# Patient Record
Sex: MALE | Race: ASIAN | NOT HISPANIC OR LATINO | Employment: OTHER | URBAN - METROPOLITAN AREA
[De-identification: names, ages, dates, MRNs, and addresses within clinical notes are randomized per-mention and may not be internally consistent; named-entity substitution may affect disease eponyms.]

---

## 2017-07-14 ENCOUNTER — TRANSFERRED RECORDS (OUTPATIENT)
Dept: HEALTH INFORMATION MANAGEMENT | Facility: CLINIC | Age: 37
End: 2017-07-14

## 2019-01-03 ENCOUNTER — TRANSFERRED RECORDS (OUTPATIENT)
Dept: HEALTH INFORMATION MANAGEMENT | Facility: CLINIC | Age: 39
End: 2019-01-03

## 2019-02-28 ENCOUNTER — TRANSFERRED RECORDS (OUTPATIENT)
Dept: HEALTH INFORMATION MANAGEMENT | Facility: CLINIC | Age: 39
End: 2019-02-28

## 2019-04-29 ENCOUNTER — TRANSFERRED RECORDS (OUTPATIENT)
Dept: HEALTH INFORMATION MANAGEMENT | Facility: CLINIC | Age: 39
End: 2019-04-29

## 2019-05-06 ENCOUNTER — TRANSFERRED RECORDS (OUTPATIENT)
Dept: HEALTH INFORMATION MANAGEMENT | Facility: CLINIC | Age: 39
End: 2019-05-06

## 2019-05-09 ENCOUNTER — MEDICAL CORRESPONDENCE (OUTPATIENT)
Dept: HEALTH INFORMATION MANAGEMENT | Facility: CLINIC | Age: 39
End: 2019-05-09

## 2019-05-09 ENCOUNTER — TRANSFERRED RECORDS (OUTPATIENT)
Dept: HEALTH INFORMATION MANAGEMENT | Facility: CLINIC | Age: 39
End: 2019-05-09

## 2019-05-20 ENCOUNTER — TRANSFERRED RECORDS (OUTPATIENT)
Dept: HEALTH INFORMATION MANAGEMENT | Facility: CLINIC | Age: 39
End: 2019-05-20

## 2019-05-20 LAB
CREAT SERPL-MCNC: 7.14 MG/DL (ref 0.73–1.18)
GFR SERPL CREATININE-BSD FRML MDRD: 9 ML/MIN/1.73M2
GLUCOSE SERPL-MCNC: 97 MG/DL (ref 70–100)
POTASSIUM SERPL-SCNC: 4.5 MMOL/L (ref 3.5–5.1)

## 2019-05-23 ENCOUNTER — REFERRAL (OUTPATIENT)
Dept: TRANSPLANT | Facility: CLINIC | Age: 39
End: 2019-05-23

## 2019-05-23 DIAGNOSIS — E11.9 DIABETES MELLITUS, TYPE 2 (H): ICD-10-CM

## 2019-05-23 DIAGNOSIS — I10 ESSENTIAL HYPERTENSION: ICD-10-CM

## 2019-05-23 DIAGNOSIS — Z76.82 ORGAN TRANSPLANT CANDIDATE: ICD-10-CM

## 2019-05-23 DIAGNOSIS — Z01.818 ENCOUNTER FOR PRE-TRANSPLANT EVALUATION FOR KIDNEY AND PANCREAS TRANSPLANT: ICD-10-CM

## 2019-05-23 DIAGNOSIS — N18.5 CHRONIC RENAL FAILURE, STAGE 5 (H): ICD-10-CM

## 2019-05-23 NOTE — Clinical Note
Please see smart set for pre KP eval. Pt is not yet on dialysis. Pt does NOT need an  speaks english very well. peace Michelle

## 2019-05-23 NOTE — LETTER
June 6, 2019  Erik Cramer  [address] [place] [id number]      Dear Erik,    Thank you for your interest in the Transplant Center at [facility], [facility]. We look forward to being a part of your care team and assisting you through the transplant process.    As we discussed, your transplant coordinator is Viviana Williamson (Pancreas, Kidney).  You may call your coordinator at any time with questions or concerns.  Your first scheduled call will be on 6/25/19.  If this needs to change, call [phone].    Please complete the following.    1. Fill out and return the enclosed forms    Authorization for Electronic Communication    Authorization to Discuss Protected Health Information    Authorization for Release of Protected Health Information    2. Sign up for:    D[employer], access to your electronic medical record (see enclosed pamphlet)    [email or web address], a transplant education website    You can use these tools to learn more about your transplant, communicate with your care team, and track your medical details    Sincerely,    Solid Organ Transplant  [facility], [facility]    cc: Figueroa Washington MD, Ignacio Dietrich MD

## 2019-05-30 VITALS — HEIGHT: 65 IN | BODY MASS INDEX: 29.99 KG/M2 | WEIGHT: 180 LBS

## 2019-05-30 SDOH — HEALTH STABILITY: MENTAL HEALTH: HOW OFTEN DO YOU HAVE A DRINK CONTAINING ALCOHOL?: NEVER

## 2019-05-30 ASSESSMENT — MIFFLIN-ST. JEOR: SCORE: 1668.35

## 2019-05-30 NOTE — TELEPHONE ENCOUNTER
PCP:   Figueroa Washington   Referring Provider: Ignacio Dietrich  Referring Diagnosis:     Insurance information:  [employer]  Policy lucas:  SELF  Subscriber/policy/ID number:  [id number]  Group Number:  [id number]    Is patient in a group home/assisted living? NO  Does patient have a guardian? NO    Referral intake process completed.  Patient is aware that after financial approval is received, medical records will be requested.   Patient confirmed for a callback from transplant coordinator on 6/25/19 (within 2 weeks)  Tentative evaluation date: Not scheduled @ present time (within 4 weeks)    Confirmed coordinator will discuss evaluation process in more detail at the time of their call.   Patient is aware of the need to arrange age appropriate cancer screening, vaccinations, and dental care.  Reminded patient to complete questionnaire, complete medical records release, and review packet prior to evaluation visit .  Assessed patient for special needs (ie--wheelchair, assistance, guardian, and ):  NONE   Patient instructed to call [phone] with questions.

## 2019-06-25 NOTE — TELEPHONE ENCOUNTER
Reviewed records available today. Pt has stage 5 CKD from hypertension per clinic note dated 05/09/2019 per Dr. Ignacio Muhammad - scanned into Epic. No mention of kidney biopsy being done. Pt also has diabetes type 2 which was diagnosed at age 19 or 20 years old. he is currently taking insulin for, previously on metformin. Hgb A1C was 6.6 on 02/22/2019. No history of cardiac/pulmonary issues. BMI 31.56. All OK to proceed with KP evaluation.     Contacted patient and introduced myself as their Transplant Coordinator, also introduced the role of the Transplant Coordinator in the transplant process.  Explained the purpose of this call including reviewing next steps and answering questions. Pt confirmed he is interested in proceeding with KP evaluation. Pt states he uses 24 units of insulin a day and does not check his blood sugars because they have been stable. Pt stating he is currently scheduled to see a nephrologist here on 07/18/2019 for a second opinion about his kidney function status and if there is any chance that it may recover before he proceeds with an actual kidney transplant. Pt is also seeing a holistic doctor who is providing some type of massage treatment/herbals to try to get his kidney function to return. Pt however still wants to proceed with KP evaluation in case his kidney function does not return. I told pt this is all fine and is good for him to get a second opinion try his holistic doctor as these make sense to him at this time. I did review the importance of not using herbal medication or other non approved substances when on immunosuppression due to the unknown interactions. Pt expressed very good understanding of all.   Confirmed Referring Provider, Dialysis Center, and Primary Care Physician. Notified patient of the importance of continued communication with referring providers and primary care physicians.    Reviewed components of transplant evaluation process including necessary  appointments, tests, and procedures.    Answered questions for patient regarding evaluation, provided my name and contact information and requested they call with any additional questions.    Determined that patient would like additional information regarding transplant by:     Drop Down choices: Mail, Email, MyChart, Phone Call   Encourage MyChart   Notified patients that they will hear from a Transplant  to schedule evaluation. Instructed pt to bring someone with him to eval. Pt expressed very good understanding of all and was in good agreement with the plan.     Smart set orders into Epic today for pre kidney/pancreas transplant evaluation - routed ot .

## 2019-07-15 NOTE — TELEPHONE ENCOUNTER
RECORDS RECEIVED FROM:(External)  CKD stage 5. Schedule per [facility]   DATE RECEIVED: 07.18.2019   NOTES STATUS DETAILS   OFFICE NOTE from referring provider Received 05.09.5019   OFFICE NOTE from other specialist  Care Everywhere 06.06.2019 07.05.2019   *Only VASCULITIS or LUPUS gather office notes for the following N/A    *PULMONARY   N/A    *ENT N/A    *DERMATOLOGY N/A    *RHEUMATOLOGY N/A    DISCHARGE SUMMARY from hospital N/A    DISCHARGE REPORT from the ER N/A    MEDICATION LIST Received 05.20.2019   IMAGING  (NEED IMAGES AND REPORTS)     KIDNEY CT SCAN N/A    KIDNEY ULTRASOUND Care Everywhere 07.14.2017   LABS     CBC Received 03.14.2019   CMP Care Everywhere 07.10.2018   BMP Care Everywhere    UA Received 05.19.2018   URINE PROTEIN N/A    RENAL PANEL Received 03.28.2019   BIOPSY     KIDNEY BIOPSY  N/A

## 2019-07-17 DIAGNOSIS — N18.5 CKD (CHRONIC KIDNEY DISEASE) STAGE 5, GFR LESS THAN 15 ML/MIN (H): Primary | ICD-10-CM

## 2019-07-18 ENCOUNTER — OFFICE VISIT (OUTPATIENT)
Dept: NEPHROLOGY | Facility: CLINIC | Age: 39
End: 2019-07-18
Attending: INTERNAL MEDICINE
Payer: COMMERCIAL

## 2019-07-18 ENCOUNTER — PRE VISIT (OUTPATIENT)
Dept: NEPHROLOGY | Facility: CLINIC | Age: 39
End: 2019-07-18

## 2019-07-18 VITALS
WEIGHT: 173.6 LBS | BODY MASS INDEX: 28.92 KG/M2 | DIASTOLIC BLOOD PRESSURE: 87 MMHG | HEART RATE: 84 BPM | HEIGHT: 65 IN | OXYGEN SATURATION: 97 % | SYSTOLIC BLOOD PRESSURE: 173 MMHG

## 2019-07-18 DIAGNOSIS — N18.5 CKD (CHRONIC KIDNEY DISEASE) STAGE 5, GFR LESS THAN 15 ML/MIN (H): ICD-10-CM

## 2019-07-18 DIAGNOSIS — N18.5 ANEMIA DUE TO STAGE 5 CHRONIC KIDNEY DISEASE, NOT ON CHRONIC DIALYSIS (H): ICD-10-CM

## 2019-07-18 DIAGNOSIS — D63.1 ANEMIA DUE TO STAGE 5 CHRONIC KIDNEY DISEASE, NOT ON CHRONIC DIALYSIS (H): ICD-10-CM

## 2019-07-18 DIAGNOSIS — I15.1 HYPERTENSION SECONDARY TO OTHER RENAL DISORDERS: ICD-10-CM

## 2019-07-18 DIAGNOSIS — N25.81 SECONDARY RENAL HYPERPARATHYROIDISM (H): ICD-10-CM

## 2019-07-18 DIAGNOSIS — N18.5 CKD (CHRONIC KIDNEY DISEASE) STAGE 5, GFR LESS THAN 15 ML/MIN (H): Primary | ICD-10-CM

## 2019-07-18 PROBLEM — D64.9 ANEMIA: Status: ACTIVE | Noted: 2019-07-18

## 2019-07-18 LAB
ALBUMIN SERPL-MCNC: 3.2 G/DL (ref 3.4–5)
ALBUMIN UR-MCNC: >499 MG/DL
ANION GAP SERPL CALCULATED.3IONS-SCNC: 6 MMOL/L (ref 3–14)
APPEARANCE UR: CLEAR
BILIRUB UR QL STRIP: NEGATIVE
BUN SERPL-MCNC: 72 MG/DL (ref 7–30)
CALCIUM SERPL-MCNC: 7.3 MG/DL (ref 8.5–10.1)
CHLORIDE SERPL-SCNC: 107 MMOL/L (ref 94–109)
CO2 SERPL-SCNC: 26 MMOL/L (ref 20–32)
COLOR UR AUTO: YELLOW
CREAT SERPL-MCNC: 7.35 MG/DL (ref 0.66–1.25)
CREAT UR-MCNC: 88 MG/DL
DEPRECATED CALCIDIOL+CALCIFEROL SERPL-MC: 9 UG/L (ref 20–75)
ERYTHROCYTE [DISTWIDTH] IN BLOOD BY AUTOMATED COUNT: 11.9 % (ref 10–15)
GFR SERPL CREATININE-BSD FRML MDRD: 9 ML/MIN/{1.73_M2}
GLUCOSE SERPL-MCNC: 108 MG/DL (ref 70–99)
GLUCOSE UR STRIP-MCNC: 50 MG/DL
HCT VFR BLD AUTO: 31.9 % (ref 40–53)
HGB BLD-MCNC: 10.2 G/DL (ref 13.3–17.7)
HGB UR QL STRIP: NEGATIVE
KETONES UR STRIP-MCNC: NEGATIVE MG/DL
LEUKOCYTE ESTERASE UR QL STRIP: NEGATIVE
MCH RBC QN AUTO: 28.8 PG (ref 26.5–33)
MCHC RBC AUTO-ENTMCNC: 32 G/DL (ref 31.5–36.5)
MCV RBC AUTO: 90 FL (ref 78–100)
MUCOUS THREADS #/AREA URNS LPF: PRESENT /LPF
NITRATE UR QL: NEGATIVE
PH UR STRIP: 6 PH (ref 5–7)
PHOSPHATE SERPL-MCNC: 5.7 MG/DL (ref 2.5–4.5)
PLATELET # BLD AUTO: 292 10E9/L (ref 150–450)
POTASSIUM SERPL-SCNC: 5.1 MMOL/L (ref 3.4–5.3)
PROT UR-MCNC: 5.48 G/L
PROT/CREAT 24H UR: 6.2 G/G CR (ref 0–0.2)
RBC # BLD AUTO: 3.54 10E12/L (ref 4.4–5.9)
RBC #/AREA URNS AUTO: 9 /HPF (ref 0–2)
SODIUM SERPL-SCNC: 139 MMOL/L (ref 133–144)
SOURCE: ABNORMAL
SP GR UR STRIP: 1.01 (ref 1–1.03)
SQUAMOUS #/AREA URNS AUTO: <1 /HPF (ref 0–1)
UROBILINOGEN UR STRIP-MCNC: 0 MG/DL (ref 0–2)
WBC # BLD AUTO: 7.3 10E9/L (ref 4–11)
WBC #/AREA URNS AUTO: 5 /HPF (ref 0–5)

## 2019-07-18 PROCEDURE — G0463 HOSPITAL OUTPT CLINIC VISIT: HCPCS | Mod: ZF

## 2019-07-18 PROCEDURE — 80069 RENAL FUNCTION PANEL: CPT | Performed by: INTERNAL MEDICINE

## 2019-07-18 PROCEDURE — 84156 ASSAY OF PROTEIN URINE: CPT | Performed by: INTERNAL MEDICINE

## 2019-07-18 PROCEDURE — 36415 COLL VENOUS BLD VENIPUNCTURE: CPT | Performed by: INTERNAL MEDICINE

## 2019-07-18 PROCEDURE — 81001 URINALYSIS AUTO W/SCOPE: CPT | Performed by: INTERNAL MEDICINE

## 2019-07-18 PROCEDURE — 82306 VITAMIN D 25 HYDROXY: CPT | Performed by: INTERNAL MEDICINE

## 2019-07-18 PROCEDURE — 85027 COMPLETE CBC AUTOMATED: CPT | Performed by: INTERNAL MEDICINE

## 2019-07-18 RX ORDER — INSULIN GLARGINE 100 [IU]/ML
14 INJECTION, SOLUTION SUBCUTANEOUS AT BEDTIME
Refills: 2 | Status: ON HOLD | COMMUNITY
Start: 2019-06-06 | End: 2021-06-21

## 2019-07-18 RX ORDER — LANCETS
EACH MISCELLANEOUS
Status: ON HOLD | COMMUNITY
Start: 2019-05-20 | End: 2021-06-10

## 2019-07-18 RX ORDER — CALCIUM CARBONATE 500 MG/1
1 TABLET, CHEWABLE ORAL PRN
Status: ON HOLD | COMMUNITY
Start: 2019-05-09 | End: 2021-06-21

## 2019-07-18 RX ORDER — SILDENAFIL 50 MG/1
50 TABLET, FILM COATED ORAL
COMMUNITY
Start: 2018-11-19 | End: 2019-10-09

## 2019-07-18 RX ORDER — ISOPROPYL ALCOHOL 70 %
1 TOWELETTE (EA) MISCELLANEOUS
Status: ON HOLD | COMMUNITY
Start: 2016-02-17 | End: 2020-08-31

## 2019-07-18 RX ORDER — CALCITRIOL 0.25 UG/1
0.25 CAPSULE, LIQUID FILLED ORAL DAILY
Refills: 3 | COMMUNITY
Start: 2019-07-02 | End: 2019-12-03

## 2019-07-18 RX ORDER — ATORVASTATIN CALCIUM 80 MG/1
80 TABLET, FILM COATED ORAL EVERY EVENING
Refills: 2 | Status: ON HOLD | COMMUNITY
Start: 2018-11-14 | End: 2021-06-21

## 2019-07-18 RX ORDER — AMLODIPINE BESYLATE 10 MG/1
10 TABLET ORAL
Status: ON HOLD | COMMUNITY
Start: 2019-05-28 | End: 2020-08-31

## 2019-07-18 RX ORDER — HYDRALAZINE HYDROCHLORIDE 100 MG/1
100 TABLET, FILM COATED ORAL 3 TIMES DAILY
Refills: 0 | Status: ON HOLD | COMMUNITY
Start: 2018-12-27 | End: 2021-06-21

## 2019-07-18 RX ORDER — FUROSEMIDE 20 MG
40 TABLET ORAL 2 TIMES DAILY
Qty: 60 TABLET | Refills: 1 | Status: SHIPPED | OUTPATIENT
Start: 2019-07-18 | End: 2021-01-14

## 2019-07-18 RX ORDER — FUROSEMIDE 20 MG
TABLET ORAL
COMMUNITY
End: 2019-12-03

## 2019-07-18 ASSESSMENT — MIFFLIN-ST. JEOR: SCORE: 1639.32

## 2019-07-18 ASSESSMENT — PAIN SCALES - GENERAL: PAINLEVEL: NO PAIN (0)

## 2019-07-18 NOTE — LETTER
2019       RE: Erik Cramer  [address]  [facility] [address]     Dear Colleague,    Thank you for referring your patient, Erik Carmer, to the [facility] NEPHROLOGY at [facility]. Please see a copy of my visit note below.      Nephrology Clinic    Oly Martinez MD  2019     Name: Erik Cramer  MRN: [id number]  Age: 39 year old  : 1980  Referring provider: Figueroa Washington     Assessment and Plan:    # CKD Stage 5: Likely secondary to diabetic nephropathy and uncontrolled hypertension. He has had a negative w/u for other autoimmune diseases. IgA nephropathy cannot be entirely ruled out based on labs, but he has not had significant hematuria and his clinical course is most consistent with diabetic nephropathy. He has been on herbal supplements for his diabetes since early this year but it is hard to say if they contributed to any worsening. GFR now at 9 and has been stable over last several months. Creatinine at 6.6 mg/dl. UA with proteinuria and P/Cr ratio of 6 g/g. Does not have any uremic symptoms at this time.   --Given the chronicity of his kidney disease, biopsy may not  and hence not recommended at this time  --No acute indication for dialysis however discussed that he may need it pretty soon.   --He has an appointment for transplant evaluation  --Discussed dialysis modalities with him in brief  --Recommend a low sodium and potassium diet.    --He will continue to f/u with Dr Dietrich in [place].     # Diabetes Type 2: Under control with the help of herbal supplements. Patient tracks blood sugar at home.     # Hypertension: Hypervolemic on exam. /87 today in clinic which is unusually high for him. Has a low salt diet. 2+ pitting edema b/l which has been getting worse more recently.  - Will increase Furosemide to 40 mg BID.     # Anemia in CKD: Hemoglobin is at 10.2. No indication for EPO    # Secondary hyperparathyroidism:  on Calcitriol per his primary nephrologist.     Follow-up: No follow-ups on file. Will follow up in [place] with Dr. Dietrich.     Reason For Visit:   Second Opinion     HPI:   Identification:  Erik Cramer is a 39 year old male with a history of diabetes mellitus type 2 and hypertension. He was diagnosed with DM at age 19 (around 2002) and did not require insulin at the time. He is now on insulin. He was diagnosed around the same time with hypertension. His blood pressure has not been controlled for a number of years, even with medication.   In the last year, the patient's kidney function has declined rapidly. The patient was last seen by Dr. Dietrich at [facility] on 6/6/19; please see that note for further details. Since this last visit, the patient reports doing alright. He is here for a second opinion on next steps today. He is not keen on doing dialysis, as his physical health does not reflect a need for it.  The patient reports lower leg edema bilaterally. The patient denies dizziness, SOB, decreased appetite, and sleep disturbances. He denies skin rashes, joint pain, or history of kidney stones. The patient is not aware of any relatives that have kidney disease, but the patient has a family history of hypertension and diabetes.      Review of Systems:   Pertinent items are noted in HPI or as below, remainder of complete ROS is negative.      Active Medications:     Current Outpatient Medications:      Alcohol Sheets (ALCOH-WIPE) SHEE, Apply 1 each topically, Disp: , Rfl:      amLODIPine (NORVASC) 10 MG tablet, Take 10 mg by mouth, Disp: , Rfl:      blood glucose (ACCU-CHEK GUIDE) test strip, 1 strip, Disp: , Rfl:      blood glucose monitoring (ACCU-CHEK FASTCLIX) lancets, Testing blood sugars 2 time(s) a day.  Indications: Type 2 Diabetes, Disp: , Rfl:      calcium carbonate (TUMS) 500 MG chewable tablet, Take 1,000 mg by mouth, Disp: , Rfl:      furosemide (LASIX) 20 MG tablet, Take 2 tablets (40 mg) by  "mouth 2 times daily, Disp: 60 tablet, Rfl: 1     insulin pen needle (BD RAÚL U/F) 32G X 4 MM miscellaneous, use as directed with lantus pen once a day, Disp: , Rfl:      sildenafil (VIAGRA) 50 MG tablet, Take 50 mg by mouth, Disp: , Rfl:      atorvastatin (LIPITOR) 40 MG tablet, Take 40 mg by mouth daily, Disp: , Rfl: 2     calcitRIOL (ROCALTROL) 0.25 MCG capsule, Take 0.25 mcg by mouth daily, Disp: , Rfl: 3     furosemide (LASIX) 20 MG tablet, , Disp: , Rfl:      hydrALAZINE (APRESOLINE) 25 MG tablet, Take 2 tablets BY MOUTH TWICE DAILY, Disp: , Rfl: 0     LANTUS SOLOSTAR 100 UNIT/ML soln, Inject 30 units sub-q every evening., Disp: , Rfl: 2     Allergies:   Patient has no known allergies.      Past Medical History:  Diabetes Type 2  Hypertension     Past Surgical History:  Retinal detachment eye surgery Left eye, 2015  Trigger finger, left    Family History:   Hypertension  Diabetes     Social History:   - Marital status:   - Smoking status: Never Smoker  - Smokeless tobacco: Never Used  - Alcohol use: Yes   - Frequency: Social     Physical Exam:  /87   Pulse 84   Ht 1.651 m (5' 5\")   Wt 78.7 kg (173 lb 9.6 oz)   SpO2 97%   BMI 28.89 kg/m        Laboratory:  CMP  Recent Labs   Lab Test 07/18/19  1158 05/20/19     --    POTASSIUM 5.1 4.5   CHLORIDE 107  --    CO2 26  --    ANIONGAP 6  --    * 97   BUN 72*  --    CR 7.35* 7.14*   GFRESTIMATED 9* 9*   GFRESTBLACK 10* 10*   SHAY 7.3*  --    PHOS 5.7*  --    ALBUMIN 3.2*  --      CBC  Recent Labs   Lab Test 07/18/19  1158   HGB 10.2*   WBC 7.3   RBC 3.54*   HCT 31.9*   MCV 90   MCH 28.8   MCHC 32.0   RDW 11.9        INR  No lab results found.  ABG  No lab results found.   URINE STUDIES  Recent Labs   Lab Test 07/18/19  1200   COLOR Yellow   APPEARANCE Clear   URINEGLC 50*   URINEBILI Negative   URINEKETONE Negative   SG 1.011   UBLD Negative   URINEPH 6.0   PROTEIN >499*   NITRITE Negative   LEUKEST Negative   RBCU 9*   WBCU 5 "     Recent Labs   Lab Test 07/18/19  1200   UTPG 6.20*     PTH  No lab results found.  IRON STUDIES   No lab results found.    Imaging:    Scribe Disclosure:   I, Ghislaine Penny, am serving as a scribe to document services personally performed by Oly Martinez MD at this visit, based upon the provider's statements to me. All documentation has been reviewed by the aforementioned provider prior to being entered into the official medical record.     Again, thank you for allowing me to participate in the care of your patient.      Sincerely,    Oly Martinez MD

## 2019-07-18 NOTE — PATIENT INSTRUCTIONS
Patient Education     Discharge Instructions: Eating a Low-Potassium Diet  Your healthcare provider has prescribed a low-potassium diet for you. This kind of diet is advised for people who have certain kidney problems. Potassium is needed for muscle function. But too much potassium is a health risk. Potassium is found in many foods. Read below to find out how to change your diet.  Foods to limit  Some foods are high in potassium. Limit your daily intake of the foods in the list below.    Fruits: apricots (canned and fresh), bananas, cantaloupe, honeydew melon, kiwi, nectarines, pomegranates, oranges, orange juice, pears, dried fruits (apricots, dates, figs, prunes), and prune juice    Vegetables: asparagus, avocado, artichoke, bamboo shoots, beets, brussels sprouts, cabbage, celery, chard, okra, potatoes (white and sweet), pumpkin, rutabaga, spinach (cooked), squash, tomato, tomato sauce, tomato juice, and vegetable juice cocktail    Legumes: black-eyed peas, chickpeas, lentils, lima beans, navy beans, red kidney beans, soybeans, and split peas    Nuts and seeds: almonds, Brazil nuts, cashews, peanuts, peanut butter, pecans, pumpkin seeds, sunflower seeds, and walnuts    Breads and cereals: bran and whole-grain products    Dairy foods: milk, cheese, ice cream, yogurt    Animal protein: all forms of animal protein    Other: chocolate, cocoa, coconut milk, and molasses  Tips    Ask your healthcare provider how much potassium you are allowed each day. This will help you figure out serving sizes for your needs.    Check labels for potassium. It may be listed as potassium chloride.    Do not use salt substitutes. These often have potassium in them.    Cook frozen fruits and vegetables in water. Rinse and drain them well before eating.    Drain liquid from all canned fruits and vegetables. Rinse them before eating.    Reduce the potassium in potatoes. Peel them, slice thinly, and soak in water for at least 4  hours.    Reduce the potassium in green leafy vegetables. Soak them in water for at least 4 hours.    Eat white rice and refined white flour products. These include white bread, pasta, and grits.  Follow-up  Make a follow-up appointment as advised by your healthcare provider.  When to call your healthcare provider  Call your healthcare provider right away if you have any of the following:    Fatigue    Shortness of breath    Chest pain    Slow, irregular heartbeat    Fainting    Dizziness    Lightheadedness    Confusion   Date Last Reviewed: 6/1/2017 2000-2018 [employer]. [address] [place] [address]. All rights reserved. This information is not intended as a substitute for professional medical care. Always follow your healthcare professional's instructions.           1. Increase the dose of furosemide from 40 mg daily to 40 mg 2 times a day.

## 2019-07-18 NOTE — LETTER
2019      RE: Erik Cramer  [address]  [facility] [address]         Nephrology Clinic    Oly Martinez MD  2019     Name: Erik Cramer  MRN: [id number]  Age: 39 year old  : 1980  Referring provider: Figueroa Washington     Assessment and Plan:    # CKD Stage 5: Likely secondary to diabetic nephropathy and uncontrolled hypertension. He has had a negative w/u for other autoimmune diseases. IgA nephropathy cannot be entirely ruled out based on labs, but he has not had significant hematuria and his clinical course is most consistent with diabetic nephropathy. He has been on herbal supplements for his diabetes since early this year but it is hard to say if they contributed to any worsening. GFR now at 9 and has been stable over last several months. Creatinine at 6.6 mg/dl. UA with proteinuria and P/Cr ratio of 6 g/g. Does not have any uremic symptoms at this time.   --Given the chronicity of his kidney disease, biopsy may not  and hence not recommended at this time  --No acute indication for dialysis however discussed that he may need it pretty soon.   --He has an appointment for transplant evaluation  --Discussed dialysis modalities with him in brief  --Recommend a low sodium and potassium diet.    --He will continue to f/u with Dr Dietrich in [place].     # Diabetes Type 2: Under control with the help of herbal supplements. Patient tracks blood sugar at home.     # Hypertension: Hypervolemic on exam. /87 today in clinic which is unusually high for him. Has a low salt diet. 2+ pitting edema b/l which has been getting worse more recently.  - Will increase Furosemide to 40 mg BID.     # Anemia in CKD: Hemoglobin is at 10.2. No indication for EPO    # Secondary hyperparathyroidism: on Calcitriol per his primary nephrologist.     Follow-up: No follow-ups on file. Will follow up in [place] with Dr. Dietrich.     Reason For Visit:   Second Opinion     HPI:   Identification:  Erik  FLORINA Cramer is a 39 year old male with a history of diabetes mellitus type 2 and hypertension. He was diagnosed with DM at age 19 (around 2002) and did not require insulin at the time. He is now on insulin. He was diagnosed around the same time with hypertension. His blood pressure has not been controlled for a number of years, even with medication.   In the last year, the patient's kidney function has declined rapidly. The patient was last seen by Dr. Dietrich at [facility] on 6/6/19; please see that note for further details. Since this last visit, the patient reports doing alright. He is here for a second opinion on next steps today. He is not keen on doing dialysis, as his physical health does not reflect a need for it.  The patient reports lower leg edema bilaterally. The patient denies dizziness, SOB, decreased appetite, and sleep disturbances. He denies skin rashes, joint pain, or history of kidney stones. The patient is not aware of any relatives that have kidney disease, but the patient has a family history of hypertension and diabetes.      Review of Systems:   Pertinent items are noted in HPI or as below, remainder of complete ROS is negative.      Active Medications:     Current Outpatient Medications:      Alcohol Sheets (ALCOH-WIPE) SHEE, Apply 1 each topically, Disp: , Rfl:      amLODIPine (NORVASC) 10 MG tablet, Take 10 mg by mouth, Disp: , Rfl:      blood glucose (ACCU-CHEK GUIDE) test strip, 1 strip, Disp: , Rfl:      blood glucose monitoring (ACCU-CHEK FASTCLIX) lancets, Testing blood sugars 2 time(s) a day.  Indications: Type 2 Diabetes, Disp: , Rfl:      calcium carbonate (TUMS) 500 MG chewable tablet, Take 1,000 mg by mouth, Disp: , Rfl:      furosemide (LASIX) 20 MG tablet, Take 2 tablets (40 mg) by mouth 2 times daily, Disp: 60 tablet, Rfl: 1     insulin pen needle (BD RAÚL U/F) 32G X 4 MM miscellaneous, use as directed with lantus pen once a day, Disp: , Rfl:      sildenafil (VIAGRA) 50 MG  "tablet, Take 50 mg by mouth, Disp: , Rfl:      atorvastatin (LIPITOR) 40 MG tablet, Take 40 mg by mouth daily, Disp: , Rfl: 2     calcitRIOL (ROCALTROL) 0.25 MCG capsule, Take 0.25 mcg by mouth daily, Disp: , Rfl: 3     furosemide (LASIX) 20 MG tablet, , Disp: , Rfl:      hydrALAZINE (APRESOLINE) 25 MG tablet, Take 2 tablets BY MOUTH TWICE DAILY, Disp: , Rfl: 0     LANTUS SOLOSTAR 100 UNIT/ML soln, Inject 30 units sub-q every evening., Disp: , Rfl: 2     Allergies:   Patient has no known allergies.      Past Medical History:  Diabetes Type 2  Hypertension     Past Surgical History:  Retinal detachment eye surgery Left eye, 2015  Trigger finger, left    Family History:   Hypertension  Diabetes     Social History:   - Marital status:   - Smoking status: Never Smoker  - Smokeless tobacco: Never Used  - Alcohol use: Yes   - Frequency: Social     Physical Exam:  /87   Pulse 84   Ht 1.651 m (5' 5\")   Wt 78.7 kg (173 lb 9.6 oz)   SpO2 97%   BMI 28.89 kg/m        Laboratory:  CMP  Recent Labs   Lab Test 07/18/19  1158 05/20/19     --    POTASSIUM 5.1 4.5   CHLORIDE 107  --    CO2 26  --    ANIONGAP 6  --    * 97   BUN 72*  --    CR 7.35* 7.14*   GFRESTIMATED 9* 9*   GFRESTBLACK 10* 10*   SHAY 7.3*  --    PHOS 5.7*  --    ALBUMIN 3.2*  --      CBC  Recent Labs   Lab Test 07/18/19  1158   HGB 10.2*   WBC 7.3   RBC 3.54*   HCT 31.9*   MCV 90   MCH 28.8   MCHC 32.0   RDW 11.9        INR  No lab results found.  ABG  No lab results found.   URINE STUDIES  Recent Labs   Lab Test 07/18/19  1200   COLOR Yellow   APPEARANCE Clear   URINEGLC 50*   URINEBILI Negative   URINEKETONE Negative   SG 1.011   UBLD Negative   URINEPH 6.0   PROTEIN >499*   NITRITE Negative   LEUKEST Negative   RBCU 9*   WBCU 5     Recent Labs   Lab Test 07/18/19  1200   UTPG 6.20*     PTH  No lab results found.  IRON STUDIES   No lab results found.    Imaging:    Scribe Disclosure:   Ghislaine BHAKTA, am serving as a scribe " to document services personally performed by Oly Martinez MD at this visit, based upon the provider's statements to me. All documentation has been reviewed by the aforementioned provider prior to being entered into the official medical record.     Oly Martinez MD

## 2019-07-18 NOTE — PROGRESS NOTES
Nephrology Clinic    Oly Martinez MD  2019     Name: Erik Cramer  MRN: [id number]  Age: 39 year old  : 1980  Referring provider: Figueroa Washington     Assessment and Plan:    # CKD Stage 5: Likely secondary to diabetic nephropathy and uncontrolled hypertension. He has had a negative w/u for other autoimmune diseases. IgA nephropathy cannot be entirely ruled out based on labs, but he has not had significant hematuria and his clinical course is most consistent with diabetic nephropathy. He has been on herbal supplements for his diabetes since early this year but it is hard to say if they contributed to any worsening. GFR now at 9 and has been stable over last several months. Creatinine at 6.6 mg/dl. UA with proteinuria and P/Cr ratio of 6 g/g. Does not have any uremic symptoms at this time.   --Given the chronicity of his kidney disease, biopsy may not  and hence not recommended at this time  --No acute indication for dialysis however discussed that he may need it pretty soon.   --He has an appointment for transplant evaluation  --Discussed dialysis modalities with him in brief  --Recommend a low sodium and potassium diet.    --He will continue to f/u with Dr Dietrich in [place].     # Diabetes Type 2: Under control with the help of herbal supplements. Patient tracks blood sugar at home.     # Hypertension: Hypervolemic on exam. /87 today in clinic which is unusually high for him. Has a low salt diet. 2+ pitting edema b/l which has been getting worse more recently.  - Will increase Furosemide to 40 mg BID.     # Anemia in CKD: Hemoglobin is at 10.2. No indication for EPO    # Secondary hyperparathyroidism: on Calcitriol per his primary nephrologist.     Follow-up: No follow-ups on file. Will follow up in [place] with Dr. Dietrich.     Reason For Visit:   Second Opinion     HPI:   Identification:  Erik Cramer is a 39 year old male with a history of diabetes mellitus type  2 and hypertension. He was diagnosed with DM at age 19 (around 2002) and did not require insulin at the time. He is now on insulin. He was diagnosed around the same time with hypertension. His blood pressure has not been controlled for a number of years, even with medication.   In the last year, the patient's kidney function has declined rapidly. The patient was last seen by Dr. Dietrich at [facility] on 6/6/19; please see that note for further details. Since this last visit, the patient reports doing alright. He is here for a second opinion on next steps today. He is not keen on doing dialysis, as his physical health does not reflect a need for it.  The patient reports lower leg edema bilaterally. The patient denies dizziness, SOB, decreased appetite, and sleep disturbances. He denies skin rashes, joint pain, or history of kidney stones. The patient is not aware of any relatives that have kidney disease, but the patient has a family history of hypertension and diabetes.      Review of Systems:   Pertinent items are noted in HPI or as below, remainder of complete ROS is negative.      Active Medications:     Current Outpatient Medications:      Alcohol Sheets (ALCOH-WIPE) SHEE, Apply 1 each topically, Disp: , Rfl:      amLODIPine (NORVASC) 10 MG tablet, Take 10 mg by mouth, Disp: , Rfl:      blood glucose (ACCU-CHEK GUIDE) test strip, 1 strip, Disp: , Rfl:      blood glucose monitoring (ACCU-CHEK FASTCLIX) lancets, Testing blood sugars 2 time(s) a day.  Indications: Type 2 Diabetes, Disp: , Rfl:      calcium carbonate (TUMS) 500 MG chewable tablet, Take 1,000 mg by mouth, Disp: , Rfl:      furosemide (LASIX) 20 MG tablet, Take 2 tablets (40 mg) by mouth 2 times daily, Disp: 60 tablet, Rfl: 1     insulin pen needle (BD RAÚL U/F) 32G X 4 MM miscellaneous, use as directed with lantus pen once a day, Disp: , Rfl:      sildenafil (VIAGRA) 50 MG tablet, Take 50 mg by mouth, Disp: , Rfl:      atorvastatin (LIPITOR) 40  "MG tablet, Take 40 mg by mouth daily, Disp: , Rfl: 2     calcitRIOL (ROCALTROL) 0.25 MCG capsule, Take 0.25 mcg by mouth daily, Disp: , Rfl: 3     furosemide (LASIX) 20 MG tablet, , Disp: , Rfl:      hydrALAZINE (APRESOLINE) 25 MG tablet, Take 2 tablets BY MOUTH TWICE DAILY, Disp: , Rfl: 0     LANTUS SOLOSTAR 100 UNIT/ML soln, Inject 30 units sub-q every evening., Disp: , Rfl: 2     Allergies:   Patient has no known allergies.      Past Medical History:  Diabetes Type 2  Hypertension     Past Surgical History:  Retinal detachment eye surgery Left eye, 2015  Trigger finger, left    Family History:   Hypertension  Diabetes     Social History:   - Marital status:   - Smoking status: Never Smoker  - Smokeless tobacco: Never Used  - Alcohol use: Yes   - Frequency: Social     Physical Exam:  /87   Pulse 84   Ht 1.651 m (5' 5\")   Wt 78.7 kg (173 lb 9.6 oz)   SpO2 97%   BMI 28.89 kg/m       Laboratory:  CMP  Recent Labs   Lab Test 07/18/19  1158 05/20/19     --    POTASSIUM 5.1 4.5   CHLORIDE 107  --    CO2 26  --    ANIONGAP 6  --    * 97   BUN 72*  --    CR 7.35* 7.14*   GFRESTIMATED 9* 9*   GFRESTBLACK 10* 10*   SHAY 7.3*  --    PHOS 5.7*  --    ALBUMIN 3.2*  --      CBC  Recent Labs   Lab Test 07/18/19  1158   HGB 10.2*   WBC 7.3   RBC 3.54*   HCT 31.9*   MCV 90   MCH 28.8   MCHC 32.0   RDW 11.9        INR  No lab results found.  ABG  No lab results found.   URINE STUDIES  Recent Labs   Lab Test 07/18/19  1200   COLOR Yellow   APPEARANCE Clear   URINEGLC 50*   URINEBILI Negative   URINEKETONE Negative   SG 1.011   UBLD Negative   URINEPH 6.0   PROTEIN >499*   NITRITE Negative   LEUKEST Negative   RBCU 9*   WBCU 5     Recent Labs   Lab Test 07/18/19  1200   UTPG 6.20*     PTH  No lab results found.  IRON STUDIES   No lab results found.    Imaging:    Scribe Disclosure:   Ghislaine BHAKTA, am serving as a scribe to document services personally performed by Oly Martinez MD at this " visit, based upon the provider's statements to me. All documentation has been reviewed by the aforementioned provider prior to being entered into the official medical record.

## 2019-07-22 ENCOUNTER — TELEPHONE (OUTPATIENT)
Dept: NEPHROLOGY | Facility: CLINIC | Age: 39
End: 2019-07-22

## 2019-07-22 DIAGNOSIS — E56.9 VITAMIN DEFICIENCY: Primary | ICD-10-CM

## 2019-07-22 RX ORDER — CHOLECALCIFEROL (VITAMIN D3) 50 MCG
2000 TABLET ORAL DAILY
Qty: 90 TABLET | Refills: 0 | Status: SHIPPED | OUTPATIENT
Start: 2019-07-22

## 2019-07-29 ENCOUNTER — ALLIED HEALTH/NURSE VISIT (OUTPATIENT)
Dept: TRANSPLANT | Facility: CLINIC | Age: 39
End: 2019-07-29
Attending: PHYSICIAN ASSISTANT
Payer: COMMERCIAL

## 2019-07-29 ENCOUNTER — DOCUMENTATION ONLY (OUTPATIENT)
Dept: TRANSPLANT | Facility: CLINIC | Age: 39
End: 2019-07-29

## 2019-07-29 ENCOUNTER — ANCILLARY PROCEDURE (OUTPATIENT)
Dept: CARDIOLOGY | Facility: CLINIC | Age: 39
End: 2019-07-29
Attending: PHYSICIAN ASSISTANT
Payer: COMMERCIAL

## 2019-07-29 ENCOUNTER — ANCILLARY PROCEDURE (OUTPATIENT)
Dept: GENERAL RADIOLOGY | Facility: CLINIC | Age: 39
End: 2019-07-29
Attending: PHYSICIAN ASSISTANT
Payer: COMMERCIAL

## 2019-07-29 VITALS
OXYGEN SATURATION: 97 % | HEIGHT: 65 IN | TEMPERATURE: 97.6 F | SYSTOLIC BLOOD PRESSURE: 163 MMHG | BODY MASS INDEX: 29.31 KG/M2 | WEIGHT: 175.9 LBS | DIASTOLIC BLOOD PRESSURE: 77 MMHG | HEART RATE: 79 BPM

## 2019-07-29 DIAGNOSIS — E11.9 DIABETES MELLITUS, TYPE 2 (H): ICD-10-CM

## 2019-07-29 DIAGNOSIS — I15.0 RENOVASCULAR HYPERTENSION: ICD-10-CM

## 2019-07-29 DIAGNOSIS — I10 ESSENTIAL HYPERTENSION: ICD-10-CM

## 2019-07-29 DIAGNOSIS — E11.01 TYPE 2 DIABETES MELLITUS WITH HYPEROSMOLAR COMA, WITHOUT LONG-TERM CURRENT USE OF INSULIN (H): ICD-10-CM

## 2019-07-29 DIAGNOSIS — N18.5 CHRONIC RENAL FAILURE, STAGE 5 (H): ICD-10-CM

## 2019-07-29 DIAGNOSIS — Z01.818 ENCOUNTER FOR PRE-TRANSPLANT EVALUATION FOR KIDNEY AND PANCREAS TRANSPLANT: ICD-10-CM

## 2019-07-29 DIAGNOSIS — Z79.4 TYPE 2 DIABETES MELLITUS WITH STAGE 5 CHRONIC KIDNEY DISEASE NOT ON CHRONIC DIALYSIS, WITH LONG-TERM CURRENT USE OF INSULIN (H): ICD-10-CM

## 2019-07-29 DIAGNOSIS — Z76.82 ORGAN TRANSPLANT CANDIDATE: ICD-10-CM

## 2019-07-29 DIAGNOSIS — E55.9 VITAMIN D DEFICIENCY: ICD-10-CM

## 2019-07-29 DIAGNOSIS — N18.5 CKD (CHRONIC KIDNEY DISEASE) STAGE 5, GFR LESS THAN 15 ML/MIN (H): Primary | ICD-10-CM

## 2019-07-29 DIAGNOSIS — E11.22 TYPE 2 DIABETES MELLITUS WITH STAGE 5 CHRONIC KIDNEY DISEASE NOT ON CHRONIC DIALYSIS, WITH LONG-TERM CURRENT USE OF INSULIN (H): ICD-10-CM

## 2019-07-29 DIAGNOSIS — Z76.82 ORGAN TRANSPLANT CANDIDATE: Primary | ICD-10-CM

## 2019-07-29 DIAGNOSIS — N18.5 CKD (CHRONIC KIDNEY DISEASE) STAGE 5, GFR LESS THAN 15 ML/MIN (H): ICD-10-CM

## 2019-07-29 DIAGNOSIS — N18.5 TYPE 2 DIABETES MELLITUS WITH STAGE 5 CHRONIC KIDNEY DISEASE NOT ON CHRONIC DIALYSIS, WITH LONG-TERM CURRENT USE OF INSULIN (H): ICD-10-CM

## 2019-07-29 DIAGNOSIS — K31.84 GASTROPARESIS: ICD-10-CM

## 2019-07-29 DIAGNOSIS — N18.5 CHRONIC KIDNEY DISEASE, STAGE 5 (H): ICD-10-CM

## 2019-07-29 DIAGNOSIS — Z01.818 ENCOUNTER FOR PRE-TRANSPLANT EVALUATION FOR KIDNEY AND PANCREAS TRANSPLANT: Primary | ICD-10-CM

## 2019-07-29 DIAGNOSIS — D63.1 ANEMIA IN STAGE 5 CHRONIC KIDNEY DISEASE, NOT ON CHRONIC DIALYSIS (H): ICD-10-CM

## 2019-07-29 DIAGNOSIS — N18.5 ANEMIA IN STAGE 5 CHRONIC KIDNEY DISEASE, NOT ON CHRONIC DIALYSIS (H): ICD-10-CM

## 2019-07-29 LAB
ABO + RH BLD: NORMAL
ALBUMIN SERPL-MCNC: 3.8 G/DL (ref 3.4–5)
ALBUMIN UR-MCNC: >499 MG/DL
ALP SERPL-CCNC: 176 U/L (ref 40–150)
ALT SERPL W P-5'-P-CCNC: 29 U/L (ref 0–70)
ANION GAP SERPL CALCULATED.3IONS-SCNC: 7 MMOL/L (ref 3–14)
APPEARANCE UR: CLEAR
APTT PPP: 37 SEC (ref 22–37)
AST SERPL W P-5'-P-CCNC: 30 U/L (ref 0–45)
BASOPHILS # BLD AUTO: 0.1 10E9/L (ref 0–0.2)
BASOPHILS NFR BLD AUTO: 0.6 %
BILIRUB SERPL-MCNC: 0.3 MG/DL (ref 0.2–1.3)
BILIRUB UR QL STRIP: NEGATIVE
BLD GP AB SCN SERPL QL: NORMAL
BLOOD BANK CMNT PATIENT-IMP: NORMAL
BUN SERPL-MCNC: 101 MG/DL (ref 7–30)
CALCIUM SERPL-MCNC: 7.8 MG/DL (ref 8.5–10.1)
CHLORIDE SERPL-SCNC: 106 MMOL/L (ref 94–109)
CO2 SERPL-SCNC: 24 MMOL/L (ref 20–32)
COLOR UR AUTO: ABNORMAL
CREAT SERPL-MCNC: 7.79 MG/DL (ref 0.66–1.25)
DIFFERENTIAL METHOD BLD: ABNORMAL
EOSINOPHIL # BLD AUTO: 0.4 10E9/L (ref 0–0.7)
EOSINOPHIL NFR BLD AUTO: 4.1 %
ERYTHROCYTE [DISTWIDTH] IN BLOOD BY AUTOMATED COUNT: 12.3 % (ref 10–15)
GFR SERPL CREATININE-BSD FRML MDRD: 8 ML/MIN/{1.73_M2}
GLUCOSE SERPL-MCNC: 138 MG/DL (ref 70–99)
GLUCOSE UR STRIP-MCNC: 50 MG/DL
HBA1C MFR BLD: 6.4 % (ref 0–5.6)
HCT VFR BLD AUTO: 30.2 % (ref 40–53)
HGB BLD-MCNC: 9.9 G/DL (ref 13.3–17.7)
HGB UR QL STRIP: NEGATIVE
IMM GRANULOCYTES # BLD: 0 10E9/L (ref 0–0.4)
IMM GRANULOCYTES NFR BLD: 0.3 %
INR PPP: 1.03 (ref 0.86–1.14)
KETONES UR STRIP-MCNC: NEGATIVE MG/DL
LEUKOCYTE ESTERASE UR QL STRIP: NEGATIVE
LYMPHOCYTES # BLD AUTO: 1.8 10E9/L (ref 0.8–5.3)
LYMPHOCYTES NFR BLD AUTO: 19.2 %
MCH RBC QN AUTO: 29.5 PG (ref 26.5–33)
MCHC RBC AUTO-ENTMCNC: 32.8 G/DL (ref 31.5–36.5)
MCV RBC AUTO: 90 FL (ref 78–100)
MONOCYTES # BLD AUTO: 0.9 10E9/L (ref 0–1.3)
MONOCYTES NFR BLD AUTO: 9.1 %
MUCOUS THREADS #/AREA URNS LPF: PRESENT /LPF
NEUTROPHILS # BLD AUTO: 6.3 10E9/L (ref 1.6–8.3)
NEUTROPHILS NFR BLD AUTO: 66.7 %
NITRATE UR QL: NEGATIVE
NRBC # BLD AUTO: 0 10*3/UL
NRBC BLD AUTO-RTO: 0 /100
PH UR STRIP: 5 PH (ref 5–7)
PHOSPHATE SERPL-MCNC: 5.4 MG/DL (ref 2.5–4.5)
PLATELET # BLD AUTO: 312 10E9/L (ref 150–450)
POTASSIUM SERPL-SCNC: 4 MMOL/L (ref 3.4–5.3)
PROT SERPL-MCNC: 8.1 G/DL (ref 6.8–8.8)
RBC # BLD AUTO: 3.36 10E12/L (ref 4.4–5.9)
RBC #/AREA URNS AUTO: 5 /HPF (ref 0–2)
SODIUM SERPL-SCNC: 136 MMOL/L (ref 133–144)
SOURCE: ABNORMAL
SP GR UR STRIP: 1.01 (ref 1–1.03)
SPECIMEN EXP DATE BLD: NORMAL
SPECIMEN EXP DATE BLD: NORMAL
UROBILINOGEN UR STRIP-MCNC: 0 MG/DL (ref 0–2)
WBC # BLD AUTO: 9.5 10E9/L (ref 4–11)
WBC #/AREA URNS AUTO: 3 /HPF (ref 0–5)

## 2019-07-29 PROCEDURE — 86803 HEPATITIS C AB TEST: CPT | Performed by: NURSE PRACTITIONER

## 2019-07-29 PROCEDURE — 83036 HEMOGLOBIN GLYCOSYLATED A1C: CPT | Performed by: NURSE PRACTITIONER

## 2019-07-29 PROCEDURE — 85025 COMPLETE CBC W/AUTO DIFF WBC: CPT | Performed by: NURSE PRACTITIONER

## 2019-07-29 PROCEDURE — 85613 RUSSELL VIPER VENOM DILUTED: CPT | Performed by: NURSE PRACTITIONER

## 2019-07-29 PROCEDURE — 81240 F2 GENE: CPT | Performed by: NURSE PRACTITIONER

## 2019-07-29 PROCEDURE — 86706 HEP B SURFACE ANTIBODY: CPT | Performed by: NURSE PRACTITIONER

## 2019-07-29 PROCEDURE — 85670 THROMBIN TIME PLASMA: CPT | Performed by: NURSE PRACTITIONER

## 2019-07-29 PROCEDURE — 81001 URINALYSIS AUTO W/SCOPE: CPT | Performed by: NURSE PRACTITIONER

## 2019-07-29 PROCEDURE — 86147 CARDIOLIPIN ANTIBODY EA IG: CPT | Performed by: NURSE PRACTITIONER

## 2019-07-29 PROCEDURE — 86665 EPSTEIN-BARR CAPSID VCA: CPT | Performed by: NURSE PRACTITIONER

## 2019-07-29 PROCEDURE — 86787 VARICELLA-ZOSTER ANTIBODY: CPT | Performed by: NURSE PRACTITIONER

## 2019-07-29 PROCEDURE — 85730 THROMBOPLASTIN TIME PARTIAL: CPT | Performed by: NURSE PRACTITIONER

## 2019-07-29 PROCEDURE — 86850 RBC ANTIBODY SCREEN: CPT | Performed by: NURSE PRACTITIONER

## 2019-07-29 PROCEDURE — 80053 COMPREHEN METABOLIC PANEL: CPT | Performed by: NURSE PRACTITIONER

## 2019-07-29 PROCEDURE — 84100 ASSAY OF PHOSPHORUS: CPT | Performed by: NURSE PRACTITIONER

## 2019-07-29 PROCEDURE — 85610 PROTHROMBIN TIME: CPT | Performed by: NURSE PRACTITIONER

## 2019-07-29 PROCEDURE — 85732 THROMBOPLASTIN TIME PARTIAL: CPT | Performed by: NURSE PRACTITIONER

## 2019-07-29 PROCEDURE — 87340 HEPATITIS B SURFACE AG IA: CPT | Performed by: NURSE PRACTITIONER

## 2019-07-29 PROCEDURE — 86644 CMV ANTIBODY: CPT | Performed by: NURSE PRACTITIONER

## 2019-07-29 PROCEDURE — 40000866 ZZHCL STATISTIC HIV 1/2 ANTIGEN/ANTIBODY PRETRANSPLANT ONLY: Performed by: NURSE PRACTITIONER

## 2019-07-29 PROCEDURE — 86900 BLOOD TYPING SEROLOGIC ABO: CPT | Mod: 91 | Performed by: NURSE PRACTITIONER

## 2019-07-29 PROCEDURE — 86886 COOMBS TEST INDIRECT TITER: CPT | Performed by: NURSE PRACTITIONER

## 2019-07-29 PROCEDURE — 86481 TB AG RESPONSE T-CELL SUSP: CPT | Performed by: NURSE PRACTITIONER

## 2019-07-29 PROCEDURE — 36415 COLL VENOUS BLD VENIPUNCTURE: CPT | Performed by: NURSE PRACTITIONER

## 2019-07-29 PROCEDURE — 86780 TREPONEMA PALLIDUM: CPT | Performed by: NURSE PRACTITIONER

## 2019-07-29 PROCEDURE — 81241 F5 GENE: CPT | Performed by: NURSE PRACTITIONER

## 2019-07-29 PROCEDURE — 84681 ASSAY OF C-PEPTIDE: CPT | Performed by: NURSE PRACTITIONER

## 2019-07-29 PROCEDURE — 85597 PHOSPHOLIPID PLTLT NEUTRALIZ: CPT | Performed by: NURSE PRACTITIONER

## 2019-07-29 PROCEDURE — 85730 THROMBOPLASTIN TIME PARTIAL: CPT | Mod: 91 | Performed by: NURSE PRACTITIONER

## 2019-07-29 PROCEDURE — 86905 BLOOD TYPING RBC ANTIGENS: CPT | Performed by: NURSE PRACTITIONER

## 2019-07-29 PROCEDURE — 86704 HEP B CORE ANTIBODY TOTAL: CPT | Performed by: NURSE PRACTITIONER

## 2019-07-29 PROCEDURE — 86901 BLOOD TYPING SEROLOGIC RH(D): CPT | Performed by: NURSE PRACTITIONER

## 2019-07-29 RX ORDER — MINOXIDIL 2.5 MG/1
2.5 TABLET ORAL
COMMUNITY
Start: 2019-07-02 | End: 2019-12-03

## 2019-07-29 ASSESSMENT — MIFFLIN-ST. JEOR: SCORE: 1655.37

## 2019-07-29 NOTE — PROGRESS NOTES
Assessment and Plan:  # Kidney/Pancreas Transplant Evaluation: Patient is a good candidate overall. Benefits of a living donor transplant were discussed.    # CKD from presumed type 2 diabetes and hypertension: with no previous biopsy. He has had progressive kidney disease since at least 2016 with a more rapid decline this past year. He is now following with Dr. Dietrich with most recent labs including a serum creatinine of 7.3 and eGFR of 9. He has plans for fistula placement soon. When ready, he would likely benefit from a kidney transplant.     # Type 2 diabetes: controlled using 24 units of insulin per day with a recent hemoglobin A1c of 5.8% (in prior years was  >14%).  No history of hypoglycemic unawareness. Given evidence of end-organ damage, he may benefit from a pancreas transplant.     # Cardiac Risk: no history of cardiac disease or events. ECHO/EKG results today are pending. Given risk factors, he will need a cardiac risk assessment and further testing for CAD.     # PAD risk: will need CT a/p and dopplers.     #Chronic nausea and vomiting: will need updated gastric emptying study.    #History of noncompliance: in part, related to insurance problems in the past.  Would appreciate social work input.  Will also discuss with primary nephrology.    # Health Maintenance: Dental: Up to date      Addendum: Discussed compliance with Dr. Dietrich. Although patient was slow to be on track with CKD management in the past, he is adherent now. No concerns with noncompliance.      Discussed the risks and benefits of a transplant, including the risk of surgery and immunosuppression medications.  Patient's overall evaluation will be discussed in the Transplant Program's regular meeting with a final recommendation on the patients suitability for transplant to be made at that time.  Patient was seen in conjunction with Dr. Howie Beltran as part of a shared visit.    Evaluation:  Erik Cramer was seen in consultation at  the request of Dr. Zoie Gray for evaluation as a potential kidney/pancreas transplant recipient.    Reason for Visit:  Erik Cramer is a 39 year old male with CKD from DM II/HTN , who presents for kidney/pancreas transplant evaluation.    History of Present Illness:  Erik Cramer is a 39-year-old of [place] descent that presents with CKD secondary to presumed type 2 diabetes and uncontrolled hypertension, with no previous biopsy. He has type 2 diabetes for 19 years and hypertension for at least 5 years. He has had progressive kidney disease since at least 2016 with a more rapid decline this past year. He is now following with Dr. Dietrich with most recent labs including a serum creatinine of 7.3 and eGFR of 9.  He has plans for fistula placement next week.  Overall, he is feeling okay.  He has had chronic nausea and vomiting recently that has improved recently with herbal tea use.  In 2016, he had a [place] hepatobiliary study that shows a low gallbladder EF of 3%.  He has never had a gastric emptying study.  He has no history of cardiac disease or events.  No outside recent ECHO or stress test for review.  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  With exertion he denies chest pain, shortness of breath or claudication symptoms.  He is  and lives with his wife and 3-year-old child.           Kidney Disease Hx:        Kidney Disease Dx: presumed DM II/HTN        Biopsy Proven: No         On Dialysis: No       Primary Nephrologist: Dr. Dietrich        H/o Kidney Stones: No       H/o Recurrent/Frequent UTI: No         Diabetic Hx:        Diagnosis Date: since 22 years old       Medication History: first started on oral medications, then started using insulin  3-4 years ago. Currently using 24 units/day.        Diabetic Control: Controlled (HbA1c <7%)   Last HbA1c: 5.8% (7/2019)       Hypoglycemic Unawareness: No        End-Organ Damage due to DM: RT and PN          Cardiac/Vascular Disease Risk Factors:        Cardiac Risk Factors: Diabetes, Hypertension and CKD       Known CAD: No       Known PAD/Caludication Symptoms: No       Known Heart Failure: No       Arrhythmia: No       Pulmonary Hypertension: No       Valvular Disease: No       Other: None             Fatigue/Decreased Energy: [x] No [] Yes    Chest Pain or SOB with Exertion: [] No [x] Yes SOB improving with better blood pressure control    Significant Weight Change: [x] No [] Yes    Nausea, Vomiting or Diarrhea: [x] No [] Yes    Fever, Sweats or Chills:  [x] No [] Yes    Leg Swelling [x] No [] Yes        History of Cancer: None    Review of Systems:  A comprehensive review of systems was obtained and negative, except as noted in the HPI or PMH.    Past Medical History:   Medical record was reviewed and PMH was discussed with patient and noted below.  Past Medical History:   Diagnosis Date     Diabetes (H) 2012    Type 2  IDDM     Hypertension 2018       Past Social History:   Past Surgical History:   Procedure Laterality Date     EYE SURGERY Left 2015    retinal detachment     ORTHOPEDIC SURGERY Left     Trigger finger      Personal history of bleeding or anesthesia problems: No    Family History:  No family history on file.    Personal History:   Social History     Socioeconomic History     Marital status:      Spouse name: Not on file     Number of children: Not on file     Years of education: Not on file     Highest education level: Not on file   Occupational History     Not on file   Social Needs     Financial resource strain: Not on file     Food insecurity:     Worry: Not on file     Inability: Not on file     Transportation needs:     Medical: Not on file     Non-medical: Not on file   Tobacco Use     Smoking status: Never Smoker     Smokeless tobacco: Never Used   Substance and Sexual Activity     Alcohol use: Yes     Frequency: Never     Comment:  Social     Drug use: Never     Sexual activity: Not on file   Lifestyle     Physical activity:     Days per week: Not on file     Minutes per session: Not on file     Stress: Not on file   Relationships     Social connections:     Talks on phone: Not on file     Gets together: Not on file     Attends Muslim service: Not on file     Active member of club or organization: Not on file     Attends meetings of clubs or organizations: Not on file     Relationship status: Not on file     Intimate partner violence:     Fear of current or ex partner: Not on file     Emotionally abused: Not on file     Physically abused: Not on file     Forced sexual activity: Not on file   Other Topics Concern     Parent/sibling w/ CABG, MI or angioplasty before 65F 55M? Not Asked   Social History Narrative     Not on file       Allergies:  No Known Allergies    Medications:  Current Outpatient Medications   Medication Sig     Alcohol Sheets (ALCOH-WIPE) SHEE Apply 1 each topically     amLODIPine (NORVASC) 10 MG tablet Take 10 mg by mouth     atorvastatin (LIPITOR) 40 MG tablet Take 40 mg by mouth daily     blood glucose (ACCU-CHEK GUIDE) test strip 1 strip     blood glucose monitoring (ACCU-CHEK FASTCLIX) lancets Testing blood sugars 2 time(s) a day.  Indications: Type 2 Diabetes     calcitRIOL (ROCALTROL) 0.25 MCG capsule Take 0.25 mcg by mouth daily     calcium carbonate (TUMS) 500 MG chewable tablet Take 1,000 mg by mouth     furosemide (LASIX) 20 MG tablet Take 2 tablets (40 mg) by mouth 2 times daily     hydrALAZINE (APRESOLINE) 25 MG tablet Take 2 tablets BY MOUTH TWICE DAILY     insulin pen needle (BD RAÚL U/F) 32G X 4 MM miscellaneous use as directed with lantus pen once a day     LANTUS SOLOSTAR 100 UNIT/ML soln Inject 30 units sub-q every evening.     minoxidil (LONITEN) 2.5 MG tablet Take 2.5 mg by mouth     vitamin D3 (CHOLECALCIFEROL) 2000 units (50 mcg) tablet Take 1 tablet (2,000 Units) by mouth daily     furosemide  "(LASIX) 20 MG tablet      sildenafil (VIAGRA) 50 MG tablet Take 50 mg by mouth     No current facility-administered medications for this visit.        Vitals:  /77   Pulse 79   Temp 97.6  F (36.4  C)   Ht 1.66 m (5' 5.35\")   Wt 79.8 kg (175 lb 14.4 oz)   SpO2 97%   BMI 28.96 kg/m      Exam:  GENERAL APPEARANCE: alert and no distress  HENT: mouth without ulcers or lesions  LYMPHATICS: no cervical or supraclavicular nodes  RESP: lungs clear to auscultation - no rales, rhonchi or wheezes  CV: regular rhythm, normal rate, no rub, no murmur  FEMORAL PULSES: 2+ equal bilaterally.   EDEMA: no LE edema bilaterally  ABDOMEN: soft, nondistended, nontender, bowel sounds normal  MS: extremities normal - no gross deformities noted, no evidence of inflammation in joints, no muscle tenderness  SKIN: no rash    Results:   Recent Results (from the past 336 hour(s))   Vitamin D Deficiency    Collection Time: 07/18/19 11:58 AM   Result Value Ref Range    Vitamin D Deficiency screening 9 (L) 20 - 75 ug/L   CBC with platelets    Collection Time: 07/18/19 11:58 AM   Result Value Ref Range    WBC 7.3 4.0 - 11.0 10e9/L    RBC Count 3.54 (L) 4.4 - 5.9 10e12/L    Hemoglobin 10.2 (L) 13.3 - 17.7 g/dL    Hematocrit 31.9 (L) 40.0 - 53.0 %    MCV 90 78 - 100 fl    MCH 28.8 26.5 - 33.0 pg    MCHC 32.0 31.5 - 36.5 g/dL    RDW 11.9 10.0 - 15.0 %    Platelet Count 292 150 - 450 10e9/L   Renal panel    Collection Time: 07/18/19 11:58 AM   Result Value Ref Range    Sodium 139 133 - 144 mmol/L    Potassium 5.1 3.4 - 5.3 mmol/L    Chloride 107 94 - 109 mmol/L    Carbon Dioxide 26 20 - 32 mmol/L    Anion Gap 6 3 - 14 mmol/L    Glucose 108 (H) 70 - 99 mg/dL    Urea Nitrogen 72 (H) 7 - 30 mg/dL    Creatinine 7.35 (H) 0.66 - 1.25 mg/dL    GFR Estimate 9 (L) >60 mL/min/[1.73_m2]    GFR Estimate If Black 10 (L) >60 mL/min/[1.73_m2]    Calcium 7.3 (L) 8.5 - 10.1 mg/dL    Phosphorus 5.7 (H) 2.5 - 4.5 mg/dL    Albumin 3.2 (L) 3.4 - 5.0 g/dL   Routine " UA with microscopic - No culture (for [facility])    Collection Time: 07/18/19 12:00 PM   Result Value Ref Range    Color Urine Yellow     Appearance Urine Clear     Glucose Urine 50 (A) NEG^Negative mg/dL    Bilirubin Urine Negative NEG^Negative    Ketones Urine Negative NEG^Negative mg/dL    Specific Gravity Urine 1.011 1.003 - 1.035    Blood Urine Negative NEG^Negative    pH Urine 6.0 5.0 - 7.0 pH    Protein Albumin Urine >499 (A) NEG^Negative mg/dL    Urobilinogen mg/dL 0.0 0.0 - 2.0 mg/dL    Nitrite Urine Negative NEG^Negative    Leukocyte Esterase Urine Negative NEG^Negative    Source Midstream Urine     WBC Urine 5 0 - 5 /HPF    RBC Urine 9 (H) 0 - 2 /HPF    Squamous Epithelial /HPF Urine <1 0 - 1 /HPF    Mucous Urine Present (A) NEG^Negative /LPF   Protein  random urine with Creat Ratio    Collection Time: 07/18/19 12:00 PM   Result Value Ref Range    Protein Random Urine 5.48 g/L    Protein Total Urine g/gr Creatinine 6.20 (H) 0 - 0.2 g/g Cr   Creatinine urine calculation only    Collection Time: 07/18/19 12:00 PM   Result Value Ref Range    Creatinine Urine 88 mg/dL

## 2019-07-29 NOTE — PROGRESS NOTES
"Psychosocial Assessment  Patient Name/ Age: Erik Cramer 39 year old   Medical Record #: [id number]  Duration of Interview:     40min  Process:   Face-to-Face Interview                (counseling < 50%)   Present at Appointment: Erik \"Tye\" and his brother-in-law David        :TRISTAN Carlisle, LICSW Date:  July 29, 2019        Type of transplant: Kidney/Pancreas    Donor type:   Tye indicated he does not know of any potential kidney donors at this time.   [facility]ver   Prior Transplants:    No Status of Transplant:       Current Living Situation    Location:   [address] [place] [address]  With Whom: Wife Saniya and their son Jordan (3 years old).       Family/ Social Support:    Tye indicated his father lives in [place].  He has two brothers (1-[place] and 1-[place]) and two sisters ([place]).   Available, helpful   Committed relationship:  Tye and Saniya have been  for nine years.   Stable/supportive   Other supports:   Friends Available, helpful       Activities/ Functional Ability    Current level:  Tye wears corrective lenses. Ambulatory, visually impaired and independent with ADL's     Transportation Drives self       Vocational/Employment/Financial     Employment  [employer]   Full time   Job Description  Technician      Income  Saniya is employed full time.   Salary/wages   Insurance  BC/BS through Tye's employer.    At this time, patient can afford medication costs:  Yes  Private Insurance       Medical Status    Current mode of treatment for ESRD None   Complications - Diabetes controlled with insulin. None       Behavioral    Tobacco Use No Chemical Dependency No    Tye indicated he may drink once a month.     Psychiatric Impairment No    Reading ability Good  Education Level: Some College Recent Legal History No    Coping Style/Strategies: Tye indicated when under stress he will talk to his wife or play games.   Ability to Adhere to Complex Medical Regime: Yes     Adherence " History:  Tye indicated he will usually follow his physician's recommendations.  See under Notable Items.        Education  _X_ Medicare  _X_ Rehabilitation  _X_ Donor issues  _X_ Community resources  _X_ Post discharge housing  _X_ Financial resources  _X_ Medical insurance options  _X_ Psych adjustment  _X_ Family adjustment  _X_ Health Care Directive - Provided Education and Declined Completing at this time.  Tye indicated agreement with his wife making his medical decisions for him if he is unable. Psychosocial Risks of Transplant Reviewed and Discussed:  _X_ Increased stress related to emotional,            family, social, employment or financial           situation  _X_ Affect on work and/or disability benefits  _X_ Affect on future life insurance  _X_ Transplant outcome expectations may           not be met  _X_ Mental Health Risks: anxiety,           depression, PTSD, guilt, grief and           chronic fatigue     Notable Items:   There are notes in Tye's chart indicating non compliance with medications.  Tye indicated understanding of need to be compliant and also that he cannot take herbs post transplant.      Final Evaluation/Assessment   Patient seemed to process information well. Appeared well informed, motivated and able to follow post transplant requirements. Behavior was appropriate during interview. Has adequate income and insurance coverage. Adequate social support. No major contraindications noted for transplant.  At this time patient appears to understand the risks and benefits of transplant.      Recommendation  Acceptable    Selection Criteria Met:  Plan for support Yes   Chemical Dependence Yes   Smoking Yes   Mental Health Yes   Adequate Finances Yes    Signature: TRISTAN Carlisle, [facility]   Title: Clinical

## 2019-07-29 NOTE — PROGRESS NOTES
Patient was seen by myself, Dr. Howie Beltran, in conjunction with Michelle Quiles, as part of a shared visit.    I personally reviewed past medical and surgical history, vital signs, medications and labs.  Present and past medical history, along with significant physical exam findings were all reviewed with ADI.    My ramirez findings:  Erik Cramer is a 39 year old year old male with CKD from diabetes mellitus type 2, who presents for Kidney transplant evaluation.  Patient with DMII since his 20's.     He has followed with nephrology since 2016. No prior biopsy. Nearing HD.     HTN: poorly controlled.     CAD: no known CAD, but no workup in the past. No exertional symptoms.    Intermittent n/v. No prior workup in the past for gastropathy.     HIDA with low EF at 3%.    DMII: 5.8 g%. On 24 units of insulin. No neuropathy or retinopathy per him. No hypoglycemic unawareness. BMI currently 28.     Interested in pancreas.    Key management decisions made by me and discussed with ADI:    1. Kidney/Pancreas transplant evaluation - patient is a good candidate overall. Benefits of a living donor transplant were discussed.  The patient may have 1 donor.  As such we will list him for simultaneous pancreas and kidney transplant to decrease his weight time for an organ. However, if living donor becomes available, may consider pancreas after living kidney if he starts dialysis soon.   2. CKD from diabetes mellitus type 2: continue with local nephrologist for HD/PD initiation.   3. DMII: diabetes well controlled. Interested in pancreas transplant.   4. HTN: goal bp 100-130/60-80.  5. CAD risk: refer to cardiology.  6. Ca screen: up to date. Dental up to date.  7. N/v, low EF of gallbladder: get gastic emptying and refer to GI for the low gall bladder EF.

## 2019-07-29 NOTE — PROGRESS NOTES
Transplant Surgery Consult Note    Medical record number: [id number]  YOB: 1980,   Consult requested by Dr. Dietrich for evaluation of kidney and pancreas transplant candidacy.    Assessment and Recommendations: Mr. Cramer is a good candidate for transplantation and has a good understanding of the risks and benefits of this approach to management of renal failure and diabetes. The following issues should be addressed prior to transplant:     C-peptide/labs and maybe AT1r due to difficulty controlling blood pressure  KP imaging: CT A/P non-contrast and U/S of B iliacs  Virals  I told him I could not guarantee he will become insulin independent given his type 2 status      The majority of our visit was spent in counselling, discussing the medical and surgical risks of living or  donor kidney and pancreas transplantation, either in a simultaneous or sequential fashion. I contrasted approximate wait time for SPK vs living vs  donor kidneys from normal (0-85%) or higher (%) kidney donor profile index (KDPI) donors and their associated outcomes. I would not recommend this individual to consider kidneys from high KDPI donors. The reason for this decision is best summarized as: multi-organ transplant candidate.  Access to transplant will be impacted by living donor availability and overall candidacy for SPK, as well as the influence of blood type and degree of sensitization. We discussed advantages of preemptive transplant as well as living donor kidney transplant, and graft and patient survival outcomes associated with these options. Potential surgical complications of kidney and pancreas transplantation include bleeding, clotting, infection, wound complications, anastomotic failure and other issues such as cardiac complications, pneumonia, deep venous thrombosis, pulmonary embolism, post transplant diabetes and death. We discussed the need for protocol biopsy of the kidney and the  possible need for a ureteral stent (and subsequent removal). We discussed benefits and risks associated with different approaches to exocrine drainage of pancreatic secretions. We also discussed differences in the average length of stay, recovery process, and posttransplant lab and monitoring protocol. We discussed the risk of graft rejection and recurrent diabetic nephropathy in the setting of poor glycemic control. I emphasized the need for strict immunosuppression adherence and the potential for complications of immunosuppression such as skin cancer or lymphoma, as well as a very low but not zero risk of donor-derived disease transmission risks (infection, cancer). Mr. Cramer asked good questions and the patient's candidacy will be reviewed at our Multidisciplinary Selection Committee. Thank you for the opportunity to participate in Mr. Cramer's care.  Total time: 45 minutes  Counselling time: 40 minutes      Zoie Gray MD FACS  Assistant Professor of Surgery  Director, Living Kidney Donor Program.  ---------------------------------------------------------------------------------------------------    HPI: Mr. Cramer has Chronic renal failure due to diabetes mellitus type 2. The patient has had diabetes for 17 years. Management is by Lantus 24 units at bedtime. The patient usually checks his blood sugar 1 times/day.  Daily blood glucoses range typically from 88 to 107.  Hypoglyemic unawareness is not an issue.  The diabetes is controlled.    Complications of diabetes include:    Retinopathy:  Yes   Neuropathy: No  Gastroparesis:  No    The patient is not on dialysis.    Has potential kidney donors:  No.  Interested in participation in paired exchange if a donor is willing: Doesn't know     The patient has the following pertinent history:       No    Yes  Dialysis:    [x]      [] via:       Blood Transfusion                  [x]      []  Number of units:   Most recently:  Pregnancy:    [x]       [] Number:       Previous Transplant:  [x]      [] Details:    Cancer    [x]      [] Comment:   Kidney stones   [x]      [] Comment:      Recurrent infections  [x]      []  Type:                  Bladder dysfunction  [x]      [] Cause:    Claudication   [x]      [] Distance:    Previous Amputation  [x]      [] Cause:     Chronic anticoagulation  [x]      [] Indication:  Presybeterian  [x]      []     Past Medical History:   Diagnosis Date     Diabetes (H) 2012    Type 2  IDDM     Hypertension 2018     Past Surgical History:   Procedure Laterality Date     EYE SURGERY Left 2015    retinal detachment     ORTHOPEDIC SURGERY Left     Trigger finger      No family history on file.  Social History     Socioeconomic History     Marital status:      Spouse name: Not on file     Number of children: Not on file     Years of education: Not on file     Highest education level: Not on file   Occupational History     Not on file   Social Needs     Financial resource strain: Not on file     Food insecurity:     Worry: Not on file     Inability: Not on file     Transportation needs:     Medical: Not on file     Non-medical: Not on file   Tobacco Use     Smoking status: Never Smoker     Smokeless tobacco: Never Used   Substance and Sexual Activity     Alcohol use: Yes     Frequency: Never     Comment: Social     Drug use: Never     Sexual activity: Not on file   Lifestyle     Physical activity:     Days per week: Not on file     Minutes per session: Not on file     Stress: Not on file   Relationships     Social connections:     Talks on phone: Not on file     Gets together: Not on file     Attends Latter-day service: Not on file     Active member of club or organization: Not on file     Attends meetings of clubs or organizations: Not on file     Relationship status: Not on file     Intimate partner violence:     Fear of current or ex partner: Not on file     Emotionally abused: Not on file     Physically abused: Not on  file     Forced sexual activity: Not on file   Other Topics Concern     Parent/sibling w/ CABG, MI or angioplasty before 65F 55M? Not Asked   Social History Narrative     Not on file       ROS:   CONSTITUTIONAL:  No fevers or chills  EYES: negative for icterus  ENT:  negative for hearing loss, tinnitus and sore throat  RESPIRATORY:  negative for cough, sputum, dyspnea  CARDIOVASCULAR:  negative for chest pain Fatigue  Retinopathy  GASTROINTESTINAL:  negative for nausea, vomiting, diarrhea or constipation  GENITOURINARY:  negative for incontinence, dysuria, bladder emptying problems  HEME:  No easy bruising  INTEGUMENT:  negative for rash and pruritus  NEURO:  Negative for headache, seizure disorder    Allergies:   No Known Allergies    Medications:  Prescription Medications as of 2019       Rx Number Disp Refills Start End Last Dispensed Date Next Fill Date Owning Pharmacy    Alcohol Sheets (ALCOH-WIPE) SHEE    2016        Sig: Apply 1 each topically    Class: Historical    Route: Topical    amLODIPine (NORVASC) 10 MG tablet    2019       Sig: Take 10 mg by mouth    Class: Historical    Route: Oral    atorvastatin (LIPITOR) 40 MG tablet   2 2018        Sig: Take 40 mg by mouth daily    Class: Historical    Route: Oral    blood glucose (ACCU-CHEK GUIDE) test strip    2019        Si strip    Class: Historical    blood glucose monitoring (ACCU-CHEK FASTCLIX) lancets    2019        Sig: Testing blood sugars 2 time(s) a day.  Indications: Type 2 Diabetes    Class: Historical    calcitRIOL (ROCALTROL) 0.25 MCG capsule   3 2019        Sig: Take 0.25 mcg by mouth daily    Class: Historical    Route: Oral    calcium carbonate (TUMS) 500 MG chewable tablet    2019        Sig: Take 1,000 mg by mouth    Class: Historical    Route: Oral    furosemide (LASIX) 20 MG tablet            Class: Historical    furosemide (LASIX) 20 MG tablet  60 tablet 1 2019    [facility], [address]    Sig: Take 2 tablets (40 mg) by mouth 2 times daily    Class: E-Prescribe    Route: Oral    hydrALAZINE (APRESOLINE) 25 MG tablet   0 12/27/2018        Sig: Take 2 tablets BY MOUTH TWICE DAILY    Class: Historical    insulin pen needle (BD RAÚL U/F) 32G X 4 MM miscellaneous    9/13/2018        Sig: use as directed with lantus pen once a day    Class: Historical    LANTUS SOLOSTAR 100 UNIT/ML soln   2 6/6/2019        Sig: Inject 30 units sub-q every evening.    Class: Historical    minoxidil (LONITEN) 2.5 MG tablet    7/2/2019        Sig: Take 2.5 mg by mouth    Class: Historical    Route: Oral    sildenafil (VIAGRA) 50 MG tablet    11/19/2018        Sig: Take 50 mg by mouth    Class: Historical    Route: Oral    vitamin D3 (CHOLECALCIFEROL) 2000 units (50 mcg) tablet  90 tablet 0 7/22/2019    [facility], [address]    Sig: Take 1 tablet (2,000 Units) by mouth daily    Class: E-Prescribe    Route: Oral          Exam:   Temp:  [97.6  F (36.4  C)] 97.6  F (36.4  C)  Pulse:  [79] 79  BP: (163)/(77) 163/77  SpO2:  [97 %] 97 %  Appearance: in no apparent distress.   Skin: normal  Head and Neck: Normal, no rashes or jaundice  Respiratory: easy respirations, no audible wheezing.  Cardiovascular: RRR  Abdomen: rounded, No distention and No surgical scars   Extremeties: femoral 2+/2+, Edema, pitting and 2+  Neuro: without deficit     Diagnostics:   Recent Results (from the past 672 hour(s))   Vitamin D Deficiency    Collection Time: 07/18/19 11:58 AM   Result Value Ref Range    Vitamin D Deficiency screening 9 (L) 20 - 75 ug/L   CBC with platelets    Collection Time: 07/18/19 11:58 AM   Result Value Ref Range    WBC 7.3 4.0 - 11.0 10e9/L    RBC Count 3.54 (L) 4.4 - 5.9 10e12/L    Hemoglobin 10.2 (L) 13.3 - 17.7 g/dL    Hematocrit 31.9 (L) 40.0 - 53.0 %    MCV 90 78 - 100 fl    MCH 28.8 26.5 - 33.0 pg    MCHC 32.0 31.5 - 36.5 g/dL    RDW  11.9 10.0 - 15.0 %    Platelet Count 292 150 - 450 10e9/L   Renal panel    Collection Time: 07/18/19 11:58 AM   Result Value Ref Range    Sodium 139 133 - 144 mmol/L    Potassium 5.1 3.4 - 5.3 mmol/L    Chloride 107 94 - 109 mmol/L    Carbon Dioxide 26 20 - 32 mmol/L    Anion Gap 6 3 - 14 mmol/L    Glucose 108 (H) 70 - 99 mg/dL    Urea Nitrogen 72 (H) 7 - 30 mg/dL    Creatinine 7.35 (H) 0.66 - 1.25 mg/dL    GFR Estimate 9 (L) >60 mL/min/[1.73_m2]    GFR Estimate If Black 10 (L) >60 mL/min/[1.73_m2]    Calcium 7.3 (L) 8.5 - 10.1 mg/dL    Phosphorus 5.7 (H) 2.5 - 4.5 mg/dL    Albumin 3.2 (L) 3.4 - 5.0 g/dL   Routine UA with microscopic - No culture (for [facility])    Collection Time: 07/18/19 12:00 PM   Result Value Ref Range    Color Urine Yellow     Appearance Urine Clear     Glucose Urine 50 (A) NEG^Negative mg/dL    Bilirubin Urine Negative NEG^Negative    Ketones Urine Negative NEG^Negative mg/dL    Specific Gravity Urine 1.011 1.003 - 1.035    Blood Urine Negative NEG^Negative    pH Urine 6.0 5.0 - 7.0 pH    Protein Albumin Urine >499 (A) NEG^Negative mg/dL    Urobilinogen mg/dL 0.0 0.0 - 2.0 mg/dL    Nitrite Urine Negative NEG^Negative    Leukocyte Esterase Urine Negative NEG^Negative    Source Midstream Urine     WBC Urine 5 0 - 5 /HPF    RBC Urine 9 (H) 0 - 2 /HPF    Squamous Epithelial /HPF Urine <1 0 - 1 /HPF    Mucous Urine Present (A) NEG^Negative /LPF   Protein  random urine with Creat Ratio    Collection Time: 07/18/19 12:00 PM   Result Value Ref Range    Protein Random Urine 5.48 g/L    Protein Total Urine g/gr Creatinine 6.20 (H) 0 - 0.2 g/g Cr   Creatinine urine calculation only    Collection Time: 07/18/19 12:00 PM   Result Value Ref Range    Creatinine Urine 88 mg/dL     No results found for: CPRA

## 2019-07-29 NOTE — LETTER
2019       RE: Erik Cramer  [address]  [facility] [place] [address]     Dear Colleague,    Thank you for referring your patient, Erik Cramer, to the [facility]ID [facility]GAN TRANSPL[facility] at [facility]. Please see a copy of my visit note below.      Transplant Surgery Consult Note    Medical record number: [id number]  YOB: 1980,   Consult requested by Dr. Dietrich for evaluation of kidney and pancreas transplant candidacy.    Assessment and Recommendations: Mr. Cramer is a good candidate for transplantation and has a good understanding of the risks and benefits of this approach to management of renal failure and diabetes. The following issues should be addressed prior to transplant:     C-peptide/labs and maybe AT1r due to difficulty controlling blood pressure  KP imaging: CT A/P non-contrast and U/S of B iliacs  Virals  I told him I could not guarantee he will become insulin independent given his type 2 status      The majority of our visit was spent in counselling, discussing the medical and surgical risks of living or  donor kidney and pancreas transplantation, either in a simultaneous or sequential fashion. I contrasted approximate wait time for SPK vs living vs  donor kidneys from normal (0-85%) or higher (%) kidney donor profile index (KDPI) donors and their associated outcomes. I would not recommend this individual to consider kidneys from high KDPI donors. The reason for this decision is best summarized as: multi-organ transplant candidate.  Access to transplant will be impacted by living donor availability and overall candidacy for SPK, as well as the influence of blood type and degree of sensitization. We discussed advantages of preemptive transplant as well as living donor kidney transplant, and graft and patient survival outcomes associated with these options. Potential surgical complications of kidney and pancreas transplantation include  bleeding, clotting, infection, wound complications, anastomotic failure and other issues such as cardiac complications, pneumonia, deep venous thrombosis, pulmonary embolism, post transplant diabetes and death. We discussed the need for protocol biopsy of the kidney and the possible need for a ureteral stent (and subsequent removal). We discussed benefits and risks associated with different approaches to exocrine drainage of pancreatic secretions. We also discussed differences in the average length of stay, recovery process, and posttransplant lab and monitoring protocol. We discussed the risk of graft rejection and recurrent diabetic nephropathy in the setting of poor glycemic control. I emphasized the need for strict immunosuppression adherence and the potential for complications of immunosuppression such as skin cancer or lymphoma, as well as a very low but not zero risk of donor-derived disease transmission risks (infection, cancer). Mr. Cramer asked good questions and the patient's candidacy will be reviewed at our Multidisciplinary Selection Committee. Thank you for the opportunity to participate in Mr. Cramer's care.  Total time: 45 minutes  Counselling time: 40 minutes      Zoie Gray MD FACS  Assistant Professor of Surgery  Director, Living Kidney Donor Program.  ---------------------------------------------------------------------------------------------------    HPI: Mr. Cramer has Chronic renal failure due to diabetes mellitus type 2. The patient has had diabetes for 17 years. Management is by Lantus 24 units at bedtime. The patient usually checks his blood sugar 1 times/day.  Daily blood glucoses range typically from 88 to 107.  Hypoglyemic unawareness is not an issue.  The diabetes is controlled.    Complications of diabetes include:    Retinopathy:  Yes   Neuropathy: No  Gastroparesis:  No    The patient is not on dialysis.    Has potential kidney donors:  No.  Interested in  participation in paired exchange if a donor is willing: Doesn't know     The patient has the following pertinent history:       No    Yes  Dialysis:    [x]      [] via:       Blood Transfusion                  [x]      []  Number of units:   Most recently:  Pregnancy:    [x]      [] Number:       Previous Transplant:  [x]      [] Details:    Cancer    [x]      [] Comment:   Kidney stones   [x]      [] Comment:      Recurrent infections  [x]      []  Type:                  Bladder dysfunction  [x]      [] Cause:    Claudication   [x]      [] Distance:    Previous Amputation  [x]      [] Cause:     Chronic anticoagulation  [x]      [] Indication:  Tenriism  [x]      []     Past Medical History:   Diagnosis Date     Diabetes (H) 2012    Type 2  IDDM     Hypertension 2018     Past Surgical History:   Procedure Laterality Date     EYE SURGERY Left 2015    retinal detachment     ORTHOPEDIC SURGERY Left     Trigger finger      No family history on file.  Social History     Socioeconomic History     Marital status:      Spouse name: Not on file     Number of children: Not on file     Years of education: Not on file     Highest education level: Not on file   Occupational History     Not on file   Social Needs     Financial resource strain: Not on file     Food insecurity:     Worry: Not on file     Inability: Not on file     Transportation needs:     Medical: Not on file     Non-medical: Not on file   Tobacco Use     Smoking status: Never Smoker     Smokeless tobacco: Never Used   Substance and Sexual Activity     Alcohol use: Yes     Frequency: Never     Comment: Social     Drug use: Never     Sexual activity: Not on file   Lifestyle     Physical activity:     Days per week: Not on file     Minutes per session: Not on file     Stress: Not on file   Relationships     Social connections:     Talks on phone: Not on file     Gets together: Not on file     Attends Cheondoism service: Not on file     Active member  of club or organization: Not on file     Attends meetings of clubs or organizations: Not on file     Relationship status: Not on file     Intimate partner violence:     Fear of current or ex partner: Not on file     Emotionally abused: Not on file     Physically abused: Not on file     Forced sexual activity: Not on file   Other Topics Concern     Parent/sibling w/ CABG, MI or angioplasty before 65F 55M? Not Asked   Social History Narrative     Not on file       ROS:   CONSTITUTIONAL:  No fevers or chills  EYES: negative for icterus  ENT:  negative for hearing loss, tinnitus and sore throat  RESPIRATORY:  negative for cough, sputum, dyspnea  CARDIOVASCULAR:  negative for chest pain Fatigue  Retinopathy  GASTROINTESTINAL:  negative for nausea, vomiting, diarrhea or constipation  GENITOURINARY:  negative for incontinence, dysuria, bladder emptying problems  HEME:  No easy bruising  INTEGUMENT:  negative for rash and pruritus  NEURO:  Negative for headache, seizure disorder    Allergies:   No Known Allergies    Medications:  Prescription Medications as of 2019       Rx Number Disp Refills Start End Last Dispensed Date Next Fill Date Owning Pharmacy    Alcohol Sheets (ALCOH-WIPE) SHEE    2016        Sig: Apply 1 each topically    Class: Historical    Route: Topical    amLODIPine (NORVASC) 10 MG tablet    2019       Sig: Take 10 mg by mouth    Class: Historical    Route: Oral    atorvastatin (LIPITOR) 40 MG tablet   2 2018        Sig: Take 40 mg by mouth daily    Class: Historical    Route: Oral    blood glucose (ACCU-CHEK GUIDE) test strip    2019        Si strip    Class: Historical    blood glucose monitoring (ACCU-CHEK FASTCLIX) lancets    2019        Sig: Testing blood sugars 2 time(s) a day.  Indications: Type 2 Diabetes    Class: Historical    calcitRIOL (ROCALTROL) 0.25 MCG capsule   3 2019        Sig: Take 0.25 mcg by mouth daily    Class: Historical    Route:  Oral    calcium carbonate (TUMS) 500 MG chewable tablet    5/9/2019        Sig: Take 1,000 mg by mouth    Class: Historical    Route: Oral    furosemide (LASIX) 20 MG tablet            Class: Historical    furosemide (LASIX) 20 MG tablet  60 tablet 1 7/18/2019    [facility], [address]    Sig: Take 2 tablets (40 mg) by mouth 2 times daily    Class: E-Prescribe    Route: Oral    hydrALAZINE (APRESOLINE) 25 MG tablet   0 12/27/2018        Sig: Take 2 tablets BY MOUTH TWICE DAILY    Class: Historical    insulin pen needle (BD RAÚL U/F) 32G X 4 MM miscellaneous    9/13/2018        Sig: use as directed with lantus pen once a day    Class: Historical    LANTUS SOLOSTAR 100 UNIT/ML soln   2 6/6/2019        Sig: Inject 30 units sub-q every evening.    Class: Historical    minoxidil (LONITEN) 2.5 MG tablet    7/2/2019        Sig: Take 2.5 mg by mouth    Class: Historical    Route: Oral    sildenafil (VIAGRA) 50 MG tablet    11/19/2018        Sig: Take 50 mg by mouth    Class: Historical    Route: Oral    vitamin D3 (CHOLECALCIFEROL) 2000 units (50 mcg) tablet  90 tablet 0 7/22/2019    [facility], [address]    Sig: Take 1 tablet (2,000 Units) by mouth daily    Class: E-Prescribe    Route: Oral          Exam:   Temp:  [97.6  F (36.4  C)] 97.6  F (36.4  C)  Pulse:  [79] 79  BP: (163)/(77) 163/77  SpO2:  [97 %] 97 %  Appearance: in no apparent distress.   Skin: normal  Head and Neck: Normal, no rashes or jaundice  Respiratory: easy respirations, no audible wheezing.  Cardiovascular: RRR  Abdomen: rounded, No distention and No surgical scars   Extremeties: femoral 2+/2+, Edema, pitting and 2+  Neuro: without deficit     Diagnostics:   Recent Results (from the past 672 hour(s))   Vitamin D Deficiency    Collection Time: 07/18/19 11:58 AM   Result Value Ref Range    Vitamin D Deficiency screening 9 (L) 20 - 75 ug/L   CBC with platelets    Collection  Time: 07/18/19 11:58 AM   Result Value Ref Range    WBC 7.3 4.0 - 11.0 10e9/L    RBC Count 3.54 (L) 4.4 - 5.9 10e12/L    Hemoglobin 10.2 (L) 13.3 - 17.7 g/dL    Hematocrit 31.9 (L) 40.0 - 53.0 %    MCV 90 78 - 100 fl    MCH 28.8 26.5 - 33.0 pg    MCHC 32.0 31.5 - 36.5 g/dL    RDW 11.9 10.0 - 15.0 %    Platelet Count 292 150 - 450 10e9/L   Renal panel    Collection Time: 07/18/19 11:58 AM   Result Value Ref Range    Sodium 139 133 - 144 mmol/L    Potassium 5.1 3.4 - 5.3 mmol/L    Chloride 107 94 - 109 mmol/L    Carbon Dioxide 26 20 - 32 mmol/L    Anion Gap 6 3 - 14 mmol/L    Glucose 108 (H) 70 - 99 mg/dL    Urea Nitrogen 72 (H) 7 - 30 mg/dL    Creatinine 7.35 (H) 0.66 - 1.25 mg/dL    GFR Estimate 9 (L) >60 mL/min/[1.73_m2]    GFR Estimate If Black 10 (L) >60 mL/min/[1.73_m2]    Calcium 7.3 (L) 8.5 - 10.1 mg/dL    Phosphorus 5.7 (H) 2.5 - 4.5 mg/dL    Albumin 3.2 (L) 3.4 - 5.0 g/dL   Routine UA with microscopic - No culture (for [facility])    Collection Time: 07/18/19 12:00 PM   Result Value Ref Range    Color Urine Yellow     Appearance Urine Clear     Glucose Urine 50 (A) NEG^Negative mg/dL    Bilirubin Urine Negative NEG^Negative    Ketones Urine Negative NEG^Negative mg/dL    Specific Gravity Urine 1.011 1.003 - 1.035    Blood Urine Negative NEG^Negative    pH Urine 6.0 5.0 - 7.0 pH    Protein Albumin Urine >499 (A) NEG^Negative mg/dL    Urobilinogen mg/dL 0.0 0.0 - 2.0 mg/dL    Nitrite Urine Negative NEG^Negative    Leukocyte Esterase Urine Negative NEG^Negative    Source Midstream Urine     WBC Urine 5 0 - 5 /HPF    RBC Urine 9 (H) 0 - 2 /HPF    Squamous Epithelial /HPF Urine <1 0 - 1 /HPF    Mucous Urine Present (A) NEG^Negative /LPF   Protein  random urine with Creat Ratio    Collection Time: 07/18/19 12:00 PM   Result Value Ref Range    Protein Random Urine 5.48 g/L    Protein Total Urine g/gr Creatinine 6.20 (H) 0 - 0.2 g/g Cr   Creatinine urine calculation only    Collection Time: 07/18/19 12:00 PM   Result  Value Ref Range    Creatinine Urine 88 mg/dL     No results found for: CPRA    Again, thank you for allowing me to participate in the care of your patient.      Sincerely,    JAIRO

## 2019-07-29 NOTE — PROGRESS NOTES
Outpatient MNT: Kidney Pancreas Transplant Evaluation    Current BMI: 29 (HT 65.25 in,  lbs/80 kg)  BMI is within criteria of <35 for KP transplant  Ideal BMI Goal for pancreas transplant <30 or <181 lbs      Time Spent: 15 minutes  Visit Type: Initial  Referring Physician: Marina   Pt accompanied by: his brother-in-law, David    Medical dx associated with RD referral  - DM II  - CKD V    History of previous txp: none   Frequency of BG checks: 1x/day   Dialysis: no     Nutrition Assessment  Pt's mother-in-law does most of the cooking. He tries to eat less potassium and phosphorus, which is new for him in the past 1-4 weeks.     Appetite: great    Vitamins, Supplements, Pertinent Meds: vit D  Herbal Medicines/Supplements: does take an herbal powder brewed into tea from [place] that his dad buys for him; goal is to improve kidney function; he is unsure what it is called    Diet Recall  Breakfast None    Lunch Plain white rice with occasional spam or eggs    Dinner Rice with pork or shrimp    Snacks Some chips and fruit    Beverages Occasional pop, Gatorade 1x/week, water    Alcohol None    Dining out 2-3x/week, Portuguese or Asian food      Physical Activity  None      Anthropometrics  Height:   65.25 in   BMI:    29    Weight Status:Overweight BMI 25-29.9   Weight:  175 lbs            IBW (lb): 138  % IBW: 127 Wt Hx: Pt reports no current edema. Weight overall stable except for fluid changes, which have now normalized.     Adj/dosing BW: 147 lbs/67 kg      Labs  No results found for: A1C  Potassium   Date Value Ref Range Status   07/18/2019 5.1 3.4 - 5.3 mmol/L Final     PHOSPHORUS: 5.7 on 7/18/19     Malnutrition  % Intake: No decreased intake noted  % Weight Loss: None noted  Subcutaneous Fat Loss: None  Muscle Loss: None  Fluid Accumulation/Edema: None noted  Malnutrition Diagnosis: Patient does not meet two of the above criteria necessary for diagnosing malnutrition     Estimated Nutrition  Needs  Energy  3702-1010     (20-25 kcal/kg dosing BW for desired wt loss vs maintenance)     Protein  40-54    (0.6-0.8 g/kg for CKD)           Fluid  1 ml/kcal or per MD   Micronutrient   Na+: <2000 mg/day  K+: 9323-1533 mg/day  Phos: 800-1000 mg/day            Nutrition Diagnosis  Food and nutrition related knowledge deficit r/t pre transplant eval AEB pt verbalized not hearing pre/post transplant diet guidelines.    Nutrition Intervention  Nutrition education provided:  Discussed current weight and that he is near BMI cutoff for a pancreas transplant.     Discussed sodium intake (low sodium foods and drinks, seasoning food without salt and tips for low sodium diet). Reviewed wnl K level, yet elevated Phos level last week. Reviewed high phos foods; pt reports he eats some nuts and some chocolate, but has really cut back within the past month trying to follow a more renal diet.     Reviewed post txp diet guidelines in brief (will review in further detail post txp):  (1) Review of proper food safety measures d/t immunosuppressant therapy post-op and increased risk for food-borne illness    (2) Avoid the following post txp d/t risk for rejection, unknown effects on the organs, and/or potential interactions with immunosuppressants:  - Herbal, Chinese, holistic, chiropractic, natural, alternative medicines and supplements  - Detoxes and cleanses  - Weight loss pills  - Protein powders or other products with extracts or herbs (ie green tea extract)    (3) Med regimen and possible side effects    Patient Understanding: Pt verbalized understanding of education provided.  Expected Compliance: Good  Follow-Up Plans: PRN     Nutrition Goals  1. Limit Na+ <2000mg/day  2. Pt to verbalize understanding of 3 aspects of post txp education provided  3. Weight maintenance at a BMI <30 or <181 lbs for pancreas transplant      Provided pt with contact info.   Kate Bernal RD, LD  Pgr [phone]

## 2019-07-29 NOTE — LETTER
"7/29/2019       RE: Erik Cramer  [address]  [place] [address]     Dear Colleague,    Thank you for referring your patient, Erik Cramer, to the [facility] SOLID ORGAN TRANSPLANT at [facility]. Please see a copy of my visit note below.    Chief Complaint   Patient presents with     Transplant Evaluation     kidney/pancreas     Blood pressure 163/77, pulse 79, temperature 97.6  F (36.4  C), height 1.66 m (5' 5.35\"), weight 79.8 kg (175 lb 14.4 oz), SpO2 97 %.    Patient has not taken his B/P medications and he did not bring them with him today.    Emilia Umana CMA          Assessment and Plan:  # Kidney/Pancreas Transplant Evaluation: Patient is a good candidate overall. Benefits of a living donor transplant were discussed.    # CKD from presumed type 2 diabetes and hypertension: with no previous biopsy. He has had progressive kidney disease since at least 2016 with a more rapid decline this past year. He is now following with Dr. Dietrich with most recent labs including a serum creatinine of 7.3 and eGFR of 9. He has plans for fistula placement soon. When ready, he would likely benefit from a kidney transplant.     # Type 2 diabetes: controlled using 24 units of insulin per day with a recent hemoglobin A1c of 5.8% (in prior years was  >14%).  No history of hypoglycemic unawareness. Given evidence of end-organ damage, he may benefit from a pancreas transplant.     # Cardiac Risk: no history of cardiac disease or events. ECHO/EKG results today are pending. Given risk factors, he will need a cardiac risk assessment and further testing for CAD.     # PAD risk: will need CT a/p and dopplers.     #Chronic nausea and vomiting: will need updated gastric emptying study.    #History of noncompliance: in part, related to insurance problems in the past.  Would appreciate social work input.  Will also discuss with primary nephrology.    # Health Maintenance: Dental: Up to date  "     Discussed the risks and benefits of a transplant, including the risk of surgery and immunosuppression medications.  Patient's overall evaluation will be discussed in the Transplant Program's regular meeting with a final recommendation on the patients suitability for transplant to be made at that time.  Patient was seen in conjunction with Dr. Howie Beltran as part of a shared visit.    Evaluation:  Erik Cramer was seen in consultation at the request of Dr. Zoie Gray for evaluation as a potential kidney/pancreas transplant recipient.    Reason for Visit:  Erik Cramer is a 39 year old male with CKD from DM II/HTN , who presents for kidney/pancreas transplant evaluation.    History of Present Illness:  Erik Cramer is a 39-year-old of Sinhala descent that presents with CKD secondary to presumed type 2 diabetes and uncontrolled hypertension, with no previous biopsy. He has type 2 diabetes for 17 years and hypertension for at least 5 years. He has had progressive kidney disease since at least 2016 with a more rapid decline this past year. He is now following with Dr. Dietrich with most recent labs including a serum creatinine of 7.3 and eGFR of 9.  He has plans for fistula placement next week.  Overall, he is feeling okay.  He has had chronic nausea and vomiting recently that has improved recently with herbal tea use.  In 2016, he had a [place] hepatobiliary study that shows a low gallbladder EF of 3%.  He has never had a gastric emptying study.  He has no history of cardiac disease or events.  No outside recent ECHO or stress test for review.  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  With exertion he denies chest pain, shortness of breath or claudication symptoms.  He is  and lives with his wife and 3-year-old child.           Kidney Disease Hx:        Kidney Disease Dx: presumed DM II/HTN        Biopsy  Proven: No         On Dialysis: No       Primary Nephrologist: Dr. Dietrich        H/o Kidney Stones: No       H/o Recurrent/Frequent UTI: No         Diabetic Hx:        Diagnosis Date: since 22 years old       Medication History: first started on oral medications, then started using insulin  3-4 years ago. Currently using 24 units/day.        Diabetic Control: Controlled (HbA1c <7%)   Last HbA1c: 5.8% (7/2019)       Hypoglycemic Unawareness: No       End-Organ Damage due to DM: RT and PN          Cardiac/Vascular Disease Risk Factors:        Cardiac Risk Factors: Diabetes, Hypertension and CKD       Known CAD: No       Known PAD/Caludication Symptoms: No       Known Heart Failure: No       Arrhythmia: No       Pulmonary Hypertension: No       Valvular Disease: No       Other: None             Fatigue/Decreased Energy: [x] No [] Yes    Chest Pain or SOB with Exertion: [] No [x] Yes SOB improving with better blood pressure control    Significant Weight Change: [x] No [] Yes    Nausea, Vomiting or Diarrhea: [x] No [] Yes    Fever, Sweats or Chills:  [x] No [] Yes    Leg Swelling [x] No [] Yes        History of Cancer: None    Review of Systems:  A comprehensive review of systems was obtained and negative, except as noted in the HPI or PMH.    Past Medical History:   Medical record was reviewed and PMH was discussed with patient and noted below.  Past Medical History:   Diagnosis Date     Diabetes (H) 2012    Type 2  IDDM     Hypertension 2018       Past Social History:   Past Surgical History:   Procedure Laterality Date     EYE SURGERY Left 2015    retinal detachment     ORTHOPEDIC SURGERY Left     Trigger finger      Personal history of bleeding or anesthesia problems: No    Family History:  No family history on file.    Personal History:   Social History     Socioeconomic History     Marital status:      Spouse name: Not on file     Number of children: Not on file     Years of education: Not on file      Highest education level: Not on file   Occupational History     Not on file   Social Needs     Financial resource strain: Not on file     Food insecurity:     Worry: Not on file     Inability: Not on file     Transportation needs:     Medical: Not on file     Non-medical: Not on file   Tobacco Use     Smoking status: Never Smoker     Smokeless tobacco: Never Used   Substance and Sexual Activity     Alcohol use: Yes     Frequency: Never     Comment: Social     Drug use: Never     Sexual activity: Not on file   Lifestyle     Physical activity:     Days per week: Not on file     Minutes per session: Not on file     Stress: Not on file   Relationships     Social connections:     Talks on phone: Not on file     Gets together: Not on file     Attends [employer] service: Not on file     Active member of club or organization: Not on file     Attends meetings of clubs or organizations: Not on file     Relationship status: Not on file     Intimate partner violence:     Fear of current or ex partner: Not on file     Emotionally abused: Not on file     Physically abused: Not on file     Forced sexual activity: Not on file   Other Topics Concern     Parent/sibling w/ CABG, MI or angioplasty before 65F 55M? Not Asked   Social History Narrative     Not on file       Allergies:  No Known Allergies    Medications:  Current Outpatient Medications   Medication Sig     Alcohol Sheets (ALCOH-WIPE) SHEE Apply 1 each topically     amLODIPine (NORVASC) 10 MG tablet Take 10 mg by mouth     atorvastatin (LIPITOR) 40 MG tablet Take 40 mg by mouth daily     blood glucose (ACCU-CHEK GUIDE) test strip 1 strip     blood glucose monitoring (ACCU-CHEK FASTCLIX) lancets Testing blood sugars 2 time(s) a day.  Indications: Type 2 Diabetes     calcitRIOL (ROCALTROL) 0.25 MCG capsule Take 0.25 mcg by mouth daily     calcium carbonate (TUMS) 500 MG chewable tablet Take 1,000 mg by mouth     furosemide (LASIX) 20 MG tablet Take 2 tablets (40 mg) by mouth 2  "times daily     hydrALAZINE (APRESOLINE) 25 MG tablet Take 2 tablets BY MOUTH TWICE DAILY     insulin pen needle (BD RAÚL U/F) 32G X 4 MM miscellaneous use as directed with lantus pen once a day     LANTUS SOLOSTAR 100 UNIT/ML soln Inject 30 units sub-q every evening.     minoxidil (LONITEN) 2.5 MG tablet Take 2.5 mg by mouth     vitamin D3 (CHOLECALCIFEROL) 2000 units (50 mcg) tablet Take 1 tablet (2,000 Units) by mouth daily     furosemide (LASIX) 20 MG tablet      sildenafil (VIAGRA) 50 MG tablet Take 50 mg by mouth     No current facility-administered medications for this visit.        Vitals:  /77   Pulse 79   Temp 97.6  F (36.4  C)   Ht 1.66 m (5' 5.35\")   Wt 79.8 kg (175 lb 14.4 oz)   SpO2 97%   BMI 28.96 kg/m       Exam:  GENERAL APPEARANCE: alert and no distress  HENT: mouth without ulcers or lesions  LYMPHATICS: no cervical or supraclavicular nodes  RESP: lungs clear to auscultation - no rales, rhonchi or wheezes  CV: regular rhythm, normal rate, no rub, no murmur  FEMORAL PULSES: 2+ equal bilaterally.   EDEMA: no LE edema bilaterally  ABDOMEN: soft, nondistended, nontender, bowel sounds normal  MS: extremities normal - no gross deformities noted, no evidence of inflammation in joints, no muscle tenderness  SKIN: no rash    Results:   Recent Results (from the past 336 hour(s))   Vitamin D Deficiency    Collection Time: 07/18/19 11:58 AM   Result Value Ref Range    Vitamin D Deficiency screening 9 (L) 20 - 75 ug/L   CBC with platelets    Collection Time: 07/18/19 11:58 AM   Result Value Ref Range    WBC 7.3 4.0 - 11.0 10e9/L    RBC Count 3.54 (L) 4.4 - 5.9 10e12/L    Hemoglobin 10.2 (L) 13.3 - 17.7 g/dL    Hematocrit 31.9 (L) 40.0 - 53.0 %    MCV 90 78 - 100 fl    MCH 28.8 26.5 - 33.0 pg    MCHC 32.0 31.5 - 36.5 g/dL    RDW 11.9 10.0 - 15.0 %    Platelet Count 292 150 - 450 10e9/L   Renal panel    Collection Time: 07/18/19 11:58 AM   Result Value Ref Range    Sodium 139 133 - 144 mmol/L    " Potassium 5.1 3.4 - 5.3 mmol/L    Chloride 107 94 - 109 mmol/L    Carbon Dioxide 26 20 - 32 mmol/L    Anion Gap 6 3 - 14 mmol/L    Glucose 108 (H) 70 - 99 mg/dL    Urea Nitrogen 72 (H) 7 - 30 mg/dL    Creatinine 7.35 (H) 0.66 - 1.25 mg/dL    GFR Estimate 9 (L) >60 mL/min/[1.73_m2]    GFR Estimate If Black 10 (L) >60 mL/min/[1.73_m2]    Calcium 7.3 (L) 8.5 - 10.1 mg/dL    Phosphorus 5.7 (H) 2.5 - 4.5 mg/dL    Albumin 3.2 (L) 3.4 - 5.0 g/dL   Routine UA with microscopic - No culture (for [facility])    Collection Time: 07/18/19 12:00 PM   Result Value Ref Range    Color Urine Yellow     Appearance Urine Clear     Glucose Urine 50 (A) NEG^Negative mg/dL    Bilirubin Urine Negative NEG^Negative    Ketones Urine Negative NEG^Negative mg/dL    Specific Gravity Urine 1.011 1.003 - 1.035    Blood Urine Negative NEG^Negative    pH Urine 6.0 5.0 - 7.0 pH    Protein Albumin Urine >499 (A) NEG^Negative mg/dL    Urobilinogen mg/dL 0.0 0.0 - 2.0 mg/dL    Nitrite Urine Negative NEG^Negative    Leukocyte Esterase Urine Negative NEG^Negative    Source Midstream Urine     WBC Urine 5 0 - 5 /HPF    RBC Urine 9 (H) 0 - 2 /HPF    Squamous Epithelial /HPF Urine <1 0 - 1 /HPF    Mucous Urine Present (A) NEG^Negative /LPF   Protein  random urine with Creat Ratio    Collection Time: 07/18/19 12:00 PM   Result Value Ref Range    Protein Random Urine 5.48 g/L    Protein Total Urine g/gr Creatinine 6.20 (H) 0 - 0.2 g/g Cr   Creatinine urine calculation only    Collection Time: 07/18/19 12:00 PM   Result Value Ref Range    Creatinine Urine 88 mg/dL           Patient was seen by myself, Dr. Howie Beltran, in conjunction with Michelle Quiles, as part of a shared visit.    I personally reviewed past medical and surgical history, vital signs, medications and labs.  Present and past medical history, along with significant physical exam findings were all reviewed with ADI.    My ramirez findings:  Erik D Cramer is a 39 year old year old male with CKD from  diabetes mellitus type 2, who presents for Kidney transplant evaluation.  Patient with DMII since his 20's.     He has followed with nephrology since 2016. No prior biopsy. Nearing HD.     HTN: poorly controlled.     CAD: no known CAD, but no workup in the past. No exertional symptoms.    Intermittent n/v. No prior workup in the past for gastropathy.     HIDA with low EF at 3%.    DMII: 5.8 g%. On 24 units of insulin. No neuropathy or retinopathy per him. No hypoglycemic unawareness. BMI currently 28.     Interested in pancreas.    Key management decisions made by me and discussed with ADI:    1. Kidney/Pancreas transplant evaluation - patient is a good candidate overall. Benefits of a living donor transplant were discussed.  The patient may have 1 donor.  As such we will list him for simultaneous pancreas and kidney transplant to decrease his weight time for an organ. However, if living donor becomes available, may consider pancreas after living kidney if he starts dialysis soon.   2. CKD from diabetes mellitus type 2: continue with local nephrologist for HD/PD initiation.   3. DMII: diabetes well controlled. Interested in pancreas transplant.   4. HTN: goal bp 100-130/60-80.  5. CAD risk: refer to cardiology.  6. Ca screen: up to date. Dental up to date.  7. N/v, low EF of gallbladder: get gastic emptying and refer to GI for the low gall bladder EF.        Again, thank you for allowing me to participate in the care of your patient.      Sincerely,    JAIRO

## 2019-07-29 NOTE — PROGRESS NOTES
Summary    Team s concerns/comments: Transplant surgeon recommended abd/pelvic CT wo contrast and bilateral iliac artery ultrasound. Transplant Nephrologist recommend gastric emptying study because of history of nausea/vomitting and low gall bladder output. Social work have no concerns.     Candidacy category: YELLOW     Action/Plan: Continue eval.     Expected Selection Meeting Discussion: 08-

## 2019-07-29 NOTE — PROGRESS NOTES
"Chief Complaint   Patient presents with     Transplant Evaluation     kidney/pancreas     Blood pressure 163/77, pulse 79, temperature 97.6  F (36.4  C), height 1.66 m (5' 5.35\"), weight 79.8 kg (175 lb 14.4 oz), SpO2 97 %.    Patient has not taken his B/P medications and he did not bring them with him today.    Emilia Umana CMA      "

## 2019-07-29 NOTE — PROGRESS NOTES
"Kidney, Pancreas Transplant Referral - 5/23/2019  Erik Cramer attended the pre-transplant patient education class today. The [facility] website pre-transplant modules were viewed; class participants were educated on using the site.     Content reviewed:    Living Donation and how to access that program    Paired exchange    Kidney Donor Profile Index (KDPI)    Waiting list issues (right to decline without penalty, high PHS risk donors, what to expect when called with an offer)    Hospital experience,  length of stay , need to stay locally post-discharge (2-4 weeks)    Surgical options (with pictures)                             Post-surgery lifting and driving restrictions    Post-transplant routines, frequency of lab work and clinic visits    Need to stay locally post-discharge (2-4 weeks)    Role of Transplant Coordinator    Participants were informed of the benefits of transplant as well as potential risks such as infection, cancer, and death.  The need for total adherence with immunosuppression medications and following transplant regimens was stressed.  The overall evaluation/approval/listing process was reviewed.        The patient was provided with the following documents:  What You Need to Know About a Kidney Transplant  Adult Kidney Transplant - A Guide for Patients  SRTR Data Sheet - Kidney  Brochure - Kidney Allocation  What You Need to Know About a Pancreas Transplant  Adult Pancreas Transplant-A Guide for Patients  SRTR Data Sheet - Pancreas  Brochure - Pancreas  SRTR Data Sheet - Kidney/Pancreas  Brochure - SPK  Brochure - Islet Allocation  Brochure - Multiple Listing and Waiting Time Transfer  What Every Patient Needs to Know ([facility]OS)  UNOS Facts and Figures  Finding a Donor  [facility] - Quick Start Guide  KDPI Consent  Receipt of Information form    Erik Cramer signed the  Receipt of Information for Organ Transplant Recipient.\" He was provided Viviana Williamson's business card and " instructed to call with additional questions.      Summary      Expected Selection Meeting Discussion: 8/7/19

## 2019-07-30 LAB
BLD GP AB SCN TITR SERPL: NORMAL {TITER}
BLOOD BANK CMNT PATIENT-IMP: NORMAL
C PEPTIDE SERPL-MCNC: 7.8 NG/ML (ref 0.9–6.9)
CARDIOLIPIN ANTIBODY IGG: <1.6 GPL-U/ML (ref 0–19.9)
CARDIOLIPIN ANTIBODY IGM: 0.2 MPL-U/ML (ref 0–19.9)
CMV IGG SERPL QL IA: >8 AI (ref 0–0.8)
EBV VCA IGG SER QL IA: >8 AI (ref 0–0.8)
HBV CORE AB SERPL QL IA: NONREACTIVE
HBV SURFACE AB SERPL IA-ACNC: 57.52 M[IU]/ML
HBV SURFACE AG SERPL QL IA: NONREACTIVE
HCV AB SERPL QL IA: NONREACTIVE
HIV 1+2 AB+HIV1 P24 AG SERPL QL IA: NONREACTIVE
INTERPRETATION ECG - MUSE: NORMAL
T PALLIDUM AB SER QL: NONREACTIVE
THROMBIN TIME: 17.2 SEC (ref 13–19)
VZV IGG SER QL IA: 4.2 AI (ref 0–0.8)

## 2019-07-31 LAB
GAMMA INTERFERON BACKGROUND BLD IA-ACNC: 0.03 IU/ML
LA PPP-IMP: NEGATIVE
M TB IFN-G BLD-IMP: NEGATIVE
M TB IFN-G CD4+ BCKGRND COR BLD-ACNC: 4.77 IU/ML
MITOGEN IGNF BCKGRD COR BLD-ACNC: 0 IU/ML
MITOGEN IGNF BCKGRD COR BLD-ACNC: 0 IU/ML

## 2019-08-01 LAB
A* LOCUS: NORMAL
A*: NORMAL
ABTEST METHOD: NORMAL
B* LOCUS: NORMAL
B*: NORMAL
BW-1: NORMAL
C* LOCUS: NORMAL
COPATH REPORT: NORMAL
DPA1* NMDP: NORMAL
DPA1*: NORMAL
DPB1* LOCUS NMDP: NORMAL
DPB1* NMDP: NORMAL
DPB1*: NORMAL
DPB1*LOCUS: NORMAL
DQA1*: NORMAL
DQA1*LOCUS: NORMAL
DQB1* LOCUS: NORMAL
DQB1*: NORMAL
DRB1* LOCUS: NORMAL
DRB1*: NORMAL
DRB3* LOCUS: NORMAL
DRB5*: NORMAL
DRSSO TEST METHOD: NORMAL
PROTOCOL CUTOFF: NORMAL
SA1 CELL: NORMAL
SA1 COMMENTS: NORMAL
SA1 HI RISK ABY: NORMAL
SA1 MOD RISK ABY: NORMAL
SA1 TEST METHOD: NORMAL
SA2 CELL: NORMAL
SA2 COMMENTS: NORMAL
SA2 HI RISK ABY UA: NORMAL
SA2 MOD RISK ABY: NORMAL
SA2 TEST METHOD: NORMAL
UNACCEPTABLE ANTIGEN: NORMAL
UNOS CPRA: 2

## 2019-08-07 ENCOUNTER — COMMITTEE REVIEW (OUTPATIENT)
Dept: TRANSPLANT | Facility: CLINIC | Age: 39
End: 2019-08-07

## 2019-08-07 NOTE — COMMITTEE REVIEW
Abdominal Committee Review Note     Evaluation Date: 7/29/2019  Committee Review Date: 8/7/2019    Organ being evaluated for: Kidney/Pancreas    Transplant Phase: Evaluation  Transplant Status: Active    Transplant Coordinator: Viviana Williamson  Transplant Surgeon: Dr. Zoie Gray      Referring Physician: Dr. Ignacio Dietrich    Primary Diagnosis: Hypertensive Nephrosclerosis  Secondary Diagnosis: CKD     Committee Review Members:  Nephrology Howie Beltran MD, Amol Quiles, APRN CNP, Bertin Brand MD   Nurse Suzi Patton, RN, Bonnie Kaufman, RN   Nutrition Kate Bernal, MELVIN   Pharmacist Bandar Ramirez, [facility]    - Clinical Dora Culver, MSW, Katelyn Powell, MSW   Surgery Zoie Gray MD, MD   Transplant Celine Marshall, YING, Katherine Peña, YING, Leilani Vincent, NP, Viviana Williamson RN, Arik Syed MD, Roman Fraser MD, Diana Lucas RN       Transplant Eligibility: Insulin-dependent diabetes mellitus    Committee Review Decision: Approved    Relative Contraindications: None    Absolute Contraindications: None    Committee Chair Arik Syed MD verbally attested to the committee's decision.    Committee Discussion Details: Reviewed medical status and evaluation results to date. Reviewed CXR results done 07/29/2019 and determined this is OK, no further evaluation needed for this. Reviewed medical compliance is OK for transplant. Determined pt does not need a aorto-iliac US.  Recommended pt completes: cardiology evaluation, gastric emptying study, GI evaluation for low EF of gallbladder, non contrast CT of abdomen and pelvis. Determined that pt is approved for kidney and SPK wait list now on INACTIVE status due to an incomplete evaluation.

## 2019-08-26 ENCOUNTER — TELEPHONE (OUTPATIENT)
Dept: TRANSPLANT | Facility: CLINIC | Age: 39
End: 2019-08-26

## 2019-08-26 NOTE — TELEPHONE ENCOUNTER
Contacted patient on 08/09/2019 to review outcome of selection committee meeting (See selection committee encounter).   Explained to patient that he/she needs to complete all components of the evaluation to be eligible for active status on the waiting list or to proceed with a live donor kidney transplant.   Reviewed next steps based on outcomes: patient wishing to complete evaluation appointments locally and so will work with his regular providers. Pt understands that results will then need to be available here for review.   I reviewed with patient on 08/09/2019 that he will be listed as inactive (is not on dialysis and evaluation is not complete)-will receive:   -An Evaluation Summary Letter indicating what is needed to complete evaluation-discussed with patient if they would like to have testing done with [facility] or locally   -A listing letter indicating inactive status   -A PRA Order   -PRA Mailers  Confirmed with patient that on successful completion of outstanding components, patient is eligible for active status and they will receive a follow-up call.   Confirmed that patient has contact information for additional questions or concerns.  Pt expressed very good understanding of all and was in good agreement with the plan.   I added pt today on 08/26/2019 onto the kidney and simultaneous kidney/pancreas transplant wait lists in [facility] on INACTIVE status due to an incomplete evaluation. Pt is not yet on dialysis. Pt does qualify for wait time with an est GFR of 8 done here with evaluation on 07/29/2019. Generated listing letter, transplant evaluation summary letter, PRA order today in Epic - routed to admin for mailing.

## 2019-08-26 NOTE — Clinical Note
Please note I have added pt today onto the DD kidney and SPK wait lists in [facility] on INACTIVE status. peace Michelle

## 2019-08-26 NOTE — LETTER
08/26/19        Erik Cramer  [address] [place] [address]  [facility] [address]        Dear Erik,    It was a pleasure to see you recently for consideration of kidney and pancreas transplantation. Your pre-transplant evaluation results were reviewed at our Multidisciplinary Selection Committee on 08/07/2019. The Committee is requesting the following items are completed before determining your candidacy:    1. Cardiologist appointment to evaluate your heart function and to make a recommendation for you to proceed with a kidney and or kidney/pancreas transplant surgery.   2. Gastric emptying study to evaluate your low gallbladder ejection fraction, chronic nausea and vomiting. When completed then attend an appointment with a Gastroenterologist for assessment of all and a recommendation to proceed with kidney/pancreas transplant surgery.   3. Abdominal/pelvic CT scan without contrast to image any arterial calcifications.   4. Dental work needs to remain up to date.     The above items can be done locally with the assistance of your local providers, please call their office to assist you. If you would like to schedule any of these appointments at our Facility, please let me know and I am happy to facilitate this.     You have already been added onto the kidney and simultaneous kidney/pancreas transplant wait list today, please see my separate letter regarding the details of this. Upon successful completion of the above items, your results will be reviewed at our Multidisciplinary Selection Committee Meeting for approval. Upon approval, you will be eligible to be changed to ACTIVE on the waiting lists or to proceed with a live donor kidney transplant in the event of an approved live donor.               Please have any potential live donors register now online with our Program to initiate their evaluations at [email or web address]. You will be notified by our Office in the event of an approved live donor.     For any questions,  please contact myself at the Transplant Office Main Number at [phone] or at my Direct Number at [phone].      Sincerely,      Viviana Williamson RN BSN Transplant Coordinator   Solid Organ Transplant  [facility]    Enclosure: [employer] Letter     CC's:

## 2019-08-26 NOTE — LETTER
PHYSICIAN ORDER   ALA/PRA BLOOD    DATE & TIME ISSUED: 2019 3:52 PM  PATIENT NAME: Erik Cramer   : 1980     [facility] MR#  [id number]     DIAGNOSIS/ICD-10 CODE: Awaiting Organ transplant [Z76.82}   EXPIRES: (1 YEAR AFTER DATE ISSUED)  EVERY 12 weeks / 3 months   1. Please draw 20ml of blood in red top (plain) tube for Antileukocyte Antibody (ALA or PRA).   2. Label tubes with the patient s name, complete lab slip.         3. Mailers, lab slips with instructions are sent to patient separately.      4. Call the Outreach Lab at [phone] to reorder mailers.       5. Mail blood to (this address is also on the mailers):    IMMUNOLOGY LABORATORY  [facility]  [address] [place]  [place] [address]    .

## 2019-08-26 NOTE — LETTER
2019    Erik Cramer  [address] [place] [id number]      Dear Mr. Cramer,    This letter is sent to confirm that you are a candidate in the kidney and pancreas transplant program at the [facility].  You were placed on the kidney and simultaneous kidney/pancreas INACTIVE waitlist on 2019.  This means you will accumulate waiting time but not receive  donor calls. You have been placed on INACTIVE status due to an incomplete evaluation, please see my separate letter regarding the details of this.       Items we will need from you:      We will receive approval from your insurance company for the transplant procedure when your status is ready to be changed to ACTIVE.  It is critical that you notify us if there is any change in your insurance.  It is also important that you familiarize yourself with the details of your specific insurance policy.  Our patient  is available to assist you if you should have any questions regarding your coverage.      An ALA or PRA blood sample will need to be sent here every 3 months to match you with  donors or any potential living donors when you are ACTIVE status. You do not need to send these samples now because you are on INACTIVE status. You will receive instruction when it is time to start. For your future use, a physician order form and special mailing boxes (called mailers) will be sent to you directly from the Outreach Department.  You should take the physician order form and the  to your home laboratory when it is time to for this testing to be done.  Additional mailers can be obtained by calling the Transplant Office and asking to speak to a kidney and pancreas .      During this waiting period, we may request additional periodic laboratory tests with your primary physician.  It will be your responsibility to remind your physician to forward your results to the  Transplant Office.      We need to be kept informed of any changes in your medical condition such as:    o changes in your medications,   o significant changes in your health  o significant infections (such as pneumonia or abscesses)  o blood transfusions  o any condition which requires hospitalization  o any surgery      Remember to complete any routine cancer screening tests required before your transplant.  This includes colonoscopy; prostrate screening for men, and mammogram and gynecologic testing for women, as well as dental work.  Your primary care clinic can assist you with arranging for these exams.  Remind your caregivers to forward copies of the records and final reports.    We want you to know that our program has physician and surgeon coverage 24 hours a day, 365 days a year. If this coverage changes or there are substantial program changes, you will be notified in writing by letter.     Attached is a letter from the [employer] ([employer]). It describes the services and information offered to patients by [employer] and the Organ Procurement and Transplantation Network.    We appreciate having had the opportunity to participate in your care.  If you have questions, please feel free to call the Transplant Office at [phone] or [phone].      Sincerely,       Kidney and Pancreas Transplant Program    Enclosures: ALA/PRA Physician Order, Telephone Contact List, Travel Resources, [employer] Letter, Waitlist Information Update and While You Are Waiting  CC:   Figueroa Washington MD(PCP) Ignacio Dietrich MD

## 2019-08-28 ENCOUNTER — TRANSFERRED RECORDS (OUTPATIENT)
Dept: HEALTH INFORMATION MANAGEMENT | Facility: CLINIC | Age: 39
End: 2019-08-28

## 2019-09-13 ENCOUNTER — TELEPHONE (OUTPATIENT)
Dept: TRANSPLANT | Facility: CLINIC | Age: 39
End: 2019-09-13

## 2019-09-13 ENCOUNTER — TRANSFERRED RECORDS (OUTPATIENT)
Dept: HEALTH INFORMATION MANAGEMENT | Facility: CLINIC | Age: 39
End: 2019-09-13

## 2019-09-13 DIAGNOSIS — Z01.818 PRE-TRANSPLANT EVALUATION FOR KIDNEY AND PANCREAS TRANSPLANT: Primary | ICD-10-CM

## 2019-09-13 DIAGNOSIS — R11.2 NAUSEA AND VOMITING: ICD-10-CM

## 2019-09-13 NOTE — Clinical Note
Please see orders for gastric emptying study, Gastroenterologist and cardiologist appts. Pt is not on dialysis. Please make sure gastric emptying done before GI appt. Pt wants all appts on one day as he is long distance. peace Michelle

## 2019-09-13 NOTE — TELEPHONE ENCOUNTER
Received VM from Dolores medical assistant at Dr. Muhammad's Office stating Dr. Muhammad recommends gastric emptying study/GI be scheduled here as they do not have resources locally for this.      Spoke with pt today when he called with the same info. Pt also requesting cardiologist appt here. Told pt all is fine and will do. Explained a   will call him soon to make appts. Pt expressed very good understanding of all and was in good agremeent with the plan.

## 2019-09-23 ENCOUNTER — TELEPHONE (OUTPATIENT)
Dept: TRANSPLANT | Facility: CLINIC | Age: 39
End: 2019-09-23

## 2019-09-23 NOTE — TELEPHONE ENCOUNTER
Called patient as he needed appts with [facility]VC & GI; GI was able to schedule him for Wed, Oct 9 at 1140am w/Viskocil & [facility] appt at 230pm w//Sih.  He confirmed for both appts.

## 2019-09-25 NOTE — TELEPHONE ENCOUNTER
RECORDS RECEIVED FROM: Internal    DATE RECEIVED: 10/9/19    NOTES STATUS DETAILS   OFFICE NOTE from referring provider Internal Ov note 9/23/19     OFFICE NOTE from other specialist N/A    DISCHARGE SUMMARY from hospital N/A    OPERATIVE REPORT N/A    MEDICATION LIST N/A         ENDOSCOPY  N/A    COLONOSCOPY N/A    ERCP N/A    EUS N/A    STOOL TESTING N/A    PERTINENT LABS Internal    PATHOLOGY REPORTS (RELATED) N/A    IMAGING (CT, MRI, EGD) Internal / [facility] PACS  NM 9/13/19  US 8/28/19  CT 8/28/19      REFERRAL INFORMATION    Date referral was placed: 10/9/19   Date all records received: N/A   Date records were scanned into Epic: N/A   Date records were sent to Provider to review: N/A   Date and recommendation received from provider:  LETTER SENT  SCHEDULE APPOINTMENT   Date patient was contacted to schedule: 9/23/19

## 2019-09-27 ENCOUNTER — TELEPHONE (OUTPATIENT)
Dept: TRANSPLANT | Facility: CLINIC | Age: 39
End: 2019-09-27

## 2019-09-27 NOTE — TELEPHONE ENCOUNTER
Reviewed abdominal pelvic CT and aorto-iliac artery US results dated 08/28/2019 from [facility] with Dr. Zoie Gray who said all is OK to proceed with KP transplant.

## 2019-10-04 ASSESSMENT — ENCOUNTER SYMPTOMS
MUSCLE WEAKNESS: 0
NECK PAIN: 0
JOINT SWELLING: 0
MUSCLE WEAKNESS: 0
NECK PAIN: 0
MUSCLE CRAMPS: 1
MUSCLE CRAMPS: 1
ARTHRALGIAS: 0
STIFFNESS: 0
STIFFNESS: 0
BACK PAIN: 0
ARTHRALGIAS: 0
JOINT SWELLING: 0
MYALGIAS: 0
MYALGIAS: 0
BACK PAIN: 0

## 2019-10-07 ENCOUNTER — TELEPHONE (OUTPATIENT)
Dept: GASTROENTEROLOGY | Facility: CLINIC | Age: 39
End: 2019-10-07

## 2019-10-07 NOTE — TELEPHONE ENCOUNTER
Called and left message for patient reminding of appointment scheduled on 10/9/19 at 1140 with [facility] GI clinic. Patient to arrive 15 min early. To reschedule or cancel patient to call [phone].    RENEE Nichols

## 2019-10-09 ENCOUNTER — PRE VISIT (OUTPATIENT)
Dept: GASTROENTEROLOGY | Facility: CLINIC | Age: 39
End: 2019-10-09

## 2019-10-09 ENCOUNTER — OFFICE VISIT (OUTPATIENT)
Dept: CARDIOLOGY | Facility: CLINIC | Age: 39
End: 2019-10-09
Attending: NURSE PRACTITIONER
Payer: COMMERCIAL

## 2019-10-09 ENCOUNTER — APPOINTMENT (OUTPATIENT)
Dept: LAB | Facility: CLINIC | Age: 39
End: 2019-10-09
Payer: COMMERCIAL

## 2019-10-09 ENCOUNTER — PRE VISIT (OUTPATIENT)
Dept: CARDIOLOGY | Facility: CLINIC | Age: 39
End: 2019-10-09

## 2019-10-09 ENCOUNTER — OFFICE VISIT (OUTPATIENT)
Dept: GASTROENTEROLOGY | Facility: CLINIC | Age: 39
End: 2019-10-09
Attending: NURSE PRACTITIONER
Payer: COMMERCIAL

## 2019-10-09 VITALS
TEMPERATURE: 98.1 F | HEART RATE: 77 BPM | HEIGHT: 66 IN | DIASTOLIC BLOOD PRESSURE: 68 MMHG | SYSTOLIC BLOOD PRESSURE: 144 MMHG | RESPIRATION RATE: 18 BRPM | BODY MASS INDEX: 27.8 KG/M2 | OXYGEN SATURATION: 98 % | WEIGHT: 173 LBS

## 2019-10-09 VITALS
SYSTOLIC BLOOD PRESSURE: 153 MMHG | HEIGHT: 66 IN | BODY MASS INDEX: 27.74 KG/M2 | DIASTOLIC BLOOD PRESSURE: 69 MMHG | HEART RATE: 83 BPM | OXYGEN SATURATION: 96 % | WEIGHT: 172.6 LBS

## 2019-10-09 DIAGNOSIS — R06.02 SOB (SHORTNESS OF BREATH): ICD-10-CM

## 2019-10-09 DIAGNOSIS — N18.5 CKD (CHRONIC KIDNEY DISEASE) STAGE 5, GFR LESS THAN 15 ML/MIN (H): ICD-10-CM

## 2019-10-09 DIAGNOSIS — Z01.818 PRE-TRANSPLANT EVALUATION FOR KIDNEY AND PANCREAS TRANSPLANT: ICD-10-CM

## 2019-10-09 DIAGNOSIS — R11.2 NON-INTRACTABLE VOMITING WITH NAUSEA, UNSPECIFIED VOMITING TYPE: Primary | ICD-10-CM

## 2019-10-09 DIAGNOSIS — R11.2 NAUSEA AND VOMITING, INTRACTABILITY OF VOMITING NOT SPECIFIED, UNSPECIFIED VOMITING TYPE: ICD-10-CM

## 2019-10-09 DIAGNOSIS — Z01.810 PRE-OPERATIVE CARDIOVASCULAR EXAMINATION: Primary | ICD-10-CM

## 2019-10-09 LAB — IGA SERPL-MCNC: 170 MG/DL (ref 70–380)

## 2019-10-09 PROCEDURE — 99203 OFFICE O/P NEW LOW 30 MIN: CPT | Mod: ZP | Performed by: INTERNAL MEDICINE

## 2019-10-09 PROCEDURE — G0463 HOSPITAL OUTPT CLINIC VISIT: HCPCS | Mod: ZF

## 2019-10-09 ASSESSMENT — PAIN SCALES - GENERAL
PAINLEVEL: NO PAIN (0)
PAINLEVEL: NO PAIN (0)

## 2019-10-09 ASSESSMENT — MIFFLIN-ST. JEOR
SCORE: 1652.47
SCORE: 1650.66

## 2019-10-09 NOTE — NURSING NOTE
Chief Complaint   Patient presents with     New Patient     reason for visit: New Pt Visit     Vitals were taken and medications were reconciled.   Mary Ann Quinonez  1:06 PM

## 2019-10-09 NOTE — NURSING NOTE
Cardiac Testing: Patient given instructions regarding  nuclear pharmacologic thallium . Discussed purpose, preparation, procedure and when to expect results reported back to the patient. Patient demonstrated understanding of this information and agreed to call with further questions or concerns.  Med Reconcile: Reviewed and verified all current medications with the patient. The updated medication list was printed and given to the patient.  Return Appointment: Patient given instructions regarding scheduling next clinic visit. Patient demonstrated understanding of this information and agreed to call with further questions or concerns.  Patient stated he understood all health information given and agreed to call with further questions or concerns.    Ebony Nuñez LPN

## 2019-10-09 NOTE — LETTER
RE: Erik Cramer  [address] [facility] [address]     Dear Colleague,    Thank you for the opportunity to participate in the care of your patient, Erik Cramer, at the [facility] HEART [facility]. Please see a copy of my visit note below.    Service Date: 10/09/2019      Figueroa Washington MD   [facility]   [address] [place], [place] [id number]      RE: Erik Cramer   MRN: [id number]   : 1980      Dear Dr. Washington:      It was a pleasure participating in the care of your patient, Mr. Tye Valencia.  As you know, Tye is a 39-year-old gentleman whom I see today in preoperative evaluation prior to kidney and pancreas transplant.      His past medical history is significant for the followin.  Type 2 diabetes.   2.  Hypertension.   3.  Chronic renal insufficiency secondary to diabetic nephropathy, current baseline GFR of 8 mL/min as of 2019.  Status post recent AV fistula placement yesterday, currently not on dialysis.      He denies having a significant history of cardiac disease.      In terms of his present symptom complex, the patient does not exercise on a regular basis, but he is able to walk for 15-20 minutes at a time and climb a flight of stairs.  However, when he pushes himself too hard, he will get mildly short of breath.  However, he denies chest discomfort.  He denies other PND, orthopnea, edema, palpitations, syncope or near-syncope.      In terms of his cardiac risk factors, he does have a long history of diabetes, about 18 years, as well as hypertension.  He denies history of smoking, drinking or drug use.  He denies a family history of heart disease.  He does not know his cholesterol.      He is on part-time work buffing glass.      CURRENT MEDICATIONS:     1.  Amlodipine 10 mg a day.   2.  Lipitor 40 mg a day.   3.  Lasix 40 mg a day.   4.  Hydralazine 50 mg twice daily.   5.  Minoxidil 2.5 mg a day.      PHYSICAL  EXAMINATION:     VITAL SIGNS:  Blood pressure is 150/70 with a pulse of 83.  His weight is 172 pounds.   NECK:  Exam reveals no obvious jugular venous distention.   LUNGS:  Clear to auscultation.  Respiratory effort is normal.   CARDIAC:  Exam reveals a regular rate and rhythm.  There is a soft, benign, 2/6 systolic ejection murmur.   ABDOMEN:  Belly soft, nontender.   EXTREMITIES:  Without gross edema.      His EKG 2019 reveals normal sinus rhythm at a rate of 84 beats per minute.      Echocardiogram 2019 reveals ejection fraction 65%-70%, no significant valvular pathology identified.  2019 potassium 4.0, GFR 8, hemoglobin 9.9.        IMPRESSION:      Tye is a 39-year-old gentleman without a prior documented history of cardiac disease who presents for preoperative evaluation prior to kidney and pancreas transplant.      From a symptom standpoint, he is able to walk for 15-20 minutes on flat ground and 1 flight of stairs; however, when he pushes himself hard, he will get mildly short of breath.  His echocardiogram reveals no significant structural pathology.  EKG is unremarkable.  Further noninvasive evaluation would be indicated.        PLAN:     1.  Pharmacologic nuclear stress test.      The patient feels deconditioned and not able to fully run or exert himself to his fullest on the treadmill or on a bike.     2.  If his pharmacologic nuclear stress test is unremarkable, then he will be approved for his procedure at acceptable perioperative risk for event; otherwise, further updates to follow.      Once again, it was a pleasure participating in the care of your patient, Mr. Tye Cramer.  Please feel free to contact me at any time if you have any questions regarding his care in the future.      Sincerely,          Alejandro Shepherd MD       D: 10/09/2019   T: 10/09/2019   MT: OSMANI    Name:     KEVEN CRAMER   MRN:      [id number]        Account:      [id number]   :      1980           Service  Date: 10/09/2019    Document: [id number]

## 2019-10-09 NOTE — PATIENT INSTRUCTIONS
1. Recommend checking a blood glucose with every episode of lightheadedness and nausea/vomiting. Please write this down.  2. Stop at the lab for a blood draw and a stool collection kit.    3. Ok to continue ondansetron/Zofran as needed for nausea.   4. Recommend not lying down for 3-4 hours after eating.     Follow-up in GI clinic as needed.     If you have any questions, please don't hesitate to contact me through our GI RN Clinic Coordinator, Meg Michelle, at [phone].

## 2019-10-09 NOTE — NURSING NOTE
"Chief Complaint   Patient presents with     Consult     Pt here for consult for kidney/pancreas TX       Vitals:    10/09/19 1125   BP: (!) 144/68   BP Location: Right arm   Patient Position: Sitting   Cuff Size: Adult Regular   Pulse: 77   Resp: 18   Temp: 98.1  F (36.7  C)   TempSrc: Oral   SpO2: 98%   Weight: 78.5 kg (173 lb)   Height: 1.676 m (5' 6\")       Body mass index is 27.92 kg/m .      CHAO BranchT                      "

## 2019-10-09 NOTE — PATIENT INSTRUCTIONS
Patient Instructions:  It was a pleasure to see you in the cardiology clinic today.      If you have any questions, you can reach my nurse, Ebony HEATON LPN, at [phone].  Press Option #1 for the [facility], and then press Option #4 for nursing.    We are encouraging the use of [employer] to communicate with your HealthCare Provider    Medication Changes: None.    Recommendations: None.    Studies Ordered: Nuclear Medicine Lexiscan Stress Test at your earliest convenience.    Prep for anum-nuc stress test: Report to the  [facility] Waiting Room at the [facility]. The hospital is located at [address] [place] [address] on [place] the [place].    This test can take up to 3 hours.    NPO 3 hours prior, Water is ok and encouraged  NO alcohol, smoking, caffeine, or decaf products 12 hours prior  TAKE ALL medications as prescribed, hold the following medications if they pertain to you:   If you take Aggrenox or dipyridamole (Persantine, Permole), stop taking it 48 hours before your test.   If you take Viagra, Cialis or Levitra, stop taking it 48 hours before your test.   If you take theophylline or aminophylline, stop taking it 12 hours before your test.   For patients with diabetes:If you take insulin, call your diabetes care team. Ask if you should take a 1/2 dose the morning of your test.   If you take diabetes medicine by mouth, don t take it on the morning of your test. Bring it with you to take after the test. (If you have questions, call your diabetes care team.)      Do not take nitrates on the day of your test. Do not wear your Nitro-Patch    If you have questions regarding your appointment date and time, please contact scheduling at [phone].  Any other questions regarding testing can be referred to Ebony.    The results from today include: None.    Please follow up: With Dr. Shepherd based on results of testing and as determined by your transplant team.    Sincerely,    Alejandro Shepherd MD      If you have an urgent need after hours (8:00 am to 4:30 pm) please call [phone] and ask for the cardiology fellow on call.

## 2019-10-09 NOTE — PROGRESS NOTES
Service Date: 10/09/2019      2019      Figueroa Washington MD   [facility] [place] [address]      RE: Erik Cramer   MRN: [id number]   : 1980      Dear Dr. Washington:      It was a pleasure participating in the care of your patient, Mr. Tye Valencia.  As you know, Tye is a 39-year-old gentleman whom I see today in preoperative evaluation prior to kidney and pancreas transplant.      His past medical history is significant for the followin.  Type 2 diabetes.   2.  Hypertension.   3.  Chronic renal insufficiency secondary to diabetic nephropathy, current baseline GFR of 8 mL/min as of 2019.  Status post recent AV fistula placement yesterday, currently not on dialysis.      He denies having a significant history of cardiac disease.      In terms of his present symptom complex, the patient does not exercise on a regular basis, but he is able to walk for 15-20 minutes at a time and climb a flight of stairs.  However, when he pushes himself too hard, he will get mildly short of breath.  However, he denies chest discomfort.  He denies other PND, orthopnea, edema, palpitations, syncope or near-syncope.      In terms of his cardiac risk factors, he does have a long history of diabetes, about 18 years, as well as hypertension.  He denies history of smoking, drinking or drug use.  He denies a family history of heart disease.  He does not know his cholesterol.      He is on part-time work buffing glass.      CURRENT MEDICATIONS:     1.  Amlodipine 10 mg a day.   2.  Lipitor 40 mg a day.   3.  Lasix 40 mg a day.   4.  Hydralazine 50 mg twice daily.   5.  Minoxidil 2.5 mg a day.      PHYSICAL EXAMINATION:     VITAL SIGNS:  Blood pressure is 150/70 with a pulse of 83.  His weight is 172 pounds.   NECK:  Exam reveals no obvious jugular venous distention.   LUNGS:  Clear to auscultation.  Respiratory effort is normal.   CARDIAC:  Exam reveals a regular rate and  rhythm.  There is a soft, benign, 2/6 systolic ejection murmur.   ABDOMEN:  Belly soft, nontender.   EXTREMITIES:  Without gross edema.      His EKG 2019 reveals normal sinus rhythm at a rate of 84 beats per minute.      Echocardiogram 2019 reveals ejection fraction 65%-70%, no significant valvular pathology identified.  2019 potassium 4.0, GFR 8, hemoglobin 9.9.        IMPRESSION:      Tye is a 39-year-old gentleman without a prior documented history of cardiac disease who presents for preoperative evaluation prior to kidney and pancreas transplant.      From a symptom standpoint, he is able to walk for 15-20 minutes on flat ground and 1 flight of stairs; however, when he pushes himself hard, he will get mildly short of breath.  His echocardiogram reveals no significant structural pathology.  EKG is unremarkable.  Further noninvasive evaluation would be indicated.        PLAN:     1.  Pharmacologic nuclear stress test.      The patient feels deconditioned and not able to fully run or exert himself to his fullest on the treadmill or on a bike.     2.  If his pharmacologic nuclear stress test is unremarkable, then he will be approved for his procedure at acceptable perioperative risk for event; otherwise, further updates to follow.      Once again, it was a pleasure participating in the care of your patient, Mr. Tye Cramer.  Please feel free to contact me at any time if you have any questions regarding his care in the future.      Sincerely,            Alejandro Shepherd MD     Addendum 10/14/19:    Pharmacologic nuclear stress test does not reveal evidence for gross stress induced ischemia    Patient is approved for his procedure at acceptable perioperative risk for event              D: 10/09/2019   T: 10/09/2019   MT: OSMANI      Name:     KEVEN CRAMER   MRN:      [id number]        Account:      [id number]   :      1980           Service Date: 10/09/2019      Document: [id number]

## 2019-10-09 NOTE — NURSING NOTE
Faxed lab request for H. Pylori stool sample to [facility] lab at [phone].  Patient aware that he will need to stop by the hospital and  a stool collection kit.  Requested the lab result be faxed back to the clinic

## 2019-10-09 NOTE — PROGRESS NOTES
GI CLINIC VISIT    CC/REFERRING MD:  Amol Quiles  REASON FOR CONSULTATION:   Mr. Cramer is a 39 year old male who I was asked to see in consultation at the request of Dr. Amol Quiles for   Chief Complaint   Patient presents with     Consult     Pt here for consult for kidney/pancreas TX       ASSESSMENT/PLAN:  (R11.2) Non-intractable vomiting with nausea, unspecified vomiting type  (primary encounter diagnosis)  (N18.5) CKD (chronic kidney disease) stage 5, GFR less than 15 ml/min (H)  Comment:     Infrequent n/v episodes (once monthly) which are generally short-lived and self-resolving. Pt maintains a healthy appetite and stable weight. Ondansetron has proved useful in the past, but he hasn't used it recently as he feels his symptoms are minimal. He denies any vomiting of pills and this has not impacted his ability to tolerate his medications at this point.    Recent gastric emptying study (in the setting of known T2DM) was normal at all time points. Clinical picture is not suggestive of gastroparesis or any chronic vomiting syndromes. Pt without other associated gut symptoms to suggest GI etiology. Will evaluate for both celiac disease and H pylori (as detailed below) given their varied presentations. Outlet obstruction is also unlikely with normal emptying study.    Pt reports today that each episode is preceded by lightheadedness (this may suggest that nausea/vomiting may be a consequence of the lightheadedness/presyncope). Unclear if he has periodic hypoTN (he is currently on three antihypertensives) though his BP was not hypotensive today shortly before he endorsed lightheadedness and nausea. Interestingly has been hypoglycemic during an episode (but hasn't checked any BGLs during episodes since then). We discussed tracking this moving forward.   Current medications which can contribute to n/v include: furosemide, hydralazine, amlodipine, calcitriol, and atorvastatin. With declining creatine  "clearance, some of these may be hanging around longer than in the past with increasing side effects. Pt with increasing BUN ( in the last few months) - though this is a chronic increase, it can contribute to some nausea as well (less likely, particularly as he has felt his n/v have been better in the last few months).    We discussed evaluating for upper GI inflammation/ulcer disease, H pylori, and celiac disease. Mr. Cramer does express concern about time away from work (states he \"used up his FMLA\") as well as cost.  Discussed consideration of EGD vs non-invasive work-up and limitations and risks of each. Ultimately, will move forward with H pylori stool Ag testing and serum celiac serologies. I think it's unlikely that symptoms are driven by GI inflammation or ulcer disease, particularly as he is symptom-free so much of the time. He is not currently endorsing any red flag symptoms. Certainly this can be considered down-the-line if there is a change in symptoms. Per his request, will arrange for local testing near his home town. He is reluctant to schedule follow-up as he feels well and he states he cannot take much more time away from work. We discussed importance of reaching out to GI clinic if symptoms progress and that we would be happy to see him. GI clinic contact information was provided.   Plan:   1. Recommend checking a blood glucose with every episode of lightheadedness and nausea/vomiting. Please write this down.  2. Stop at the lab for a blood draw and a stool collection kit.    3. Ok to continue ondansetron/Zofran as needed for nausea.   4. Recommend not lying down for 3-4 hours after eating.     Follow-up in GI clinic as needed.     Thank you for this consultation.  It was a pleasure to participate in the care of this patient; please contact us with any further questions.  I spent 45 minutes with the patient, of which > 50% was spent face-to-face with the patient in education, care coordination " "and counseling regarding the above diagnoses (explaining treatment options, disease processes, laboratory/imaging results, prognosis, risks and benefits of treatment options, medication side effects, importance of compliance with treatment, risk factor reduction, follow up with primary care physician).  I spent a total time (reviewing notes, labs, imaging, clinical status events) of 30 min.      Inez Cervantes MD   of Medicine  Division of Gastroenterology, Hepatology and Nutrition  [facility]    ---------------------------------------------------------------------------------------------------  HPI:     Mr. Cramer is a 39 year old male with CKD, stage 5 with secondary hyperparathyroidism s/p recent AV fistula formation, HTN, anemia of chronic disease and renal disease, DMT2 (on insulin, most recent hgb A1c < 6.0), and BMI of 28 who states he presents to GI clinic for a \"pre-transplant evaluation.\" He is currently being evaluated for a kidney-pancreas transplant. Outside records indicate he is here \"after gastric emptying studying.\" After a full GI ROS (see below), it seems patient has been having chronic nausea and vomiting.     Mr. Cramer believes his nausea and vomiting began about a year ago.   At it's worst he had episodes of nausea once every other week/about twice a month. He states this has improved in recent months and he is experiencing nausea about once a month.     The nausea seems to always be proceeded by lightheadedness and \"dizziness\". When asked about his dizziness, he denies a sense of room spinning, imbalance, or vertigo. He further describes it's more a feeling like he \"might faint.\" He denies experiencing any tunnel vision, he has had occasionally had diaphoresis. He denies an episodes where he lost consciousness.   Generally the lightheadedness then progresses to nausea. About 50 % of the time he has nausea, he will vomit. If he doesn't vomit, his sense of " "nausea spontaneously resolves after a couple hours. If he does vomit, he often feels immediately better and most episodes last < 10 minutes. In the past he's had episodes that last longer, at most up to two hours; he may vomit more than once during these episodes. When he vomits he will bring up whatever is in his stomach at that time (acid and often food). Mr. Cramer feels his vomiting is unpredictable and not reliably after meals. In the past, he had a couple episodes of vomiting in the morning where he brought up some food contents eaten the night before.     Mr. Cramer has not noted any pattern to when an episode occurs (no relation to time of day, timing of medications, eating, activity, etc.). He hasn't really checked his blood pressure during these episodes (doesn't have a home BP cuff). He only checked in BGL once during an episode and it was low at that time (he thinks around 70). He estimates that he only checks his BGL once a month. He hasn't checked his pulse during episodes or noted any palpitations.    He denies vomiting blood. He denies weight loss. His weight has ranged from 172-175 lbs over the past several months on his home scale and he feels this has been very stable. He continues to have a good appetite and does not feel his eating habits have been affected. He denies every waking at night to vomit.    In the past he was prescribed ondansetron 4 mg. Today, he states this was helpful and when taken would abort an episode of nausea/vomiting, though he used it infrequently. He states he would use his ondansetron if an episode lasted longer than an hour or so. He hasn't used this medication in a few months as he's felt better.     Today, Mr. Cramer does report that driving here today made him nauseated. When asked about increased symptoms with motion or traveling in moving vehicles he denied this was a concern. He then stated that he felt his current nausea was \"due to rushing here\" and feeling " "\"anxious\" and \"stressed\". In his view this evolved into feeling lightheaded and then nauseated. He wonders if anxiousness is a trigger.    Partway through his visit today, Mr. Cramer did vomit into an emesis bag. Emesis was ~ 250 mL of pale-yellow translucent liquid.     Diet:  Wakes up at 6 a.m. Starts work at 6:30 a.m.  B: doesn't eat  S: eats snack at 9a.m. (during break time), generally has a bag of chips  L: eats at 12 - 12:30 pm, eats rice or bread with a meat, homemade   D: eats at 7:30 - 8:00 pm, eats rice or bread with a meat, homemade, veggies are mainly lettuce or cucumber  Lays down for bed at 10:00 pm, but often stays awake until 11 pm or 12am.    Prior work-up for his nausea and vomiting has included a GES which was normal at all time points.       ROS:    GI ROS:  Dysphagia: none   Odynophagia: none  GERD symptoms: \"rare\" heartburn, experienced as a substernal pressure, generally passes quickly  Nausea/vomiting: see HPI  Hematemesis/melena/hematochezia: none  Baseline stools:  Generally has 1-2 BMs a day, stools are formed and easy to pass  Diarrhea: none  Constipation: none  Eructation: no change from baseline  Flatus: no change from baseline  Abdominal pain: none  Weight loss: none, see HPI   NSAIDs: none  Oral steroids: none     See remainder of comprehensive ROS below.    PROBLEM LIST  Patient Active Problem List    Diagnosis Date Noted     Type 2 diabetes mellitus (H)      Priority: Medium     Chronic kidney disease, stage 5 (H)      Priority: Medium     Anemia in chronic kidney disease      Priority: Medium     Vitamin D deficiency      Priority: Medium     CKD (chronic kidney disease) stage 5, GFR less than 15 ml/min (H) 07/18/2019     Priority: Medium     Hypertension secondary to other renal disorders 07/18/2019     Priority: Medium     Anemia 07/18/2019     Priority: Medium     Secondary renal hyperparathyroidism (H) 07/18/2019     Priority: Medium     Hypertension 01/01/2018     Priority: " Medium       PERTINENT PAST MEDICAL HISTORY:  Past Medical History:   Diagnosis Date     Anemia in chronic kidney disease      Chronic kidney disease, stage 5 (H)      Hypertension 2018     Type 2 diabetes mellitus (H)      Vitamin D deficiency        PREVIOUS SURGERIES:  Past Surgical History:   Procedure Laterality Date     EYE SURGERY Left 2015    retinal detachment     ORTHOPEDIC SURGERY Left     Trigger finger        PREVIOUS ENDOSCOPY:  None    ALLERGIES:   Allergies   Allergen Reactions     Metoprolol Anaphylaxis       PERTINENT MEDICATIONS:  Current Outpatient Medications   Medication     Alcohol Sheets (ALCOH-WIPE) SHEE     amLODIPine (NORVASC) 10 MG tablet     atorvastatin (LIPITOR) 40 MG tablet     blood glucose (ACCU-CHEK GUIDE) test strip     blood glucose monitoring (ACCU-CHEK FASTCLIX) lancets     calcitRIOL (ROCALTROL) 0.25 MCG capsule     calcium carbonate (TUMS) 500 MG chewable tablet     furosemide (LASIX) 20 MG tablet     furosemide (LASIX) 20 MG tablet     hydrALAZINE (APRESOLINE) 25 MG tablet     insulin pen needle (BD RAÚL U/F) 32G X 4 MM miscellaneous     LANTUS SOLOSTAR 100 UNIT/ML soln     minoxidil (LONITEN) 2.5 MG tablet     sildenafil (VIAGRA) 50 MG tablet     vitamin D3 (CHOLECALCIFEROL) 2000 units (50 mcg) tablet     No current facility-administered medications for this visit.        SOCIAL HISTORY:  Social History     Socioeconomic History     Marital status:      Spouse name: Not on file     Number of children: Not on file     Years of education: Not on file     Highest education level: Not on file   Occupational History     Not on file   Social Needs     Financial resource strain: Not on file     Food insecurity:     Worry: Not on file     Inability: Not on file     Transportation needs:     Medical: Not on file     Non-medical: Not on file   Tobacco Use     Smoking status: Never Smoker     Smokeless tobacco: Never Used   Substance and Sexual Activity     Alcohol use: Yes      "Frequency: Never     Comment: Social     Drug use: Never     Sexual activity: Not on file   Lifestyle     Physical activity:     Days per week: Not on file     Minutes per session: Not on file     Stress: Not on file   Relationships     Social connections:     Talks on phone: Not on file     Gets together: Not on file     Attends Evangelical service: Not on file     Active member of club or organization: Not on file     Attends meetings of clubs or organizations: Not on file     Relationship status: Not on file     Intimate partner violence:     Fear of current or ex partner: Not on file     Emotionally abused: Not on file     Physically abused: Not on file     Forced sexual activity: Not on file   Other Topics Concern     Parent/sibling w/ CABG, MI or angioplasty before 65F 55M? Not Asked   Social History Narrative     Not on file       FAMILY HISTORY:  Family History   Problem Relation Age of Onset     Breast Cancer Mother      Diabetes Brother    Father: T2DM    Denies any known family history of IBD, celiac disease, or GI malignancies.     PHYSICAL EXAMINATION:  Vitals BP (!) 144/68 (BP Location: Right arm, Patient Position: Sitting, Cuff Size: Adult Regular)   Pulse 77   Temp 98.1  F (36.7  C) (Oral)   Resp 18   Ht 1.676 m (5' 6\")   Wt 78.5 kg (173 lb)   SpO2 98%   BMI 27.92 kg/m     Wt   Wt Readings from Last 2 Encounters:   10/09/19 78.5 kg (173 lb)   07/29/19 79.8 kg (175 lb 14.4 oz)   Gen: Pt sitting up in NAD, interactive and cooperative on exam  Eyes: sclerae anicteric, no injection  ENT:  OP clear, MMM  Cardiac: RRR, nl S1, S2,  1-2+ pitting LE edema  Resp: Clear on anterior exam  GI: Normoactive BS, abd soft, flat, nontender  Skin: Warm, dry, no jaundice, nails appear healthy  Lymph: no submandibular, no cervical, and no supraclavicular LAD  Neuro: alert, oriented, answers questions appropriately      PERTINENT STUDIES:    Orders Only on 07/29/2019   Component Date Value Ref Range Status     C " Peptide 07/29/2019 7.8* 0.9 - 6.9 ng/mL Final     Hemoglobin A1C 07/29/2019 6.4* 0 - 5.6 % Final     Color Urine 07/29/2019 Straw   Final     Appearance Urine 07/29/2019 Clear   Final     Glucose Urine 07/29/2019 50* NEG^Negative mg/dL Final     Bilirubin Urine 07/29/2019 Negative  NEG^Negative Final     Ketones Urine 07/29/2019 Negative  NEG^Negative mg/dL Final     Specific Gravity Urine 07/29/2019 1.010  1.003 - 1.035 Final     Blood Urine 07/29/2019 Negative  NEG^Negative Final     pH Urine 07/29/2019 5.0  5.0 - 7.0 pH Final     Protein Albumin Urine 07/29/2019 >499* NEG^Negative mg/dL Final     Urobilinogen mg/dL 07/29/2019 0.0  0.0 - 2.0 mg/dL Final     Nitrite Urine 07/29/2019 Negative  NEG^Negative Final     Leukocyte Esterase Urine 07/29/2019 Negative  NEG^Negative Final     Source 07/29/2019 Midstream Urine   Final     WBC Urine 07/29/2019 3  0 - 5 /HPF Final     RBC Urine 07/29/2019 5* 0 - 2 /HPF Final     Mucous Urine 07/29/2019 Present* NEG^Negative /LPF Final     Thrombin Time 07/29/2019 17.2  13.0 - 19.0 sec Final     PTT 07/29/2019 37  22 - 37 sec Final     INR 07/29/2019 1.03  0.86 - 1.14 Final     Lupus Result 07/29/2019 Negative  NEG^Negative Final     Cardiolipin Antibody IgG 07/29/2019 <1.6  0.0 - 19.9 GPL-U/mL Final     Cardiolipin Antibody IgM 07/29/2019 0.2  0.0 - 19.9 MPL-U/mL Final     WBC 07/29/2019 9.5  4.0 - 11.0 10e9/L Final     RBC Count 07/29/2019 3.36* 4.4 - 5.9 10e12/L Final     Hemoglobin 07/29/2019 9.9* 13.3 - 17.7 g/dL Final     Hematocrit 07/29/2019 30.2* 40.0 - 53.0 % Final     MCV 07/29/2019 90  78 - 100 fl Final     MCH 07/29/2019 29.5  26.5 - 33.0 pg Final     MCHC 07/29/2019 32.8  31.5 - 36.5 g/dL Final     RDW 07/29/2019 12.3  10.0 - 15.0 % Final     Platelet Count 07/29/2019 312  150 - 450 10e9/L Final     Diff Method 07/29/2019 Automated Method   Final     % Neutrophils 07/29/2019 66.7  % Final     % Lymphocytes 07/29/2019 19.2  % Final     % Monocytes 07/29/2019 9.1  %  Final     % Eosinophils 07/29/2019 4.1  % Final     % Basophils 07/29/2019 0.6  % Final     % Immature Granulocytes 07/29/2019 0.3  % Final     Nucleated RBCs 07/29/2019 0  0 /100 Final     Absolute Neutrophil 07/29/2019 6.3  1.6 - 8.3 10e9/L Final     Absolute Lymphocytes 07/29/2019 1.8  0.8 - 5.3 10e9/L Final     Absolute Monocytes 07/29/2019 0.9  0.0 - 1.3 10e9/L Final     Absolute Eosinophils 07/29/2019 0.4  0.0 - 0.7 10e9/L Final     Absolute Basophils 07/29/2019 0.1  0.0 - 0.2 10e9/L Final     Abs Immature Granulocytes 07/29/2019 0.0  0 - 0.4 10e9/L Final     Absolute Nucleated RBC 07/29/2019 0.0   Final     Quantiferon-TB Gold Plus Result 07/29/2019 Negative  NEG^Negative Final     TB1 Ag minus Nil Value 07/29/2019 0.00  IU/mL Final     TB2 Ag minus Nil Value 07/29/2019 0.00  IU/mL Final     Mitogen minus Nil Result 07/29/2019 4.77  IU/mL Final     Nil Result 07/29/2019 0.03  IU/mL Final     Phosphorus 07/29/2019 5.4* 2.5 - 4.5 mg/dL Final     Sodium 07/29/2019 136  133 - 144 mmol/L Final     Potassium 07/29/2019 4.0  3.4 - 5.3 mmol/L Final     Chloride 07/29/2019 106  94 - 109 mmol/L Final     Carbon Dioxide 07/29/2019 24  20 - 32 mmol/L Final     Anion Gap 07/29/2019 7  3 - 14 mmol/L Final     Glucose 07/29/2019 138* 70 - 99 mg/dL Final     Urea Nitrogen 07/29/2019 101* 7 - 30 mg/dL Final     Creatinine 07/29/2019 7.79* 0.66 - 1.25 mg/dL Final     GFR Estimate 07/29/2019 8* >60 mL/min/[1.73_m2] Final     GFR Estimate If Black 07/29/2019 9* >60 mL/min/[1.73_m2] Final     Calcium 07/29/2019 7.8* 8.5 - 10.1 mg/dL Final     Bilirubin Total 07/29/2019 0.3  0.2 - 1.3 mg/dL Final     Albumin 07/29/2019 3.8  3.4 - 5.0 g/dL Final     Protein Total 07/29/2019 8.1  6.8 - 8.8 g/dL Final     Alkaline Phosphatase 07/29/2019 176* 40 - 150 U/L Final     ALT 07/29/2019 29  0 - 70 U/L Final     AST 07/29/2019 30  0 - 45 U/L Final     Antigen Type 07/29/2019    Final                    Value:Canceled, Test  credited  Incorrectly ordered by PCU/Clinic       Antibody Titer 07/29/2019    Final                    Value:Anti A with A1 cells: IgM 8, IgG 8  Anti A with A2 cells: IgM 1, IgG <1       ABO 07/29/2019 B   Final     RH(D) 07/29/2019 Pos   Final     Antibody Screen 07/29/2019 Neg   Final     Test Valid Only At 07/29/2019 [facility]      Final     Specimen Expires 07/29/2019 08/01/2019   Final     ABO 07/29/2019 B   Final     RH(D) 07/29/2019 Pos   Final     Specimen Expires 07/29/2019 08/01/2019   Final     Copath Report 07/29/2019    Final                    Value:Patient Name: KEVEN MCKENNA  MR#: [id number]  Specimen #: [id number]  Collected: 7/29/2019 12:54  Received: 7/30/2019 08:28  Reported: 8/1/2019 15:05  Ordering Phy(s): CLAYTON PINEDA  Additional Phy(s): NEYMAR SCOTT    For improved result formatting, select 'View Enhanced Report Format' under   Linked Documents section.  _________________________________________    TEST(S) REQUESTED:  Factor 5 Leiden and Factor 2 by PCR    SPECIMEN DESCRIPTION:  Blood    METHODOLOGY:   The regions of genomic DNA containing the [id number] Factor V   Leiden gene mutation (Factor V  Leiden) and the Factor 2(Prothrombin [id number]) gene mutation were   simultaneously amplified using the polymerase  chain reaction.  The amplified products were digested with restriction   endonuclease TaqI and products were  analyzed by gel electrophoresis.    RESULTS:    Factor V 1691G>A (Leiden)  RESULTS:  Mutation analyzed:     1691G>A  Factor V 1691G>A (Leiden)  Interpretation:      ABSENT  Factor V 1691G>A (Leiden) mut                          ation  genotype:      G/G    FACTOR 2/PROTHROMBIN RESULTS:  Mutation analyzed:     39376T>A  Factor 2 Mutation Interpretation:      ABSENT  Factor 2 Mutation genotype:      G/G    INTERPRETATION:  The patient is negative for the Factor V 1691G>A (Leiden) and negative for   the Factor 2  mutation.    COMMENTS:  If a patient is the recipient of an allogeneic bone marrow transplant,   this test must be done on a  pre-transplant sample or buccal swab.  A previous allogeneic bone marrow   transplant will interfere with test  results.  Call the [facility]([phone]) for   instructions on sample collection for these  patients.    This test was developed and its performance characteristics determined by   the [facility],  [facility]. It has not been cleared or   approved by the FDA. The laboratory is  regulated under CLIA as qualified to perform high-complexity testing. This   test is used for clinical purposes.  It should not                           be regarded as investigational or for research.    A resident/fellow in an accredited training program was involved in the   selection of testing, review of  laboratory data, and/or interpretation of this case.  I, as the senior   physician, attest that I: (i) confirmed  appropriate testing, (ii) examined the relevant raw data for the   specimen(s); and (iii) rendered or confirmed  the interpretation(s).    Electronically Signed Out By:  Ector Becker M.D., PhD  [facility]    CPT Codes:  A: 66117-K8MHVM, 78212-O7ZTZA, -XOLEBM(2)    TESTING LAB LOCATION:  [address] [address] [phone]    COLLECTION SITE:  Client:  [facility]  Location:  [facility] (B)         Answers for HPI/ROS submitted by the patient on 10/4/2019   General Symptoms: No  Skin Symptoms: No  HENT Symptoms: No  EYE SYMPTOMS: No  HEART SYMPTOMS: No  LUNG SYMPTOMS: No  INTESTINAL SYMPTOMS: No  URINARY SYMPTOMS: No  REPRODUCTIVE SYMPTOMS: No  SKELETAL SYMPTOMS: Yes  BLOOD SYMPTOMS: No  NERVOUS SYSTEM SYMPTOMS: No  MENTAL HEALTH SYMPTOMS: No  Back pain: No  Muscle aches: No  Neck pain:  No  Swollen joints: No  Joint pain: No  Bone pain: No  Muscle cramps: Yes  Muscle weakness: No  Joint stiffness: No  Bone fracture: No

## 2019-10-09 NOTE — LETTER
10/9/2019       RE: Erik Cramer  [address]  [facility] [address]     Dear Colleague,    Thank you for referring your patient, Erik Cramer, to the [facility] GASTROENTEROLOGY AND IBD CLINIC at [facility]. Please see a copy of my visit note below.    GI CLINIC VISIT    CC/REFERRING MD:  Amol Quiles  REASON FOR CONSULTATION:   Mr. Cramer is a 39 year old male who I was asked to see in consultation at the request of Dr. Amol Quiles for   Chief Complaint   Patient presents with     Consult     Pt here for consult for kidney/pancreas TX       ASSESSMENT/PLAN:  (R11.2) Non-intractable vomiting with nausea, unspecified vomiting type  (primary encounter diagnosis)  (N18.5) CKD (chronic kidney disease) stage 5, GFR less than 15 ml/min (H)  Comment:     Infrequent n/v episodes (once monthly) which are generally short-lived and self-resolving. Pt maintains a healthy appetite and stable weight. Ondansetron has proved useful in the past, but he hasn't used it recently as he feels his symptoms are minimal. He denies any vomiting of pills and this has not impacted his ability to tolerate his medications at this point.    Recent gastric emptying study (in the setting of known T2DM) was normal at all time points. Clinical picture is not suggestive of gastroparesis or any chronic vomiting syndromes. Pt without other associated gut symptoms to suggest GI etiology. Will evaluate for both celiac disease and H pylori (as detailed below) given their varied presentations. Outlet obstruction is also unlikely with normal emptying study.    Pt reports today that each episode is preceded by lightheadedness (this may suggest that nausea/vomiting may be a consequence of the lightheadedness/presyncope). Unclear if he has periodic hypoTN (he is currently on three antihypertensives) though his BP was not hypotensive today shortly before he endorsed lightheadedness and nausea. Interestingly has been  "hypoglycemic during an episode (but hasn't checked any BGLs during episodes since then). We discussed tracking this moving forward.   Current medications which can contribute to n/v include: furosemide, hydralazine, amlodipine, calcitriol, and atorvastatin. With declining creatine clearance, some of these may be hanging around longer than in the past with increasing side effects. Pt with increasing BUN ( in the last few months) - though this is a chronic increase, it can contribute to some nausea as well (less likely, particularly as he has felt his n/v have been better in the last few months).    We discussed evaluating for upper GI inflammation/ulcer disease, H pylori, and celiac disease. Mr. Cramer does express concern about time away from work (states he \"used up his FMLA\") as well as cost.  Discussed consideration of EGD vs non-invasive work-up and limitations and risks of each. Ultimately, will move forward with H pylori stool Ag testing and serum celiac serologies. I think it's unlikely that symptoms are driven by GI inflammation or ulcer disease, particularly as he is symptom-free so much of the time. He is not currently endorsing any red flag symptoms. Certainly this can be considered down-the-line if there is a change in symptoms. Per his request, will arrange for local testing near his home town. He is reluctant to schedule follow-up as he feels well and he states he cannot take much more time away from work. We discussed importance of reaching out to GI clinic if symptoms progress and that we would be happy to see him. GI clinic contact information was provided.   Plan:   1. Recommend checking a blood glucose with every episode of lightheadedness and nausea/vomiting. Please write this down.  2. Stop at the lab for a blood draw and a stool collection kit.    3. Ok to continue ondansetron/Zofran as needed for nausea.   4. Recommend not lying down for 3-4 hours after eating.     Follow-up in GI clinic " "as needed.     Thank you for this consultation.  It was a pleasure to participate in the care of this patient; please contact us with any further questions.  I spent 45 minutes with the patient, of which > 50% was spent face-to-face with the patient in education, care coordination and counseling regarding the above diagnoses (explaining treatment options, disease processes, laboratory/imaging results, prognosis, risks and benefits of treatment options, medication side effects, importance of compliance with treatment, risk factor reduction, follow up with primary care physician).  I spent a total time (reviewing notes, labs, imaging, clinical status events) of 30 min.      Inez Cervantes MD   of Medicine  Division of Gastroenterology, Hepatology and Nutrition  [facility]    ---------------------------------------------------------------------------------------------------  HPI:     Mr. Cramer is a 39 year old male with CKD, stage 5 with secondary hyperparathyroidism s/p recent AV fistula formation, HTN, anemia of chronic disease and renal disease, DMT2 (on insulin, most recent hgb A1c < 6.0), and BMI of 28 who states he presents to GI clinic for a \"pre-transplant evaluation.\" He is currently being evaluated for a kidney-pancreas transplant. Outside records indicate he is here \"after gastric emptying studying.\" After a full GI ROS (see below), it seems patient has been having chronic nausea and vomiting.     Mr. Cramer believes his nausea and vomiting began about a year ago.   At it's worst he had episodes of nausea once every other week/about twice a month. He states this has improved in recent months and he is experiencing nausea about once a month.     The nausea seems to always be proceeded by lightheadedness and \"dizziness\". When asked about his dizziness, he denies a sense of room spinning, imbalance, or vertigo. He further describes it's more a feeling like he \"might faint.\" " He denies experiencing any tunnel vision, he has had occasionally had diaphoresis. He denies an episodes where he lost consciousness.   Generally the lightheadedness then progresses to nausea. About 50 % of the time he has nausea, he will vomit. If he doesn't vomit, his sense of nausea spontaneously resolves after a couple hours. If he does vomit, he often feels immediately better and most episodes last < 10 minutes. In the past he's had episodes that last longer, at most up to two hours; he may vomit more than once during these episodes. When he vomits he will bring up whatever is in his stomach at that time (acid and often food). Mr. Cramer feels his vomiting is unpredictable and not reliably after meals. In the past, he had a couple episodes of vomiting in the morning where he brought up some food contents eaten the night before.     Mr. Cramer has not noted any pattern to when an episode occurs (no relation to time of day, timing of medications, eating, activity, etc.). He hasn't really checked his blood pressure during these episodes (doesn't have a home BP cuff). He only checked in BGL once during an episode and it was low at that time (he thinks around 70). He estimates that he only checks his BGL once a month. He hasn't checked his pulse during episodes or noted any palpitations.    He denies vomiting blood. He denies weight loss. His weight has ranged from 172-175 lbs over the past several months on his home scale and he feels this has been very stable. He continues to have a good appetite and does not feel his eating habits have been affected. He denies every waking at night to vomit.    In the past he was prescribed ondansetron 4 mg. Today, he states this was helpful and when taken would abort an episode of nausea/vomiting, though he used it infrequently. He states he would use his ondansetron if an episode lasted longer than an hour or so. He hasn't used this medication in a few months as he's felt  "better.     Today, Mr. Cramer does report that driving here today made him nauseated. When asked about increased symptoms with motion or traveling in moving vehicles he denied this was a concern. He then stated that he felt his current nausea was \"due to rushing here\" and feeling \"anxious\" and \"stressed\". In his view this evolved into feeling lightheaded and then nauseated. He wonders if anxiousness is a trigger.    Partway through his visit today, Mr. Cramer did vomit into an emesis bag. Emesis was ~ 250 mL of pale-yellow translucent liquid.     Diet:  Wakes up at 6 a.m. Starts work at 6:30 a.m.  B: doesn't eat  S: eats snack at 9a.m. (during break time), generally has a bag of chips  L: eats at 12 - 12:30 pm, eats rice or bread with a meat, homemade   D: eats at 7:30 - 8:00 pm, eats rice or bread with a meat, homemade, veggies are mainly lettuce or cucumber  Lays down for bed at 10:00 pm, but often stays awake until 11 pm or 12am.    Prior work-up for his nausea and vomiting has included a GES which was normal at all time points.       ROS:    GI ROS:  Dysphagia: none   Odynophagia: none  GERD symptoms: \"rare\" heartburn, experienced as a substernal pressure, generally passes quickly  Nausea/vomiting: see HPI  Hematemesis/melena/hematochezia: none  Baseline stools:  Generally has 1-2 BMs a day, stools are formed and easy to pass  Diarrhea: none  Constipation: none  Eructation: no change from baseline  Flatus: no change from baseline  Abdominal pain: none  Weight loss: none, see HPI   NSAIDs: none  Oral steroids: none     See remainder of comprehensive ROS below.    PROBLEM LIST  Patient Active Problem List    Diagnosis Date Noted     Type 2 diabetes mellitus (H)      Priority: Medium     Chronic kidney disease, stage 5 (H)      Priority: Medium     Anemia in chronic kidney disease      Priority: Medium     Vitamin D deficiency      Priority: Medium     CKD (chronic kidney disease) stage 5, GFR less than 15 ml/min " (H) 07/18/2019     Priority: Medium     Hypertension secondary to other renal disorders 07/18/2019     Priority: Medium     Anemia 07/18/2019     Priority: Medium     Secondary renal hyperparathyroidism (H) 07/18/2019     Priority: Medium     Hypertension 01/01/2018     Priority: Medium       PERTINENT PAST MEDICAL HISTORY:  Past Medical History:   Diagnosis Date     Anemia in chronic kidney disease      Chronic kidney disease, stage 5 (H)      Hypertension 2018     Type 2 diabetes mellitus (H)      Vitamin D deficiency        PREVIOUS SURGERIES:  Past Surgical History:   Procedure Laterality Date     EYE SURGERY Left 2015    retinal detachment     ORTHOPEDIC SURGERY Left     Trigger finger        PREVIOUS ENDOSCOPY:  None    ALLERGIES:   Allergies   Allergen Reactions     Metoprolol Anaphylaxis       PERTINENT MEDICATIONS:  Current Outpatient Medications   Medication     Alcohol Sheets (ALCOH-WIPE) SHEE     amLODIPine (NORVASC) 10 MG tablet     atorvastatin (LIPITOR) 40 MG tablet     blood glucose (ACCU-CHEK GUIDE) test strip     blood glucose monitoring (ACCU-CHEK FASTCLIX) lancets     calcitRIOL (ROCALTROL) 0.25 MCG capsule     calcium carbonate (TUMS) 500 MG chewable tablet     furosemide (LASIX) 20 MG tablet     furosemide (LASIX) 20 MG tablet     hydrALAZINE (APRESOLINE) 25 MG tablet     insulin pen needle (BD RAÚL U/F) 32G X 4 MM miscellaneous     LANTUS SOLOSTAR 100 UNIT/ML soln     minoxidil (LONITEN) 2.5 MG tablet     sildenafil (VIAGRA) 50 MG tablet     vitamin D3 (CHOLECALCIFEROL) 2000 units (50 mcg) tablet     No current facility-administered medications for this visit.        SOCIAL HISTORY:  Social History     Socioeconomic History     Marital status:      Spouse name: Not on file     Number of children: Not on file     Years of education: Not on file     Highest education level: Not on file   Occupational History     Not on file   Social Needs     Financial resource strain: Not on file      "Food insecurity:     Worry: Not on file     Inability: Not on file     Transportation needs:     Medical: Not on file     Non-medical: Not on file   Tobacco Use     Smoking status: Never Smoker     Smokeless tobacco: Never Used   Substance and Sexual Activity     Alcohol use: Yes     Frequency: Never     Comment: Social     Drug use: Never     Sexual activity: Not on file   Lifestyle     Physical activity:     Days per week: Not on file     Minutes per session: Not on file     Stress: Not on file   Relationships     Social connections:     Talks on phone: Not on file     Gets together: Not on file     Attends Druze service: Not on file     Active member of club or organization: Not on file     Attends meetings of clubs or organizations: Not on file     Relationship status: Not on file     Intimate partner violence:     Fear of current or ex partner: Not on file     Emotionally abused: Not on file     Physically abused: Not on file     Forced sexual activity: Not on file   Other Topics Concern     Parent/sibling w/ CABG, MI or angioplasty before 65F 55M? Not Asked   Social History Narrative     Not on file       FAMILY HISTORY:  Family History   Problem Relation Age of Onset     Breast Cancer Mother      Diabetes Brother    Father: T2DM    Denies any known family history of IBD, celiac disease, or GI malignancies.     PHYSICAL EXAMINATION:  Vitals BP (!) 144/68 (BP Location: Right arm, Patient Position: Sitting, Cuff Size: Adult Regular)   Pulse 77   Temp 98.1  F (36.7  C) (Oral)   Resp 18   Ht 1.676 m (5' 6\")   Wt 78.5 kg (173 lb)   SpO2 98%   BMI 27.92 kg/m      Wt   Wt Readings from Last 2 Encounters:   10/09/19 78.5 kg (173 lb)   07/29/19 79.8 kg (175 lb 14.4 oz)   Gen: Pt sitting up in NAD, interactive and cooperative on exam  Eyes: sclerae anicteric, no injection  ENT:  OP clear, MMM  Cardiac: RRR, nl S1, S2,  1-2+ pitting LE edema  Resp: Clear on anterior exam  GI: Normoactive BS, abd soft, flat, " nontender  Skin: Warm, dry, no jaundice, nails appear healthy  Lymph: no submandibular, no cervical, and no supraclavicular LAD  Neuro: alert, oriented, answers questions appropriately      PERTINENT STUDIES:    Orders Only on 07/29/2019   Component Date Value Ref Range Status     C Peptide 07/29/2019 7.8* 0.9 - 6.9 ng/mL Final     Hemoglobin A1C 07/29/2019 6.4* 0 - 5.6 % Final     Color Urine 07/29/2019 Straw   Final     Appearance Urine 07/29/2019 Clear   Final     Glucose Urine 07/29/2019 50* NEG^Negative mg/dL Final     Bilirubin Urine 07/29/2019 Negative  NEG^Negative Final     Ketones Urine 07/29/2019 Negative  NEG^Negative mg/dL Final     Specific Gravity Urine 07/29/2019 1.010  1.003 - 1.035 Final     Blood Urine 07/29/2019 Negative  NEG^Negative Final     pH Urine 07/29/2019 5.0  5.0 - 7.0 pH Final     Protein Albumin Urine 07/29/2019 >499* NEG^Negative mg/dL Final     Urobilinogen mg/dL 07/29/2019 0.0  0.0 - 2.0 mg/dL Final     Nitrite Urine 07/29/2019 Negative  NEG^Negative Final     Leukocyte Esterase Urine 07/29/2019 Negative  NEG^Negative Final     Source 07/29/2019 Midstream Urine   Final     WBC Urine 07/29/2019 3  0 - 5 /HPF Final     RBC Urine 07/29/2019 5* 0 - 2 /HPF Final     Mucous Urine 07/29/2019 Present* NEG^Negative /LPF Final     Thrombin Time 07/29/2019 17.2  13.0 - 19.0 sec Final     PTT 07/29/2019 37  22 - 37 sec Final     INR 07/29/2019 1.03  0.86 - 1.14 Final     Lupus Result 07/29/2019 Negative  NEG^Negative Final     Cardiolipin Antibody IgG 07/29/2019 <1.6  0.0 - 19.9 GPL-U/mL Final     Cardiolipin Antibody IgM 07/29/2019 0.2  0.0 - 19.9 MPL-U/mL Final     WBC 07/29/2019 9.5  4.0 - 11.0 10e9/L Final     RBC Count 07/29/2019 3.36* 4.4 - 5.9 10e12/L Final     Hemoglobin 07/29/2019 9.9* 13.3 - 17.7 g/dL Final     Hematocrit 07/29/2019 30.2* 40.0 - 53.0 % Final     MCV 07/29/2019 90  78 - 100 fl Final     MCH 07/29/2019 29.5  26.5 - 33.0 pg Final     MCHC 07/29/2019 32.8  31.5 - 36.5  g/dL Final     RDW 07/29/2019 12.3  10.0 - 15.0 % Final     Platelet Count 07/29/2019 312  150 - 450 10e9/L Final     Diff Method 07/29/2019 Automated Method   Final     % Neutrophils 07/29/2019 66.7  % Final     % Lymphocytes 07/29/2019 19.2  % Final     % Monocytes 07/29/2019 9.1  % Final     % Eosinophils 07/29/2019 4.1  % Final     % Basophils 07/29/2019 0.6  % Final     % Immature Granulocytes 07/29/2019 0.3  % Final     Nucleated RBCs 07/29/2019 0  0 /100 Final     Absolute Neutrophil 07/29/2019 6.3  1.6 - 8.3 10e9/L Final     Absolute Lymphocytes 07/29/2019 1.8  0.8 - 5.3 10e9/L Final     Absolute Monocytes 07/29/2019 0.9  0.0 - 1.3 10e9/L Final     Absolute Eosinophils 07/29/2019 0.4  0.0 - 0.7 10e9/L Final     Absolute Basophils 07/29/2019 0.1  0.0 - 0.2 10e9/L Final     Abs Immature Granulocytes 07/29/2019 0.0  0 - 0.4 10e9/L Final     Absolute Nucleated RBC 07/29/2019 0.0   Final     Quantiferon-TB Gold Plus Result 07/29/2019 Negative  NEG^Negative Final     TB1 Ag minus Nil Value 07/29/2019 0.00  IU/mL Final     TB2 Ag minus Nil Value 07/29/2019 0.00  IU/mL Final     Mitogen minus Nil Result 07/29/2019 4.77  IU/mL Final     Nil Result 07/29/2019 0.03  IU/mL Final     Phosphorus 07/29/2019 5.4* 2.5 - 4.5 mg/dL Final     Sodium 07/29/2019 136  133 - 144 mmol/L Final     Potassium 07/29/2019 4.0  3.4 - 5.3 mmol/L Final     Chloride 07/29/2019 106  94 - 109 mmol/L Final     Carbon Dioxide 07/29/2019 24  20 - 32 mmol/L Final     Anion Gap 07/29/2019 7  3 - 14 mmol/L Final     Glucose 07/29/2019 138* 70 - 99 mg/dL Final     Urea Nitrogen 07/29/2019 101* 7 - 30 mg/dL Final     Creatinine 07/29/2019 7.79* 0.66 - 1.25 mg/dL Final     GFR Estimate 07/29/2019 8* >60 mL/min/[1.73_m2] Final     GFR Estimate If Black 07/29/2019 9* >60 mL/min/[1.73_m2] Final     Calcium 07/29/2019 7.8* 8.5 - 10.1 mg/dL Final     Bilirubin Total 07/29/2019 0.3  0.2 - 1.3 mg/dL Final     Albumin 07/29/2019 3.8  3.4 - 5.0 g/dL Final      Protein Total 07/29/2019 8.1  6.8 - 8.8 g/dL Final     Alkaline Phosphatase 07/29/2019 176* 40 - 150 U/L Final     ALT 07/29/2019 29  0 - 70 U/L Final     AST 07/29/2019 30  0 - 45 U/L Final     Antigen Type 07/29/2019    Final                    Value:Canceled, Test credited  Incorrectly ordered by PCU/Clinic       Antibody Titer 07/29/2019    Final                    Value:Anti A with A1 cells: IgM 8, IgG 8  Anti A with A2 cells: IgM 1, IgG <1       ABO 07/29/2019 B   Final     RH(D) 07/29/2019 Pos   Final     Antibody Screen 07/29/2019 Neg   Final     Test Valid Only At 07/29/2019 General acute hospital      Final     Specimen Expires 07/29/2019 08/01/2019   Final     ABO 07/29/2019 B   Final     RH(D) 07/29/2019 Pos   Final     Specimen Expires 07/29/2019 08/01/2019   Final     Copath Report 07/29/2019    Final                    Value:Patient Name: KEVEN MCKENNA  MR#: [id number]  Specimen #: [id number]  Collected: 7/29/2019 12:54  Received: 7/30/2019 08:28  Reported: 8/1/2019 15:05  Ordering Phy(s): CLAYTON PINEDA  Additional Phy(s): NEYMAR SCOTT    For improved result formatting, select 'View Enhanced Report Format' under   Linked Documents section.  _________________________________________    TEST(S) REQUESTED:  Factor 5 Leiden and Factor 2 by PCR    SPECIMEN DESCRIPTION:  Blood    METHODOLOGY:   The regions of genomic DNA containing the [id number] Factor V   Leiden gene mutation (Factor V  Leiden) and the Factor 2(Prothrombin S39621L) gene mutation were   simultaneously amplified using the polymerase  chain reaction.  The amplified products were digested with restriction   endonuclease TaqI and products were  analyzed by gel electrophoresis.    RESULTS:    Factor V 1691G>A (Leiden)  RESULTS:  Mutation analyzed:     1691G>A  Factor V 1691G>A (Leiden)  Interpretation:      ABSENT  Factor V 1691G>A (Leiden) mut                          ation  genotype:      G/G    FACTOR  2/PROTHROMBIN RESULTS:  Mutation analyzed:     05790A>A  Factor 2 Mutation Interpretation:      ABSENT  Factor 2 Mutation genotype:      G/G    INTERPRETATION:  The patient is negative for the Factor V 1691G>A (Leiden) and negative for   the Factor 2 mutation.    COMMENTS:  If a patient is the recipient of an allogeneic bone marrow transplant,   this test must be done on a  pre-transplant sample or buccal swab.  A previous allogeneic bone marrow   transplant will interfere with test  results.  Call the [facility]([phone]) for   instructions on sample collection for these  patients.    This test was developed and its performance characteristics determined by   the [facility],  [facility]. It has not been cleared or   approved by the FDA. The laboratory is  regulated under CLIA as qualified to perform high-complexity testing. This   test is used for clinical purposes.  It should not                           be regarded as investigational or for research.    A resident/fellow in an accredited training program was involved in the   selection of testing, review of  laboratory data, and/or interpretation of this case.  I, as the senior   physician, attest that I: (i) confirmed  appropriate testing, (ii) examined the relevant raw data for the   specimen(s); and (iii) rendered or confirmed  the interpretation(s).    Electronically Signed Out By:  Ector Becker M.D., PhD  [facility]    CPT Codes:  A: 40773-F0LBEC, 85398-V1NKGF, -BRYWTV(2)    TESTING LAB LOCATION:  [address] [address] [phone]    COLLECTION SITE:  Client:  [facility]  Location:  [facility] (B)         Again, thank you for allowing me to participate in the care of your patient.      Sincerely,    Inez Cervantes MD

## 2019-10-10 LAB
TTG IGA SER-ACNC: <1 U/ML
TTG IGG SER-ACNC: <1 U/ML

## 2019-10-11 ENCOUNTER — HOSPITAL ENCOUNTER (OUTPATIENT)
Dept: NUCLEAR MEDICINE | Facility: CLINIC | Age: 39
Setting detail: NUCLEAR MEDICINE
End: 2019-10-11
Attending: INTERNAL MEDICINE
Payer: COMMERCIAL

## 2019-10-11 ENCOUNTER — HOSPITAL ENCOUNTER (OUTPATIENT)
Dept: CARDIOLOGY | Facility: CLINIC | Age: 39
Discharge: HOME | End: 2019-10-11
Attending: INTERNAL MEDICINE | Admitting: INTERNAL MEDICINE
Payer: COMMERCIAL

## 2019-10-11 VITALS — SYSTOLIC BLOOD PRESSURE: 145 MMHG | DIASTOLIC BLOOD PRESSURE: 45 MMHG

## 2019-10-11 DIAGNOSIS — Z01.810 PRE-OPERATIVE CARDIOVASCULAR EXAMINATION: ICD-10-CM

## 2019-10-11 DIAGNOSIS — R06.02 SOB (SHORTNESS OF BREATH): ICD-10-CM

## 2019-10-11 PROCEDURE — 78452 HT MUSCLE IMAGE SPECT MULT: CPT

## 2019-10-11 PROCEDURE — 25000128 H RX IP 250 OP 636: Performed by: INTERNAL MEDICINE

## 2019-10-11 PROCEDURE — 93018 CV STRESS TEST I&R ONLY: CPT | Performed by: INTERNAL MEDICINE

## 2019-10-11 PROCEDURE — 93017 CV STRESS TEST TRACING ONLY: CPT

## 2019-10-11 PROCEDURE — 34300033 ZZH RX 343: Performed by: INTERNAL MEDICINE

## 2019-10-11 PROCEDURE — A9502 TC99M TETROFOSMIN: HCPCS | Performed by: INTERNAL MEDICINE

## 2019-10-11 PROCEDURE — 93016 CV STRESS TEST SUPVJ ONLY: CPT | Performed by: INTERNAL MEDICINE

## 2019-10-11 RX ORDER — ACYCLOVIR 200 MG/1
0-1 CAPSULE ORAL
Status: DISCONTINUED | OUTPATIENT
Start: 2019-10-11 | End: 2019-10-12 | Stop reason: HOSPADM

## 2019-10-11 RX ORDER — REGADENOSON 0.08 MG/ML
0.4 INJECTION, SOLUTION INTRAVENOUS ONCE
Status: COMPLETED | OUTPATIENT
Start: 2019-10-11 | End: 2019-10-11

## 2019-10-11 RX ORDER — ALBUTEROL SULFATE 90 UG/1
2 AEROSOL, METERED RESPIRATORY (INHALATION) EVERY 5 MIN PRN
Status: DISCONTINUED | OUTPATIENT
Start: 2019-10-11 | End: 2019-10-12 | Stop reason: HOSPADM

## 2019-10-11 RX ORDER — AMINOPHYLLINE 25 MG/ML
50-100 INJECTION, SOLUTION INTRAVENOUS
Status: DISCONTINUED | OUTPATIENT
Start: 2019-10-11 | End: 2019-10-12 | Stop reason: HOSPADM

## 2019-10-11 RX ADMIN — TETROFOSMIN 37.9 MCI.: 1.38 INJECTION, POWDER, LYOPHILIZED, FOR SOLUTION INTRAVENOUS at 10:01

## 2019-10-11 RX ADMIN — TETROFOSMIN 11.2 MCI.: 1.38 INJECTION, POWDER, LYOPHILIZED, FOR SOLUTION INTRAVENOUS at 09:03

## 2019-10-11 RX ADMIN — REGADENOSON 0.4 MG: 0.08 INJECTION, SOLUTION INTRAVENOUS at 10:00

## 2019-10-21 ENCOUNTER — TELEPHONE (OUTPATIENT)
Dept: TRANSPLANT | Facility: CLINIC | Age: 39
End: 2019-10-21

## 2019-10-21 NOTE — TELEPHONE ENCOUNTER
Spoke with pt today who called to tell me he is currently inpatient at [facility] as he is initiating on dialysis. Pt stating his chronic dialysis unit has not yet been confirmed and he will call me when he knows. Pt wanting to know if live donors can register - told him yes and review the website/donor team phone number for this. Reviewed tx eval status, still waiting for his stool sample for h pylori to be resulted here.  Pt expressed very good understanding of all and was in good agreement with the plan.

## 2019-10-24 ENCOUNTER — TELEPHONE (OUTPATIENT)
Dept: TRANSPLANT | Facility: CLINIC | Age: 39
End: 2019-10-24

## 2019-10-24 NOTE — TELEPHONE ENCOUNTER
Spoke with pt today, explained his stool sample result is not resulted here yet.  Will keep on eye out for it. Pt expressed very good understanding.

## 2019-10-30 ENCOUNTER — MYC MEDICAL ADVICE (OUTPATIENT)
Dept: GASTROENTEROLOGY | Facility: CLINIC | Age: 39
End: 2019-10-30

## 2019-10-30 ENCOUNTER — PATIENT OUTREACH (OUTPATIENT)
Dept: GASTROENTEROLOGY | Facility: CLINIC | Age: 39
End: 2019-10-30

## 2019-10-30 NOTE — PROGRESS NOTES
Called patient as he requested explained to patient that his H. pylori test was negative.      Called patient  transplant care coordinator Chiquita Lezama and informed her that the negative testing result for h. Pylori is located in care everywhere and Dr Cervantes has signed off on the patient.

## 2019-11-04 ENCOUNTER — TELEPHONE (OUTPATIENT)
Dept: TRANSPLANT | Facility: CLINIC | Age: 39
End: 2019-11-04

## 2019-11-04 NOTE — LETTER
PHYSICIAN ORDER   ALA/PRA BLOOD    DATE & TIME ISSUED: 2019 2:08 PM  PATIENT NAME: Erik Cramer   : 1980     [facility] MR#  [id number]     DIAGNOSIS/ICD-10 CODE: Awaiting Organ transplant [Z76.82}   EXPIRES: (1 YEAR AFTER DATE ISSUED)  EVERY 12 weeks / 3 months   1. Please draw 20ml of blood in red top (plain) tube for Antileukocyte Antibody (ALA or PRA).   2. Label tubes with the patient s name, complete lab slip.         3. Mailers, lab slips with instructions are sent to patient separately.      4. Call the Outreach Lab at [phone] to reorder mailers.       5. Mail blood to (this address is also on the mailers):    IMMUNOLOGY LABORATORY  [facility]  [address] [place] [address]    .

## 2019-11-04 NOTE — TELEPHONE ENCOUNTER
Reviewed medical chart for purposes of pre kidney/pancreas transplant evaluation, found that it appears all pending items are now completed. Will plan to review this week at Selection Committee.     Called pt today and LM on VM explaining above. Also need to know where he is on chronic dialysis at, who his nephrologist currently is now, needs updated PRA sample sent.     Spoke with pt today when he returned my call. Pt did receive my voicemail as above. Pt also provided he now dialyzes at [facility] on MWF and nephrologist is Dr. Bell.     I called dialysis today and requested that PRA sample be drawn.

## 2019-11-08 ENCOUNTER — TELEPHONE (OUTPATIENT)
Dept: TRANSPLANT | Facility: CLINIC | Age: 39
End: 2019-11-08

## 2019-11-08 NOTE — TELEPHONE ENCOUNTER
Spoke with pt today, let him know determined still need GI review of [place] hepatobiliary test result done in 2016 at [facility] as this is abnormal. Explained will review outcome of GI review again at Selection Committee when known. Explained he will remain listed on INACTIVE status until further notice for now. Pt expressed very good understanding of all and was in good agreement with the plan.

## 2019-11-15 ENCOUNTER — TELEPHONE (OUTPATIENT)
Dept: TRANSPLANT | Facility: CLINIC | Age: 39
End: 2019-11-15

## 2019-11-15 DIAGNOSIS — R11.2 NAUSEA AND VOMITING IN ADULT: ICD-10-CM

## 2019-11-15 DIAGNOSIS — Z01.818 PRE-TRANSPLANT EVALUATION FOR KIDNEY AND PANCREAS TRANSPLANT: Primary | ICD-10-CM

## 2019-11-15 NOTE — TELEPHONE ENCOUNTER
Contacted patient to review outcome of selection committee meeting (See selection committee encounter).   Explained to patient that he/she needs to complete all components of the evaluation to be eligible for active status on the waiting list or to proceed with a live donor kidney transplant.   Reviewed next steps based on outcomes:  will call patient to schedule upper endoscopy and appt with Dr. Zoie Gray to discuss recommendation and scheduling of cholecystectomy. Pt expressed very good understanding of all and was in good agreement with the plan. Instructed pt he needs a  because of sedation related to upper endoscopy.   Orders for upper endoscopy and appt with Dr. Gray into [email or web address] - routed to     Transplant Evaluation Summary Letter generated in Epic today - routed to  for mailing.

## 2019-11-15 NOTE — LETTER
11/15/19    Erik Cramer  [address] [address]        Dear Erik,    Your pre-transplant evaluation results were reviewed at our Multidisciplinary Selection Committee on 11/13/2019. The Committee is requesting the following items are completed before determining your candidacy:    1. Upper endoscopy to look for anything that could be contributing to your symptoms of nausea and vomiting. You will need to be fasting for this. You will need a  to bring you home after this because sedation is used.     2. Return to clinic here with Dr. Zoie Gray to review our recommendation for you to have a cholecystectomy ( surgical removal of your gallbladder ).  Scheduling of this surgery will also be discussed at this clinic visit.       A  from our Office will call you to schedule these appointments here at our Center.       Upon successful completion of the above items, your results will be reviewed at the Multidisciplinary Selection Committee for approval.  When you are approved, you will then be eligible to be changed to ACTIVE status on the kidney and simultaneous kidney-pancreas transplant wait lists or to proceed with a live donor kidney transplant in the event you have an approved live donor.       Please have any potential live donors register now online with our Program to initiate [facility] at [email or web address]. You will be notified by our Office in the event of an approved live donor.                For any questions, please contact myself at the Transplant Office Main Number at [phone] or at my Direct Number at [phone].    Sincerely,    Viviana Williamson RN BSN Transplant Coordinator   Solid Organ Transplant  [facility]    Enclosure: UNOS Letter     CC's: Figueroa Washington MD(PCP); Ignacio Dietrich MD; Marcelino Arroyo

## 2019-11-15 NOTE — Clinical Note
Please see order for upper endoscopy and RTC with Dr. Gray to review recommendation for cholecystectomy. Pt is on dialysis. peace joyce

## 2019-11-20 NOTE — TELEPHONE ENCOUNTER
Called patient to schedule needs appts, Marina's first available is Tues, Jan 16; patient wasn't happy about that date so he asked for Mon appt, he'll move his dialysis to earlier in the morning to come to Monday appts.  After many calls back and forth with Endoscopy being the issue the first available open date for both appts is Mon, Dec 16, 2019; w/Finger at 1pm and Endo at 230pm.  I have called patient lvm for appts and mailing letter.

## 2019-11-22 ENCOUNTER — TELEPHONE (OUTPATIENT)
Dept: TRANSPLANT | Facility: CLINIC | Age: 39
End: 2019-11-22

## 2019-11-22 ENCOUNTER — TELEPHONE (OUTPATIENT)
Dept: GASTROENTEROLOGY | Facility: CLINIC | Age: 39
End: 2019-11-22

## 2019-11-22 ENCOUNTER — PREP FOR PROCEDURE (OUTPATIENT)
Dept: TRANSPLANT | Facility: CLINIC | Age: 39
End: 2019-11-22

## 2019-11-22 DIAGNOSIS — K80.50 GALLSTONES, COMMON BILE DUCT: Primary | ICD-10-CM

## 2019-11-22 NOTE — TELEPHONE ENCOUNTER
Called patient today and spoke with him to review the below plan:     Tuesday December 3rd:   9:00 am appt with Dr. Zoie Gray to review need for cholecystectomy and surgical procedure of   2:30 pm appt to have upper endoscopy at our hospital     Tuesday December 10th:   1:30 pm laparoscopic cholecystectomy with Dr. Zoie Gray      - Patient to schedule pre-op history and physical asap with local PCP clinic.     Patient will remain listed on K and SPK wait lists on INACTIVE status until all of these pending items are successfully resolved.     Patient expressed very good understanding of all and was in good agreement with the plan.     Staff message today to Dr. Gray about plan.

## 2019-11-26 ENCOUNTER — TELEPHONE (OUTPATIENT)
Dept: GASTROENTEROLOGY | Facility: CLINIC | Age: 39
End: 2019-11-26

## 2019-11-26 NOTE — TELEPHONE ENCOUNTER
Patient scheduled for EGD    Indication for procedure. Pre-transplant evaluation for kidney and pancreas transplant; Nausea and vomiting in adult    Referring Provider. Zoie Gray MD    ? No     Arrival time verified? 2:30 PM    Facility location verified? [address] [place] [address]    Instructions given regarding prep and procedure Instructions reviewed    Prep Type NPO    Are you taking any anticoagulants or blood thinners? No     Instructions given? N/a     Electronic implanted devices? No     Pre procedure teaching completed? Yes    Transportation from procedure?  policy reviewed. Instructed patient to have someone stay with him for 6 hours post exam    H&P / Pre op physical completed? N/a    Advised patient to consult his clinician about insulin dosing

## 2019-11-29 DIAGNOSIS — Z76.82 AWAITING ORGAN TRANSPLANT: ICD-10-CM

## 2019-12-03 ENCOUNTER — OFFICE VISIT (OUTPATIENT)
Dept: TRANSPLANT | Facility: CLINIC | Age: 39
End: 2019-12-03
Attending: SURGERY
Payer: COMMERCIAL

## 2019-12-03 ENCOUNTER — ANCILLARY PROCEDURE (OUTPATIENT)
Dept: ULTRASOUND IMAGING | Facility: CLINIC | Age: 39
End: 2019-12-03
Payer: COMMERCIAL

## 2019-12-03 ENCOUNTER — HOSPITAL ENCOUNTER (OUTPATIENT)
Facility: AMBULATORY SURGERY CENTER | Age: 39
End: 2019-12-03
Attending: INTERNAL MEDICINE
Payer: COMMERCIAL

## 2019-12-03 VITALS
SYSTOLIC BLOOD PRESSURE: 120 MMHG | BODY MASS INDEX: 27.48 KG/M2 | TEMPERATURE: 98 F | HEART RATE: 63 BPM | WEIGHT: 171 LBS | HEIGHT: 66 IN | OXYGEN SATURATION: 99 % | DIASTOLIC BLOOD PRESSURE: 73 MMHG

## 2019-12-03 VITALS
OXYGEN SATURATION: 100 % | SYSTOLIC BLOOD PRESSURE: 159 MMHG | BODY MASS INDEX: 27.48 KG/M2 | WEIGHT: 171 LBS | HEIGHT: 66 IN | TEMPERATURE: 97.5 F | HEART RATE: 76 BPM | DIASTOLIC BLOOD PRESSURE: 91 MMHG | RESPIRATION RATE: 18 BRPM

## 2019-12-03 DIAGNOSIS — Z01.818 PRE-TRANSPLANT EVALUATION FOR KIDNEY AND PANCREAS TRANSPLANT: ICD-10-CM

## 2019-12-03 DIAGNOSIS — R11.2 NAUSEA AND VOMITING IN ADULT: ICD-10-CM

## 2019-12-03 DIAGNOSIS — K82.8 BILIARY DYSKINESIA: Primary | ICD-10-CM

## 2019-12-03 LAB
GLUCOSE BLDC GLUCOMTR-MCNC: 88 MG/DL (ref 70–99)
GLUCOSE BLDC GLUCOMTR-MCNC: 98 MG/DL (ref 70–99)
UPPER GI ENDOSCOPY: NORMAL

## 2019-12-03 RX ORDER — ONDANSETRON 2 MG/ML
4 INJECTION INTRAMUSCULAR; INTRAVENOUS
Status: DISCONTINUED | OUTPATIENT
Start: 2019-12-03 | End: 2019-12-04 | Stop reason: HOSPADM

## 2019-12-03 RX ORDER — LIDOCAINE 40 MG/G
CREAM TOPICAL
Status: DISCONTINUED | OUTPATIENT
Start: 2019-12-03 | End: 2019-12-04 | Stop reason: HOSPADM

## 2019-12-03 RX ORDER — LOSARTAN POTASSIUM 100 MG/1
100 TABLET ORAL DAILY
Status: ON HOLD | COMMUNITY
End: 2021-06-21

## 2019-12-03 RX ORDER — FENTANYL CITRATE 50 UG/ML
INJECTION, SOLUTION INTRAMUSCULAR; INTRAVENOUS PRN
Status: DISCONTINUED | OUTPATIENT
Start: 2019-12-03 | End: 2019-12-03 | Stop reason: HOSPADM

## 2019-12-03 RX ORDER — SIMETHICONE
LIQUID (ML) MISCELLANEOUS PRN
Status: DISCONTINUED | OUTPATIENT
Start: 2019-12-03 | End: 2019-12-03 | Stop reason: HOSPADM

## 2019-12-03 ASSESSMENT — MIFFLIN-ST. JEOR
SCORE: 1643.4
SCORE: 1643.4

## 2019-12-03 NOTE — PROGRESS NOTES
[facility] Initial Transplant Surgery Consult    Erik Cramer MRN# [id number]   Age: 39 year old YOB: 1980     Date of Admission:  (Not on file)    Reason for consult: Biliary dyskinesia and N/V       Requesting physician: Dr. Fraser       Level of consult: Consult, follow and place orders           Assessment and Plan:   Assessment:   Nausea and vomiting with normal gastric emptying study and HIDA scan showing 3% EF of gallbladder.  Patient likely has symptomatic biliary dyskinesia.      Plan:   I did obtain a right upper quadrant ultrasound today to look for common bile duct stones and signs of cholecystitis as well as cholelithiasis.  This showed sludge in the gallbladder without signs of acute cholecystitis or choledocholithiasis.  He is scheduled for an upper endoscopy today to rule out other etiologies of nausea and vomiting including ulcer disease.  If the upper endoscopy is normal, we will proceed with laparoscopic cholecystectomy.  We discussed the risks and benefits of a laparoscopic cholecystectomy including but not limited to bleeding, infection, damage to surrounding structures, damage to common bile duct, anesthetic risks, possibility of converting to an open procedure, and the possibility that the surgery will not alleviate his symptoms.  He does have a HIDA scan consistent with biliary dyskinesia and nausea and vomiting with fatty meals.  He does avoid fatty meals for this reason.  For this reason, I do think there is a high likelihood that he would benefit from a laparoscopic cholecystectomy.  He is currently scheduled for laparoscopic cholecystectomy next week.  We will proceed with that unless there are findings to be corrected on the upper endoscopy.             Chief Complaint:   Nausea and vomiting     History is obtained from the patient         History of Present Illness:   This patient is a 39 year old  male with a significant past medical history of chronic  kidney disease, diabetes and hypertension who presents with history of nausea and vomiting for the past approximately 4 years.  He had a HIDA scan performed in 2016 which showed an ejection fraction of 3%.  He more recently had a gastric emptying study that was normal.  He states he has nausea and vomiting intermittently.  Sometimes this nausea and vomiting is associated with fatty meals and he therefore tries to avoid fatty meals.  He denies any acholic stools.  He denies any right upper quadrant pain.  The nausea and vomiting is not always associated with meals.  Of note he is on the kidney pancreas transplant waiting list and therefore it is important to have this nausea and vomiting resolved to ensure that he will tolerate the antirejection medications.  He also has an upper endoscopy scheduled today to rule out other etiologies of nausea and vomiting.       Past Medical History:     Past Medical History:   Diagnosis Date     Anemia in chronic kidney disease      Chronic kidney disease, stage 5 (H)      Hypertension 2018     Type 2 diabetes mellitus (H)      Vitamin D deficiency              Past Surgical History:     Past Surgical History:   Procedure Laterality Date     EYE SURGERY Left 2015    retinal detachment     ORTHOPEDIC SURGERY Left     Trigger finger              Social History:     Social History     Tobacco Use     Smoking status: Never Smoker     Smokeless tobacco: Never Used   Substance Use Topics     Alcohol use: Yes     Frequency: Never     Comment: Social             Family History:     Family History   Problem Relation Age of Onset     Breast Cancer Mother      Diabetes Brother      Family history reviewed and updated in EPIC and reviewed         Allergies:     Allergies   Allergen Reactions     Metoprolol Anaphylaxis             Medications:     Current Outpatient Medications   Medication Sig     Alcohol Sheets (ALCOH-WIPE) SHEE Apply 1 each topically     amLODIPine (NORVASC) 10 MG tablet  Take 10 mg by mouth     atorvastatin (LIPITOR) 40 MG tablet Take 80 mg by mouth daily      blood glucose (ACCU-CHEK GUIDE) test strip 1 strip     blood glucose monitoring (ACCU-CHEK FASTCLIX) lancets Testing blood sugars 2 time(s) a day.  Indications: Type 2 Diabetes     calcium carbonate (TUMS) 500 MG chewable tablet Take 1,000 mg by mouth     cloNIDine (CATAPRES) 0.1 MG tablet Take 0.1 mg by mouth 2 times daily     furosemide (LASIX) 20 MG tablet Take 2 tablets (40 mg) by mouth 2 times daily     hydrALAZINE (APRESOLINE) 25 MG tablet 100 mg 3 times daily      insulin pen needle (BD RAÚL U/F) 32G X 4 MM miscellaneous use as directed with lantus pen once a day     LANTUS SOLOSTAR 100 UNIT/ML soln Inject 30 units sub-q every evening.     losartan (COZAAR) 100 MG tablet Take 100 mg by mouth daily     vitamin D3 (CHOLECALCIFEROL) 2000 units (50 mcg) tablet Take 1 tablet (2,000 Units) by mouth daily     No current facility-administered medications for this visit.      Facility-Administered Medications Ordered in Other Visits   Medication     lidocaine (LMX4) kit     lidocaine BUFFERED 1 % injection 0.1-1 mL     ondansetron (ZOFRAN) injection 4 mg     simethicone (MYLICON) suspension     sodium chloride (PF) 0.9% PF flush 3 mL     sodium chloride (PF) 0.9% PF flush 3 mL           Immunosuppressant Medications:            Reasons for Consult:   Biliary dyskinesia         Review of Systems:   A comprehensive review of systems was performed and found to be negative except as described in this note          Physical Exam:   Vitals were reviewed  Temp: 98  F (36.7  C)   BP: 120/73 Pulse: 63     SpO2: 99 %      Constitutional:   awake, alert, cooperative, no apparent distress, and appears stated age     Eyes:   Lids and lashes normal, pupils equal, round and reactive to light, extra ocular muscles intact, sclera clear, conjunctiva normal     Lungs:   No increased work of breathing, good air exchange, clear to auscultation  bilaterally, no crackles or wheezing     Cardiovascular:   Normal apical impulse, regular rate and rhythm, normal S1 and S2, no S3 or S4, and no murmur noted     Chest / Breast:   No HD catheter present         Abdomen:   No scars, normal bowel sounds, soft, non-distended, non-tender, no masses palpated, no hepatosplenomegally.  No positive johnson's sign     Genitounirinary:   No CVA tenderness     Musculoskeletal:   There is no redness, warmth, or swelling of the joints.  Full range of motion noted.  Motor strength is 5 out of 5 all extremities bilaterally.  Tone is normal.     Neurologic:   Awake, alert, oriented to name, place and time.  Cranial nerves II-XII are grossly intact.  Motor is 5 out of 5 bilaterally.  Cerebellar finger to nose, heel to shin intact.  Sensory is intact.  Babinski down going, Romberg negative, and gait is normal.     Neuropsychiatric:   Level of consciousness: alert / normal  Affect: normal  Orientation: oriented to self, place, time and situation     Skin:   no bruising or bleeding and no rashes             Data:     Lab Results   Component Value Date    WBC 9.5 07/29/2019    HGB 9.9 (L) 07/29/2019    HCT 30.2 (L) 07/29/2019     07/29/2019     07/29/2019    POTASSIUM 4.0 07/29/2019    CHLORIDE 106 07/29/2019    CO2 24 07/29/2019     (H) 07/29/2019    CR 7.79 (H) 07/29/2019     (H) 07/29/2019    AST 30 07/29/2019    ALT 29 07/29/2019    ALKPHOS 176 (H) 07/29/2019    BILITOTAL 0.3 07/29/2019    INR 1.03 07/29/2019     All cardiac studies reviewed by me.  All imaging studies reviewed by me.

## 2019-12-03 NOTE — LETTER
12/3/2019       RE: Erik Cramer  [address] [place] [id number]     Dear Colleague,    Thank you for referring your patient, Erik Cramer, to the [facility] SOLID ORGAN TRANSPLANT at [facility]. Please see a copy of my visit note below.    [facility] Initial Transplant Surgery Consult    Erik Cramer MRN# [id number]   Age: 39 year old YOB: 1980     Date of Admission:  (Not on file)    Reason for consult: Biliary dyskinesia and N/V       Requesting physician: Dr. Fraser       Level of consult: Consult, follow and place orders           Assessment and Plan:   Assessment:   Nausea and vomiting with normal gastric emptying study and HIDA scan showing 3% EF of gallbladder.  Patient likely has symptomatic biliary dyskinesia.      Plan:   I did obtain a right upper quadrant ultrasound today to look for common bile duct stones and signs of cholecystitis as well as cholelithiasis.  This showed sludge in the gallbladder without signs of acute cholecystitis or choledocholithiasis.  He is scheduled for an upper endoscopy today to rule out other etiologies of nausea and vomiting including ulcer disease.  If the upper endoscopy is normal, we will proceed with laparoscopic cholecystectomy.  We discussed the risks and benefits of a laparoscopic cholecystectomy including but not limited to bleeding, infection, damage to surrounding structures, damage to common bile duct, anesthetic risks, possibility of converting to an open procedure, and the possibility that the surgery will not alleviate his symptoms.  He does have a HIDA scan consistent with biliary dyskinesia and nausea and vomiting with fatty meals.  He does avoid fatty meals for this reason.  For this reason, I do think there is a high likelihood that he would benefit from a laparoscopic cholecystectomy.  He is currently scheduled for laparoscopic cholecystectomy next week.  We will proceed with that unless there  are findings to be corrected on the upper endoscopy.             Chief Complaint:   Nausea and vomiting     History is obtained from the patient         History of Present Illness:   This patient is a 39 year old  male with a significant past medical history of chronic kidney disease, diabetes and hypertension who presents with history of nausea and vomiting for the past approximately 4 years.  He had a HIDA scan performed in 2016 which showed an ejection fraction of 3%.  He more recently had a gastric emptying study that was normal.  He states he has nausea and vomiting intermittently.  Sometimes this nausea and vomiting is associated with fatty meals and he therefore tries to avoid fatty meals.  He denies any acholic stools.  He denies any right upper quadrant pain.  The nausea and vomiting is not always associated with meals.  Of note he is on the kidney pancreas transplant waiting list and therefore it is important to have this nausea and vomiting resolved to ensure that he will tolerate the antirejection medications.  He also has an upper endoscopy scheduled today to rule out other etiologies of nausea and vomiting.       Past Medical History:     Past Medical History:   Diagnosis Date     Anemia in chronic kidney disease      Chronic kidney disease, stage 5 (H)      Hypertension 2018     Type 2 diabetes mellitus (H)      Vitamin D deficiency              Past Surgical History:     Past Surgical History:   Procedure Laterality Date     EYE SURGERY Left 2015    retinal detachment     ORTHOPEDIC SURGERY Left     Trigger finger              Social History:     Social History     Tobacco Use     Smoking status: Never Smoker     Smokeless tobacco: Never Used   Substance Use Topics     Alcohol use: Yes     Frequency: Never     Comment: Social             Family History:     Family History   Problem Relation Age of Onset     Breast Cancer Mother      Diabetes Brother      Family history reviewed and updated in  EPIC and reviewed         Allergies:     Allergies   Allergen Reactions     Metoprolol Anaphylaxis             Medications:     Current Outpatient Medications   Medication Sig     Alcohol Sheets (ALCOH-WIPE) SHEE Apply 1 each topically     amLODIPine (NORVASC) 10 MG tablet Take 10 mg by mouth     atorvastatin (LIPITOR) 40 MG tablet Take 80 mg by mouth daily      blood glucose (ACCU-CHEK GUIDE) test strip 1 strip     blood glucose monitoring (ACCU-CHEK FASTCLIX) lancets Testing blood sugars 2 time(s) a day.  Indications: Type 2 Diabetes     calcium carbonate (TUMS) 500 MG chewable tablet Take 1,000 mg by mouth     cloNIDine (CATAPRES) 0.1 MG tablet Take 0.1 mg by mouth 2 times daily     furosemide (LASIX) 20 MG tablet Take 2 tablets (40 mg) by mouth 2 times daily     hydrALAZINE (APRESOLINE) 25 MG tablet 100 mg 3 times daily      insulin pen needle (BD RAÚL U/F) 32G X 4 MM miscellaneous use as directed with lantus pen once a day     LANTUS SOLOSTAR 100 UNIT/ML soln Inject 30 units sub-q every evening.     losartan (COZAAR) 100 MG tablet Take 100 mg by mouth daily     vitamin D3 (CHOLECALCIFEROL) 2000 units (50 mcg) tablet Take 1 tablet (2,000 Units) by mouth daily     No current facility-administered medications for this visit.      Facility-Administered Medications Ordered in Other Visits   Medication     lidocaine (LMX4) kit     lidocaine BUFFERED 1 % injection 0.1-1 mL     ondansetron (ZOFRAN) injection 4 mg     simethicone (MYLICON) suspension     sodium chloride (PF) 0.9% PF flush 3 mL     sodium chloride (PF) 0.9% PF flush 3 mL           Immunosuppressant Medications:            Reasons for Consult:   Biliary dyskinesia         Review of Systems:   A comprehensive review of systems was performed and found to be negative except as described in this note          Physical Exam:   Vitals were reviewed  Temp: 98  F (36.7  C)   BP: 120/73 Pulse: 63     SpO2: 99 %      Constitutional:   awake, alert, cooperative, no  apparent distress, and appears stated age     Eyes:   Lids and lashes normal, pupils equal, round and reactive to light, extra ocular muscles intact, sclera clear, conjunctiva normal     Lungs:   No increased work of breathing, good air exchange, clear to auscultation bilaterally, no crackles or wheezing     Cardiovascular:   Normal apical impulse, regular rate and rhythm, normal S1 and S2, no S3 or S4, and no murmur noted     Chest / Breast:   No HD catheter present         Abdomen:   No scars, normal bowel sounds, soft, non-distended, non-tender, no masses palpated, no hepatosplenomegally.  No positive johnson's sign     Genitounirinary:   No CVA tenderness     Musculoskeletal:   There is no redness, warmth, or swelling of the joints.  Full range of motion noted.  Motor strength is 5 out of 5 all extremities bilaterally.  Tone is normal.     Neurologic:   Awake, alert, oriented to name, place and time.  Cranial nerves II-XII are grossly intact.  Motor is 5 out of 5 bilaterally.  Cerebellar finger to nose, heel to shin intact.  Sensory is intact.  Babinski down going, Romberg negative, and gait is normal.     Neuropsychiatric:   Level of consciousness: alert / normal  Affect: normal  Orientation: oriented to self, place, time and situation     Skin:   no bruising or bleeding and no rashes             Data:     Lab Results   Component Value Date    WBC 9.5 07/29/2019    HGB 9.9 (L) 07/29/2019    HCT 30.2 (L) 07/29/2019     07/29/2019     07/29/2019    POTASSIUM 4.0 07/29/2019    CHLORIDE 106 07/29/2019    CO2 24 07/29/2019     (H) 07/29/2019    CR 7.79 (H) 07/29/2019     (H) 07/29/2019    AST 30 07/29/2019    ALT 29 07/29/2019    ALKPHOS 176 (H) 07/29/2019    BILITOTAL 0.3 07/29/2019    INR 1.03 07/29/2019     All cardiac studies reviewed by me.  All imaging studies reviewed by me.       Again, thank you for allowing me to participate in the care of your patient.      Sincerely,    Zoie  MD Marina, MD

## 2019-12-04 ENCOUNTER — TRANSFERRED RECORDS (OUTPATIENT)
Dept: HEALTH INFORMATION MANAGEMENT | Facility: CLINIC | Age: 39
End: 2019-12-04

## 2019-12-05 DIAGNOSIS — Z76.82 AWAITING ORGAN TRANSPLANT: Primary | ICD-10-CM

## 2019-12-09 ENCOUNTER — ANESTHESIA EVENT (OUTPATIENT)
Dept: SURGERY | Facility: CLINIC | Age: 39
End: 2019-12-09
Payer: COMMERCIAL

## 2019-12-09 LAB
PROTOCOL CUTOFF: NORMAL
SA1 CELL: NORMAL
SA1 COMMENTS: NORMAL
SA1 HI RISK ABY: NORMAL
SA1 MOD RISK ABY: NORMAL
SA1 TEST METHOD: NORMAL
SA2 CELL: NORMAL
SA2 COMMENTS: NORMAL
SA2 HI RISK ABY UA: NORMAL
SA2 MOD RISK ABY: NORMAL
SA2 TEST METHOD: NORMAL
UNACCEPTABLE ANTIGEN: NORMAL
UNOS CPRA: 3

## 2019-12-10 ENCOUNTER — ANESTHESIA (OUTPATIENT)
Dept: SURGERY | Facility: CLINIC | Age: 39
End: 2019-12-10
Payer: COMMERCIAL

## 2019-12-10 ENCOUNTER — HOSPITAL ENCOUNTER (OUTPATIENT)
Facility: CLINIC | Age: 39
Discharge: HOME | End: 2019-12-10
Attending: SURGERY | Admitting: SURGERY
Payer: COMMERCIAL

## 2019-12-10 VITALS
SYSTOLIC BLOOD PRESSURE: 145 MMHG | RESPIRATION RATE: 16 BRPM | OXYGEN SATURATION: 100 % | HEIGHT: 66 IN | WEIGHT: 165.57 LBS | DIASTOLIC BLOOD PRESSURE: 73 MMHG | TEMPERATURE: 98.6 F | HEART RATE: 99 BPM | BODY MASS INDEX: 26.61 KG/M2

## 2019-12-10 DIAGNOSIS — K82.8 BILIARY DYSKINESIA: Primary | ICD-10-CM

## 2019-12-10 DIAGNOSIS — K80.50 GALLSTONES, COMMON BILE DUCT: ICD-10-CM

## 2019-12-10 LAB
CREAT SERPL-MCNC: 7.7 MG/DL (ref 0.66–1.25)
GFR SERPL CREATININE-BSD FRML MDRD: 8 ML/MIN/{1.73_M2}
GLUCOSE BLDC GLUCOMTR-MCNC: 108 MG/DL (ref 70–99)
GLUCOSE BLDC GLUCOMTR-MCNC: 99 MG/DL (ref 70–99)
HGB BLD-MCNC: 13.3 G/DL (ref 13.3–17.7)
INR PPP: 0.97 (ref 0.86–1.14)
POTASSIUM SERPL-SCNC: 4.8 MMOL/L (ref 3.4–5.3)

## 2019-12-10 PROCEDURE — 84132 ASSAY OF SERUM POTASSIUM: CPT | Performed by: ANESTHESIOLOGY

## 2019-12-10 PROCEDURE — 71000027 ZZH RECOVERY PHASE 2 EACH 15 MINS: Performed by: SURGERY

## 2019-12-10 PROCEDURE — 71000014 ZZH RECOVERY PHASE 1 LEVEL 2 FIRST HR: Performed by: SURGERY

## 2019-12-10 PROCEDURE — 25000132 ZZH RX MED GY IP 250 OP 250 PS 637: Performed by: ANESTHESIOLOGY

## 2019-12-10 PROCEDURE — 82565 ASSAY OF CREATININE: CPT | Performed by: ANESTHESIOLOGY

## 2019-12-10 PROCEDURE — 36415 COLL VENOUS BLD VENIPUNCTURE: CPT | Performed by: ANESTHESIOLOGY

## 2019-12-10 PROCEDURE — 37000009 ZZH ANESTHESIA TECHNICAL FEE, EACH ADDTL 15 MIN: Performed by: SURGERY

## 2019-12-10 PROCEDURE — 85018 HEMOGLOBIN: CPT | Performed by: ANESTHESIOLOGY

## 2019-12-10 PROCEDURE — 25000128 H RX IP 250 OP 636: Performed by: SURGERY

## 2019-12-10 PROCEDURE — 85610 PROTHROMBIN TIME: CPT | Performed by: ANESTHESIOLOGY

## 2019-12-10 PROCEDURE — 25000125 ZZHC RX 250: Performed by: SURGERY

## 2019-12-10 PROCEDURE — 88304 TISSUE EXAM BY PATHOLOGIST: CPT | Performed by: SURGERY

## 2019-12-10 PROCEDURE — 25000128 H RX IP 250 OP 636: Performed by: NURSE ANESTHETIST, CERTIFIED REGISTERED

## 2019-12-10 PROCEDURE — 25000125 ZZHC RX 250: Performed by: NURSE ANESTHETIST, CERTIFIED REGISTERED

## 2019-12-10 PROCEDURE — 37000008 ZZH ANESTHESIA TECHNICAL FEE, 1ST 30 MIN: Performed by: SURGERY

## 2019-12-10 PROCEDURE — 36000057 ZZH SURGERY LEVEL 3 1ST 30 MIN - UMMC: Performed by: SURGERY

## 2019-12-10 PROCEDURE — 25000565 ZZH ISOFLURANE, EA 15 MIN: Performed by: SURGERY

## 2019-12-10 PROCEDURE — 40000170 ZZH STATISTIC PRE-PROCEDURE ASSESSMENT II: Performed by: SURGERY

## 2019-12-10 PROCEDURE — 36000059 ZZH SURGERY LEVEL 3 EA 15 ADDTL MIN UMMC: Performed by: SURGERY

## 2019-12-10 PROCEDURE — 25800030 ZZH RX IP 258 OP 636: Performed by: NURSE ANESTHETIST, CERTIFIED REGISTERED

## 2019-12-10 PROCEDURE — 27210794 ZZH OR GENERAL SUPPLY STERILE: Performed by: SURGERY

## 2019-12-10 PROCEDURE — 25000128 H RX IP 250 OP 636: Performed by: ANESTHESIOLOGY

## 2019-12-10 PROCEDURE — 71000015 ZZH RECOVERY PHASE 1 LEVEL 2 EA ADDTL HR: Performed by: SURGERY

## 2019-12-10 PROCEDURE — 82962 GLUCOSE BLOOD TEST: CPT

## 2019-12-10 RX ORDER — VITS A,C,E/LUTEIN/MINERALS 300MCG-200
2 TABLET ORAL DAILY
Status: ON HOLD | COMMUNITY
End: 2020-08-31

## 2019-12-10 RX ORDER — LIDOCAINE HYDROCHLORIDE 20 MG/ML
INJECTION, SOLUTION INFILTRATION; PERINEURAL PRN
Status: DISCONTINUED | OUTPATIENT
Start: 2019-12-10 | End: 2019-12-10

## 2019-12-10 RX ORDER — MEPERIDINE HYDROCHLORIDE 25 MG/ML
12.5 INJECTION INTRAMUSCULAR; INTRAVENOUS; SUBCUTANEOUS
Status: DISCONTINUED | OUTPATIENT
Start: 2019-12-10 | End: 2019-12-10 | Stop reason: HOSPADM

## 2019-12-10 RX ORDER — SODIUM CHLORIDE, SODIUM LACTATE, POTASSIUM CHLORIDE, CALCIUM CHLORIDE 600; 310; 30; 20 MG/100ML; MG/100ML; MG/100ML; MG/100ML
INJECTION, SOLUTION INTRAVENOUS CONTINUOUS
Status: DISCONTINUED | OUTPATIENT
Start: 2019-12-10 | End: 2019-12-10 | Stop reason: HOSPADM

## 2019-12-10 RX ORDER — CEFUROXIME SODIUM 1.5 G/16ML
1.5 INJECTION, POWDER, FOR SOLUTION INTRAVENOUS
Status: COMPLETED | OUTPATIENT
Start: 2019-12-10 | End: 2019-12-10

## 2019-12-10 RX ORDER — ACETAMINOPHEN 325 MG/1
650 TABLET ORAL
Status: DISCONTINUED | OUTPATIENT
Start: 2019-12-10 | End: 2019-12-10 | Stop reason: HOSPADM

## 2019-12-10 RX ORDER — NALOXONE HYDROCHLORIDE 0.4 MG/ML
.1-.4 INJECTION, SOLUTION INTRAMUSCULAR; INTRAVENOUS; SUBCUTANEOUS
Status: DISCONTINUED | OUTPATIENT
Start: 2019-12-10 | End: 2019-12-10 | Stop reason: HOSPADM

## 2019-12-10 RX ORDER — LABETALOL 20 MG/4 ML (5 MG/ML) INTRAVENOUS SYRINGE
10
Status: DISCONTINUED | OUTPATIENT
Start: 2019-12-10 | End: 2019-12-10 | Stop reason: HOSPADM

## 2019-12-10 RX ORDER — METOCLOPRAMIDE HYDROCHLORIDE 5 MG/ML
10 INJECTION INTRAMUSCULAR; INTRAVENOUS ONCE
Status: COMPLETED | OUTPATIENT
Start: 2019-12-10 | End: 2019-12-10

## 2019-12-10 RX ORDER — GABAPENTIN 300 MG/1
300 CAPSULE ORAL ONCE
Status: COMPLETED | OUTPATIENT
Start: 2019-12-10 | End: 2019-12-10

## 2019-12-10 RX ORDER — HYDRALAZINE HYDROCHLORIDE 20 MG/ML
2.5-5 INJECTION INTRAMUSCULAR; INTRAVENOUS EVERY 10 MIN PRN
Status: DISCONTINUED | OUTPATIENT
Start: 2019-12-10 | End: 2019-12-10 | Stop reason: HOSPADM

## 2019-12-10 RX ORDER — SODIUM CHLORIDE 9 MG/ML
INJECTION, SOLUTION INTRAVENOUS CONTINUOUS PRN
Status: DISCONTINUED | OUTPATIENT
Start: 2019-12-10 | End: 2019-12-10

## 2019-12-10 RX ORDER — HYDROCODONE BITARTRATE AND ACETAMINOPHEN 5; 325 MG/1; MG/1
1-2 TABLET ORAL EVERY 6 HOURS PRN
Qty: 6 TABLET | Refills: 0 | Status: ON HOLD | OUTPATIENT
Start: 2019-12-10 | End: 2020-08-31

## 2019-12-10 RX ORDER — FENTANYL CITRATE 50 UG/ML
25-50 INJECTION, SOLUTION INTRAMUSCULAR; INTRAVENOUS EVERY 5 MIN PRN
Status: DISCONTINUED | OUTPATIENT
Start: 2019-12-10 | End: 2019-12-10 | Stop reason: HOSPADM

## 2019-12-10 RX ORDER — PROPOFOL 10 MG/ML
INJECTION, EMULSION INTRAVENOUS PRN
Status: DISCONTINUED | OUTPATIENT
Start: 2019-12-10 | End: 2019-12-10

## 2019-12-10 RX ORDER — ONDANSETRON 4 MG/1
4 TABLET, ORALLY DISINTEGRATING ORAL EVERY 30 MIN PRN
Status: DISCONTINUED | OUTPATIENT
Start: 2019-12-10 | End: 2019-12-10 | Stop reason: HOSPADM

## 2019-12-10 RX ORDER — ACETAMINOPHEN 325 MG/1
975 TABLET ORAL ONCE
Status: COMPLETED | OUTPATIENT
Start: 2019-12-10 | End: 2019-12-10

## 2019-12-10 RX ORDER — ONDANSETRON 4 MG/1
4 TABLET, ORALLY DISINTEGRATING ORAL
Status: DISCONTINUED | OUTPATIENT
Start: 2019-12-10 | End: 2019-12-10 | Stop reason: HOSPADM

## 2019-12-10 RX ORDER — ONDANSETRON 2 MG/ML
INJECTION INTRAMUSCULAR; INTRAVENOUS PRN
Status: DISCONTINUED | OUTPATIENT
Start: 2019-12-10 | End: 2019-12-10

## 2019-12-10 RX ORDER — HYDROCODONE BITARTRATE AND ACETAMINOPHEN 5; 325 MG/1; MG/1
1 TABLET ORAL
Status: DISCONTINUED | OUTPATIENT
Start: 2019-12-10 | End: 2019-12-10 | Stop reason: HOSPADM

## 2019-12-10 RX ORDER — EPHEDRINE SULFATE 50 MG/ML
INJECTION, SOLUTION INTRAMUSCULAR; INTRAVENOUS; SUBCUTANEOUS PRN
Status: DISCONTINUED | OUTPATIENT
Start: 2019-12-10 | End: 2019-12-10

## 2019-12-10 RX ORDER — ONDANSETRON 2 MG/ML
4 INJECTION INTRAMUSCULAR; INTRAVENOUS EVERY 30 MIN PRN
Status: DISCONTINUED | OUTPATIENT
Start: 2019-12-10 | End: 2019-12-10 | Stop reason: HOSPADM

## 2019-12-10 RX ORDER — HYDROMORPHONE HYDROCHLORIDE 1 MG/ML
.3-.5 INJECTION, SOLUTION INTRAMUSCULAR; INTRAVENOUS; SUBCUTANEOUS EVERY 10 MIN PRN
Status: DISCONTINUED | OUTPATIENT
Start: 2019-12-10 | End: 2019-12-10 | Stop reason: HOSPADM

## 2019-12-10 RX ORDER — AMOXICILLIN 250 MG
1-2 CAPSULE ORAL 2 TIMES DAILY
Qty: 30 TABLET | Refills: 0 | Status: ON HOLD | OUTPATIENT
Start: 2019-12-10 | End: 2020-08-31

## 2019-12-10 RX ORDER — FENTANYL CITRATE 50 UG/ML
INJECTION, SOLUTION INTRAMUSCULAR; INTRAVENOUS PRN
Status: DISCONTINUED | OUTPATIENT
Start: 2019-12-10 | End: 2019-12-10

## 2019-12-10 RX ADMIN — GABAPENTIN 300 MG: 300 CAPSULE ORAL at 12:14

## 2019-12-10 RX ADMIN — MIDAZOLAM 2 MG: 1 INJECTION INTRAMUSCULAR; INTRAVENOUS at 12:40

## 2019-12-10 RX ADMIN — HYDRALAZINE HYDROCHLORIDE 5 MG: 20 INJECTION INTRAMUSCULAR; INTRAVENOUS at 15:33

## 2019-12-10 RX ADMIN — FENTANYL CITRATE 25 MCG: 50 INJECTION, SOLUTION INTRAMUSCULAR; INTRAVENOUS at 16:02

## 2019-12-10 RX ADMIN — METOCLOPRAMIDE 10 MG: 5 INJECTION, SOLUTION INTRAMUSCULAR; INTRAVENOUS at 12:18

## 2019-12-10 RX ADMIN — HYDRALAZINE HYDROCHLORIDE 10 MG: 20 INJECTION INTRAMUSCULAR; INTRAVENOUS at 16:04

## 2019-12-10 RX ADMIN — Medication 5 MG: at 13:02

## 2019-12-10 RX ADMIN — FAMOTIDINE 20 MG: 20 INJECTION, SOLUTION INTRAVENOUS at 12:16

## 2019-12-10 RX ADMIN — ONDANSETRON 4 MG: 2 INJECTION INTRAMUSCULAR; INTRAVENOUS at 14:28

## 2019-12-10 RX ADMIN — PHENYLEPHRINE HYDROCHLORIDE 100 MCG: 10 INJECTION INTRAVENOUS at 13:05

## 2019-12-10 RX ADMIN — ROCURONIUM BROMIDE 10 MG: 10 INJECTION INTRAVENOUS at 13:20

## 2019-12-10 RX ADMIN — ROCURONIUM BROMIDE 50 MG: 10 INJECTION INTRAVENOUS at 12:48

## 2019-12-10 RX ADMIN — MIDAZOLAM 0.5 MG: 1 INJECTION INTRAMUSCULAR; INTRAVENOUS at 12:14

## 2019-12-10 RX ADMIN — PROPOFOL 40 MG: 10 INJECTION, EMULSION INTRAVENOUS at 14:55

## 2019-12-10 RX ADMIN — CEFUROXIME 1.5 G: 1.5 INJECTION, POWDER, FOR SOLUTION INTRAVENOUS at 13:06

## 2019-12-10 RX ADMIN — SUGAMMADEX 200 MG: 100 INJECTION, SOLUTION INTRAVENOUS at 14:45

## 2019-12-10 RX ADMIN — SODIUM CHLORIDE: 9 INJECTION, SOLUTION INTRAVENOUS at 12:05

## 2019-12-10 RX ADMIN — FENTANYL CITRATE 100 MCG: 50 INJECTION, SOLUTION INTRAMUSCULAR; INTRAVENOUS at 12:48

## 2019-12-10 RX ADMIN — ACETAMINOPHEN 975 MG: 325 TABLET, FILM COATED ORAL at 12:14

## 2019-12-10 RX ADMIN — PROPOFOL 80 MG: 10 INJECTION, EMULSION INTRAVENOUS at 12:48

## 2019-12-10 RX ADMIN — LIDOCAINE HYDROCHLORIDE 100 MG: 20 INJECTION, SOLUTION INFILTRATION; PERINEURAL at 12:48

## 2019-12-10 ASSESSMENT — PAIN DESCRIPTION - DESCRIPTORS
DESCRIPTORS: TIGHTNESS;SORE
DESCRIPTORS: SORE

## 2019-12-10 ASSESSMENT — MIFFLIN-ST. JEOR: SCORE: 1618.75

## 2019-12-10 NOTE — ANESTHESIA CARE TRANSFER NOTE
Patient: Erik Cramer    Procedure(s):  CHOLECYSTECTOMY, LAPAROSCOPIC    Diagnosis: Gallstones, common bile duct [K80.50]  Diagnosis Additional Information: No value filed.    Anesthesia Type:   General     Note:  Airway :Nasal Cannula  Patient transferred to:PACU  Handoff Report: Identifed the Patient, Identified the Reponsible Provider, Reviewed the pertinent medical history, Discussed the surgical course, Reviewed Intra-OP anesthesia mangement and issues during anesthesia, Set expectations for post-procedure period and Allowed opportunity for questions and acknowledgement of understanding      Vitals: (Last set prior to Anesthesia Care Transfer)    CRNA VITALS  12/10/2019 1430 - 12/10/2019 1507      12/10/2019             Resp Rate (observed):  11    Resp Rate (set):  10                Electronically Signed By: SHANIA Gregory CRNA  December 10, 2019  3:07 PM

## 2019-12-10 NOTE — DISCHARGE INSTRUCTIONS
[facility] [place]  Same-Day Surgery   Adult Discharge Orders & Instructions     For 24 hours after surgery    1. Get plenty of rest.  A responsible adult must stay with you for at least 24 hours after you leave the hospital.   2. Do not drive or use heavy equipment.  If you have weakness or tingling, don't drive or use heavy equipment until this feeling goes away.  3. Do not drink alcohol.  4. Avoid strenuous or risky activities.  Ask for help when climbing stairs.   5. You may feel lightheaded.  IF so, sit for a few minutes before standing.  Have someone help you get up.   6. If you have nausea (feel sick to your stomach): Drink only clear liquids such as apple juice, ginger ale, broth or 7-Up.  Rest may also help.  Be sure to drink enough fluids.  Move to a regular diet as you feel able.  7. You may have a slight fever. Call the doctor if your fever is over 100.5 F (taken under the tongue) or lasts longer than 24 hours.  8. You may have a dry mouth, a sore throat, muscle aches or trouble sleeping.  These should go away after 24 hours.  9. Do not make important or legal decisions.   Call your doctor for any of the followin.  Signs of infection (fever, growing tenderness at the surgery site, a large amount of drainage or bleeding, severe pain, foul-smelling drainage, redness, swelling).    2. It has been over 8 to 10 hours since surgery and you are still not able to urinate (pass water).    3.  Headache for over 24 hours.    4.  Numbness, tingling or weakness the day after surgery (if you had spinal anesthesia).  To contact a doctor, call _______________________________ or:        [phone] and ask for the resident on call for   ___________________________General surgery___________________ (answered 24 hours a day)      Emergency Department:    [facility]: [phone]       (TTY for hearing impaired: [phone])

## 2019-12-10 NOTE — BRIEF OP NOTE
[facility], [place]    Brief Operative Note    Pre-operative diagnosis: Biliary dyskinesia  Post-operative diagnosis Same as pre-operative diagnosis    Procedure: Procedure(s):  CHOLECYSTECTOMY, LAPAROSCOPIC  Surgeon: Surgeon(s) and Role:     * Zoie Gray MD - Primary     * Andres Lanza MD - Resident - Assisting     * Edmar Jacobo MD - Fellow - Assisting  Anesthesia: General   Estimated blood loss: 20  Drains: None  Specimens:   ID Type Source Tests Collected by Time Destination   A : Gallbladder Tissue Gallbladder and Contents SURGICAL PATHOLOGY EXAM Zoie Gray MD 12/10/2019  2:17 PM      Findings:   biliary dysinesia.  Complications: None   .  Implants: * No implants in log *

## 2019-12-10 NOTE — PROVIDER NOTIFICATION
"BP (!) 179/84   Pulse 76   Temp 98.4  F (36.9  C) (Oral)   Resp 18   Ht 1.676 m (5' 6\")   Wt 75.1 kg (165 lb 9.1 oz)   SpO2 100%   BMI 26.72 kg/m      Time of notification: 3:40 PM  Provider notified: Dr. Cedeño  Patient status: BP climbing, has had 5mg hydralazine with no effect  Orders received: give 300mL NS bolus and dose with another 10mg hydralazine if next BP elevated.      "

## 2019-12-10 NOTE — ANESTHESIA POSTPROCEDURE EVALUATION
Anesthesia POST Procedure Evaluation    Patient: Erik Cramer   MRN:     [id number] Gender:   male   Age:    39 year old :      1980        Preoperative Diagnosis: Gallstones, common bile duct [K80.50]   Procedure(s):  CHOLECYSTECTOMY, LAPAROSCOPIC   Postop Comments: No value filed.       Anesthesia Type:  Not documented  General    Reportable Event: NO     PAIN: Uncomplicated   Sign Out status: Comfortable, Well controlled pain     PONV: No PONV   Sign Out status:  No Nausea or Vomiting     Neuro/Psych: Uneventful perioperative course   Sign Out Status: Preoperative baseline; Age appropriate mentation     Airway/Resp.: Uneventful perioperative course   Sign Out Status: Non labored breathing, age appropriate RR; Resp. Status within EXPECTED Parameters     CV: Uneventful perioperative course   Sign Out status: Appropriate BP and perfusion indices; Appropriate HR/Rhythm     Disposition:   Sign Out in:  PACU  Disposition:  Phase II; Home  Recovery Course: Uneventful  Follow-Up: Not required           Last Anesthesia Record Vitals:  CRNA VITALS  12/10/2019 1430 - 12/10/2019 1530      12/10/2019             Resp Rate (observed):  11    Resp Rate (set):  10          Last PACU Vitals:  Vitals Value Taken Time   /69 12/10/2019  4:20 PM   Temp 37.1  C (98.8  F) 12/10/2019  4:10 PM   Pulse 97 12/10/2019  4:20 PM   Resp 18 12/10/2019  4:10 PM   SpO2 97 % 12/10/2019  4:23 PM   Temp src     NIBP     Pulse     SpO2     Resp     Temp     Ht Rate     Temp 2     Vitals shown include unvalidated device data.      Electronically Signed By: Linda Cedeño MD, December 10, 2019, 4:24 PM

## 2019-12-10 NOTE — PROGRESS NOTES
Spoke with Transplant Surgical Fellow who called to tell pt pt is now post op fide phipps and needs RTC with Dr. Gray in 2 weeks - told him will do.     Called /sent staff message that pt needs RTC with Dr. Gray in 2 weeks for post op check.

## 2019-12-10 NOTE — OR NURSING
"\" Please place preop orders on Bela Gamble - thanks sohail *[id number]\".  This text message sent to MD Gray @ 8499.. Pt also hypertensive on arrival - rechecked blood pressure - no improvement.  LULU Thrasher notified of HTN @ 1130.    "

## 2019-12-10 NOTE — OP NOTE
Transplant Surgery  Operative Note      Date:  12/10/2019  Preop Dx:  Biliary Dyskinesia  Postop Dx: Biliary Dyskinesia  Procedure: Laparoscopic Cholecystectomy  Surgeon: Zoie Gray MD  ASSISTANT:  Inocencio fellow. ROXANNA Lanza resident.   Anesthesia: General  EBL: 20 ml  Drains: no drain  Specimen: Gallbladder.  Complications: None  Findings:    Complications: None.    Indication: The patient has history of nausea, found in 2016 to have EF of 3% on HIDA.  After discussing the risks and benefits of surgery and potential complications, the patient provided informed consent.     DETAILS OF PROCEDURE:  The patient was brought to the operating room, placed in a supine position.  Perioperative prophylactic IV antibiotics were given.  Anesthesia was adminisitered. The abdomen was prepped and draped in the usual sterile fashion.  Time out was performed. An infraumbilical incision was made and dissection was carried down to the fascia and fascia was entered, a alesia port was placed and the abdomen was insufflated to 15mmHg, patient tolerated insufflation well. No injury from trocar placement was appreciated. An additional 12mm port was placed in the epigastrium,  And two additional 5mm ports placed in the RUQ. Patient was placed in reverse Trendelenberg with the right side up. The gallbladder was grasped at the fundus and retracted towards the right shoulder, the gallbladder was grasped at the infindibulum and retracted towards the right lower quadrant, this maneuver exposed Calot's triangle. The Cystic duct and the cystic artery were circumferentially dissected out and a critical view of safety was achieved. The cystic duct and artery were clipped once towards the GB and twice away and divided. The gallbladder was then taken off of the liver bed with bovie electrocautery. A cholecystostomy was made in the process and there was bile spillage. This was copiously irrigated. Hemostasis was checked and found to be  excellent. The epigastric port site was closed with O Vicryl. The 5mm ports were closed under direct vision. The Elijah [place] site was closed with O vicryl. Skin was closed with 4-O monocryl and skin glue.    All needle, sponge, and instrument counts were accurate.  The patient tolerated the procedure well without apparent complications and was trasfered to the PACU in good condition.  Faculty was present for critical portions of the procedure.

## 2019-12-10 NOTE — ANESTHESIA PREPROCEDURE EVALUATION
Anesthesia Pre-Procedure Evaluation    Patient: Erik Cramer   MRN:     [id number] Gender:   male   Age:    39 year old :      1980        Preoperative Diagnosis: Gallstones, common bile duct [K80.50]   Procedure(s):  CHOLECYSTECTOMY, LAPAROSCOPIC     Past Medical History:   Diagnosis Date     Anemia in chronic kidney disease      Chronic kidney disease, stage 5 (H)      Hypertension      Type 2 diabetes mellitus (H)      Vitamin D deficiency       Past Surgical History:   Procedure Laterality Date     ESOPHAGOSCOPY, GASTROSCOPY, DUODENOSCOPY (EGD), COMBINED N/A 12/3/2019    Procedure: ESOPHAGOGASTRODUODENOSCOPY, WITH BIOPSY;  Surgeon: Guy Bar MD;  Location: [facility] OR     EYE SURGERY Left 2015    retinal detachment     ORTHOPEDIC SURGERY Left     Trigger finger           Anesthesia Evaluation     . Pt has had prior anesthetic. Type: MAC and General           ROS/MED HX    ENT/Pulmonary:  - neg pulmonary ROS     Neurologic:  - neg neurologic ROS     Cardiovascular:     (+) hypertension----. : . . . :. .       METS/Exercise Tolerance:     Hematologic:     (+) Anemia, -      Musculoskeletal:         GI/Hepatic:     (+) cholecystitis/cholelithiasis,       Renal/Genitourinary:     (+) chronic renal disease, type: CRI, Pt has history of transplant,       Endo:     (+) type II DM .   (-) Type I DM   Psychiatric:     (+) psychiatric history anxiety      Infectious Disease:         Malignancy:         Other:                         PHYSICAL EXAM:   Mental Status/Neuro: A/A/O   Airway: Facies: Feasible  Mallampati: I  Mouth/Opening: Full  TM distance: > 6 cm  Neck ROM: Full   Respiratory: Auscultation: CTAB     Resp. Rate: Normal     Resp. Effort: Normal      CV: Rhythm: Regular  Rate: Age appropriate  Heart: Normal Sounds  Edema: None   Comments:      Dental: Normal Dentition                LABS:  CBC:   Lab Results   Component Value Date    WBC 9.5 2019    WBC 7.3 2019    HGB 13.3 12/10/2019  "   HGB 9.9 (L) 07/29/2019    HCT 30.2 (L) 07/29/2019    HCT 31.9 (L) 07/18/2019     07/29/2019     07/18/2019     BMP:   Lab Results   Component Value Date     07/29/2019     07/18/2019    POTASSIUM 4.0 07/29/2019    POTASSIUM 5.1 07/18/2019    CHLORIDE 106 07/29/2019    CHLORIDE 107 07/18/2019    CO2 24 07/29/2019    CO2 26 07/18/2019     (H) 07/29/2019    BUN 72 (H) 07/18/2019    CR 7.79 (H) 07/29/2019    CR 7.35 (H) 07/18/2019     (H) 07/29/2019     (H) 07/18/2019     COAGS:   Lab Results   Component Value Date    PTT 37 07/29/2019    INR 1.03 07/29/2019     POC:   Lab Results   Component Value Date    BGM 99 12/10/2019     OTHER:   Lab Results   Component Value Date    A1C 6.4 (H) 07/29/2019    SHAY 7.8 (L) 07/29/2019    PHOS 5.4 (H) 07/29/2019    ALBUMIN 3.8 07/29/2019    PROTTOTAL 8.1 07/29/2019    ALT 29 07/29/2019    AST 30 07/29/2019    ALKPHOS 176 (H) 07/29/2019    BILITOTAL 0.3 07/29/2019        Preop Vitals    BP Readings from Last 3 Encounters:   12/10/19 (!) 182/96   12/03/19 (!) 159/91   12/03/19 120/73    Pulse Readings from Last 3 Encounters:   12/10/19 76   12/03/19 76   12/03/19 63      Resp Readings from Last 3 Encounters:   12/10/19 18   12/03/19 18   10/09/19 18    SpO2 Readings from Last 3 Encounters:   12/10/19 100%   12/03/19 100%   12/03/19 99%      Temp Readings from Last 1 Encounters:   12/10/19 36.6  C (97.9  F) (Oral)    Ht Readings from Last 1 Encounters:   12/10/19 1.676 m (5' 6\")      Wt Readings from Last 1 Encounters:   12/10/19 75.1 kg (165 lb 9.1 oz)    Estimated body mass index is 26.72 kg/m  as calculated from the following:    Height as of this encounter: 1.676 m (5' 6\").    Weight as of this encounter: 75.1 kg (165 lb 9.1 oz).     LDA:  CVC Double Lumen 12/03/19 (Active)   Site Assessment WDL 12/3/2019  1:20 PM   Lumen A - Status heparin locked 12/3/2019  1:20 PM   Lumen B - Status heparin locked 12/3/2019  1:20 PM   Number of " days: 7       Hemodialysis Vascular Access Arteriovenous fistula (Active)   Site Assessment [facility] 12/3/2019  1:20 PM   Number of days: 7        Assessment:   ASA SCORE: 3    H&P: History and physical reviewed and following examination; no interval change.         Plan:   Anes. Type:  General   Pre-Medication: Midazolam; Gabapentin; Acetaminophen   Induction:  IV (Standard)   Airway: ETT; Oral   Access/Monitoring: PIV; 2nd PIV   Maintenance: Balanced     Postop Plan:   Postop Pain: Opioids  Postop Sedation/Airway: Not planned  Disposition: Inpatient/Admit     PONV Management:   Adult Risk Factors:, Postop Opioids   Prevention: Ondansetron, Dexamethasone     CONSENT: Direct conversation   Plan and risks discussed with: Patient   Blood Products: Consented (ALL Blood Products)                   Ashly Colbert MD

## 2019-12-11 LAB — COPATH REPORT: NORMAL

## 2019-12-12 ENCOUNTER — TELEPHONE (OUTPATIENT)
Dept: TRANSPLANT | Facility: CLINIC | Age: 39
End: 2019-12-12

## 2019-12-12 RX ORDER — TRAMADOL HYDROCHLORIDE 50 MG/1
50 TABLET ORAL EVERY 6 HOURS PRN
Qty: 10 TABLET | Refills: 0 | Status: CANCELLED | OUTPATIENT
Start: 2019-12-12 | End: 2019-12-15

## 2019-12-12 NOTE — TELEPHONE ENCOUNTER
Called patient for s/p lap moise post op check and spoke to him. Pt states he is feeling well, eating/drinking normally and up and around. Pt denies any fever or drainage from surgical incision. Pt stating he does have pain directly at the site of gallbladder removal, stating he just took his last narco today and has used them all up - wants to know he he can get a refill - told him I will check with Dr. Gray.     Called patient back and explained that Dr. Gray has submitted a script for Ultram to his [facility]. Pt will arrange to pick it up.

## 2019-12-13 ENCOUNTER — TELEPHONE (OUTPATIENT)
Dept: TRANSPLANT | Facility: CLINIC | Age: 39
End: 2019-12-13

## 2019-12-13 LAB — COPATH REPORT: NORMAL

## 2019-12-13 NOTE — TELEPHONE ENCOUNTER
Thurs, Dec 12:  I called patient lvm for Leilani S appt on Friday, Dec 27 at 930am, asked for a cb to confirm.    Thurs, Dec 12 at 5:55pm:  Patient called back lvm asking could we move his appt to a Tues or Thurs, due to his dialysis schedule.  Not sure where to move him too or with who?

## 2019-12-13 NOTE — TELEPHONE ENCOUNTER
Called pt and spoke with him today. He is feeling better again today, no fever no drainage. Has picked up his ultram but says he hasn't taken any because he doesn't have bad enough pain to use it. Plans to attend post surg f/u appt as scheduled. Will call with keegan.

## 2019-12-19 ENCOUNTER — OFFICE VISIT (OUTPATIENT)
Dept: TRANSPLANT | Facility: CLINIC | Age: 39
End: 2019-12-19
Attending: NURSE PRACTITIONER
Payer: COMMERCIAL

## 2019-12-19 VITALS
OXYGEN SATURATION: 96 % | SYSTOLIC BLOOD PRESSURE: 141 MMHG | DIASTOLIC BLOOD PRESSURE: 74 MMHG | WEIGHT: 167.4 LBS | HEART RATE: 72 BPM | BODY MASS INDEX: 27.02 KG/M2

## 2019-12-19 DIAGNOSIS — L30.9 DERMATITIS: Primary | ICD-10-CM

## 2019-12-19 RX ORDER — EMOLLIENT BASE
CREAM (GRAM) TOPICAL PRN
Qty: 453 G | Refills: 1 | Status: ON HOLD | OUTPATIENT
Start: 2019-12-19 | End: 2020-08-31

## 2019-12-19 ASSESSMENT — PAIN SCALES - GENERAL: PAINLEVEL: MILD PAIN (2)

## 2020-01-02 NOTE — PROGRESS NOTES
Transplant Surgery Progress Note    Medical record number: [id number]  YOB: 1980,   Date of Visit:  2019  For followup after laparoscopic cholecystectomy.    Assessment and Recommendations: Mr. Cramer is doing well after laparoscopic cholecystectomy.    1. Lifting restrictions: 10 lbs until 6 weeks   2. Incision: healing well.     Total time: 15 minutes  Counselling time: 10 minutes        ---------------------------------------------------------------------------------------------------    S: Mr. Cramer is a 39-year-old male who presents to clinic today for follow-up after laparoscopic cholecystectomy.    Patient overall is doing well he has no complaints.  He denies any fevers, chills, nausea or vomiting.    He states his incision is healing well  Medications:  Prescription Medications as of 2020       Rx Number Disp Refills Start End Last Dispensed Date Next Fill Date Owning Pharmacy    Alcohol Sheets (ALCOH-WIPE) ANNABELLEE    2016        Sig: Apply 1 each topically    Class: Historical    Route: Topical    amLODIPine (NORVASC) 10 MG tablet    2019       Sig: Take 10 mg by mouth    Class: Historical    Route: Oral    atorvastatin (LIPITOR) 40 MG tablet   2 2018        Sig: Take 80 mg by mouth daily     Class: Historical    Route: Oral    blood glucose (ACCU-CHEK GUIDE) test strip    2019        Si strip    Class: Historical    blood glucose monitoring (ACCU-CHEK FASTCLIX) lancets    2019        Sig: Testing blood sugars 2 time(s) a day.  Indications: Type 2 Diabetes    Class: Historical    calcium carbonate (OS-SHAY) 1500 (600 Ca) MG tablet   12 2019    [place], [place] - [address] [facility]    Si mg    Class: Historical    calcium carbonate (TUMS) 500 MG chewable tablet    2019        Sig: Take 1,000 mg by mouth Pt says he takes regular calcium-not TUMS chewables    Class: Historical    Route: Oral     cloNIDine (CATAPRES) 0.1 MG tablet            Sig: Take 0.1 mg by mouth 2 times daily    Class: Historical    Route: Oral    emollient (VANICREAM) external cream  453 g 1 2019    [address]    Sig: Apply topically as needed for other (apply to abdomen and directed)    Class: E-Prescribe    Route: Topical    furosemide (LASIX) 20 MG tablet  60 tablet 1 2019    [facility], [address]    Sig: Take 2 tablets (40 mg) by mouth 2 times daily    Class: E-Prescribe    Route: Oral    hydrALAZINE (APRESOLINE) 25 MG tablet   0 2018        Si mg 3 times daily     Class: Historical    HYDROcodone-acetaminophen (NORCO) 5-325 MG tablet  6 tablet 0 12/10/2019    [address]    Sig: Take 1-2 tablets by mouth every 6 hours as needed for moderate to severe pain    Class: Local Print    Earliest Fill Date: 12/10/2019    Route: Oral    insulin pen needle (BD RAÚL U/F) 32G X 4 MM miscellaneous    2018        Sig: use as directed with lantus pen once a day    Class: Historical    LANTUS SOLOSTAR 100 UNIT/ML soln   2 2019        Si Units At Bedtime     Class: Historical    losartan (COZAAR) 100 MG tablet            Sig: Take 100 mg by mouth daily    Class: Historical    Route: Oral    multivitamin (OCUVITE) TABS tablet        [address]    Sig: Take 2 tablets by mouth daily    Class: Historical    Route: Oral    senna-docusate (SENOKOT-S/PERICOLACE) 8.6-50 MG tablet  30 tablet 0 12/10/2019    [address]    Sig: Take 1-2 tablets by mouth 2 times daily    Class: Local Print    Route: Oral    vitamin D3 (CHOLECALCIFEROL) 2000 units (50 mcg) tablet  90 tablet 0 2019    [facility], [place] -  [address]    Sig: Take 1 tablet (2,000 Units) by mouth daily    Class: E-Prescribe    Route: Oral          Exam:      Appearance: in no apparent distress.   Skin: normal  Head and Neck: Normal, no rashes or jaundice  Respiratory: normal respiratory excursions, no audible wheeze  Cardiovascular: RRR  Abdomen: rounded, Surgical scars consistent with history   Extremeties: Edema, none  Neuro: without deficit       Labs:   Recent Labs   Lab Test 12/10/19  1111 07/29/19  1252   BUN  --  101*   CR 7.70* 7.79*   GFRESTIMATED 8* 8*   GLC  --  138*     Recent Labs   Lab Test 07/29/19  1240   COLOR Straw   APPEARANCE Clear   URINEGLC 50*   URINEBILI Negative   URINEKETONE Negative   SG 1.010   UBLD Negative   URINEPH 5.0   PROTEIN >499*   NITRITE Negative   LEUKEST Negative   RBCU 5*   WBCU 3     Recent Labs   Lab Test 07/18/19  1200   [facility]PG 6.20*

## 2020-01-16 ENCOUNTER — TELEPHONE (OUTPATIENT)
Dept: TRANSPLANT | Facility: CLINIC | Age: 40
End: 2020-01-16

## 2020-01-16 NOTE — TELEPHONE ENCOUNTER
Called pt today and LM asking him to return my call. Pt returned my call and I spoke with him patient to inform them of eligibility of listing to active status on K, SPK wait lists.  Pt very agreeable and happy to change to active status.   -Verified contact information as documented in EPIC  -Informed patient that a  donor offer can happen at any time (24 hours/day, 7 days/week)  -Instructed patient about importance of having PRAs drawn every 3 months via: (Mailers/[facility] Lab)  -Reviewed organ offer process including request to accept or deny offer, informing coordinator of any recent infections  -Reviewed timeframe after call with organ offer (process for admission to hospital and pre-op testing and work and timeframe)  -Discussed expected length of surgical procedure, expected inpatient length of stay post transplant, and potential to stay locally post transplant.    Pt stating he plans to have his post transplant labs done locally at [facility]. Understands role of post transplant coordinator. Pt states he currently sets up and manages his own medications and expects to do so post transplant. Pt stating he is currently using 20 units per day of Lantus and no short acting.   Explained he will be transferred now to the wait list coordinator team, his new coordinator is Tamy HE assisted by Tg BATISTA. Provider phone numbers.   Pt confirmed he has assistance at home post op for when he initially goes home.   Provided name and contact information for additional questions or concerns.  Pt expressed very good understanding of all and was in good agreement with the plan.     Generated change of status letter in Epic - routed to admin for mailing.

## 2020-01-16 NOTE — LETTER
January 16, 2020    Erik Cramer  [address] [place] [id number]      Dear Mr. Cramer,    This letter is sent to notify you that your listing status was changed from Inactive to Active on 01/16/2020 on the kidney and simultaneous kidney/pancreas transplant waitlist at the [facility].  Your status was changed because you now meet all criteria for this.    During this waiting period, we may still request periodic laboratory tests with your primary physician.  It will be your responsibility to remind your physician to forward your results to the Transplant Office.    We still need to be kept informed of any changes such as:    o changes in your insurance coverage  o change in your phone number, address or emergency contact  o significant changes in your health  o significant infections (such as pneumonia or abscesses)  o blood transfusions  o any condition which requires hospitalization or surgery    Sincerely,        Kidney and Pancreas Transplant Program    Enclosures: UNOS Letter    CC:Figueroa Avendano MD(PCP);Antoinette saldivar; Ignacio Dietrich

## 2020-03-11 ENCOUNTER — HEALTH MAINTENANCE LETTER (OUTPATIENT)
Age: 40
End: 2020-03-11

## 2020-06-02 ENCOUNTER — DOCUMENTATION ONLY (OUTPATIENT)
Dept: TRANSPLANT | Facility: CLINIC | Age: 40
End: 2020-06-02

## 2020-06-03 DIAGNOSIS — Z76.82 AWAITING ORGAN TRANSPLANT: ICD-10-CM

## 2020-06-10 LAB
PROTOCOL CUTOFF: NORMAL
SA1 CELL: NORMAL
SA1 COMMENTS: NORMAL
SA1 HI RISK ABY: NORMAL
SA1 MOD RISK ABY: NORMAL
SA1 TEST METHOD: NORMAL
SA2 CELL: NORMAL
SA2 COMMENTS: NORMAL
SA2 HI RISK ABY UA: NORMAL
SA2 MOD RISK ABY: NORMAL
SA2 TEST METHOD: NORMAL
UNACCEPTABLE ANTIGEN: NORMAL
UNOS CPRA: 32

## 2020-08-12 ENCOUNTER — TELEPHONE (OUTPATIENT)
Dept: TRANSPLANT | Facility: CLINIC | Age: 40
End: 2020-08-12

## 2020-08-12 NOTE — TELEPHONE ENCOUNTER
Patient Call: Voicemail  Date/Time: 8/12/20 / 3:30/20  Reason for call: Natalie would like to talk to Tamy about his plan on care and specifically if he is on the active transplant list for pancreas and kidney.  Natalie provided a confidential voicemail number however the area code cut out so I could only recover the following numbers:  [phone]

## 2020-08-12 NOTE — TELEPHONE ENCOUNTER
Added an 8 to the phone number and was able to reach Natalie's voice mail.  Left voice message patient is currently Active status on the kidney and kidney/pancreas transplant waitlists and will be due for return waitlist in-person appointments ([place] resident) in October 2020.  Requested return call from Natalie should she have other questions.

## 2020-08-30 ENCOUNTER — HOSPITAL ENCOUNTER (INPATIENT)
Facility: CLINIC | Age: 40
Setting detail: SURGERY ADMIT
LOS: 1 days | Discharge: HOME | End: 2020-08-31
Attending: SURGERY | Admitting: SURGERY
Payer: COMMERCIAL

## 2020-08-30 ENCOUNTER — ORGAN (OUTPATIENT)
Dept: TRANSPLANT | Facility: CLINIC | Age: 40
End: 2020-08-30

## 2020-08-30 ENCOUNTER — DOCUMENTATION ONLY (OUTPATIENT)
Dept: TRANSPLANT | Facility: CLINIC | Age: 40
End: 2020-08-30

## 2020-08-30 ENCOUNTER — RESULTS ONLY (OUTPATIENT)
Dept: OTHER | Facility: CLINIC | Age: 40
End: 2020-08-30

## 2020-08-30 DIAGNOSIS — Z76.82 AWAITING ORGAN TRANSPLANT: Primary | ICD-10-CM

## 2020-08-30 PROCEDURE — 93010 ELECTROCARDIOGRAM REPORT: CPT | Mod: 59 | Performed by: INTERNAL MEDICINE

## 2020-08-30 PROCEDURE — 87389 HIV-1 AG W/HIV-1&-2 AB AG IA: CPT | Performed by: STUDENT IN AN ORGANIZED HEALTH CARE EDUCATION/TRAINING PROGRAM

## 2020-08-30 PROCEDURE — 86644 CMV ANTIBODY: CPT | Performed by: STUDENT IN AN ORGANIZED HEALTH CARE EDUCATION/TRAINING PROGRAM

## 2020-08-30 PROCEDURE — 86705 HEP B CORE ANTIBODY IGM: CPT | Performed by: STUDENT IN AN ORGANIZED HEALTH CARE EDUCATION/TRAINING PROGRAM

## 2020-08-30 PROCEDURE — 83036 HEMOGLOBIN GLYCOSYLATED A1C: CPT | Performed by: STUDENT IN AN ORGANIZED HEALTH CARE EDUCATION/TRAINING PROGRAM

## 2020-08-30 PROCEDURE — 85730 THROMBOPLASTIN TIME PARTIAL: CPT | Performed by: STUDENT IN AN ORGANIZED HEALTH CARE EDUCATION/TRAINING PROGRAM

## 2020-08-30 PROCEDURE — 86665 EPSTEIN-BARR CAPSID VCA: CPT | Performed by: STUDENT IN AN ORGANIZED HEALTH CARE EDUCATION/TRAINING PROGRAM

## 2020-08-30 PROCEDURE — 85610 PROTHROMBIN TIME: CPT | Performed by: STUDENT IN AN ORGANIZED HEALTH CARE EDUCATION/TRAINING PROGRAM

## 2020-08-30 PROCEDURE — 87340 HEPATITIS B SURFACE AG IA: CPT | Performed by: STUDENT IN AN ORGANIZED HEALTH CARE EDUCATION/TRAINING PROGRAM

## 2020-08-30 PROCEDURE — 86900 BLOOD TYPING SEROLOGIC ABO: CPT | Performed by: STUDENT IN AN ORGANIZED HEALTH CARE EDUCATION/TRAINING PROGRAM

## 2020-08-30 PROCEDURE — 12000026 ZZH R&B TRANSPLANT

## 2020-08-30 PROCEDURE — 86901 BLOOD TYPING SEROLOGIC RH(D): CPT | Performed by: STUDENT IN AN ORGANIZED HEALTH CARE EDUCATION/TRAINING PROGRAM

## 2020-08-30 PROCEDURE — 86645 CMV ANTIBODY IGM: CPT | Performed by: STUDENT IN AN ORGANIZED HEALTH CARE EDUCATION/TRAINING PROGRAM

## 2020-08-30 PROCEDURE — 80053 COMPREHEN METABOLIC PANEL: CPT | Performed by: STUDENT IN AN ORGANIZED HEALTH CARE EDUCATION/TRAINING PROGRAM

## 2020-08-30 PROCEDURE — 36415 COLL VENOUS BLD VENIPUNCTURE: CPT | Performed by: STUDENT IN AN ORGANIZED HEALTH CARE EDUCATION/TRAINING PROGRAM

## 2020-08-30 PROCEDURE — 85025 COMPLETE CBC W/AUTO DIFF WBC: CPT | Performed by: STUDENT IN AN ORGANIZED HEALTH CARE EDUCATION/TRAINING PROGRAM

## 2020-08-30 PROCEDURE — 80061 LIPID PANEL: CPT | Performed by: STUDENT IN AN ORGANIZED HEALTH CARE EDUCATION/TRAINING PROGRAM

## 2020-08-30 PROCEDURE — 86923 COMPATIBILITY TEST ELECTRIC: CPT | Performed by: STUDENT IN AN ORGANIZED HEALTH CARE EDUCATION/TRAINING PROGRAM

## 2020-08-30 PROCEDURE — 86850 RBC ANTIBODY SCREEN: CPT | Performed by: STUDENT IN AN ORGANIZED HEALTH CARE EDUCATION/TRAINING PROGRAM

## 2020-08-30 PROCEDURE — 86803 HEPATITIS C AB TEST: CPT | Performed by: STUDENT IN AN ORGANIZED HEALTH CARE EDUCATION/TRAINING PROGRAM

## 2020-08-30 RX ORDER — CEFUROXIME SODIUM 1.5 G/16ML
1.5 INJECTION, POWDER, FOR SOLUTION INTRAVENOUS ONCE
Status: DISCONTINUED | OUTPATIENT
Start: 2020-08-31 | End: 2020-08-31 | Stop reason: HOSPADM

## 2020-08-30 RX ORDER — ACETAMINOPHEN 325 MG/1
975 TABLET ORAL ONCE
Status: DISCONTINUED | OUTPATIENT
Start: 2020-08-31 | End: 2020-08-31 | Stop reason: HOSPADM

## 2020-08-30 ASSESSMENT — MIFFLIN-ST. JEOR: SCORE: 1713.25

## 2020-08-30 NOTE — PROGRESS NOTES
PATHOLOGY HLA CROSSMATCH CONSULTATION: DONOR/RECIPIENT  VIRTUAL CROSSMATCH - Kidney-Pancreas  Consultation Date: 2020  Consultation Requested by: Dr. Ordaz    Regarding: Compatibility of  donor organ UNOS #[id number] from OPO: [place] with Erik Cramer    Findings: Regarding a virtual crossmatch between Erik Cramer and  donor listed above (match ID [id number]):  The most recent (2020) and 2 additional patient serum/sera  were analyzed.  The patient has no antibodies listed with HLA specificity against the donor organ.      Record Review Indicates: I personally reviewed the most recent serum, the historic peak sera, and all other sera with solid-phase HLA Single Antigen test results:  The patient has no HLA antibodies against the donor organ.     The results of this virtual XM are:   -most recent serum: compatible   -peak #1: compatible  -peak #2: compatible    Disclaimer: Clinical judgement must take into account other factors, such as non-HLA antibodies not detected in the assay. The VXM gives probabilities only.  The probability does not account for the potential for auto-antibodies that may be present in the patient's serum.  These autoantibodies may render the physical crossmatch falsely positive, and would be detected by an autologous crossmatch.  When possible, confirm findings with prospective allogeneic and autologous flow crossmatches before going to transplant as clinically indicated.     Korin Nettles MD  Medical Director, Immunology/Histocompatibility Laboratory

## 2020-08-31 ENCOUNTER — APPOINTMENT (OUTPATIENT)
Dept: GENERAL RADIOLOGY | Facility: CLINIC | Age: 40
End: 2020-08-31
Attending: SURGERY
Payer: COMMERCIAL

## 2020-08-31 ENCOUNTER — PATIENT OUTREACH (OUTPATIENT)
Dept: CARE COORDINATION | Facility: CLINIC | Age: 40
End: 2020-08-31

## 2020-08-31 ENCOUNTER — TELEPHONE (OUTPATIENT)
Dept: TRANSPLANT | Facility: CLINIC | Age: 40
End: 2020-08-31

## 2020-08-31 VITALS
WEIGHT: 188.49 LBS | SYSTOLIC BLOOD PRESSURE: 171 MMHG | TEMPERATURE: 98.6 F | DIASTOLIC BLOOD PRESSURE: 87 MMHG | BODY MASS INDEX: 31.4 KG/M2 | HEART RATE: 69 BPM | HEIGHT: 65 IN | RESPIRATION RATE: 16 BRPM | OXYGEN SATURATION: 98 %

## 2020-08-31 LAB
ABO + RH BLD: NORMAL
ABO + RH BLD: NORMAL
ALBUMIN SERPL-MCNC: 4.3 G/DL (ref 3.4–5)
ALP SERPL-CCNC: 109 U/L (ref 40–150)
ALT SERPL W P-5'-P-CCNC: 25 U/L (ref 0–70)
ANION GAP SERPL CALCULATED.3IONS-SCNC: 8 MMOL/L (ref 3–14)
APTT PPP: 35 SEC (ref 22–37)
AST SERPL W P-5'-P-CCNC: 17 U/L (ref 0–45)
BASOPHILS # BLD AUTO: 0.1 10E9/L (ref 0–0.2)
BASOPHILS NFR BLD AUTO: 0.7 %
BILIRUB SERPL-MCNC: 0.4 MG/DL (ref 0.2–1.3)
BLD GP AB SCN SERPL QL: NORMAL
BLD PROD TYP BPU: NORMAL
BLOOD BANK CMNT PATIENT-IMP: NORMAL
BUN SERPL-MCNC: 60 MG/DL (ref 7–30)
CALCIUM SERPL-MCNC: 9 MG/DL (ref 8.5–10.1)
CHLORIDE SERPL-SCNC: 100 MMOL/L (ref 94–109)
CHOLEST SERPL-MCNC: 133 MG/DL
CMV IGG SERPL QL IA: >8 AI (ref 0–0.8)
CMV IGM SERPL QL IA: <0.2 AI (ref 0–0.8)
CO2 SERPL-SCNC: 26 MMOL/L (ref 20–32)
CREAT SERPL-MCNC: 10.6 MG/DL (ref 0.66–1.25)
DIFFERENTIAL METHOD BLD: ABNORMAL
EBV VCA IGG SER QL IA: >8 AI (ref 0–0.8)
EBV VCA IGM SER QL IA: <0.2 AI (ref 0–0.8)
EOSINOPHIL # BLD AUTO: 0.3 10E9/L (ref 0–0.7)
EOSINOPHIL NFR BLD AUTO: 3 %
ERYTHROCYTE [DISTWIDTH] IN BLOOD BY AUTOMATED COUNT: 13.2 % (ref 10–15)
GFR SERPL CREATININE-BSD FRML MDRD: 5 ML/MIN/{1.73_M2}
GLUCOSE BLDC GLUCOMTR-MCNC: 157 MG/DL (ref 70–99)
GLUCOSE SERPL-MCNC: 126 MG/DL (ref 70–99)
HBA1C MFR BLD: 5.9 % (ref 0–5.6)
HBV CORE IGM SERPL QL IA: NONREACTIVE
HBV SURFACE AB SERPL IA-ACNC: >1000 M[IU]/ML
HBV SURFACE AG SERPL QL IA: NONREACTIVE
HBV SURFACE AG SERPL QL IA: NONREACTIVE
HCT VFR BLD AUTO: 32.4 % (ref 40–53)
HCV AB SERPL QL IA: NONREACTIVE
HDLC SERPL-MCNC: 62 MG/DL
HGB BLD-MCNC: 10.5 G/DL (ref 13.3–17.7)
HIV 1+2 AB+HIV1 P24 AG SERPL QL IA: NONREACTIVE
IMM GRANULOCYTES # BLD: 0 10E9/L (ref 0–0.4)
IMM GRANULOCYTES NFR BLD: 0.2 %
INR PPP: 0.96 (ref 0.86–1.14)
LABORATORY COMMENT REPORT: NORMAL
LDLC SERPL CALC-MCNC: 49 MG/DL
LYMPHOCYTES # BLD AUTO: 2.5 10E9/L (ref 0.8–5.3)
LYMPHOCYTES NFR BLD AUTO: 28.3 %
MCH RBC QN AUTO: 31.6 PG (ref 26.5–33)
MCHC RBC AUTO-ENTMCNC: 32.4 G/DL (ref 31.5–36.5)
MCV RBC AUTO: 98 FL (ref 78–100)
MONOCYTES # BLD AUTO: 0.8 10E9/L (ref 0–1.3)
MONOCYTES NFR BLD AUTO: 9.1 %
NEUTROPHILS # BLD AUTO: 5.1 10E9/L (ref 1.6–8.3)
NEUTROPHILS NFR BLD AUTO: 58.7 %
NONHDLC SERPL-MCNC: 71 MG/DL
NRBC # BLD AUTO: 0 10*3/UL
NRBC BLD AUTO-RTO: 0 /100
NUM BPU REQUESTED: 2
PLATELET # BLD AUTO: 284 10E9/L (ref 150–450)
POTASSIUM SERPL-SCNC: 4.8 MMOL/L (ref 3.4–5.3)
PROT SERPL-MCNC: 8.2 G/DL (ref 6.8–8.8)
PROT UR-MCNC: 0.86 G/L
PROT/CREAT 24H UR: 2.62 G/G CR (ref 0–0.2)
RBC # BLD AUTO: 3.32 10E12/L (ref 4.4–5.9)
SARS-COV-2 RNA SPEC QL NAA+PROBE: NEGATIVE
SARS-COV-2 RNA SPEC QL NAA+PROBE: NORMAL
SODIUM SERPL-SCNC: 134 MMOL/L (ref 133–144)
SPECIMEN EXP DATE BLD: NORMAL
SPECIMEN SOURCE: NORMAL
SPECIMEN SOURCE: NORMAL
TRIGL SERPL-MCNC: 109 MG/DL
WBC # BLD AUTO: 8.7 10E9/L (ref 4–11)

## 2020-08-31 PROCEDURE — U0003 INFECTIOUS AGENT DETECTION BY NUCLEIC ACID (DNA OR RNA); SEVERE ACUTE RESPIRATORY SYNDROME CORONAVIRUS 2 (SARS-COV-2) (CORONAVIRUS DISEASE [COVID-19]), AMPLIFIED PROBE TECHNIQUE, MAKING USE OF HIGH THROUGHPUT TECHNOLOGIES AS DESCRIBED BY CMS-2020-01-R: HCPCS | Performed by: STUDENT IN AN ORGANIZED HEALTH CARE EDUCATION/TRAINING PROGRAM

## 2020-08-31 PROCEDURE — 87340 HEPATITIS B SURFACE AG IA: CPT | Performed by: INTERNAL MEDICINE

## 2020-08-31 PROCEDURE — 90937 HEMODIALYSIS REPEATED EVAL: CPT

## 2020-08-31 PROCEDURE — 25800030 ZZH RX IP 258 OP 636: Performed by: INTERNAL MEDICINE

## 2020-08-31 PROCEDURE — 25000125 ZZHC RX 250

## 2020-08-31 PROCEDURE — 5A1D70Z PERFORMANCE OF URINARY FILTRATION, INTERMITTENT, LESS THAN 6 HOURS PER DAY: ICD-10-PCS | Performed by: INTERNAL MEDICINE

## 2020-08-31 PROCEDURE — 93005 ELECTROCARDIOGRAM TRACING: CPT

## 2020-08-31 PROCEDURE — 25000132 ZZH RX MED GY IP 250 OP 250 PS 637: Performed by: STUDENT IN AN ORGANIZED HEALTH CARE EDUCATION/TRAINING PROGRAM

## 2020-08-31 PROCEDURE — 00000146 ZZHCL STATISTIC GLUCOSE BY METER IP

## 2020-08-31 PROCEDURE — 71046 X-RAY EXAM CHEST 2 VIEWS: CPT

## 2020-08-31 PROCEDURE — 86706 HEP B SURFACE ANTIBODY: CPT | Performed by: INTERNAL MEDICINE

## 2020-08-31 PROCEDURE — 40000556 ZZH STATISTIC PERIPHERAL IV START W US GUIDANCE

## 2020-08-31 PROCEDURE — 84156 ASSAY OF PROTEIN URINE: CPT | Performed by: STUDENT IN AN ORGANIZED HEALTH CARE EDUCATION/TRAINING PROGRAM

## 2020-08-31 RX ORDER — HYDRALAZINE HYDROCHLORIDE 50 MG/1
100 TABLET, FILM COATED ORAL 3 TIMES DAILY
Status: DISCONTINUED | OUTPATIENT
Start: 2020-08-31 | End: 2020-08-31 | Stop reason: HOSPADM

## 2020-08-31 RX ORDER — AMLODIPINE BESYLATE 10 MG/1
10 TABLET ORAL DAILY
COMMUNITY
End: 2021-07-08

## 2020-08-31 RX ORDER — ONDANSETRON 4 MG/1
4 TABLET, ORALLY DISINTEGRATING ORAL EVERY 8 HOURS PRN
COMMUNITY
End: 2021-08-12

## 2020-08-31 RX ORDER — LABETALOL 20 MG/4 ML (5 MG/ML) INTRAVENOUS SYRINGE
20 EVERY 4 HOURS PRN
Status: DISCONTINUED | OUTPATIENT
Start: 2020-08-31 | End: 2020-08-31

## 2020-08-31 RX ORDER — HYDRALAZINE HYDROCHLORIDE 20 MG/ML
5 INJECTION INTRAMUSCULAR; INTRAVENOUS ONCE
Status: DISCONTINUED | OUTPATIENT
Start: 2020-08-31 | End: 2020-08-31 | Stop reason: HOSPADM

## 2020-08-31 RX ORDER — CALCIUM ACETATE 667 MG/1
1334 CAPSULE ORAL
Status: ON HOLD | COMMUNITY
End: 2021-06-21

## 2020-08-31 RX ORDER — LIDOCAINE HYDROCHLORIDE 10 MG/ML
INJECTION, SOLUTION EPIDURAL; INFILTRATION; INTRACAUDAL; PERINEURAL
Status: COMPLETED
Start: 2020-08-31 | End: 2020-08-31

## 2020-08-31 RX ORDER — HYDRALAZINE HYDROCHLORIDE 20 MG/ML
5 INJECTION INTRAMUSCULAR; INTRAVENOUS ONCE
Status: DISCONTINUED | OUTPATIENT
Start: 2020-08-31 | End: 2020-08-31

## 2020-08-31 RX ADMIN — HYDRALAZINE HYDROCHLORIDE 100 MG: 50 TABLET, FILM COATED ORAL at 08:23

## 2020-08-31 RX ADMIN — Medication: at 02:30

## 2020-08-31 RX ADMIN — SODIUM CHLORIDE 250 ML: 9 INJECTION, SOLUTION INTRAVENOUS at 02:30

## 2020-08-31 RX ADMIN — LIDOCAINE HYDROCHLORIDE 0.5 ML: 10 INJECTION, SOLUTION EPIDURAL; INFILTRATION; INTRACAUDAL; PERINEURAL at 02:25

## 2020-08-31 RX ADMIN — SODIUM CHLORIDE 300 ML: 9 INJECTION, SOLUTION INTRAVENOUS at 02:30

## 2020-08-31 ASSESSMENT — ACTIVITIES OF DAILY LIVING (ADL)
ADLS_ACUITY_SCORE: 17
RETIRED_COMMUNICATION: 0-->UNDERSTANDS/COMMUNICATES WITHOUT DIFFICULTY
TRANSFERRING: 0-->INDEPENDENT
AMBULATION: 0-->INDEPENDENT
RETIRED_EATING: 0-->INDEPENDENT
TOILETING: 0-->INDEPENDENT
FALL_HISTORY_WITHIN_LAST_SIX_MONTHS: NO
SWALLOWING: 0-->SWALLOWS FOODS/LIQUIDS WITHOUT DIFFICULTY
DRESS: 0-->INDEPENDENT
ADLS_ACUITY_SCORE: 11
COGNITION: 0 - NO COGNITION ISSUES REPORTED
BATHING: 0-->INDEPENDENT

## 2020-08-31 ASSESSMENT — MIFFLIN-ST. JEOR: SCORE: 1701.88

## 2020-08-31 NOTE — PROGRESS NOTES
HEMODIALYSIS TREATMENT NOTE    Date: 8/31/2020  Time: 3:00 AM    Data:  Pre Wt: 85.5 kg (standing scale)  Desired Wt: 84 kg   Post Wt:  84.5 kg (estimated)  Weight change: 1 kg  Ultrafiltration - Post Run Net Total Removed (mL):  1000 mls  Vascular Access Status: Fistula (left forearm) / patent  Dialyzer Rinse: light, streaked  Total Blood Volume Processed: 57.9 L   Total Dialysis (Treatment) Time:  3 hours  Dialysate Bath: K 2, Ca 2.5, Na 138, Bicarb 32  Heparin: None    Lab:   Yes - Hep B Surface Antigen & Antibody (per protocol)    Interventions:  AVF cannulated with 16 gauge needles / 350 BFR  Lidocaine administered prior to cannulation per patient preference  UF goal decreased 2/2 drop in SBP    @ 0410  1 L of fluid removed without complications  Pressure applied to cannulation sites post-treatment until hemostasis achieved    Assessment:  HD run for ESRD patient admitted for SPK. Alert and oriented x 4. Ambulating independently. Pleasant and cooperative with cares. Still makes urine every few hours. Voiced nervousness/excitement regarding transplant. Regularly dialyzes MWF at [facility] in [place], [place].      Plan:    Continue with plan of care. Further RRT per renal team.

## 2020-08-31 NOTE — TELEPHONE ENCOUNTER
"TRANSPLANT OR REPORT    Organ: SPK  Laterality (if known): TBD  Organ Location: Local    UNOS ID: [id number]   Donor OR Time: 0500  Expected/Actual Cross Clamp Time: 0700  Expected Organ Arrival Time: 0900    Surgeon: Gary  Time in OR: 0900  Time in 3C (N/A for LI): 0800    Recipient Details  Admission ETA: 2300  Unit: 7A  Isolation: No  Latex Allergy: No  : No  Diagnosis: ESRD/DM    Liver Transplants  Bypass: N/A  Hemodialysis: N/A  ~ \"RENAL STAFF TEACHING SERVICE MEDICINE\" : N/A  ~ CRRT Resource Nurse: N/A  (Telephone Number for CRRT [phone])    Kidney/Panc Transplants  XM Status (Need to wait for XM?): Yes    Liver or KP/PA Recipients:  Can Vessels be Banked: Yes      Transplant Coordinator Contact Info: Porsha Angelo2      Vessel Bank Information  Transplant hospitals must not store a donor s extra vessels if the donor has tested positive for any of the following:   - HIV by antibody, antigen, or nucleic acid test (YISSEL)   - Hepatitis B surface antigen (HBsAg)   - Hepatitis B (HBV) by YISSEL   - Hepatitis C (HCV) by antibody or YISSEL     Extra vessels from donors that do not test positive for HIV, HBV, or HCV as above may be stored      "

## 2020-08-31 NOTE — CONSULTS
Nephrology Initial Consult  August 31, 2020      Erik Cramer MRN:[id number] YOB: 1980  Date of Admission:8/30/2020  Primary care provider: Figueroa Washington  Requesting physician: Julio Cesar Vega*    ASSESSMENT AND RECOMMENDATIONS:     Erik Cramer is a 40 year old male with ESRD on HD , T2DM for 18 yrs  With h/o retinopathy,, HTN, Obesity, bilateral shoulder pain, and atypical nevi . Patient admitted for SPK         ESRD on HD   Hypertensive urgency   Euvolemic   -- Will dialyze him now for 3 hours  With 1  litres  UF   -- He does not know his EDW .  Today his weight is 86.6   -- he is compliant with his AntiHTN meds   Per patient his regimen includes   Losartan 100 mg daily - hold perioperatively   Clonidine 0.2 mg BID - Hold perioperatively  Lasix 80 mg PO BID  - hold scheduled dose , but can continue PRN    Amlodipine 5 mg daily - increase dose to 10 mg daily   Hydralazine 100 mg TID - Continue   Start Labetalol prn  Every 4 hours for SBP > 160     --Patient needs Renal Replacement therapy ( RRT )   Indications of Renal replacement therapy ( RRT) , its possible complications including but not limited to infections, bleeding, fatal arrhythmias ,death explained to patient . Patient verbalized understanding and agreeable to proceed with RRT. Patient signed written consent  - placed in chart and  Image attached to this note       Anemia : Hb 10.5  Electrolytes - no acute issues  Acid/Base: no acute issues   BMD - he is on calcium carbonate, Vit D3    Recommendations were communicated to primary team via note and verbally  Patient Staffed with on call attending  Segundo Medina MD, FACP  Nephrology Fellow   [facility]   Pager [phone]          REASON FOR CONSULT:  ESRD on HD - needs urgent HD prior to DDKT     HISTORY OF PRESENT ILLNESS:  Admitting provider and nursing notes reviewed  Erik Cramer is a 40 year old male with ESRD on HD , T2DM for 18 yrs  With  h/o retinopathy,, HTN, Obesity, bilateral shoulder pain, and atypical nevi . Patient admitted for  SPK  Nephrology team has been consulted for urgent HD prior to SPK which is scheduled in AM  Around 9 AM   Denies any CP/SOB/Cough/abdomen pain/nausea/vomiting.  Still males urine  . No dysuria/hematuria.  No confusion/headache/focal weakness        PAST MEDICAL HISTORY:  Reviewed with patient on 08/31/2020     Past Medical History:   Diagnosis Date     Anemia in chronic kidney disease      Chronic kidney disease, stage 5 (H)      Hypertension 2018     Type 2 diabetes mellitus (H)      Vitamin D deficiency        Past Surgical History:   Procedure Laterality Date     ESOPHAGOSCOPY, GASTROSCOPY, DUODENOSCOPY (EGD), COMBINED N/A 12/3/2019    Procedure: ESOPHAGOGASTRODUODENOSCOPY, WITH BIOPSY;  Surgeon: Guy Bar MD;  Location: [facility]     EYE SURGERY Left 2015    retinal detachment     LAPAROSCOPIC CHOLECYSTECTOMY N/A 12/10/2019    Procedure: CHOLECYSTECTOMY, LAPAROSCOPIC;  Surgeon: Zoie Gray MD;  Location: [facility] OR     ORTHOPEDIC SURGERY Left     Trigger finger         MEDICATIONS:  PTA Meds  Prior to Admission medications    Medication Sig Last Dose Taking? Auth Provider   Alcohol Sheets (ALCOH-WIPE) SHEE Apply 1 each topically   Reported, Patient   amLODIPine (NORVASC) 10 MG tablet Take 10 mg by mouth   Reported, Patient   atorvastatin (LIPITOR) 40 MG tablet Take 80 mg by mouth daily    Reported, Patient   blood glucose (ACCU-CHEK GUIDE) test strip 1 strip   Reported, Patient   blood glucose monitoring (ACCU-CHEK FASTCLIX) lancets Testing blood sugars 2 time(s) a day.  Indications: Type 2 Diabetes   Reported, Patient   calcium carbonate (OS-SHAY) 1500 (600 Ca) MG tablet 600 mg   Reported, Patient   calcium carbonate (TUMS) 500 MG chewable tablet Take 1,000 mg by mouth Pt says he takes regular calcium-not TUMS chewables   Reported, Patient   cloNIDine (CATAPRES) 0.1 MG tablet Take 0.1 mg by mouth 2 times  daily   Reported, Patient   emollient (VANICREAM) external cream Apply topically as needed for other (apply to abdomen and directed)   Leilani Vincent, NP   furosemide (LASIX) 20 MG tablet Take 2 tablets (40 mg) by mouth 2 times daily  Patient taking differently: Take 20 mg by mouth 2 times daily    Oly Martinez MD   hydrALAZINE (APRESOLINE) 25 MG tablet 100 mg 3 times daily    Reported, Patient   HYDROcodone-acetaminophen (NORCO) 5-325 MG tablet Take 1-2 tablets by mouth every 6 hours as needed for moderate to severe pain   Edmar Jacobo MD   insulin pen needle (BD RAÚL U/F) 32G X 4 MM miscellaneous use as directed with lantus pen once a day   Reported, Patient   LANTUS SOLOSTAR 100 UNIT/ML soln 20 Units At Bedtime    Reported, Patient   losartan (COZAAR) 100 MG tablet Take 100 mg by mouth daily   Reported, Patient   multivitamin (OCUVITE) TABS tablet Take 2 tablets by mouth daily   Reported, Patient   senna-docusate (SENOKOT-S/PERICOLACE) 8.6-50 MG tablet Take 1-2 tablets by mouth 2 times daily   Edmar Jacobo MD   vitamin D3 (CHOLECALCIFEROL) 2000 units (50 mcg) tablet Take 1 tablet (2,000 Units) by mouth daily   Oly Martinez MD      Current Meds    acetaminophen  975 mg Oral Once     anti-thymocyte globulin  2 mg/kg (Dosing Weight) Intravenous Once     anti-thymocyte globulin  2 mg/kg (Dosing Weight) Intravenous Once     cefuroxime (ZINACEF) IV  1.5 g Intravenous Once     methylPREDNISolone  500 mg Intravenous Once     methylPREDNISolone  500 mg Intravenous Once     mycophenolate mofetil intermittent infusion 1000 mg  1,000 mg Intravenous Once     mycophenolate mofetil intermittent infusion 1000 mg  1,000 mg Intravenous Once     Infusion Meds      ALLERGIES:    Allergies   Allergen Reactions     Metoprolol Anaphylaxis       REVIEW OF SYSTEMS:  A comprehensive of systems was negative except as noted above.    SOCIAL HISTORY:   Social History     Socioeconomic History      "Marital status:      Spouse name: Not on file     Number of children: Not on file     Years of education: Not on file     Highest education level: Not on file   Occupational History     Not on file   Social Needs     Financial resource strain: Not on file     Food insecurity     Worry: Not on file     Inability: Not on file     Transportation needs     Medical: Not on file     Non-medical: Not on file   Tobacco Use     Smoking status: Never Smoker     Smokeless tobacco: Never Used   Substance and Sexual Activity     Alcohol use: Yes     Frequency: Never     Comment: Social     Drug use: Never     Sexual activity: Not on file   Lifestyle     Physical activity     Days per week: Not on file     Minutes per session: Not on file     Stress: Not on file   Relationships     Social connections     Talks on phone: Not on file     Gets together: Not on file     Attends Sabianist service: Not on file     Active member of club or organization: Not on file     Attends meetings of clubs or organizations: Not on file     Relationship status: Not on file     Intimate partner violence     Fear of current or ex partner: Not on file     Emotionally abused: Not on file     Physically abused: Not on file     Forced sexual activity: Not on file   Other Topics Concern     Parent/sibling w/ CABG, MI or angioplasty before 65F 55M? Not Asked   Social History Narrative     Not on file     Reviewed with patient     FAMILY MEDICAL HISTORY:   Family History   Problem Relation Age of Onset     Breast Cancer Mother      Diabetes Brother      Reviewed with patient     PHYSICAL EXAM:   Temp  Av.1  F (36.7  C)  Min: 98.1  F (36.7  C)  Max: 98.1  F (36.7  C)      Pulse  Av  Min: 78  Max: 78 Resp  Av  Min: 20  Max: 20  SpO2  Av %  Min: 99 %  Max: 99 %       BP (!) 198/92 (BP Location: Right arm)   Pulse 78   Temp 98.1  F (36.7  C) (Oral)   Resp 20   Ht 1.651 m (5' 5\")   Wt 86.6 kg (191 lb)   SpO2 99%   BMI 31.78 kg/m  "       Admit Weight: 86.6 kg (191 lb)     GENERAL APPEARANCE: no distress, he is  awake  EYES: no scleral icterus, pupils equal  Endo: no goiter, no moon facies  Lymphatics: no cervical or supraclavicular LAD  Pulmonary: lungs clear to auscultation with equal breath sounds bilaterally,no clubbing  CV: regular rhythm, normal rate, no rub   - JVD none   - Edema none  GI: soft, nontender, normal bowel sounds  MS: no evidence of inflammation in joints, no muscle tenderness  :no edwards  SKIN: no rash, warm, dry, no cyanosis  NEURO: face symmetric, no asterixis   ACCESS : left AVF - good bruit     LABS:   CMP  Recent Labs   Lab 08/30/20  2338      POTASSIUM 4.8   CHLORIDE 100   CO2 26   ANIONGAP 8   *   BUN 60*   CR 10.60*   GFRESTIMATED 5*   GFRESTBLACK 6*   SHAY 9.0   PROTTOTAL 8.2   ALBUMIN 4.3   BILITOTAL 0.4   ALKPHOS 109   AST 17   ALT 25     CBC  Recent Labs   Lab 08/30/20  2338   HGB 10.5*   WBC 8.7   RBC 3.32*   HCT 32.4*   MCV 98   MCH 31.6   MCHC 32.4   RDW 13.2        INR  Recent Labs   Lab 08/30/20  2338   INR 0.96   PTT 35     ABGNo lab results found in last 7 days.   URINE STUDIES  Recent Labs   Lab Test 07/29/19  1240 07/18/19  1200   COLOR Straw Yellow   APPEARANCE Clear Clear   URINEGLC 50* 50*   URINEBILI Negative Negative   URINEKETONE Negative Negative   SG 1.010 1.011   UBLD Negative Negative   URINEPH 5.0 6.0   PROTEIN >499* >499*   NITRITE Negative Negative   LEUKEST Negative Negative   RBCU 5* 9*   WBCU 3 5     Recent Labs   Lab Test 07/18/19  1200   UTPG 6.20*     PTH  No lab results found.  IRON STUDIES  No lab results found.    IMAGING:  All imaging studies reviewed by me.     Himanshu Raymond MD

## 2020-08-31 NOTE — H&P
[facility]    Transplant Surgery  History and Physical    Erik Cramer  : 1980  MRN # [id number]    ADMIT DATE: 2020    PCP: Figueroa Washington    Assessment and Plan: Erik Cramer is a 40 year old male with a past medical history significant for end stage kidney disease Other past medical history includes HD, T2DM, HTN, Obesity, bilateral shoulder pain, and atypical nevi with AV fistula.  Past surgical history of lap moise () for symptomatic biliary dyskinesia. Patient was notified as an acceptable  donor organ became available and presented for further pre-operative work-up.  Patient was informed of the risks and benefits regarding  donor kidney pancreas transplantation, and has elected to proceed.    - [place] to outpatient for pre-op work-up  - Pre-op labs, including BMP, CBC, coag panel, viral serologies  - Immunosuppression therapy tonight   - Solmedrol    - Mycophenalate   - Ant-thymoglobulin  - EKG, CXR  - Nephrology consult placed, and spoken to    Stefano Piedra MD MONTEZ  Department of Surgery  [facility]    Attestation: I did NOT see the patient during this admission. Graft quality was not sufficient for transplant. The case was aborted and the patient discharged home.   -Julio Cesar Vega MD  -------------------------------------------------------------    Chief Complaint   ESRD     History is obtained from the patient     History of Present Illness  Erik Cramer is a 40 year old male with a past medical history significant for end stage kidney disease      The patient is on dialysis.  Modality: hemodialysis    H/o blood transfusion: no  No history of Chronic anticoagulation, etc  Other past medical history includes HD, T2DM, HTN, Obesity, bilateral shoulder pain, and atypical nevi with AV fistula.      At the time of admission, the patient did not show any signs of infection and was at his baseline in good  health. He is COVID negative, and understands the risks/benefits associated with this transplantation. He is agreeable with the plan and has elected to go forward with pancrease kidney transplantation.    PMH:  Past Medical History:   Diagnosis Date     Anemia in chronic kidney disease      Chronic kidney disease, stage 5 (H)      Hypertension 2018     Type 2 diabetes mellitus (H)      Vitamin D deficiency        PSH:  Past Surgical History:   Procedure Laterality Date     ESOPHAGOSCOPY, GASTROSCOPY, DUODENOSCOPY (EGD), COMBINED N/A 12/3/2019    Procedure: ESOPHAGOGASTRODUODENOSCOPY, WITH BIOPSY;  Surgeon: Guy Bar MD;  Location: [facility] OR     EYE SURGERY Left 2015    retinal detachment     LAPAROSCOPIC CHOLECYSTECTOMY N/A 12/10/2019    Procedure: CHOLECYSTECTOMY, LAPAROSCOPIC;  Surgeon: Zoie Gray MD;  Location: [facility] OR     ORTHOPEDIC SURGERY Left     Trigger finger        MEDICATIONS:  Cannot display prior to admission medications because the patient has not been admitted in this contact.        ALLERGIES:     Allergies   Allergen Reactions     Metoprolol Anaphylaxis       FAMILY HISTORY:  Family History   Problem Relation Age of Onset     Breast Cancer Mother      Diabetes Brother        SOCIAL HISTORY:  Social History     Socioeconomic History     Marital status:      Spouse name: Not on file     Number of children: Not on file     Years of education: Not on file     Highest education level: Not on file   Occupational History     Not on file   Social Needs     Financial resource strain: Not on file     Food insecurity     Worry: Not on file     Inability: Not on file     Transportation needs     Medical: Not on file     Non-medical: Not on file   Tobacco Use     Smoking status: Never Smoker     Smokeless tobacco: Never Used   Substance and Sexual Activity     Alcohol use: Yes     Frequency: Never     Comment: Social     Drug use: Never     Sexual activity: Not on file   Lifestyle      "Physical activity     Days per week: Not on file     Minutes per session: Not on file     Stress: Not on file   Relationships     Social connections     Talks on phone: Not on file     Gets together: Not on file     Attends [employer] service: Not on file     Active member of club or organization: Not on file     Attends meetings of clubs or organizations: Not on file     Relationship status: Not on file     Intimate partner violence     Fear of current or ex partner: Not on file     Emotionally abused: Not on file     Physically abused: Not on file     Forced sexual activity: Not on file   Other Topics Concern     Parent/sibling w/ CABG, MI or angioplasty before 65F 55M? Not Asked   Social History Narrative     Not on file       PHYSICAL EXAM:  Blood pressure (!) 198/92, pulse 78, temperature 98.1  F (36.7  C), temperature source Oral, resp. rate 20, height 1.651 m (5' 5\"), weight 86.6 kg (191 lb), SpO2 99 %.  Constitutional: awake, alert, cooperative, no apparent distress, and appears stated age  Respiratory: No increased work of breathing, good air exchange, clear to auscultation bilaterally, no crackles or wheezing  Cardiovascular: Normal apical impulse, regular rate and rhythm, normal S1 and S2, no S3 or S4, and no murmur noted  GI: No scars, normal bowel sounds, soft, non-distended, non-tender, no masses palpated, no hepatosplenomegally  Skin: normal skin color, texture, turgor and no redness, warmth, or swelling  Musculoskeletal: 2+ pitting edema bilaterally, moves all 4 extremities without difficulty  Neurologic: Awake, alert, oriented to name, place and time.  Cranial nerves II-XII are grossly intact.  Motor is 5 out of 5 bilaterally.  Cerebellar finger to nose, heel to shin intact.  Sensory is intact.  Babinski down going, Romberg negative, and gait is normal.  Neuropsychiatric: appropriate mood and affect, cognitively normal  Extremities: Notable thrill apparent on LUE, distal pulses b/l UE and " LE    LABS:  CBC:  Recent Labs   Lab Test 12/10/19  1111 07/29/19  1252   WBC  --  9.5   RBC  --  3.36*   HGB 13.3 9.9*   HCT  --  30.2*   MCV  --  90   MCH  --  29.5   MCHC  --  32.8   RDW  --  12.3   PLT  --  312       CMP:  Recent Labs   Lab Test 12/10/19  1111 07/29/19  1252   NA  --  136   POTASSIUM 4.8 4.0   CHLORIDE  --  106   SHAY  --  7.8*   CO2  --  24   BUN  --  101*   CR 7.70* 7.79*   GLC  --  138*   AST  --  30   ALT  --  29   BILITOTAL  --  0.3   ALBUMIN  --  3.8   PROTTOTAL  --  8.1   ALKPHOS  --  176*

## 2020-08-31 NOTE — TELEPHONE ENCOUNTER
Reviewed with Saniya that since our transplant center was offered combined kidney/pancreas, we need to accept the organs as a pair; if there's an issue with one of the organs we cannot decline one organ and accept the other.  This is part of the regulations we follow as a transplant hospital. I also reviewed the average regional wait times for kidney and kidney/pancreas for patients who are blood type B.  Also noted if Saniya and Tye can review [employer], all of the teaching modules for kidney and kidney/pancreas that were reviewed in transplant class can be found on this website for review.  Noted if Tye isn't transplanted by October 2020, he will be due for return waitlist appointments with transplant surgery, social work, and cardiology, all of which may be schedule as virtual visits.  Explained I will send scheduling orders to our scheduling team next month for these appointments and then one of our schedulers will contact Tye by phone or through [employer] to schedule.  Encouraged Saniya to call me should she have further questions.  Saniya thanked me for the call.

## 2020-08-31 NOTE — PLAN OF CARE
Pt was admitted as pre-op Kidney/pancreas transplant and transplant was not performed due to organs quality.   Discharge instruction reviewed with pt. PIV removed. Pt is waiting for his wife to come and pick him up.  BP rechecked 171/89, per pt his BP gets that high at times, he had scheduled Hydralazine and he is going to take BP meds when he gets home

## 2020-08-31 NOTE — PHARMACY-ADMISSION MEDICATION HISTORY
Admission Medication History Completed by Pharmacy    See [facility] Admission Navigator for allergy information, preferred outpatient pharmacy, prior to admission medications and immunization status.     Medication History Sources:     Patient, Sure Scripts    Changes made to PTA medication list (reason):    Added: Calcium acetate, ondansetron, vit B complex, vit E, Ocuvite    Deleted: Norco    Changed:   o Added route and frequency to Os-Geoffrey  o Tums 2 tab -> 1 tab prn heartburn  o Clonidine 0.1 mg BID -> 0.2 mg BID  o Furosemide 20 mg BID -> 80 mg BID    Additional Information:    Patient was a good historian and knew doses and directions for all of his medications.    Prior to Admission medications    Medication Sig Last Dose Taking? Auth Provider   amLODIPine (NORVASC) 10 MG tablet Take 10 mg by mouth daily 8/30/2020 at Unknown time Yes Unknown, Entered By History   atorvastatin (LIPITOR) 80 MG tablet Take 80 mg by mouth daily  8/30/2020 at Unknown time Yes Reported, Patient   calcium acetate (PHOSLO) 667 MG CAPS capsule Take 1,334 mg by mouth 3 times daily (with meals) 8/30/2020 at Unknown time Yes Unknown, Entered By History   calcium carbonate (OS-GEOFFREY) 1500 (600 Ca) MG tablet Take 600 mg by mouth 3 times daily  8/30/2020 at Unknown time Yes Reported, Patient   cloNIDine (CATAPRES) 0.1 MG tablet Take 0.2 mg by mouth 2 times daily  8/30/2020 at Unknown time Yes Reported, Patient   furosemide (LASIX) 20 MG tablet Take 2 tablets (40 mg) by mouth 2 times daily  Patient taking differently: Take 80 mg by mouth 2 times daily  8/30/2020 at Unknown time Yes Oly Martinez MD   hydrALAZINE (APRESOLINE) 25 MG tablet Take 100 mg by mouth 3 times daily  8/30/2020 at Unknown time Yes Reported, Patient   LANTUS SOLOSTAR 100 UNIT/ML soln Inject 20 Units Subcutaneous At Bedtime  8/30/2020 at Unknown time Yes Reported, Patient   losartan (COZAAR) 100 MG tablet Take 100 mg by mouth daily 8/30/2020 at Unknown time Yes  Reported, Patient   Multiple Vitamins-Minerals (OCUVITE PO) Take 2 tablets by mouth daily 8/30/2020 at Unknown time Yes Unknown, Entered By History   ondansetron (ZOFRAN-ODT) 4 MG ODT tab Take 4 mg by mouth every 8 hours as needed for nausea Past Month at Unknown time Yes Unknown, Entered By History   vitamin B-Complex Take 1 tablet by mouth daily 8/30/2020 at Unknown time Yes Unknown, Entered By History   vitamin D3 (CHOLECALCIFEROL) 2000 units (50 mcg) tablet Take 1 tablet (2,000 Units) by mouth daily 8/30/2020 at Unknown time Yes Oly Martinez MD   VITAMIN E PO Take 1 tablet by mouth daily 8/30/2020 at Unknown time Yes Unknown, Entered By History   blood glucose (ACCU-CHEK GUIDE) test strip 1 strip   Reported, Patient   blood glucose monitoring (ACCU-CHEK FASTCLIX) lancets Testing blood sugars 2 time(s) a day.  Indications: Type 2 Diabetes   Reported, Patient   calcium carbonate (TUMS) 500 MG chewable tablet Take 1 chew tab by mouth as needed for heartburn  More than a month at Unknown time  Reported, Patient   insulin pen needle (BD RAÚL U/F) 32G X 4 MM miscellaneous use as directed with lantus pen once a day   Reported, Patient       Date completed: 08/31/20    Medication history completed by: Coral Tim, Pharmacy Resident

## 2020-08-31 NOTE — DISCHARGE SUMMARY
[facility] Discharge Summary    Erik Cramer MRN# [id number]   Age: 40 year old YOB: 1980     Date of Admission:  8/30/2020  Date of Discharge::  8/31/2020  Admitting Physician:  Julio Cesar Vega MD  Discharge Physician:  Monica Pérez PA-C/Arik Syed MD         Admission Diagnoses:   End stage renal disease (H) [N18.6]          Discharge Diagnosis:   Active Problems:    Kidney transplant candidate         Procedures:   No procedures performed during this admission          Medications Prior to Admission:     Medications Prior to Admission   Medication Sig Dispense Refill Last Dose     amLODIPine (NORVASC) 10 MG tablet Take 10 mg by mouth daily   8/30/2020 at Unknown time     atorvastatin (LIPITOR) 80 MG tablet Take 80 mg by mouth daily   2 8/30/2020 at Unknown time     calcium acetate (PHOSLO) 667 MG CAPS capsule Take 1,334 mg by mouth 3 times daily (with meals)   8/30/2020 at Unknown time     calcium carbonate (OS-SHAY) 1500 (600 Ca) MG tablet Take 600 mg by mouth 3 times daily   12 8/30/2020 at Unknown time     cloNIDine (CATAPRES) 0.1 MG tablet Take 0.2 mg by mouth 2 times daily    8/30/2020 at Unknown time     furosemide (LASIX) 20 MG tablet Take 2 tablets (40 mg) by mouth 2 times daily (Patient taking differently: Take 80 mg by mouth 2 times daily ) 60 tablet 1 8/30/2020 at Unknown time     hydrALAZINE (APRESOLINE) 25 MG tablet Take 100 mg by mouth 3 times daily   0 8/30/2020 at Unknown time     LANTUS SOLOSTAR 100 UNIT/ML soln Inject 20 Units Subcutaneous At Bedtime   2 8/30/2020 at Unknown time     losartan (COZAAR) 100 MG tablet Take 100 mg by mouth daily   8/30/2020 at Unknown time     Multiple Vitamins-Minerals (OCUVITE PO) Take 2 tablets by mouth daily   8/30/2020 at Unknown time     ondansetron (ZOFRAN-ODT) 4 MG ODT tab Take 4 mg by mouth every 8 hours as needed for nausea   Past Month at Unknown time     vitamin B-Complex Take 1 tablet by mouth daily    8/30/2020 at Unknown time     vitamin D3 (CHOLECALCIFEROL) 2000 units (50 mcg) tablet Take 1 tablet (2,000 Units) by mouth daily 90 tablet 0 8/30/2020 at Unknown time     VITAMIN E PO Take 1 tablet by mouth daily   8/30/2020 at Unknown time     blood glucose (ACCU-CHEK GUIDE) test strip 1 strip        blood glucose monitoring (ACCU-CHEK FASTCLIX) lancets Testing blood sugars 2 time(s) a day.  Indications: Type 2 Diabetes        calcium carbonate (TUMS) 500 MG chewable tablet Take 1 chew tab by mouth as needed for heartburn    More than a month at Unknown time     insulin pen needle (BD RAÚL U/F) 32G X 4 MM miscellaneous use as directed with lantus pen once a day                Discharge Medications:     Current Discharge Medication List      CONTINUE these medications which have NOT CHANGED    Details   amLODIPine (NORVASC) 10 MG tablet Take 10 mg by mouth daily      atorvastatin (LIPITOR) 80 MG tablet Take 80 mg by mouth daily   Refills: 2      calcium acetate (PHOSLO) 667 MG CAPS capsule Take 1,334 mg by mouth 3 times daily (with meals)      calcium carbonate (OS-SHAY) 1500 (600 Ca) MG tablet Take 600 mg by mouth 3 times daily   Refills: 12      cloNIDine (CATAPRES) 0.1 MG tablet Take 0.2 mg by mouth 2 times daily       furosemide (LASIX) 20 MG tablet Take 2 tablets (40 mg) by mouth 2 times daily  Qty: 60 tablet, Refills: 1    Associated Diagnoses: CKD (chronic kidney disease) stage 5, GFR less than 15 ml/min (H)      hydrALAZINE (APRESOLINE) 25 MG tablet Take 100 mg by mouth 3 times daily   Refills: 0      LANTUS SOLOSTAR 100 UNIT/ML soln Inject 20 Units Subcutaneous At Bedtime   Refills: 2      losartan (COZAAR) 100 MG tablet Take 100 mg by mouth daily      Multiple Vitamins-Minerals (OCUVITE PO) Take 2 tablets by mouth daily      ondansetron (ZOFRAN-ODT) 4 MG ODT tab Take 4 mg by mouth every 8 hours as needed for nausea      vitamin B-Complex Take 1 tablet by mouth daily      vitamin D3 (CHOLECALCIFEROL) 2000  units (50 mcg) tablet Take 1 tablet (2,000 Units) by mouth daily  Qty: 90 tablet, Refills: 0    Associated Diagnoses: Vitamin deficiency      VITAMIN E PO Take 1 tablet by mouth daily      blood glucose (ACCU-CHEK GUIDE) test strip 1 strip      blood glucose monitoring (ACCU-CHEK FASTCLIX) lancets Testing blood sugars 2 time(s) a day.  Indications: Type 2 Diabetes      calcium carbonate (TUMS) 500 MG chewable tablet Take 1 chew tab by mouth as needed for heartburn       insulin pen needle (BD RAÚL U/F) 32G X 4 MM miscellaneous use as directed with lantus pen once a day                   Consultations:   Consultation during this admission received from nephrology          Brief History of Illness:   40 year old male with a past medical history significant for end stage kidney disease. Other past medical history includes HD, T2DM, HTN, Obesity, bilateral shoulder pain, and atypical nevi with AV fistula.  Past surgical history of lap moise () for symptomatic biliary dyskinesia. Patient was notified as an acceptable  donor organ became available and presented for further pre-operative work-up.            Hospital Course:   Nephrology was consulted and HD run was completed on admission. Pancreas was found to be poor quality and patient was discharged home.     PE:   Constitutional: awake, alert, cooperative, no apparent distress, and appears stated age  Respiratory: No increased work of breathing, good air exchange, clear to auscultation bilaterally, no crackles or wheezing  Cardiovascular: Regular rate and rhythm  GI: No scars, normal bowel sounds, soft, non-distended, non-tender, no masses palpated, no hepatosplenomegally  Skin: normal skin color, texture, turgor and no redness, warmth, or swelling  Musculoskeletal: 1+ pitting edema bilaterally  Neurologic: Awake, alert, oriented to name, place and time. Gait is normal.  Neuropsychiatric: appropriate mood and affect  Extremities: Notable thrill apparent on  ROSA          Discharge Instructions and Follow-Up:   Discharge diet: Diabetic/renal   Discharge activity: Activity as tolerated   Discharge follow-up: Resume previous lab/appointment and dialysis schedule.            Discharge Disposition:   Discharged to home      Monica Pérez PA-C                        I have reviewed history, examined patient and discussed plan with the fellow/resident/ADI.    I concur with the findings in this note.    Time spent on discharge activities: 45 minutes.

## 2020-08-31 NOTE — TELEPHONE ENCOUNTER
"Patient asks why he wasn't offered the kidney, since the pancreas \"was bad.\"  Reviewed when our transplant center is offered the organs as a pair/together, we need to accept them as a pair; we cannot just take the kidney per regulatory rules that all transplant centers follow.  Patient reports his wife was very upset and doesn't understand why he couldn't have gotten the kidney alone.  Per his request, I will contact her to explain. Confirmed patient is listed for both kidney alone and simultaneous kidney/pancreas, active status, which means he is eligible to receive organ offer calls.   "

## 2020-08-31 NOTE — PLAN OF CARE
"Admitted Erik \"ANAY\" Bela on August 30, 2020 at 2345. Patient is expected to undergo a kidney/pancreas transplant on August 31, 2020 with an OR time of 0900. Author of note began pre-operative admission process including orientation to care setting, pre-op education & pre-op workup.     7A Transplant Pre-Op Checklist (RN responsibilities):       Release signed & held orders: Done.    Verify ID/Allergy/Falls/Limb Restriction bands in place: Done.     COVID-19 swab: Done - negative.     VRE swab: N/A    STAT Orders  o Labs: Done.  o EKG: Done - in chart.   o CXR: Done.  o UA/UC: Need sample - patient is aware, hat is in toilet; pt still makes urine.   o HCG: N/A    Pre-op Shower:   o X1: done  o X2: done    PIV placed: Done - RPIV saline locked.    Survival Rates: Done.     Insulin drip initiated: N/A    [facility] account: Not completed.     [facility] videos:  Not completed.     Contact Pharmacy: Done    Adult Profile: Done    Adult PCS: Not completed.     Epic Pre-Op Checklist: Started - day RN please complete.     [facility] Pre-Op Education Charting: Not completed.     Additional Pre-OP Requirements     Bedside NST tasks: Done.     Desk NST tasks: Done.     Charge RN tasks: Done.     Notes: Patient dialyzed this AM from apprx [phone].   "

## 2020-08-31 NOTE — TELEPHONE ENCOUNTER
Organ Offer Encounter Information    Organ Offer Information  Organ offer date & time:  8/30/2020  4:18 PM  Coordinator/Fellow/Attending name:  Praveena Wilkinson RN   Organ(s):   Organ UNOS ID Match Run ID Comment Organ Laterality   Kidney [id number] MNOP    Pancreas [id number] MNOP       Recent infections?:  No    New medications?:  No Recent pregnancy?:  No (Comment: NA)   Angicoagulation medications?:  No Recent vaccinations?:  No   Recent blood transfusions?:  No Recent hospitalizations?:  No   Has your insurance changed in the last 6-12 months?:  Neg    Patient last dialyzed:  8/28/2020  6:30 AM  Dialysis type:  [facility]  Discussed organ offer with:  Patient  Patient/Caregiver name:  Tye Cramer  Discussed risk category with Patient/Other:  N/A  Understood donor criteria, verbalized understanding  Patient/Other asked to speak to a surgeon?:  No  Discussed program-specific outcomes:  Did not have questions regarding SRTR  Right to decline organ offer without penalty, Patient/Other:  Aware of option to decline without penalty  Organ offer decision status Patient/Other:  Accepted Offer  Organ disposition:  Case Cancelled - Donor quality - Other (specify)  Additional Comments:  8/30/2020 4:25 PM  KP/Panc: JEANNA  MD: Gary/Marilee  OPO Contact: Harpreet Villanueva [phone]  VXM Results: Compatible per Dr. Nettles (results in Epic)  Plan (XM, NPO, Donor OR): Donor OR not set yet.  Per Dr. Vega, will admit once OR time set.  Will need xmatch and COVOD 19 testing.  Discussed with Tye the admission process and that pancreas could be identified to be not transplantable. He expressed understanding.    In the past month, have you had:  Any close contact with a suspect or laboratory-confirmed COVID-19 patient: No  Travel anywhere: No  Fever: No  Cough: No  Short of Breath: No  Rash: No  Praveena Wilkinson RN, BA  On Call Organ Coordinator    August 30, 2020 7:53 PM  Called patient to give updated contact info.  " Donor OR time TBD yet.  Planning to admit once time set.  Porsha Gutierrez RN   Transplant Coordinator    August 30, 2020 9:43 PM  Donor OR set for 0500, will admit now for pre-op and FXM.  Called patient to give admission instructions, explained current visitor policy to patient.  Dr. Hunt and Dr. Jacobo to procure,  at **.  Admissions: 2143 - [place], ETA 2300  Unit: 2145 - [facility] 7A notified of admission  Update Provider Entering Orders (XM Plan & COVID Testing):  2130 - FELIX BRENNEN [ Msg Id [id number] ] - needs FXM and COVID testing  Immunology: 2158 - Tg notified of admission and FXM needs  Inpatient Lab (COVID Testing [phone], Option 2): [facility] notified of pre-op testing  Book OR: 2212 - [place], booked for 0900 - - Need to F/U with laterality after donor OR  Vessel Storage Confirmation: Ok to bank, confirmed with Clarita  Blood Bank: 2210 - [facility] notified - - Need to F/U with laterality after donor OR  Research: N/A  TransNet/ABO Verification: 2205 - Done  Add Organ: 2205 - Done  Porsha Gutierrez RN   Transplant Coordinator    7:44 AM  August 31, 2020  Notified that pancreas is not transplantable \"hard  And soponification present.\"  OR, Blood Bank, and 7A notified of cancelation.  Praveena Wilkinson RN, BA  On Call Organ Coordinator            Attestation I have discussed all of the above with the Patient/Legal Guardian/Caregiver regarding this organ offer.:  Yes  Coordinator/Fellow/Attending name:  Praveena Wilkinson RN       "

## 2020-08-31 NOTE — TELEPHONE ENCOUNTER
Patient Call: Voicemail  Date/Time: 8/31/20 / 4:31 pm  Reason for call: Patient is trying to get a hold of Zoie Doll and would like a call back.

## 2020-09-01 LAB
CELL TYPE ALLO: NORMAL
CELL TYPE AUTO: NORMAL
CHANNELSHIFTALLOB1: -67
CHANNELSHIFTALLOB2: -64
CHANNELSHIFTALLOT1: -35
CHANNELSHIFTALLOT2: -35
CHANNELSHIFTAUTOB1: -56
CHANNELSHIFTAUTOB2: -56
CHANNELSHIFTAUTOT1: -21
CHANNELSHIFTAUTOT2: -20
COMMENT ALLOB2: NORMAL
CROSSMATCHDATEALLO: NORMAL
CROSSMATCHDATEAUTO: NORMAL
DONOR ALLO: NORMAL
DONOR AUTO: NORMAL
DONORCELLDATE ALLO: NORMAL
DONORCELLDATE AUTO: NORMAL
INTERPRETATION ECG - MUSE: NORMAL
POS CUT OFF ALLO B: >93
POS CUT OFF ALLO T: >79
POS CUT OFF AUTO B: >93
POS CUT OFF AUTO T: >79
PROTOCOL CUTOFF: NORMAL
RESULT ALLO B1: NORMAL
RESULT ALLO B2: NORMAL
RESULT ALLO T1: NORMAL
RESULT ALLO T2: NORMAL
RESULT AUTO B1: NORMAL
RESULT AUTO B2: NORMAL
RESULT AUTO T1: NORMAL
RESULT AUTO T2: NORMAL
SA1 CELL: NORMAL
SA1 COMMENTS: NORMAL
SA1 HI RISK ABY: NORMAL
SA1 MOD RISK ABY: NORMAL
SA1 TEST METHOD: NORMAL
SA2 CELL: NORMAL
SA2 COMMENTS: NORMAL
SA2 HI RISK ABY UA: NORMAL
SA2 MOD RISK ABY: NORMAL
SA2 TEST METHOD: NORMAL
SERUM DATE ALLO B1: NORMAL
SERUM DATE ALLO B2: NORMAL
SERUM DATE ALLO T1: NORMAL
SERUM DATE ALLO T2: NORMAL
SERUM DATE AUTO B1: NORMAL
SERUM DATE AUTO B2: NORMAL
SERUM DATE AUTO T1: NORMAL
SERUM DATE AUTO T2: NORMAL
TESTMETHODALLO: NORMAL
TESTMETHODAUTO: NORMAL
TREATMENT ALLO B1: NORMAL
TREATMENT ALLO B2: NORMAL
TREATMENT ALLO T1: NORMAL
TREATMENT ALLO T2: NORMAL
TREATMENT AUTO B1: NORMAL
TREATMENT AUTO B2: NORMAL
TREATMENT AUTO T1: NORMAL
TREATMENT AUTO T2: NORMAL
UNACCEPTABLE ANTIGEN: NORMAL
UNOS CPRA: 32

## 2020-09-01 NOTE — TELEPHONE ENCOUNTER
Returned call from 08/31/2020, 6:30 PM.  Confirmed with Tye that I also spoke with Saniya (spouse) last evening and answered her questions.  Noted if Tye hasn't been transplanted by October 2020, he will be due to have return waitlist appointments with pancreas transplant surgeon, social work, and cardiology.  Explained later this month, I will send scheduling orders to our scheduling team to contact Tye to schedule these appointments, which will likely be scheduled as virtual and phone visits.  Noted if Tye has been transplanted, the appointments may always be cancelled.  Encouraged Tye to contact me should he have any questions.

## 2020-09-03 LAB
BLD PROD TYP BPU: NORMAL
BLD PROD TYP BPU: NORMAL
BLD UNIT ID BPU: 0
BLD UNIT ID BPU: 0
BLOOD PRODUCT CODE: NORMAL
BLOOD PRODUCT CODE: NORMAL
BPU ID: NORMAL
BPU ID: NORMAL
TRANSFUSION STATUS PATIENT QL: NORMAL

## 2020-09-14 ENCOUNTER — APPOINTMENT (OUTPATIENT)
Dept: LAB | Facility: CLINIC | Age: 40
End: 2020-09-14
Attending: TRANSPLANT SURGERY
Payer: COMMERCIAL

## 2020-10-06 ENCOUNTER — DOCUMENTATION ONLY (OUTPATIENT)
Dept: TRANSPLANT | Facility: CLINIC | Age: 40
End: 2020-10-06

## 2020-10-06 ENCOUNTER — ORGAN (OUTPATIENT)
Dept: TRANSPLANT | Facility: CLINIC | Age: 40
End: 2020-10-06

## 2020-10-06 DIAGNOSIS — Z76.82 AWAITING ORGAN TRANSPLANT: Primary | ICD-10-CM

## 2020-10-06 PROCEDURE — 80502 PR CLINICAL PATHOLOGY CONSULTATION COMPREHENSIVE: CPT | Performed by: PATHOLOGY

## 2020-10-06 NOTE — PROGRESS NOTES
PATHOLOGY HLA CROSSMATCH CONSULTATION: DONOR/RECIPIENT  VIRTUAL CROSSMATCH - Kidney/pancreas  Consultation Date: 2020  Consultation Requested by: Dr. Edmar Jacobo    Regarding: Compatibility of  donor organ UNOS #[id number] from OPO: AZOB with Erik Cramer    Findings: Regarding a virtual crossmatch between Erik FLORINA Cramer and  donor listed above (match ID [id number]):  The most recent (20) and three (3) additional patient serum/sera  were analyzed.  The patient has no antibodies listed with HLA specificity against the donor organ.      Record Review Indicates: I personally reviewed the most recent serum, the historic peak sera, and all other sera with solid-phase HLA Single Antigen test results:  The patient has no HLA antibodies against the donor organ.     The results of this virtual XM are:   -most recent serum: compatible   -peak #1: compatible  -peak #2: compatible    Disclaimer: Clinical judgement must take into account other factors, such as non-HLA antibodies not detected in the assay. The VXM gives probabilities only.  The probability does not account for the potential for auto-antibodies that may be present in the patient's serum.  These autoantibodies may render the physical crossmatch falsely positive, and would be detected by an autologous crossmatch.  When possible, confirm findings with prospective allogeneic and autologous flow crossmatches before going to transplant as clinically indicated.     Miguel Ludwig, PhD    Miguel Ludwig, PhD,[facility]  Medical Director, Immunology/Histocompatibility Laboratory  Pager [phone]

## 2020-10-07 NOTE — TELEPHONE ENCOUNTER
Organ Offer Encounter Information    Organ Offer Information  Organ offer date & time: 10/6/2020  5:28 PM  Coordinator/Fellow/Attending name: Solange Lomeli RN   Organ(s):  Organ UNOS ID Match Run ID Comment Organ Laterality   Kidney [id number] AZOB    Pancreas [id number] AZOB       Recent infections?: No    New medications?: Yes (Comment: bp med) Recent pregnancy?: No   Angicoagulation medications?: No Recent vaccinations?: No   Recent blood transfusions?: No Recent hospitalizations?: No   Has your insurance changed in the last 6-12 months?: Neg    Patient last dialyzed: 10/5/2020 11:00 AM  Dialysis type: [facility]  Discussed organ offer with: Patient  Patient/Caregiver name: Tye  Discussed risk category with Patient/Other: [employer] Risk  Understood donor criteria, verbalized understanding  Discussed program-specific outcomes: Did not have questions regarding SRTR  Right to decline organ offer without penalty, Patient/Other: Aware of option to decline without penalty  Organ offer decision status Patient/Other: Accepted Offer  Organ disposition: Case Cancelled - Other (specify)  Additional Comments: 10/6/2020 5:30 PM  KP/Panc: KP offer  MD: Odalis/Marcia  OPO Contact: Bree [phone]  VXM Results: Dr. Ludwig, compatible, no DSA  Plan (XM, NPO, Donor OR): Donor OR set for 1600, called pt to discuss offer. He is interested in moving forward. We are still backup, waiting to hear from Bree if we become primary again. Bree agreed to split a charter with us. Spoke to Ale at [employer] and put a soft hold on a smaller jet with a total flight time of 4.5hrs with layover in [place] to [facility]. 2.5hrs reposition time. Bree said that the center before us has accepted. Called Tye to let him know he is still backup until he hears from me. Will follow up as soon as we know.    - - -   COVID Screening  In the past month, have you had:  Any close contact with a suspect or laboratory-confirmed COVID-19  patient: No  Travel anywhere: No  Fever: No  Cough: No  Short of Breath: No  Rash: No    10/6/2020 9:10 PM:   Bree texted to confirm that primary center accepted upon visualization. Per Dr. Jacobo called Ada at [facility] to cancel soft hold w Almaz and called Tye to notify him that he would not be getting the organs. Pt verbalized understanding no further questions at this time.   Solange Lomeli  Transplant Coordinator        Attestation I have discussed all of the above with the Patient/Legal Guardian/Caregiver regarding this organ offer.: Yes  Coordinator/Fellow/Attending name: Solange Lomeli RN

## 2020-10-19 LAB — HBA1C MFR BLD: 6.1 % (ref 0–5.7)

## 2020-10-26 ENCOUNTER — TELEPHONE (OUTPATIENT)
Dept: TRANSPLANT | Facility: CLINIC | Age: 40
End: 2020-10-26

## 2020-10-26 NOTE — TELEPHONE ENCOUNTER
Patient reports had cataract surgery on 10/22/2020 and is scheduled 11/05/2020 to have the second eye done.  Patient wants to confirm whether okay to have a transplant now.  Since patient reports he has a return appointment with his eye specialist on 10/29/2020, advised he check with this specialist whether he needs to wait specific time period after cataract surgery before having other surgery such as organ transplant.  Provided my direct contact information for additional questions.

## 2020-12-04 ENCOUNTER — TRANSFERRED RECORDS (OUTPATIENT)
Dept: HEALTH INFORMATION MANAGEMENT | Facility: CLINIC | Age: 40
End: 2020-12-04

## 2020-12-09 LAB
ALT SERPL-CCNC: 48 U/L (ref 10–49)
AST SERPL-CCNC: 21 U/L (ref 0–34)
CREAT SERPL-MCNC: 14.02 MG/DL (ref 0.7–1.3)
POTASSIUM SERPL-SCNC: 5 MEQ/L (ref 3.5–5.5)

## 2020-12-21 LAB — POTASSIUM SERPL-SCNC: 4.3 MEQ/L (ref 3.5–5.5)

## 2020-12-29 ENCOUNTER — DOCUMENTATION ONLY (OUTPATIENT)
Dept: TRANSPLANT | Facility: CLINIC | Age: 40
End: 2020-12-29

## 2020-12-30 DIAGNOSIS — N18.6 ESRD (END STAGE RENAL DISEASE) (H): ICD-10-CM

## 2020-12-30 DIAGNOSIS — Z76.82 ORGAN TRANSPLANT CANDIDATE: ICD-10-CM

## 2021-01-03 ENCOUNTER — HEALTH MAINTENANCE LETTER (OUTPATIENT)
Age: 41
End: 2021-01-03

## 2021-01-04 ENCOUNTER — TRANSFERRED RECORDS (OUTPATIENT)
Dept: HEALTH INFORMATION MANAGEMENT | Facility: CLINIC | Age: 41
End: 2021-01-04

## 2021-01-04 DIAGNOSIS — Z76.82 ORGAN TRANSPLANT CANDIDATE: ICD-10-CM

## 2021-01-04 DIAGNOSIS — N18.6 ESRD (END STAGE RENAL DISEASE) (H): ICD-10-CM

## 2021-01-04 LAB
ALT SERPL-CCNC: 47 U/L (ref 10–49)
AST SERPL-CCNC: 31 U/L (ref 0–34)
CREAT SERPL-MCNC: 9.89 MG/DL (ref 0.7–1.3)
POTASSIUM SERPL-SCNC: 4.2 MEQ/L (ref 3.5–5.5)

## 2021-01-05 ENCOUNTER — DOCUMENTATION ONLY (OUTPATIENT)
Dept: TRANSPLANT | Facility: CLINIC | Age: 41
End: 2021-01-05

## 2021-01-05 ENCOUNTER — TELEPHONE (OUTPATIENT)
Dept: TRANSPLANT | Facility: CLINIC | Age: 41
End: 2021-01-05

## 2021-01-05 DIAGNOSIS — Z76.82 ORGAN TRANSPLANT CANDIDATE: ICD-10-CM

## 2021-01-05 DIAGNOSIS — E11.9 DIABETES MELLITUS, TYPE 2 (H): ICD-10-CM

## 2021-01-05 DIAGNOSIS — N18.5 STAGE 5 CHRONIC KIDNEY DISEASE NOT ON CHRONIC DIALYSIS (H): ICD-10-CM

## 2021-01-05 NOTE — TELEPHONE ENCOUNTER
Left voice message patient due for return waitlist appointments and he will be receiving a call from scheduling to schedule the appointments which will be virtual and phone appointments.  Requested return call should patient have any questions.

## 2021-01-06 ENCOUNTER — TRANSFERRED RECORDS (OUTPATIENT)
Dept: HEALTH INFORMATION MANAGEMENT | Facility: CLINIC | Age: 41
End: 2021-01-06

## 2021-01-08 ENCOUNTER — TELEPHONE (OUTPATIENT)
Dept: TRANSPLANT | Facility: CLINIC | Age: 41
End: 2021-01-08

## 2021-01-08 NOTE — TELEPHONE ENCOUNTER
Patient reports he had COBRA insurance through [facility] that ended 12/31/2020.  He also reports he applied for Medicare, was told by [employer] that Medicare is approved, but take some time to process and doesn't yet have his ID number yet.  Patient notes he was told claims may be re-submitted to Medicare.  Noted I will send an update to Sony [address] with this information; will not change transplant waitlist status yet.

## 2021-01-11 NOTE — PROGRESS NOTES
"[facility]  CARDIOVASCULAR MEDICINE VIDEO VISIT NOTE    Referring Provider: Arik Syed   Primary Care Provider: Figueroa Washington     Patient Name: Erik Cramer   MRN: [id number]     PERTINENT CLINICAL HISTORY:   Erik Cramer is a 40 year old male with past medical history of HTN, DMII (since age 21), and ESRD on HD who presents via video visit for cardiac evaluation as part of kidney/pancreas transplant work up. Patient last saw Cardiology (Dr. Shepherd) in 2019. No recent hospitalizations.     Tye has been feeling \"pretty good\" recently. With dialysis, he has been more tired on some days than on others though this seems to be random. He sometimes feels dizzy w/ HD, particularly at the end, sometimes the last blood pressure is too low and he gets dizzy upon standing. He is trying to be active although COVID and the weather make that difficult. He takes his son outside to play, walks around the block when the weather is nicer, cleans, and does laundry and dishes. Gets tired if he walks long distances. Can take stairs. He is asymptomatic with activity. Denies chest pain, SOB, GOMEZ, palpitations, lightheadedness, dizziness, leg swelling, and weight gain. Doesn't remember recent BP readings. He estimates that he's had DMII since age 21 although he was off medications for a period of time after he lost some weight. He had cataract surgery in October on both eyes which went well.     Cardiac medications include amlodipine 10 mg, atorvastatin 80 mg, clonidine 0.2 mg BID, lasix 80 mg BID, hydralazine 100 mg TID, doxazosin 2 mg, and losartan 100 mg.    PAST MEDICAL HISTORY:     Past Medical History:   Diagnosis Date     Anemia in chronic kidney disease      Chronic kidney disease, stage 5 (H)      Hypertension 2018     Type 2 diabetes mellitus (H)      Vitamin D deficiency         PAST SURGICAL HISTORY:     Past Surgical History:   Procedure Laterality Date     ESOPHAGOSCOPY, GASTROSCOPY, DUODENOSCOPY " (EGD), COMBINED N/A 12/3/2019    Procedure: ESOPHAGOGASTRODUODENOSCOPY, WITH BIOPSY;  Surgeon: Guy Bar MD;  Location: [facility] OR     EYE SURGERY Left 2015    retinal detachment     LAPAROSCOPIC CHOLECYSTECTOMY N/A 12/10/2019    Procedure: CHOLECYSTECTOMY, LAPAROSCOPIC;  Surgeon: Zoie Gray MD;  Location: [facility] OR     ORTHOPEDIC SURGERY Left     Trigger finger         CURRENT MEDICATIONS:     Current Outpatient Medications   Medication Sig Dispense Refill     amLODIPine (NORVASC) 10 MG tablet Take 10 mg by mouth daily       atorvastatin (LIPITOR) 80 MG tablet Take 80 mg by mouth daily   2     blood glucose (ACCU-CHEK GUIDE) test strip 1 strip       blood glucose monitoring (ACCU-CHEK FASTCLIX) lancets Testing blood sugars 2 time(s) a day.  Indications: Type 2 Diabetes       calcium acetate (PHOSLO) 667 MG CAPS capsule Take 1,334 mg by mouth 3 times daily (with meals)       calcium carbonate (OS-SHAY) 1500 (600 Ca) MG tablet Take 600 mg by mouth 3 times daily   12     calcium carbonate (TUMS) 500 MG chewable tablet Take 1 chew tab by mouth as needed for heartburn        cloNIDine (CATAPRES) 0.1 MG tablet Take 0.2 mg by mouth 2 times daily        furosemide (LASIX) 20 MG tablet Take 2 tablets (40 mg) by mouth 2 times daily (Patient taking differently: Take 80 mg by mouth 2 times daily ) 60 tablet 1     hydrALAZINE (APRESOLINE) 25 MG tablet Take 100 mg by mouth 3 times daily   0     insulin pen needle (BD RAÚL U/F) 32G X 4 MM miscellaneous use as directed with lantus pen once a day       LANTUS SOLOSTAR 100 UNIT/ML soln Inject 20 Units Subcutaneous At Bedtime   2     losartan (COZAAR) 100 MG tablet Take 100 mg by mouth daily       Multiple Vitamins-Minerals (OCUVITE PO) Take 2 tablets by mouth daily       ondansetron (ZOFRAN-ODT) 4 MG ODT tab Take 4 mg by mouth every 8 hours as needed for nausea       vitamin B-Complex Take 1 tablet by mouth daily       vitamin D3 (CHOLECALCIFEROL) 2000 units (50 mcg)  tablet Take 1 tablet (2,000 Units) by mouth daily 90 tablet 0     VITAMIN E PO Take 1 tablet by mouth daily          ALLERGIES:     Allergies   Allergen Reactions     Metoprolol Anaphylaxis        FAMILY HISTORY:     Family History   Problem Relation Age of Onset     Breast Cancer Mother      Diabetes Brother         SOCIAL HISTORY:     Social History     Socioeconomic History     Marital status:      Spouse name: Not on file     Number of children: Not on file     Years of education: Not on file     Highest education level: Not on file   Occupational History     Not on file   Social Needs     Financial resource strain: Not on file     Food insecurity     Worry: Not on file     Inability: Not on file     Transportation needs     Medical: Not on file     Non-medical: Not on file   Tobacco Use     Smoking status: Never Smoker     Smokeless tobacco: Never Used   Substance and Sexual Activity     Alcohol use: Yes     Frequency: Never     Comment: Social     Drug use: Never     Sexual activity: Not on file   Lifestyle     Physical activity     Days per week: Not on file     Minutes per session: Not on file     Stress: Not on file   Relationships     Social connections     Talks on phone: Not on file     Gets together: Not on file     Attends Orthodoxy service: Not on file     Active member of club or organization: Not on file     Attends meetings of clubs or organizations: Not on file     Relationship status: Not on file     Intimate partner violence     Fear of current or ex partner: Not on file     Emotionally abused: Not on file     Physically abused: Not on file     Forced sexual activity: Not on file   Other Topics Concern     Parent/sibling w/ CABG, MI or angioplasty before 65F 55M? Not Asked   Social History Narrative     Not on file     Erik  reports current alcohol use. and  reports that he has never smoked. He has never used smokeless tobacco..     REVIEW OF SYSTEMS:   A comprehensive review of systems  was performed and negative unless otherwise noted in the HPI above.      PHYSICAL EXAMINATION:   No vital signs were taken for this video visit.   There is no height or weight on file to calculate BMI.  Wt Readings from Last 2 Encounters:   08/31/20 85.5 kg (188 lb 7.9 oz)   12/19/19 75.9 kg (167 lb 6.4 oz)     Constitutional: no acute distress, pleasant and cooperative, appears overall well.  Eyes:sclera white, conjunctiva clear, without icterus   Respiratory: no audible wheezes  Musculoskeletal: normal muscle bulk and tone, joints   Neurologic: Alert and oriented, face symmetric, normal gait  Psychiatric: appropriate affect, eye contact, intact thought and speech     LABORATORY DATA:     LIPID RESULTS:  Recent Labs   Lab Test 08/30/20 2338   CHOL 133   HDL 62   LDL 49   TRIG 109        LIVER ENZYME RESULTS:  Recent Labs   Lab Test 08/30/20 2338 07/29/19  1252   AST 17 30   ALT 25 29       CBC RESULTS:  Recent Labs   Lab Test 08/30/20  2338 12/10/19  1111 07/29/19  1252   WBC 8.7  --  9.5   HGB 10.5* 13.3 9.9*   HCT 32.4*  --  30.2*     --  312       BMP RESULTS:  Recent Labs   Lab Test 08/30/20  2338 12/10/19  1111 07/29/19  1252     --  136   POTASSIUM 4.8 4.8 4.0   CHLORIDE 100  --  106   CO2 26  --  24   ANIONGAP 8  --  7   *  --  138*   BUN 60*  --  101*   CR 10.60* 7.70* 7.79*   SHAY 9.0  --  7.8*       A1C RESULTS:  Lab Results   Component Value Date    A1C 5.9 (H) 08/30/2020       INR RESULTS:  Recent Labs   Lab Test 08/30/20  2338 12/10/19  1111   INR 0.96 0.97          PROCEDURES & FURTHER ASSESSMENTS:     ECHO: 7/19 - Global and regional left ventricular function is hyperkinetic with an EF of  65-70%.  Right ventricular function, chamber size, wall motion, and thickness are  normal.  The inferior vena cava is normal.  Trivial pericardial effusion is present.    STRESS TEST: 10/19 - Normal myocardial SPECT study      CLINICAL IMPRESSION:     Erik Cramer is a 40 year old male with  "past medical history of HTN, DMII (since age 21), and ESRD on HD who presents via video visit for cardiac evaluation as part of kidney/pancreas transplant work up.     PLAN:  Pre-operative cardiovascular clearance   Erik (\"Tye\") is able to achieve >4 METS without symptoms. He is tolerating dialysis well without chest pain or hypotension during runs. Risk factors for perioperative MACE include intra-abdominal surgery, creatinine >2, and insulin therapy thus RCRI risk score is 3. This score corresponds with a 5.4% risk of periprocedural MACE. Plan is for DSE for cardiac evaluation prior to transplant. If DSE is normal, he may proceed with transplant at an acceptable cardiac risk.   -DSE     Follow-up: in one year     Thank you for allowing me to take part in the care of this very pleasant patient.  Please do not hesitate to call if any further questions or concerns arise.    Ebony Rand DNP APRN CNP  Interventional and Critical Care Cardiology  [phone]    Duration of video visit: 12 minutes   Patient location: home   Provider location: home  Program used: [facility]    ADDENDUM 2/16/21: Pt had DSE at OSH on 2/12/21 which showed: Globally normal resting LV systolic function with no regional wall abnormalities. Normal resting LVEF. Normal augmentation of global function with Dobutamine infusion with no regional wall motion abnormalities. Normal Dobutamine Stress Echo test, no evidence for ischemia. Tye may proceed with transplant at acceptable cardiac risk.     SHANIA Amin CNP    CC  Patient Care Team:  Figueroa Washington MD as PCP - Ignacio Wilson MD as Nephrologist (Nephrology)  Zoie Gray MD as Assigned Surgical Provider  Oly Martinez MD as Assigned Nephrology Provider  Tg Dhaliwal LPN as LPN (Transplant)  Tamy Brown RN as Registered Nurse (Transplant)  KELLY CHRISTIANSON    "

## 2021-01-12 ENCOUNTER — ALLIED HEALTH/NURSE VISIT (OUTPATIENT)
Dept: TRANSPLANT | Facility: CLINIC | Age: 41
End: 2021-01-12
Attending: SURGERY
Payer: MEDICARE

## 2021-01-12 ENCOUNTER — VIRTUAL VISIT (OUTPATIENT)
Dept: NEPHROLOGY | Facility: CLINIC | Age: 41
End: 2021-01-12
Attending: SURGERY
Payer: MEDICARE

## 2021-01-12 DIAGNOSIS — E11.22 TYPE 2 DIABETES MELLITUS WITH CHRONIC KIDNEY DISEASE ON CHRONIC DIALYSIS, WITH LONG-TERM CURRENT USE OF INSULIN (H): ICD-10-CM

## 2021-01-12 DIAGNOSIS — Z76.82 AWAITING ORGAN TRANSPLANT: Primary | ICD-10-CM

## 2021-01-12 DIAGNOSIS — Z79.4 TYPE 2 DIABETES MELLITUS WITH CHRONIC KIDNEY DISEASE ON CHRONIC DIALYSIS, WITH LONG-TERM CURRENT USE OF INSULIN (H): ICD-10-CM

## 2021-01-12 DIAGNOSIS — N18.6 END STAGE KIDNEY DISEASE (H): Primary | ICD-10-CM

## 2021-01-12 DIAGNOSIS — N18.6 TYPE 2 DIABETES MELLITUS WITH CHRONIC KIDNEY DISEASE ON CHRONIC DIALYSIS, WITH LONG-TERM CURRENT USE OF INSULIN (H): ICD-10-CM

## 2021-01-12 DIAGNOSIS — Z99.2 TYPE 2 DIABETES MELLITUS WITH CHRONIC KIDNEY DISEASE ON CHRONIC DIALYSIS, WITH LONG-TERM CURRENT USE OF INSULIN (H): ICD-10-CM

## 2021-01-12 PROCEDURE — 99214 OFFICE O/P EST MOD 30 MIN: CPT | Mod: 95 | Performed by: NURSE PRACTITIONER

## 2021-01-12 RX ORDER — DOXAZOSIN 4 MG/1
4 TABLET ORAL AT BEDTIME
Status: ON HOLD | COMMUNITY
Start: 2020-09-30 | End: 2021-06-21

## 2021-01-12 NOTE — PROGRESS NOTES
"Tye is a 40 year old who is being evaluated via a billable video visit.      How would you like to obtain your AVS? MyChart  If the video visit is dropped, the invitation should be resent by: Text to cell phone: [phone]  Will anyone else be joining your video visit? No  {If patient encounters technical issues they should call [phone] :[id number]}    Video Start Time: {video visit start/end time for provider to select:152948}  Video-Visit Details    Type of service:  Video Visit    Video End Time:{video visit start/end time for provider to select:152948}    Originating Location (pt. Location): {video visit patient location:071433::\"Home\"}    Distant Location (provider location):  [facility] [place]     Platform used for Video Visit: {Virtual Visit Platforms:811465::\"Private Practice\"}    "

## 2021-01-12 NOTE — PROGRESS NOTES
Patient Name: Erik Cramer  : 1980  Age: 40 year old  MRN: [id number]  Date of Initial Social Work Evaluation: 3019    Patient on kidney/pancreas transplant wait list active.  Called Tye today to update psychosocial assessment.      Presenting Information   Living Situation: in [place], [place] with his wife Saniya and their son Jordan.  If not local, plans for short term stay:  Encouraged Tye to contact his local county to learn if he is eligible for assistance with travel and lodging.  Previous Functional Status: Independent  Cultural/Language/Spiritual Considerations: None indicated at this time.    Support System  Primary Support Person Saniya  Other support:  Brother-in-aw  Plan for support in immediate post-transplant period: Saniya    Health Care Directive  Decision Maker: Self  Alternate Decision Maker: Saniya  Health Care Directive: Provided education and Declined completing at this time.  Tye indicated he is in agreement with Saniya making his medical decisions for him if he is unable.    Mental Health/Coping:   History of Mental Health: No known history  History of Chemical Health: Tye indicated he may have a drink a month.  Current status: Appropriate  Coping: Tye indicated when under stress he will talk to his wife or play games.  Services Needed/Recommended: None indicated at this time.    Financial   Income: SSDI  Impact of transplant on income: Discussed Tye may not be eligible for disability one year post successful transplant.  He indicated under standing  Insurance and medication coverage: Medicare Parts A and B and [employer].  Tye indicated his Part D is through [facility].  Financial concerns: None indicated at this time.  Resources needed: None indicated at this time.    Assessment and recommendations and plan: Tye indicated he started dialysis 2019 and needed to stop working.  Tye continues to be an appropriate transplant candidate at this time. Reviewed transplant education (Medicare,  rehabilitation, donor issues, community/financial resources, and psych/family adjustment) as well as psychosocial risks of transplant. Emailed, with patient's permission, a copy of post-transplant informational sheet that includes information on potential costs of medications, Medicare ESRD, post-transplant lodging, etc. Patient seemed to process information well. Appeared well informed, motivated, and able to follow post transplant requirements. Behavior was appropriate during interview. Has adequate income and insurance coverage. Adequate social support. No major contraindications noted for transplant. At this time, patient appears to understand the risks and benefits of transplant.

## 2021-01-12 NOTE — LETTER
2021       RE: Erik Cramer  [address] [place] [id number]     Dear Colleague,    Thank you for referring your patient, Erik Cramer, to the [facility] [place] at [facility]. Please see a copy of my visit note below.    Tye is a 40 year old who is being evaluated via a billable video visit.      How would you like to obtain your AVS? MyChart  If the video visit is dropped, the invitation should be resent by: Send to e-mail at: [email or web address]  Will anyone else be joining your video visit? No      Video Start Time: 4:00 pm   Video-Visit Details    Type of service:  Video Visit    Video End Time:4:15 pm     Originating Location (pt. Location): Home    Distant Location (provider location):  [facility] [place]     Platform used for Video Visit: E[employer]ia App    Assessment and Plan:  #Kidney/Pancreas Transplant Wait List Evaluation: Patient is a good candidate overall. Patient should be active on the wait list.    # ESKD from diabetes mellitus type 2: although doing okay on hemodialysis since 10/2019, he would likely benefit from a kidney transplant.  Called in for potential transplant in 2020, but graft quality was not sufficient for transplant.    #DM Type 2: controlled using 18 units of insulin per day.    # Cardiac Risk: no known history of heart disease.  He had a negative stress test in 10/2019 and will have a updated cardiac risk assessment later this week.    # Health Maintenance: Dental: Up to date     Discussed the risks and benefits of a transplant, including the risk of surgery and immunosuppression medications.    KDPI: We discussed approximate remaining wait time and how that is influenced by issues such as blood type and sensitization (PRA) and access to a living donor. I contrasted potential waiting time for living vs  donor kidneys from  normal (0-85%) or higher (%) kidney donor  profile index (KDPI) donors and their associated outcomes. I would not recommend Mr. Cramer to consider kidneys from high KDPI donors. The reason for this decision is best summarized as: multi-organ transplant candidate.    Patient s overall re-evaluation may require further discussion in the Transplant Program s multidisciplinary selection committee for a final recommendation on the patient s suitability for transplant.     Reason for Visit:  Erik Cramer is a 40 year old male with ESKD from diabetes mellitus type 2, who presents for kidney/pancreas transplant wait list evaluation.     Date of Initial Transplant Evaluation:  7/2019        Current Transplant Phase: Waitlist: Active  Official [facility] Listing Date: 8/26/2019  Blood Type: B        cPRA: 32%       Date of cPRA: 8/2020  Transplant Coordinator: Tamy Brown Transplant Office phone number [phone]     Previous Medical Issues:  #Cardiology: 10/2019 stress test was negative for ischemia and he was cleared for transplant.  He will have an updated risk assessment later this week.  #PAD screening: Mild iliac plaque on 8/2019 outside CT imaging.  #Dental: UTD      History of Present Illness:   Erik Cramer is a 40-year-old Hmong gentleman who presents for waitlist evaluation with history of ESKD, on HD 10/2019 from presumed type 2 diabetes and hypertension, and history of noncompliance.           Interim Events:    He started hemodialysis in 10/2019 which has been going well.  Has had some recent fluctuating blood pressures but working closely with Dr. Khan on this and recently started Cardura. He was called in for SPK after in August 2020, but graft quality was not sufficient for transplant.    S/p lap moise in 12/2019 for biliary dyskinesia.  No complications.         Kidney Disease:        Kidney Disease Dx: Diabetes mellitus type 2        On Dialysis: Yes, Date initiated: 10/2019  and Dialysis Type: Incenter HD;       [facility]          Diabetes: 18 units of Lantus daily        Diabetic Control: Controlled (HbA1c <7%)      Last HbA1c: 6.2       Hypoglycemic Unawareness: No       New Issues: No           Cardiac/Vascular Disease History:       Known CAD: No       Known PAD/Claudication Symptoms: No       Known Heart Failure: No       Arrhythmia:  No       Pulmonary Hypertension: No        Valvular Disease: No       Other: None       New Cardiac/Vascular Events: No         Functional Capacity/Frailty:        Limited to walking a few blocks d/t fatigue.  Able to go up and down a flight of stairs okay, but gets tired.             Fatigue/Decreased Energy: [] No [x] Yes    Chest Pain or SOB with Exertion: [x] No [] Yes    Significant Weight Change: [x] No [] Yes    Nausea, Vomiting or Diarrhea: [x] No [] Yes    Fever, Sweats or Chills:  [x] No [] Yes    Leg Swelling [x] No [] Yes        Other Pertinent Transplant Surgical Issues:  Recent Blood Transfusion: No  Previous Abdominal Transplant: No  Bladder Dysfunction: No  Chronic/Recurrent Infections: No  Chronic Anticoagulation: No  Jehovah s Witness: No    Review Of Systems:  A comprehensive review of systems was obtained and negative, except as noted in the HPI or PMH.     Active Problem List:   Patient Active Problem List   Diagnosis     CKD (chronic kidney disease) stage 5, GFR less than 15 ml/min (H)     Hypertension secondary to other renal disorders     Anemia     Secondary renal hyperparathyroidism (H)     Type 2 diabetes mellitus (H)     Hypertension     Chronic kidney disease, stage 5 (H)     Anemia in chronic kidney disease     Vitamin D deficiency     Gallstones, common bile duct     Kidney transplant candidate       Personal History:  Social History     Socioeconomic History     Marital status:      Spouse name: Not on file     Number of children: Not on file     Years of education: Not on file     Highest education level: Not on file   Occupational History     Not  on file   Social Needs     Financial resource strain: Not on file     Food insecurity     Worry: Not on file     Inability: Not on file     Transportation needs     Medical: Not on file     Non-medical: Not on file   Tobacco Use     Smoking status: Never Smoker     Smokeless tobacco: Never Used   Substance and Sexual Activity     Alcohol use: Yes     Frequency: Never     Comment: Social     Drug use: Never     Sexual activity: Not on file   Lifestyle     Physical activity     Days per week: Not on file     Minutes per session: Not on file     Stress: Not on file   Relationships     Social connections     Talks on phone: Not on file     Gets together: Not on file     Attends [employer] service: Not on file     Active member of club or organization: Not on file     Attends meetings of clubs or organizations: Not on file     Relationship status: Not on file     Intimate partner violence     Fear of current or ex partner: Not on file     Emotionally abused: Not on file     Physically abused: Not on file     Forced sexual activity: Not on file   Other Topics Concern     Parent/sibling w/ CABG, MI or angioplasty before 65F 55M? Not Asked   Social History Narrative     Not on file        Allergies:  Allergies   Allergen Reactions     Metoprolol Anaphylaxis        Medications:  Current Outpatient Medications   Medication Sig     amLODIPine (NORVASC) 10 MG tablet Take 10 mg by mouth daily     atorvastatin (LIPITOR) 80 MG tablet Take 80 mg by mouth daily      blood glucose (ACCU-CHEK GUIDE) test strip 1 strip     blood glucose monitoring (ACCU-CHEK FASTCLIX) lancets Testing blood sugars 2 time(s) a day.  Indications: Type 2 Diabetes     calcium acetate (PHOSLO) 667 MG CAPS capsule Take 1,334 mg by mouth 3 times daily (with meals)     calcium carbonate (OS-SHAY) 1500 (600 Ca) MG tablet Take 600 mg by mouth 3 times daily      calcium carbonate (TUMS) 500 MG chewable tablet Take 1 chew tab by mouth as needed for heartburn       cloNIDine (CATAPRES) 0.1 MG tablet Take 0.2 mg by mouth 2 times daily      doxazosin (CARDURA) 2 MG tablet Take 2 mg by mouth     furosemide (LASIX) 20 MG tablet Take 2 tablets (40 mg) by mouth 2 times daily (Patient taking differently: Take 80 mg by mouth 2 times daily )     hydrALAZINE (APRESOLINE) 25 MG tablet Take 100 mg by mouth 3 times daily      insulin pen needle (BD RAÚL U/F) 32G X 4 MM miscellaneous use as directed with lantus pen once a day     LANTUS SOLOSTAR 100 UNIT/ML soln Inject 20 Units Subcutaneous At Bedtime      losartan (COZAAR) 100 MG tablet Take 100 mg by mouth daily     Multiple Vitamins-Minerals (OCUVITE PO) Take 2 tablets by mouth daily     ondansetron (ZOFRAN-ODT) 4 MG ODT tab Take 4 mg by mouth every 8 hours as needed for nausea     vitamin B-Complex Take 1 tablet by mouth daily     vitamin D3 (CHOLECALCIFEROL) 2000 units (50 mcg) tablet Take 1 tablet (2,000 Units) by mouth daily     VITAMIN E PO Take 1 tablet by mouth daily     No current facility-administered medications for this visit.         Vitals:  There were no vitals taken for this visit.     Exam:  GENERAL: Healthy, alert and no distress  EYES: Eyes grossly normal to inspection.  No discharge or erythema, or obvious scleral/conjunctival abnormalities.  RESP: No audible wheeze, cough, or visible cyanosis.  No visible retractions or increased work of breathing.    SKIN: Visible skin clear. No significant rash, abnormal pigmentation or lesions.  NEURO: Cranial nerves grossly intact.  Mentation and speech appropriate for age.  PSYCH: Mentation appears normal, affect normal/bright, judgement and insight intact, normal speech and appearance well-groomed.            Again, thank you for allowing me to participate in the care of your patient.      Sincerely,    SHANIA Meier CNP

## 2021-01-12 NOTE — PROGRESS NOTES
Tye is a 40 year old who is being evaluated via a billable video visit.      How would you like to obtain your AVS? MyChart  If the video visit is dropped, the invitation should be resent by: Send to e-mail at: [email or web address]  Will anyone else be joining your video visit? No      Video Start Time: 4:00 pm   Video-Visit Details    Type of service:  Video Visit    Video End Time:4:15 pm     Originating Location (pt. Location): Home    Distant Location (provider location):  [facility] [place]     Platform used for Video Visit: Aw[employer]    Assessment and Plan:  #Kidney/Pancreas Transplant Wait List Evaluation: Patient is a good candidate overall. Patient should be active on the wait list.    # ESKD from diabetes mellitus type 2: although doing okay on hemodialysis since 10/2019, he would likely benefit from a kidney transplant.  Called in for potential transplant in 2020, but graft quality was not sufficient for transplant.    #DM Type 2: controlled using 18 units of insulin per day.    # Cardiac Risk: no known history of heart disease.  He had a negative stress test in 10/2019 and will have a updated cardiac risk assessment later this week.    # Health Maintenance: Dental: Up to date     Discussed the risks and benefits of a transplant, including the risk of surgery and immunosuppression medications.    KDPI: We discussed approximate remaining wait time and how that is influenced by issues such as blood type and sensitization (PRA) and access to a living donor. I contrasted potential waiting time for living vs  donor kidneys from  normal (0-85%) or higher (%) kidney donor profile index (KDPI) donors and their associated outcomes. I would not recommend Mr. Cramer to consider kidneys from high KDPI donors. The reason for this decision is best summarized as: multi-organ transplant candidate.    Patient s overall re-evaluation may require further discussion in the  Transplant Program s multidisciplinary selection committee for a final recommendation on the patient s suitability for transplant.     Reason for Visit:  Erik Cramer is a 40 year old male with ESKD from diabetes mellitus type 2, who presents for kidney/pancreas transplant wait list evaluation.     Date of Initial Transplant Evaluation:  7/2019        Current Transplant Phase: Waitlist: Active  Official [facility]OS Listing Date: 8/26/2019  Blood Type: B        cPRA: 32%       Date of cPRA: 8/2020  Transplant Coordinator: Tamy Brown Transplant Office phone number [phone]     Previous Medical Issues:  #Cardiology: 10/2019 stress test was negative for ischemia and he was cleared for transplant.  He will have an updated risk assessment later this week.  #PAD screening: Mild iliac plaque on 8/2019 outside CT imaging.  #Dental: UTD      History of Present Illness:   Erik Cramer is a 40-year-old Hmong gentleman who presents for waitlist evaluation with history of ESKD, on HD 10/2019 from presumed type 2 diabetes and hypertension, and history of noncompliance.           Interim Events:    He started hemodialysis in 10/2019 which has been going well.  Has had some recent fluctuating blood pressures but working closely with Dr. Khan on this and recently started Cardura. He was called in for SPK after in August 2020, but graft quality was not sufficient for transplant.    S/p lap moise in 12/2019 for biliary dyskinesia.  No complications.         Kidney Disease:        Kidney Disease Dx: Diabetes mellitus type 2        On Dialysis: Yes, Date initiated: 10/2019  and Dialysis Type: [facility];       Primary Nephrologist: Dr. Khan          Diabetes: 18 units of Lantus daily        Diabetic Control: Controlled (HbA1c <7%)      Last HbA1c: 6.2       Hypoglycemic Unawareness: No       New Issues: No           Cardiac/Vascular Disease History:       Known CAD: No       Known PAD/Claudication Symptoms: No       Known  Heart Failure: No       Arrhythmia:  No       Pulmonary Hypertension: No        Valvular Disease: No       Other: None       New Cardiac/Vascular Events: No         Functional Capacity/Frailty:        Limited to walking a few blocks d/t fatigue.  Able to go up and down a flight of stairs okay, but gets tired.             Fatigue/Decreased Energy: [] No [x] Yes    Chest Pain or SOB with Exertion: [x] No [] Yes    Significant Weight Change: [x] No [] Yes    Nausea, Vomiting or Diarrhea: [x] No [] Yes    Fever, Sweats or Chills:  [x] No [] Yes    Leg Swelling [x] No [] Yes        Other Pertinent Transplant Surgical Issues:  Recent Blood Transfusion: No  Previous Abdominal Transplant: No  Bladder Dysfunction: No  Chronic/Recurrent Infections: No  Chronic Anticoagulation: No  Jehovah s Witness: No    Review Of Systems:  A comprehensive review of systems was obtained and negative, except as noted in the HPI or PMH.     Active Problem List:   Patient Active Problem List   Diagnosis     CKD (chronic kidney disease) stage 5, GFR less than 15 ml/min (H)     Hypertension secondary to other renal disorders     Anemia     Secondary renal hyperparathyroidism (H)     Type 2 diabetes mellitus (H)     Hypertension     Chronic kidney disease, stage 5 (H)     Anemia in chronic kidney disease     Vitamin D deficiency     Gallstones, common bile duct     Kidney transplant candidate       Personal History:  Social History     Socioeconomic History     Marital status:      Spouse name: Not on file     Number of children: Not on file     Years of education: Not on file     Highest education level: Not on file   Occupational History     Not on file   Social Needs     Financial resource strain: Not on file     Food insecurity     Worry: Not on file     Inability: Not on file     Transportation needs     Medical: Not on file     Non-medical: Not on file   Tobacco Use     Smoking status: Never Smoker     Smokeless tobacco: Never Used    Substance and Sexual Activity     Alcohol use: Yes     Frequency: Never     Comment: Social     Drug use: Never     Sexual activity: Not on file   Lifestyle     Physical activity     Days per week: Not on file     Minutes per session: Not on file     Stress: Not on file   Relationships     Social connections     Talks on phone: Not on file     Gets together: Not on file     Attends Anabaptist service: Not on file     Active member of club or organization: Not on file     Attends meetings of clubs or organizations: Not on file     Relationship status: Not on file     Intimate partner violence     Fear of current or ex partner: Not on file     Emotionally abused: Not on file     Physically abused: Not on file     Forced sexual activity: Not on file   Other Topics Concern     Parent/sibling w/ CABG, MI or angioplasty before 65F 55M? Not Asked   Social History Narrative     Not on file        Allergies:  Allergies   Allergen Reactions     Metoprolol Anaphylaxis        Medications:  Current Outpatient Medications   Medication Sig     amLODIPine (NORVASC) 10 MG tablet Take 10 mg by mouth daily     atorvastatin (LIPITOR) 80 MG tablet Take 80 mg by mouth daily      blood glucose (ACCU-CHEK GUIDE) test strip 1 strip     blood glucose monitoring (ACCU-CHEK FASTCLIX) lancets Testing blood sugars 2 time(s) a day.  Indications: Type 2 Diabetes     calcium acetate (PHOSLO) 667 MG CAPS capsule Take 1,334 mg by mouth 3 times daily (with meals)     calcium carbonate (OS-SHAY) 1500 (600 Ca) MG tablet Take 600 mg by mouth 3 times daily      calcium carbonate (TUMS) 500 MG chewable tablet Take 1 chew tab by mouth as needed for heartburn      cloNIDine (CATAPRES) 0.1 MG tablet Take 0.2 mg by mouth 2 times daily      doxazosin (CARDURA) 2 MG tablet Take 2 mg by mouth     furosemide (LASIX) 20 MG tablet Take 2 tablets (40 mg) by mouth 2 times daily (Patient taking differently: Take 80 mg by mouth 2 times daily )     hydrALAZINE  (APRESOLINE) 25 MG tablet Take 100 mg by mouth 3 times daily      insulin pen needle (BD RAÚL U/F) 32G X 4 MM miscellaneous use as directed with lantus pen once a day     LANTUS SOLOSTAR 100 UNIT/ML soln Inject 20 Units Subcutaneous At Bedtime      losartan (COZAAR) 100 MG tablet Take 100 mg by mouth daily     Multiple Vitamins-Minerals (OCUVITE PO) Take 2 tablets by mouth daily     ondansetron (ZOFRAN-ODT) 4 MG ODT tab Take 4 mg by mouth every 8 hours as needed for nausea     vitamin B-Complex Take 1 tablet by mouth daily     vitamin D3 (CHOLECALCIFEROL) 2000 units (50 mcg) tablet Take 1 tablet (2,000 Units) by mouth daily     VITAMIN E PO Take 1 tablet by mouth daily     No current facility-administered medications for this visit.         Vitals:  There were no vitals taken for this visit.     Exam:  GENERAL: Healthy, alert and no distress  EYES: Eyes grossly normal to inspection.  No discharge or erythema, or obvious scleral/conjunctival abnormalities.  RESP: No audible wheeze, cough, or visible cyanosis.  No visible retractions or increased work of breathing.    SKIN: Visible skin clear. No significant rash, abnormal pigmentation or lesions.  NEURO: Cranial nerves grossly intact.  Mentation and speech appropriate for age.  PSYCH: Mentation appears normal, affect normal/bright, judgement and insight intact, normal speech and appearance well-groomed.

## 2021-01-14 ENCOUNTER — VIRTUAL VISIT (OUTPATIENT)
Dept: CARDIOLOGY | Facility: CLINIC | Age: 41
End: 2021-01-14
Attending: SURGERY
Payer: MEDICARE

## 2021-01-14 DIAGNOSIS — N18.5 CKD (CHRONIC KIDNEY DISEASE) STAGE 5, GFR LESS THAN 15 ML/MIN (H): ICD-10-CM

## 2021-01-14 DIAGNOSIS — N18.6 ESRD (END STAGE RENAL DISEASE) ON DIALYSIS (H): ICD-10-CM

## 2021-01-14 DIAGNOSIS — Z99.2 ESRD (END STAGE RENAL DISEASE) ON DIALYSIS (H): ICD-10-CM

## 2021-01-14 DIAGNOSIS — I10 HYPERTENSION, UNSPECIFIED TYPE: ICD-10-CM

## 2021-01-14 DIAGNOSIS — Z01.810 PRE-OPERATIVE CARDIOVASCULAR EXAMINATION: Primary | ICD-10-CM

## 2021-01-14 DIAGNOSIS — Z76.82 ORGAN TRANSPLANT CANDIDATE: ICD-10-CM

## 2021-01-14 PROCEDURE — 99213 OFFICE O/P EST LOW 20 MIN: CPT | Mod: 95 | Performed by: NURSE PRACTITIONER

## 2021-01-14 RX ORDER — FUROSEMIDE 80 MG
80 TABLET ORAL 2 TIMES DAILY
Status: ON HOLD | COMMUNITY
Start: 2021-01-14 | End: 2021-06-21

## 2021-01-14 NOTE — PROGRESS NOTES
Tye is a 40 year old who is being evaluated via a billable video visit.      How would you like to obtain your AVS? Harry   Would like mailed copy as well  If the video visit is dropped, the invitation should be resent by: Text to cell phone: [phone]  Will anyone else be joining your video visit? No

## 2021-01-14 NOTE — LETTER
"1/14/2021      RE: Erik Cramer  [address] [place] [id number]       Dear Colleague,    Thank you for the opportunity to participate in the care of your patient, Erik Cramer, at the [facility] [place] at [facility]. Please see a copy of my visit note below.    [facility]  CARDIOVASCULAR MEDICINE VIDEO VISIT NOTE    Referring Provider: Arik Syed   Primary Care Provider: Figueroa Washington     Patient Name: Erik Cramer   MRN: [id number]     PERTINENT CLINICAL HISTORY:   Erik Cramer is a 40 year old male with past medical history of HTN, DMII (since age 21), and ESRD on HD who presents via video visit for cardiac evaluation as part of kidney/pancreas transplant work up. Patient last saw Cardiology (Dr. Shepherd) in 2019. No recent hospitalizations.     Tye has been feeling \"pretty good\" recently. With dialysis, he has been more tired on some days than on others though this seems to be random. He sometimes feels dizzy w/ HD, particularly at the end, sometimes the last blood pressure is too low and he gets dizzy upon standing. He is trying to be active although COVID and the weather make that difficult. He takes his son outside to play, walks around the block when the weather is nicer, cleans, and does laundry and dishes. Gets tired if he walks long distances. Can take stairs. He is asymptomatic with activity. Denies chest pain, SOB, GOMEZ, palpitations, lightheadedness, dizziness, leg swelling, and weight gain. Doesn't remember recent BP readings. He estimates that he's had DMII since age 21 although he was off medications for a period of time after he lost some weight. He had cataract surgery in October on both eyes which went well.     Cardiac medications include amlodipine 10 mg, atorvastatin 80 mg, clonidine 0.2 mg BID, lasix 80 mg BID, hydralazine 100 mg TID, doxazosin 2 mg, and losartan 100 mg.    PAST MEDICAL HISTORY:     Past " Medical History:   Diagnosis Date     Anemia in chronic kidney disease      Chronic kidney disease, stage 5 (H)      Hypertension 2018     Type 2 diabetes mellitus (H)      Vitamin D deficiency         PAST SURGICAL HISTORY:     Past Surgical History:   Procedure Laterality Date     ESOPHAGOSCOPY, GASTROSCOPY, DUODENOSCOPY (EGD), COMBINED N/A 12/3/2019    Procedure: ESOPHAGOGASTRODUODENOSCOPY, WITH BIOPSY;  Surgeon: Guy Bar MD;  Location: [facility] OR     EYE SURGERY Left 2015    retinal detachment     LAPAROSCOPIC CHOLECYSTECTOMY N/A 12/10/2019    Procedure: CHOLECYSTECTOMY, LAPAROSCOPIC;  Surgeon: Zoie Gray MD;  Location: [facility] OR     ORTHOPEDIC SURGERY Left     Trigger finger         CURRENT MEDICATIONS:     Current Outpatient Medications   Medication Sig Dispense Refill     amLODIPine (NORVASC) 10 MG tablet Take 10 mg by mouth daily       atorvastatin (LIPITOR) 80 MG tablet Take 80 mg by mouth daily   2     blood glucose (ACCU-CHEK GUIDE) test strip 1 strip       blood glucose monitoring (ACCU-CHEK FASTCLIX) lancets Testing blood sugars 2 time(s) a day.  Indications: Type 2 Diabetes       calcium acetate (PHOSLO) 667 MG CAPS capsule Take 1,334 mg by mouth 3 times daily (with meals)       calcium carbonate (OS-SHAY) 1500 (600 Ca) MG tablet Take 600 mg by mouth 3 times daily   12     calcium carbonate (TUMS) 500 MG chewable tablet Take 1 chew tab by mouth as needed for heartburn        cloNIDine (CATAPRES) 0.1 MG tablet Take 0.2 mg by mouth 2 times daily        furosemide (LASIX) 20 MG tablet Take 2 tablets (40 mg) by mouth 2 times daily (Patient taking differently: Take 80 mg by mouth 2 times daily ) 60 tablet 1     hydrALAZINE (APRESOLINE) 25 MG tablet Take 100 mg by mouth 3 times daily   0     insulin pen needle (BD RAÚL U/F) 32G X 4 MM miscellaneous use as directed with lantus pen once a day       LANTUS SOLOSTAR 100 UNIT/ML soln Inject 20 Units Subcutaneous At Bedtime   2     losartan (COZAAR) 100  MG tablet Take 100 mg by mouth daily       Multiple Vitamins-Minerals (OCUVITE PO) Take 2 tablets by mouth daily       ondansetron (ZOFRAN-ODT) 4 MG ODT tab Take 4 mg by mouth every 8 hours as needed for nausea       vitamin B-Complex Take 1 tablet by mouth daily       vitamin D3 (CHOLECALCIFEROL) 2000 units (50 mcg) tablet Take 1 tablet (2,000 Units) by mouth daily 90 tablet 0     VITAMIN E PO Take 1 tablet by mouth daily          ALLERGIES:     Allergies   Allergen Reactions     Metoprolol Anaphylaxis        FAMILY HISTORY:     Family History   Problem Relation Age of Onset     Breast Cancer Mother      Diabetes Brother         SOCIAL HISTORY:     Social History     Socioeconomic History     Marital status:      Spouse name: Not on file     Number of children: Not on file     Years of education: Not on file     Highest education level: Not on file   Occupational History     Not on file   Social Needs     Financial resource strain: Not on file     Food insecurity     Worry: Not on file     Inability: Not on file     Transportation needs     Medical: Not on file     Non-medical: Not on file   Tobacco Use     Smoking status: Never Smoker     Smokeless tobacco: Never Used   Substance and Sexual Activity     Alcohol use: Yes     Frequency: Never     Comment: Social     Drug use: Never     Sexual activity: Not on file   Lifestyle     Physical activity     Days per week: Not on file     Minutes per session: Not on file     Stress: Not on file   Relationships     Social connections     Talks on phone: Not on file     Gets together: Not on file     Attends Buddhism service: Not on file     Active member of club or organization: Not on file     Attends meetings of clubs or organizations: Not on file     Relationship status: Not on file     Intimate partner violence     Fear of current or ex partner: Not on file     Emotionally abused: Not on file     Physically abused: Not on file     Forced sexual activity: Not on  file   Other Topics Concern     Parent/sibling w/ CABG, MI or angioplasty before 65F 55M? Not Asked   Social History Narrative     Not on file     Erik  reports current alcohol use. and  reports that he has never smoked. He has never used smokeless tobacco..     REVIEW OF SYSTEMS:   A comprehensive review of systems was performed and negative unless otherwise noted in the HPI above.      PHYSICAL EXAMINATION:   No vital signs were taken for this video visit.   There is no height or weight on file to calculate BMI.  Wt Readings from Last 2 Encounters:   08/31/20 85.5 kg (188 lb 7.9 oz)   12/19/19 75.9 kg (167 lb 6.4 oz)     Constitutional: no acute distress, pleasant and cooperative, appears overall well.  Eyes:sclera white, conjunctiva clear, without icterus   Respiratory: no audible wheezes  Musculoskeletal: normal muscle bulk and tone, joints   Neurologic: Alert and oriented, face symmetric, normal gait  Psychiatric: appropriate affect, eye contact, intact thought and speech     LABORATORY DATA:     LIPID RESULTS:  Recent Labs   Lab Test 08/30/20 2338   CHOL 133   HDL 62   LDL 49   TRIG 109        LIVER ENZYME RESULTS:  Recent Labs   Lab Test 08/30/20 2338 07/29/19  1252   AST 17 30   ALT 25 29       CBC RESULTS:  Recent Labs   Lab Test 08/30/20  2338 12/10/19  1111 07/29/19  1252   WBC 8.7  --  9.5   HGB 10.5* 13.3 9.9*   HCT 32.4*  --  30.2*     --  312       BMP RESULTS:  Recent Labs   Lab Test 08/30/20  2338 12/10/19  1111 07/29/19  1252     --  136   POTASSIUM 4.8 4.8 4.0   CHLORIDE 100  --  106   CO2 26  --  24   ANIONGAP 8  --  7   *  --  138*   BUN 60*  --  101*   CR 10.60* 7.70* 7.79*   SHAY 9.0  --  7.8*       A1C RESULTS:  Lab Results   Component Value Date    A1C 5.9 (H) 08/30/2020       INR RESULTS:  Recent Labs   Lab Test 08/30/20  2338 12/10/19  1111   INR 0.96 0.97        PROCEDURES & FURTHER ASSESSMENTS:     ECHO: 7/19 - Global and regional left ventricular function is  "hyperkinetic with an EF of  65-70%.  Right ventricular function, chamber size, wall motion, and thickness are  normal.  The inferior vena cava is normal.  Trivial pericardial effusion is present.    STRESS TEST: 10/19 - Normal myocardial SPECT study      CLINICAL IMPRESSION:     Erik Cramer is a 40 year old male with past medical history of HTN, DMII (since age 21), and ESRD on HD who presents via video visit for cardiac evaluation as part of kidney/pancreas transplant work up.     PLAN:  Pre-operative cardiovascular clearance   Erik (\"Tye\") is able to achieve >4 METS without symptoms. He is tolerating dialysis well without chest pain or hypotension during runs. Risk factors for perioperative MACE include intra-abdominal surgery, creatinine >2, and insulin therapy thus RCRI risk score is 3. This score corresponds with a 5.4% risk of periprocedural MACE. Plan is for DSE for cardiac evaluation prior to transplant. If DSE is normal, he may proceed with transplant at an acceptable cardiac risk.   -DSE     Follow-up: in one year     Thank you for allowing me to take part in the care of this very pleasant patient.  Please do not hesitate to call if any further questions or concerns arise.    Ebony Rand, ZANDRA APRN CNP  Interventional and Critical Care Cardiology  [phone]    Duration of video visit: 12 minutes   Patient location: home   Provider location: home  Program used: [facility]  Patient Care Team:  Figueroa Washington MD as PCP - Ignacio Wilson MD as Nephrologist (Nephrology)  Zoie Gray MD as Assigned Surgical Provider  Oly Martinez MD as Assigned Nephrology Provider  Tg Dhaliwal LPN as LPN (Transplant)  Tamy Brown RN as Registered Nurse (Transplant)  KELLY CHRISTIANSON      Tye is a 40 year old who is being evaluated via a billable video visit.      How would you like to obtain your AVS? MyChart   Would like mailed copy as well  If the video visit is dropped, " the invitation should be resent by: Text to cell phone: [phone]  Will anyone else be joining your video visit? No    Please do not hesitate to contact me if you have any questions/concerns.     Sincerely,     SHANIA Amin CNP

## 2021-01-14 NOTE — PATIENT INSTRUCTIONS
Thank you for your visit with me in the Cardiovascular Clinic at the [facility]! I appreciate your time.     Cardiology provider you saw during your visit: Ebony Rand, ZANDRA APRN CNP.    1. I have ordered a dobutamine stress test for cardiac evaluation. You will be contacted in 1-2 weeks to schedule this.   2. Continue current medications.   3. Follow up with Cardiology in one year if you are still listed for transplant at that time.    Questions and [phone].   First press #1 for the University and then press #3 for Medical Questions to reach the Cardiology triage nurse.     On Call Cardiologist for after hours or on weekends: [phone], press option #4 and ask to speak to the on-call Cardiologist.

## 2021-01-15 ENCOUNTER — HEALTH MAINTENANCE LETTER (OUTPATIENT)
Age: 41
End: 2021-01-15

## 2021-01-18 ENCOUNTER — TELEPHONE (OUTPATIENT)
Dept: TRANSPLANT | Facility: CLINIC | Age: 41
End: 2021-01-18

## 2021-01-18 NOTE — TELEPHONE ENCOUNTER
Left voice message requesting return call from patient as need to schedule stress test ordered by our Cardiology provider.

## 2021-01-18 NOTE — TELEPHONE ENCOUNTER
"Patient reports he requested [facility] cardiology provider send stress test orders to [facility] as prefers to have done locally and \"she said she would.\"   "

## 2021-01-25 ENCOUNTER — VIRTUAL VISIT (OUTPATIENT)
Dept: TRANSPLANT | Facility: CLINIC | Age: 41
End: 2021-01-25
Attending: SURGERY
Payer: MEDICARE

## 2021-01-25 DIAGNOSIS — N18.6 TYPE 2 DIABETES MELLITUS WITH CHRONIC KIDNEY DISEASE ON CHRONIC DIALYSIS, WITH LONG-TERM CURRENT USE OF INSULIN (H): ICD-10-CM

## 2021-01-25 DIAGNOSIS — Z99.2 TYPE 2 DIABETES MELLITUS WITH CHRONIC KIDNEY DISEASE ON CHRONIC DIALYSIS, WITH LONG-TERM CURRENT USE OF INSULIN (H): ICD-10-CM

## 2021-01-25 DIAGNOSIS — E11.22 TYPE 2 DIABETES MELLITUS WITH CHRONIC KIDNEY DISEASE ON CHRONIC DIALYSIS, WITH LONG-TERM CURRENT USE OF INSULIN (H): ICD-10-CM

## 2021-01-25 DIAGNOSIS — Z76.82 ORGAN TRANSPLANT CANDIDATE: ICD-10-CM

## 2021-01-25 DIAGNOSIS — Z79.4 TYPE 2 DIABETES MELLITUS WITH CHRONIC KIDNEY DISEASE ON CHRONIC DIALYSIS, WITH LONG-TERM CURRENT USE OF INSULIN (H): ICD-10-CM

## 2021-01-25 DIAGNOSIS — N18.5 STAGE 5 CHRONIC KIDNEY DISEASE NOT ON CHRONIC DIALYSIS (H): ICD-10-CM

## 2021-01-25 PROCEDURE — 99204 OFFICE O/P NEW MOD 45 MIN: CPT | Performed by: SURGERY

## 2021-01-25 NOTE — PROGRESS NOTES
Transplant Video visit [facility] Consult Note    Medical record number: [id number]  YOB: 1980,   Consult requested by Dr. Hidalgo for evaluation of kidney and pancreas transplant candidacy.    Assessment and Recommendations: Mr. Cramer is a good candidate for transplantation and has a good understanding of the risks and benefits of this approach to management of renal failure and diabetes. The following issues should be addressed prior to transplant:     39 yo male with type 2 Dm   On insulin 18 units/d of lantus  A1C 6.2  ESRD on dialysis since Oct 2019  No prev abd surgery except lap moise  No blood transfusions  BMI 30.4  Abdominal profile - full  Good candidate for KP  Risks of the surgical procedure including but not limited to the rare risk of mortality discussed in detail. Patient verbalized good understanding and had several pertinent questions which were answered satisfactorily.   Immunosuppressive regimen, management and long term risks discussed in detail.     Mr. Cramer has Type 2 Diabetes whose condition is not expected to resolve, is expected to progress, and is expected to continue to develop related comorbid conditions.  Cardiology consult for cardiac risk stratification to be ordered:Yes  CT abdomen and pelvis without contrast to be ordered for assessment of vascular targets: Yes  Transplant listing labs ordered to include HLA, ABOx2, Cr, etc.  Dietician consult ordered: Yes  Social work consult ordered: Yes  Imaging reports reviewed:  yes  Radiology images reviewed:no  Recipient suitable to move forward with work up of living donors:  No  No live donors at this time    The majority of our visit was spent in counselling, discussing the medical and surgical risks of living or  donor kidney and pancreas transplantation, either in a simultaneous or sequential fashion. I contrasted approximate wait time for SPK vs living vs  donor kidneys from normal (0-85%) or  higher (%) kidney donor profile index (KDPI) donors and their associated outcomes. I would not recommend this individual to consider kidneys from high KDPI donors. The reason for this decision is best summarized as: multi-organ transplant candidate.  Access to transplant will be impacted by living donor availability and overall candidacy for SPK, as well as the influence of blood type and degree of sensitization. We discussed advantages of preemptive transplant as well as living donor kidney transplant, and graft and patient survival outcomes associated with these options. Potential surgical complications of kidney and pancreas transplantation include bleeding, clotting, infection, wound complications, anastomotic failure and other issues such as cardiac complications, pneumonia, deep venous thrombosis, pulmonary embolism, post transplant diabetes and death. We discussed the need for protocol biopsy of the kidney and the possible need for a ureteral stent (and subsequent removal). We discussed benefits and risks associated with different approaches to exocrine drainage of pancreatic secretions. We also discussed differences in the average length of stay, recovery process, and posttransplant lab and monitoring protocol. We discussed the risk of graft rejection and recurrent diabetic nephropathy in the setting of poor glycemic control. I emphasized the need for strict immunosuppression adherence and the potential for complications of immunosuppression such as skin cancer or lymphoma, as well as a very low but not zero risk of donor-derived disease transmission risks (infection, cancer). Mr. Cramer asked good questions and the patient's candidacy will be reviewed at our Multidisciplinary Selection Committee. Thank you for the opportunity to participate in Mr. Cramer's care.    Total time: 45 minutes  Counselling time: 40  minutes    .  ---------------------------------------------------------------------------------------------------    HPI: Mr. Cramer has Type 2 Diabetes. The patient has had diabetes for 20 years. Management is by Lantus 18 units. The patient usually checks his blood sugar 1 times/day.  Hypoglyemic unawareness is not an issue.  The diabetes is controlled.    Complications of diabetes include:    Retinopathy:  Yes   Neuropathy: No  Gastroparesis:  No    The patient is on dialysis.    Has potential kidney donors:  No.  Interested in participation in paired exchange if a donor is willing: Doesn't know     The patient has the following pertinent history:       No    Yes  Dialysis:    []      [] via:       Blood Transfusion                  []      []  Number of units:   Most recently:  Pregnancy:    []      [] Number:       Previous Transplant:  []      [] Details:    Cancer    []      [] Comment:   Kidney stones   []      [] Comment:      Recurrent infections  []      []  Type:                  Bladder dysfunction  []      [] Cause:    Claudication   []      [] Distance:    Previous Amputation  []      [] Cause:     Chronic anticoagulation  []      [] Indication:  Temple  []      []     Past Medical History:   Diagnosis Date     Anemia in chronic kidney disease      Chronic kidney disease, stage 5 (H)      Hypertension 2018     Type 2 diabetes mellitus (H)      Vitamin D deficiency      Vitamin D deficiency      Past Surgical History:   Procedure Laterality Date     ESOPHAGOSCOPY, GASTROSCOPY, DUODENOSCOPY (EGD), COMBINED N/A 12/3/2019    Procedure: ESOPHAGOGASTRODUODENOSCOPY, WITH BIOPSY;  Surgeon: Guy Bar MD;  Location: [facility] OR     EYE SURGERY Left 2015    retinal detachment     LAPAROSCOPIC CHOLECYSTECTOMY N/A 12/10/2019    Procedure: CHOLECYSTECTOMY, LAPAROSCOPIC;  Surgeon: Zoie Gray MD;  Location: [facility] OR     ORTHOPEDIC SURGERY Left     Trigger finger      Family History   Problem  Relation Age of Onset     Breast Cancer Mother      Diabetes Brother      Social History     Socioeconomic History     Marital status:      Spouse name: Not on file     Number of children: Not on file     Years of education: Not on file     Highest education level: Not on file   Occupational History     Not on file   Social Needs     Financial resource strain: Not on file     Food insecurity     Worry: Not on file     Inability: Not on file     Transportation needs     Medical: Not on file     Non-medical: Not on file   Tobacco Use     Smoking status: Never Smoker     Smokeless tobacco: Never Used   Substance and Sexual Activity     Alcohol use: Yes     Frequency: Never     Comment: Social     Drug use: Never     Sexual activity: Not on file   Lifestyle     Physical activity     Days per week: Not on file     Minutes per session: Not on file     Stress: Not on file   Relationships     Social connections     Talks on phone: Not on file     Gets together: Not on file     Attends Synagogue service: Not on file     Active member of club or organization: Not on file     Attends meetings of clubs or organizations: Not on file     Relationship status: Not on file     Intimate partner violence     Fear of current or ex partner: Not on file     Emotionally abused: Not on file     Physically abused: Not on file     Forced sexual activity: Not on file   Other Topics Concern     Parent/sibling w/ CABG, MI or angioplasty before 65F 55M? Not Asked   Social History Narrative     Not on file       ROS:   CONSTITUTIONAL:  No fevers or chills  EYES: negative for icterus  ENT:  negative for hearing loss, tinnitus and sore throat  RESPIRATORY:  negative for cough, sputum, dyspnea  CARDIOVASCULAR:  negative for chest pain Fatigue  GASTROINTESTINAL:  negative for nausea, vomiting, diarrhea or constipation  GENITOURINARY:  negative for incontinence, dysuria, bladder emptying problems  HEME:  No easy bruising  INTEGUMENT:  negative  for rash and pruritus  NEURO:  Negative for headache, seizure disorder    Allergies:   Allergies   Allergen Reactions     Metoprolol Anaphylaxis       Medications:  Prescription Medications as of 2021       Rx Number Disp Refills Start End Last Dispensed Date Next Fill Date Owning Pharmacy    amLODIPine (NORVASC) 10 MG tablet            Sig: Take 10 mg by mouth daily    Class: Historical    Route: Oral    atorvastatin (LIPITOR) 80 MG tablet   2 2018        Sig: Take 80 mg by mouth daily     Class: Historical    Route: Oral    blood glucose (ACCU-CHEK GUIDE) test strip    2019        Si strip    Class: Historical    blood glucose monitoring (ACCU-CHEK FASTCLIX) lancets    2019        Sig: Testing blood sugars 2 time(s) a day.  Indications: Type 2 Diabetes    Class: Historical    calcium acetate (PHOSLO) 667 MG CAPS capsule            Sig: Take 1,334 mg by mouth 3 times daily (with meals)    Class: Historical    Route: Oral    calcium carbonate (OS-SHAY) 1500 (600 Ca) MG tablet   12 2019    [facility] [place], [place] - [address] [place] [address]    Sig: Take 600 mg by mouth 3 times daily     Class: Historical    Route: Oral    calcium carbonate (TUMS) 500 MG chewable tablet    2019        Sig: Take 1 chew tab by mouth as needed for heartburn     Class: Historical    Route: Oral    cloNIDine (CATAPRES) 0.1 MG tablet            Sig: Take 0.2 mg by mouth 2 times daily     Class: Historical    Route: Oral    doxazosin (CARDURA) 2 MG tablet    2020    [facility] [address]     Sig: Take 2 mg by mouth    Class: Historical    Route: Oral    furosemide (LASIX) 20 MG tablet    2021    [facility] [address]    Sig: Take 4 tablets (80 mg) by mouth 2 times daily    Class: Historical    Route: Oral    hydrALAZINE (APRESOLINE) 25 MG tablet   0 2018        Sig: Take 100 mg by mouth 3 times daily     Class:  Historical    Route: Oral    insulin pen needle (BD RAÚL U/F) 32G X 4 MM miscellaneous    9/13/2018        Sig: use as directed with lantus pen once a day    Class: Historical    LANTUS SOLOSTAR 100 UNIT/ML soln   2 6/6/2019        Sig: Inject 20 Units Subcutaneous At Bedtime     Class: Historical    Route: Subcutaneous    losartan (COZAAR) 100 MG tablet            Sig: Take 100 mg by mouth daily    Class: Historical    Route: Oral    Multiple Vitamins-Minerals (OCUVITE PO)            Sig: Take 2 tablets by mouth daily    Class: Historical    Route: Oral    ondansetron (ZOFRAN-ODT) 4 MG ODT tab            Sig: Take 4 mg by mouth every 8 hours as needed for nausea    Class: Historical    Route: Oral    vitamin B-Complex            Sig: Take 1 tablet by mouth daily    Class: Historical    Route: Oral    vitamin D3 (CHOLECALCIFEROL) 2000 units (50 mcg) tablet  90 tablet 0 7/22/2019    [facility], [place] - [address]    Sig: Take 1 tablet (2,000 Units) by mouth daily    Class: E-Prescribe    Route: Oral    VITAMIN E PO            Sig: Take 1 tablet by mouth daily    Class: Historical    Route: Oral          Exam:   Constitutional - A&O in NAD.   Eyes - no redness or discharge.  Sclera anicteric  Respiratory - no cough, no labored breathing  Musculoskeletal - range of motion normal  Skin - no discoloration, no jaundice  Neurological - no tremors.  No facial droop or dysarthria  Psychiatric - normal mood and affect  The rest of a comprehensive physical examination is deferred due to PHE (public health emergency) video visit restrictions    Diagnostics:   Recent Results (from the past 672 hour(s))   PRA Single Antigen IgG Antibody ([id number])    Collection Time: 12/30/20 10:15 AM   Result Value Ref Range    SA1 Test Method SA FCS     SA1 Cell Class I     SA1 Hi Risk Tamie None     SA1 Mod Risk Tamie B:37 48     SA1 Comments        Test performed by modified procedure. Serum heat inactivated and tested   by [facility]  modified ([place]) protocol including fetal calf serum addition.   High-risk, mfi >3,000. Mod-risk, mfi 500-3,000.      SA2 Test Method SA FCS     SA2 Cell Class II     SA2 Hi Risk Tamie None     SA2 Mod Risk Tamie None     SA2 Comments        Test performed by modified procedure. Serum heat inactivated and tested   by a modified ([place]) protocol including fetal calf serum addition.   High-risk, mfi >3,000. Mod-risk, mfi 500-3,000.      Protocol Cutoff Plan A, 500 mfi cumulative      UNOS cPRA 32     Unacceptable Antigen B:37 44 48 76 DR:14      ALT    Collection Time: 01/04/21 12:00 AM   Result Value Ref Range    ALT 47 10 - 49 U/L   AST    Collection Time: 01/04/21 12:00 AM   Result Value Ref Range    AST 31 0 - 34 U/L   Creatinine    Collection Time: 01/04/21 12:00 AM   Result Value Ref Range    Creatinine 9.89 (H) 0.70 - 1.30 mg/dL   Potassium    Collection Time: 01/04/21 12:00 AM   Result Value Ref Range    Potassium 4.2 3.5 - 5.5 mEq/L     UNOS cPRA   Date Value Ref Range Status   12/30/2020 32  Final

## 2021-01-25 NOTE — LETTER
1/25/2021         RE: Erik Cramer  [address] [place] [address]        Dear Colleague,    Thank you for referring your patient, Erik Cramer, to the [facility]PL[facility]. Please see a copy of my visit note below.    Transplant Video visit [facility] Consult Note    Medical record number: [id number]  YOB: 1980,   Consult requested by Dr. Hidalgo for evaluation of kidney and pancreas transplant candidacy.    Assessment and Recommendations: Mr. Cramer is a good candidate for transplantation and has a good understanding of the risks and benefits of this approach to management of renal failure and diabetes. The following issues should be addressed prior to transplant:     41 yo male with type 2 Dm   On insulin 18 units/d of lantus  A1C 6.2  ESRD on dialysis since Oct 2019  No prev abd surgery except lap moise  No blood transfusions  BMI 30.4  Abdominal profile - full  Good candidate for KP  Risks of the surgical procedure including but not limited to the rare risk of mortality discussed in detail. Patient verbalized good understanding and had several pertinent questions which were answered satisfactorily.   Immunosuppressive regimen, management and long term risks discussed in detail.     Mr. Cramer has Type 2 Diabetes whose condition is not expected to resolve, is expected to progress, and is expected to continue to develop related comorbid conditions.  Cardiology consult for cardiac risk stratification to be ordered:Yes  CT abdomen and pelvis without contrast to be ordered for assessment of vascular targets: Yes  Transplant listing labs ordered to include HLA, ABOx2, Cr, etc.  Dietician consult ordered: Yes  Social work consult ordered: Yes  Imaging reports reviewed:  yes  Radiology images reviewed:no  Recipient suitable to move forward with work up of living donors:  No  No live donors at this time    The majority of our visit was spent in counselling, discussing the  medical and surgical risks of living or  donor kidney and pancreas transplantation, either in a simultaneous or sequential fashion. I contrasted approximate wait time for SPK vs living vs  donor kidneys from normal (0-85%) or higher (%) kidney donor profile index (KDPI) donors and their associated outcomes. I would not recommend this individual to consider kidneys from high KDPI donors. The reason for this decision is best summarized as: multi-organ transplant candidate.  Access to transplant will be impacted by living donor availability and overall candidacy for SPK, as well as the influence of blood type and degree of sensitization. We discussed advantages of preemptive transplant as well as living donor kidney transplant, and graft and patient survival outcomes associated with these options. Potential surgical complications of kidney and pancreas transplantation include bleeding, clotting, infection, wound complications, anastomotic failure and other issues such as cardiac complications, pneumonia, deep venous thrombosis, pulmonary embolism, post transplant diabetes and death. We discussed the need for protocol biopsy of the kidney and the possible need for a ureteral stent (and subsequent removal). We discussed benefits and risks associated with different approaches to exocrine drainage of pancreatic secretions. We also discussed differences in the average length of stay, recovery process, and posttransplant lab and monitoring protocol. We discussed the risk of graft rejection and recurrent diabetic nephropathy in the setting of poor glycemic control. I emphasized the need for strict immunosuppression adherence and the potential for complications of immunosuppression such as skin cancer or lymphoma, as well as a very low but not zero risk of donor-derived disease transmission risks (infection, cancer). Mr. Cramer asked good questions and the patient's candidacy will be reviewed at our  Multidisciplinary Selection Committee. Thank you for the opportunity to participate in Mr. Cramer's care.    Total time: 45 minutes  Counselling time: 40 minutes    .  ---------------------------------------------------------------------------------------------------    HPI: Mr. Cramer has Type 2 Diabetes. The patient has had diabetes for 20 years. Management is by Lantus 18 units. The patient usually checks his blood sugar 1 times/day.  Hypoglyemic unawareness is not an issue.  The diabetes is controlled.    Complications of diabetes include:    Retinopathy:  Yes   Neuropathy: No  Gastroparesis:  No    The patient is on dialysis.    Has potential kidney donors:  No.  Interested in participation in paired exchange if a donor is willing: Doesn't know     The patient has the following pertinent history:       No    Yes  Dialysis:    []      [] via:       Blood Transfusion                  []      []  Number of units:   Most recently:  Pregnancy:    []      [] Number:       Previous Transplant:  []      [] Details:    Cancer    []      [] Comment:   Kidney stones   []      [] Comment:      Recurrent infections  []      []  Type:                  Bladder dysfunction  []      [] Cause:    Claudication   []      [] Distance:    Previous Amputation  []      [] Cause:     Chronic anticoagulation  []      [] Indication:  Faith  []      []     Past Medical History:   Diagnosis Date     Anemia in chronic kidney disease      Chronic kidney disease, stage 5 (H)      Hypertension 2018     Type 2 diabetes mellitus (H)      Vitamin D deficiency      Vitamin D deficiency      Past Surgical History:   Procedure Laterality Date     ESOPHAGOSCOPY, GASTROSCOPY, DUODENOSCOPY (EGD), COMBINED N/A 12/3/2019    Procedure: ESOPHAGOGASTRODUODENOSCOPY, WITH BIOPSY;  Surgeon: Guy Bar MD;  Location: [facility] OR     EYE SURGERY Left 2015    retinal detachment     LAPAROSCOPIC CHOLECYSTECTOMY N/A 12/10/2019    Procedure:  CHOLECYSTECTOMY, LAPAROSCOPIC;  Surgeon: Zoie Gray MD;  Location: [facility] OR     ORTHOPEDIC SURGERY Left     Trigger finger      Family History   Problem Relation Age of Onset     Breast Cancer Mother      Diabetes Brother      Social History     Socioeconomic History     Marital status:      Spouse name: Not on file     Number of children: Not on file     Years of education: Not on file     Highest education level: Not on file   Occupational History     Not on file   Social Needs     Financial resource strain: Not on file     Food insecurity     Worry: Not on file     Inability: Not on file     Transportation needs     Medical: Not on file     Non-medical: Not on file   Tobacco Use     Smoking status: Never Smoker     Smokeless tobacco: Never Used   Substance and Sexual Activity     Alcohol use: Yes     Frequency: Never     Comment: Social     Drug use: Never     Sexual activity: Not on file   Lifestyle     Physical activity     Days per week: Not on file     Minutes per session: Not on file     Stress: Not on file   Relationships     Social connections     Talks on phone: Not on file     Gets together: Not on file     Attends Anglican service: Not on file     Active member of club or organization: Not on file     Attends meetings of clubs or organizations: Not on file     Relationship status: Not on file     Intimate partner violence     Fear of current or ex partner: Not on file     Emotionally abused: Not on file     Physically abused: Not on file     Forced sexual activity: Not on file   Other Topics Concern     Parent/sibling w/ CABG, MI or angioplasty before 65F 55M? Not Asked   Social History Narrative     Not on file       ROS:   CONSTITUTIONAL:  No fevers or chills  EYES: negative for icterus  ENT:  negative for hearing loss, tinnitus and sore throat  RESPIRATORY:  negative for cough, sputum, dyspnea  CARDIOVASCULAR:  negative for chest pain Fatigue  GASTROINTESTINAL:  negative for nausea,  vomiting, diarrhea or constipation  GENITOURINARY:  negative for incontinence, dysuria, bladder emptying problems  HEME:  No easy bruising  INTEGUMENT:  negative for rash and pruritus  NEURO:  Negative for headache, seizure disorder    Allergies:   Allergies   Allergen Reactions     Metoprolol Anaphylaxis       Medications:  Prescription Medications as of 2021       Rx Number Disp Refills Start End Last Dispensed Date Next Fill Date Owning Pharmacy    amLODIPine (NORVASC) 10 MG tablet            Sig: Take 10 mg by mouth daily    Class: Historical    Route: Oral    atorvastatin (LIPITOR) 80 MG tablet   2 2018        Sig: Take 80 mg by mouth daily     Class: Historical    Route: Oral    blood glucose (ACCU-CHEK GUIDE) test strip    2019        Si strip    Class: Historical    blood glucose monitoring (ACCU-CHEK FASTCLIX) lancets    2019        Sig: Testing blood sugars 2 time(s) a day.  Indications: Type 2 Diabetes    Class: Historical    calcium acetate (PHOSLO) 667 MG CAPS capsule            Sig: Take 1,334 mg by mouth 3 times daily (with meals)    Class: Historical    Route: Oral    calcium carbonate (OS-SHAY) 1500 (600 Ca) MG tablet   12 2019    [facility] [place], [place] - [address]    Sig: Take 600 mg by mouth 3 times daily     Class: Historical    Route: Oral    calcium carbonate (TUMS) 500 MG chewable tablet    2019        Sig: Take 1 chew tab by mouth as needed for heartburn     Class: Historical    Route: Oral    cloNIDine (CATAPRES) 0.1 MG tablet            Sig: Take 0.2 mg by mouth 2 times daily     Class: Historical    Route: Oral    doxazosin (CARDURA) 2 MG tablet    2020    [facility] [place], [place] - [address]    Sig: Take 2 mg by mouth    Class: Historical    Route: Oral    furosemide (LASIX) 20 MG tablet    2021    [facility] [place], [place] - [address]     Sig: Take 4 tablets (80 mg) by mouth 2  times daily    Class: Historical    Route: Oral    hydrALAZINE (APRESOLINE) 25 MG tablet   0 12/27/2018        Sig: Take 100 mg by mouth 3 times daily     Class: Historical    Route: Oral    insulin pen needle (BD RAÚL U/F) 32G X 4 MM miscellaneous    9/13/2018        Sig: use as directed with lantus pen once a day    Class: Historical    LANTUS SOLOSTAR 100 UNIT/ML soln   2 6/6/2019        Sig: Inject 20 Units Subcutaneous At Bedtime     Class: Historical    Route: Subcutaneous    losartan (COZAAR) 100 MG tablet            Sig: Take 100 mg by mouth daily    Class: Historical    Route: Oral    Multiple Vitamins-Minerals (OCUVITE PO)            Sig: Take 2 tablets by mouth daily    Class: Historical    Route: Oral    ondansetron (ZOFRAN-ODT) 4 MG ODT tab            Sig: Take 4 mg by mouth every 8 hours as needed for nausea    Class: Historical    Route: Oral    vitamin B-Complex            Sig: Take 1 tablet by mouth daily    Class: Historical    Route: Oral    vitamin D3 (CHOLECALCIFEROL) 2000 units (50 mcg) tablet  90 tablet 0 7/22/2019    [facility], [place] - [address]    Sig: Take 1 tablet (2,000 Units) by mouth daily    Class: E-Prescribe    Route: Oral    VITAMIN E PO            Sig: Take 1 tablet by mouth daily    Class: Historical    Route: Oral          Exam:   Constitutional - A&O in NAD.   Eyes - no redness or discharge.  Sclera anicteric  Respiratory - no cough, no labored breathing  Musculoskeletal - range of motion normal  Skin - no discoloration, no jaundice  Neurological - no tremors.  No facial droop or dysarthria  Psychiatric - normal mood and affect  The rest of a comprehensive physical examination is deferred due to PHE (public health emergency) video visit restrictions    Diagnostics:   Recent Results (from the past 672 hour(s))   PRA Single Antigen IgG Antibody ([id number])    Collection Time: 12/30/20 10:15 AM   Result Value Ref Range    SA1 Test Method SA FCS     SA1 Cell  Class I     SA1 Hi Risk Tamie None     SA1 Mod Risk Tamie B:37 48     SA1 Comments        Test performed by modified procedure. Serum heat inactivated and tested   by a modified ([place]) protocol including fetal calf serum addition.   High-risk, mfi >3,000. Mod-risk, mfi 500-3,000.      SA2 Test Method SA FCS     SA2 Cell Class II     SA2 Hi Risk Tamie None     SA2 Mod Risk Tamie None     SA2 Comments        Test performed by modified procedure. Serum heat inactivated and tested   by a modified ([place]) protocol including fetal calf serum addition.   High-risk, mfi >3,000. Mod-risk, mfi 500-3,000.      Protocol Cutoff Plan A, 500 mfi cumulative      UNOS cPRA 32     Unacceptable Antigen B:37 44 48 76 DR:14      ALT    Collection Time: 01/04/21 12:00 AM   Result Value Ref Range    ALT 47 10 - 49 U/L   AST    Collection Time: 01/04/21 12:00 AM   Result Value Ref Range    AST 31 0 - 34 U/L   Creatinine    Collection Time: 01/04/21 12:00 AM   Result Value Ref Range    Creatinine 9.89 (H) 0.70 - 1.30 mg/dL   Potassium    Collection Time: 01/04/21 12:00 AM   Result Value Ref Range    Potassium 4.2 3.5 - 5.5 mEq/L     UNOS cPRA   Date Value Ref Range Status   12/30/2020 32  Final       Again, thank you for allowing me to participate in the care of your patient.        Sincerely,        Arik Syed MD

## 2021-02-02 ENCOUNTER — TELEPHONE (OUTPATIENT)
Dept: TRANSPLANT | Facility: CLINIC | Age: 41
End: 2021-02-02

## 2021-02-02 NOTE — TELEPHONE ENCOUNTER
Left voice message for patient.  Did he have his stress test locally?  If not, requested he contact [facility] as patient had previously reported this clinic was going to send orders to patient's local clinic. Requested return call from patient.

## 2021-02-02 NOTE — TELEPHONE ENCOUNTER
Patient reports he is waiting for a call from [place] to schedule his stress test.  Per request, sending [employer] message with the transplant office fax number to have stress test report faxed.

## 2021-02-12 ENCOUNTER — TRANSFERRED RECORDS (OUTPATIENT)
Dept: HEALTH INFORMATION MANAGEMENT | Facility: CLINIC | Age: 41
End: 2021-02-12

## 2021-03-01 DIAGNOSIS — Z76.82 ORGAN TRANSPLANT CANDIDATE: ICD-10-CM

## 2021-03-01 DIAGNOSIS — N18.6 ESRD (END STAGE RENAL DISEASE) (H): ICD-10-CM

## 2021-03-07 ENCOUNTER — HEALTH MAINTENANCE LETTER (OUTPATIENT)
Age: 41
End: 2021-03-07

## 2021-04-07 ENCOUNTER — TELEPHONE (OUTPATIENT)
Dept: TRANSPLANT | Facility: CLINIC | Age: 41
End: 2021-04-07

## 2021-04-07 NOTE — TELEPHONE ENCOUNTER
Patient reports he had his first COVID19 vaccine today at dialysis ([facility]) and also reports he now has Medicare Part D.  Noted I will share the insurance information with Sony Lane and if she has questions, she will contact the patient.  Patient verbalizes agreement with this plan.

## 2021-06-09 ENCOUNTER — DOCUMENTATION ONLY (OUTPATIENT)
Dept: TRANSPLANT | Facility: CLINIC | Age: 41
End: 2021-06-09

## 2021-06-09 ENCOUNTER — ORGAN (OUTPATIENT)
Dept: TRANSPLANT | Facility: CLINIC | Age: 41
End: 2021-06-09

## 2021-06-09 DIAGNOSIS — Z76.82 AWAITING ORGAN TRANSPLANT: Primary | ICD-10-CM

## 2021-06-10 ENCOUNTER — RESULTS ONLY (OUTPATIENT)
Dept: OTHER | Facility: CLINIC | Age: 41
End: 2021-06-10

## 2021-06-10 ENCOUNTER — ANESTHESIA (OUTPATIENT)
Dept: SURGERY | Facility: CLINIC | Age: 41
DRG: 008 | End: 2021-06-10
Payer: MEDICARE

## 2021-06-10 ENCOUNTER — ANESTHESIA EVENT (OUTPATIENT)
Dept: SURGERY | Facility: CLINIC | Age: 41
DRG: 008 | End: 2021-06-10
Payer: MEDICARE

## 2021-06-10 ENCOUNTER — HOSPITAL ENCOUNTER (INPATIENT)
Facility: CLINIC | Age: 41
LOS: 10 days | Discharge: HOME | DRG: 008 | End: 2021-06-21
Attending: TRANSPLANT SURGERY | Admitting: TRANSPLANT SURGERY
Payer: MEDICARE

## 2021-06-10 ENCOUNTER — APPOINTMENT (OUTPATIENT)
Dept: GENERAL RADIOLOGY | Facility: CLINIC | Age: 41
DRG: 008 | End: 2021-06-10
Attending: TRANSPLANT SURGERY
Payer: MEDICARE

## 2021-06-10 DIAGNOSIS — Z76.82 PANCREAS TRANSPLANT CANDIDATE: Primary | ICD-10-CM

## 2021-06-10 DIAGNOSIS — Z94.83 PANCREAS TRANSPLANTED (H): ICD-10-CM

## 2021-06-10 DIAGNOSIS — Z94.0 KIDNEY REPLACED BY TRANSPLANT: ICD-10-CM

## 2021-06-10 DIAGNOSIS — N18.6 TYPE 2 DIABETES MELLITUS WITH CHRONIC KIDNEY DISEASE ON CHRONIC DIALYSIS, WITH LONG-TERM CURRENT USE OF INSULIN (H): ICD-10-CM

## 2021-06-10 DIAGNOSIS — Z76.82 KIDNEY TRANSPLANT CANDIDATE: ICD-10-CM

## 2021-06-10 DIAGNOSIS — E11.22 TYPE 2 DIABETES MELLITUS WITH CHRONIC KIDNEY DISEASE ON CHRONIC DIALYSIS, WITH LONG-TERM CURRENT USE OF INSULIN (H): ICD-10-CM

## 2021-06-10 DIAGNOSIS — Z79.4 TYPE 2 DIABETES MELLITUS WITH CHRONIC KIDNEY DISEASE ON CHRONIC DIALYSIS, WITH LONG-TERM CURRENT USE OF INSULIN (H): ICD-10-CM

## 2021-06-10 DIAGNOSIS — Z99.2 TYPE 2 DIABETES MELLITUS WITH CHRONIC KIDNEY DISEASE ON CHRONIC DIALYSIS, WITH LONG-TERM CURRENT USE OF INSULIN (H): ICD-10-CM

## 2021-06-10 DIAGNOSIS — D84.9 IMMUNOSUPPRESSED STATUS (H): ICD-10-CM

## 2021-06-10 LAB
ABO + RH BLD: NORMAL
ABO + RH BLD: NORMAL
ALBUMIN SERPL-MCNC: 3.9 G/DL (ref 3.4–5)
ALBUMIN UR-MCNC: 200 MG/DL
ALP SERPL-CCNC: 84 U/L (ref 40–150)
ALT SERPL W P-5'-P-CCNC: 32 U/L (ref 0–70)
AMYLASE SERPL-CCNC: 96 U/L (ref 30–110)
ANION GAP SERPL CALCULATED.3IONS-SCNC: 7 MMOL/L (ref 3–14)
APPEARANCE UR: CLEAR
APTT PPP: 37 SEC (ref 22–37)
AST SERPL W P-5'-P-CCNC: 32 U/L (ref 0–45)
BASE DEFICIT BLDA-SCNC: 0.2 MMOL/L
BASE DEFICIT BLDA-SCNC: 0.3 MMOL/L
BASE DEFICIT BLDA-SCNC: 1.5 MMOL/L
BASE EXCESS BLDA CALC-SCNC: 0.4 MMOL/L
BILIRUB SERPL-MCNC: 0.4 MG/DL (ref 0.2–1.3)
BILIRUB UR QL STRIP: NEGATIVE
BLD GP AB SCN SERPL QL: NORMAL
BLD PROD TYP BPU: NORMAL
BLD UNIT ID BPU: 0
BLD UNIT ID BPU: 0
BLOOD BANK CMNT PATIENT-IMP: NORMAL
BLOOD PRODUCT CODE: NORMAL
BLOOD PRODUCT CODE: NORMAL
BPU ID: NORMAL
BPU ID: NORMAL
BUN SERPL-MCNC: 34 MG/DL (ref 7–30)
CA-I BLD-MCNC: 4.3 MG/DL (ref 4.4–5.2)
CA-I BLD-MCNC: 4.3 MG/DL (ref 4.4–5.2)
CA-I BLD-MCNC: 4.4 MG/DL (ref 4.4–5.2)
CA-I BLD-MCNC: 4.5 MG/DL (ref 4.4–5.2)
CALCIUM SERPL-MCNC: 8.8 MG/DL (ref 8.5–10.1)
CHLORIDE SERPL-SCNC: 96 MMOL/L (ref 94–109)
CMV IGG SERPL QL IA: >8 AI (ref 0–0.8)
CMV IGM SERPL QL IA: <0.2 AI (ref 0–0.8)
CO2 SERPL-SCNC: 29 MMOL/L (ref 20–32)
COLOR UR AUTO: ABNORMAL
CREAT SERPL-MCNC: 7.32 MG/DL (ref 0.66–1.25)
EBV VCA IGG SER QL IA: >8 AI (ref 0–0.8)
EBV VCA IGM SER QL IA: <0.2 AI (ref 0–0.8)
ERYTHROCYTE [DISTWIDTH] IN BLOOD BY AUTOMATED COUNT: 13.4 % (ref 10–15)
GFR SERPL CREATININE-BSD FRML MDRD: 9 ML/MIN/{1.73_M2}
GLUCOSE BLD-MCNC: 147 MG/DL (ref 70–99)
GLUCOSE BLD-MCNC: 156 MG/DL (ref 70–99)
GLUCOSE BLD-MCNC: 181 MG/DL (ref 70–99)
GLUCOSE BLD-MCNC: 189 MG/DL (ref 70–99)
GLUCOSE BLDC GLUCOMTR-MCNC: 107 MG/DL (ref 70–99)
GLUCOSE BLDC GLUCOMTR-MCNC: 125 MG/DL (ref 70–99)
GLUCOSE SERPL-MCNC: 119 MG/DL (ref 70–99)
GLUCOSE UR STRIP-MCNC: NEGATIVE MG/DL
HBA1C MFR BLD: 6 % (ref 0–5.6)
HBV CORE AB SERPL QL IA: NONREACTIVE
HBV SURFACE AB SERPL IA-ACNC: >1000 M[IU]/ML
HBV SURFACE AG SERPL QL IA: NONREACTIVE
HCO3 BLD-SCNC: 23 MMOL/L (ref 21–28)
HCO3 BLD-SCNC: 24 MMOL/L (ref 21–28)
HCO3 BLD-SCNC: 24 MMOL/L (ref 21–28)
HCO3 BLD-SCNC: 25 MMOL/L (ref 21–28)
HCT VFR BLD AUTO: 25 % (ref 40–53)
HCV AB SERPL QL IA: NONREACTIVE
HGB BLD-MCNC: 7.1 G/DL (ref 13.3–17.7)
HGB BLD-MCNC: 7.5 G/DL (ref 13.3–17.7)
HGB BLD-MCNC: 7.5 G/DL (ref 13.3–17.7)
HGB BLD-MCNC: 7.6 G/DL (ref 13.3–17.7)
HGB BLD-MCNC: 8.1 G/DL (ref 13.3–17.7)
HGB UR QL STRIP: NEGATIVE
HIV 1+2 AB+HIV1 P24 AG SERPL QL IA: NONREACTIVE
HYALINE CASTS #/AREA URNS LPF: 1 /LPF (ref 0–2)
INR PPP: 0.98 (ref 0.86–1.14)
KETONES UR STRIP-MCNC: NEGATIVE MG/DL
LABORATORY COMMENT REPORT: NORMAL
LACTATE BLD-SCNC: 1 MMOL/L (ref 0.7–2)
LACTATE BLD-SCNC: 1.1 MMOL/L (ref 0.7–2)
LACTATE BLD-SCNC: 1.4 MMOL/L (ref 0.7–2)
LEUKOCYTE ESTERASE UR QL STRIP: NEGATIVE
LIPASE SERPL-CCNC: 580 U/L (ref 73–393)
MCH RBC QN AUTO: 31 PG (ref 26.5–33)
MCHC RBC AUTO-ENTMCNC: 32.4 G/DL (ref 31.5–36.5)
MCV RBC AUTO: 96 FL (ref 78–100)
MUCOUS THREADS #/AREA URNS LPF: PRESENT /LPF
NITRATE UR QL: NEGATIVE
NUM BPU REQUESTED: 2
O2/TOTAL GAS SETTING VFR VENT: 40 %
OXYHGB MFR BLD: 94 % (ref 92–100)
PCO2 BLD: 34 MM HG (ref 35–45)
PCO2 BLD: 38 MM HG (ref 35–45)
PH BLD: 7.42 PH (ref 7.35–7.45)
PH BLD: 7.43 PH (ref 7.35–7.45)
PH BLD: 7.45 PH (ref 7.35–7.45)
PH BLD: 7.45 PH (ref 7.35–7.45)
PH UR STRIP: 7 PH (ref 5–7)
PLATELET # BLD AUTO: 231 10E9/L (ref 150–450)
PO2 BLD: 113 MM HG (ref 80–105)
PO2 BLD: 124 MM HG (ref 80–105)
PO2 BLD: 177 MM HG (ref 80–105)
PO2 BLD: 78 MM HG (ref 80–105)
POTASSIUM BLD-SCNC: 3.9 MMOL/L (ref 3.4–5.3)
POTASSIUM BLD-SCNC: 4.3 MMOL/L (ref 3.4–5.3)
POTASSIUM BLD-SCNC: 4.4 MMOL/L (ref 3.4–5.3)
POTASSIUM BLD-SCNC: 4.4 MMOL/L (ref 3.4–5.3)
POTASSIUM SERPL-SCNC: 4.3 MMOL/L (ref 3.4–5.3)
PROT SERPL-MCNC: 7.2 G/DL (ref 6.8–8.8)
PROT UR-MCNC: 2.26 G/L
PROT/CREAT 24H UR: 5.9 G/G CR (ref 0–0.2)
RBC # BLD AUTO: 2.61 10E12/L (ref 4.4–5.9)
RBC #/AREA URNS AUTO: 3 /HPF (ref 0–2)
SARS-COV-2 RNA RESP QL NAA+PROBE: NEGATIVE
SODIUM BLD-SCNC: 132 MMOL/L (ref 133–144)
SODIUM BLD-SCNC: 133 MMOL/L (ref 133–144)
SODIUM BLD-SCNC: 133 MMOL/L (ref 133–144)
SODIUM BLD-SCNC: 134 MMOL/L (ref 133–144)
SODIUM SERPL-SCNC: 132 MMOL/L (ref 133–144)
SOURCE: ABNORMAL
SP GR UR STRIP: 1.01 (ref 1–1.03)
SPECIMEN EXP DATE BLD: NORMAL
SPECIMEN SOURCE: NORMAL
SPERM #/AREA URNS HPF: PRESENT /HPF
SQUAMOUS #/AREA URNS AUTO: 0 /HPF (ref 0–1)
TRANSFUSION STATUS PATIENT QL: NORMAL
UROBILINOGEN UR STRIP-MCNC: NORMAL MG/DL (ref 0–2)
WBC # BLD AUTO: 6.7 10E9/L (ref 4–11)
WBC #/AREA URNS AUTO: 1 /HPF (ref 0–5)

## 2021-06-10 PROCEDURE — 250N000011 HC RX IP 250 OP 636: Performed by: PHYSICIAN ASSISTANT

## 2021-06-10 PROCEDURE — 0TY00Z0 TRANSPLANTATION OF RIGHT KIDNEY, ALLOGENEIC, OPEN APPROACH: ICD-10-PCS | Performed by: TRANSPLANT SURGERY

## 2021-06-10 PROCEDURE — 71046 X-RAY EXAM CHEST 2 VIEWS: CPT

## 2021-06-10 PROCEDURE — 84132 ASSAY OF SERUM POTASSIUM: CPT | Performed by: TRANSPLANT SURGERY

## 2021-06-10 PROCEDURE — 87535 HIV-1 PROBE&REVERSE TRNSCRPJ: CPT | Performed by: TRANSPLANT SURGERY

## 2021-06-10 PROCEDURE — 999N000141 HC STATISTIC PRE-PROCEDURE NURSING ASSESSMENT: Performed by: TRANSPLANT SURGERY

## 2021-06-10 PROCEDURE — 82330 ASSAY OF CALCIUM: CPT | Performed by: TRANSPLANT SURGERY

## 2021-06-10 PROCEDURE — 86665 EPSTEIN-BARR CAPSID VCA: CPT | Performed by: TRANSPLANT SURGERY

## 2021-06-10 PROCEDURE — 83036 HEMOGLOBIN GLYCOSYLATED A1C: CPT | Performed by: TRANSPLANT SURGERY

## 2021-06-10 PROCEDURE — 86923 COMPATIBILITY TEST ELECTRIC: CPT | Performed by: STUDENT IN AN ORGANIZED HEALTH CARE EDUCATION/TRAINING PROGRAM

## 2021-06-10 PROCEDURE — 250N000011 HC RX IP 250 OP 636

## 2021-06-10 PROCEDURE — 87389 HIV-1 AG W/HIV-1&-2 AB AG IA: CPT | Performed by: TRANSPLANT SURGERY

## 2021-06-10 PROCEDURE — 86901 BLOOD TYPING SEROLOGIC RH(D): CPT | Performed by: STUDENT IN AN ORGANIZED HEALTH CARE EDUCATION/TRAINING PROGRAM

## 2021-06-10 PROCEDURE — 86803 HEPATITIS C AB TEST: CPT | Performed by: TRANSPLANT SURGERY

## 2021-06-10 PROCEDURE — 87516 HEPATITIS B DNA AMP PROBE: CPT | Performed by: TRANSPLANT SURGERY

## 2021-06-10 PROCEDURE — 370N000017 HC ANESTHESIA TECHNICAL FEE, PER MIN: Performed by: TRANSPLANT SURGERY

## 2021-06-10 PROCEDURE — 85730 THROMBOPLASTIN TIME PARTIAL: CPT | Performed by: TRANSPLANT SURGERY

## 2021-06-10 PROCEDURE — 250N000009 HC RX 250: Performed by: STUDENT IN AN ORGANIZED HEALTH CARE EDUCATION/TRAINING PROGRAM

## 2021-06-10 PROCEDURE — 999N000157 HC STATISTIC RCP TIME EA 10 MIN

## 2021-06-10 PROCEDURE — 250N000009 HC RX 250

## 2021-06-10 PROCEDURE — 81001 URINALYSIS AUTO W/SCOPE: CPT | Performed by: STUDENT IN AN ORGANIZED HEALTH CARE EDUCATION/TRAINING PROGRAM

## 2021-06-10 PROCEDURE — 84156 ASSAY OF PROTEIN URINE: CPT | Performed by: STUDENT IN AN ORGANIZED HEALTH CARE EDUCATION/TRAINING PROGRAM

## 2021-06-10 PROCEDURE — U0003 INFECTIOUS AGENT DETECTION BY NUCLEIC ACID (DNA OR RNA); SEVERE ACUTE RESPIRATORY SYNDROME CORONAVIRUS 2 (SARS-COV-2) (CORONAVIRUS DISEASE [COVID-19]), AMPLIFIED PROBE TECHNIQUE, MAKING USE OF HIGH THROUGHPUT TECHNOLOGIES AS DESCRIBED BY CMS-2020-01-R: HCPCS | Performed by: PHYSICIAN ASSISTANT

## 2021-06-10 PROCEDURE — 80053 COMPREHEN METABOLIC PANEL: CPT | Performed by: TRANSPLANT SURGERY

## 2021-06-10 PROCEDURE — 82803 BLOOD GASES ANY COMBINATION: CPT

## 2021-06-10 PROCEDURE — 272N000001 HC OR GENERAL SUPPLY STERILE: Performed by: TRANSPLANT SURGERY

## 2021-06-10 PROCEDURE — 360N000078 HC SURGERY LEVEL 5, PER MIN: Performed by: TRANSPLANT SURGERY

## 2021-06-10 PROCEDURE — 85027 COMPLETE CBC AUTOMATED: CPT | Performed by: TRANSPLANT SURGERY

## 2021-06-10 PROCEDURE — 258N000003 HC RX IP 258 OP 636: Performed by: PHYSICIAN ASSISTANT

## 2021-06-10 PROCEDURE — 36415 COLL VENOUS BLD VENIPUNCTURE: CPT | Performed by: STUDENT IN AN ORGANIZED HEALTH CARE EDUCATION/TRAINING PROGRAM

## 2021-06-10 PROCEDURE — 999N000015 HC STATISTIC ARTERIAL MONITORING DAILY

## 2021-06-10 PROCEDURE — 86706 HEP B SURFACE ANTIBODY: CPT | Performed by: TRANSPLANT SURGERY

## 2021-06-10 PROCEDURE — 250N000025 HC SEVOFLURANE, PER MIN: Performed by: TRANSPLANT SURGERY

## 2021-06-10 PROCEDURE — 82803 BLOOD GASES ANY COMBINATION: CPT | Performed by: TRANSPLANT SURGERY

## 2021-06-10 PROCEDURE — 93005 ELECTROCARDIOGRAM TRACING: CPT

## 2021-06-10 PROCEDURE — 86644 CMV ANTIBODY: CPT | Performed by: TRANSPLANT SURGERY

## 2021-06-10 PROCEDURE — 82962 GLUCOSE BLOOD TEST: CPT

## 2021-06-10 PROCEDURE — 87521 HEPATITIS C PROBE&RVRS TRNSC: CPT | Performed by: TRANSPLANT SURGERY

## 2021-06-10 PROCEDURE — 85610 PROTHROMBIN TIME: CPT | Performed by: TRANSPLANT SURGERY

## 2021-06-10 PROCEDURE — 0FYG0Z0 TRANSPLANTATION OF PANCREAS, ALLOGENEIC, OPEN APPROACH: ICD-10-PCS | Performed by: TRANSPLANT SURGERY

## 2021-06-10 PROCEDURE — 258N000003 HC RX IP 258 OP 636

## 2021-06-10 PROCEDURE — C2617 STENT, NON-COR, TEM W/O DEL: HCPCS | Performed by: TRANSPLANT SURGERY

## 2021-06-10 PROCEDURE — 999N000155 HC STATISTIC RAPCV CVP MONITORING

## 2021-06-10 PROCEDURE — 83605 ASSAY OF LACTIC ACID: CPT

## 2021-06-10 PROCEDURE — 82330 ASSAY OF CALCIUM: CPT

## 2021-06-10 PROCEDURE — 82947 ASSAY GLUCOSE BLOOD QUANT: CPT | Performed by: TRANSPLANT SURGERY

## 2021-06-10 PROCEDURE — 82947 ASSAY GLUCOSE BLOOD QUANT: CPT

## 2021-06-10 PROCEDURE — 999N000128 HC STATISTIC PERIPHERAL IV START W/O US GUIDANCE

## 2021-06-10 PROCEDURE — 99207 PR SATISFY VISIT NUMBER: CPT | Performed by: TRANSPLANT SURGERY

## 2021-06-10 PROCEDURE — 86900 BLOOD TYPING SEROLOGIC ABO: CPT | Performed by: STUDENT IN AN ORGANIZED HEALTH CARE EDUCATION/TRAINING PROGRAM

## 2021-06-10 PROCEDURE — 250N000011 HC RX IP 250 OP 636: Performed by: STUDENT IN AN ORGANIZED HEALTH CARE EDUCATION/TRAINING PROGRAM

## 2021-06-10 PROCEDURE — 82150 ASSAY OF AMYLASE: CPT | Performed by: TRANSPLANT SURGERY

## 2021-06-10 PROCEDURE — 258N000003 HC RX IP 258 OP 636: Performed by: STUDENT IN AN ORGANIZED HEALTH CARE EDUCATION/TRAINING PROGRAM

## 2021-06-10 PROCEDURE — U0005 INFEC AGEN DETEC AMPLI PROBE: HCPCS | Performed by: PHYSICIAN ASSISTANT

## 2021-06-10 PROCEDURE — 84132 ASSAY OF SERUM POTASSIUM: CPT

## 2021-06-10 PROCEDURE — 87340 HEPATITIS B SURFACE AG IA: CPT | Performed by: TRANSPLANT SURGERY

## 2021-06-10 PROCEDURE — 71046 X-RAY EXAM CHEST 2 VIEWS: CPT | Mod: 26 | Performed by: RADIOLOGY

## 2021-06-10 PROCEDURE — 86645 CMV ANTIBODY IGM: CPT | Performed by: TRANSPLANT SURGERY

## 2021-06-10 PROCEDURE — 36415 COLL VENOUS BLD VENIPUNCTURE: CPT | Performed by: TRANSPLANT SURGERY

## 2021-06-10 PROCEDURE — 84295 ASSAY OF SERUM SODIUM: CPT | Performed by: TRANSPLANT SURGERY

## 2021-06-10 PROCEDURE — 93010 ELECTROCARDIOGRAM REPORT: CPT | Mod: 59 | Performed by: INTERNAL MEDICINE

## 2021-06-10 PROCEDURE — P9016 RBC LEUKOCYTES REDUCED: HCPCS | Performed by: STUDENT IN AN ORGANIZED HEALTH CARE EDUCATION/TRAINING PROGRAM

## 2021-06-10 PROCEDURE — 86704 HEP B CORE ANTIBODY TOTAL: CPT | Performed by: TRANSPLANT SURGERY

## 2021-06-10 PROCEDURE — 710N000011 HC RECOVERY PHASE 1, LEVEL 3, PER MIN: Performed by: TRANSPLANT SURGERY

## 2021-06-10 PROCEDURE — 86850 RBC ANTIBODY SCREEN: CPT | Performed by: STUDENT IN AN ORGANIZED HEALTH CARE EDUCATION/TRAINING PROGRAM

## 2021-06-10 PROCEDURE — 82810 BLOOD GASES O2 SAT ONLY: CPT

## 2021-06-10 PROCEDURE — 84295 ASSAY OF SERUM SODIUM: CPT

## 2021-06-10 PROCEDURE — 83690 ASSAY OF LIPASE: CPT | Performed by: TRANSPLANT SURGERY

## 2021-06-10 PROCEDURE — 83605 ASSAY OF LACTIC ACID: CPT | Performed by: TRANSPLANT SURGERY

## 2021-06-10 DEVICE — STENT URETERAL DBL PIGTAIL INLAY 6FRX16CM G14865
Type: IMPLANTABLE DEVICE | Site: URETER | Status: NON-FUNCTIONAL
Removed: 2021-07-08

## 2021-06-10 RX ORDER — PIPERACILLIN SODIUM, TAZOBACTAM SODIUM 3; .375 G/15ML; G/15ML
3.38 INJECTION, POWDER, LYOPHILIZED, FOR SOLUTION INTRAVENOUS ONCE
Status: COMPLETED | OUTPATIENT
Start: 2021-06-10 | End: 2021-06-10

## 2021-06-10 RX ORDER — NICOTINE POLACRILEX 4 MG
15-30 LOZENGE BUCCAL
Status: DISCONTINUED | OUTPATIENT
Start: 2021-06-10 | End: 2021-06-11

## 2021-06-10 RX ORDER — LIDOCAINE/PRILOCAINE 2.5 %-2.5%
CREAM (GRAM) TOPICAL PRN
Status: ON HOLD | COMMUNITY
End: 2021-06-21

## 2021-06-10 RX ORDER — DEXTROSE MONOHYDRATE 25 G/50ML
25-50 INJECTION, SOLUTION INTRAVENOUS
Status: DISCONTINUED | OUTPATIENT
Start: 2021-06-10 | End: 2021-06-11

## 2021-06-10 RX ORDER — FENTANYL CITRATE 50 UG/ML
INJECTION, SOLUTION INTRAMUSCULAR; INTRAVENOUS PRN
Status: DISCONTINUED | OUTPATIENT
Start: 2021-06-10 | End: 2021-06-11

## 2021-06-10 RX ORDER — MONTELUKAST SODIUM 10 MG/1
10 TABLET ORAL AT BEDTIME
COMMUNITY

## 2021-06-10 RX ORDER — SODIUM CHLORIDE, SODIUM LACTATE, POTASSIUM CHLORIDE, CALCIUM CHLORIDE 600; 310; 30; 20 MG/100ML; MG/100ML; MG/100ML; MG/100ML
INJECTION, SOLUTION INTRAVENOUS CONTINUOUS PRN
Status: DISCONTINUED | OUTPATIENT
Start: 2021-06-10 | End: 2021-06-11

## 2021-06-10 RX ORDER — LIDOCAINE 40 MG/G
CREAM TOPICAL
Status: DISCONTINUED | OUTPATIENT
Start: 2021-06-10 | End: 2021-06-11 | Stop reason: HOSPADM

## 2021-06-10 RX ORDER — PROPOFOL 10 MG/ML
INJECTION, EMULSION INTRAVENOUS PRN
Status: DISCONTINUED | OUTPATIENT
Start: 2021-06-10 | End: 2021-06-11

## 2021-06-10 RX ORDER — OCTREOTIDE ACETATE 100 UG/ML
100 INJECTION, SOLUTION INTRAVENOUS; SUBCUTANEOUS ONCE
Status: DISCONTINUED | OUTPATIENT
Start: 2021-06-10 | End: 2021-06-11 | Stop reason: HOSPADM

## 2021-06-10 RX ORDER — LIDOCAINE HYDROCHLORIDE 20 MG/ML
INJECTION, SOLUTION INFILTRATION; PERINEURAL PRN
Status: DISCONTINUED | OUTPATIENT
Start: 2021-06-10 | End: 2021-06-11

## 2021-06-10 RX ADMIN — PIPERACILLIN SODIUM AND TAZOBACTAM SODIUM 3.38 G: 3; .375 INJECTION, POWDER, LYOPHILIZED, FOR SOLUTION INTRAVENOUS at 18:50

## 2021-06-10 RX ADMIN — ROCURONIUM BROMIDE 20 MG: 10 INJECTION INTRAVENOUS at 20:14

## 2021-06-10 RX ADMIN — FENTANYL CITRATE 50 MCG: 50 INJECTION, SOLUTION INTRAMUSCULAR; INTRAVENOUS at 17:54

## 2021-06-10 RX ADMIN — MYCOPHENOLATE MOFETIL 1000 MG: 500 INJECTION, POWDER, LYOPHILIZED, FOR SOLUTION INTRAVENOUS at 19:15

## 2021-06-10 RX ADMIN — SODIUM CHLORIDE 500 MG: 9 INJECTION, SOLUTION INTRAVENOUS at 18:45

## 2021-06-10 RX ADMIN — FENTANYL CITRATE 100 MCG: 50 INJECTION, SOLUTION INTRAMUSCULAR; INTRAVENOUS at 18:20

## 2021-06-10 RX ADMIN — HUMAN INSULIN 1.5 UNITS/HR: 100 INJECTION, SOLUTION SUBCUTANEOUS at 21:19

## 2021-06-10 RX ADMIN — ANTI-THYMOCYTE GLOBULIN (RABBIT) 175 MG: 5 INJECTION, POWDER, LYOPHILIZED, FOR SOLUTION INTRAVENOUS at 19:15

## 2021-06-10 RX ADMIN — CISATRACURIUM BESYLATE 4 MG: 2 INJECTION INTRAVENOUS at 22:54

## 2021-06-10 RX ADMIN — SODIUM CHLORIDE, POTASSIUM CHLORIDE, SODIUM LACTATE AND CALCIUM CHLORIDE: 600; 310; 30; 20 INJECTION, SOLUTION INTRAVENOUS at 21:00

## 2021-06-10 RX ADMIN — SODIUM CHLORIDE, POTASSIUM CHLORIDE, SODIUM LACTATE AND CALCIUM CHLORIDE: 600; 310; 30; 20 INJECTION, SOLUTION INTRAVENOUS at 21:19

## 2021-06-10 RX ADMIN — FENTANYL CITRATE 50 MCG: 50 INJECTION, SOLUTION INTRAMUSCULAR; INTRAVENOUS at 23:56

## 2021-06-10 RX ADMIN — LIDOCAINE HYDROCHLORIDE 100 MG: 20 INJECTION, SOLUTION INFILTRATION; PERINEURAL at 18:20

## 2021-06-10 RX ADMIN — MICAFUNGIN SODIUM 100 MG: 50 INJECTION, POWDER, LYOPHILIZED, FOR SOLUTION INTRAVENOUS at 18:50

## 2021-06-10 RX ADMIN — FENTANYL CITRATE 100 MCG: 50 INJECTION, SOLUTION INTRAMUSCULAR; INTRAVENOUS at 19:25

## 2021-06-10 RX ADMIN — PROPOFOL 200 MG: 10 INJECTION, EMULSION INTRAVENOUS at 18:20

## 2021-06-10 RX ADMIN — ROCURONIUM BROMIDE 50 MG: 10 INJECTION INTRAVENOUS at 18:20

## 2021-06-10 RX ADMIN — SODIUM CHLORIDE, POTASSIUM CHLORIDE, SODIUM LACTATE AND CALCIUM CHLORIDE: 600; 310; 30; 20 INJECTION, SOLUTION INTRAVENOUS at 18:20

## 2021-06-10 RX ADMIN — CISATRACURIUM BESYLATE 4 MG: 2 INJECTION INTRAVENOUS at 23:37

## 2021-06-10 RX ADMIN — MIDAZOLAM 2 MG: 1 INJECTION INTRAMUSCULAR; INTRAVENOUS at 18:00

## 2021-06-10 ASSESSMENT — MIFFLIN-ST. JEOR: SCORE: 1713.88

## 2021-06-10 NOTE — PROGRESS NOTES
Pt transported to pre-op via bed. Belongings remain in patients room. His back pack, clothes, shoes, necklace and ring, and cell phone. Report given to 3 C Rn.

## 2021-06-10 NOTE — PROGRESS NOTES
Admission          6/10/2021  7:37 AM  -----------------------------------------------------------  Reason for admission: kidney/panc transplant direct admit  Primary team notified of pt arrival.  Admitted from: home  Via: walked in  Accompanied by: self  Belongings: Placed in closet  Admission Profile: complete  Teaching: orientation to unit and call light- call light within reach, call don't fall, use of console, meal times, when to call for the RN, and enforced importance of safety   Access: right PIV  Telemetry: no tele orders  Ht./Wt.: complete  Code Status verified on armband: yes  2 RN Skin Assessment Completed By: marty, skin intact, left AV fistula WDL.  Med Rec completed: yes  Bed surface reassessed with algorithm and charted: yes  New bed surface ordered: no  Suction/Ambu bag/Flowmeter at bedside: yes    Pt status:    Temp:  [98.2  F (36.8  C)] 98.2  F (36.8  C)  Pulse:  [63] 63  Resp:  [16] 16  BP: (144)/(83) 144/83

## 2021-06-10 NOTE — PHARMACY-ADMISSION MEDICATION HISTORY
Admission Medication History Completed by Pharmacy    See [facility] Admission Navigator for allergy information, preferred outpatient pharmacy, prior to admission medications and immunization status.     Medication History Sources:   - Patient and Care Everywhere    Changes made to PTA medication list (reason):    Added: Lidocaine prilocaine 2.5%-2.5% cream - Apply topically on dialysis port during dialysis runs on MWF, omeprazole DR 20 mg capsules - Take 1 capsule by mouth every morning, montelukast 10 mg tablets - Take 1 tablet by mouth at bedtime.     Deleted: None    Changed: Doxazosin 2 mg tablets (2 tabs at bedtime)  --> doxazosin 4 mg tablets (I tab at bedtime), furosemide 20 mg tablets (4 tabs BID) --> furosemide 80 mg tablets (1 tab BID), hydralazine 25 mg tablets (4 tabs TID) --> hydralazine 100 mg tablets (1 tab TID), clonidine 0.1 mg (2 tabs BID) ---> clonidine 0.2 mg (1 tab BID)    Additional Information:    Patient was a good historian, was able to list medications and strengths unprompted    Patient reports not testing blood sugars often due to lowering of A1c (patient reports A1c of 6.2%).    Lidocaine-prilocaine cream applied to dialysis port during dialysis on MWF    Prior to Admission medications    Medication Sig Last Dose Taking? Auth Provider   amLODIPine (NORVASC) 10 MG tablet Take 10 mg by mouth daily Take 1 tablet by mouth in the evening 6/9/2021 at 1800 Yes Unknown, Entered By History   atorvastatin (LIPITOR) 80 MG tablet Take 80 mg by mouth daily  6/9/2021 at 2100 Yes Reported, Patient   calcium acetate (PHOSLO) 667 MG CAPS capsule Take 1,334 mg by mouth 3 times daily (with meals) 6/10/2021 at 0600 Yes Unknown, Entered By History   calcium carbonate (OS-SHAY) 1500 (600 Ca) MG tablet Take 600 mg by mouth 2 times daily (with meals)  6/10/2021 at 0600am Yes Reported, Patient   calcium carbonate (TUMS) 500 MG chewable tablet Take 1 chew tab by mouth as needed for heartburn  6/10/2021 at 0600 Yes  Reported, Patient   CLONIDINE HCL PO Take 0.2 mg by mouth 2 times daily Take 1 tablet by mouth twice daily 6/10/2021 at 0600 Yes Reported, Patient   doxazosin (CARDURA) 4 MG tablet Take 4 mg by mouth At Bedtime Take 1 tablet by mouth at bedtime 6/9/2021 at 2100 Yes Reported, Patient   furosemide (LASIX) 80 MG tablet Take 80 mg by mouth 2 times daily  6/10/2021 at 0600 Yes Ebony Rand APRN CNP   hydrALAZINE (APRESOLINE) 100 MG tablet Take 100 mg by mouth 3 times daily  6/10/2021 at 0600 Yes Reported, Patient   insulin pen needle (BD RAÚL U/F) 32G X 4 MM miscellaneous use as directed with lantus pen once a day 6/9/2021 at 2100 Yes Reported, Patient   LANTUS SOLOSTAR 100 UNIT/ML soln Inject 14 Units Subcutaneous At Bedtime  6/9/2021 at 2100 Yes Reported, Patient   lidocaine-prilocaine (EMLA) 2.5-2.5 % external cream Apply topically as needed for moderate pain Apply to dialysis port during dialysis runs on MWF 6/9/2021 at Unknown time Yes Unknown, Entered By History   losartan (COZAAR) 100 MG tablet Take 100 mg by mouth daily 6/10/2021 at 0600 Yes Reported, Patient   montelukast (SINGULAIR) 10 MG tablet Take 10 mg by mouth At Bedtime 6/9/2021 at 2100 Yes Unknown, Entered By History   Multiple Vitamins-Minerals (OCUVITE PO) Take 2 tablets by mouth daily 6/9/2021 at 2100 Yes Unknown, Entered By History   omeprazole (PRILOSEC) 20 MG DR capsule Take 20 mg by mouth every morning 6/10/2021 at 0600 Yes Unknown, Entered By History   ondansetron (ZOFRAN-ODT) 4 MG ODT tab Take 4 mg by mouth every 8 hours as needed for nausea 6/10/2021 at 0600 Yes Unknown, Entered By History   vitamin B-Complex Take 1 tablet by mouth 2 times daily  6/10/2021 at 0600 Yes Unknown, Entered By History   vitamin D3 (CHOLECALCIFEROL) 2000 units (50 mcg) tablet Take 1 tablet (2,000 Units) by mouth daily 6/9/2021 at 2100 Yes Oly Martinez MD   VITAMIN E PO Take 1 tablet by mouth daily 6/9/2021 at 2100 Yes Unknown, Entered By History            Date completed: 06/10/21    Medication history completed by: Pascual Silva

## 2021-06-10 NOTE — PROGRESS NOTES
PATHOLOGY HLA CROSSMATCH CONSULTATION: DONOR/RECIPIENT  VIRTUAL CROSSMATCH - Kidney/pancreas  Consultation Date: 2021  Consultation Requested by: Dr. Julio Cesar Vega    Regarding: Compatibility of  donor organ UNOS #[id number] from OPO: MNOP with Erik Cramer    Findings: Regarding a virtual crossmatch between Erik Cramer and  donor listed above (match ID [id number]):  The most recent (3/1/21) and five (5) additional patient serum/sera  were analyzed.  The patient has no antibodies listed with HLA specificity against the donor organ.      Record Review Indicates: I personally reviewed the most recent serum, the historic peak sera, and all other sera with solid-phase HLA Single Antigen test results:  The patient has no HLA antibodies against the donor organ.     The results of this virtual XM are:   -most recent serum: compatible   -peak #1: compatible  -peak #2: compatible    Disclaimer: Clinical judgement must take into account other factors, such as non-HLA antibodies not detected in the assay. The VXM gives probabilities only.  The probability does not account for the potential for auto-antibodies that may be present in the patient's serum.  These autoantibodies may render the physical crossmatch falsely positive, and would be detected by an autologous crossmatch.  When possible, confirm findings with prospective allogeneic and autologous flow crossmatches before going to transplant as clinically indicated.     Miguel Ludwig, PhD    Miguel Ludwig, PhD,[facility]  Medical Director, Immunology/Histocompatibility Laboratory  Pager [phone]

## 2021-06-10 NOTE — LETTER
[facility] [address] [address]  [phone]    FACSIMILE TRANSMITTAL SHEET    TO: [facility]    _____URGENT _____REVIEW ONLY _____PLEASE COMMENT____PLEASE REPLY    NOTES/COMMENTS: Attached please find initial clinical/demographic information for Erik Cramer.  Requesting home RN.  Anticipate discharge end of week.    Cristina Boland, RN BSN, PHN, ACM-RN  7A RN Care Coordinator  Phone: [phone]  Pager [phone]    6/15/2021 4:29 PM                                        IF YOU DID NOT RECEIVE THE CORRECT NUMBER OF PAGES OR THE FAX DID NOT COME THROUGH CLEARLY, PLEASE CALL THE SENDER     CONFIDENTIALITY STATEMENT: Confidential information that may accompany this transmission contains protected health information under state and federal law and is legally privileged. This information is intended only for the use of the individual or entity named above and may be used only for carrying out treatment, payment or other healthcare operations. The recipient or person responsible for delivering this information is prohibited by law from disclosing this information without proper authorization to any other party, unless required to do so by law or regulation. If you are not the intended recipient, you are hereby notified that any review, dissemination, distribution, or copying of this message is strictly prohibited. If you have received this communication in error, please destroy the materials and contact us immediately by calling the number listed above. No response indicates that the information was received by the appropriate authorized party

## 2021-06-10 NOTE — TELEPHONE ENCOUNTER
"TRANSPLANT OR REPORT    Organ: KP  Laterality (if known): TBD  Organ Location: [place]    UNOS ID: [id number]  Donor OR Time: 1100  Expected/Actual Cross Clamp Time: 1300  Expected Organ Arrival Time: 1530    Surgeon: Marcia/Gary  Time in OR: 1530  Time in 3C (N/A for LI): 1430    Recipient Details  Admission ETA: Currently admitted  Unit: 6B  Isolation: No  Latex Allergy: No  : No  Diagnosis: DM/ESRD    Liver Transplants  Bypass: N/A  Hemodialysis: N/A  ~ \"RENAL STAFF TEACHING SERVICE MEDICINE\" : N/A  ~ CRRT Resource Nurse: N/A  (Telephone Number for CRRT [phone])    Kidney/Panc Transplants  XM Status (Need to wait for XM?): Currently in process    Liver or KP/PA Recipients - Vessel Banking:  Donor has positive serologies for HIV/HCV/HBV: No  Donor has risk criteria for HIV/HCV/HBV: No      Transplant Coordinator Contact Info: [phone]      Vessel Bank Information  Transplant hospitals must not store a donor s extra vessels if the donor has tested positive for any of the following:   - HIV by antibody, antigen, or nucleic acid test (YISSEL)   - Hepatitis B surface antigen (HBsAg)   - Hepatitis B (HBV) by YISSEL   - Hepatitis C (HCV) by antibody or YISSEL     Extra vessels from donors that do not test positive for HIV, HBV, or HCV as above may be stored      "

## 2021-06-10 NOTE — LETTER
Transition Communication Hand-off for Care Transitions to Next Level of Care Provider    Name: Erik Cramer  : 1980  MRN #: [id number]  Primary Care Provider: Figueroa Washington     Primary Clinic: [facility] [address] [place] [id number]     Reason for Hospitalization:  Pancreas transplant candidate [Z76.82]  Pancreas transplant status (H) [Z94.83]  Admit Date/Time: 6/10/2021  7:37 AM  Discharge Date: 2021  Payor Source: Payor: MEDICARE / Plan: MEDICARE / Product Type: Medicare /            Reason for Communication Hand-off Referral: Other Continuity of care    Discharge Plan:Home with outpatient follow up and home care       Concern for non-adherence with plan of care:   No  Discharge Needs Assessment:  Needs      Most Recent Value   Equipment Currently Used at Home  none          Follow-up specialty is recommended: Yes    Follow-up plan:    Future Appointments   Date Time Provider Department Center   2021  6:30 AM [facility]   2021  7:00 AM [facility] SIP[facility] INFUSION NURSE [facility]   2021  8:00 AM Bertin Brand MD [facility]   2021  6:30 AM [facility]   2021  7:00 AM [facility] SIPC INFUSION NURSE [facility]   2021  8:00 AM Bertin Brand MD [facility]   2021  4:30 PM Russell Kennedy MD [facility]   2021  9:00 AM Transplant, [facility]   2021  2:00 PM Leilani Vincent NP TXS [facility]   2021  9:30 AM Transplant, [facility]   2021  2:00 PM Lobo Bartlett, [facility]   10/11/2021 10:00 AM Transplant, [facility]   10/11/2021 11:00 AM Dora Culver, MSW TXO [facility]   12/3/2021 10:30 AM Transplant, [facility]       Any outstanding tests or procedures:        Referrals     Future Labs/Procedures    Home care nursing referral     Comments:    [facility] [phone], Fax [phone]    Home Care visits to start  following completion of daily ATC Clinic visits ([phone])     Skilled nursing visits to monitor cardiac and resp status   Monitor hydration, nutrition, urinary and bowel status   Monitor healing of incision     Instruct in medications and eval effects    Assist / teach patient to obtain and record lab results in handbook     Lab draws(mornings) per orders, report results to Post Transplant Coordinator: Twyla Chang, RN #[phone]  Fax # [phone]      Your provider has ordered home care nursing services. If you have not been contacted within 2 days of your discharge please call the inpatient department phone number at [phone] .      Documentation of Face to Face and Certification for Home Health Services    I certify that patient: Erik Cramer is under my care and that I, or a nurse practitioner or physician's assistant working with me, had a face-to-face encounter that meets the physician face-to-face encounter requirements with this patient on: June 14, 2021.    This encounter with the patient was in whole, or in part, for the following medical condition, which is the primary reason for home health care: Kidney Pancreas Transplants.    I certify that, based on my findings, the following services are medically necessary home health services: Nursing    My clinical findings support the need for the above services because: Skilled nursing visits to monitor cardiac and resp status. Monitor hydration, nutrition, urinary and bowel status. Monitor healing of incision. Instruct in medications and eval effects. Assist / teach patient to obtain and record lab results in handbook. Physical Therapy to address functional mobility, transfers, gait and endurance.     Further, I certify that my clinical findings support that this patient is homebound (i.e. absences from home require considerable and taxing effort and are for medical reasons or [employer] services or infrequently or of short duration when for  other reasons) because: Leaving home is medically contraindicated for the following reason(s): Infection risk / immunocompromised state where it is safer for them to receive services in the home.     Based on the above findings. I certify that this patient is confined to the home and needs intermittent skilled nursing care, physical therapy and/or speech therapy.  The patient is under my care, and I have initiated the establishment of the plan of care.  This patient will be followed by a physician who will periodically review the plan of care.  Physician/Provider to provide follow up care: Figueroa Washington    Attending hospital physician (the Medicare certified Price provider): Julio Cesar Vega*  Physician Signature: See electronic signature associated with these discharge orders.  Date: 6/14/2021            Key Recommendations:  Please see attached AVS.  Patient cleared to discharge home today.  ATC requested.  [facility] anticipates start of care on Thursday 6/24/2021.     Brittnee Boland RN    AVS/Discharge Summary is the source of truth; this is a helpful guide for improved communication of patient story

## 2021-06-10 NOTE — H&P
[facility]     History and Physical  Solid Organ Transplant     Date of Admission:  6/10/2021    Erik Cramer  MRN: [id number]  male  41 year old    PCP: Figueroa Washington    Assessment and Plan:     40 year old male with a past medical history significant for end stage kidney disease Other past medical history includes HD, T2DM, HTN, BMI>30, bilateral shoulder pain, and atypical nevi with AV fistula.     Past surgical history of lap moise () for symptomatic biliary dyskinesia. Patient was notified as an acceptable  donor organ became available and presented for further pre-operative work-up.  Patient was informed of the risks and benefits regarding  donor kidney pancreas transplantation, and has elected to proceed.     - STAT pre-op labs, COVID-19  - Pre-op labs, including BMP, CBC, coag panel, viral serologies  - Immunosuppression therapy               - Solmedrol               - Mycophenalate              - Ant-thymoglobulin, intermediate protocol, 2 mg/kg intra-op. PRA 32.  - EKG, CXR  - Nephrology consult  - OR today @~3:30PM  - Surgical Consent completed      __________________________________________________________   History is obtained from the patient     History of Present Illness  Erik Cramer is a 40 year old male with a PMH with HD, T2DM, HTN, BMI>30, bilateral shoulder pain, and atypical nevi with AV fistula.      The patient is on dialysis.  Modality: hemodialysis  H/o blood transfusion: no  No history of Chronic anticoagulation, etc  Other past medical history includes      At the time of admission, the patient did not show any signs of infection and was at his baseline in good health. He is COVID negative, and understands the risks/benefits associated with this transplantation. He is agreeable with the plan and has elected to go forward with pancrease kidney transplantation.    Patient was notified as an acceptable   donor organ became available and presented for further pre-operative work-up.  Patient was informed of the risks and benefits regarding  donor organ transplantation, and has elected to proceed.     Discussed with fellow   ________________________    41 yo male with Type 2DM  On insulin 18 units/d of lantus  A1C 6.2  BMI 30.4     The patient is on dialysis. Modality: HD, since Oct 2019  Dialysis days: MWF  Last dialysis run: 21  Does/does not make urine prior to transplantation.    H/o blood transfusion: None  Chronic anticoagulation: No  Other past medical history includes T2DM, HTN, Gallstones, Anemia  Other past surgical history includes lap cholecystectomy    _______________________________  Stefano Piedra MD  Transplant Surgery    Patient Active Problem List   Diagnosis     End stage kidney disease (H)     Hypertension secondary to other renal disorders     Secondary renal hyperparathyroidism (H)     Type 2 diabetes mellitus (H)     Hypertension     Anemia in chronic kidney disease     Vitamin D deficiency     Gallstones, common bile duct     Kidney transplant candidate       Past Surgical History:   Past Surgical History:   Procedure Laterality Date     ESOPHAGOSCOPY, GASTROSCOPY, DUODENOSCOPY (EGD), COMBINED N/A 12/3/2019    Procedure: ESOPHAGOGASTRODUODENOSCOPY, WITH BIOPSY;  Surgeon: Guy Bar MD;  [facility]     EYE SURGERY Left     retinal detachment     LAPAROSCOPIC CHOLECYSTECTOMY N/A 12/10/2019    Procedure: CHOLECYSTECTOMY, LAPAROSCOPIC;  Surgeon: Zoie Gray MD;  Location[facility]     ORTHOPEDIC SURGERY Left     Trigger finger        Past Medical History:   Past Medical History:   Diagnosis Date     Anemia in chronic kidney disease      Chronic kidney disease, stage 5 (H)      Hypertension      Type 2 diabetes mellitus (H)      Vitamin D deficiency      Vitamin D deficiency        Social History:   Social History     Tobacco Use     Smoking status: Never  Smoker     Smokeless tobacco: Never Used   Substance Use Topics     Alcohol use: Yes     Frequency: Never     Comment: Social       Family History:   Family History   Problem Relation Age of Onset     Breast Cancer Mother      Diabetes Brother         Allergies:   Allergies   Allergen Reactions     Metoprolol Anaphylaxis       Active Medications:   Current Outpatient Medications   Medication Sig Dispense Refill     amLODIPine (NORVASC) 10 MG tablet Take 10 mg by mouth daily       atorvastatin (LIPITOR) 80 MG tablet Take 80 mg by mouth daily   2     blood glucose (ACCU-CHEK GUIDE) test strip 1 strip       blood glucose monitoring (ACCU-CHEK FASTCLIX) lancets Testing blood sugars 2 time(s) a day.  Indications: Type 2 Diabetes       calcium acetate (PHOSLO) 667 MG CAPS capsule Take 1,334 mg by mouth 3 times daily (with meals)       calcium carbonate (OS-SHAY) 1500 (600 Ca) MG tablet Take 600 mg by mouth 3 times daily   12     calcium carbonate (TUMS) 500 MG chewable tablet Take 1 chew tab by mouth as needed for heartburn        cloNIDine (CATAPRES) 0.1 MG tablet Take 0.2 mg by mouth 2 times daily        doxazosin (CARDURA) 2 MG tablet Take 2 mg by mouth       furosemide (LASIX) 20 MG tablet Take 4 tablets (80 mg) by mouth 2 times daily       hydrALAZINE (APRESOLINE) 25 MG tablet Take 100 mg by mouth 3 times daily   0     insulin pen needle (BD RAÚL U/F) 32G X 4 MM miscellaneous use as directed with lantus pen once a day       LANTUS SOLOSTAR 100 UNIT/ML soln Inject 20 Units Subcutaneous At Bedtime   2     losartan (COZAAR) 100 MG tablet Take 100 mg by mouth daily       Multiple Vitamins-Minerals (OCUVITE PO) Take 2 tablets by mouth daily       ondansetron (ZOFRAN-ODT) 4 MG ODT tab Take 4 mg by mouth every 8 hours as needed for nausea       vitamin B-Complex Take 1 tablet by mouth daily       vitamin D3 (CHOLECALCIFEROL) 2000 units (50 mcg) tablet Take 1 tablet (2,000 Units) by mouth daily 90 tablet 0     VITAMIN E PO  Take 1 tablet by mouth daily         ROS:  Otherwise negative    Physical Examination:   Vital Signs: There were no vitals taken for this visit.  Awake and alert  RRR, normal S1, S2  Non labored breathing  Soft, non distended, non tender  Extremities for fistula  No edema  CN II- XII intact    Labs/Imaging/Other:  BMPNo lab results found in last 7 days.  CBCNo lab results found in last 7 days.  INRNo lab results found in last 7 days.   AST/ALT & Alk PhosNo lab results found in last 7 days.  Bili  Recent Labs   Lab Test 08/30/20  2338 07/29/19  1252   BILITOTAL 0.4 0.3     Lipase/AmlyaseNo lab results found in last 7 days.   Imaging: No results found.    Admitting Diagnosis: No diagnosis found.

## 2021-06-10 NOTE — LETTER
[facility] [address] [address]  [phone]    FACSIMILE TRANSMITTAL SHEET    TO:  [facility],  [phone], Fax [phone]    _____URGENT _____REVIEW ONLY _____PLEASE COMMENT____PLEASE REPLY    NOTES/COMMENTS: Attached please find final discharge orders for Erik ReyeChristopher Angy.      Cristina Boland, RN BSN, PHN, ACM-RN  7A RN Care Coordinator  Phone: [phone]  Pager [phone]    6/21/2021 2:18 PM                                      IF YOU DID NOT RECEIVE THE CORRECT NUMBER OF PAGES OR THE FAX DID NOT COME THROUGH CLEARLY, PLEASE CALL THE SENDER     CONFIDENTIALITY STATEMENT: Confidential information that may accompany this transmission contains protected health information under state and federal law and is legally privileged. This information is intended only for the use of the individual or entity named above and may be used only for carrying out treatment, payment or other healthcare operations. The recipient or person responsible for delivering this information is prohibited by law from disclosing this information without proper authorization to any other party, unless required to do so by law or regulation. If you are not the intended recipient, you are hereby notified that any review, dissemination, distribution, or copying of this message is strictly prohibited. If you have received this communication in error, please destroy the materials and contact us immediately by calling the number listed above. No response indicates that the information was received by the appropriate authorized party

## 2021-06-11 ENCOUNTER — DOCUMENTATION ONLY (OUTPATIENT)
Dept: TRANSPLANT | Facility: CLINIC | Age: 41
End: 2021-06-11

## 2021-06-11 ENCOUNTER — APPOINTMENT (OUTPATIENT)
Dept: ULTRASOUND IMAGING | Facility: CLINIC | Age: 41
DRG: 008 | End: 2021-06-11
Attending: STUDENT IN AN ORGANIZED HEALTH CARE EDUCATION/TRAINING PROGRAM
Payer: MEDICARE

## 2021-06-11 ENCOUNTER — APPOINTMENT (OUTPATIENT)
Dept: GENERAL RADIOLOGY | Facility: CLINIC | Age: 41
DRG: 008 | End: 2021-06-11
Attending: SURGERY
Payer: MEDICARE

## 2021-06-11 LAB
AMYLASE SERPL-CCNC: 102 U/L (ref 30–110)
ANION GAP SERPL CALCULATED.3IONS-SCNC: 12 MMOL/L (ref 3–14)
APTT PPP: 34 SEC (ref 22–37)
BASE DEFICIT BLDA-SCNC: 1.5 MMOL/L
BASE DEFICIT BLDA-SCNC: 3.2 MMOL/L
BASOPHILS # BLD AUTO: 0 10E9/L (ref 0–0.2)
BASOPHILS NFR BLD AUTO: 0.1 %
BUN SERPL-MCNC: 42 MG/DL (ref 7–30)
CA-I BLD-MCNC: 4.3 MG/DL (ref 4.4–5.2)
CA-I BLD-MCNC: 4.6 MG/DL (ref 4.4–5.2)
CALCIUM SERPL-MCNC: 9 MG/DL (ref 8.5–10.1)
CELL TYPE ALLO: NORMAL
CELL TYPE AUTO: NORMAL
CHANNELSHIFTALLOB1: -47
CHANNELSHIFTALLOB2: -48
CHANNELSHIFTALLOT1: -40
CHANNELSHIFTALLOT2: -42
CHANNELSHIFTAUTOB1: -46
CHANNELSHIFTAUTOB2: -51
CHANNELSHIFTAUTOT1: -21
CHANNELSHIFTAUTOT2: -21
CHLORIDE SERPL-SCNC: 100 MMOL/L (ref 94–109)
CO2 SERPL-SCNC: 19 MMOL/L (ref 20–32)
COMMENT ALLOB2: NORMAL
CREAT SERPL-MCNC: 7.93 MG/DL (ref 0.66–1.25)
CROSSMATCHDATEALLO: NORMAL
CROSSMATCHDATEAUTO: NORMAL
DIFFERENTIAL METHOD BLD: ABNORMAL
DONOR ALLO: NORMAL
DONOR AUTO: NORMAL
DONOR IDENTIFICATION: NORMAL
DONORCELLDATE ALLO: NORMAL
DONORCELLDATE AUTO: NORMAL
DSA COMMENTS: NORMAL
DSA PRESENT: NO
DSA TEST METHOD: NORMAL
EOSINOPHIL # BLD AUTO: 0 10E9/L (ref 0–0.7)
EOSINOPHIL NFR BLD AUTO: 0 %
ERYTHROCYTE [DISTWIDTH] IN BLOOD BY AUTOMATED COUNT: 16.7 % (ref 10–15)
ERYTHROCYTE [DISTWIDTH] IN BLOOD BY AUTOMATED COUNT: 16.9 % (ref 10–15)
GFR SERPL CREATININE-BSD FRML MDRD: 8 ML/MIN/{1.73_M2}
GLUCOSE BLD-MCNC: 134 MG/DL (ref 70–99)
GLUCOSE BLD-MCNC: 142 MG/DL (ref 70–99)
GLUCOSE BLDC GLUCOMTR-MCNC: 105 MG/DL (ref 70–99)
GLUCOSE BLDC GLUCOMTR-MCNC: 111 MG/DL (ref 70–99)
GLUCOSE BLDC GLUCOMTR-MCNC: 112 MG/DL (ref 70–99)
GLUCOSE BLDC GLUCOMTR-MCNC: 117 MG/DL (ref 70–99)
GLUCOSE BLDC GLUCOMTR-MCNC: 118 MG/DL (ref 70–99)
GLUCOSE BLDC GLUCOMTR-MCNC: 119 MG/DL (ref 70–99)
GLUCOSE BLDC GLUCOMTR-MCNC: 120 MG/DL (ref 70–99)
GLUCOSE BLDC GLUCOMTR-MCNC: 121 MG/DL (ref 70–99)
GLUCOSE BLDC GLUCOMTR-MCNC: 121 MG/DL (ref 70–99)
GLUCOSE BLDC GLUCOMTR-MCNC: 123 MG/DL (ref 70–99)
GLUCOSE BLDC GLUCOMTR-MCNC: 125 MG/DL (ref 70–99)
GLUCOSE BLDC GLUCOMTR-MCNC: 137 MG/DL (ref 70–99)
GLUCOSE BLDC GLUCOMTR-MCNC: 137 MG/DL (ref 70–99)
GLUCOSE SERPL-MCNC: 129 MG/DL (ref 70–99)
HCO3 BLD-SCNC: 21 MMOL/L (ref 21–28)
HCO3 BLD-SCNC: 23 MMOL/L (ref 21–28)
HCT VFR BLD AUTO: 20.3 % (ref 40–53)
HCT VFR BLD AUTO: 26.4 % (ref 40–53)
HGB BLD-MCNC: 10 G/DL (ref 13.3–17.7)
HGB BLD-MCNC: 6.6 G/DL (ref 13.3–17.7)
HGB BLD-MCNC: 7.1 G/DL (ref 13.3–17.7)
HGB BLD-MCNC: 7.3 G/DL (ref 13.3–17.7)
HGB BLD-MCNC: 8.7 G/DL (ref 13.3–17.7)
HGB BLD-MCNC: 9.5 G/DL (ref 13.3–17.7)
HGB BLD-MCNC: 9.7 G/DL (ref 13.3–17.7)
HGB BLD-MCNC: 9.9 G/DL (ref 13.3–17.7)
IMM GRANULOCYTES # BLD: 0.1 10E9/L (ref 0–0.4)
IMM GRANULOCYTES NFR BLD: 0.8 %
INR PPP: 1.09 (ref 0.86–1.14)
LACTATE BLD-SCNC: 2 MMOL/L (ref 0.7–2)
LACTATE BLD-SCNC: 2.3 MMOL/L (ref 0.7–2)
LIPASE SERPL-CCNC: 1066 U/L (ref 73–393)
LYMPHOCYTES # BLD AUTO: 0.1 10E9/L (ref 0.8–5.3)
LYMPHOCYTES NFR BLD AUTO: 0.4 %
MAGNESIUM SERPL-MCNC: 1.5 MG/DL (ref 1.6–2.3)
MCH RBC QN AUTO: 30.6 PG (ref 26.5–33)
MCH RBC QN AUTO: 30.6 PG (ref 26.5–33)
MCHC RBC AUTO-ENTMCNC: 32.5 G/DL (ref 31.5–36.5)
MCHC RBC AUTO-ENTMCNC: 33 G/DL (ref 31.5–36.5)
MCV RBC AUTO: 93 FL (ref 78–100)
MCV RBC AUTO: 94 FL (ref 78–100)
MONOCYTES # BLD AUTO: 1 10E9/L (ref 0–1.3)
MONOCYTES NFR BLD AUTO: 6.8 %
NEUTROPHILS # BLD AUTO: 13 10E9/L (ref 1.6–8.3)
NEUTROPHILS NFR BLD AUTO: 91.9 %
NRBC # BLD AUTO: 0 10*3/UL
NRBC BLD AUTO-RTO: 0 /100
O2/TOTAL GAS SETTING VFR VENT: ABNORMAL %
O2/TOTAL GAS SETTING VFR VENT: ABNORMAL %
ORGAN: NORMAL
PCO2 BLD: 35 MM HG (ref 35–45)
PCO2 BLD: 35 MM HG (ref 35–45)
PH BLD: 7.4 PH (ref 7.35–7.45)
PH BLD: 7.42 PH (ref 7.35–7.45)
PHOSPHATE SERPL-MCNC: 2.2 MG/DL (ref 2.5–4.5)
PLATELET # BLD AUTO: 169 10E9/L (ref 150–450)
PLATELET # BLD AUTO: 217 10E9/L (ref 150–450)
PO2 BLD: 121 MM HG (ref 80–105)
PO2 BLD: 128 MM HG (ref 80–105)
POS CUT OFF ALLO B: >93
POS CUT OFF ALLO T: >79
POS CUT OFF AUTO B: >93
POS CUT OFF AUTO T: >79
POTASSIUM BLD-SCNC: 4 MMOL/L (ref 3.4–5.3)
POTASSIUM BLD-SCNC: 4.1 MMOL/L (ref 3.4–5.3)
POTASSIUM SERPL-SCNC: 4.1 MMOL/L (ref 3.4–5.3)
POTASSIUM SERPL-SCNC: 4.4 MMOL/L (ref 3.4–5.3)
POTASSIUM SERPL-SCNC: 4.5 MMOL/L (ref 3.4–5.3)
POTASSIUM SERPL-SCNC: 4.7 MMOL/L (ref 3.4–5.3)
POTASSIUM SERPL-SCNC: 4.8 MMOL/L (ref 3.4–5.3)
RBC # BLD AUTO: 2.16 10E12/L (ref 4.4–5.9)
RBC # BLD AUTO: 2.84 10E12/L (ref 4.4–5.9)
RESULT ALLO B1: NORMAL
RESULT ALLO B2: NORMAL
RESULT ALLO T1: NORMAL
RESULT ALLO T2: NORMAL
RESULT AUTO B1: NORMAL
RESULT AUTO B2: NORMAL
RESULT AUTO T1: NORMAL
RESULT AUTO T2: NORMAL
SA1 CELL: NORMAL
SA1 COMMENTS: NORMAL
SA1 HI RISK ABY: NORMAL
SA1 MOD RISK ABY: NORMAL
SA1 TEST METHOD: NORMAL
SA2 CELL: NORMAL
SA2 COMMENTS: NORMAL
SA2 HI RISK ABY UA: NORMAL
SA2 MOD RISK ABY: NORMAL
SA2 TEST METHOD: NORMAL
SERUM DATE ALLO B1: NORMAL
SERUM DATE ALLO B2: NORMAL
SERUM DATE ALLO T1: NORMAL
SERUM DATE ALLO T2: NORMAL
SERUM DATE AUTO B1: NORMAL
SERUM DATE AUTO B2: NORMAL
SERUM DATE AUTO T1: NORMAL
SERUM DATE AUTO T2: NORMAL
SODIUM BLD-SCNC: 132 MMOL/L (ref 133–144)
SODIUM BLD-SCNC: 133 MMOL/L (ref 133–144)
SODIUM SERPL-SCNC: 132 MMOL/L (ref 133–144)
TESTMETHODALLO: NORMAL
TESTMETHODAUTO: NORMAL
TREATMENT ALLO B1: NORMAL
TREATMENT ALLO B2: NORMAL
TREATMENT ALLO T1: NORMAL
TREATMENT ALLO T2: NORMAL
TREATMENT AUTO B1: NORMAL
TREATMENT AUTO B2: NORMAL
TREATMENT AUTO T1: NORMAL
TREATMENT AUTO T2: NORMAL
UNACCEPTABLE ANTIGEN: NORMAL
UNOS CPRA: 32
WBC # BLD AUTO: 14.2 10E9/L (ref 4–11)
WBC # BLD AUTO: 9.8 10E9/L (ref 4–11)

## 2021-06-11 PROCEDURE — 83690 ASSAY OF LIPASE: CPT | Performed by: SURGERY

## 2021-06-11 PROCEDURE — 250N000013 HC RX MED GY IP 250 OP 250 PS 637: Performed by: SURGERY

## 2021-06-11 PROCEDURE — 85610 PROTHROMBIN TIME: CPT | Performed by: SURGERY

## 2021-06-11 PROCEDURE — 71045 X-RAY EXAM CHEST 1 VIEW: CPT | Mod: 26 | Performed by: RADIOLOGY

## 2021-06-11 PROCEDURE — 258N000002 HC RX IP 258 OP 250: Performed by: SURGERY

## 2021-06-11 PROCEDURE — 250N000011 HC RX IP 250 OP 636: Performed by: ANESTHESIOLOGY

## 2021-06-11 PROCEDURE — 250N000011 HC RX IP 250 OP 636: Performed by: PHYSICIAN ASSISTANT

## 2021-06-11 PROCEDURE — 999N000065 XR CHEST PORT 1 VIEW

## 2021-06-11 PROCEDURE — 258N000003 HC RX IP 258 OP 636: Performed by: SURGERY

## 2021-06-11 PROCEDURE — 88304 TISSUE EXAM BY PATHOLOGIST: CPT | Mod: 26 | Performed by: PATHOLOGY

## 2021-06-11 PROCEDURE — 999N001017 HC STATISTIC GLUCOSE BY METER IP

## 2021-06-11 PROCEDURE — 99223 1ST HOSP IP/OBS HIGH 75: CPT | Mod: GC | Performed by: INTERNAL MEDICINE

## 2021-06-11 PROCEDURE — 80048 BASIC METABOLIC PNL TOTAL CA: CPT | Performed by: SURGERY

## 2021-06-11 PROCEDURE — 85027 COMPLETE CBC AUTOMATED: CPT | Performed by: TRANSPLANT SURGERY

## 2021-06-11 PROCEDURE — 120N000003 HC R&B IMCU UMMC

## 2021-06-11 PROCEDURE — 82150 ASSAY OF AMYLASE: CPT | Performed by: SURGERY

## 2021-06-11 PROCEDURE — 812N000003 HC ACQUISITION KIDNEY CADAVER

## 2021-06-11 PROCEDURE — 250N000013 HC RX MED GY IP 250 OP 250 PS 637: Performed by: PHYSICIAN ASSISTANT

## 2021-06-11 PROCEDURE — 84132 ASSAY OF SERUM POTASSIUM: CPT | Performed by: SURGERY

## 2021-06-11 PROCEDURE — 85025 COMPLETE CBC W/AUTO DIFF WBC: CPT | Performed by: SURGERY

## 2021-06-11 PROCEDURE — 82947 ASSAY GLUCOSE BLOOD QUANT: CPT | Performed by: TRANSPLANT SURGERY

## 2021-06-11 PROCEDURE — 84132 ASSAY OF SERUM POTASSIUM: CPT | Performed by: TRANSPLANT SURGERY

## 2021-06-11 PROCEDURE — 812N000013 HC ACQUISITION PANCREAS CADAVER OF SPK

## 2021-06-11 PROCEDURE — 250N000009 HC RX 250: Performed by: PHYSICIAN ASSISTANT

## 2021-06-11 PROCEDURE — 250N000011 HC RX IP 250 OP 636: Performed by: STUDENT IN AN ORGANIZED HEALTH CARE EDUCATION/TRAINING PROGRAM

## 2021-06-11 PROCEDURE — 258N000003 HC RX IP 258 OP 636

## 2021-06-11 PROCEDURE — 76776 US EXAM K TRANSPL W/DOPPLER: CPT

## 2021-06-11 PROCEDURE — 96372 THER/PROPH/DIAG INJ SC/IM: CPT | Performed by: SURGERY

## 2021-06-11 PROCEDURE — 88304 TISSUE EXAM BY PATHOLOGIST: CPT | Mod: TC | Performed by: TRANSPLANT SURGERY

## 2021-06-11 PROCEDURE — 85018 HEMOGLOBIN: CPT | Performed by: SURGERY

## 2021-06-11 PROCEDURE — 93976 VASCULAR STUDY: CPT

## 2021-06-11 PROCEDURE — 93976 VASCULAR STUDY: CPT | Mod: 26 | Performed by: RADIOLOGY

## 2021-06-11 PROCEDURE — 250N000012 HC RX MED GY IP 250 OP 636 PS 637: Performed by: SURGERY

## 2021-06-11 PROCEDURE — 36592 COLLECT BLOOD FROM PICC: CPT | Performed by: SURGERY

## 2021-06-11 PROCEDURE — 82803 BLOOD GASES ANY COMBINATION: CPT | Performed by: TRANSPLANT SURGERY

## 2021-06-11 PROCEDURE — 76776 US EXAM K TRANSPL W/DOPPLER: CPT | Mod: 26 | Performed by: RADIOLOGY

## 2021-06-11 PROCEDURE — 250N000011 HC RX IP 250 OP 636

## 2021-06-11 PROCEDURE — 84100 ASSAY OF PHOSPHORUS: CPT | Performed by: SURGERY

## 2021-06-11 PROCEDURE — 250N000011 HC RX IP 250 OP 636: Performed by: SURGERY

## 2021-06-11 PROCEDURE — P9041 ALBUMIN (HUMAN),5%, 50ML: HCPCS

## 2021-06-11 PROCEDURE — 250N000013 HC RX MED GY IP 250 OP 250 PS 637: Performed by: STUDENT IN AN ORGANIZED HEALTH CARE EDUCATION/TRAINING PROGRAM

## 2021-06-11 PROCEDURE — 250N000009 HC RX 250

## 2021-06-11 PROCEDURE — 83735 ASSAY OF MAGNESIUM: CPT | Performed by: SURGERY

## 2021-06-11 PROCEDURE — 84295 ASSAY OF SERUM SODIUM: CPT | Performed by: TRANSPLANT SURGERY

## 2021-06-11 PROCEDURE — 82330 ASSAY OF CALCIUM: CPT | Performed by: TRANSPLANT SURGERY

## 2021-06-11 PROCEDURE — 83605 ASSAY OF LACTIC ACID: CPT | Performed by: TRANSPLANT SURGERY

## 2021-06-11 PROCEDURE — 85730 THROMBOPLASTIN TIME PARTIAL: CPT | Performed by: SURGERY

## 2021-06-11 RX ORDER — GLYCOPYRROLATE 0.2 MG/ML
INJECTION, SOLUTION INTRAMUSCULAR; INTRAVENOUS PRN
Status: DISCONTINUED | OUTPATIENT
Start: 2021-06-11 | End: 2021-06-11

## 2021-06-11 RX ORDER — MANNITOL 20 G/100ML
INJECTION, SOLUTION INTRAVENOUS PRN
Status: DISCONTINUED | OUTPATIENT
Start: 2021-06-11 | End: 2021-06-11

## 2021-06-11 RX ORDER — NALOXONE HYDROCHLORIDE 0.4 MG/ML
0.4 INJECTION, SOLUTION INTRAMUSCULAR; INTRAVENOUS; SUBCUTANEOUS
Status: DISCONTINUED | OUTPATIENT
Start: 2021-06-11 | End: 2021-06-11

## 2021-06-11 RX ORDER — SULFAMETHOXAZOLE AND TRIMETHOPRIM 200; 40 MG/5ML; MG/5ML
10 SUSPENSION ORAL
Status: DISCONTINUED | OUTPATIENT
Start: 2021-06-11 | End: 2021-06-11

## 2021-06-11 RX ORDER — HYDROXYZINE HYDROCHLORIDE 25 MG/1
50 TABLET, FILM COATED ORAL EVERY 6 HOURS PRN
Status: DISCONTINUED | OUTPATIENT
Start: 2021-06-11 | End: 2021-06-21 | Stop reason: HOSPADM

## 2021-06-11 RX ORDER — CLOTRIMAZOLE 10 MG/1
10 LOZENGE ORAL 4 TIMES DAILY
Status: DISCONTINUED | OUTPATIENT
Start: 2021-06-11 | End: 2021-06-21 | Stop reason: HOSPADM

## 2021-06-11 RX ORDER — CALCIUM CHLORIDE 100 MG/ML
INJECTION INTRAVENOUS; INTRAVENTRICULAR PRN
Status: DISCONTINUED | OUTPATIENT
Start: 2021-06-11 | End: 2021-06-11

## 2021-06-11 RX ORDER — HYDROMORPHONE HYDROCHLORIDE 1 MG/ML
0.5 INJECTION, SOLUTION INTRAMUSCULAR; INTRAVENOUS; SUBCUTANEOUS ONCE
Status: DISCONTINUED | OUTPATIENT
Start: 2021-06-11 | End: 2021-06-11

## 2021-06-11 RX ORDER — PREDNISONE 10 MG/1
10 TABLET ORAL DAILY
Status: DISCONTINUED | OUTPATIENT
Start: 2021-07-02 | End: 2021-06-21 | Stop reason: HOSPADM

## 2021-06-11 RX ORDER — HEPARIN SODIUM 10000 [USP'U]/100ML
200 INJECTION, SOLUTION INTRAVENOUS CONTINUOUS
Status: DISCONTINUED | OUTPATIENT
Start: 2021-06-11 | End: 2021-06-11

## 2021-06-11 RX ORDER — ONDANSETRON 4 MG/1
4 TABLET, ORALLY DISINTEGRATING ORAL EVERY 30 MIN PRN
Status: DISCONTINUED | OUTPATIENT
Start: 2021-06-11 | End: 2021-06-11 | Stop reason: HOSPADM

## 2021-06-11 RX ORDER — ONDANSETRON 2 MG/ML
4 INJECTION INTRAMUSCULAR; INTRAVENOUS EVERY 30 MIN PRN
Status: DISCONTINUED | OUTPATIENT
Start: 2021-06-11 | End: 2021-06-11 | Stop reason: HOSPADM

## 2021-06-11 RX ORDER — AMOXICILLIN 250 MG
1 CAPSULE ORAL 2 TIMES DAILY
Status: DISCONTINUED | OUTPATIENT
Start: 2021-06-11 | End: 2021-06-14

## 2021-06-11 RX ORDER — ALBUMIN HUMAN 5 %
INTRAVENOUS SOLUTION INTRAVENOUS PRN
Status: DISCONTINUED | OUTPATIENT
Start: 2021-06-11 | End: 2021-06-11

## 2021-06-11 RX ORDER — ONDANSETRON 2 MG/ML
INJECTION INTRAMUSCULAR; INTRAVENOUS PRN
Status: DISCONTINUED | OUTPATIENT
Start: 2021-06-11 | End: 2021-06-11

## 2021-06-11 RX ORDER — HYDROXYZINE HYDROCHLORIDE 25 MG/1
25 TABLET, FILM COATED ORAL EVERY 6 HOURS PRN
Status: DISCONTINUED | OUTPATIENT
Start: 2021-06-11 | End: 2021-06-21 | Stop reason: HOSPADM

## 2021-06-11 RX ORDER — PIPERACILLIN SODIUM, TAZOBACTAM SODIUM 2; .25 G/10ML; G/10ML
2.25 INJECTION, POWDER, LYOPHILIZED, FOR SOLUTION INTRAVENOUS
Status: DISCONTINUED | OUTPATIENT
Start: 2021-06-11 | End: 2021-06-13

## 2021-06-11 RX ORDER — MAGNESIUM OXIDE 400 MG/1
400 TABLET ORAL EVERY 24 HOURS
Status: DISCONTINUED | OUTPATIENT
Start: 2021-06-13 | End: 2021-06-21 | Stop reason: HOSPADM

## 2021-06-11 RX ORDER — CLONIDINE HYDROCHLORIDE 0.2 MG/1
0.2 TABLET ORAL 2 TIMES DAILY
Status: DISCONTINUED | OUTPATIENT
Start: 2021-06-11 | End: 2021-06-12

## 2021-06-11 RX ORDER — HYDRALAZINE HYDROCHLORIDE 20 MG/ML
10 INJECTION INTRAMUSCULAR; INTRAVENOUS EVERY 4 HOURS PRN
Status: DISCONTINUED | OUTPATIENT
Start: 2021-06-11 | End: 2021-06-12

## 2021-06-11 RX ORDER — SODIUM CHLORIDE, SODIUM LACTATE, POTASSIUM CHLORIDE, CALCIUM CHLORIDE 600; 310; 30; 20 MG/100ML; MG/100ML; MG/100ML; MG/100ML
INJECTION, SOLUTION INTRAVENOUS CONTINUOUS
Status: CANCELLED | OUTPATIENT
Start: 2021-06-11

## 2021-06-11 RX ORDER — NALOXONE HYDROCHLORIDE 0.4 MG/ML
0.2 INJECTION, SOLUTION INTRAMUSCULAR; INTRAVENOUS; SUBCUTANEOUS
Status: DISCONTINUED | OUTPATIENT
Start: 2021-06-11 | End: 2021-06-12

## 2021-06-11 RX ORDER — SODIUM CHLORIDE 450 MG/100ML
INJECTION, SOLUTION INTRAVENOUS
Status: DISCONTINUED
Start: 2021-06-11 | End: 2021-06-11 | Stop reason: HOSPADM

## 2021-06-11 RX ORDER — HYDRALAZINE HYDROCHLORIDE 20 MG/ML
10 INJECTION INTRAMUSCULAR; INTRAVENOUS ONCE
Status: COMPLETED | OUTPATIENT
Start: 2021-06-11 | End: 2021-06-11

## 2021-06-11 RX ORDER — NICOTINE POLACRILEX 4 MG
15-30 LOZENGE BUCCAL
Status: DISCONTINUED | OUTPATIENT
Start: 2021-06-11 | End: 2021-06-21 | Stop reason: HOSPADM

## 2021-06-11 RX ORDER — ATORVASTATIN CALCIUM 40 MG/1
40 TABLET, FILM COATED ORAL DAILY
Status: DISCONTINUED | OUTPATIENT
Start: 2021-06-13 | End: 2021-06-21 | Stop reason: HOSPADM

## 2021-06-11 RX ORDER — ONDANSETRON 2 MG/ML
4 INJECTION INTRAMUSCULAR; INTRAVENOUS EVERY 6 HOURS PRN
Status: DISCONTINUED | OUTPATIENT
Start: 2021-06-11 | End: 2021-06-21 | Stop reason: HOSPADM

## 2021-06-11 RX ORDER — DOCUSATE SODIUM 100 MG/1
100 CAPSULE, LIQUID FILLED ORAL 2 TIMES DAILY
Status: DISCONTINUED | OUTPATIENT
Start: 2021-06-11 | End: 2021-06-14

## 2021-06-11 RX ORDER — FENTANYL CITRATE 50 UG/ML
25-50 INJECTION, SOLUTION INTRAMUSCULAR; INTRAVENOUS
Status: CANCELLED | OUTPATIENT
Start: 2021-06-11

## 2021-06-11 RX ORDER — PREDNISONE 20 MG/1
40 TABLET ORAL DAILY
Status: COMPLETED | OUTPATIENT
Start: 2021-06-16 | End: 2021-06-17

## 2021-06-11 RX ORDER — NEOSTIGMINE METHYLSULFATE 1 MG/ML
VIAL (ML) INJECTION PRN
Status: DISCONTINUED | OUTPATIENT
Start: 2021-06-11 | End: 2021-06-11

## 2021-06-11 RX ORDER — FUROSEMIDE 10 MG/ML
INJECTION INTRAMUSCULAR; INTRAVENOUS PRN
Status: DISCONTINUED | OUTPATIENT
Start: 2021-06-11 | End: 2021-06-11

## 2021-06-11 RX ORDER — SULFAMETHOXAZOLE AND TRIMETHOPRIM 200; 40 MG/5ML; MG/5ML
10 SUSPENSION ORAL
Status: DISCONTINUED | OUTPATIENT
Start: 2021-06-14 | End: 2021-06-14

## 2021-06-11 RX ORDER — ACETAMINOPHEN 325 MG/10.15ML
975 LIQUID ORAL EVERY 8 HOURS
Status: DISCONTINUED | OUTPATIENT
Start: 2021-06-11 | End: 2021-06-14

## 2021-06-11 RX ORDER — HEPARIN SODIUM 10000 [USP'U]/100ML
400 INJECTION, SOLUTION INTRAVENOUS CONTINUOUS
Status: DISCONTINUED | OUTPATIENT
Start: 2021-06-11 | End: 2021-06-18

## 2021-06-11 RX ORDER — HYDROMORPHONE HCL IN WATER/PF 6 MG/30 ML
0.2 PATIENT CONTROLLED ANALGESIA SYRINGE INTRAVENOUS
Status: DISCONTINUED | OUTPATIENT
Start: 2021-06-11 | End: 2021-06-14

## 2021-06-11 RX ORDER — ONDANSETRON 2 MG/ML
4 INJECTION INTRAMUSCULAR; INTRAVENOUS EVERY 30 MIN PRN
Status: CANCELLED | OUTPATIENT
Start: 2021-06-11

## 2021-06-11 RX ORDER — ALBUMIN, HUMAN INJ 5% 5 %
SOLUTION INTRAVENOUS CONTINUOUS PRN
Status: DISCONTINUED | OUTPATIENT
Start: 2021-06-11 | End: 2021-06-11

## 2021-06-11 RX ORDER — HEPARIN SODIUM 1000 [USP'U]/ML
INJECTION, SOLUTION INTRAVENOUS; SUBCUTANEOUS PRN
Status: DISCONTINUED | OUTPATIENT
Start: 2021-06-11 | End: 2021-06-11

## 2021-06-11 RX ORDER — PREDNISONE 20 MG/1
20 TABLET ORAL DAILY
Status: DISCONTINUED | OUTPATIENT
Start: 2021-06-18 | End: 2021-06-21 | Stop reason: HOSPADM

## 2021-06-11 RX ORDER — OCTREOTIDE ACETATE 100 UG/ML
100 INJECTION, SOLUTION INTRAVENOUS; SUBCUTANEOUS EVERY 12 HOURS
Status: DISCONTINUED | OUTPATIENT
Start: 2021-06-11 | End: 2021-06-14

## 2021-06-11 RX ORDER — PREDNISONE 5 MG/1
5 TABLET ORAL DAILY
Status: DISCONTINUED | OUTPATIENT
Start: 2021-07-09 | End: 2021-06-21 | Stop reason: HOSPADM

## 2021-06-11 RX ORDER — NALOXONE HYDROCHLORIDE 0.4 MG/ML
0.2 INJECTION, SOLUTION INTRAMUSCULAR; INTRAVENOUS; SUBCUTANEOUS
Status: DISCONTINUED | OUTPATIENT
Start: 2021-06-11 | End: 2021-06-11

## 2021-06-11 RX ORDER — VALGANCICLOVIR 450 MG/1
450 TABLET, FILM COATED ORAL
Status: DISCONTINUED | OUTPATIENT
Start: 2021-06-14 | End: 2021-06-13

## 2021-06-11 RX ORDER — PROCHLORPERAZINE MALEATE 5 MG
10 TABLET ORAL EVERY 6 HOURS PRN
Status: DISCONTINUED | OUTPATIENT
Start: 2021-06-11 | End: 2021-06-21 | Stop reason: HOSPADM

## 2021-06-11 RX ORDER — HYDROMORPHONE HYDROCHLORIDE 1 MG/ML
.3-.5 INJECTION, SOLUTION INTRAMUSCULAR; INTRAVENOUS; SUBCUTANEOUS EVERY 5 MIN PRN
Status: DISCONTINUED | OUTPATIENT
Start: 2021-06-11 | End: 2021-06-11 | Stop reason: HOSPADM

## 2021-06-11 RX ORDER — DEXTROSE MONOHYDRATE 25 G/50ML
25-50 INJECTION, SOLUTION INTRAVENOUS
Status: DISCONTINUED | OUTPATIENT
Start: 2021-06-11 | End: 2021-06-21 | Stop reason: HOSPADM

## 2021-06-11 RX ORDER — DEXAMETHASONE SODIUM PHOSPHATE 4 MG/ML
INJECTION, SOLUTION INTRA-ARTICULAR; INTRALESIONAL; INTRAMUSCULAR; INTRAVENOUS; SOFT TISSUE PRN
Status: DISCONTINUED | OUTPATIENT
Start: 2021-06-11 | End: 2021-06-11

## 2021-06-11 RX ORDER — FENTANYL CITRATE 50 UG/ML
25-50 INJECTION, SOLUTION INTRAMUSCULAR; INTRAVENOUS
Status: DISCONTINUED | OUTPATIENT
Start: 2021-06-11 | End: 2021-06-11 | Stop reason: HOSPADM

## 2021-06-11 RX ORDER — POLYETHYLENE GLYCOL 3350 17 G/17G
17 POWDER, FOR SOLUTION ORAL DAILY
Status: DISCONTINUED | OUTPATIENT
Start: 2021-06-12 | End: 2021-06-15

## 2021-06-11 RX ORDER — METHYLPREDNISOLONE SODIUM SUCCINATE 125 MG/2ML
100 INJECTION, POWDER, LYOPHILIZED, FOR SOLUTION INTRAMUSCULAR; INTRAVENOUS ONCE
Status: COMPLETED | OUTPATIENT
Start: 2021-06-13 | End: 2021-06-13

## 2021-06-11 RX ORDER — HYDROMORPHONE HCL IN WATER/PF 6 MG/30 ML
0.4 PATIENT CONTROLLED ANALGESIA SYRINGE INTRAVENOUS
Status: DISCONTINUED | OUTPATIENT
Start: 2021-06-11 | End: 2021-06-14

## 2021-06-11 RX ORDER — EPHEDRINE SULFATE 50 MG/ML
INJECTION, SOLUTION INTRAMUSCULAR; INTRAVENOUS; SUBCUTANEOUS PRN
Status: DISCONTINUED | OUTPATIENT
Start: 2021-06-11 | End: 2021-06-11

## 2021-06-11 RX ORDER — ASPIRIN 81 MG/1
81 TABLET, CHEWABLE ORAL DAILY
Status: DISCONTINUED | OUTPATIENT
Start: 2021-06-11 | End: 2021-06-18

## 2021-06-11 RX ORDER — ONDANSETRON 4 MG/1
4 TABLET, ORALLY DISINTEGRATING ORAL EVERY 6 HOURS PRN
Status: DISCONTINUED | OUTPATIENT
Start: 2021-06-11 | End: 2021-06-21 | Stop reason: HOSPADM

## 2021-06-11 RX ORDER — DEXTROSE MONOHYDRATE 100 MG/ML
INJECTION, SOLUTION INTRAVENOUS CONTINUOUS PRN
Status: DISCONTINUED | OUTPATIENT
Start: 2021-06-11 | End: 2021-06-21 | Stop reason: HOSPADM

## 2021-06-11 RX ORDER — MYCOPHENOLATE MOFETIL 200 MG/ML
1000 POWDER, FOR SUSPENSION ORAL 2 TIMES DAILY
Status: DISCONTINUED | OUTPATIENT
Start: 2021-06-11 | End: 2021-06-14

## 2021-06-11 RX ORDER — HYDROMORPHONE HYDROCHLORIDE 1 MG/ML
.3-.5 INJECTION, SOLUTION INTRAMUSCULAR; INTRAVENOUS; SUBCUTANEOUS EVERY 5 MIN PRN
Status: CANCELLED | OUTPATIENT
Start: 2021-06-11

## 2021-06-11 RX ORDER — SODIUM CHLORIDE, SODIUM LACTATE, POTASSIUM CHLORIDE, CALCIUM CHLORIDE 600; 310; 30; 20 MG/100ML; MG/100ML; MG/100ML; MG/100ML
INJECTION, SOLUTION INTRAVENOUS CONTINUOUS
Status: DISCONTINUED | OUTPATIENT
Start: 2021-06-11 | End: 2021-06-11 | Stop reason: HOSPADM

## 2021-06-11 RX ORDER — NALOXONE HYDROCHLORIDE 0.4 MG/ML
0.4 INJECTION, SOLUTION INTRAMUSCULAR; INTRAVENOUS; SUBCUTANEOUS
Status: DISCONTINUED | OUTPATIENT
Start: 2021-06-11 | End: 2021-06-12

## 2021-06-11 RX ORDER — ACETAMINOPHEN 325 MG/1
650 TABLET ORAL EVERY 4 HOURS PRN
Status: DISCONTINUED | OUTPATIENT
Start: 2021-06-14 | End: 2021-06-21 | Stop reason: HOSPADM

## 2021-06-11 RX ORDER — BISACODYL 10 MG
10 SUPPOSITORY, RECTAL RECTAL DAILY PRN
Status: DISCONTINUED | OUTPATIENT
Start: 2021-06-11 | End: 2021-06-21 | Stop reason: HOSPADM

## 2021-06-11 RX ORDER — ONDANSETRON 4 MG/1
4 TABLET, ORALLY DISINTEGRATING ORAL EVERY 30 MIN PRN
Status: CANCELLED | OUTPATIENT
Start: 2021-06-11

## 2021-06-11 RX ORDER — AMLODIPINE BESYLATE 10 MG/1
10 TABLET ORAL DAILY
Status: DISCONTINUED | OUTPATIENT
Start: 2021-06-11 | End: 2021-06-21 | Stop reason: HOSPADM

## 2021-06-11 RX ADMIN — HYDROMORPHONE HYDROCHLORIDE 0.5 MG: 1 INJECTION, SOLUTION INTRAMUSCULAR; INTRAVENOUS; SUBCUTANEOUS at 06:06

## 2021-06-11 RX ADMIN — HYDRALAZINE HYDROCHLORIDE 10 MG: 20 INJECTION INTRAMUSCULAR; INTRAVENOUS at 06:25

## 2021-06-11 RX ADMIN — ACETAMINOPHEN ORAL SOLUTION 975 MG: 325 SOLUTION ORAL at 09:54

## 2021-06-11 RX ADMIN — TOPICAL ANESTHETIC 0.5 ML: 200 SPRAY DENTAL; PERIODONTAL at 11:26

## 2021-06-11 RX ADMIN — DEXAMETHASONE SODIUM PHOSPHATE 4 MG: 4 INJECTION, SOLUTION INTRA-ARTICULAR; INTRALESIONAL; INTRAMUSCULAR; INTRAVENOUS; SOFT TISSUE at 03:34

## 2021-06-11 RX ADMIN — SODIUM CHLORIDE, SODIUM LACTATE, POTASSIUM CHLORIDE, CALCIUM CHLORIDE AND DEXTROSE MONOHYDRATE: 5; 600; 310; 30; 20 INJECTION, SOLUTION INTRAVENOUS at 05:49

## 2021-06-11 RX ADMIN — FUROSEMIDE 100 MG: 10 INJECTION, SOLUTION INTRAVENOUS at 02:58

## 2021-06-11 RX ADMIN — FENTANYL CITRATE 50 MCG: 50 INJECTION, SOLUTION INTRAMUSCULAR; INTRAVENOUS at 04:32

## 2021-06-11 RX ADMIN — PANTOPRAZOLE SODIUM 40 MG: 40 TABLET, DELAYED RELEASE ORAL at 11:19

## 2021-06-11 RX ADMIN — HYDROXYZINE HYDROCHLORIDE 50 MG: 25 TABLET, FILM COATED ORAL at 23:11

## 2021-06-11 RX ADMIN — DOCUSATE SODIUM 50 MG AND SENNOSIDES 8.6 MG 1 TABLET: 8.6; 5 TABLET, FILM COATED ORAL at 09:53

## 2021-06-11 RX ADMIN — OCTREOTIDE ACETATE 100 MCG: 100 INJECTION, SOLUTION INTRAVENOUS; SUBCUTANEOUS at 11:22

## 2021-06-11 RX ADMIN — GLYCOPYRROLATE 0.2 MG: 0.2 INJECTION, SOLUTION INTRAMUSCULAR; INTRAVENOUS at 05:01

## 2021-06-11 RX ADMIN — AMLODIPINE BESYLATE 10 MG: 10 TABLET ORAL at 09:52

## 2021-06-11 RX ADMIN — HYDROMORPHONE HYDROCHLORIDE 0.4 MG: 0.2 INJECTION, SOLUTION INTRAMUSCULAR; INTRAVENOUS; SUBCUTANEOUS at 20:21

## 2021-06-11 RX ADMIN — Medication 10 MG: at 01:15

## 2021-06-11 RX ADMIN — HYDROMORPHONE HYDROCHLORIDE 0.5 MG: 1 INJECTION, SOLUTION INTRAMUSCULAR; INTRAVENOUS; SUBCUTANEOUS at 01:58

## 2021-06-11 RX ADMIN — SODIUM CHLORIDE: 4.5 INJECTION, SOLUTION INTRAVENOUS at 10:42

## 2021-06-11 RX ADMIN — SODIUM CHLORIDE: 9 INJECTION, SOLUTION INTRAVENOUS at 15:56

## 2021-06-11 RX ADMIN — OCTREOTIDE ACETATE 100 MCG: 100 INJECTION, SOLUTION INTRAVENOUS; SUBCUTANEOUS at 20:38

## 2021-06-11 RX ADMIN — SODIUM CHLORIDE, SODIUM LACTATE, POTASSIUM CHLORIDE, CALCIUM CHLORIDE AND DEXTROSE MONOHYDRATE: 5; 600; 310; 30; 20 INJECTION, SOLUTION INTRAVENOUS at 16:36

## 2021-06-11 RX ADMIN — HYDROMORPHONE HYDROCHLORIDE 0.5 MG: 1 INJECTION, SOLUTION INTRAMUSCULAR; INTRAVENOUS; SUBCUTANEOUS at 07:45

## 2021-06-11 RX ADMIN — HYDROMORPHONE HYDROCHLORIDE 0.5 MG: 1 INJECTION, SOLUTION INTRAMUSCULAR; INTRAVENOUS; SUBCUTANEOUS at 05:48

## 2021-06-11 RX ADMIN — SODIUM CHLORIDE, POTASSIUM CHLORIDE, SODIUM LACTATE AND CALCIUM CHLORIDE: 600; 310; 30; 20 INJECTION, SOLUTION INTRAVENOUS at 04:07

## 2021-06-11 RX ADMIN — SODIUM CHLORIDE: 9 INJECTION, SOLUTION INTRAVENOUS at 06:00

## 2021-06-11 RX ADMIN — MANNITOL 12.5 G: 20 INJECTION, SOLUTION INTRAVENOUS at 00:42

## 2021-06-11 RX ADMIN — HYDROMORPHONE HYDROCHLORIDE 0.4 MG: 0.2 INJECTION, SOLUTION INTRAMUSCULAR; INTRAVENOUS; SUBCUTANEOUS at 17:56

## 2021-06-11 RX ADMIN — DOCUSATE SODIUM 50 MG AND SENNOSIDES 8.6 MG 1 TABLET: 8.6; 5 TABLET, FILM COATED ORAL at 20:24

## 2021-06-11 RX ADMIN — HEPARIN SODIUM 1000 UNITS: 1000 INJECTION INTRAVENOUS; SUBCUTANEOUS at 00:11

## 2021-06-11 RX ADMIN — TACROLIMUS 2 MG: 5 CAPSULE ORAL at 17:55

## 2021-06-11 RX ADMIN — CISATRACURIUM BESYLATE 2 MG: 2 INJECTION INTRAVENOUS at 02:42

## 2021-06-11 RX ADMIN — ACETAMINOPHEN ORAL SOLUTION 975 MG: 325 SOLUTION ORAL at 17:55

## 2021-06-11 RX ADMIN — CLONIDINE HYDROCHLORIDE 0.2 MG: 0.1 TABLET ORAL at 20:24

## 2021-06-11 RX ADMIN — CLOTRIMAZOLE 10 MG: 10 LOZENGE ORAL at 11:17

## 2021-06-11 RX ADMIN — ALBUMIN (HUMAN) 250 ML: 12.5 SOLUTION INTRAVENOUS at 03:30

## 2021-06-11 RX ADMIN — SODIUM CHLORIDE: 9 INJECTION, SOLUTION INTRAVENOUS at 10:43

## 2021-06-11 RX ADMIN — MICAFUNGIN SODIUM 100 MG: 50 INJECTION, POWDER, LYOPHILIZED, FOR SOLUTION INTRAVENOUS at 18:04

## 2021-06-11 RX ADMIN — PIPERACILLIN SODIUM AND TAZOBACTAM SODIUM 2.25 G: 2; .25 INJECTION, POWDER, LYOPHILIZED, FOR SOLUTION INTRAVENOUS at 23:41

## 2021-06-11 RX ADMIN — SODIUM CHLORIDE, POTASSIUM CHLORIDE, SODIUM LACTATE AND CALCIUM CHLORIDE: 600; 310; 30; 20 INJECTION, SOLUTION INTRAVENOUS at 04:45

## 2021-06-11 RX ADMIN — CALCIUM CHLORIDE 500 MG: 100 INJECTION INTRAVENOUS; INTRAVENTRICULAR at 02:09

## 2021-06-11 RX ADMIN — ASPIRIN 81 MG CHEWABLE TABLET 81 MG: 81 TABLET CHEWABLE at 09:53

## 2021-06-11 RX ADMIN — CISATRACURIUM BESYLATE 2 MG: 2 INJECTION INTRAVENOUS at 01:08

## 2021-06-11 RX ADMIN — Medication 5 MG: at 23:11

## 2021-06-11 RX ADMIN — ALBUMIN (HUMAN) 250 ML: 12.5 SOLUTION INTRAVENOUS at 03:09

## 2021-06-11 RX ADMIN — HEPARIN SODIUM 200 UNITS/HR: 10000 INJECTION, SOLUTION INTRAVENOUS at 07:22

## 2021-06-11 RX ADMIN — MYCOPHENOLATE MOFETIL 1000 MG: 200 POWDER, FOR SUSPENSION ORAL at 20:35

## 2021-06-11 RX ADMIN — NEOSTIGMINE METHYLSULFATE 2 MG: 1 INJECTION, SOLUTION INTRAVENOUS at 05:01

## 2021-06-11 RX ADMIN — CLOTRIMAZOLE 10 MG: 10 LOZENGE ORAL at 16:24

## 2021-06-11 RX ADMIN — HYDRALAZINE HYDROCHLORIDE 10 MG: 20 INJECTION INTRAMUSCULAR; INTRAVENOUS at 22:06

## 2021-06-11 RX ADMIN — HYDROMORPHONE HYDROCHLORIDE 0.5 MG: 1 INJECTION, SOLUTION INTRAMUSCULAR; INTRAVENOUS; SUBCUTANEOUS at 05:57

## 2021-06-11 RX ADMIN — FUROSEMIDE 60 MG: 10 INJECTION, SOLUTION INTRAVENOUS at 03:35

## 2021-06-11 RX ADMIN — CLOTRIMAZOLE 10 MG: 10 LOZENGE ORAL at 20:28

## 2021-06-11 RX ADMIN — PIPERACILLIN SODIUM AND TAZOBACTAM SODIUM 2.25 G: 2; .25 INJECTION, POWDER, LYOPHILIZED, FOR SOLUTION INTRAVENOUS at 15:55

## 2021-06-11 RX ADMIN — PIPERACILLIN SODIUM AND TAZOBACTAM SODIUM 2.25 G: 2; .25 INJECTION, POWDER, LYOPHILIZED, FOR SOLUTION INTRAVENOUS at 10:24

## 2021-06-11 RX ADMIN — SODIUM CHLORIDE, POTASSIUM CHLORIDE, SODIUM LACTATE AND CALCIUM CHLORIDE: 600; 310; 30; 20 INJECTION, SOLUTION INTRAVENOUS at 02:41

## 2021-06-11 RX ADMIN — HYDRALAZINE HYDROCHLORIDE 10 MG: 20 INJECTION INTRAMUSCULAR; INTRAVENOUS at 18:10

## 2021-06-11 RX ADMIN — FENTANYL CITRATE 50 MCG: 50 INJECTION, SOLUTION INTRAMUSCULAR; INTRAVENOUS at 05:11

## 2021-06-11 RX ADMIN — CISATRACURIUM BESYLATE 2 MG: 2 INJECTION INTRAVENOUS at 03:47

## 2021-06-11 RX ADMIN — SODIUM CHLORIDE, POTASSIUM CHLORIDE, SODIUM LACTATE AND CALCIUM CHLORIDE: 600; 310; 30; 20 INJECTION, SOLUTION INTRAVENOUS at 00:28

## 2021-06-11 RX ADMIN — HYDROMORPHONE HYDROCHLORIDE 0.2 MG: 0.2 INJECTION, SOLUTION INTRAMUSCULAR; INTRAVENOUS; SUBCUTANEOUS at 15:45

## 2021-06-11 RX ADMIN — HYDROMORPHONE HYDROCHLORIDE 0.2 MG: 0.2 INJECTION, SOLUTION INTRAMUSCULAR; INTRAVENOUS; SUBCUTANEOUS at 12:31

## 2021-06-11 RX ADMIN — HYDRALAZINE HYDROCHLORIDE 10 MG: 20 INJECTION INTRAMUSCULAR; INTRAVENOUS at 15:22

## 2021-06-11 RX ADMIN — HYDROMORPHONE HYDROCHLORIDE 0.4 MG: 0.2 INJECTION, SOLUTION INTRAMUSCULAR; INTRAVENOUS; SUBCUTANEOUS at 22:09

## 2021-06-11 RX ADMIN — CISATRACURIUM BESYLATE 4 MG: 2 INJECTION INTRAVENOUS at 01:54

## 2021-06-11 RX ADMIN — CALCIUM CHLORIDE 500 MG: 100 INJECTION INTRAVENOUS; INTRAVENTRICULAR at 02:52

## 2021-06-11 RX ADMIN — HYDRALAZINE HYDROCHLORIDE 10 MG: 20 INJECTION INTRAMUSCULAR; INTRAVENOUS at 10:30

## 2021-06-11 RX ADMIN — MANNITOL 25 G: 20 INJECTION, SOLUTION INTRAVENOUS at 02:38

## 2021-06-11 RX ADMIN — HYDRALAZINE HYDROCHLORIDE 10 MG: 20 INJECTION INTRAMUSCULAR; INTRAVENOUS at 23:11

## 2021-06-11 RX ADMIN — FENTANYL CITRATE 50 MCG: 50 INJECTION, SOLUTION INTRAMUSCULAR; INTRAVENOUS at 02:45

## 2021-06-11 RX ADMIN — CHLOROTHIAZIDE SODIUM 500 MG: 500 INJECTION, POWDER, LYOPHILIZED, FOR SOLUTION INTRAVENOUS at 04:20

## 2021-06-11 RX ADMIN — ONDANSETRON 4 MG: 2 INJECTION INTRAMUSCULAR; INTRAVENOUS at 05:36

## 2021-06-11 RX ADMIN — SODIUM CHLORIDE: 4.5 INJECTION, SOLUTION INTRAVENOUS at 06:02

## 2021-06-11 RX ADMIN — CALCIUM CHLORIDE 500 MG: 100 INJECTION INTRAVENOUS; INTRAVENTRICULAR at 01:11

## 2021-06-11 RX ADMIN — CALCIUM CHLORIDE 500 MG: 100 INJECTION INTRAVENOUS; INTRAVENTRICULAR at 00:49

## 2021-06-11 RX ADMIN — ONDANSETRON 4 MG: 2 INJECTION INTRAMUSCULAR; INTRAVENOUS at 04:32

## 2021-06-11 RX ADMIN — HYDROMORPHONE HYDROCHLORIDE 0.5 MG: 1 INJECTION, SOLUTION INTRAMUSCULAR; INTRAVENOUS; SUBCUTANEOUS at 05:35

## 2021-06-11 RX ADMIN — FENTANYL CITRATE 50 MCG: 50 INJECTION, SOLUTION INTRAMUSCULAR; INTRAVENOUS at 01:05

## 2021-06-11 RX ADMIN — DOCUSATE SODIUM 100 MG: 100 CAPSULE, LIQUID FILLED ORAL at 20:28

## 2021-06-11 RX ADMIN — MYCOPHENOLATE MOFETIL 1000 MG: 200 POWDER, FOR SUSPENSION ORAL at 11:18

## 2021-06-11 RX ADMIN — PROCHLORPERAZINE EDISYLATE 10 MG: 5 INJECTION INTRAMUSCULAR; INTRAVENOUS at 07:03

## 2021-06-11 RX ADMIN — FUROSEMIDE 40 MG: 10 INJECTION, SOLUTION INTRAVENOUS at 00:42

## 2021-06-11 ASSESSMENT — MIFFLIN-ST. JEOR: SCORE: 1726.88

## 2021-06-11 ASSESSMENT — ACTIVITIES OF DAILY LIVING (ADL)
ADLS_ACUITY_SCORE: 12
ADLS_ACUITY_SCORE: 10

## 2021-06-11 NOTE — OP NOTE
Transplant Surgery  Operative Note     Procedure Date:  case start 6/10, case complete 06/11/21    Preoperative Diagnosis:  End Stage renal failure due to diabetes mellitus type 2    Postoperative Diagnosis:  Same    Procedure:  1. Right Kidney Donation after Brain Death Kidney, Left iliac fossa, with venous reconstruction. A J-J stent was placed.   2. Kidney allograft preparation on Back Table   3. Open appendectomy    4. Whole Pancreas Donation after Brain Death Pancreas Transplant   5. Pancreas allograft preparation on Back Table    Surgeon:  Surgeon(s) and Role:     * Russell Kennedy MD - Primary     * Julio Cesar Vega MD - Assisting     * Edmar Jacobo MD - Fellow - Assisting     * Kd Joel MD - Fellow - Assisting    Fellow/Assistant:  Julio Cesar Vega, co-surgeon, Kd Joel MD fellow, Stefano Piedra MD- resident, Edmar Jacobo MD- fellow. There was no qualified resident to assist with this procedure    Anesthesia:  General    Specimen:  appendix (permanent)    Drains:  Rogerio-Iglesias drain    Urine Output:  225 mls    Estimated Blood Loss:  300    Fluids Administered:        Intraoperative Events: None    Complications: None.    Findings: graft whole pancreas, right kidney. Kidney graft with two veins, reconstructed with caval conduit. Single artery. Pancreas with small duodenal serosal injury during procurement, repaired with lembert sutures in procurement. Standard reconstruction. Internal iliac veins on right ligated and divided. Pancreas placed head down, with tail between right colon and native right kidney. Bowel managed with side to side hand sewn two layered anastomosis. Portal to external iliac vein, Y graft to external iliac artery. Artery very soft and thin, minimal plaque. Kidney placed in left iliac fossa, end to side to external iliac vessels. Good reperfusion, but some delayed spasm. Improved with distal clamping. Made some urine intraoperatively.  URETERAL STENT PLACED. TIA in RLQ along pancreas. Appendix excised using standard open technique.       None.    Indication: The patient has Type 2 diabetes with End Stage kidney failure. The patient received an organ offer for a Donation after Brain Death kidney and Donation after Brain Death pancreas. After discussing the risks and benefits of proceeding, the patient agreed to proceed with surgery and provided informed consent.    Final ABO/Crossmatch Verification: After the donor organ arrived to the operating room and prior to anastomosis, I participated in the transplant pre-verification upon organ receipt timeout by visually verifying the donor ID, organ and laterality, donor blood type, recipient unique identifier, recipient blood type, and that the donor and recipient are blood type compatible. The crossmatch was done prospectively; and the T cell crossmatch result was negative and B cell Flow crossmatch result was negative. The patient received Thymoglobulin, Cellcept and Solumedrol on induction.    Pancreas Donor Organ Information:   Donor UNOS ID: [id number]  Donor ABO: B  Donor Arterial Clamp on: 6/10/2021  3:29 PM  Vessels with organ: Yes    Ischemic time:  Total:  557  Cold: 528  Warm: 29     Pancreas Back Table Details:   Procedure:  Bench preparation of the pancreas allograft for transplantation with arterial reconstruction    Preoperative Diagnosis:  End stage renal failure due to diabetes mellitus type 2    Postoperative Diagnosis:  Same    Surgeon:  Surgeon(s) and Role:     * Angy Kennedy MD - Primary     * Julio Cesar Vega MD - Assisting     * Edmar Jacobo MD - Fellow - Assisting     * Kd Joel MD - Fellow - Assisting    Faculty Co-Surgeon:  ANGY KENNEDY    Fellow/Assistant:  Julio Cesar Vega MD- cosurgeonKd MD- fellow, Stefano Piedra MD- resident.  There was no qualified resident to assist with this procedure    Anesthesia:  None     "Graft Injury:  Yes-  Duodenal serosal injury during procurement- repaired    Donor Arrival to Recipient Room:  6/10/2021  8:00 PM    Portal Venous Extension:  No    Donor Iliac Vessel Quality:  Normal     Findings: as above    Pancreas Back Table Preparation: The donor pancreas was received and inspected. It had been previously flushed with UW. We removed the spleen by doubly ligating vessels between the tail of the pancreas and the spleen. The peripancreatic fat was ligated with 3-0 silk ties and removed. The proximal duodenum staple line was inverted and oversewn using running 4-0 Prolene suture. The bile duct and GDA were re-secured. The previously stapled root of the mesentery was oversewn using 4-0 Prolene forward and back. The third and fourth portions of the duodenum were freed away from the pancreas by ligating and dividing the intervening tissue with a series of 3-0 and 4-0 silk ties. Ganglionectomy was performed with 3-0 and 4-0 silk ties.    Y-Graft to SPA and SMA Arterial \"Y graft\" construction was required: the donor iliac artery was dilated, leak checked, and tailored to create an extension \"Y' graft by anastomosing the internal iliac artery to the splenic artery and the external iliac artery to the superior mesenteric artery in an end-to end fashion using running 7-0 Prolene suture.  . The pancreas was transferred to a new bowl with fresh iced cold preservation solution. Faculty was present for key portions of the procedure.    Operative Procedure:   Arterial Anastomosis Start:  6/11/2021 12:17 AM    Recipient Arterial Unclamp:  6/11/2021 12:46 AM    Extra Vessels Used:    UNOS ID Type Placement Comments   [id number]  Artery         Extra Vessels Banked:  No     Kidney Donor Organ Information:    Donor UNOS ID: [id number]  Donor ABO: B  Donor Arterial Clamp on: 6/10/2021  3:29 PM  Vessels with organ: No    Ischemic time:  Total:  675  Cold: 645  Warm: 30     Kidney Back Table Details:    Preoperative " Procedure:  Bench preparation of the kidney allograft for transplantation with venous reconstruction    Diagnosis:  End stage renal failure due to diabetes mellitus type 2    Postoperative Diagnosis:  Same    Surgeon:  Surgeon(s) and Role:     * Angy Kennedy MD - Primary     * Julio Cesar Vega MD - Assisting     * Edmar Jacobo MD - Fellow - Assisting     * Kd Joel MD - Fellow - Assisting    Faculty Co-Surgeon:  ANGY KENNEDY    Fellow/Assistant:  Julio Cesar Vega, co-surgeon, Kd Joel MD- fellow, Stefano Piedra MD- resident. There was no qualified resident to assist with this procedure    Anesthesia:  None    Donor Arrival to Recipient Room:  6/10/2021  8:00 PM      Graft Injury: No  Graft Biopsy: n/a      Organ Received On: Ice  Pump Resistance: n/a   Pump Flow: n/a    Ureteral Anatomy: Single  Arterial Anatomy: Single  Venous Anatomy: Double      Any Reconstruction:  Yes    Artery:   None    Vein:   Caval conduit     Findings: Mild atherosclerosis. As above    Kidney Back Table Preparation: The donor kidney was received and inspected. It had been previously flushed with UW. The graft was prepared on the back table by removing perinephric fat and ligating venous tributaries and lymphatics. The ureter was also cleaned of excess tissue. If required, reconstruction was performed as detailed above. The kidney was stored in iced cold preservation solution until ready for transplantation. Faculty was present for the critical portions of the procedure.    Operative Procedure:   Arterial Anastomosis Start:  6/11/2021  2:14 AM    Recipient Arterial Unclamp:  6/11/2021  2:44 AM    Extra Vessels Used:  n/a    Extra Vessels Banked:  n/a       The patient was brought to the operating room, placed in a supine position, and a time out was performed. Sequential compression devices were placed on both lower extremities and general endotracheal anesthesia was induced. The patient  was given IV antibiotics, Thymoglobulin, Cellcept and Solumedrol, and a Fox catheter. A central line and arterial lines were placed by Anesthesia service. The abdomen was then shaved, prepped, and draped in the usual sterile fashion. We performed a lower midline incision, divided the linea alba and opened the peritoneum sharply under direct vision. Retractors were placed.    Pancreas Transplant: We mobilized the right colon and the ureter medially and proceeded to circumferentially dissect the right iliac vessels. The internal iliac vein was ligated and divided. We obtained vascular control, performed a venotomy, and performed an anastomosis between the donor portal vein and the recipient's right External Iliac. We then obtained proximal and distal control of the right external iliac artery . Arteriotomy and irrigation ensued, and the donor Y graft was anastomosed to the external iliac artery  in an end to side fashion. After both anastomoses were completed, we opened the clamps. The pancreas consistency and reperfusion quality was Pink throughout, the graft duodenum was Pink throughout. There was no pancreatitis. Overall the graft was rated [place] grade A. The exocrine secretions of the pancreas were drained via Enteric w/o [id number] employing  a side to side hand sewn 2-layered. The pancreas placement was Intra-Peritoneal. Faculty was present for key portions of the procedure.    Kidney Transplant: The patient was heparinized. We applied atraumatic vascular clamps and the donor kidney was brought to the operative field. We made a venotomy and the caval conduit was anastomosed to the recipient left External Iliac vein in an end-to-side fashion. An arteriotomy was made and the donor renal artery was anastomosed to the recipient left external iliac artery in an end to side fashion. The patient was simultaneously loaded with IV mannitol, Lasix and volume. The renal artery was protected and the clamps were  removed. After several cardiac cycles, we opened the renal artery and the kidney had Good reperfusion and was firm and pink .    The transplant ureter was managed by creating a Liche (anterior multistitch) anastomosis with absorbable suture. A stent was placed across the anastomosis. The kidney made Yes urine prior to implantation.    Hemostasis was obtained, the anastomoses inspected, and the kidney placed in the iliac fossa. After placement, the vessel lay was inspected and found to be acceptable. The kidney position was Intra-peritoneal. The field was irrigated with antibiotic solution. A drain was placed. The retractor was removed and the abdominal wall fascia reapproximated. Subcutaneous tissues were irrigated and hemostasis obtained. The skin was reapproximated with staples and a dry dressing was applied. Faculty was present for key portions of the procedure.    The order of the organ reperfusion was: pancreas then kidney.    The appendix was excised using standard open technique..    All needle, sponge and instrument counts were correct x 2. The patient was awakened, extubated, and transferred to the PACU for post-op monitoring.    ATTESTATION:    I was present during the key portions of the procedure, and I was immediately available for the entire procedure between opening and closing.    Russell Kennedy MD, PhD  Associate Professor of Surgery

## 2021-06-11 NOTE — PROGRESS NOTES
Patient removed from [facility]OS waitlist after  donor pancreas and kidney transplant. OS ID [id number].    Donor Has Risk Criteria for Transmission of HIV/HCV/HBV: No  Recipient Notified of Risk Criteria: N/A

## 2021-06-11 NOTE — ANESTHESIA PREPROCEDURE EVALUATION
Anesthesia Pre-Procedure Evaluation    Patient: Erik Cramer   MRN: [id number] : 1980        Preoperative Diagnosis: End stage renal disease (H) [N18.6]  Diabetes (H) [E11.9]   Procedure : Procedure(s):  TRANSPLANT, KIDNEY AND PANCREAS,  DONOR     Past Medical History:   Diagnosis Date     Anemia in chronic kidney disease      Chronic kidney disease, stage 5 (H)      Hypertension 2018     Type 2 diabetes mellitus (H)      Vitamin D deficiency      Vitamin D deficiency       Past Surgical History:   Procedure Laterality Date     ESOPHAGOSCOPY, GASTROSCOPY, DUODENOSCOPY (EGD), COMBINED N/A 12/3/2019    Procedure: ESOPHAGOGASTRODUODENOSCOPY, WITH BIOPSY;  Surgeon: Guy Bar MD;  Location: [facility] OR     EYE SURGERY Left     retinal detachment     LAPAROSCOPIC CHOLECYSTECTOMY N/A 12/10/2019    Procedure: CHOLECYSTECTOMY, LAPAROSCOPIC;  Surgeon: Zoie Gray MD;  Location: [facility] OR     ORTHOPEDIC SURGERY Left     Trigger finger       Allergies   Allergen Reactions     Metoprolol Anaphylaxis      Social History     Tobacco Use     Smoking status: Never Smoker     Smokeless tobacco: Never Used   Substance Use Topics     Alcohol use: Yes     Frequency: Never     Comment: Social      Wt Readings from Last 1 Encounters:   06/10/21 87.2 kg (192 lb 3.9 oz)        Anesthesia Evaluation   Pt has had prior anesthetic. Type: General, Regional and MAC.    No history of anesthetic complications       ROS/MED HX  ENT/Pulmonary:  - neg pulmonary ROS     Neurologic:  - neg neurologic ROS     Cardiovascular:     (+) hypertension-----Previous cardiac testing   Echo: Date: Results:    Stress Test: Date: 21 Results:  Normal LVEF  Negative for ischemia  ECG Reviewed: Date: Results:    Cath: Date: Results:      METS/Exercise Tolerance: >4 METS    Hematologic:     (+) anemia,     Musculoskeletal:  - neg musculoskeletal ROS     GI/Hepatic:  - neg GI/hepatic ROS     Renal/Genitourinary: Comment: MWF HD, last  dialysis 06/09/21    (+) renal disease, type: ESRD, Pt requires dialysis, type: Hemodialysis,     Endo:     (+) type II DM, Obesity,     Psychiatric/Substance Use:  - neg psychiatric ROS     Infectious Disease:  - neg infectious disease ROS     Malignancy:  - neg malignancy ROS     Other:  - neg other ROS          Physical Exam    Airway  airway exam normal      Mallampati: I   TM distance: > 3 FB   Neck ROM: full   Mouth opening: > 3 cm    Respiratory Devices and Support         Dental  no notable dental history         Cardiovascular   cardiovascular exam normal       Rhythm and rate: regular and normal     Pulmonary   pulmonary exam normal        breath sounds clear to auscultation           OUTSIDE LABS:  CBC:   Lab Results   Component Value Date    WBC 6.7 06/10/2021    WBC 8.7 08/30/2020    HGB 7.5 (L) 06/10/2021    HGB 8.1 (L) 06/10/2021    HCT 25.0 (L) 06/10/2021    HCT 32.4 (L) 08/30/2020     06/10/2021     08/30/2020     BMP:   Lab Results   Component Value Date     06/10/2021     (L) 06/10/2021    POTASSIUM 4.3 06/10/2021    POTASSIUM 4.3 06/10/2021    CHLORIDE 96 06/10/2021    CHLORIDE 100 08/30/2020    CO2 29 06/10/2021    CO2 26 08/30/2020    BUN 34 (H) 06/10/2021    BUN 60 (H) 08/30/2020    CR 7.32 (H) 06/10/2021    CR 9.89 (H) 01/04/2021     (H) 06/10/2021     (H) 06/10/2021     COAGS:   Lab Results   Component Value Date    PTT 37 06/10/2021    INR 0.98 06/10/2021     POC:   Lab Results   Component Value Date     (H) 06/10/2021     HEPATIC:   Lab Results   Component Value Date    ALBUMIN 3.9 06/10/2021    PROTTOTAL 7.2 06/10/2021    ALT 32 06/10/2021    AST 32 06/10/2021    ALKPHOS 84 06/10/2021    BILITOTAL 0.4 06/10/2021     OTHER:   Lab Results   Component Value Date    PH 7.42 06/10/2021    LACT 1.0 06/10/2021    A1C 6.0 (H) 06/10/2021    SHAY 8.8 06/10/2021    PHOS 5.4 (H) 07/29/2019    LIPASE 580 (H) 06/10/2021    AMYLASE 96 06/10/2021        Anesthesia Plan    ASA Status:  3   NPO Status:  NPO Appropriate    Anesthesia Type: General.     - Airway: ETT   Induction: Intravenous.   Maintenance: Inhalation.   Techniques and Equipment:     - Lines/Monitors: 2nd IV, Arterial Line, Central Line, CVP, BIS     Consents    Anesthesia Plan(s) and associated risks, benefits, and realistic alternatives discussed. Questions answered and patient/representative(s) expressed understanding.     - Discussed with:  Patient      - Extended Intubation/Ventilatory Support Discussed: Yes.      - Patient is DNR/DNI Status: No    Use of blood products discussed: Yes.     - Discussed with: Patient.     - Consented: consented to blood products            Reason for refusal: other.     Postoperative Care    Pain management: IV analgesics, Multi-modal analgesia, Oral pain medications.   PONV prophylaxis: Ondansetron (or other 5HT-3)     Comments:         H&P reviewed: Unable to attach H&P to encounter due to EHR limitations. H&P Update: appropriate H&P reviewed, patient examined. No interval changes since H&P (within 30 days).         Silver Sumner MD       Agree with anesthetic plan as outlined above    Jerome Bhatti MD  Staff Anesthesiologist  [phone]

## 2021-06-11 NOTE — OR NURSING
Ultrasound paged re: STAT renal and pancreas US multiple times with no answer between 0530 and 0645.  Paged finally returned at 0645, tech states they will come to 3C now to complete scans.

## 2021-06-11 NOTE — PLAN OF CARE
Temp: 98.7  F (37.1  C) Temp src: Oral BP: (!) 184/83 Pulse: 106   Resp: 13 SpO2: 99 % O2 Device: Nasal cannula Oxygen Delivery: 1 LPM     Neuro: A&Ox4.   Cardiac: SR-ST rates 90s-100s. Hypertensive despite interventions- team aware. CVPs monitored hourly, running low 3-5.  Respiratory: RA-2L (while sleeping). Complaining of difficulty breathing but appears to be due to NG tube- cross cover updated. No acute distress.  GI/: Adequate urine output via edwards. Hypoactive BS. No gas, no BM.  Diet/appetite: Ice chips, tolerating.  Activity:  Assist of 1, up to chair.  Pain: PRN dilaudid given, primary complaint is throat pain from NG tube.  Skin: Abd incision- mod. drainage.  LDA's: R TL I.J with hep gtt at 400u/hr, IVMF + replacements. NG to LCS, NAZANIN Edwards TIA w/ serosang output.    Plan: Continue with POC. Notify primary team with changes.

## 2021-06-11 NOTE — CONSULTS
[facility]  Transplant Nephrology Consult  Date of Admission:  6/10/2021  Today's Date: 06/11/2021  Requesting physician: Julio Cesar Vega*    Recommendations:  - start amlodipine 10 mg daily, PRN hydralazine and continue to monitor BP after pain improves.  - daily tacrolimus trough.    Assessment & Plan   # DDKTX (SPK) Last checked at time of transplant   - Baseline Creatinine: ~ TBD   - Proteinuria: Not checked post transplant   - Date DSA Last Checked: Jun/2021      Latest DSA: No DSA at time of transplant   - BK Viremia: No   - Kidney Tx Biopsy: No    # Pancreas Tx (SPK):    - Pancreatic Exocrine Drainage: Enteric drained     - Blood glucose: Euglycemia      On insulin: No   - HbA1c: Last checked at time of transplant        - Pancreatic enzymes: Last checked at time of transplant   - Date DSA Last Checked: Jun/2021  Latest DSA: No DSA at time of transplant   - Pancreas Tx Biopsy: No    # Immunosuppression: Tacrolimus immediate release (goal 8-10), Mycophenolate mofetil (dose 1000 mg every 12 hours) and Prednisone (dose taper)    - Induction: intermediate risk protocol, PRA 32%   - Changes: No    # Infection Prophylaxis:   - PJP: Sulfa/TMP (Bactrim)  - CMV: Valcyte  - Thrush: Micafungin (Mycamine)    # Hypertension: Inadequate control;  Goal BP: < 150/90   - Volume status: Euvolemic     - Changes: Yes - per recommendations    # Anemia in Chronic Renal Disease: Hgb: Stable      ROSA M: No   - Iron studies: Unknown at this time, but checked with dialysis    # Mineral Bone Disorder:   - Secondary renal hyperparathyroidism; PTH level: Unknown at this time, but checked with dialysis        On treatment: None  - Vitamin D; level: Unknown at this time, but checked with dialysis        On supplement: No  - Calcium; level: Normal        On supplement: No  - Phosphorus; level: Normal        On supplement: No    # Electrolytes:   - Potassium; level: Normal        On  supplement: No  - Magnesium; level: Normal        On supplement: Yes  - Bicarbonate; level: Normal        On supplement: No      # Transplant History:  Etiology of Kidney Failure: Diabetes mellitus type 2  Tx: DDKT (SPK)  Transplant: 6/11/2021 (Kidney / Pancreas)  DSA at time of Tx: No  Significant changes in immunosuppression: None  Significant transplant-related complications: None    Recommendations were communicated to the primary team verbally.    Seen and discussed with Dr. Domnigo Mya MD  Pager: [phone]    This patient has been seen and evaluated by me, Audie Lane MD.  I have reviewed the note and agree with plan of care as documented by the fellow.    Audie Lane MD on 6/11/2021       REASON FOR CONSULT   SPK.    History of Present Illness   Erik Cramer is a 41 year old male with PMH of type II DM and ESRD on HD for the last year and a half who was had SPK transplantation last night. UOP has been good over the few hours following the surgery, blood sugar is good on D5LR, not on insulin. He states he had incisional pain. He denies ative N/V/CP or SOB. US tx pancreas and tx kidney shows adequate flow.     Review of Systems    The 10 point Review of Systems is negative other than noted in the HPI or here.     Past Medical History    I have reviewed this patient's medical history and updated it with pertinent information if needed.   Past Medical History:   Diagnosis Date     Anemia in chronic kidney disease      Chronic kidney disease, stage 5 (H)      Hypertension 2018     Type 2 diabetes mellitus (H)      Vitamin D deficiency      Vitamin D deficiency        Past Surgical History   I have reviewed this patient's surgical history and updated it with pertinent information if needed.  Past Surgical History:   Procedure Laterality Date     ESOPHAGOSCOPY, GASTROSCOPY, DUODENOSCOPY (EGD), COMBINED N/A 12/3/2019    Procedure: ESOPHAGOGASTRODUODENOSCOPY, WITH BIOPSY;  Surgeon: de  Guy Tinajero MD;  Location:  OR     EYE SURGERY Left 2015    retinal detachment     LAPAROSCOPIC CHOLECYSTECTOMY N/A 12/10/2019    Procedure: CHOLECYSTECTOMY, LAPAROSCOPIC;  Surgeon: Zoie Gray MD;  Location: [facility] OR     ORTHOPEDIC SURGERY Left     Trigger finger        Family History   I have reviewed this patient's family history and updated it with pertinent information if needed.   Family History   Problem Relation Age of Onset     Breast Cancer Mother      Diabetes Brother        Social History   I have reviewed this patient's social history and updated it with pertinent information if needed. Erik Cramer  reports that he has never smoked. He has never used smokeless tobacco. He reports current alcohol use. He reports that he does not use drugs.    Allergies   Allergies   Allergen Reactions     Metoprolol Anaphylaxis     Prior to Admission Medications     acetaminophen  975 mg Oral Q8H     aspirin  81 mg Per NG tube Daily     [START ON 6/13/2021] calcium carbonate-vitamin D  1 tablet Oral BID w/meals     clotrimazole  10 mg Buccal 4x Daily     docusate sodium  100 mg Oral BID     HYDROmorphone (PF)  0.5 mg Intravenous Once     [START ON 6/13/2021] magnesium oxide  400 mg Oral Q24H     micafungin  100 mg Intravenous Q24H     mycophenolate  1,000 mg Per NG tube BID     octreotide  100 mcg Subcutaneous Q12H     pantoprazole  40 mg Per NG tube Daily     piperacillin-tazobactam  2.25 g Intravenous Q8H     [START ON 6/12/2021] polyethylene glycol  17 g Oral Daily     senna-docusate  1 tablet Oral BID     sodium chloride (PF)  3 mL Intravenous Q8H     sulfamethoxazole-trimethoprim  10 mL Per NG tube Daily     tacrolimus  2 mg Oral BID IS     [START ON 6/14/2021] valGANciclovir  450 mg Oral Once per day on Mon Thu       dextrose       IV fluid REPLACEMENT ONLY 100 mL/hr at 06/11/21 0912     furosemide (LASIX) infusion ADULT STANDARD       HEParin 200 Units/hr (06/11/21 0722)     insulin regular        "IV fluid REPLACEMENT ONLY 263 mL/hr at 21 0914     IV fluid REPLACEMENT ONLY 200 mL/hr at 21 0913       Physical Exam   Temp  Av.5  F (36.9  C)  Min: 98.2  F (36.8  C)  Max: 98.7  F (37.1  C)  Arterial Line BP  Min: 216/75  Max: 221/84  Arterial Line MAP (mmHg)  Av mmHg  Min: 124 mmHg  Max: 137 mmHg      Pulse  Av.2  Min: 63  Max: 95 Resp  Av.3  Min: 12  Max: 24  SpO2  Av.8 %  Min: 97 %  Max: 100 %    CVP (mmHg): 5 mmHgBP (!) 182/82 (BP Location: Right arm)   Pulse 90   Temp 98.6  F (37  C) (Oral)   Resp 21   Ht 1.651 m (5' 5\")   Wt 87.2 kg (192 lb 3.9 oz)   SpO2 99%   BMI 31.99 kg/m     Date 21 07 - 21 0659   Shift 6993-0451 8477-8357 0077-8317 24 Hour Total   INTAKE   I.V. 500   500   Shift Total(mL/kg) 500(5.73)   500(5.73)   OUTPUT   Urine 1250   1250   Drains 170   170   Shift Total(mL/kg) 1420(16.28)   1420(16.28)   Weight (kg) 87.2 87.2 87.2 87.2      Admit Weight: 87.2 kg (192 lb 3.9 oz)     GENERAL APPEARANCE: alert and no distress  HENT: mouth without ulcers or lesions  LYMPHATICS: no cervical or supraclavicular nodes  RESP: lungs clear to auscultation - no rales, rhonchi or wheezes  CV: regular rhythm, normal rate, no rub, no murmur  EDEMA: no LE edema bilaterally  ABDOMEN: soft, nondistended, nontender, bowel sounds normal  MS: extremities normal - no gross deformities noted, no evidence of inflammation in joints, no muscle tenderness  SKIN: no rash    Data   CMP  Recent Labs   Lab 21  0540 21  0100 21  0000 06/10/21  2300 06/10/21  0927 06/10/21  0927   * 132* 133 133   < > 132*   POTASSIUM 4.8 4.1 4.0 3.9   < > 4.3   CHLORIDE 100  --   --   --   --  96   CO2 19*  --   --   --   --  29   ANIONGAP 12  --   --   --   --  7   * 134* 142* 156*   < > 119*   BUN 42*  --   --   --   --  34*   CR 7.93*  --   --   --   --  7.32*   GFRESTIMATED 8*  --   --   --   --  9*   GFRESTBLACK 9*  --   --   --   --  10*   SHAY 9.0  --  "  --   --   --  8.8   MAG 1.5*  --   --   --   --   --    PHOS 2.2*  --   --   --   --   --    PROTTOTAL  --   --   --   --   --  7.2   ALBUMIN  --   --   --   --   --  3.9   BILITOTAL  --   --   --   --   --  0.4   ALKPHOS  --   --   --   --   --  84   AST  --   --   --   --   --  32   ALT  --   --   --   --   --  32    < > = values in this interval not displayed.     CBC  Recent Labs   Lab 06/11/21  0540 06/11/21  0423 06/11/21  0100 06/11/21  0000 06/10/21  0927 06/10/21  0927   HGB 8.7* 6.6* 7.3* 7.1*   < > 8.1*   WBC 14.2* 9.8  --   --   --  6.7   RBC 2.84* 2.16*  --   --   --  2.61*   HCT 26.4* 20.3*  --   --   --  25.0*   MCV 93 94  --   --   --  96   MCH 30.6 30.6  --   --   --  31.0   MCHC 33.0 32.5  --   --   --  32.4   RDW 16.7* 16.9*  --   --   --  13.4    169  --   --   --  231    < > = values in this interval not displayed.     INR  Recent Labs   Lab 06/11/21  0540 06/10/21  0927   INR 1.09 0.98   PTT 34 37     ABG  Recent Labs   Lab 06/11/21  0100 06/11/21  0000 06/10/21  2300 06/10/21  2152   PH 7.40 7.42 7.43 7.45   PCO2 35 35 34* 34*   PO2 121* 128* 177* 113*   HCO3 21 23 23 24   O2PER 40% 40% 40.0 40      Urine Studies  Recent Labs   Lab Test 06/10/21  1120 07/29/19  1240 07/18/19  1200   COLOR Light Yellow Straw Yellow   APPEARANCE Clear Clear Clear   URINEGLC Negative 50* 50*   URINEBILI Negative Negative Negative   URINEKETONE Negative Negative Negative   SG 1.007 1.010 1.011   UBLD Negative Negative Negative   URINEPH 7.0 5.0 6.0   PROTEIN 200* >499* >499*   NITRITE Negative Negative Negative   LEUKEST Negative Negative Negative   RBCU 3* 5* 9*   WBCU 1 3 5     Recent Labs   Lab Test 06/10/21  1120 08/31/20  0000 07/18/19  1200   UTPG 5.90* 2.62* 6.20*     PTH  No lab results found.  Iron Studies  No lab results found.    IMAGING:  All imaging studies reviewed by me.

## 2021-06-11 NOTE — ANESTHESIA POSTPROCEDURE EVALUATION
Patient: Erik Cramer    Procedure(s):  Transplant recipient pancreas and Right kidney, appendectomy.    Diagnosis:End stage renal disease (H) [N18.6]  Diabetes (H) [E11.9]  Diagnosis Additional Information: No value filed.    Anesthesia Type:  No value filed.    Note:  Disposition: Admission   Postop Pain Control: Uneventful            Sign Out: Well controlled pain   PONV: Yes            Sign Out: PONV/POV resolved with treatment   Neuro/Psych: Uneventful            Sign Out: Acceptable/Baseline neuro status   Airway/Respiratory: Uneventful            Sign Out: Acceptable/Baseline resp. status   CV/Hemodynamics: Uneventful            Sign Out: Acceptable CV status; No obvious hypovolemia; No obvious fluid overload   Other NRE: NONE   DID A NON-ROUTINE EVENT OCCUR? No           Last vitals:  Vitals:    06/11/21 0700 06/11/21 0715 06/11/21 0730   BP: (!) 191/81 (!) 187/87 (!) 179/86   Pulse: 95 91 87   Resp: 24 24 (P) 18   Temp:      SpO2: 99% 99% 98%       Last vitals prior to Anesthesia Care Transfer:  CRNA VITALS  6/11/2021 0445 - 6/11/2021 0545      6/11/2021             EKG:  Sinus tachycardia          Electronically Signed By: Esther Delgado MD  June 11, 2021  7:38 AM

## 2021-06-11 NOTE — TELEPHONE ENCOUNTER
Organ Offer Encounter Information    Organ Offer Information  Organ offer date & time: 6/9/2021  9:04 PM  Coordinator/Fellow/Attending name: Solange Lomeli RN   Organ(s):  Organ UNOS ID Match Run ID Comment Organ Laterality   Pancreas [id number] MNOP    Kidney [id number] MNOP       Recent infections?: No    New medications?: No Recent pregnancy?: No   Angicoagulation medications?: No Recent vaccinations?: No   Recent blood transfusions?: No Recent hospitalizations?: No   Has your insurance changed in the last 6-12 months?: Neg    Patient last dialyzed: 6/9/2021  1:15 PM  Dialysis type: Hemo  Discussed organ offer with: Patient  Patient/Caregiver name: Tye  Discussed risk category with Patient/Other: N/A  Understood donor criteria, verbalized understanding  Discussed program-specific outcomes: Did not have questions regarding SRTR  Right to decline organ offer without penalty, Patient/Other: Aware of option to decline without penalty  Organ offer decision status Patient/Other: Accepted Offer  Organ disposition: Transplanted  Additional Comments: 6/9/2021 9:05 PM  KP/Panc: KP primary offer  MD: Gary  OPO Contact: Lou/Miguel Angel GUAJARDO Results: Compatible, no DSA per Dr. Ludwig  XM Plan (FXM must be done with serum no older than 10 days from transplant): Plan to do FXM once patient is admitted (after donor OR set)   Plan (Admission, NPO, Donor OR): No donor OR at this time, no need for NPO. Called patient to inform him we have a potential offer for him. No need to admit at this time, just wanted to check in and make sure he's interested and available. He is interested and available and will keep his phone close by overnight so we can touch base with him as needed.   - - -   COVID Screening  In the past month, have you had:  Any close contact with a suspect or laboratory-confirmed COVID-19 patient: No  Travel anywhere: No  COVID Symptoms (Fever, Cough, Short of Breath, Loss of Taste/Smell, Rash):  No    Admissions: 0020 - Melissa - ETA 0700  Unit: 0015 - Chelly, no bed available, pt will go to 6B  Update Provider Entering Orders (XM Plan & COVID Testing):  0045 - sherd OSWALDO DENIS [ Msg Id [id number] ], needs FXM & STAT COVID  Immunology: 0038 - Elaine, will need FXM  Inpatient Lab (COVID Testing [phone], Option 2): 0035 - Patricia  Book OR: pending  Vessel Storage Confirmation (PA/KP/LI): N/A  Blood Bank: pending  Research: ON HOLD  TransNet/ABO Verification: pending laterality  Add Organ: pending laterality    Donor OR Time: 1100cst  Procuring MD: Dr. Joel/Dr. Jacobo  Contact in the OR: Coleen [phone]  Organs Being Procured: Lungs, liver, pancreas, kidneys, heart valves and ovaries for research  Flush Solution: SPS  Biopsy: N/A  Pump: N/A  Special Requests: None  MD for Visualization: Dr. Vega  Transportation Details: pickup in [facility] @ 0830, tail # [id number], Dr. Jacobo & Dr. Joel aware      Do 10, 2021 8:01 AM  OR booked for 1530 per Dr. Vgea.  [place] in Blood Bank notified.  Need to update both with KI laterality.  Porsha Gutierrez RN   Transplant Coordinator    Do 10, 2021 2:40 PM  OR bumped back to 1630, updated Jeniffer in OR.  Porsha Gutierrez RN   Transplant Coordinator    Do 10, 2021 5:53 PM  RKI and PA accepted.  Organs added, OR paperwork printed.  Hafsa in Blood Bank and Jeniffer in OR updated.  Porsha Gutierrez RN   Transplant Coordinator      Attestation I have discussed all of the above with the Patient/Legal Guardian/Caregiver regarding this organ offer.: Yes  Coordinator/Fellow/Attending name: Solange Lomeli RN

## 2021-06-11 NOTE — PROGRESS NOTES
"CLINICAL NUTRITION SERVICES - ASSESSMENT NOTE     Nutrition Prescription    RECOMMENDATIONS FOR MDs/PROVIDERS TO ORDER:  Liberalize diet as medically able to promote calorie/protien intake to promote healing.     Malnutrition Status:    Diet advancement in 5-7 days     Recommendations already ordered by Registered Dietitian (RD):  None at this time     Future/Additional Recommendations:  Education on acutely increased energy and protein needs, 6-8 weeks post surgery and long term recommendation for heart healthy (low saturated fat, low sodium) diet and food safety precautions.        REASON FOR ASSESSMENT  Pt is a 41 year old male seen by the dietitian for MD Order- Assess & Educate post-op SOT- kidney/pancreas transplant     CLINICAL HISTORY   past medical history significant for end stage kidney disease Other past medical history includes HD, T2DM, HTN, BMI>30, bilateral shoulder pain, and atypical nevi with AV fistula.     NUTRITION HISTORY  Patient last saw RD on 7/29/2019 for pre-transplant evaluation. He was laying in bed, NG in place. Reported eating well prior to admission. Information limited due to fatigue post surgery.     CURRENT NUTRITION ORDERS  Diet: NPO  Intake/Tolerance: NG to LIS     LABS  Na 132 (L), Mg 1.5 (L), Po4 2.2 (L)  K+ 4.8 (WNL)  Urea Nitrogen 42/Creatinine 7.93 (H)  Lipase 1066 (H)  Glucose 129 (H)    MEDICATIONS  Medications reviewed    ANTHROPOMETRICS  Height: 165.1 cm (5' 5\")  Most Recent Weight: 87.2 kg (192 lb 3.9 oz)    IBW: 61.8 kg   BMI: Obesity Grade I BMI 30-34.9  Weight History:   Wt Readings from Last 15 Encounters:   06/10/21 87.2 kg (192 lb 3.9 oz)   08/31/20 85.5 kg (188 lb 7.9 oz)   12/19/19 75.9 kg (167 lb 6.4 oz)   12/10/19 75.1 kg (165 lb 9.1 oz)   12/03/19 77.6 kg (171 lb)   12/03/19 77.6 kg (171 lb)   10/09/19 78.3 kg (172 lb 9.6 oz)   10/09/19 78.5 kg (173 lb)   07/29/19 79.8 kg (175 lb 14.4 oz)   07/18/19 78.7 kg (173 lb 9.6 oz)   05/30/19 81.6 kg (180 lb) "     Dosing Weight: 68.1 kg (adjusted based on weight of 87.2 kg and IBW)    ASSESSED NUTRITION NEEDS:  Estimated Energy Needs: 4080-9425  kcals (30-35 Kcal/Kg)  Justification: increased needs post-op SOT  Estimated Protein Needs:  grams protein (1.3-2 gm/Kg)  Justification: increased needs post op SOT  Estimated Fluid Needs: 3905-1882  mL (30-35 mL/Kg)  Justification: maintenance, or per MD pending fluid status and adequate UOP    PHYSICAL FINDINGS  See malnutrition section below.    MALNUTRITION  % Intake: No decreased intake noted  % Weight Loss: None noted  Subcutaneous Fat Loss: None observed  Muscle Loss: None observed  Fluid Accumulation/Edema: None noted  Malnutrition Diagnosis: Patient does not meet two of the established criteria necessary for diagnosing malnutrition    NUTRITION DIAGNOSIS:  Food and nutrition-related knowledge deficit r/t length of time since previous post-transplant education AEB patient verbal report, review of chart record, and MD consult for nutrition education.     INTERVENTIONS  Implementation  Nutrition Education: RD role in care, predicted NPO status with diet progression     Goals  Diet to advance in 5-7 days     Monitoring/Evaluation  Progress toward goals will be monitored and evaluated per protocol.    Allyn Mendez RD, LD  6B pager: [phone]

## 2021-06-11 NOTE — PHARMACY-TRANSPLANT NOTE
Adult Kidney/Pancreas Transplant Post Operative Note    41 year old male s/p SPK  transplant on 06/10/2021 for ESRD secondary to Type II diabetes.       Planned immunosuppression regimen per kidney/pancreas transplant protocol:    INDUCTION:   POD0: Thymoglobulin 175 mg IV x 1 dose and methylprednisolone 500 mg IV daily x 1 dose  POD1: Basliximab 20 mg IV x 1 dose and methylprednisolone 250 mg IV x 1 dose  POD2: Methylprednisolone 100 mg x 1 dose   POD3: Prednisone 60 mg daily x 2 days, 40 mg daily x 2 days, then 20 mg daily x 7 days. Then decrease by 5mg/ week to 5mg daily by week 4 post-transplant, stop at 4 weeks  post-transplant.  POD5:  Basliximab 20 mg IV x 1 dose     MAINTENANCE:  mycophenolate and tacrolimus with goal trough levels of 8-10 mcg/L for the first 6 months post-transplant.      Opportunistic pathogen prophylaxis includes: trimethoprim/sulfamethoxazole, clotrimazole and valganciclovir.    Post-op antibiotic/antifungal surgical prophylaxis includes: piperacillin-tazobactam for 3 days post-procedure and micafungin IV for 6 days post-procedure.    Patient is not enrolled in medication study.    Pharmacy will monitor for medication interactions and immunosuppression levels in conjunction with the team.  Medication therapy needs for discharge planning will continue to be addressed throughout the current admission via multidisciplinary rounds and order review.   Pharmacy will make recommendations as appropriate.

## 2021-06-11 NOTE — ANESTHESIA PROCEDURE NOTES
Arterial Line Procedure Note  Pre-Procedure   Staff -        Anesthesiologist:  Jerome Bhatti MD       Resident/Fellow: Silver Sumner MD       Performed By: resident       Location: OR       Pre-Anesthestic Checklist: patient identified, IV checked, risks and benefits discussed, informed consent, monitors and equipment checked, pre-op evaluation and at physician/surgeon's request  Timeout:       Correct Patient: Yes        Correct Procedure: Yes        Correct Site: Yes        Correct Position: Yes   Procedure   Procedure: arterial line       Laterality: right       Insertion Site: radial.  Sterile Prep        Standard elements of sterile barrier followed       Skin prep: Chloraprep  Insertion/Injection        Technique: Seldinger Technique        Catheter Type/Size: 20 G, 12 cm  Narrative        Tegaderm dressing used.       Complications: None apparent,        Arterial waveform: Yes        IBP within 10% of NIBP: Yes

## 2021-06-11 NOTE — ANESTHESIA CARE TRANSFER NOTE
Patient: Erik Cramer    Procedure(s):  Transplant recipient pancreas and Right kidney, appendectomy.    Diagnosis: End stage renal disease (H) [N18.6]  Diabetes (H) [E11.9]  Diagnosis Additional Information: No value filed.    Anesthesia Type:   No value filed.     Note:    Oropharynx: oropharynx clear of all foreign objects and spontaneously breathing  Level of Consciousness: awake  Oxygen Supplementation: face mask  Level of Supplemental Oxygen (L/min / FiO2): 6  Independent Airway: airway patency satisfactory and stable  Dentition: dentition unchanged  Vital Signs Stable: post-procedure vital signs reviewed and stable  Report to RN Given: handoff report given  Patient transferred to: PACU  Comments: VSS. Breathing spontaneously at a regular rate with adequate tidal volumes and maintaining O2 sats on 6lpm. No apparent complications from anesthesia. When we arrived in the PACU patient complained of some mild nausea however he did not have any emesis. He was in some discomfort. His NGT was causing him some grief and making it feel like he was unable to breath, although he was satting 100%.    Jacinto Sumner MD  Anesthesia CA-1  [phone]    Handoff Report: Identifed the Patient, Identified the Reponsible Provider, Reviewed the pertinent medical history, Discussed the surgical course, Reviewed Intra-OP anesthesia mangement and issues during anesthesia, Set expectations for post-procedure period and Allowed opportunity for questions and acknowledgement of understanding      Vitals: (Last set prior to Anesthesia Care Transfer)  CRNA VITALS  6/11/2021 0445 - 6/11/2021 0532      6/11/2021             EKG:  Sinus tachycardia        Electronically Signed By: Silver Sumner MD  June 11, 2021  5:32 AM

## 2021-06-11 NOTE — ANESTHESIA PROCEDURE NOTES
Central Line/PA Catheter Placement  Pre-Procedure   Staff -        Anesthesiologist:  Jerome Bhatti MD       Resident/Fellow: Silver Sumner MD       Performed By: resident       Location: OR       Pre-Anesthestic Checklist: patient identified, IV checked, site marked, risks and benefits discussed, informed consent, monitors and equipment checked, pre-op evaluation and at physician/surgeon's request  Timeout:       Correct Patient: Yes        Correct Procedure: Yes        Correct Site: Yes        Correct Position: Yes        Correct Laterality: Yes     Procedure   Procedure: central line       Laterality: right       Insertion Site: internal jugular.  Sterile Prep        All elements of maximal sterile barrier technique followed       Patient Prep/Sterile Barriers: draped, hand hygiene, gloves , hat , mask , draped, gown, sterile gel and probe cover       Skin prep: Chloraprep  Insertion/Injection        Technique: ultrasound guided and Seldinger Technique        1. Ultrasound was used to evaluate the access site.       2. Vein evaluated via ultrasound for patency/adequacy.       3. Using real-time ultrasound the needle/catheter was observed entering the artery/vein.       5. The visualized structures were anatomically normal.       6. There were no apparent abnormal pathologic findings.       Catheter Type/Size: 7 Fr, 20 cm, 3-lumen  Narrative         Secured by: suture and anchor securement device       Tegaderm and Biopatch dressing used.       Complications: None apparent,        blood aspirated from all lumens,        All lumens flushed: Yes       Verification method: Placement to be verified post-op

## 2021-06-11 NOTE — PROGRESS NOTES
Transplant Surgery  Inpatient Daily Progress Note  06/11/2021    Assessment & Plan: 40 year old male with a past medical history significant for end stage kidney disease on HD every MWF via AVF, anuric prior to transplant. Other past medical history includes T2DM, HTN, BMI>30, bilateral shoulder pain, s/p lap cholecystectomy 12/19 due to symptomatic biliary dyskinesia and atypical nevi. Admitted for SPK-ED with ureteral stent on 6/11/21 with Dr. Kennedy.     Graft function:  Pancreas: Lipase 580 on admission, elevated to 1066 post operatively. Will monitor.  this AM, remains euglycemic independent of insulin. US with slightly increased RIs. Octreootide and heparin gtt per protocol. ASA 81 mg daily started.   Kidney: Cr 7.9 immediately post op, 7.3 pre-op. Good UOP. US with moderately elevated velocities at the renal artery anastomosis and mid renal artery.  Immunosuppression management:  PRA 32, intermediate induction  Thymo 175 mg completed intra-op  Simulect POD 1 & 5  Solu-medrol 500 mg intra-op, 250 mg POD 1, 100 mg POD 2 then taper per protocol  Cellcept 1000 mg BID  Tacrolimus 2 mg BID. Goal 8-10.   Complexity of management:Medium. Contributing factors: anemia and induction  Hematology:   Acute blood loss anemia: HGB down to 6.6 intra-op, given 2 units PRBC. HGB stable 10.0 this AM.   Post op ppx: Heparin gtt @200/hour initially post-op, HGB stable and increased dose to 400units/hour this AM. Continue ASA 81 mg daily.   Cardiorespiratory: Stable on room air   HTN: Start Norvasc 10 mg daily and Hydralazine IV prn SBP >160. PTA: Hydralazine 100 mg TID, Norvasc 10 daily, Clonidine 0.2 mg BID and Losartan 100 mg daily.   GI/Nutrition: NPO. NG in place. No flatus. Start bowel regimen.   Endocrine: Euglycemic independent of insulin. Insulin gtt prn.   Fluid/Electrolytes: MIVF: D5LR@100cc/hour plus urine replacements. Electrolytes WNL. Trend K every 4 hours.   : Fox to remain due to new surgical anastomosis  x 3 days  Infectious disease: AF.  Leukocytosis: Likely secondary to steroids, monitor.   Prophylaxis: Bactrim indefinitely, Valcyte x 3 months. CMV R+, EBV R+, donor info unknown at this time. Micafungin x 7 days and Zosyn x 3 days.   Disposition: 6B, transfer 7A this evening vs tomorrow    Medical Decision Making: Medium  Subsequent visit 51833 (moderate level decision making)    ADI/Fellow/Resident Provider: Monica Pérez PA-C [id number]    Faculty: Russell Kennedy M.D., Ph.D.  _________________________________________________________________  Transplant History: Admitted 6/10/2021 for SPK.  6/11/2021 (Kidney / Pancreas), Postoperative day: 0     Interval History: History is obtained from the patient  Overnight events: Operation completed ~ 4 AM. Remains somnolent this AM. Complaining of irritation of NG and abdominal pain. HTN.     ROS:   A 10-point review of systems was negative except as noted above.    Meds:    acetaminophen  975 mg Oral Q8H     amLODIPine  10 mg Oral or NG Tube Daily     aspirin  81 mg Oral Daily     [START ON 6/12/2021] basiliximab (SIMULECT) infusion  20 mg Intravenous Once     [START ON 6/16/2021] basiliximab (SIMULECT) infusion  20 mg Intravenous Once     [START ON 6/13/2021] calcium carbonate-vitamin D  1 tablet Oral BID w/meals     clotrimazole  10 mg Buccal 4x Daily     docusate sodium  100 mg Oral BID     HYDROmorphone (PF)  0.5 mg Intravenous Once     [START ON 6/13/2021] magnesium oxide  400 mg Oral Q24H     [START ON 6/12/2021] methylPREDNISolone  250 mg Intravenous Once     [START ON 6/13/2021] methylPREDNISolone  100 mg Intravenous Once     micafungin  100 mg Intravenous Q24H     mycophenolate  1,000 mg Per NG tube BID     octreotide  100 mcg Subcutaneous Q12H     pantoprazole  40 mg Per NG tube Daily     piperacillin-tazobactam  2.25 g Intravenous Q8H     [START ON 6/12/2021] polyethylene glycol  17 g Oral Daily     [START ON 6/14/2021] predniSONE  60 mg Oral Daily    Followed by      "[START ON 6/16/2021] predniSONE  40 mg Oral Daily    Followed by     [START ON 6/18/2021] predniSONE  20 mg Oral Daily    Followed by     [START ON 6/25/2021] predniSONE  15 mg Oral Daily    Followed by     [START ON 7/2/2021] predniSONE  10 mg Oral Daily    Followed by     [START ON 7/9/2021] predniSONE  5 mg Oral Daily     senna-docusate  1 tablet Oral BID     sodium chloride (PF)  3 mL Intravenous Q8H     [START ON 6/14/2021] sulfamethoxazole-trimethoprim  10 mL Per NG tube Q Mon Wed Fri AM     tacrolimus  2 mg Oral BID IS     [START ON 6/14/2021] valGANciclovir  450 mg Oral Once per day on Mon Thu       Physical Exam:     Admit Weight: 87.2 kg (192 lb 3.9 oz)    Current vitals:   BP (!) 166/79   Pulse 99   Temp 98.6  F (37  C) (Oral)   Resp 20   Ht 1.651 m (5' 5\")   Wt 87.2 kg (192 lb 3.9 oz)   SpO2 96%   BMI 31.99 kg/m      CVP (mmHg): 6 mmHg    Vital sign ranges:    Temp:  [98.3  F (36.8  C)-98.7  F (37.1  C)] 98.6  F (37  C)  Pulse:  [68-99] 99  Resp:  [12-24] 20  BP: (166-208)/(79-97) 166/79  MAP:  [124 mmHg-137 mmHg] 124 mmHg  Arterial Line BP: (216-221)/(73-84) 217/73  SpO2:  [95 %-100 %] 96 %  Patient Vitals for the past 24 hrs:   BP Temp Temp src Pulse Resp SpO2   06/11/21 1015 (!) 166/79 -- -- 99 -- --   06/11/21 1000 (!) 195/97 -- -- 89 -- 96 %   06/11/21 0945 (!) 185/86 -- -- 85 -- --   06/11/21 0930 (!) 193/87 -- -- 88 20 95 %   06/11/21 0915 (!) 193/92 -- -- 97 20 98 %   06/11/21 0900 (!) 182/82 98.6  F (37  C) Oral 90 21 99 %   06/11/21 0856 (!) 181/93 -- -- 93 -- --   06/11/21 0815 (!) 176/86 -- -- 87 18 100 %   06/11/21 0814 -- -- -- -- -- 100 %   06/11/21 0800 (!) 188/86 98.3  F (36.8  C) Oral 93 18 100 %   06/11/21 0745 (!) 193/90 -- -- 93 18 100 %   06/11/21 0730 (!) 179/86 -- -- 87 18 98 %   06/11/21 0715 (!) 187/87 -- -- 91 24 99 %   06/11/21 0700 (!) 191/81 -- -- 95 24 99 %   06/11/21 0645 (!) 188/83 -- -- 90 20 99 %   06/11/21 0630 (!) 193/86 98.3  F (36.8  C) Axillary 88 20 98 % "   06/11/21 0615 (!) 208/95 -- -- 84 12 98 %   06/11/21 0600 (!) 187/85 -- -- 84 20 98 %   06/11/21 0545 (!) 203/95 -- -- 84 -- 99 %   06/11/21 0530 (!) 196/97 98.7  F (37.1  C) Oral 86 14 98 %   06/11/21 0519 -- 98.7  F (37.1  C) Oral -- -- --   06/10/21 1506 (!) 185/88 98.6  F (37  C) Oral 72 16 99 %   06/10/21 1124 (!) 169/82 98.5  F (36.9  C) Oral 68 16 97 %     General Appearance: in no apparent distress.   Skin: normal  Heart: regular rate and rhythm  Lungs: NLB on RA  Abdomen: The abdomen is obese, and  non-tender, generalized. he is not tender over the kidney and pancreas graft. The wound is covered with operative dressing with moderate saturation.  : edwards is present. Urine has small gross hematuria.   Extremities: edema: present bilaterally. 1+  Neurologic: alert, oriented and somnolent. Tremor absent.    Data:   CMP  Recent Labs   Lab 06/11/21  0540 06/11/21  0100 06/11/21  0000 06/10/21  0927 06/10/21  0927   * 132* 133   < > 132*   POTASSIUM 4.8 4.1 4.0   < > 4.3   CHLORIDE 100  --   --   --  96   CO2 19*  --   --   --  29   * 134* 142*   < > 119*   BUN 42*  --   --   --  34*   CR 7.93*  --   --   --  7.32*   GFRESTIMATED 8*  --   --   --  9*   GFRESTBLACK 9*  --   --   --  10*   SHAY 9.0  --   --   --  8.8   ICAW  --  4.6 4.3*   < >  --    MAG 1.5*  --   --   --   --    PHOS 2.2*  --   --   --   --    AMYLASE 102  --   --   --  96   LIPASE 1,066*  --   --   --  580*   ALBUMIN  --   --   --   --  3.9   BILITOTAL  --   --   --   --  0.4   ALKPHOS  --   --   --   --  84   AST  --   --   --   --  32   ALT  --   --   --   --  32    < > = values in this interval not displayed.     CBC  Recent Labs   Lab 06/11/21 0540 06/11/21  0423 06/10/21  0927 06/10/21  0927   HGB 8.7* 6.6*   < > 8.1*   WBC 14.2* 9.8  --  6.7    169  --  231   A1C  --   --   --  6.0*    < > = values in this interval not displayed.     COAGS  Recent Labs   Lab 06/11/21  0540 06/10/21  0927   INR 1.09 0.98   PTT 34 37       Urinalysis  Recent Labs   Lab Test 06/10/21  1120 08/31/20  0000 07/29/19  1240   COLOR Light Yellow  --  Straw   APPEARANCE Clear  --  Clear   URINEGLC Negative  --  50*   URINEBILI Negative  --  Negative   URINEKETONE Negative  --  Negative   SG 1.007  --  1.010   UBLD Negative  --  Negative   URINEPH 7.0  --  5.0   PROTEIN 200*  --  >499*   NITRITE Negative  --  Negative   LEUKEST Negative  --  Negative   RBCU 3*  --  5*   WBCU 1  --  3   UTPG 5.90* 2.62*  --      Virology:  Hepatitis C Antibody   Date Value Ref Range Status   06/10/2021 Nonreactive NR^Nonreactive Final     Comment:     Assay performance characteristics have not been established for newborns,   infants, and children

## 2021-06-12 ENCOUNTER — APPOINTMENT (OUTPATIENT)
Dept: PHYSICAL THERAPY | Facility: CLINIC | Age: 41
DRG: 008 | End: 2021-06-12
Attending: SURGERY
Payer: MEDICARE

## 2021-06-12 ENCOUNTER — APPOINTMENT (OUTPATIENT)
Dept: ULTRASOUND IMAGING | Facility: CLINIC | Age: 41
DRG: 008 | End: 2021-06-12
Attending: PHYSICIAN ASSISTANT
Payer: MEDICARE

## 2021-06-12 LAB
AMYLASE SERPL-CCNC: 154 U/L (ref 30–110)
ANION GAP SERPL CALCULATED.3IONS-SCNC: 4 MMOL/L (ref 3–14)
BASOPHILS # BLD AUTO: 0 10E9/L (ref 0–0.2)
BASOPHILS NFR BLD AUTO: 0.2 %
BUN SERPL-MCNC: 41 MG/DL (ref 7–30)
CALCIUM SERPL-MCNC: 7.9 MG/DL (ref 8.5–10.1)
CHLORIDE SERPL-SCNC: 108 MMOL/L (ref 94–109)
CO2 SERPL-SCNC: 26 MMOL/L (ref 20–32)
CREAT SERPL-MCNC: 6.08 MG/DL (ref 0.66–1.25)
DIFFERENTIAL METHOD BLD: ABNORMAL
EOSINOPHIL # BLD AUTO: 0 10E9/L (ref 0–0.7)
EOSINOPHIL NFR BLD AUTO: 0.4 %
ERYTHROCYTE [DISTWIDTH] IN BLOOD BY AUTOMATED COUNT: 17 % (ref 10–15)
GFR SERPL CREATININE-BSD FRML MDRD: 11 ML/MIN/{1.73_M2}
GLUCOSE BLDC GLUCOMTR-MCNC: 110 MG/DL (ref 70–99)
GLUCOSE BLDC GLUCOMTR-MCNC: 111 MG/DL (ref 70–99)
GLUCOSE BLDC GLUCOMTR-MCNC: 112 MG/DL (ref 70–99)
GLUCOSE BLDC GLUCOMTR-MCNC: 119 MG/DL (ref 70–99)
GLUCOSE BLDC GLUCOMTR-MCNC: 120 MG/DL (ref 70–99)
GLUCOSE BLDC GLUCOMTR-MCNC: 121 MG/DL (ref 70–99)
GLUCOSE BLDC GLUCOMTR-MCNC: 124 MG/DL (ref 70–99)
GLUCOSE BLDC GLUCOMTR-MCNC: 133 MG/DL (ref 70–99)
GLUCOSE BLDC GLUCOMTR-MCNC: 136 MG/DL (ref 70–99)
GLUCOSE BLDC GLUCOMTR-MCNC: 151 MG/DL (ref 70–99)
GLUCOSE BLDC GLUCOMTR-MCNC: 156 MG/DL (ref 70–99)
GLUCOSE BLDC GLUCOMTR-MCNC: 158 MG/DL (ref 70–99)
GLUCOSE BLDC GLUCOMTR-MCNC: 159 MG/DL (ref 70–99)
GLUCOSE BLDC GLUCOMTR-MCNC: 164 MG/DL (ref 70–99)
GLUCOSE SERPL-MCNC: 119 MG/DL (ref 70–99)
HCT VFR BLD AUTO: 28 % (ref 40–53)
HGB BLD-MCNC: 9 G/DL (ref 13.3–17.7)
HGB BLD-MCNC: 9.1 G/DL (ref 13.3–17.7)
IMM GRANULOCYTES # BLD: 0 10E9/L (ref 0–0.4)
IMM GRANULOCYTES NFR BLD: 0.3 %
LIPASE SERPL-CCNC: 793 U/L (ref 73–393)
LYMPHOCYTES # BLD AUTO: 0.3 10E9/L (ref 0.8–5.3)
LYMPHOCYTES NFR BLD AUTO: 2.6 %
MAGNESIUM SERPL-MCNC: 1.9 MG/DL (ref 1.6–2.3)
MCH RBC QN AUTO: 30.4 PG (ref 26.5–33)
MCHC RBC AUTO-ENTMCNC: 32.1 G/DL (ref 31.5–36.5)
MCV RBC AUTO: 95 FL (ref 78–100)
MONOCYTES # BLD AUTO: 0.5 10E9/L (ref 0–1.3)
MONOCYTES NFR BLD AUTO: 5.6 %
NEUTROPHILS # BLD AUTO: 8.6 10E9/L (ref 1.6–8.3)
NEUTROPHILS NFR BLD AUTO: 90.9 %
NRBC # BLD AUTO: 0 10*3/UL
NRBC BLD AUTO-RTO: 0 /100
PHOSPHATE SERPL-MCNC: 3.8 MG/DL (ref 2.5–4.5)
PLATELET # BLD AUTO: 153 10E9/L (ref 150–450)
POTASSIUM SERPL-SCNC: 4 MMOL/L (ref 3.4–5.3)
POTASSIUM SERPL-SCNC: 4.2 MMOL/L (ref 3.4–5.3)
RBC # BLD AUTO: 2.96 10E12/L (ref 4.4–5.9)
SODIUM SERPL-SCNC: 138 MMOL/L (ref 133–144)
UFH PPP CHRO-ACNC: <0.1 IU/ML
WBC # BLD AUTO: 9.5 10E9/L (ref 4–11)

## 2021-06-12 PROCEDURE — 83690 ASSAY OF LIPASE: CPT | Performed by: SURGERY

## 2021-06-12 PROCEDURE — 258N000003 HC RX IP 258 OP 636: Performed by: PHYSICIAN ASSISTANT

## 2021-06-12 PROCEDURE — 120N000011 HC R&B TRANSPLANT UMMC

## 2021-06-12 PROCEDURE — 84100 ASSAY OF PHOSPHORUS: CPT | Performed by: SURGERY

## 2021-06-12 PROCEDURE — 250N000011 HC RX IP 250 OP 636: Performed by: SURGERY

## 2021-06-12 PROCEDURE — 250N000009 HC RX 250: Performed by: PHYSICIAN ASSISTANT

## 2021-06-12 PROCEDURE — 250N000013 HC RX MED GY IP 250 OP 250 PS 637: Performed by: PHYSICIAN ASSISTANT

## 2021-06-12 PROCEDURE — 85018 HEMOGLOBIN: CPT | Performed by: SURGERY

## 2021-06-12 PROCEDURE — 99233 SBSQ HOSP IP/OBS HIGH 50: CPT | Performed by: INTERNAL MEDICINE

## 2021-06-12 PROCEDURE — 85025 COMPLETE CBC W/AUTO DIFF WBC: CPT | Performed by: SURGERY

## 2021-06-12 PROCEDURE — 97530 THERAPEUTIC ACTIVITIES: CPT | Mod: GP

## 2021-06-12 PROCEDURE — 250N000011 HC RX IP 250 OP 636: Performed by: PHYSICIAN ASSISTANT

## 2021-06-12 PROCEDURE — 250N000013 HC RX MED GY IP 250 OP 250 PS 637: Performed by: STUDENT IN AN ORGANIZED HEALTH CARE EDUCATION/TRAINING PROGRAM

## 2021-06-12 PROCEDURE — 82150 ASSAY OF AMYLASE: CPT | Performed by: SURGERY

## 2021-06-12 PROCEDURE — 999N001017 HC STATISTIC GLUCOSE BY METER IP

## 2021-06-12 PROCEDURE — 93976 VASCULAR STUDY: CPT | Mod: 26 | Performed by: RADIOLOGY

## 2021-06-12 PROCEDURE — 80048 BASIC METABOLIC PNL TOTAL CA: CPT | Performed by: SURGERY

## 2021-06-12 PROCEDURE — 76705 ECHO EXAM OF ABDOMEN: CPT

## 2021-06-12 PROCEDURE — 97161 PT EVAL LOW COMPLEX 20 MIN: CPT | Mod: GP

## 2021-06-12 PROCEDURE — 36592 COLLECT BLOOD FROM PICC: CPT | Performed by: SURGERY

## 2021-06-12 PROCEDURE — 85520 HEPARIN ASSAY: CPT | Performed by: SURGERY

## 2021-06-12 PROCEDURE — 83735 ASSAY OF MAGNESIUM: CPT | Performed by: SURGERY

## 2021-06-12 PROCEDURE — 250N000012 HC RX MED GY IP 250 OP 636 PS 637: Performed by: PHYSICIAN ASSISTANT

## 2021-06-12 PROCEDURE — 84132 ASSAY OF SERUM POTASSIUM: CPT | Performed by: SURGERY

## 2021-06-12 PROCEDURE — 250N000013 HC RX MED GY IP 250 OP 250 PS 637: Performed by: SURGERY

## 2021-06-12 PROCEDURE — 97116 GAIT TRAINING THERAPY: CPT | Mod: GP

## 2021-06-12 PROCEDURE — 250N000012 HC RX MED GY IP 250 OP 636 PS 637: Performed by: SURGERY

## 2021-06-12 RX ORDER — HYDRALAZINE HYDROCHLORIDE 20 MG/ML
10-20 INJECTION INTRAMUSCULAR; INTRAVENOUS EVERY 4 HOURS PRN
Status: DISCONTINUED | OUTPATIENT
Start: 2021-06-12 | End: 2021-06-21 | Stop reason: HOSPADM

## 2021-06-12 RX ORDER — HYDRALAZINE HYDROCHLORIDE 20 MG/ML
10 INJECTION INTRAMUSCULAR; INTRAVENOUS ONCE
Status: COMPLETED | OUTPATIENT
Start: 2021-06-12 | End: 2021-06-12

## 2021-06-12 RX ORDER — CLONIDINE HYDROCHLORIDE 0.2 MG/1
0.2 TABLET ORAL 3 TIMES DAILY
Status: DISCONTINUED | OUTPATIENT
Start: 2021-06-12 | End: 2021-06-16

## 2021-06-12 RX ADMIN — PIPERACILLIN SODIUM AND TAZOBACTAM SODIUM 2.25 G: 2; .25 INJECTION, POWDER, LYOPHILIZED, FOR SOLUTION INTRAVENOUS at 15:59

## 2021-06-12 RX ADMIN — HYDRALAZINE HYDROCHLORIDE 10 MG: 20 INJECTION INTRAMUSCULAR; INTRAVENOUS at 06:50

## 2021-06-12 RX ADMIN — ACETAMINOPHEN ORAL SOLUTION 975 MG: 325 SOLUTION ORAL at 01:52

## 2021-06-12 RX ADMIN — OCTREOTIDE ACETATE 100 MCG: 100 INJECTION, SOLUTION INTRAVENOUS; SUBCUTANEOUS at 20:52

## 2021-06-12 RX ADMIN — HYDRALAZINE HYDROCHLORIDE 10 MG: 20 INJECTION INTRAMUSCULAR; INTRAVENOUS at 03:01

## 2021-06-12 RX ADMIN — CLONIDINE HYDROCHLORIDE 0.2 MG: 0.2 TABLET ORAL at 20:52

## 2021-06-12 RX ADMIN — AMLODIPINE BESYLATE 10 MG: 10 TABLET ORAL at 08:04

## 2021-06-12 RX ADMIN — PANTOPRAZOLE SODIUM 40 MG: 40 TABLET, DELAYED RELEASE ORAL at 08:10

## 2021-06-12 RX ADMIN — ACETAMINOPHEN ORAL SOLUTION 975 MG: 325 SOLUTION ORAL at 09:03

## 2021-06-12 RX ADMIN — HYDROMORPHONE HYDROCHLORIDE 0.2 MG: 0.2 INJECTION, SOLUTION INTRAMUSCULAR; INTRAVENOUS; SUBCUTANEOUS at 21:20

## 2021-06-12 RX ADMIN — CLOTRIMAZOLE 10 MG: 10 LOZENGE ORAL at 08:37

## 2021-06-12 RX ADMIN — HYDROXYZINE HYDROCHLORIDE 50 MG: 25 TABLET, FILM COATED ORAL at 22:23

## 2021-06-12 RX ADMIN — POLYETHYLENE GLYCOL 3350 17 G: 17 POWDER, FOR SOLUTION ORAL at 08:13

## 2021-06-12 RX ADMIN — HYDROMORPHONE HYDROCHLORIDE 0.4 MG: 0.2 INJECTION, SOLUTION INTRAMUSCULAR; INTRAVENOUS; SUBCUTANEOUS at 03:55

## 2021-06-12 RX ADMIN — SODIUM CHLORIDE, SODIUM LACTATE, POTASSIUM CHLORIDE, CALCIUM CHLORIDE AND DEXTROSE MONOHYDRATE: 5; 600; 310; 30; 20 INJECTION, SOLUTION INTRAVENOUS at 21:52

## 2021-06-12 RX ADMIN — OCTREOTIDE ACETATE 100 MCG: 100 INJECTION, SOLUTION INTRAVENOUS; SUBCUTANEOUS at 08:39

## 2021-06-12 RX ADMIN — HYDRALAZINE HYDROCHLORIDE 20 MG: 20 INJECTION INTRAMUSCULAR; INTRAVENOUS at 22:25

## 2021-06-12 RX ADMIN — ACETAMINOPHEN ORAL SOLUTION 975 MG: 325 SOLUTION ORAL at 17:17

## 2021-06-12 RX ADMIN — CLOTRIMAZOLE 10 MG: 10 LOZENGE ORAL at 20:52

## 2021-06-12 RX ADMIN — HYDROMORPHONE HYDROCHLORIDE 0.4 MG: 0.2 INJECTION, SOLUTION INTRAMUSCULAR; INTRAVENOUS; SUBCUTANEOUS at 00:55

## 2021-06-12 RX ADMIN — Medication 5 MG: at 22:23

## 2021-06-12 RX ADMIN — ASPIRIN 81 MG CHEWABLE TABLET 81 MG: 81 TABLET CHEWABLE at 08:03

## 2021-06-12 RX ADMIN — SODIUM CHLORIDE 20 MG: 9 INJECTION, SOLUTION INTRAVENOUS at 08:54

## 2021-06-12 RX ADMIN — HYDROMORPHONE HYDROCHLORIDE 0.4 MG: 0.2 INJECTION, SOLUTION INTRAMUSCULAR; INTRAVENOUS; SUBCUTANEOUS at 17:17

## 2021-06-12 RX ADMIN — CLONIDINE HYDROCHLORIDE 0.2 MG: 0.2 TABLET ORAL at 17:17

## 2021-06-12 RX ADMIN — HYDRALAZINE HYDROCHLORIDE 10 MG: 20 INJECTION INTRAMUSCULAR; INTRAVENOUS at 12:38

## 2021-06-12 RX ADMIN — DOCUSATE SODIUM 100 MG: 100 CAPSULE, LIQUID FILLED ORAL at 09:00

## 2021-06-12 RX ADMIN — TACROLIMUS 2 MG: 5 CAPSULE ORAL at 08:10

## 2021-06-12 RX ADMIN — DOCUSATE SODIUM 50 MG AND SENNOSIDES 8.6 MG 1 TABLET: 8.6; 5 TABLET, FILM COATED ORAL at 20:52

## 2021-06-12 RX ADMIN — HYDROMORPHONE HYDROCHLORIDE 0.4 MG: 0.2 INJECTION, SOLUTION INTRAMUSCULAR; INTRAVENOUS; SUBCUTANEOUS at 08:09

## 2021-06-12 RX ADMIN — SODIUM CHLORIDE, SODIUM LACTATE, POTASSIUM CHLORIDE, CALCIUM CHLORIDE AND DEXTROSE MONOHYDRATE: 5; 600; 310; 30; 20 INJECTION, SOLUTION INTRAVENOUS at 11:48

## 2021-06-12 RX ADMIN — DOCUSATE SODIUM 100 MG: 100 CAPSULE, LIQUID FILLED ORAL at 20:52

## 2021-06-12 RX ADMIN — MICAFUNGIN SODIUM 100 MG: 50 INJECTION, POWDER, LYOPHILIZED, FOR SOLUTION INTRAVENOUS at 20:52

## 2021-06-12 RX ADMIN — MYCOPHENOLATE MOFETIL 1000 MG: 200 POWDER, FOR SUSPENSION ORAL at 22:05

## 2021-06-12 RX ADMIN — DOCUSATE SODIUM 50 MG AND SENNOSIDES 8.6 MG 1 TABLET: 8.6; 5 TABLET, FILM COATED ORAL at 08:05

## 2021-06-12 RX ADMIN — HYDRALAZINE HYDROCHLORIDE 20 MG: 20 INJECTION INTRAMUSCULAR; INTRAVENOUS at 17:02

## 2021-06-12 RX ADMIN — CLOTRIMAZOLE 10 MG: 10 LOZENGE ORAL at 12:00

## 2021-06-12 RX ADMIN — SODIUM CHLORIDE 250 MG: 9 INJECTION, SOLUTION INTRAVENOUS at 08:27

## 2021-06-12 RX ADMIN — CLOTRIMAZOLE 10 MG: 10 LOZENGE ORAL at 15:59

## 2021-06-12 RX ADMIN — HYDRALAZINE HYDROCHLORIDE 10 MG: 20 INJECTION INTRAMUSCULAR; INTRAVENOUS at 14:26

## 2021-06-12 RX ADMIN — TACROLIMUS 2 MG: 5 CAPSULE ORAL at 17:26

## 2021-06-12 RX ADMIN — MYCOPHENOLATE MOFETIL 1000 MG: 200 POWDER, FOR SUSPENSION ORAL at 08:10

## 2021-06-12 RX ADMIN — HYDROMORPHONE HYDROCHLORIDE 0.2 MG: 0.2 INJECTION, SOLUTION INTRAMUSCULAR; INTRAVENOUS; SUBCUTANEOUS at 21:04

## 2021-06-12 RX ADMIN — HUMAN INSULIN 1 UNITS/HR: 100 INJECTION, SOLUTION SUBCUTANEOUS at 12:38

## 2021-06-12 RX ADMIN — CLONIDINE HYDROCHLORIDE 0.2 MG: 0.1 TABLET ORAL at 08:04

## 2021-06-12 RX ADMIN — PIPERACILLIN SODIUM AND TAZOBACTAM SODIUM 2.25 G: 2; .25 INJECTION, POWDER, LYOPHILIZED, FOR SOLUTION INTRAVENOUS at 08:24

## 2021-06-12 ASSESSMENT — ACTIVITIES OF DAILY LIVING (ADL)
ADLS_ACUITY_SCORE: 12
ADLS_ACUITY_SCORE: 12
ADLS_ACUITY_SCORE: 15
ADLS_ACUITY_SCORE: 13
ADLS_ACUITY_SCORE: 13
ADLS_ACUITY_SCORE: 12

## 2021-06-12 ASSESSMENT — MIFFLIN-ST. JEOR: SCORE: 1751.88

## 2021-06-12 NOTE — PROGRESS NOTES
Transfer  Transferred to: 7A  Via:bed  Reason for transfer:Pt no longer appropriate for 6B- improved patient condition  Family: Aware of transfer per pt  Belongings: Packed and sent with pt  Chart: Delivered with pt to next unit  Medications: Meds sent to new unit with pt  Report given to:  YING [facility] 7A  Pt status:    Remains hypertensive, team aware. Encouraging OOB activity. 1000 BG was 151 post solumedrol IV- notified team at bedside, recommended recheck in 1hr- communicated to Carol NGUYEN RN.

## 2021-06-12 NOTE — PROGRESS NOTES
06/12/21 1300   Quick Adds   Type of Visit Initial PT Evaluation   Living Environment   People in home other relative(s)   Current Living Arrangements house   Home Accessibility stairs to enter home;stairs within home   Number of Stairs, Within Home, Primary   (1 flight)   Transportation Anticipated family or friend will provide   Living Environment Comments Pt lives at home with a few family members, did not specify, says he has 1 flight of stairs to get to bedroom.    Self-Care   Usual Activity Tolerance good   Current Activity Tolerance fair   Equipment Currently Used at Home none   Activity/Exercise/Self-Care Comment Per pr he was IND with ADL's prior to admission.  Pt was driving and running errands IND, was not working.    Disability/Function   Hearing Difficulty or Deaf no   Wear Glasses or Blind no   Concentrating, Remembering or Making Decisions Difficulty no   Difficulty Communicating no   Difficulty Eating/Swallowing no   Walking or Climbing Stairs Difficulty no   Dressing/Bathing Difficulty no   Toileting issues no   Doing Errands Independently Difficulty (such as shopping) no   Fall history within last six months no   Change in Functional Status Since Onset of Current Illness/Injury yes   General Information   Onset of Illness/Injury or Date of Surgery 06/10/21   Patient/Family Therapy Goals Statement (PT) Pt wants to go home    Pertinent History of Current Problem (include personal factors and/or comorbidities that impact the POC) 40 year old male with a past medical history significant for end stage kidney disease on HD every MWF via AVF, anuric prior to transplant. Other past medical history includes T2DM, HTN, BMI>30, bilateral shoulder pain, s/p lap cholecystectomy 12/19 due to symptomatic biliary dyskinesia and atypical nevi. Admitted for SPK-ED with ureteral stent on 6/11/21 with Dr. Kennedy.    Existing Precautions/Restrictions fall;abdominal   Weight-Bearing Status - LUE full weight-bearing    Weight-Bearing Status - RUE full weight-bearing   Weight-Bearing Status - LLE full weight-bearing   Weight-Bearing Status - RLE full weight-bearing   General Observations Activity: up with assist   Cognition   Orientation Status (Cognition) oriented x 4   Affect/Mental Status (Cognition) WNL   Follows Commands (Cognition) WNL   Cognitive Status Comments Very sleepy    Pain Assessment   Patient Currently in Pain No   Integumentary/Edema   Integumentary/Edema Comments Abdominal binder on   Posture    Posture Protracted shoulders;Forward head position   Range of Motion (ROM)   ROM Comment ROM WFL   Strength   Strength Comments LE's grossly 5/5   Bed Mobility   Comment (Bed Mobility) ModA at trunk for bed mobility    Transfers   Transfer Safety Comments CGA-Rebecca with 1 UE support on IV pole    Gait/Stairs (Locomotion)   Comment (Gait/Stairs) Ambulated 100' with 1 UE support and CGA, slow cadnce short step length    Balance   Balance Comments Impaired standing balance due to immobility, UE support on IV pole with mobility    Sensory Examination   Sensory Perception WNL   Coordination   Coordination no deficits were identified   Muscle Tone   Muscle Tone no deficits were identified   Clinical Impression   Criteria for Skilled Therapeutic Intervention yes, treatment indicated;meets criteria   PT Diagnosis (PT) Impaired functional mobility   Influenced by the following impairments Decreased strength, balance and activity tolerance   Functional limitations due to impairments Inability to complete functional mobility at IND level of functioning    Clinical Presentation Stable/Uncomplicated   Clinical Presentation Rationale Medically stable, on RA   Clinical Decision Making (Complexity) low complexity   Therapy Frequency (PT) Daily   Predicted Duration of Therapy Intervention (days/wks) 1 week    Planned Therapy Interventions (PT) bed mobility training;gait training;home exercise program;stair training;ROM (range of  motion);strengthening;stretching;transfer training   Anticipated Equipment Needs at Discharge (PT)   (Anticipate no DME needs )   Risk & Benefits of therapy have been explained evaluation/treatment results reviewed;care plan/treatment goals reviewed;risks/benefits reviewed;current/potential barriers reviewed;participants voiced agreement with care plan;participants included;patient   Clinical Impression Comments Pt would benefit from IP PT to progress strength and IND with functional mobility in order to ensure safety with d/c home.    PT Discharge Planning    PT Discharge Recommendation (DC Rec) home with assist   PT Rationale for DC Rec Pending progress with therapy    PT Brief overview of current status  A x 1 for mobility    Total Evaluation Time   Total Evaluation Time (Minutes) 10

## 2021-06-12 NOTE — PROGRESS NOTES
Admitted/transferred from:   2 RN full   skin assessment completed by Carol Carbajal, YING and Gold John, YING.  Skin assessment finding: issues found abdominal incision UTV operative dressing still in place; scant drainage marked. Right TIA CDI. Right CVC. Right PIV. Left arm fistula.    Interventions/actions: skin interventions not needed at this time     Will continue to monitor.

## 2021-06-12 NOTE — PLAN OF CARE
"BP (!) 159/83 (BP Location: Right arm)   Pulse 100   Temp 98.2  F (36.8  C) (Oral)   Resp 16   Ht 1.651 m (5' 5\")   Wt 91 kg (200 lb 9.9 oz)   SpO2 97%   BMI 33.38 kg/m       Patient alert and oriented x 4. Tachycardic. Hypertensive. PRN hydralazine given (See MAR). All other VS stable. Patient on 1 L NC. Patient complains of pain. PRN dilaudid given (See MAR). Patient denies nausea. BG - patient on insulin gtt. Hourly BG checks. Using algorithm 3. Urine Output - Fox catheter with adequate pink-tinged, urine output. Bowel Function - No BM during shift. Patient passing gas. Nutrition - NPO with ice chips. Midline incision - operative dressing in place. Scant drainage, outlined. TIA Drain - serosanguineous output. NG tube - low continuous suction. Left AVF. PIV - saline locked. CVC - internal jugular: D5LR @ 100 ml/hr, heparin gtt @ 400 units/hr straight rate, insulin gtt, and TKO. Activity - Assist of one with gait belt. Education - Lab book up to date. Plan of Care - Will continue to monitor and notify care team of any changes.    "

## 2021-06-12 NOTE — PROGRESS NOTES
Transplant Surgery  Inpatient Daily Progress Note  06/12/2021    Assessment & Plan: 40 year old male with a past medical history significant for end stage kidney disease on HD every MWF via AVF, anuric prior to transplant. Other past medical history includes T2DM, HTN, BMI>30, bilateral shoulder pain, s/p lap cholecystectomy 12/19 due to symptomatic biliary dyskinesia and atypical nevi. Admitted for SPK-ED with ureteral stent on 6/11/21 with Dr. Kennedy.     Graft function:  Pancreas: Lipase 793 (from 1066 post operatively). Will monitor. Has been requiring some insulin this afternoon after steroid dose, will recheck US due to slightly increased RIs on POD 0 US and increase in insulin usage. Octreotide and heparin gtt per protocol. ASA 81 mg daily started.   Kidney: Cr 6.1 (7.9). Good UOP. US with moderately elevated velocities at the renal artery anastomosis and mid renal artery.  Immunosuppression management:  PRA 32, intermediate induction  Thymo 175 mg completed intra-op  Simulect POD 1 & 5  Solu-medrol 500 mg intra-op, 250 mg POD 1, 100 mg POD 2 then taper per protocol  Cellcept 1000 mg BID  Tacrolimus 2 mg BID. Goal 8-10.   Complexity of management:Medium. Contributing factors: anemia and induction  Hematology:   Acute blood loss anemia: HGB down to 6.6 intra-op, given 2 units PRBC. HGB now stabilized ~9.   Post op ppx: Heparin gtt @200/hour initially post-op, HGB stable and increased dose to 400units/hour. Continue ASA 81 mg daily.   Cardiorespiratory: Stable on 1L O2 via NC  HTN: Start Norvasc 10 mg daily and Hydralazine IV prn SBP >160. Clonidine restarted overnight. PTA: Hydralazine 100 mg TID, Norvasc 10 daily, Clonidine 0.2 mg BID and Losartan 100 mg daily.   GI/Nutrition: NPO. NG in place. No flatus. Start bowel regimen.   Endocrine: Insulin drip restarted today after steroid dose, will monitor  Fluid/Electrolytes: MIVF: D5LR@100cc/hour. Electrolytes WNL.   : Fox to remain due to new surgical  anastomosis x 3 days  Infectious disease: AF.  Leukocytosis: Likely secondary to steroids, monitor.   Prophylaxis: Bactrim indefinitely, Valcyte x 3 months. CMV R+, EBV R+, donor info unknown at this time. Micafungin x 7 days and Zosyn x 3 days.   Disposition: 7A    Medical Decision Making: Medium  Subsequent visit 68085 (moderate level decision making)    ADI/Fellow/Resident Provider: Nora Farfan PA-C    Faculty: Julio Cesar Vega MD    Attestation: I saw and examined this patient with Nora Farfan PA-C, and the transplant team. I independently reviewed all pertinent laboratory and imaging results. I agree with the findings and plan as documented in this note.  Julio Cesar Vega MD  _________________________________________________________________  Transplant History: Admitted 6/10/2021 for SPK.  6/11/2021 (Kidney / Pancreas), Postoperative day: 1     Interval History: History is obtained from the patient  Overnight events: Somnolent this AM. Hypertensive. No acute complaints. No flatus    ROS:   A 10-point review of systems was negative except as noted above.    Meds:    acetaminophen  975 mg Oral Q8H     amLODIPine  10 mg Oral or NG Tube Daily     aspirin  81 mg Oral Daily     [START ON 6/13/2021] atorvastatin  40 mg Oral Daily     [START ON 6/16/2021] basiliximab (SIMULECT) infusion  20 mg Intravenous Once     [START ON 6/13/2021] calcium carbonate-vitamin D  1 tablet Oral BID w/meals     cloNIDine  0.2 mg Oral BID     clotrimazole  10 mg Buccal 4x Daily     docusate sodium  100 mg Oral BID     hydrALAZINE  10 mg Intravenous Once     [START ON 6/13/2021] magnesium oxide  400 mg Oral Q24H     [START ON 6/13/2021] methylPREDNISolone  100 mg Intravenous Once     micafungin  100 mg Intravenous Q24H     mycophenolate  1,000 mg Per NG tube BID     octreotide  100 mcg Subcutaneous Q12H     pantoprazole  40 mg Per NG tube Daily     piperacillin-tazobactam  2.25 g Intravenous Q8H     polyethylene glycol  17  "g Oral Daily     [START ON 6/14/2021] predniSONE  60 mg Oral Daily    Followed by     [START ON 6/16/2021] predniSONE  40 mg Oral Daily    Followed by     [START ON 6/18/2021] predniSONE  20 mg Oral Daily    Followed by     [START ON 6/25/2021] predniSONE  15 mg Oral Daily    Followed by     [START ON 7/2/2021] predniSONE  10 mg Oral Daily    Followed by     [START ON 7/9/2021] predniSONE  5 mg Oral Daily     senna-docusate  1 tablet Oral BID     sodium chloride (PF)  3 mL Intravenous Q8H     [START ON 6/14/2021] sulfamethoxazole-trimethoprim  10 mL Per NG tube Q Mon Wed Fri AM     tacrolimus  2 mg Oral BID IS     [START ON 6/14/2021] valGANciclovir  450 mg Oral Once per day on Mon Thu       Physical Exam:     Admit Weight: 87.2 kg (192 lb 3.9 oz)    Current vitals:   BP (!) 174/94   Pulse 89   Temp 98.2  F (36.8  C) (Oral)   Resp 16   Ht 1.651 m (5' 5\")   Wt 91 kg (200 lb 9.9 oz)   SpO2 95%   BMI 33.38 kg/m      CVP (mmHg): 6 mmHg    Vital sign ranges:    Temp:  [98.2  F (36.8  C)-98.9  F (37.2  C)] 98.2  F (36.8  C)  Pulse:  [] 89  Resp:  [13-22] 16  BP: (144-193)/(74-98) 174/94  SpO2:  [89 %-99 %] 95 %  Patient Vitals for the past 24 hrs:   BP Temp Temp src Pulse Resp SpO2 Weight   06/12/21 1403 (!) 174/94 -- -- -- -- -- --   06/12/21 1330 (!) 181/91 -- -- -- -- -- --   06/12/21 1238 (!) 171/92 -- -- -- -- -- --   06/12/21 1125 -- -- -- -- -- 95 % --   06/12/21 1120 (!) 158/91 -- -- -- -- (!) 89 % --   06/12/21 1100 (!) 162/84 98.2  F (36.8  C) Oral 89 16 90 % --   06/12/21 1000 (!) 154/84 -- -- 91 -- -- --   06/12/21 0900 (!) 172/85 -- -- 98 -- -- --   06/12/21 0800 (!) 173/86 98.4  F (36.9  C) Oral 98 22 98 % --   06/12/21 0649 (!) 164/89 -- -- 93 16 97 % --   06/12/21 0601 (!) 179/91 -- -- 102 16 99 % --   06/12/21 0504 (!) 156/83 -- -- -- 16 -- --   06/12/21 0402 (!) 158/84 98.4  F (36.9  C) Oral 94 16 98 % --   06/12/21 0255 (!) 192/90 -- -- 109 16 -- --   06/12/21 0156 (!) 156/81 -- -- 100 16 " 98 % --   06/12/21 0151 -- -- -- -- -- -- 91 kg (200 lb 9.9 oz)   06/12/21 0104 (!) 180/95 -- -- -- -- -- --   06/12/21 0100 (!) 181/91 -- -- 111 16 99 % --   06/12/21 0000 (!) 156/87 98.6  F (37  C) Oral 98 16 98 % --   06/11/21 2305 (!) 170/84 -- -- 112 -- 99 % --   06/11/21 2200 (!) 193/91 -- -- 109 16 98 % --   06/11/21 2103 (!) 177/88 -- -- -- 16 96 % --   06/11/21 2008 (!) 185/98 98.9  F (37.2  C) Oral 111 16 97 % --   06/11/21 1901 (!) 171/85 -- -- 107 -- 97 % --   06/11/21 1800 (!) 174/83 -- -- 100 15 96 % --   06/11/21 1700 (!) 184/83 -- -- 106 13 99 % --   06/11/21 1600 (!) 145/76 98.7  F (37.1  C) Oral 96 14 99 % --   06/11/21 1545 (!) 154/79 -- -- 102 -- -- --   06/11/21 1522 (!) 164/80 -- -- -- -- -- 88.5 kg (195 lb 1.7 oz)   06/11/21 1515 (!) 144/74 -- -- 103 -- -- --   06/11/21 1500 (!) 165/91 -- -- 102 18 99 % --     General Appearance: in no apparent distress.   Skin: normal  Heart: regular rate and rhythm  Lungs: NLB on 1L  Abdomen: The abdomen is obese, and non-tender, generalized. he is not tender over the kidney and pancreas graft. The wound is covered with operative dressing with moderate saturation.  : edwards is present. Urine has small gross hematuria.   Extremities: edema: present bilaterally. 1+  Neurologic: somnolent but arousable, oriented. Tremor absent.    Data:   CMP  Recent Labs   Lab 06/12/21  0624 06/12/21  0057 06/11/21  0540 06/11/21  0540 06/11/21  0100 06/11/21  0000 06/10/21  0927 06/10/21  0927     --   --  132* 132* 133   < > 132*   POTASSIUM 4.0 4.2   < > 4.8 4.1 4.0   < > 4.3   CHLORIDE 108  --   --  100  --   --   --  96   CO2 26  --   --  19*  --   --   --  29   *  --   --  129* 134* 142*   < > 119*   BUN 41*  --   --  42*  --   --   --  34*   CR 6.08*  --   --  7.93*  --   --   --  7.32*   GFRESTIMATED 11*  --   --  8*  --   --   --  9*   GFRESTBLACK 12*  --   --  9*  --   --   --  10*   SHAY 7.9*  --   --  9.0  --   --   --  8.8   ICAW  --   --   --   --   4.6 4.3*   < >  --    MAG 1.9  --   --  1.5*  --   --   --   --    PHOS 3.8  --   --  2.2*  --   --   --   --    AMYLASE 154*  --   --  102  --   --   --  96   LIPASE 793*  --   --  1,066*  --   --   --  580*   ALBUMIN  --   --   --   --   --   --   --  3.9   BILITOTAL  --   --   --   --   --   --   --  0.4   ALKPHOS  --   --   --   --   --   --   --  84   AST  --   --   --   --   --   --   --  32   ALT  --   --   --   --   --   --   --  32    < > = values in this interval not displayed.     CBC  Recent Labs   Lab 06/12/21  0624 06/12/21  0057 06/11/21  0540 06/11/21  0540 06/10/21  0927 06/10/21  0927   HGB 9.0* 9.1*   < > 8.7*   < > 8.1*   WBC 9.5  --   --  14.2*   < > 6.7     --   --  217   < > 231   A1C  --   --   --   --   --  6.0*    < > = values in this interval not displayed.     COAGS  Recent Labs   Lab 06/11/21  0540 06/10/21  0927   INR 1.09 0.98   PTT 34 37      Urinalysis  Recent Labs   Lab Test 06/10/21  1120 08/31/20  0000 07/29/19  1240   COLOR Light Yellow  --  Straw   APPEARANCE Clear  --  Clear   URINEGLC Negative  --  50*   URINEBILI Negative  --  Negative   URINEKETONE Negative  --  Negative   SG 1.007  --  1.010   UBLD Negative  --  Negative   URINEPH 7.0  --  5.0   PROTEIN 200*  --  >499*   NITRITE Negative  --  Negative   LEUKEST Negative  --  Negative   RBCU 3*  --  5*   WBCU 1  --  3   UTPG 5.90* 2.62*  --      Virology:  Hepatitis C Antibody   Date Value Ref Range Status   06/10/2021 Nonreactive NR^Nonreactive Final     Comment:     Assay performance characteristics have not been established for newborns,   infants, and children

## 2021-06-12 NOTE — PLAN OF CARE
"BP (!) 179/91 (BP Location: Right arm)   Pulse 102   Temp 98.4  F (36.9  C) (Oral)   Resp 16   Ht 1.651 m (5' 5\")   Wt 91 kg (200 lb 9.9 oz)   SpO2 99%   BMI 33.38 kg/m      Hours of Care: [id number].    Reason for admission: S/p KP with ureteral stent and appendectomy on 6/11.  Hx of ESRD on HD, was anuric prior to surgery, DM2, HTN, BMI > 30, sliding scale/p lap moise on 12/19 d/t symptomatic biliary dyskinesia.  Vitals: VSS.  Quite hypertensive.  PRN hydralazine given q4h with additional one time hydralazine ordered.  Pt was restarted on clonidine.  Team aware of hypertension.   Activity: Up with A1.  Pain: Incisional pain/ discomfort from NG tube improves with IV dilaudid.  Scheduled Tylenol also given.  Neuro: AO x 4.  Anxious at times.  Atarax given.  Cardiac: SR/ ST.    Respiratory: On 1L NC.  Capnography WDL.  GI/: Fox with 125-250cc out per hour with pink tinged urine.  Hypoactive BS.  No BM yet.  NG tube to low continuous suction.  Diet: NPO except ice chips.    Lines: R TL I.J., R PIV SL.  Skin/Wounds: RLQ with TIA drain with serosanguinous output.  Midline abdominal incision with primapore/ staples and a small amount of drainage.         Continue to monitor and follow POC    Braden Hinton RN on 6/12/2021 at 6:14 AM         "

## 2021-06-12 NOTE — PLAN OF CARE
"BP (!) 179/89   Pulse 89   Temp 98.2  F (36.8  C) (Oral)   Resp 16   Ht 1.651 m (5' 5\")   Wt 91 kg (200 lb 9.9 oz)   SpO2 95%   BMI 33.38 kg/m      Patient transferred to unit 7A from  around 1200.  Patient hypertensive on 1L, afebrile. -162; insulin gtt initiated now on algorithm 2. No complaints of pain or nausea. Tolerating NPO diet. IJ running heparin gtt SR, insulin gtt, and D5LR at 100ml/hr . PIV saline locked. NG to low continuous suction. Good UOP via edwards catheter. No BM, not passing flatus. 2 RN skin assessment completed; see note. Up with assist x1. Ultrasound of pancreas completed. Will continue with POC and notify MD with changes or concerns.    "

## 2021-06-13 ENCOUNTER — DOCUMENTATION ONLY (OUTPATIENT)
Dept: TRANSPLANT | Facility: CLINIC | Age: 41
End: 2021-06-13

## 2021-06-13 ENCOUNTER — APPOINTMENT (OUTPATIENT)
Dept: PHYSICAL THERAPY | Facility: CLINIC | Age: 41
DRG: 008 | End: 2021-06-13
Attending: TRANSPLANT SURGERY
Payer: MEDICARE

## 2021-06-13 LAB
AMYLASE SERPL-CCNC: 107 U/L (ref 30–110)
ANION GAP SERPL CALCULATED.3IONS-SCNC: 3 MMOL/L (ref 3–14)
BASOPHILS # BLD AUTO: 0 10E9/L (ref 0–0.2)
BASOPHILS NFR BLD AUTO: 0.1 %
BUN SERPL-MCNC: 34 MG/DL (ref 7–30)
CALCIUM SERPL-MCNC: 8.3 MG/DL (ref 8.5–10.1)
CHLORIDE SERPL-SCNC: 112 MMOL/L (ref 94–109)
CO2 SERPL-SCNC: 27 MMOL/L (ref 20–32)
CREAT SERPL-MCNC: 3.92 MG/DL (ref 0.66–1.25)
DIFFERENTIAL METHOD BLD: ABNORMAL
EOSINOPHIL # BLD AUTO: 0 10E9/L (ref 0–0.7)
EOSINOPHIL NFR BLD AUTO: 0.2 %
ERYTHROCYTE [DISTWIDTH] IN BLOOD BY AUTOMATED COUNT: 17.2 % (ref 10–15)
GFR SERPL CREATININE-BSD FRML MDRD: 18 ML/MIN/{1.73_M2}
GLUCOSE BLDC GLUCOMTR-MCNC: 102 MG/DL (ref 70–99)
GLUCOSE BLDC GLUCOMTR-MCNC: 103 MG/DL (ref 70–99)
GLUCOSE BLDC GLUCOMTR-MCNC: 104 MG/DL (ref 70–99)
GLUCOSE BLDC GLUCOMTR-MCNC: 105 MG/DL (ref 70–99)
GLUCOSE BLDC GLUCOMTR-MCNC: 106 MG/DL (ref 70–99)
GLUCOSE BLDC GLUCOMTR-MCNC: 107 MG/DL (ref 70–99)
GLUCOSE BLDC GLUCOMTR-MCNC: 107 MG/DL (ref 70–99)
GLUCOSE BLDC GLUCOMTR-MCNC: 108 MG/DL (ref 70–99)
GLUCOSE BLDC GLUCOMTR-MCNC: 111 MG/DL (ref 70–99)
GLUCOSE BLDC GLUCOMTR-MCNC: 111 MG/DL (ref 70–99)
GLUCOSE BLDC GLUCOMTR-MCNC: 113 MG/DL (ref 70–99)
GLUCOSE BLDC GLUCOMTR-MCNC: 114 MG/DL (ref 70–99)
GLUCOSE BLDC GLUCOMTR-MCNC: 114 MG/DL (ref 70–99)
GLUCOSE BLDC GLUCOMTR-MCNC: 117 MG/DL (ref 70–99)
GLUCOSE BLDC GLUCOMTR-MCNC: 123 MG/DL (ref 70–99)
GLUCOSE BLDC GLUCOMTR-MCNC: 124 MG/DL (ref 70–99)
GLUCOSE BLDC GLUCOMTR-MCNC: 126 MG/DL (ref 70–99)
GLUCOSE BLDC GLUCOMTR-MCNC: 127 MG/DL (ref 70–99)
GLUCOSE BLDC GLUCOMTR-MCNC: 130 MG/DL (ref 70–99)
GLUCOSE BLDC GLUCOMTR-MCNC: 81 MG/DL (ref 70–99)
GLUCOSE BLDC GLUCOMTR-MCNC: 86 MG/DL (ref 70–99)
GLUCOSE SERPL-MCNC: 104 MG/DL (ref 70–99)
HBV DNA SERPL QL NAA+PROBE: NORMAL
HCT VFR BLD AUTO: 25.4 % (ref 40–53)
HCV RNA SERPL QL NAA+PROBE: NORMAL
HGB BLD-MCNC: 8.1 G/DL (ref 13.3–17.7)
HIV1+2 RNA SERPL QL NAA+PROBE: NORMAL
IMM GRANULOCYTES # BLD: 0.1 10E9/L (ref 0–0.4)
IMM GRANULOCYTES NFR BLD: 0.8 %
LIPASE SERPL-CCNC: 701 U/L (ref 73–393)
LYMPHOCYTES # BLD AUTO: 0.5 10E9/L (ref 0.8–5.3)
LYMPHOCYTES NFR BLD AUTO: 4.6 %
MAGNESIUM SERPL-MCNC: 2 MG/DL (ref 1.6–2.3)
MCH RBC QN AUTO: 30.6 PG (ref 26.5–33)
MCHC RBC AUTO-ENTMCNC: 31.9 G/DL (ref 31.5–36.5)
MCV RBC AUTO: 96 FL (ref 78–100)
MONOCYTES # BLD AUTO: 1 10E9/L (ref 0–1.3)
MONOCYTES NFR BLD AUTO: 8.7 %
NEUTROPHILS # BLD AUTO: 10 10E9/L (ref 1.6–8.3)
NEUTROPHILS NFR BLD AUTO: 85.6 %
NRBC # BLD AUTO: 0 10*3/UL
NRBC BLD AUTO-RTO: 0 /100
PHOSPHATE SERPL-MCNC: 3 MG/DL (ref 2.5–4.5)
PLATELET # BLD AUTO: 167 10E9/L (ref 150–450)
POTASSIUM SERPL-SCNC: 4.2 MMOL/L (ref 3.4–5.3)
RBC # BLD AUTO: 2.65 10E12/L (ref 4.4–5.9)
SODIUM SERPL-SCNC: 141 MMOL/L (ref 133–144)
UFH PPP CHRO-ACNC: <0.1 IU/ML
WBC # BLD AUTO: 11.6 10E9/L (ref 4–11)

## 2021-06-13 PROCEDURE — 120N000011 HC R&B TRANSPLANT UMMC

## 2021-06-13 PROCEDURE — 36592 COLLECT BLOOD FROM PICC: CPT | Performed by: PHYSICIAN ASSISTANT

## 2021-06-13 PROCEDURE — 250N000011 HC RX IP 250 OP 636: Performed by: PHYSICIAN ASSISTANT

## 2021-06-13 PROCEDURE — 250N000013 HC RX MED GY IP 250 OP 250 PS 637: Performed by: SURGERY

## 2021-06-13 PROCEDURE — 250N000013 HC RX MED GY IP 250 OP 250 PS 637: Performed by: INTERNAL MEDICINE

## 2021-06-13 PROCEDURE — 80048 BASIC METABOLIC PNL TOTAL CA: CPT | Performed by: PHYSICIAN ASSISTANT

## 2021-06-13 PROCEDURE — 250N000013 HC RX MED GY IP 250 OP 250 PS 637: Performed by: PHYSICIAN ASSISTANT

## 2021-06-13 PROCEDURE — 84100 ASSAY OF PHOSPHORUS: CPT | Performed by: PHYSICIAN ASSISTANT

## 2021-06-13 PROCEDURE — 85520 HEPARIN ASSAY: CPT | Performed by: PHYSICIAN ASSISTANT

## 2021-06-13 PROCEDURE — 85025 COMPLETE CBC W/AUTO DIFF WBC: CPT | Performed by: PHYSICIAN ASSISTANT

## 2021-06-13 PROCEDURE — 250N000011 HC RX IP 250 OP 636: Performed by: SURGERY

## 2021-06-13 PROCEDURE — 99233 SBSQ HOSP IP/OBS HIGH 50: CPT | Performed by: INTERNAL MEDICINE

## 2021-06-13 PROCEDURE — 82150 ASSAY OF AMYLASE: CPT | Performed by: PHYSICIAN ASSISTANT

## 2021-06-13 PROCEDURE — 97530 THERAPEUTIC ACTIVITIES: CPT | Mod: GP

## 2021-06-13 PROCEDURE — 258N000003 HC RX IP 258 OP 636: Performed by: PHYSICIAN ASSISTANT

## 2021-06-13 PROCEDURE — 83690 ASSAY OF LIPASE: CPT | Performed by: PHYSICIAN ASSISTANT

## 2021-06-13 PROCEDURE — 250N000012 HC RX MED GY IP 250 OP 636 PS 637: Performed by: PHYSICIAN ASSISTANT

## 2021-06-13 PROCEDURE — 83735 ASSAY OF MAGNESIUM: CPT | Performed by: PHYSICIAN ASSISTANT

## 2021-06-13 PROCEDURE — 999N001017 HC STATISTIC GLUCOSE BY METER IP

## 2021-06-13 RX ORDER — CARVEDILOL 6.25 MG/1
6.25 TABLET ORAL 2 TIMES DAILY
Status: DISCONTINUED | OUTPATIENT
Start: 2021-06-13 | End: 2021-06-14

## 2021-06-13 RX ORDER — PIPERACILLIN SODIUM, TAZOBACTAM SODIUM 2; .25 G/10ML; G/10ML
2.25 INJECTION, POWDER, LYOPHILIZED, FOR SOLUTION INTRAVENOUS EVERY 6 HOURS
Status: COMPLETED | OUTPATIENT
Start: 2021-06-13 | End: 2021-06-14

## 2021-06-13 RX ORDER — METHOCARBAMOL 500 MG/1
500 TABLET, FILM COATED ORAL 4 TIMES DAILY
Status: DISCONTINUED | OUTPATIENT
Start: 2021-06-13 | End: 2021-06-21 | Stop reason: HOSPADM

## 2021-06-13 RX ORDER — LIDOCAINE 4 G/G
1-2 PATCH TOPICAL
Status: DISCONTINUED | OUTPATIENT
Start: 2021-06-14 | End: 2021-06-21 | Stop reason: HOSPADM

## 2021-06-13 RX ORDER — VALGANCICLOVIR 450 MG/1
450 TABLET, FILM COATED ORAL EVERY OTHER DAY
Status: DISCONTINUED | OUTPATIENT
Start: 2021-06-13 | End: 2021-06-15

## 2021-06-13 RX ADMIN — CLONIDINE HYDROCHLORIDE 0.2 MG: 0.2 TABLET ORAL at 08:10

## 2021-06-13 RX ADMIN — HYDROMORPHONE HYDROCHLORIDE 0.4 MG: 0.2 INJECTION, SOLUTION INTRAMUSCULAR; INTRAVENOUS; SUBCUTANEOUS at 08:38

## 2021-06-13 RX ADMIN — METHOCARBAMOL 500 MG: 500 TABLET, FILM COATED ORAL at 20:33

## 2021-06-13 RX ADMIN — Medication 1 TABLET: at 08:10

## 2021-06-13 RX ADMIN — DOCUSATE SODIUM 100 MG: 100 CAPSULE, LIQUID FILLED ORAL at 20:30

## 2021-06-13 RX ADMIN — HYDRALAZINE HYDROCHLORIDE 20 MG: 20 INJECTION INTRAMUSCULAR; INTRAVENOUS at 21:52

## 2021-06-13 RX ADMIN — HYDRALAZINE HYDROCHLORIDE 20 MG: 20 INJECTION INTRAMUSCULAR; INTRAVENOUS at 10:04

## 2021-06-13 RX ADMIN — MICAFUNGIN SODIUM 100 MG: 50 INJECTION, POWDER, LYOPHILIZED, FOR SOLUTION INTRAVENOUS at 18:07

## 2021-06-13 RX ADMIN — DOCUSATE SODIUM 50 MG AND SENNOSIDES 8.6 MG 1 TABLET: 8.6; 5 TABLET, FILM COATED ORAL at 20:30

## 2021-06-13 RX ADMIN — HYDROMORPHONE HYDROCHLORIDE 0.4 MG: 0.2 INJECTION, SOLUTION INTRAMUSCULAR; INTRAVENOUS; SUBCUTANEOUS at 13:21

## 2021-06-13 RX ADMIN — ATORVASTATIN CALCIUM 40 MG: 40 TABLET, FILM COATED ORAL at 08:10

## 2021-06-13 RX ADMIN — VALGANCICLOVIR 450 MG: 450 TABLET, FILM COATED ORAL at 11:37

## 2021-06-13 RX ADMIN — PIPERACILLIN SODIUM AND TAZOBACTAM SODIUM 2.25 G: 2; .25 INJECTION, POWDER, LYOPHILIZED, FOR SOLUTION INTRAVENOUS at 20:05

## 2021-06-13 RX ADMIN — MAGNESIUM OXIDE TAB 400 MG (241.3 MG ELEMENTAL MG) 400 MG: 400 (241.3 MG) TAB at 11:37

## 2021-06-13 RX ADMIN — MAGNESIUM HYDROXIDE 30 ML: 400 SUSPENSION ORAL at 21:53

## 2021-06-13 RX ADMIN — CLONIDINE HYDROCHLORIDE 0.2 MG: 0.2 TABLET ORAL at 20:30

## 2021-06-13 RX ADMIN — POLYETHYLENE GLYCOL 3350 17 G: 17 POWDER, FOR SOLUTION ORAL at 08:10

## 2021-06-13 RX ADMIN — HYDROMORPHONE HYDROCHLORIDE 0.4 MG: 0.2 INJECTION, SOLUTION INTRAMUSCULAR; INTRAVENOUS; SUBCUTANEOUS at 23:38

## 2021-06-13 RX ADMIN — HYDRALAZINE HYDROCHLORIDE 20 MG: 20 INJECTION INTRAMUSCULAR; INTRAVENOUS at 16:35

## 2021-06-13 RX ADMIN — ACETAMINOPHEN ORAL SOLUTION 975 MG: 325 SOLUTION ORAL at 16:06

## 2021-06-13 RX ADMIN — CLOTRIMAZOLE 10 MG: 10 LOZENGE ORAL at 20:30

## 2021-06-13 RX ADMIN — PANTOPRAZOLE SODIUM 40 MG: 40 TABLET, DELAYED RELEASE ORAL at 08:10

## 2021-06-13 RX ADMIN — CARVEDILOL 6.25 MG: 6.25 TABLET, FILM COATED ORAL at 20:30

## 2021-06-13 RX ADMIN — CLOTRIMAZOLE 10 MG: 10 LOZENGE ORAL at 15:46

## 2021-06-13 RX ADMIN — CLONIDINE HYDROCHLORIDE 0.2 MG: 0.2 TABLET ORAL at 13:21

## 2021-06-13 RX ADMIN — TACROLIMUS 2 MG: 5 CAPSULE ORAL at 18:07

## 2021-06-13 RX ADMIN — SODIUM CHLORIDE, SODIUM LACTATE, POTASSIUM CHLORIDE, CALCIUM CHLORIDE AND DEXTROSE MONOHYDRATE: 5; 600; 310; 30; 20 INJECTION, SOLUTION INTRAVENOUS at 17:56

## 2021-06-13 RX ADMIN — AMLODIPINE BESYLATE 10 MG: 10 TABLET ORAL at 08:10

## 2021-06-13 RX ADMIN — HYDRALAZINE HYDROCHLORIDE 20 MG: 20 INJECTION INTRAMUSCULAR; INTRAVENOUS at 03:35

## 2021-06-13 RX ADMIN — CARVEDILOL 6.25 MG: 6.25 TABLET, FILM COATED ORAL at 11:36

## 2021-06-13 RX ADMIN — Medication 5 MG: at 23:38

## 2021-06-13 RX ADMIN — HYDROMORPHONE HYDROCHLORIDE 0.4 MG: 0.2 INJECTION, SOLUTION INTRAMUSCULAR; INTRAVENOUS; SUBCUTANEOUS at 03:26

## 2021-06-13 RX ADMIN — MYCOPHENOLATE MOFETIL 1000 MG: 200 POWDER, FOR SUSPENSION ORAL at 08:10

## 2021-06-13 RX ADMIN — OCTREOTIDE ACETATE 100 MCG: 100 INJECTION, SOLUTION INTRAVENOUS; SUBCUTANEOUS at 20:47

## 2021-06-13 RX ADMIN — PIPERACILLIN SODIUM AND TAZOBACTAM SODIUM 2.25 G: 2; .25 INJECTION, POWDER, LYOPHILIZED, FOR SOLUTION INTRAVENOUS at 13:30

## 2021-06-13 RX ADMIN — MYCOPHENOLATE MOFETIL 1000 MG: 200 POWDER, FOR SUSPENSION ORAL at 20:30

## 2021-06-13 RX ADMIN — DOCUSATE SODIUM 50 MG AND SENNOSIDES 8.6 MG 1 TABLET: 8.6; 5 TABLET, FILM COATED ORAL at 08:10

## 2021-06-13 RX ADMIN — HYDROMORPHONE HYDROCHLORIDE 0.4 MG: 0.2 INJECTION, SOLUTION INTRAMUSCULAR; INTRAVENOUS; SUBCUTANEOUS at 20:21

## 2021-06-13 RX ADMIN — HEPARIN SODIUM 400 UNITS/HR: 10000 INJECTION, SOLUTION INTRAVENOUS at 21:53

## 2021-06-13 RX ADMIN — CLOTRIMAZOLE 10 MG: 10 LOZENGE ORAL at 11:37

## 2021-06-13 RX ADMIN — DOCUSATE SODIUM 100 MG: 100 CAPSULE, LIQUID FILLED ORAL at 08:10

## 2021-06-13 RX ADMIN — ACETAMINOPHEN ORAL SOLUTION 975 MG: 325 SOLUTION ORAL at 08:21

## 2021-06-13 RX ADMIN — OCTREOTIDE ACETATE 100 MCG: 100 INJECTION, SOLUTION INTRAVENOUS; SUBCUTANEOUS at 09:28

## 2021-06-13 RX ADMIN — SODIUM CHLORIDE, SODIUM LACTATE, POTASSIUM CHLORIDE, CALCIUM CHLORIDE AND DEXTROSE MONOHYDRATE: 5; 600; 310; 30; 20 INJECTION, SOLUTION INTRAVENOUS at 08:22

## 2021-06-13 RX ADMIN — METHOCARBAMOL 500 MG: 500 TABLET, FILM COATED ORAL at 15:46

## 2021-06-13 RX ADMIN — HYDROXYZINE HYDROCHLORIDE 50 MG: 25 TABLET, FILM COATED ORAL at 23:38

## 2021-06-13 RX ADMIN — METHOCARBAMOL 500 MG: 500 TABLET, FILM COATED ORAL at 13:34

## 2021-06-13 RX ADMIN — CLOTRIMAZOLE 10 MG: 10 LOZENGE ORAL at 08:10

## 2021-06-13 RX ADMIN — HYDROMORPHONE HYDROCHLORIDE 0.4 MG: 0.2 INJECTION, SOLUTION INTRAMUSCULAR; INTRAVENOUS; SUBCUTANEOUS at 06:00

## 2021-06-13 RX ADMIN — TACROLIMUS 2 MG: 5 CAPSULE ORAL at 08:10

## 2021-06-13 RX ADMIN — ASPIRIN 81 MG CHEWABLE TABLET 81 MG: 81 TABLET CHEWABLE at 08:10

## 2021-06-13 RX ADMIN — METHYLPREDNISOLONE SODIUM SUCCINATE 100 MG: 125 INJECTION, POWDER, FOR SOLUTION INTRAMUSCULAR; INTRAVENOUS at 08:06

## 2021-06-13 RX ADMIN — PIPERACILLIN SODIUM AND TAZOBACTAM SODIUM 2.25 G: 2; .25 INJECTION, POWDER, LYOPHILIZED, FOR SOLUTION INTRAVENOUS at 08:11

## 2021-06-13 RX ADMIN — HYDROMORPHONE HYDROCHLORIDE 0.4 MG: 0.2 INJECTION, SOLUTION INTRAMUSCULAR; INTRAVENOUS; SUBCUTANEOUS at 15:46

## 2021-06-13 RX ADMIN — ACETAMINOPHEN ORAL SOLUTION 975 MG: 325 SOLUTION ORAL at 00:10

## 2021-06-13 RX ADMIN — PIPERACILLIN SODIUM AND TAZOBACTAM SODIUM 2.25 G: 2; .25 INJECTION, POWDER, LYOPHILIZED, FOR SOLUTION INTRAVENOUS at 00:06

## 2021-06-13 ASSESSMENT — ACTIVITIES OF DAILY LIVING (ADL)
ADLS_ACUITY_SCORE: 14
ADLS_ACUITY_SCORE: 15
ADLS_ACUITY_SCORE: 14

## 2021-06-13 NOTE — PROGRESS NOTES
[facility]   Transplant Nephrology Progress Note  Date of Admission:  6/10/2021  Today's Date: 06/13/2021    Recommendations:  -Added carvedilol 6.25mg BID   -Needs to ambulate.     Assessment & Plan   # DDKTX (SPK) Last checked at time of transplant              - Baseline Creatinine: ~ TBD              - Proteinuria: Not checked post transplant              - Date DSA Last Checked: Jun/2021      Latest DSA: No DSA at time of transplant              - BK Viremia: No              - Kidney Tx Biopsy: No     # Pancreas Tx (SPK):               - Pancreatic Exocrine Drainage: Enteric drained                     - Blood glucose: hyperglycemia       On insulin: yes. Restarted               - HbA1c: Last checked at time of transplant                   - Pancreatic enzymes: Last checked at time of transplant              - Date DSA Last Checked: Jun/2021             Latest DSA: No DSA at time of transplant              - Pancreas Tx Biopsy: No     # Immunosuppression: Tacrolimus immediate release (goal 8-10), Mycophenolate mofetil (dose 1000 mg every 12 hours) and Prednisone (dose taper)               - Induction: intermediate risk protocol, PRA 32%              - Changes: No     # Infection Prophylaxis:   - PJP: Sulfa/TMP (Bactrim)  - CMV: Valcyte  - Thrush: Micafungin (Mycamine)    # Hypertension: Inadequate control;  Goal BP: < 150/90   - Volume status: Euvolemic    - Changes: Added carvedilol 6.25mg BID     # Anemia in Chronic Renal Disease: Hgb: Decreased      ROSA M: No   - Iron studies: Unknown at this time, but checked with dialysis    # Mineral Bone Disorder:   - Secondary renal hyperparathyroidism; PTH level: Unknown at this time, but checked with dialysis        On treatment: None  - Vitamin D; level: Unknown at this time, but checked with dialysis        On supplement: Yes  - Calcium; level: Low        On supplement: Yes  - Phosphorus; level: Normal        On  supplement: No    # Electrolytes:   - Potassium; level: Normal        On supplement: No  - Magnesium; level: Normal        On supplement: Yes  - Bicarbonate; level: Normal        On supplement: No  - Sodium; level: Normal    # Transplant History:  Etiology of Kidney Failure: Diabetes mellitus type 2  Tx: DDKT (SPK)  Transplant: 6/11/2021 (Kidney / Pancreas)  DSA at time of Tx: No  Significant changes in immunosuppression: None  Significant transplant-related complications: None     Recommendations were communicated to the primary team verbally.  Seen and discussed with Dr. Sunday Vincent, NP   Pager: [phone]    Interval History   Fatigued, but doing well overall.     No chest pain or shortness of breath.    Has not gotten out of bed.      Review of Systems   4 point ROS was obtained and negative except as noted in the Interval History.    MEDICATIONS:    acetaminophen  975 mg Oral Q8H     amLODIPine  10 mg Oral or NG Tube Daily     aspirin  81 mg Oral Daily     atorvastatin  40 mg Oral Daily     [START ON 6/16/2021] basiliximab (SIMULECT) infusion  20 mg Intravenous Once     calcium carbonate-vitamin D  1 tablet Oral BID w/meals     carvedilol  6.25 mg Oral BID     cloNIDine  0.2 mg Oral TID     clotrimazole  10 mg Buccal 4x Daily     docusate sodium  100 mg Oral BID     [START ON 6/14/2021] lidocaine  1-2 patch Transdermal Q24H     lidocaine   Transdermal Q8H     magnesium oxide  400 mg Oral Q24H     methocarbamol  500 mg Oral 4x Daily     micafungin  100 mg Intravenous Q24H     mycophenolate  1,000 mg Per NG tube BID     octreotide  100 mcg Subcutaneous Q12H     pantoprazole  40 mg Per NG tube Daily     piperacillin-tazobactam  2.25 g Intravenous Q6H     polyethylene glycol  17 g Oral Daily     [START ON 6/14/2021] predniSONE  60 mg Oral Daily    Followed by     [START ON 6/16/2021] predniSONE  40 mg Oral Daily    Followed by     [START ON 6/18/2021] predniSONE  20 mg Oral Daily    Followed by      "[START ON 2021] predniSONE  15 mg Oral Daily    Followed by     [START ON 2021] predniSONE  10 mg Oral Daily    Followed by     [START ON 2021] predniSONE  5 mg Oral Daily     senna-docusate  1 tablet Oral BID     sodium chloride (PF)  3 mL Intravenous Q8H     [START ON 2021] sulfamethoxazole-trimethoprim  10 mL Per NG tube Q Mon  AM     tacrolimus  2 mg Oral BID IS     valGANciclovir  450 mg Oral Every Other Day       dextrose       IV fluid REPLACEMENT ONLY 100 mL/hr at 21 0822     heparin 400 Units/hr (21 1900)     insulin regular 1 Units/hr (21 1309)       Physical Exam   Temp  Av.5  F (36.9  C)  Min: 98.2  F (36.8  C)  Max: 98.9  F (37.2  C)  Arterial Line BP  Min: 216/75  Max: 221/84  Arterial Line MAP (mmHg)  Av mmHg  Min: 124 mmHg  Max: 137 mmHg      Pulse  Av.9  Min: 63  Max: 112 Resp  Av.6  Min: 12  Max: 24  SpO2  Av.7 %  Min: 89 %  Max: 100 %    CVP (mmHg): 3 mmHgBP (!) 178/95 (BP Location: Right arm)   Pulse 97   Temp 98.4  F (36.9  C) (Oral)   Resp 18   Ht 1.651 m (5' 5\")   Wt 91 kg (200 lb 9.9 oz)   SpO2 91%   BMI 33.38 kg/m     Date 21 07 - 21 0659   Shift 4742-2840 6036-1143 5137-0803 24 Hour Total   INTAKE   I.V. 2366.94   2366.94   NG/   120   Shift Total(mL/kg) 2486.94(27.33)   2486.94(27.33)   OUTPUT   Urine 1450   1450   Emesis/NG output 325   325   Drains 210   210   Shift Total(mL/kg) (21.81)   (21.81)   Weight (kg) 91 91 91 91      Admit Weight: 87.2 kg (192 lb 3.9 oz)     GENERAL APPEARANCE: alert and no distress  HENT: mouth without ulcers or lesions  RESP: lungs clear to auscultation - no rales, rhonchi or wheezes  CV: regular rhythm, normal rate, no rub, no murmur  EDEMA: no LE edema bilaterally  ABDOMEN: soft, nondistended, nontender, bowel sounds normal  MS: extremities normal - no gross deformities noted, no evidence of inflammation in joints, no muscle tenderness  SKIN: no " rash    Data   All labs reviewed by me.  CMP  Recent Labs   Lab 06/13/21  0717 06/12/21  0624 06/12/21  0057 06/11/21  1948 06/11/21  0540 06/11/21  0540 06/11/21  0100 06/10/21  0927 06/10/21  0927    138  --   --   --  132* 132*   < > 132*   POTASSIUM 4.2 4.0 4.2 4.1   < > 4.8 4.1   < > 4.3   CHLORIDE 112* 108  --   --   --  100  --   --  96   CO2 27 26  --   --   --  19*  --   --  29   ANIONGAP 3 4  --   --   --  12  --   --  7   * 119*  --   --   --  129* 134*   < > 119*   BUN 34* 41*  --   --   --  42*  --   --  34*   CR 3.92* 6.08*  --   --   --  7.93*  --   --  7.32*   GFRESTIMATED 18* 11*  --   --   --  8*  --   --  9*   GFRESTBLACK 21* 12*  --   --   --  9*  --   --  10*   SHAY 8.3* 7.9*  --   --   --  9.0  --   --  8.8   MAG 2.0 1.9  --   --   --  1.5*  --   --   --    PHOS 3.0 3.8  --   --   --  2.2*  --   --   --    PROTTOTAL  --   --   --   --   --   --   --   --  7.2   ALBUMIN  --   --   --   --   --   --   --   --  3.9   BILITOTAL  --   --   --   --   --   --   --   --  0.4   ALKPHOS  --   --   --   --   --   --   --   --  84   AST  --   --   --   --   --   --   --   --  32   ALT  --   --   --   --   --   --   --   --  32    < > = values in this interval not displayed.     CBC  Recent Labs   Lab 06/13/21 0717 06/12/21 0624 06/12/21 0057 06/11/21 1948 06/11/21  0540 06/11/21  0540 06/11/21  0423   HGB 8.1* 9.0* 9.1* 9.5*   < > 8.7* 6.6*   WBC 11.6* 9.5  --   --   --  14.2* 9.8   RBC 2.65* 2.96*  --   --   --  2.84* 2.16*   HCT 25.4* 28.0*  --   --   --  26.4* 20.3*   MCV 96 95  --   --   --  93 94   MCH 30.6 30.4  --   --   --  30.6 30.6   MCHC 31.9 32.1  --   --   --  33.0 32.5   RDW 17.2* 17.0*  --   --   --  16.7* 16.9*    153  --   --   --  217 169    < > = values in this interval not displayed.     INR  Recent Labs   Lab 06/11/21  0540 06/10/21  0927   INR 1.09 0.98   PTT 34 37     ABG  Recent Labs   Lab 06/11/21  0100 06/11/21  0000 06/10/21  2300 06/10/21  2152   PH 7.40  7.42 7.43 7.45   PCO2 35 35 34* 34*   PO2 121* 128* 177* 113*   HCO3 21 23 23 24   O2PER 40% 40% 40.0 40      Urine Studies  Recent Labs   Lab Test 06/10/21  1120 07/29/19  1240 07/18/19  1200   COLOR Light Yellow Straw Yellow   APPEARANCE Clear Clear Clear   URINEGLC Negative 50* 50*   URINEBILI Negative Negative Negative   URINEKETONE Negative Negative Negative   SG 1.007 1.010 1.011   UBLD Negative Negative Negative   URINEPH 7.0 5.0 6.0   PROTEIN 200* >499* >499*   NITRITE Negative Negative Negative   LEUKEST Negative Negative Negative   RBCU 3* 5* 9*   WBCU 1 3 5     Recent Labs   Lab Test 06/10/21  1120 08/31/20  0000 07/18/19  1200   UTPG 5.90* 2.62* 6.20*     PTH  No lab results found.  Iron Studies  No lab results found.    IMAGING:  All imaging studies reviewed by me.    Patient was seen by myself, Dr. Bertin Brand, in conjunction with Leilani Vincent NP as part of a shared visit.     I personally reviewed past medical and surgical history, vital signs, medications and labs.  Present and past medical history, along with significant physical exam findings were all reviewed with ADI.     My key findings:  SPK     Key management decisions made by me and discussed with ADI:  Progressing well, remains on insulin requirement, will monitor

## 2021-06-13 NOTE — PLAN OF CARE
"BP (!) 155/75 (BP Location: Right arm)   Pulse 80   Temp 98  F (36.7  C) (Oral)   Resp 16   Ht 1.651 m (5' 5\")   Wt 91 kg (200 lb 9.9 oz)   SpO2 95%   BMI 33.38 kg/m       Patient alert and oriented x 4. Hypertensive. PRN hydralazine given x 2 (See MAR). All other VS stable. Patient on room air. Patient complains of pain. PRN dilaudid given (See MAR). Patient denies nausea. BG - patient on insulin gtt. Hourly BG checks. Using algorithm 2. Urine Output - Fox catheter with adequate pink-tinged, urine output. Bowel Function - No BM during shift. Patient passing gas. Nutrition - NPO with ice chips. Midline incision - stapled, open to air. Scant drainage. TIA Drain - serosanguineous output. NG tube - clamped at 1600. Orders to clamp for 4 hours. If output after 4 hours (2000) is less than 200 ml, NG can be pulled.  Left AVF. PIV - saline locked. CVC - internal jugular: D5LR @ 100 ml/hr, heparin gtt @ 400 units/hr straight rate, insulin gtt, and TKO. Activity - SBA. Patient up to chair multiple times during shift. Education - Lab book up to date. Med card up to date. Plan of Care - Will continue to monitor and notify care team of any changes.   "

## 2021-06-13 NOTE — PROGRESS NOTES
Transplant Surgery  Inpatient Daily Progress Note  06/13/2021    Assessment & Plan: 40 year old male with a past medical history significant for end stage kidney disease on HD every MWF via AVF, anuric prior to transplant. Other past medical history includes T2DM, HTN, BMI>30, bilateral shoulder pain, s/p lap cholecystectomy 12/19 due to symptomatic biliary dyskinesia and atypical nevi. Admitted for SPK-ED with ureteral stent on 6/11/21 with Dr. Kennedy.     Graft function:  Pancreas: Lipase 701 (from 1066 post operatively). Will monitor. Has been requiring some insulin with steroid dose, will monitor. US POD 0 and 1 showed patent doppler. Octreotide and heparin gtt per protocol. ASA 81 mg daily started.   Kidney: Cr 3.9 (6.1). Good UOP. US with moderately elevated velocities at the renal artery anastomosis and mid renal artery.  Immunosuppression management:  PRA 32, intermediate induction  Thymo 175 mg completed intra-op  Simulect POD 1 & 5  Solu-medrol 500 mg intra-op, 250 mg POD 1, 100 mg POD 2 then taper per protocol  Cellcept 1000 mg BID  Tacrolimus 2 mg BID. Goal 8-10.   Complexity of management:Medium. Contributing factors: anemia and induction  Hematology:   Acute blood loss anemia: Given 2 units pRBC intra-op with hgb of 6.6. HGB now with slow drift.   Post op ppx: Heparin gtt @200/hour initially post-op, HGB stable and increased dose to 400units/hour. Continue ASA 81 mg daily.   Cardiorespiratory: Wean O2 as tolerated  HTN: Start Norvasc 10 mg daily and Hydralazine IV prn SBP >160. Clonidine now restarted, and then increased from BID to TID. Will start coreg today, 6.25 mg BID. Hydralazine PRN. PTA: Hydralazine 100 mg TID, Norvasc 10 daily, Clonidine 0.2 mg BID and Losartan 100 mg daily.   GI/Nutrition: NPO. NG in place. Scant flatus. Start bowel regimen.   Endocrine: Insulin drip restarted after steroid dose, will monitor  Fluid/Electrolytes: MIVF: D5LR@100cc/hour. Electrolytes WNL.   : Fox to remain  due to new surgical anastomosis x 3 days  Infectious disease: AF.  Leukocytosis: Likely secondary to steroids, monitor.   Prophylaxis: Bactrim indefinitely, Valcyte x 3 months. CMV R+, EBV R+, donor info unknown at this time. Micafungin x 7 days and Zosyn x 3 days.   Disposition: 7A    Medical Decision Making: Medium  Subsequent visit 52346 (moderate level decision making)    ADI/Fellow/Resident Provider: Nora Farfan PA-C    Faculty: Julio Cesar Vgea MD    Attestation: I saw and examined this patient with Nora Farfan PA-C, and the transplant team. I independently reviewed all pertinent laboratory and imaging results. I agree with the findings and plan as documented in this note.  Julio Cesar Vega MD  _________________________________________________________________  Transplant History: Admitted 6/10/2021 for SPK.  6/11/2021 (Kidney / Pancreas), Postoperative day: 2     Interval History: History is obtained from the patient  Overnight events: Awake, alert. No acute complaints. No nausea, no flatus.    ROS:   A 10-point review of systems was negative except as noted above.    Meds:    acetaminophen  975 mg Oral Q8H     amLODIPine  10 mg Oral or NG Tube Daily     aspirin  81 mg Oral Daily     atorvastatin  40 mg Oral Daily     [START ON 6/16/2021] basiliximab (SIMULECT) infusion  20 mg Intravenous Once     calcium carbonate-vitamin D  1 tablet Oral BID w/meals     carvedilol  6.25 mg Oral BID     cloNIDine  0.2 mg Oral TID     clotrimazole  10 mg Buccal 4x Daily     docusate sodium  100 mg Oral BID     magnesium oxide  400 mg Oral Q24H     micafungin  100 mg Intravenous Q24H     mycophenolate  1,000 mg Per NG tube BID     octreotide  100 mcg Subcutaneous Q12H     pantoprazole  40 mg Per NG tube Daily     piperacillin-tazobactam  2.25 g Intravenous Q6H     polyethylene glycol  17 g Oral Daily     [START ON 6/14/2021] predniSONE  60 mg Oral Daily    Followed by     [START ON 6/16/2021] predniSONE  40 mg  "Oral Daily    Followed by     [START ON 6/18/2021] predniSONE  20 mg Oral Daily    Followed by     [START ON 6/25/2021] predniSONE  15 mg Oral Daily    Followed by     [START ON 7/2/2021] predniSONE  10 mg Oral Daily    Followed by     [START ON 7/9/2021] predniSONE  5 mg Oral Daily     senna-docusate  1 tablet Oral BID     sodium chloride (PF)  3 mL Intravenous Q8H     [START ON 6/14/2021] sulfamethoxazole-trimethoprim  10 mL Per NG tube Q Mon Wed Fri AM     tacrolimus  2 mg Oral BID IS     valGANciclovir  450 mg Oral Every Other Day       Physical Exam:     Admit Weight: 87.2 kg (192 lb 3.9 oz)    Current vitals:   BP (!) 178/95 (BP Location: Right arm)   Pulse 97   Temp 98.4  F (36.9  C) (Oral)   Resp 18   Ht 1.651 m (5' 5\")   Wt 91 kg (200 lb 9.9 oz)   SpO2 91%   BMI 33.38 kg/m      Vital sign ranges:    Temp:  [98.3  F (36.8  C)-98.4  F (36.9  C)] 98.4  F (36.9  C)  Pulse:  [] 97  Resp:  [16-20] 18  BP: (138-209)/() 178/95  SpO2:  [91 %-97 %] 91 %  Patient Vitals for the past 24 hrs:   BP Temp Temp src Pulse Resp SpO2   06/13/21 1001 (!) 178/95 -- -- -- -- --   06/13/21 0809 (!) 175/81 98.4  F (36.9  C) Oral 97 18 91 %   06/13/21 0500 (!) 173/78 -- -- -- -- --   06/13/21 0416 (!) 189/101 -- -- -- -- --   06/13/21 0400 (!) 192/88 -- -- 96 -- --   06/13/21 0326 (!) 170/78 -- -- 79 16 95 %   06/13/21 0000 (!) 143/74 -- -- 80 16 97 %   06/12/21 2328 138/72 -- -- 89 -- --   06/12/21 2253 (!) 178/80 -- -- -- -- --   06/12/21 2225 (!) 183/88 -- -- 95 -- --   06/12/21 2200 (!) 188/98 -- -- 100 -- --   06/12/21 2000 (!) 188/97 -- -- 94 -- --   06/12/21 1900 (!) 167/88 98.3  F (36.8  C) Axillary 102 20 97 %   06/12/21 1803 (!) 159/83 -- -- 100 16 97 %   06/12/21 1702 (!) 209/96 -- -- 105 16 96 %   06/12/21 1600 (!) 199/93 -- -- -- -- --   06/12/21 1530 (!) 187/95 -- -- -- -- --   06/12/21 1500 (!) 185/86 -- -- -- -- --   06/12/21 1426 (!) 179/89 -- -- -- -- --   06/12/21 1403 (!) 174/94 -- -- -- -- -- "   06/12/21 1330 (!) 181/91 -- -- -- -- --   06/12/21 1238 (!) 171/92 -- -- -- -- --     General Appearance: in no apparent distress.   Skin: normal  Heart: regular rate and rhythm  Lungs: NLB on 1L  Abdomen: The abdomen is obese, and non-tender, generalized. he is not tender over the kidney and pancreas graft. The wound is stapled, c/d/i, drain with serosanguinous output  : edwards is present. Urine has small gross hematuria.   Extremities: edema: trace  Neurologic: awake, alert, oriented. Tremor absent.    Data:   CMP  Recent Labs   Lab 06/13/21  0717 06/12/21  0624 06/11/21  0100 06/11/21  0100 06/11/21  0000 06/10/21  0927 06/10/21  0927    138   < > 132* 133   < > 132*   POTASSIUM 4.2 4.0   < > 4.1 4.0   < > 4.3   CHLORIDE 112* 108   < >  --   --   --  96   CO2 27 26   < >  --   --   --  29   * 119*   < > 134* 142*   < > 119*   BUN 34* 41*   < >  --   --   --  34*   CR 3.92* 6.08*   < >  --   --   --  7.32*   GFRESTIMATED 18* 11*   < >  --   --   --  9*   GFRESTBLACK 21* 12*   < >  --   --   --  10*   SHAY 8.3* 7.9*   < >  --   --   --  8.8   ICAW  --   --   --  4.6 4.3*   < >  --    MAG 2.0 1.9   < >  --   --   --   --    PHOS 3.0 3.8   < >  --   --   --   --    AMYLASE 107 154*   < >  --   --   --  96   LIPASE 701* 793*   < >  --   --   --  580*   ALBUMIN  --   --   --   --   --   --  3.9   BILITOTAL  --   --   --   --   --   --  0.4   ALKPHOS  --   --   --   --   --   --  84   AST  --   --   --   --   --   --  32   ALT  --   --   --   --   --   --  32    < > = values in this interval not displayed.     CBC  Recent Labs   Lab 06/13/21  0717 06/12/21  0624 06/10/21  0927 06/10/21  0927   HGB 8.1* 9.0*   < > 8.1*   WBC 11.6* 9.5   < > 6.7    153   < > 231   A1C  --   --   --  6.0*    < > = values in this interval not displayed.     COAGS  Recent Labs   Lab 06/11/21  0540 06/10/21  0927   INR 1.09 0.98   PTT 34 37      Urinalysis  Recent Labs   Lab Test 06/10/21  1120 08/31/20  0000  07/29/19  1240   COLOR Light Yellow  --  Straw   APPEARANCE Clear  --  Clear   URINEGLC Negative  --  50*   URINEBILI Negative  --  Negative   URINEKETONE Negative  --  Negative   SG 1.007  --  1.010   UBLD Negative  --  Negative   URINEPH 7.0  --  5.0   PROTEIN 200*  --  >499*   NITRITE Negative  --  Negative   LEUKEST Negative  --  Negative   RBCU 3*  --  5*   WBCU 1  --  3   UTPG 5.90* 2.62*  --      Virology:  Hepatitis C Antibody   Date Value Ref Range Status   06/10/2021 Nonreactive NR^Nonreactive Final     Comment:     Assay performance characteristics have not been established for newborns,   infants, and children

## 2021-06-13 NOTE — PROGRESS NOTES
Final positive donor sputum culture results have been uploaded to [facility].  Donor ID [id number].  Dr. Joel notified of results.

## 2021-06-13 NOTE — PLAN OF CARE
"BP (!) 143/74 (BP Location: Right arm)   Pulse 80   Temp 98.3  F (36.8  C) (Axillary)   Resp 16   Ht 1.651 m (5' 5\")   Wt 91 kg (200 lb 9.9 oz)   SpO2 97%   BMI 33.38 kg/m     Shift: 2955-6431  VS: Hypertensive 180s/100s, OVSS on RA.  Neuro: AOx4  BG: Insulin infusion,   Cardiopulmonary: WDL  Pain/Nausea/PRN: C/o incisional pain, received dilaudid x3, atarax x1; denies nausea. Hydralazine given x2 (still hypertensive, team aware, no further intervention)  Diet: NPO with ice  LDA: R internal jugular w/ heparin straight at 400 unit(s), D5LR at 100mL, Insulin infusion; R PIV, R TIA, edwards, NG to low continuous sx 300mL brown/pink output  GI/: Adequate urine out, passing gas, no BM  Skin: Post-op dressing in place  Mobility: SBA  Plan: Encourage ambulation    Will continue with POC, will notify MD with changes or concerns.     "

## 2021-06-14 ENCOUNTER — APPOINTMENT (OUTPATIENT)
Dept: ULTRASOUND IMAGING | Facility: CLINIC | Age: 41
DRG: 008 | End: 2021-06-14
Attending: NURSE PRACTITIONER
Payer: MEDICARE

## 2021-06-14 ENCOUNTER — DOCUMENTATION ONLY (OUTPATIENT)
Dept: TRANSPLANT | Facility: CLINIC | Age: 41
End: 2021-06-14

## 2021-06-14 ENCOUNTER — APPOINTMENT (OUTPATIENT)
Dept: PHYSICAL THERAPY | Facility: CLINIC | Age: 41
DRG: 008 | End: 2021-06-14
Attending: TRANSPLANT SURGERY
Payer: MEDICARE

## 2021-06-14 LAB
AMYLASE SERPL-CCNC: 133 U/L (ref 30–110)
ANION GAP SERPL CALCULATED.3IONS-SCNC: 5 MMOL/L (ref 3–14)
BASOPHILS # BLD AUTO: 0 10E9/L (ref 0–0.2)
BASOPHILS NFR BLD AUTO: 0.2 %
BUN SERPL-MCNC: 29 MG/DL (ref 7–30)
CALCIUM SERPL-MCNC: 8.4 MG/DL (ref 8.5–10.1)
CHLORIDE SERPL-SCNC: 110 MMOL/L (ref 94–109)
CO2 SERPL-SCNC: 27 MMOL/L (ref 20–32)
CREAT SERPL-MCNC: 2.82 MG/DL (ref 0.66–1.25)
DIFFERENTIAL METHOD BLD: ABNORMAL
EOSINOPHIL # BLD AUTO: 0.1 10E9/L (ref 0–0.7)
EOSINOPHIL NFR BLD AUTO: 0.5 %
ERYTHROCYTE [DISTWIDTH] IN BLOOD BY AUTOMATED COUNT: 16.9 % (ref 10–15)
GFR SERPL CREATININE-BSD FRML MDRD: 27 ML/MIN/{1.73_M2}
GLUCOSE BLDC GLUCOMTR-MCNC: 105 MG/DL (ref 70–99)
GLUCOSE BLDC GLUCOMTR-MCNC: 106 MG/DL (ref 70–99)
GLUCOSE BLDC GLUCOMTR-MCNC: 108 MG/DL (ref 70–99)
GLUCOSE BLDC GLUCOMTR-MCNC: 110 MG/DL (ref 70–99)
GLUCOSE BLDC GLUCOMTR-MCNC: 111 MG/DL (ref 70–99)
GLUCOSE BLDC GLUCOMTR-MCNC: 115 MG/DL (ref 70–99)
GLUCOSE BLDC GLUCOMTR-MCNC: 119 MG/DL (ref 70–99)
GLUCOSE BLDC GLUCOMTR-MCNC: 132 MG/DL (ref 70–99)
GLUCOSE BLDC GLUCOMTR-MCNC: 133 MG/DL (ref 70–99)
GLUCOSE BLDC GLUCOMTR-MCNC: 134 MG/DL (ref 70–99)
GLUCOSE BLDC GLUCOMTR-MCNC: 138 MG/DL (ref 70–99)
GLUCOSE BLDC GLUCOMTR-MCNC: 141 MG/DL (ref 70–99)
GLUCOSE BLDC GLUCOMTR-MCNC: 142 MG/DL (ref 70–99)
GLUCOSE BLDC GLUCOMTR-MCNC: 143 MG/DL (ref 70–99)
GLUCOSE BLDC GLUCOMTR-MCNC: 144 MG/DL (ref 70–99)
GLUCOSE BLDC GLUCOMTR-MCNC: 150 MG/DL (ref 70–99)
GLUCOSE BLDC GLUCOMTR-MCNC: 155 MG/DL (ref 70–99)
GLUCOSE BLDC GLUCOMTR-MCNC: 91 MG/DL (ref 70–99)
GLUCOSE BLDC GLUCOMTR-MCNC: 96 MG/DL (ref 70–99)
GLUCOSE BLDC GLUCOMTR-MCNC: 99 MG/DL (ref 70–99)
GLUCOSE SERPL-MCNC: 96 MG/DL (ref 70–99)
HCT VFR BLD AUTO: 24.5 % (ref 40–53)
HGB BLD-MCNC: 7.6 G/DL (ref 13.3–17.7)
IMM GRANULOCYTES # BLD: 0.1 10E9/L (ref 0–0.4)
IMM GRANULOCYTES NFR BLD: 0.5 %
INTERPRETATION ECG - MUSE: NORMAL
LIPASE FLD-CCNC: 2373 U/L
LIPASE SERPL-CCNC: 1024 U/L (ref 73–393)
LIPASE SERPL-CCNC: 1354 U/L (ref 73–393)
LYMPHOCYTES # BLD AUTO: 0.9 10E9/L (ref 0.8–5.3)
LYMPHOCYTES NFR BLD AUTO: 9.2 %
MAGNESIUM SERPL-MCNC: 2 MG/DL (ref 1.6–2.3)
MCH RBC QN AUTO: 30.4 PG (ref 26.5–33)
MCHC RBC AUTO-ENTMCNC: 31 G/DL (ref 31.5–36.5)
MCV RBC AUTO: 98 FL (ref 78–100)
MONOCYTES # BLD AUTO: 0.8 10E9/L (ref 0–1.3)
MONOCYTES NFR BLD AUTO: 7.9 %
NEUTROPHILS # BLD AUTO: 8.4 10E9/L (ref 1.6–8.3)
NEUTROPHILS NFR BLD AUTO: 81.7 %
NRBC # BLD AUTO: 0 10*3/UL
NRBC BLD AUTO-RTO: 0 /100
PHOSPHATE SERPL-MCNC: 2.7 MG/DL (ref 2.5–4.5)
PLATELET # BLD AUTO: 163 10E9/L (ref 150–450)
POTASSIUM SERPL-SCNC: 4.3 MMOL/L (ref 3.4–5.3)
RBC # BLD AUTO: 2.5 10E12/L (ref 4.4–5.9)
SODIUM SERPL-SCNC: 141 MMOL/L (ref 133–144)
SPECIMEN SOURCE FLD: NORMAL
TACROLIMUS BLD-MCNC: 4.2 UG/L (ref 5–15)
TME LAST DOSE: ABNORMAL H
UFH PPP CHRO-ACNC: <0.1 IU/ML
WBC # BLD AUTO: 10.3 10E9/L (ref 4–11)

## 2021-06-14 PROCEDURE — 250N000011 HC RX IP 250 OP 636: Performed by: PHYSICIAN ASSISTANT

## 2021-06-14 PROCEDURE — 83690 ASSAY OF LIPASE: CPT | Performed by: PHYSICIAN ASSISTANT

## 2021-06-14 PROCEDURE — 258N000003 HC RX IP 258 OP 636: Performed by: PHYSICIAN ASSISTANT

## 2021-06-14 PROCEDURE — 97530 THERAPEUTIC ACTIVITIES: CPT | Mod: GP | Performed by: REHABILITATION PRACTITIONER

## 2021-06-14 PROCEDURE — 250N000012 HC RX MED GY IP 250 OP 636 PS 637: Performed by: PHYSICIAN ASSISTANT

## 2021-06-14 PROCEDURE — 93976 VASCULAR STUDY: CPT | Mod: 26 | Performed by: RADIOLOGY

## 2021-06-14 PROCEDURE — 250N000013 HC RX MED GY IP 250 OP 250 PS 637: Performed by: PHYSICIAN ASSISTANT

## 2021-06-14 PROCEDURE — 80048 BASIC METABOLIC PNL TOTAL CA: CPT | Performed by: PHYSICIAN ASSISTANT

## 2021-06-14 PROCEDURE — 83735 ASSAY OF MAGNESIUM: CPT | Performed by: PHYSICIAN ASSISTANT

## 2021-06-14 PROCEDURE — 82150 ASSAY OF AMYLASE: CPT | Performed by: PHYSICIAN ASSISTANT

## 2021-06-14 PROCEDURE — 250N000011 HC RX IP 250 OP 636: Performed by: SURGERY

## 2021-06-14 PROCEDURE — 84100 ASSAY OF PHOSPHORUS: CPT | Performed by: PHYSICIAN ASSISTANT

## 2021-06-14 PROCEDURE — 80197 ASSAY OF TACROLIMUS: CPT | Performed by: PHYSICIAN ASSISTANT

## 2021-06-14 PROCEDURE — 250N000013 HC RX MED GY IP 250 OP 250 PS 637: Performed by: NURSE PRACTITIONER

## 2021-06-14 PROCEDURE — 250N000012 HC RX MED GY IP 250 OP 636 PS 637: Performed by: NURSE PRACTITIONER

## 2021-06-14 PROCEDURE — 83690 ASSAY OF LIPASE: CPT | Performed by: NURSE PRACTITIONER

## 2021-06-14 PROCEDURE — 99233 SBSQ HOSP IP/OBS HIGH 50: CPT | Performed by: INTERNAL MEDICINE

## 2021-06-14 PROCEDURE — 36592 COLLECT BLOOD FROM PICC: CPT | Performed by: NURSE PRACTITIONER

## 2021-06-14 PROCEDURE — 85025 COMPLETE CBC W/AUTO DIFF WBC: CPT | Performed by: PHYSICIAN ASSISTANT

## 2021-06-14 PROCEDURE — 999N001017 HC STATISTIC GLUCOSE BY METER IP

## 2021-06-14 PROCEDURE — 85520 HEPARIN ASSAY: CPT | Performed by: PHYSICIAN ASSISTANT

## 2021-06-14 PROCEDURE — 36592 COLLECT BLOOD FROM PICC: CPT | Performed by: PHYSICIAN ASSISTANT

## 2021-06-14 PROCEDURE — 93976 VASCULAR STUDY: CPT

## 2021-06-14 PROCEDURE — 250N000013 HC RX MED GY IP 250 OP 250 PS 637: Performed by: SURGERY

## 2021-06-14 PROCEDURE — 120N000011 HC R&B TRANSPLANT UMMC

## 2021-06-14 PROCEDURE — 250N000011 HC RX IP 250 OP 636: Performed by: NURSE PRACTITIONER

## 2021-06-14 PROCEDURE — 250N000013 HC RX MED GY IP 250 OP 250 PS 637: Performed by: INTERNAL MEDICINE

## 2021-06-14 RX ORDER — CARVEDILOL 6.25 MG/1
6.25 TABLET ORAL ONCE
Status: COMPLETED | OUTPATIENT
Start: 2021-06-14 | End: 2021-06-14

## 2021-06-14 RX ORDER — NALOXONE HYDROCHLORIDE 0.4 MG/ML
0.2 INJECTION, SOLUTION INTRAMUSCULAR; INTRAVENOUS; SUBCUTANEOUS
Status: DISCONTINUED | OUTPATIENT
Start: 2021-06-14 | End: 2021-06-21 | Stop reason: HOSPADM

## 2021-06-14 RX ORDER — NALOXONE HYDROCHLORIDE 0.4 MG/ML
0.4 INJECTION, SOLUTION INTRAMUSCULAR; INTRAVENOUS; SUBCUTANEOUS
Status: DISCONTINUED | OUTPATIENT
Start: 2021-06-14 | End: 2021-06-21 | Stop reason: HOSPADM

## 2021-06-14 RX ORDER — MYCOPHENOLATE MOFETIL 250 MG/1
1000 CAPSULE ORAL 2 TIMES DAILY
Status: DISCONTINUED | OUTPATIENT
Start: 2021-06-14 | End: 2021-06-21 | Stop reason: HOSPADM

## 2021-06-14 RX ORDER — OXYCODONE HYDROCHLORIDE 5 MG/1
5-10 TABLET ORAL EVERY 4 HOURS PRN
Status: DISCONTINUED | OUTPATIENT
Start: 2021-06-14 | End: 2021-06-16

## 2021-06-14 RX ORDER — SULFAMETHOXAZOLE AND TRIMETHOPRIM 400; 80 MG/1; MG/1
1 TABLET ORAL DAILY
Status: DISCONTINUED | OUTPATIENT
Start: 2021-06-14 | End: 2021-06-21 | Stop reason: HOSPADM

## 2021-06-14 RX ORDER — BISACODYL 10 MG
10 SUPPOSITORY, RECTAL RECTAL ONCE
Status: COMPLETED | OUTPATIENT
Start: 2021-06-14 | End: 2021-06-14

## 2021-06-14 RX ORDER — CARVEDILOL 12.5 MG/1
12.5 TABLET ORAL 2 TIMES DAILY
Status: DISCONTINUED | OUTPATIENT
Start: 2021-06-14 | End: 2021-06-16

## 2021-06-14 RX ORDER — ACETAMINOPHEN 325 MG/1
975 TABLET ORAL EVERY 8 HOURS
Status: COMPLETED | OUTPATIENT
Start: 2021-06-14 | End: 2021-06-16

## 2021-06-14 RX ORDER — TACROLIMUS 1 MG/1
3 CAPSULE ORAL
Status: DISCONTINUED | OUTPATIENT
Start: 2021-06-14 | End: 2021-06-15

## 2021-06-14 RX ORDER — OCTREOTIDE ACETATE 100 UG/ML
100 INJECTION, SOLUTION INTRAVENOUS; SUBCUTANEOUS 3 TIMES DAILY
Status: DISCONTINUED | OUTPATIENT
Start: 2021-06-14 | End: 2021-06-15

## 2021-06-14 RX ORDER — PANTOPRAZOLE SODIUM 20 MG/1
40 TABLET, DELAYED RELEASE ORAL
Status: DISCONTINUED | OUTPATIENT
Start: 2021-06-14 | End: 2021-06-21 | Stop reason: HOSPADM

## 2021-06-14 RX ORDER — TACROLIMUS 1 MG/1
2 CAPSULE ORAL
Status: DISCONTINUED | OUTPATIENT
Start: 2021-06-14 | End: 2021-06-14

## 2021-06-14 RX ORDER — AMOXICILLIN 250 MG
2 CAPSULE ORAL 2 TIMES DAILY
Status: DISCONTINUED | OUTPATIENT
Start: 2021-06-14 | End: 2021-06-17

## 2021-06-14 RX ADMIN — PANTOPRAZOLE 40 MG: 20 TABLET, DELAYED RELEASE ORAL at 07:53

## 2021-06-14 RX ADMIN — TACROLIMUS 3 MG: 1 CAPSULE ORAL at 18:00

## 2021-06-14 RX ADMIN — HYDRALAZINE HYDROCHLORIDE 20 MG: 20 INJECTION INTRAMUSCULAR; INTRAVENOUS at 03:32

## 2021-06-14 RX ADMIN — Medication 5 MG: at 22:59

## 2021-06-14 RX ADMIN — Medication 3000 UNITS: at 11:31

## 2021-06-14 RX ADMIN — DOCUSATE SODIUM 50 MG AND SENNOSIDES 8.6 MG 1 TABLET: 8.6; 5 TABLET, FILM COATED ORAL at 07:52

## 2021-06-14 RX ADMIN — OXYCODONE HYDROCHLORIDE 10 MG: 5 TABLET ORAL at 20:22

## 2021-06-14 RX ADMIN — METHOCARBAMOL 500 MG: 500 TABLET, FILM COATED ORAL at 16:10

## 2021-06-14 RX ADMIN — PIPERACILLIN SODIUM AND TAZOBACTAM SODIUM 2.25 G: 2; .25 INJECTION, POWDER, LYOPHILIZED, FOR SOLUTION INTRAVENOUS at 02:41

## 2021-06-14 RX ADMIN — MICAFUNGIN SODIUM 100 MG: 50 INJECTION, POWDER, LYOPHILIZED, FOR SOLUTION INTRAVENOUS at 18:01

## 2021-06-14 RX ADMIN — OXYCODONE HYDROCHLORIDE 10 MG: 5 TABLET ORAL at 16:10

## 2021-06-14 RX ADMIN — CLOTRIMAZOLE 10 MG: 10 LOZENGE ORAL at 07:52

## 2021-06-14 RX ADMIN — CLOTRIMAZOLE 10 MG: 10 LOZENGE ORAL at 20:22

## 2021-06-14 RX ADMIN — OCTREOTIDE ACETATE 100 MCG: 100 INJECTION, SOLUTION INTRAVENOUS; SUBCUTANEOUS at 14:08

## 2021-06-14 RX ADMIN — ATORVASTATIN CALCIUM 40 MG: 40 TABLET, FILM COATED ORAL at 07:52

## 2021-06-14 RX ADMIN — ACETAMINOPHEN 975 MG: 325 TABLET, FILM COATED ORAL at 09:17

## 2021-06-14 RX ADMIN — CLONIDINE HYDROCHLORIDE 0.2 MG: 0.2 TABLET ORAL at 14:08

## 2021-06-14 RX ADMIN — CARVEDILOL 6.25 MG: 6.25 TABLET, FILM COATED ORAL at 07:52

## 2021-06-14 RX ADMIN — MYCOPHENOLATE MOFETIL 1000 MG: 250 CAPSULE ORAL at 07:52

## 2021-06-14 RX ADMIN — OCTREOTIDE ACETATE 100 MCG: 100 INJECTION, SOLUTION INTRAVENOUS; SUBCUTANEOUS at 08:02

## 2021-06-14 RX ADMIN — ASPIRIN 81 MG CHEWABLE TABLET 81 MG: 81 TABLET CHEWABLE at 07:52

## 2021-06-14 RX ADMIN — CLONIDINE HYDROCHLORIDE 0.2 MG: 0.2 TABLET ORAL at 20:40

## 2021-06-14 RX ADMIN — DOCUSATE SODIUM 100 MG: 100 CAPSULE, LIQUID FILLED ORAL at 07:52

## 2021-06-14 RX ADMIN — ACETAMINOPHEN 975 MG: 325 TABLET, FILM COATED ORAL at 18:00

## 2021-06-14 RX ADMIN — OXYCODONE HYDROCHLORIDE 5 MG: 5 TABLET ORAL at 11:24

## 2021-06-14 RX ADMIN — AMLODIPINE BESYLATE 10 MG: 10 TABLET ORAL at 07:52

## 2021-06-14 RX ADMIN — Medication 1 TABLET: at 18:02

## 2021-06-14 RX ADMIN — MAGNESIUM OXIDE TAB 400 MG (241.3 MG ELEMENTAL MG) 400 MG: 400 (241.3 MG) TAB at 11:24

## 2021-06-14 RX ADMIN — Medication 1 TABLET: at 07:52

## 2021-06-14 RX ADMIN — PREDNISONE 60 MG: 50 TABLET ORAL at 07:52

## 2021-06-14 RX ADMIN — SULFAMETHOXAZOLE AND TRIMETHOPRIM 1 TABLET: 400; 80 TABLET ORAL at 09:17

## 2021-06-14 RX ADMIN — HYDRALAZINE HYDROCHLORIDE 20 MG: 20 INJECTION INTRAMUSCULAR; INTRAVENOUS at 21:10

## 2021-06-14 RX ADMIN — DOCUSATE SODIUM 50 MG AND SENNOSIDES 8.6 MG 2 TABLET: 8.6; 5 TABLET, FILM COATED ORAL at 20:22

## 2021-06-14 RX ADMIN — SODIUM CHLORIDE, SODIUM LACTATE, POTASSIUM CHLORIDE, CALCIUM CHLORIDE AND DEXTROSE MONOHYDRATE: 5; 600; 310; 30; 20 INJECTION, SOLUTION INTRAVENOUS at 04:05

## 2021-06-14 RX ADMIN — CARVEDILOL 6.25 MG: 6.25 TABLET, FILM COATED ORAL at 09:17

## 2021-06-14 RX ADMIN — METHOCARBAMOL 500 MG: 500 TABLET, FILM COATED ORAL at 11:24

## 2021-06-14 RX ADMIN — CARVEDILOL 12.5 MG: 12.5 TABLET, FILM COATED ORAL at 20:22

## 2021-06-14 RX ADMIN — CLONIDINE HYDROCHLORIDE 0.2 MG: 0.2 TABLET ORAL at 07:58

## 2021-06-14 RX ADMIN — CLOTRIMAZOLE 10 MG: 10 LOZENGE ORAL at 11:23

## 2021-06-14 RX ADMIN — CLOTRIMAZOLE 10 MG: 10 LOZENGE ORAL at 16:10

## 2021-06-14 RX ADMIN — TACROLIMUS 2 MG: 1 CAPSULE ORAL at 07:52

## 2021-06-14 RX ADMIN — METHOCARBAMOL 500 MG: 500 TABLET, FILM COATED ORAL at 20:22

## 2021-06-14 RX ADMIN — MYCOPHENOLATE MOFETIL 1000 MG: 250 CAPSULE ORAL at 18:00

## 2021-06-14 RX ADMIN — PIPERACILLIN SODIUM AND TAZOBACTAM SODIUM 2.25 G: 2; .25 INJECTION, POWDER, LYOPHILIZED, FOR SOLUTION INTRAVENOUS at 07:49

## 2021-06-14 RX ADMIN — HYDROMORPHONE HYDROCHLORIDE 0.4 MG: 0.2 INJECTION, SOLUTION INTRAMUSCULAR; INTRAVENOUS; SUBCUTANEOUS at 07:05

## 2021-06-14 RX ADMIN — ACETAMINOPHEN 975 MG: 325 TABLET, FILM COATED ORAL at 02:41

## 2021-06-14 RX ADMIN — METHOCARBAMOL 500 MG: 500 TABLET, FILM COATED ORAL at 07:52

## 2021-06-14 RX ADMIN — BISACODYL 10 MG: 10 SUPPOSITORY RECTAL at 09:18

## 2021-06-14 RX ADMIN — SODIUM CHLORIDE, SODIUM LACTATE, POTASSIUM CHLORIDE, CALCIUM CHLORIDE AND DEXTROSE MONOHYDRATE: 5; 600; 310; 30; 20 INJECTION, SOLUTION INTRAVENOUS at 14:08

## 2021-06-14 RX ADMIN — LIDOCAINE 2 PATCH: 560 PATCH PERCUTANEOUS; TOPICAL; TRANSDERMAL at 07:59

## 2021-06-14 RX ADMIN — OCTREOTIDE ACETATE 100 MCG: 100 INJECTION, SOLUTION INTRAVENOUS; SUBCUTANEOUS at 20:22

## 2021-06-14 RX ADMIN — POLYETHYLENE GLYCOL 3350 17 G: 17 POWDER, FOR SOLUTION ORAL at 07:53

## 2021-06-14 RX ADMIN — OXYCODONE HYDROCHLORIDE 5 MG: 5 TABLET ORAL at 12:19

## 2021-06-14 ASSESSMENT — ACTIVITIES OF DAILY LIVING (ADL)
ADLS_ACUITY_SCORE: 14

## 2021-06-14 ASSESSMENT — MIFFLIN-ST. JEOR: SCORE: 1702.35

## 2021-06-14 NOTE — PROVIDER NOTIFICATION
Provider Notification  2030  MD notified pt's NGT was removed per order & patient requested a clear liquid diet; MD ordered clear liquid diet.     0400  MD notified pt's BP is 200/97, PRN hydralazine administered at 0330. Pt's BP resolved to 170s systolic shortly after page sent. Remains 180s/80s. Will continue to monitor.

## 2021-06-14 NOTE — PROGRESS NOTES
Handoff information     Type of transplant:  Donor Kidney/Pancreas  Date of transplant: 2021  Direct/non-direct/PEP- Not Applicable  Transplant history: Not previously transplanted.  Outstanding items for patient: None  Pertinent history: ESRd, on hemodialysis since 2019; no previous abdominal surgery, except lap cholecystectomy; Diabetes Type 2, on 18 units of insulin.  Barriers to post transplant care: None

## 2021-06-14 NOTE — CONSULTS
Transplant Admission Psychosocial Assessment    Patient Name: Erik Cramer  : 1980  Age: 41 year old  MRN: [id number]  Completed assessment with: Patient    Patient underwent a  donor kidney/pancreas transplant.  Met with patient to update psychosocial assessment and provide brief education about SW role while inpatient, as well as expectations/requirements and follow up needs post-transplant. SW also provided education about need for compliance with transplant medications, and explained ESRD Medicare benefits and medication coverage under Medicare part B.  Medicare 2728 forms completed and signed by patient.    Presenting Information   Living Situation: Patient lives with his wife Saniya, their 5 year old son, and his in-laws in [place], [place]  If not local, plans for short term stay:  Patient plans to to drive to and from [place] for his follow up appointments  Previous Functional Status: Independent  Cultural/Language/Spiritual Considerations: None indicated by patient    Support System  Primary Support Person: Mark Kothari  Other support:  None  Plan for support in immediate post-transplant period: Mark Kothari    Health Care Directive  Decision Maker: Patient  Alternate Decision Maker: Legal NOK would be his mark Kothari  Health Care Directive: Declined completing    Mental Health/Coping:   History of Mental Health: Patient declined current or history of mental health   History of Chemical Health: None indicated by patient  Current status: Appropriate and oriented  Coping: Patient reports coping well minus the pain. He is eager to know more about his donor  Services Needed/Recommended: SW provided him information on how to write to donor's family    Financial   Income: SSDI, Wife's Wages/Salary  Impact of transplant on income: Patient may no longer be eligible for SSDI within 1 year of successful transplant. He indicated understanding  Insurance and medication coverage: Erik Cramer  [id number]  :  1980                  Kimberlee for the caps -    BMT*CIARRA BACA IS A KIDNEY/PANCREAS TRANSPLANT PATIENT  (DATE OF TRANSPLANT: 21)     HEALTH BENEFIT: MEDICARE   ID#[id number] (PART A EFFECTIVE 21, PART B EFFECTIVE 21)   PROCESSING INFO: ID#[id number] GRP#[id number] BIN#[id number]  SUPPLEMENTAL HEALTH BENEFIT: [place] MEDICAL ASSISTANCE (EFFECTIVE 21)  PROCESSING INFO: [place] MANUAL ID#[id number] BIN#[id number]  PT WILL PAY $0 AT TIME OF SERVICE FOR ELIGIBLE PART B MEDS     PHARMACY BENEFIT: CIG[employer] PART D  PROCESSING INFO: ID#[id number] GRP#[id number] PCN#[id number] BIN#[id number] (EFFECTIVE 21)   NO DEDUCTIBLE OR OUT-OF-POCKET DUE TO LOW-INCOME SUBSIDY  COPAY STRUCTURE:  $3.70 FOR GENERIC  $9.20 FOR BRAND  $9.20 FOR NON-FORMULARY MEDICATIONS     [facility]   MYCOPHENOLATE 250MG=$0 AT TIME OF SERVICE  PROGRAF 1MG=$0 AT TIME OF SERVICE  TACROLIMUS 1MG=$0 AT TIME OF SERVICE  CYCLOSPORINE 100MG=$0 AT TIME OF SERVICE  VALGANCICLOVIR 450MG=Valganciclovir is not considered a specialty drug. [facility] pharmacy is unable to fill any part D medications due to non-contracted status. [facility] cannot get an override to fill here. Valganciclovir and other non-specialty part D meds can be filled at pt s preferred pharmacy. SW alerted transplant team  Financial concerns: None indicated  Resources needed: None indicated    Assessment, recommendations and discharge plan: Patient is a 41-year-old, male, who underwent a  donor kidney/pancreas transplant on 2021. Patient was on dialysis prior to transplant. Patient was sitting up in a chair and willing to talk with . JIMENEZ provided information on Medicare. JIMENEZ also provided information on how to write to his donor's family. JIMENEZ provided contact information for inpatient and outpatient SW. JIMENEZ then provided the cost of patient's transplant medication. Patient has adequate insurance coverage and social support. No coping issues or psychosocial concerns.  Patient was well informed of post-transplant expectations/follow through.    TRISTAN Ortiz, ANTSW  7A   Ph: [phone]  Pager: [phone]

## 2021-06-14 NOTE — CONSULTS
Initial consult completed by JIMENEZ on 6/14    Care Management Follow Up    Length of Stay (days): 3    Expected Discharge Date: 06/17/21     Concerns to be Addressed:   Discharge planning.     Patient plan of care discussed at interdisciplinary rounds: spoke with 7A RNCC  Anticipated Discharge Disposition:  Home to [place]  Anticipated Discharge Services:  Home Care v OP labs      Additional Information:  Per 7A RNCC, pt will discharge home to [facility] as early as Thursday 6/17. His family will drive him back for appointments. Attempted to reach patient by phone to discuss MWF lab draws and to offer preference for home care versus OP labs. Pt was unavailable.     There are 4 home care agencies in patient's area. RNCC called [facility], but got VM. Both [facility] and [facility] would like a referral packet sent. They will review with their directors to see if they can take patient. Referral faxed  to both with request for start of care - day after ATC clinic visits. Also MWF lab no later than 9AM, with same day results.    [facility] ph: [phone] fx: [phone]  [facility] ph:[phone]   Fx: [phone]    7A RNCC updated and will follow up with patient and home care agencies tomorrow - to determine patient choice as well as options.       Ching Gillespie, RN, [place]  Float Care Coordinator

## 2021-06-14 NOTE — PROGRESS NOTES
[facility]   Transplant Nephrology Progress Note  Date of Admission:  6/10/2021  Today's Date: 06/14/2021    Recommendations:  -Increase coreg to 12.5mg bid  -Will discuss lipase increase with transplant surgery. U/S with unchanged fluid collection    Assessment & Plan   # DDKTX (SPK) trend down              - Baseline Creatinine: ~ TBD              - Proteinuria: Not checked post transplant              - Date DSA Last Checked: Jun/2021      Latest DSA: No DSA at time of transplant              - BK Viremia: No              - Kidney Tx Biopsy: No     # Pancreas Tx (SPK):               - Pancreatic Exocrine Drainage: Enteric drained                     - Blood glucose: near euglycemia       On insulin: yes, drip              - HbA1c: Last checked at time of transplant     6%              - Pancreatic enzymes: uptrend, U/S with unchanged size of fluid collection, patent vessels              - Date DSA Last Checked: Jun/2021             Latest DSA: No DSA at time of transplant              - Pancreas Tx Biopsy: No     # Immunosuppression: Tacrolimus immediate release (goal 8-10), Mycophenolate mofetil (dose 1000 mg every 12 hours) and Prednisone (dose taper)               - Induction: intermediate risk protocol, PRA 32%              - Changes: No     # Infection Prophylaxis:   - PJP: Sulfa/TMP (Bactrim)  - CMV: Valcyte x3m  - Thrush: Micafungin (Mycamine) and myclex    # Hypertension: Inadequate control;  Goal BP: < 150/90   - Volume status: Euvolemic    - Changes: Increase carvedilol to 12.5mg BID, continue clonidine 0.2mg tid    # Anemia in Chronic Renal Disease: Hgb: downtrend ROSA M: No   - Iron studies: Unknown at this time, but checked with dialysis    # Mineral Bone Disorder:   - Secondary renal hyperparathyroidism; PTH level: Unknown at this time, but checked with dialysis        On treatment: None  - Vitamin D; level: Unknown at this time, but checked with dialysis         On supplement: Yes  - Calcium; level: Normal   On supplement: No  - Phosphorus; level: Normal        On supplement: No    # Electrolytes:   - Potassium; level: Normal        On supplement: No  - Magnesium; level: Normal        On supplement: Yes  - Bicarbonate; level: Normal        On supplement: No  - Sodium; level: Normal    # Transplant History:  Etiology of Kidney Failure: Diabetes mellitus type 2  Tx: SPK  Transplant: 6/11/2021 (Kidney / Pancreas)  DSA at time of Tx: No  Significant changes in immunosuppression: None  Significant transplant-related complications: None     Recommendations were communicated to the primary team verbally.    Saw Irby MD   Pager: [phone]    Interval History   Slight pain but improving    Review of Systems   4 point ROS was obtained and negative except as noted in the Interval History.    MEDICATIONS:    acetaminophen  975 mg Oral Q8H     amLODIPine  10 mg Oral or NG Tube Daily     aspirin  81 mg Oral Daily     atorvastatin  40 mg Oral Daily     [START ON 6/16/2021] basiliximab (SIMULECT) infusion  20 mg Intravenous Once     calcium carbonate-vitamin D  1 tablet Oral BID w/meals     carvedilol  12.5 mg Oral BID     cloNIDine  0.2 mg Oral TID     clotrimazole  10 mg Buccal 4x Daily     lidocaine  1-2 patch Transdermal Q24H     lidocaine   Transdermal Q8H     magnesium oxide  400 mg Oral Q24H     methocarbamol  500 mg Oral 4x Daily     micafungin  100 mg Intravenous Q24H     mycophenolate  1,000 mg Oral BID     octreotide  100 mcg Subcutaneous TID     pantoprazole  40 mg Oral QAM AC     polyethylene glycol  17 g Oral Daily     predniSONE  60 mg Oral Daily    Followed by     [START ON 6/16/2021] predniSONE  40 mg Oral Daily    Followed by     [START ON 6/18/2021] predniSONE  20 mg Oral Daily    Followed by     [START ON 6/25/2021] predniSONE  15 mg Oral Daily    Followed by     [START ON 7/2/2021] predniSONE  10 mg Oral Daily    Followed by     [START ON 7/9/2021] predniSONE   "5 mg Oral Daily     senna-docusate  2 tablet Oral BID     sodium chloride (PF)  3 mL Intravenous Q8H     sulfamethoxazole-trimethoprim  1 tablet Oral Daily     tacrolimus  3 mg Oral BID IS     valGANciclovir  450 mg Oral Every Other Day       dextrose       IV fluid REPLACEMENT ONLY 100 mL/hr at 21 1408     heparin 400 Units/hr (21 2153)     insulin regular 1 Units/hr (21 1619)       Physical Exam   Temp  Av.5  F (36.9  C)  Min: 98.2  F (36.8  C)  Max: 98.9  F (37.2  C)  Arterial Line BP  Min: 216/75  Max: 221/84  Arterial Line MAP (mmHg)  Av mmHg  Min: 124 mmHg  Max: 137 mmHg      Pulse  Av.9  Min: 63  Max: 112 Resp  Av.6  Min: 12  Max: 24  SpO2  Av.7 %  Min: 89 %  Max: 100 %    CVP (mmHg): 3 mmHgBP (!) 159/77 (BP Location: Right arm)   Pulse 63   Temp 98  F (36.7  C) (Oral)   Resp 16   Ht 1.651 m (5' 5\")   Wt 86 kg (189 lb 11.2 oz)   SpO2 93%   BMI 31.57 kg/m     Date 21 07 - 21 0659   Shift 1740-5526 4745-2633 5587-7439 24 Hour Total   INTAKE   I.V. 2366.94   2366.94   NG/   120   Shift Total(mL/kg) 2486.94(27.33)   2486.94(27.33)   OUTPUT   Urine 1450   1450   Emesis/NG output 325   325   Drains 210   210   Shift Total(mL/kg) (21.81)   (21.81)   Weight (kg) 91 91 91 91      Admit Weight: 87.2 kg (192 lb 3.9 oz)     GENERAL APPEARANCE: alert and no distress  HENT: mouth without ulcers or lesions  RESP: lungs clear to auscultation - no rales, rhonchi or wheezes  CV: regular rhythm, normal rate, no rub, no murmur  EDEMA: no LE edema bilaterally  ABDOMEN: soft, nondistended, nontender, bowel sounds normal  MS: extremities normal - no gross deformities noted, no evidence of inflammation in joints, no muscle tenderness  SKIN: no rash    Data   All labs reviewed by me.  CMP  Recent Labs   Lab 21  0541 21  0717 21  0624 21  0057 21  0540 21  0540 06/10/21  0927 06/10/21  0927    141 138  --   --  132* "   < > 132*   POTASSIUM 4.3 4.2 4.0 4.2   < > 4.8   < > 4.3   CHLORIDE 110* 112* 108  --   --  100  --  96   CO2 27 27 26  --   --  19*  --  29   ANIONGAP 5 3 4  --   --  12  --  7   GLC 96 104* 119*  --   --  129*   < > 119*   BUN 29 34* 41*  --   --  42*  --  34*   CR 2.82* 3.92* 6.08*  --   --  7.93*  --  7.32*   GFRESTIMATED 27* 18* 11*  --   --  8*  --  9*   GFRESTBLACK 31* 21* 12*  --   --  9*  --  10*   SHAY 8.4* 8.3* 7.9*  --   --  9.0  --  8.8   MAG 2.0 2.0 1.9  --   --  1.5*  --   --    PHOS 2.7 3.0 3.8  --   --  2.2*  --   --    PROTTOTAL  --   --   --   --   --   --   --  7.2   ALBUMIN  --   --   --   --   --   --   --  3.9   BILITOTAL  --   --   --   --   --   --   --  0.4   ALKPHOS  --   --   --   --   --   --   --  84   AST  --   --   --   --   --   --   --  32   ALT  --   --   --   --   --   --   --  32    < > = values in this interval not displayed.     CBC  Recent Labs   Lab 06/14/21  0541 06/13/21  0717 06/12/21  0624 06/12/21  0057 06/11/21  0540 06/11/21  0540   HGB 7.6* 8.1* 9.0* 9.1*   < > 8.7*   WBC 10.3 11.6* 9.5  --   --  14.2*   RBC 2.50* 2.65* 2.96*  --   --  2.84*   HCT 24.5* 25.4* 28.0*  --   --  26.4*   MCV 98 96 95  --   --  93   MCH 30.4 30.6 30.4  --   --  30.6   MCHC 31.0* 31.9 32.1  --   --  33.0   RDW 16.9* 17.2* 17.0*  --   --  16.7*    167 153  --   --  217    < > = values in this interval not displayed.     INR  Recent Labs   Lab 06/11/21  0540 06/10/21  0927   INR 1.09 0.98   PTT 34 37     ABG  Recent Labs   Lab 06/11/21  0100 06/11/21  0000 06/10/21  2300 06/10/21  2152   PH 7.40 7.42 7.43 7.45   PCO2 35 35 34* 34*   PO2 121* 128* 177* 113*   HCO3 21 23 23 24   O2PER 40% 40% 40.0 40      Urine Studies  Recent Labs   Lab Test 06/10/21  1120 07/29/19  1240 07/18/19  1200   COLOR Light Yellow Straw Yellow   APPEARANCE Clear Clear Clear   URINEGLC Negative 50* 50*   URINEBILI Negative Negative Negative   URINEKETONE Negative Negative Negative   SG 1.007 1.010 1.011   UBLD  Negative Negative Negative   URINEPH 7.0 5.0 6.0   PROTEIN 200* >499* >499*   NITRITE Negative Negative Negative   LEUKEST Negative Negative Negative   RBCU 3* 5* 9*   WBCU 1 3 5     Recent Labs   Lab Test 06/10/21  1120 08/31/20  0000 07/18/19  1200   UTPG 5.90* 2.62* 6.20*     PTH  No lab results found.  Iron Studies  No lab results found.    IMAGING:  All imaging studies reviewed by me.

## 2021-06-14 NOTE — Clinical Note
Gera Richey,  Please see the attached.  Tye is a great patient!  Very compliant and always calls if he has questions.  Please let me know if you have any questions.  Thanks, Tamy

## 2021-06-14 NOTE — PLAN OF CARE
"BP (!) 145/72 (BP Location: Right arm)   Pulse 66   Temp 97.8  F (36.6  C) (Oral)   Resp 16   Ht 1.651 m (5' 5\")   Wt 86 kg (189 lb 11.2 oz)   SpO2 97%   BMI 31.57 kg/m       Patient alert and oriented x 4. Hypertensive. All other VS stable. Patient on room air. Patient complains of pain. PRN oxy given (See MAR). Patient denies nausea. BG - patient on insulin gtt. Hourly BG checks. Using algorithm 1. Urine Output - Fox catheter pulled during shift. Negative PVR after first two voids. Bowel Function - small BM during shift. Patient passing gas. Nutrition - clear liquid. Midline incision - stapled, open to air. TIA Drain - serosanguineous output. Left AVF. PIV - saline locked. CVC - internal jugular: D5LR @ 100 ml/hr, heparin gtt @ 400 units/hr straight rate, insulin gtt, and TKO. Activity - SBA. Patient up to chair multiple times during shift. Education - Lab book up to date. Med card up to date. Plan of Care - Will continue to monitor and notify care team of any changes.      "

## 2021-06-14 NOTE — PROGRESS NOTES
Reason for Assessment:  Nutrition education regarding post transplant diet.     Diet History:   Pt notes eating well PTA. Currently on CLD. Pt is hungry and eager for his diet to advance.     Nutrition Diagnosis:  Food- and nutrition-related knowledge deficit r/t no previous knowledge of post transplant diet guidelines AEB patient verbal report.     Interventions:   Provided instruction on post-transplant diet with discussion regarding protein sources and high protein needs in acute post-tx phase.   -- Reviewed recommendations to follow low fat/low sodium diet long term and discussed heart healthy diet tips.    -- Reviewed need for food safety precautions to prevent food borne illness.    Provided handouts: Post Transplant Diet Guidelines and USDA food safety booklet    Pt verbalized understanding of diet following education and denied further nutritional questions.      Goals:   Patient will verbalize understanding of education provided.    Follow-up:    Patient to ask any further nutrition-related questions before discharge.  In addition, pt may request outpatient RD appointment.    Brittnee Jackman RDN, LD  7A/OBS Pg [phone]

## 2021-06-14 NOTE — PLAN OF CARE
"BP (!) 146/79 (BP Location: Right arm)   Pulse 76   Temp 98.3  F (36.8  C) (Oral)   Resp 16   Ht 1.651 m (5' 5\")   Wt 91 kg (200 lb 9.9 oz)   SpO2 96%   BMI 33.38 kg/m     Shift: 4727-9974  VS: Hypertensive 160s-200s, OVSS on RA.   Neuro: AOx4  BG: Insulin infusion   Cardiopulmonary: WDL  Pain/Nausea/PRN: C/o incisional pain, dilaudid x2. Hydralazine x2 for HTN, milk of magnesia given for constipation. Denies nausea  Diet: Tolerating clears   LDA: R internal jugular, R PIV, R TIA, edwards  GI/: Adequate light urine from edwards, passing gas, no BM  Mobility: SBA  Plan: Continue bowel regimen & encourage activity    Will continue with POC, will notify MD with changes or concerns.     "

## 2021-06-15 ENCOUNTER — APPOINTMENT (OUTPATIENT)
Dept: PHYSICAL THERAPY | Facility: CLINIC | Age: 41
DRG: 008 | End: 2021-06-15
Attending: TRANSPLANT SURGERY
Payer: MEDICARE

## 2021-06-15 LAB
AMYLASE SERPL-CCNC: 175 U/L (ref 30–110)
ANION GAP SERPL CALCULATED.3IONS-SCNC: 5 MMOL/L (ref 3–14)
BASOPHILS # BLD AUTO: 0 10E9/L (ref 0–0.2)
BASOPHILS NFR BLD AUTO: 0.1 %
BUN SERPL-MCNC: 28 MG/DL (ref 7–30)
CALCIUM SERPL-MCNC: 8.6 MG/DL (ref 8.5–10.1)
CHLORIDE SERPL-SCNC: 110 MMOL/L (ref 94–109)
CO2 SERPL-SCNC: 25 MMOL/L (ref 20–32)
CREAT SERPL-MCNC: 2.46 MG/DL (ref 0.66–1.25)
DIFFERENTIAL METHOD BLD: ABNORMAL
EOSINOPHIL # BLD AUTO: 0 10E9/L (ref 0–0.7)
EOSINOPHIL NFR BLD AUTO: 0.3 %
ERYTHROCYTE [DISTWIDTH] IN BLOOD BY AUTOMATED COUNT: 15.9 % (ref 10–15)
GFR SERPL CREATININE-BSD FRML MDRD: 32 ML/MIN/{1.73_M2}
GLUCOSE BLDC GLUCOMTR-MCNC: 103 MG/DL (ref 70–99)
GLUCOSE BLDC GLUCOMTR-MCNC: 115 MG/DL (ref 70–99)
GLUCOSE BLDC GLUCOMTR-MCNC: 118 MG/DL (ref 70–99)
GLUCOSE BLDC GLUCOMTR-MCNC: 120 MG/DL (ref 70–99)
GLUCOSE BLDC GLUCOMTR-MCNC: 136 MG/DL (ref 70–99)
GLUCOSE BLDC GLUCOMTR-MCNC: 141 MG/DL (ref 70–99)
GLUCOSE BLDC GLUCOMTR-MCNC: 141 MG/DL (ref 70–99)
GLUCOSE BLDC GLUCOMTR-MCNC: 150 MG/DL (ref 70–99)
GLUCOSE BLDC GLUCOMTR-MCNC: 171 MG/DL (ref 70–99)
GLUCOSE BLDC GLUCOMTR-MCNC: 179 MG/DL (ref 70–99)
GLUCOSE BLDC GLUCOMTR-MCNC: 182 MG/DL (ref 70–99)
GLUCOSE BLDC GLUCOMTR-MCNC: 194 MG/DL (ref 70–99)
GLUCOSE BLDC GLUCOMTR-MCNC: 95 MG/DL (ref 70–99)
GLUCOSE SERPL-MCNC: 82 MG/DL (ref 70–99)
HCT VFR BLD AUTO: 25 % (ref 40–53)
HGB BLD-MCNC: 7.9 G/DL (ref 13.3–17.7)
IMM GRANULOCYTES # BLD: 0 10E9/L (ref 0–0.4)
IMM GRANULOCYTES NFR BLD: 0.3 %
LIPASE SERPL-CCNC: 1258 U/L (ref 73–393)
LYMPHOCYTES # BLD AUTO: 0.9 10E9/L (ref 0.8–5.3)
LYMPHOCYTES NFR BLD AUTO: 10.2 %
MAGNESIUM SERPL-MCNC: 2.1 MG/DL (ref 1.6–2.3)
MCH RBC QN AUTO: 30.2 PG (ref 26.5–33)
MCHC RBC AUTO-ENTMCNC: 31.6 G/DL (ref 31.5–36.5)
MCV RBC AUTO: 95 FL (ref 78–100)
MONOCYTES # BLD AUTO: 0.9 10E9/L (ref 0–1.3)
MONOCYTES NFR BLD AUTO: 9.7 %
NEUTROPHILS # BLD AUTO: 7.3 10E9/L (ref 1.6–8.3)
NEUTROPHILS NFR BLD AUTO: 79.4 %
NRBC # BLD AUTO: 0 10*3/UL
NRBC BLD AUTO-RTO: 0 /100
PHOSPHATE SERPL-MCNC: 2.5 MG/DL (ref 2.5–4.5)
PLATELET # BLD AUTO: 198 10E9/L (ref 150–450)
POTASSIUM SERPL-SCNC: 4.2 MMOL/L (ref 3.4–5.3)
RBC # BLD AUTO: 2.62 10E12/L (ref 4.4–5.9)
SODIUM SERPL-SCNC: 140 MMOL/L (ref 133–144)
TACROLIMUS BLD-MCNC: 9.5 UG/L (ref 5–15)
TME LAST DOSE: NORMAL H
UFH PPP CHRO-ACNC: <0.1 IU/ML
WBC # BLD AUTO: 9.2 10E9/L (ref 4–11)

## 2021-06-15 PROCEDURE — 250N000011 HC RX IP 250 OP 636: Performed by: PHYSICIAN ASSISTANT

## 2021-06-15 PROCEDURE — 258N000003 HC RX IP 258 OP 636: Performed by: PHYSICIAN ASSISTANT

## 2021-06-15 PROCEDURE — 80048 BASIC METABOLIC PNL TOTAL CA: CPT | Performed by: PHYSICIAN ASSISTANT

## 2021-06-15 PROCEDURE — 250N000013 HC RX MED GY IP 250 OP 250 PS 637: Performed by: SURGERY

## 2021-06-15 PROCEDURE — 97530 THERAPEUTIC ACTIVITIES: CPT | Mod: GP

## 2021-06-15 PROCEDURE — 250N000012 HC RX MED GY IP 250 OP 636 PS 637: Performed by: PHYSICIAN ASSISTANT

## 2021-06-15 PROCEDURE — 250N000012 HC RX MED GY IP 250 OP 636 PS 637: Performed by: NURSE PRACTITIONER

## 2021-06-15 PROCEDURE — 83735 ASSAY OF MAGNESIUM: CPT | Performed by: PHYSICIAN ASSISTANT

## 2021-06-15 PROCEDURE — 250N000013 HC RX MED GY IP 250 OP 250 PS 637: Performed by: PHYSICIAN ASSISTANT

## 2021-06-15 PROCEDURE — 83690 ASSAY OF LIPASE: CPT | Performed by: PHYSICIAN ASSISTANT

## 2021-06-15 PROCEDURE — 82150 ASSAY OF AMYLASE: CPT | Performed by: PHYSICIAN ASSISTANT

## 2021-06-15 PROCEDURE — 999N001017 HC STATISTIC GLUCOSE BY METER IP

## 2021-06-15 PROCEDURE — 99233 SBSQ HOSP IP/OBS HIGH 50: CPT | Performed by: INTERNAL MEDICINE

## 2021-06-15 PROCEDURE — 84100 ASSAY OF PHOSPHORUS: CPT | Performed by: PHYSICIAN ASSISTANT

## 2021-06-15 PROCEDURE — 36592 COLLECT BLOOD FROM PICC: CPT | Performed by: PHYSICIAN ASSISTANT

## 2021-06-15 PROCEDURE — 97116 GAIT TRAINING THERAPY: CPT | Mod: GP

## 2021-06-15 PROCEDURE — 85025 COMPLETE CBC W/AUTO DIFF WBC: CPT | Performed by: PHYSICIAN ASSISTANT

## 2021-06-15 PROCEDURE — 250N000013 HC RX MED GY IP 250 OP 250 PS 637: Performed by: NURSE PRACTITIONER

## 2021-06-15 PROCEDURE — 250N000011 HC RX IP 250 OP 636: Performed by: NURSE PRACTITIONER

## 2021-06-15 PROCEDURE — 120N000011 HC R&B TRANSPLANT UMMC

## 2021-06-15 PROCEDURE — 85520 HEPARIN ASSAY: CPT | Performed by: PHYSICIAN ASSISTANT

## 2021-06-15 PROCEDURE — 80197 ASSAY OF TACROLIMUS: CPT | Performed by: NURSE PRACTITIONER

## 2021-06-15 RX ORDER — VALGANCICLOVIR 450 MG/1
450 TABLET, FILM COATED ORAL DAILY
Status: DISCONTINUED | OUTPATIENT
Start: 2021-06-16 | End: 2021-06-21 | Stop reason: HOSPADM

## 2021-06-15 RX ORDER — HYDRALAZINE HYDROCHLORIDE 25 MG/1
25 TABLET, FILM COATED ORAL EVERY 8 HOURS SCHEDULED
Status: DISCONTINUED | OUTPATIENT
Start: 2021-06-15 | End: 2021-06-16

## 2021-06-15 RX ORDER — BISACODYL 10 MG
10 SUPPOSITORY, RECTAL RECTAL ONCE
Status: COMPLETED | OUTPATIENT
Start: 2021-06-15 | End: 2021-06-15

## 2021-06-15 RX ORDER — POLYETHYLENE GLYCOL 3350 17 G/17G
17 POWDER, FOR SOLUTION ORAL 2 TIMES DAILY
Status: DISCONTINUED | OUTPATIENT
Start: 2021-06-15 | End: 2021-06-17

## 2021-06-15 RX ORDER — OCTREOTIDE ACETATE 100 UG/ML
150 INJECTION, SOLUTION INTRAVENOUS; SUBCUTANEOUS 3 TIMES DAILY
Status: DISCONTINUED | OUTPATIENT
Start: 2021-06-15 | End: 2021-06-21 | Stop reason: HOSPADM

## 2021-06-15 RX ADMIN — INSULIN ASPART 1 UNITS: 100 INJECTION, SOLUTION INTRAVENOUS; SUBCUTANEOUS at 14:47

## 2021-06-15 RX ADMIN — METHOCARBAMOL 500 MG: 500 TABLET, FILM COATED ORAL at 15:45

## 2021-06-15 RX ADMIN — HYDRALAZINE HYDROCHLORIDE 25 MG: 25 TABLET, FILM COATED ORAL at 22:00

## 2021-06-15 RX ADMIN — METHOCARBAMOL 500 MG: 500 TABLET, FILM COATED ORAL at 20:43

## 2021-06-15 RX ADMIN — CLONIDINE HYDROCHLORIDE 0.2 MG: 0.2 TABLET ORAL at 20:43

## 2021-06-15 RX ADMIN — POLYETHYLENE GLYCOL 3350 17 G: 17 POWDER, FOR SOLUTION ORAL at 08:03

## 2021-06-15 RX ADMIN — MYCOPHENOLATE MOFETIL 1000 MG: 250 CAPSULE ORAL at 17:57

## 2021-06-15 RX ADMIN — OCTREOTIDE ACETATE 150 MCG: 100 INJECTION, SOLUTION INTRAVENOUS; SUBCUTANEOUS at 20:43

## 2021-06-15 RX ADMIN — ATORVASTATIN CALCIUM 40 MG: 40 TABLET, FILM COATED ORAL at 08:02

## 2021-06-15 RX ADMIN — CLOTRIMAZOLE 10 MG: 10 LOZENGE ORAL at 11:29

## 2021-06-15 RX ADMIN — TACROLIMUS 2.5 MG: 1 CAPSULE ORAL at 17:56

## 2021-06-15 RX ADMIN — OCTREOTIDE ACETATE 150 MCG: 100 INJECTION, SOLUTION INTRAVENOUS; SUBCUTANEOUS at 14:48

## 2021-06-15 RX ADMIN — PREDNISONE 60 MG: 50 TABLET ORAL at 08:02

## 2021-06-15 RX ADMIN — INSULIN ASPART 1 UNITS: 100 INJECTION, SOLUTION INTRAVENOUS; SUBCUTANEOUS at 10:30

## 2021-06-15 RX ADMIN — OXYCODONE HYDROCHLORIDE 10 MG: 5 TABLET ORAL at 09:17

## 2021-06-15 RX ADMIN — VALGANCICLOVIR 450 MG: 450 TABLET, FILM COATED ORAL at 08:29

## 2021-06-15 RX ADMIN — TACROLIMUS 3 MG: 1 CAPSULE ORAL at 08:01

## 2021-06-15 RX ADMIN — CARVEDILOL 12.5 MG: 12.5 TABLET, FILM COATED ORAL at 20:43

## 2021-06-15 RX ADMIN — HYDRALAZINE HYDROCHLORIDE 20 MG: 20 INJECTION INTRAMUSCULAR; INTRAVENOUS at 04:38

## 2021-06-15 RX ADMIN — CLOTRIMAZOLE 10 MG: 10 LOZENGE ORAL at 08:02

## 2021-06-15 RX ADMIN — OXYCODONE HYDROCHLORIDE 10 MG: 5 TABLET ORAL at 13:32

## 2021-06-15 RX ADMIN — HYDRALAZINE HYDROCHLORIDE 25 MG: 25 TABLET, FILM COATED ORAL at 13:33

## 2021-06-15 RX ADMIN — METHOCARBAMOL 500 MG: 500 TABLET, FILM COATED ORAL at 11:29

## 2021-06-15 RX ADMIN — PANTOPRAZOLE 40 MG: 20 TABLET, DELAYED RELEASE ORAL at 08:01

## 2021-06-15 RX ADMIN — BISACODYL 10 MG: 10 SUPPOSITORY RECTAL at 11:29

## 2021-06-15 RX ADMIN — LIDOCAINE 2 PATCH: 560 PATCH PERCUTANEOUS; TOPICAL; TRANSDERMAL at 08:03

## 2021-06-15 RX ADMIN — CLOTRIMAZOLE 10 MG: 10 LOZENGE ORAL at 15:45

## 2021-06-15 RX ADMIN — CLONIDINE HYDROCHLORIDE 0.2 MG: 0.2 TABLET ORAL at 08:01

## 2021-06-15 RX ADMIN — OCTREOTIDE ACETATE 100 MCG: 100 INJECTION, SOLUTION INTRAVENOUS; SUBCUTANEOUS at 08:29

## 2021-06-15 RX ADMIN — Medication 1 TABLET: at 08:01

## 2021-06-15 RX ADMIN — DOCUSATE SODIUM 50 MG AND SENNOSIDES 8.6 MG 2 TABLET: 8.6; 5 TABLET, FILM COATED ORAL at 08:02

## 2021-06-15 RX ADMIN — ACETAMINOPHEN 975 MG: 325 TABLET, FILM COATED ORAL at 00:30

## 2021-06-15 RX ADMIN — Medication 5 MG: at 22:05

## 2021-06-15 RX ADMIN — ACETAMINOPHEN 975 MG: 325 TABLET, FILM COATED ORAL at 17:01

## 2021-06-15 RX ADMIN — SODIUM CHLORIDE, SODIUM LACTATE, POTASSIUM CHLORIDE, CALCIUM CHLORIDE AND DEXTROSE MONOHYDRATE: 5; 600; 310; 30; 20 INJECTION, SOLUTION INTRAVENOUS at 11:27

## 2021-06-15 RX ADMIN — MAGNESIUM OXIDE TAB 400 MG (241.3 MG ELEMENTAL MG) 400 MG: 400 (241.3 MG) TAB at 11:29

## 2021-06-15 RX ADMIN — ASPIRIN 81 MG CHEWABLE TABLET 81 MG: 81 TABLET CHEWABLE at 08:01

## 2021-06-15 RX ADMIN — CARVEDILOL 12.5 MG: 12.5 TABLET, FILM COATED ORAL at 08:02

## 2021-06-15 RX ADMIN — ACETAMINOPHEN 975 MG: 325 TABLET, FILM COATED ORAL at 08:30

## 2021-06-15 RX ADMIN — OXYCODONE HYDROCHLORIDE 10 MG: 5 TABLET ORAL at 04:38

## 2021-06-15 RX ADMIN — MICAFUNGIN SODIUM 100 MG: 50 INJECTION, POWDER, LYOPHILIZED, FOR SOLUTION INTRAVENOUS at 17:01

## 2021-06-15 RX ADMIN — MYCOPHENOLATE MOFETIL 1000 MG: 250 CAPSULE ORAL at 08:01

## 2021-06-15 RX ADMIN — OXYCODONE HYDROCHLORIDE 10 MG: 5 TABLET ORAL at 00:30

## 2021-06-15 RX ADMIN — Medication 1 TABLET: at 17:01

## 2021-06-15 RX ADMIN — CLOTRIMAZOLE 10 MG: 10 LOZENGE ORAL at 20:43

## 2021-06-15 RX ADMIN — AMLODIPINE BESYLATE 10 MG: 10 TABLET ORAL at 08:01

## 2021-06-15 RX ADMIN — SULFAMETHOXAZOLE AND TRIMETHOPRIM 1 TABLET: 400; 80 TABLET ORAL at 08:02

## 2021-06-15 RX ADMIN — CLONIDINE HYDROCHLORIDE 0.2 MG: 0.2 TABLET ORAL at 13:33

## 2021-06-15 RX ADMIN — METHOCARBAMOL 500 MG: 500 TABLET, FILM COATED ORAL at 08:02

## 2021-06-15 RX ADMIN — INSULIN ASPART 1 UNITS: 100 INJECTION, SOLUTION INTRAVENOUS; SUBCUTANEOUS at 17:56

## 2021-06-15 ASSESSMENT — ACTIVITIES OF DAILY LIVING (ADL)
ADLS_ACUITY_SCORE: 14

## 2021-06-15 ASSESSMENT — MIFFLIN-ST. JEOR
SCORE: 1691.47
SCORE: 1750.88

## 2021-06-15 NOTE — PROGRESS NOTES
[facility]   Transplant Nephrology Progress Note  Date of Admission:  6/10/2021  Today's Date: 06/15/2021    Recommendations:  -Start hydralazine 25mg PO q8h  -Monitor serum and drain lipase    Assessment & Plan   # DDKTX (SPK) trend down              - Baseline Creatinine: ~ TBD              - Proteinuria: Not checked post transplant              - Date DSA Last Checked: Jun/2021      Latest DSA: No DSA at time of transplant              - BK Viremia: No              - Kidney Tx Biopsy: No   -Stent placed. Remove 4-6 weeks post txp     # Pancreas Tx (SPK):               - Pancreatic Exocrine Drainage: Enteric drained                     - Blood glucose: near euglycemia       On insulin: yes, short acting              - HbA1c: Last checked at time of transplant     6%              - Pancreatic enzymes: stable, elevated, U/S with unchanged size of fluid collection, patent vessels. Plan to monitor               - Date DSA Last Checked: Jun/2021             Latest DSA: No DSA at time of transplant              - Pancreas Tx Biopsy: No     # Immunosuppression: Tacrolimus immediate release (goal 8-10), Mycophenolate mofetil (dose 1000 mg every 12 hours) and Prednisone (dose taper)               - Induction: intermediate risk protocol, PRA 32%              - Changes: No     # Infection Prophylaxis:   - PJP: Sulfa/TMP (Bactrim)  - CMV: Valcyte x3m  - Thrush: Micafungin (Mycamine) and myclex    # Hypertension: Inadequate control;  Goal BP: < 150/90   - Volume status: Euvolemic    - Changes: Yes, add hydralazine 25mg PO q8h. Continue amlodipine 10mg daily, carvedilol 12.5mg BID, clonidine 0.2mg tid    # Anemia in Chronic Renal Disease: Hgb: stable, low ROSA M: No   - Iron studies: Unknown at this time, but checked with dialysis    # Mineral Bone Disorder:   - Secondary renal hyperparathyroidism; PTH level: Unknown at this time, but checked with dialysis        On treatment: None  -  Vitamin D; level: Unknown at this time, but checked with dialysis        On supplement: Yes  - Calcium; level: Normal   On supplement: No  - Phosphorus; level: Normal        On supplement: No    # Electrolytes:   - Potassium; level: Normal        On supplement: No  - Magnesium; level: Normal        On supplement: Yes  - Bicarbonate; level: Normal        On supplement: No  - Sodium; level: Normal    # Transplant History:  Etiology of Kidney Failure: Diabetes mellitus type 2  Tx: SPK  Transplant: 6/11/2021 (Kidney / Pancreas)  DSA at time of Tx: No  Significant changes in immunosuppression: None  Significant transplant-related complications: None     Recommendations were communicated to the primary team verbally.    Saw Irby MD   Pager: [phone]    Interval History   NG removed. Walked around x1, had small bowel movement    Review of Systems   4 point ROS was obtained and negative except as noted in the Interval History.    MEDICATIONS:    acetaminophen  975 mg Oral Q8H     amLODIPine  10 mg Oral or NG Tube Daily     aspirin  81 mg Oral Daily     atorvastatin  40 mg Oral Daily     [START ON 6/16/2021] basiliximab (SIMULECT) infusion  20 mg Intravenous Once     calcium carbonate-vitamin D  1 tablet Oral BID w/meals     carvedilol  12.5 mg Oral BID     cloNIDine  0.2 mg Oral TID     clotrimazole  10 mg Buccal 4x Daily     hydrALAZINE  25 mg Oral Q8H ESTER     insulin aspart  1-6 Units Subcutaneous Q4H     lidocaine  1-2 patch Transdermal Q24H     lidocaine   Transdermal Q8H     magnesium oxide  400 mg Oral Q24H     methocarbamol  500 mg Oral 4x Daily     micafungin  100 mg Intravenous Q24H     mycophenolate  1,000 mg Oral BID     octreotide  150 mcg Subcutaneous TID     pantoprazole  40 mg Oral QAM AC     polyethylene glycol  17 g Oral BID     [START ON 6/16/2021] predniSONE  40 mg Oral Daily    Followed by     [START ON 6/18/2021] predniSONE  20 mg Oral Daily    Followed by     [START ON 6/25/2021] predniSONE  15 mg  "Oral Daily    Followed by     [START ON 2021] predniSONE  10 mg Oral Daily    Followed by     [START ON 2021] predniSONE  5 mg Oral Daily     senna-docusate  2 tablet Oral BID     sodium chloride (PF)  3 mL Intravenous Q8H     sulfamethoxazole-trimethoprim  1 tablet Oral Daily     tacrolimus  2.5 mg Oral BID IS     [START ON 2021] valGANciclovir  450 mg Oral Daily       dextrose       IV fluid REPLACEMENT ONLY 100 mL/hr at 06/15/21 1127     heparin 400 Units/hr (06/15/21 0100)       Physical Exam   Temp  Av.5  F (36.9  C)  Min: 98.2  F (36.8  C)  Max: 98.9  F (37.2  C)  Arterial Line BP  Min: 216/75  Max: 221/84  Arterial Line MAP (mmHg)  Av mmHg  Min: 124 mmHg  Max: 137 mmHg      Pulse  Av.9  Min: 63  Max: 112 Resp  Av.6  Min: 12  Max: 24  SpO2  Av.7 %  Min: 89 %  Max: 100 %    CVP (mmHg): 3 mmHgBP 136/77 (BP Location: Right arm)   Pulse 58   Temp 96.4  F (35.8  C) (Axillary)   Resp 16   Ht 1.651 m (5' 5\")   Wt 85 kg (187 lb 4.8 oz)   SpO2 96%   BMI 31.17 kg/m     Date 21 0700 - 21 0659   Shift 6244-3066 6149-5443 3868-4001 24 Hour Total   INTAKE   I.V. 2366.94   2366.94   NG/   120   Shift Total(mL/kg) 2486.94(27.33)   2486.94(27.33)   OUTPUT   Urine 1450   1450   Emesis/NG output 325   325   Drains 210   210   Shift Total(mL/kg) (21.81)   (21.81)   Weight (kg) 91 91 91 91      Admit Weight: 87.2 kg (192 lb 3.9 oz)     GENERAL APPEARANCE: alert and no distress  HENT: mouth without ulcers or lesions  RESP: lungs clear to auscultation - no rales, rhonchi or wheezes  CV: regular rhythm, normal rate, no rub, no murmur  EDEMA: no LE edema bilaterally  ABDOMEN: soft, nondistended, nontender, bowel sounds normal  MS: extremities normal - no gross deformities noted, no evidence of inflammation in joints, no muscle tenderness  SKIN: no rash    Data   All labs reviewed by me.  CMP  Recent Labs   Lab 06/15/21  0631 21  0541 21  0717 " 06/12/21  0624 06/10/21  0927 06/10/21  0927    141 141 138   < > 132*   POTASSIUM 4.2 4.3 4.2 4.0   < > 4.3   CHLORIDE 110* 110* 112* 108   < > 96   CO2 25 27 27 26   < > 29   ANIONGAP 5 5 3 4   < > 7   GLC 82 96 104* 119*   < > 119*   BUN 28 29 34* 41*   < > 34*   CR 2.46* 2.82* 3.92* 6.08*   < > 7.32*   GFRESTIMATED 32* 27* 18* 11*   < > 9*   GFRESTBLACK 37* 31* 21* 12*   < > 10*   SHAY 8.6 8.4* 8.3* 7.9*   < > 8.8   MAG 2.1 2.0 2.0 1.9   < >  --    PHOS 2.5 2.7 3.0 3.8   < >  --    PROTTOTAL  --   --   --   --   --  7.2   ALBUMIN  --   --   --   --   --  3.9   BILITOTAL  --   --   --   --   --  0.4   ALKPHOS  --   --   --   --   --  84   AST  --   --   --   --   --  32   ALT  --   --   --   --   --  32    < > = values in this interval not displayed.     CBC  Recent Labs   Lab 06/15/21  0631 06/14/21  0541 06/13/21  0717 06/12/21  0624   HGB 7.9* 7.6* 8.1* 9.0*   WBC 9.2 10.3 11.6* 9.5   RBC 2.62* 2.50* 2.65* 2.96*   HCT 25.0* 24.5* 25.4* 28.0*   MCV 95 98 96 95   MCH 30.2 30.4 30.6 30.4   MCHC 31.6 31.0* 31.9 32.1   RDW 15.9* 16.9* 17.2* 17.0*    163 167 153     INR  Recent Labs   Lab 06/11/21  0540 06/10/21  0927   INR 1.09 0.98   PTT 34 37     ABG  Recent Labs   Lab 06/11/21  0100 06/11/21  0000 06/10/21  2300 06/10/21  2152   PH 7.40 7.42 7.43 7.45   PCO2 35 35 34* 34*   PO2 121* 128* 177* 113*   HCO3 21 23 23 24   O2PER 40% 40% 40.0 40      Urine Studies  Recent Labs   Lab Test 06/10/21  1120 07/29/19  1240 07/18/19  1200   COLOR Light Yellow Straw Yellow   APPEARANCE Clear Clear Clear   URINEGLC Negative 50* 50*   URINEBILI Negative Negative Negative   URINEKETONE Negative Negative Negative   SG 1.007 1.010 1.011   UBLD Negative Negative Negative   URINEPH 7.0 5.0 6.0   PROTEIN 200* >499* >499*   NITRITE Negative Negative Negative   LEUKEST Negative Negative Negative   RBCU 3* 5* 9*   WBCU 1 3 5     Recent Labs   Lab Test 06/10/21  1120 08/31/20  0000 07/18/19  1200   UTPG 5.90* 2.62* 6.20*      PTH  No lab results found.  Iron Studies  No lab results found.    IMAGING:  All imaging studies reviewed by me.

## 2021-06-15 NOTE — PLAN OF CARE
Hypertensive with BPs of 176/87 and 177/82 treated with 20 mg of hydralazine. Infusion pump set to 0.5 U/hr and BGs <160 w/ checks q2h. Clear liquids diet.  mLs of pink, clotty output. Urinating adequately in the hat with pink tinged, clotty urine. Hep straight reated at 400, LRD5 running at 100, NS TKO with Insulin pump feeding into it. Up independently and went for a walk. Ad isak to the bathroom.

## 2021-06-15 NOTE — PROGRESS NOTES
Transplant Surgery  Inpatient Daily Progress Note  06/15/2021    Assessment & Plan: 40 year old male with a past medical history significant for end stage kidney disease on HD every MWF via AVF, anuric prior to transplant. Other past medical history includes T2DM, HTN, BMI>30, bilateral shoulder pain, s/p lap cholecystectomy 12/19 due to symptomatic biliary dyskinesia and atypical nevi. Admitted for SPK-ED with ureteral stent on 6/11/21 with Dr. Kennedy.     S/p SPK: POD #4  Pancreas: Lipase 1354->1258, continued RLQ pain. 6/14 fluid lipase ~2400. Insulin needs low. Stop gtt and monitor q2H. Octreotide increased to TID, ASA, and heparin gtt. 6/14 US: small fluid collection, patent vessels.  Kidney: Cr 2.8->2.5. Good UOP. 6/11 US with moderately elevated velocities at the renal artery anastomosis and mid renal artery.  Acute post-op pain: Continue APAP, oxycodone, and lidoderm.  Immunosuppression management:  PRA 32, intermediate induction  Thymo 175 mg completed intra-op  Simulect POD 1 & 5  Solu-medrol 500/250/100, prednisone taper.  Cellcept 1000 mg BID  Tacrolimus . Goal 8-10. Level 9.5, reduce dose slightly.  Complexity of management: Medium. Contributing factors: anemia and induction  Hematology:   Acute blood loss anemia: Given 2 units pRBC intra-op for hgb of 6.6. HGB 7.9 today.  Vascular ppx: Heparin gtt @400/hour and ASA 81 mg daily.   Cardiorespiratory:   HTN: Better control. Continue Norvasc 10 mg daily, Clonidine 0.2mg TID, and Coreg 12.5mg.  Add scheduled hydralazine. PTA: Hydralazine 100 mg TID, Norvasc 10 daily, Clonidine 0.2 mg BID and Losartan 100 mg daily.   GI/Nutrition:   Diet: CLD  Bowel regimen: Senna and Miralax. Suppository today.  Endocrine: As above.  Fluid/Electrolytes: MIVF: D5LR@100cc/hour. Electrolytes WNL.   : No acute issues.   Infectious disease: Afebrile. WBC 9.3  Leukocytosis: Likely secondary to steroids, resolved.  Prophylaxis: Bactrim indefinitely, Valcyte x 3 months. CMV R+,  EBV R+, donor info unknown at this time. Micafungin x 7 days.  Disposition: 7A    Medical Decision Making: Medium  Subsequent visit 16962 (moderate level decision making)    ADI/Fellow/Resident Provider: Arleth Flaherty NP [id number]    Faculty: Arik Syed MD   _________________________________________________________________  Transplant History: Admitted 6/10/2021 for SPK.  6/11/2021 (Kidney / Pancreas), Postoperative day: 4     Interval History: History is obtained from the patient  Overnight events: Cont RLQ pain     ROS:   A 10-point review of systems was negative except as noted above.    Meds:    acetaminophen  975 mg Oral Q8H     amLODIPine  10 mg Oral or NG Tube Daily     aspirin  81 mg Oral Daily     atorvastatin  40 mg Oral Daily     [START ON 6/16/2021] basiliximab (SIMULECT) infusion  20 mg Intravenous Once     calcium carbonate-vitamin D  1 tablet Oral BID w/meals     carvedilol  12.5 mg Oral BID     cloNIDine  0.2 mg Oral TID     clotrimazole  10 mg Buccal 4x Daily     hydrALAZINE  25 mg Oral Q8H ESTER     insulin aspart  1-6 Units Subcutaneous Q4H     lidocaine  1-2 patch Transdermal Q24H     lidocaine   Transdermal Q8H     magnesium oxide  400 mg Oral Q24H     methocarbamol  500 mg Oral 4x Daily     micafungin  100 mg Intravenous Q24H     mycophenolate  1,000 mg Oral BID     octreotide  150 mcg Subcutaneous TID     pantoprazole  40 mg Oral QAM AC     polyethylene glycol  17 g Oral BID     [START ON 6/16/2021] predniSONE  40 mg Oral Daily    Followed by     [START ON 6/18/2021] predniSONE  20 mg Oral Daily    Followed by     [START ON 6/25/2021] predniSONE  15 mg Oral Daily    Followed by     [START ON 7/2/2021] predniSONE  10 mg Oral Daily    Followed by     [START ON 7/9/2021] predniSONE  5 mg Oral Daily     senna-docusate  2 tablet Oral BID     sodium chloride (PF)  3 mL Intravenous Q8H     sulfamethoxazole-trimethoprim  1 tablet Oral Daily     tacrolimus  2.5 mg Oral BID IS     [START ON 6/16/2021]  "valGANciclovir  450 mg Oral Daily       Physical Exam:     Admit Weight: 87.2 kg (192 lb 3.9 oz)    Current vitals:   /77 (BP Location: Right arm)   Pulse 58   Temp 96.4  F (35.8  C) (Axillary)   Resp 16   Ht 1.651 m (5' 5\")   Wt 85 kg (187 lb 4.8 oz)   SpO2 96%   BMI 31.17 kg/m      Vital sign ranges:    Temp:  [96.4  F (35.8  C)-98.2  F (36.8  C)] 96.4  F (35.8  C)  Pulse:  [58-65] 58  Resp:  [16] 16  BP: (136-177)/(77-87) 136/77  SpO2:  [93 %-99 %] 96 %  Patient Vitals for the past 24 hrs:   BP Temp Temp src Pulse Resp SpO2 Weight   06/15/21 1145 136/77 96.4  F (35.8  C) Axillary 58 16 96 % --   06/15/21 0914 -- -- -- -- -- -- 85 kg (187 lb 4.8 oz)   06/15/21 0733 138/84 97.7  F (36.5  C) Oral 58 16 99 % --   06/15/21 0611 (!) 166/83 98  F (36.7  C) Oral 63 16 98 % --   06/15/21 0415 (!) 170/80 98.2  F (36.8  C) Oral 65 16 98 % 90.9 kg (200 lb 6.4 oz)   06/14/21 2252 (!) 174/80 97.7  F (36.5  C) Oral 65 16 99 % --   06/14/21 2100 (!) 177/82 -- -- -- -- -- --   06/14/21 1934 (!) 176/87 97.8  F (36.6  C) Oral 64 16 96 % --   06/14/21 1529 (!) 159/77 98  F (36.7  C) Oral 63 16 93 % --     General Appearance: in no apparent distress.   Skin: normal  Heart: perfused  Lungs: NLB on RA  Abdomen: The abdomen is obese, and tender RLQ. The wound is stapled, c/d/i, drain with serosanguinous output  : no edwards  Extremities: edema: none  Neurologic: awake, alert, oriented x4. Tremor absent.    Data:   CMP  Recent Labs   Lab 06/15/21  0631 06/14/21  1626 06/14/21  1143 06/14/21  0541 06/11/21  0100 06/11/21  0100 06/11/21  0000 06/10/21  0927 06/10/21  0927     --   --  141   < > 132* 133   < > 132*   POTASSIUM 4.2  --   --  4.3   < > 4.1 4.0   < > 4.3   CHLORIDE 110*  --   --  110*   < >  --   --   --  96   CO2 25  --   --  27   < >  --   --   --  29   GLC 82  --   --  96   < > 134* 142*   < > 119*   BUN 28  --   --  29   < >  --   --   --  34*   CR 2.46*  --   --  2.82*   < >  --   --   --  7.32* "   GFRESTIMATED 32*  --   --  27*   < >  --   --   --  9*   GFRESTBLACK 37*  --   --  31*   < >  --   --   --  10*   SHAY 8.6  --   --  8.4*   < >  --   --   --  8.8   ICAW  --   --   --   --   --  4.6 4.3*   < >  --    MAG 2.1  --   --  2.0   < >  --   --   --   --    PHOS 2.5  --   --  2.7   < >  --   --   --   --    AMYLASE 175*  --   --  133*   < >  --   --   --  96   LIPASE 1,258* 1,354*  --  1,024*   < >  --   --   --  580*   ALBUMIN  --   --   --   --   --   --   --   --  3.9   BILITOTAL  --   --   --   --   --   --   --   --  0.4   ALKPHOS  --   --   --   --   --   --   --   --  84   AST  --   --   --   --   --   --   --   --  32   ALT  --   --   --   --   --   --   --   --  32   FLIPA  --   --  2,373  --   --   --   --   --   --     < > = values in this interval not displayed.     CBC  Recent Labs   Lab 06/15/21  0631 06/14/21  0541 06/10/21  0927 06/10/21  0927   HGB 7.9* 7.6*   < > 8.1*   WBC 9.2 10.3   < > 6.7    163   < > 231   A1C  --   --   --  6.0*    < > = values in this interval not displayed.     COAGS  Recent Labs   Lab 06/11/21  0540 06/10/21  0927   INR 1.09 0.98   PTT 34 37      Urinalysis  Recent Labs   Lab Test 06/10/21  1120 08/31/20  0000 07/29/19  1240   COLOR Light Yellow  --  Straw   APPEARANCE Clear  --  Clear   URINEGLC Negative  --  50*   URINEBILI Negative  --  Negative   URINEKETONE Negative  --  Negative   SG 1.007  --  1.010   UBLD Negative  --  Negative   URINEPH 7.0  --  5.0   PROTEIN 200*  --  >499*   NITRITE Negative  --  Negative   LEUKEST Negative  --  Negative   RBCU 3*  --  5*   WBCU 1  --  3   UTPG 5.90* 2.62*  --      Virology:  Hepatitis C Antibody   Date Value Ref Range Status   06/10/2021 Nonreactive NR^Nonreactive Final     Comment:     Assay performance characteristics have not been established for newborns,   infants, and children

## 2021-06-15 NOTE — PLAN OF CARE
"/51 (BP Location: Right arm)   Pulse 57   Temp 97.6  F (36.4  C) (Oral)   Resp 16   Ht 1.651 m (5' 5\")   Wt 85 kg (187 lb 4.8 oz)   SpO2 96%   BMI 31.17 kg/m       Patient alert and oriented x 4. VS stable. Patient on room air. Patient complains of pain. PRN oxy given (See MAR). Patient denies nausea. BG - insulin gtt discontinued during shift. Q2H blood sugar checks with Q4H sliding scale insulin. Urine Output - voiding adequately. Bowel Function - BM x 2 during shift. Nutrition - clear liquid. Midline incision - stapled, open to air. TIA Drain - serosanguineous output. Left AVF. PIV - saline locked. CVC - internal jugular: D5LR @ 100 ml/hr, heparin gtt @ 400 units/hr straight rate, and TKO. Activity - UAL in room. Patient up to chair multiple times during shift. Education - Lab book up to date. Med card up to date. Plan of Care - Will continue to monitor and notify care team of any changes. Patient to meet with specialty pharmacy tomorrow for medication education.      "

## 2021-06-15 NOTE — PLAN OF CARE
[id number] Nursing Shift Report:  Tye slept on/off between cares. Medicated x2 for pain, once with 5mg of oxy and the second time with 10mg of oxy due to continued c/o abdominal discomfort. SBP up to 178 and medicated with hydralazine 20mg. He did ultimately come down to . Had been up to the bathroom several times trying to stool, not sure if he had success but will have day nurse follow up. Abdominal incision CD with staples intact and wearing an abdominal binder. TIA to R abd. Draining large amount of serousanguenous. Voiding large amounts of pink tinge urine, sometimes using the urinal and other times voiding in the hat. Did take in apple juice at the end of shift. Remains on insulin gtt at 0.5U throughout the shift ranging 140's to 90's at shift's end. Will continue to monitor and report any significant changes.

## 2021-06-15 NOTE — PROGRESS NOTES
Care Management Follow Up    Length of Stay (days): 4    Expected Discharge Date: 06/17/21     Concerns to be Addressed:   Home care  Patient plan of care discussed at interdisciplinary rounds: Yes    Anticipated Discharge Disposition:  Home with home care     Anticipated Discharge Services:  Home Care  Anticipated Discharge DME:  None    Patient/family educated on Medicare website which has current facility and service quality ratings:  Yes  Education Provided on the Discharge Plan:  Yes  Patient/Family in Agreement with the Plan:  Yes    Referrals Placed by CM/SW:  Home Care  Private pay costs discussed: Not applicable    Additional Information:  Met with pt.  Introduced RNCC role.  Discussed/reviewed anticipated plan for discharge.  Reviewed/discussed home care recommendation.   Reviewed/discussed post hospital ATC f/u.  Pt notes no concerns regarding home care RN f/u. Agreed to f/u with pt when closer to discharge.    Home Care:  [facility] - unable to accept.  [facility] [phone]  - spoke with Mónica who agreed to review referral request.  Initial clinical/demographic information faxed to [phone].       Cristina Boland RN BSN, PHN, ACM-RN  7A RN Care Coordinator  Phone: [phone]  Pager [phone]    6/15/2021 4:30 PM

## 2021-06-16 ENCOUNTER — TELEPHONE (OUTPATIENT)
Dept: TRANSPLANT | Facility: CLINIC | Age: 41
End: 2021-06-16

## 2021-06-16 ENCOUNTER — APPOINTMENT (OUTPATIENT)
Dept: PHYSICAL THERAPY | Facility: CLINIC | Age: 41
DRG: 008 | End: 2021-06-16
Attending: TRANSPLANT SURGERY
Payer: MEDICARE

## 2021-06-16 PROBLEM — Z76.82 PANCREAS TRANSPLANT CANDIDATE: Status: RESOLVED | Noted: 2021-06-10 | Resolved: 2021-06-16

## 2021-06-16 PROBLEM — T86.898 PANCREATITIS OF PANCREAS TRANSPLANT: Status: ACTIVE | Noted: 2021-06-16

## 2021-06-16 PROBLEM — K85.90 PANCREATITIS OF PANCREAS TRANSPLANT: Status: ACTIVE | Noted: 2021-06-16

## 2021-06-16 PROBLEM — Z94.0 KIDNEY REPLACED BY TRANSPLANT: Status: ACTIVE | Noted: 2021-06-16

## 2021-06-16 LAB
AMYLASE SERPL-CCNC: 179 U/L (ref 30–110)
ANION GAP SERPL CALCULATED.3IONS-SCNC: 3 MMOL/L (ref 3–14)
BUN SERPL-MCNC: 26 MG/DL (ref 7–30)
CALCIUM SERPL-MCNC: 8.1 MG/DL (ref 8.5–10.1)
CHLORIDE SERPL-SCNC: 111 MMOL/L (ref 94–109)
CO2 SERPL-SCNC: 26 MMOL/L (ref 20–32)
COPATH REPORT: NORMAL
CREAT SERPL-MCNC: 2.34 MG/DL (ref 0.66–1.25)
ERYTHROCYTE [DISTWIDTH] IN BLOOD BY AUTOMATED COUNT: 15.6 % (ref 10–15)
GFR SERPL CREATININE-BSD FRML MDRD: 34 ML/MIN/{1.73_M2}
GLUCOSE BLDC GLUCOMTR-MCNC: 105 MG/DL (ref 70–99)
GLUCOSE BLDC GLUCOMTR-MCNC: 106 MG/DL (ref 70–99)
GLUCOSE BLDC GLUCOMTR-MCNC: 107 MG/DL (ref 70–99)
GLUCOSE BLDC GLUCOMTR-MCNC: 109 MG/DL (ref 70–99)
GLUCOSE BLDC GLUCOMTR-MCNC: 109 MG/DL (ref 70–99)
GLUCOSE BLDC GLUCOMTR-MCNC: 118 MG/DL (ref 70–99)
GLUCOSE BLDC GLUCOMTR-MCNC: 149 MG/DL (ref 70–99)
GLUCOSE BLDC GLUCOMTR-MCNC: 158 MG/DL (ref 70–99)
GLUCOSE BLDC GLUCOMTR-MCNC: 177 MG/DL (ref 70–99)
GLUCOSE BLDC GLUCOMTR-MCNC: 179 MG/DL (ref 70–99)
GLUCOSE BLDC GLUCOMTR-MCNC: 94 MG/DL (ref 70–99)
GLUCOSE SERPL-MCNC: 97 MG/DL (ref 70–99)
HCT VFR BLD AUTO: 23.5 % (ref 40–53)
HGB BLD-MCNC: 7.5 G/DL (ref 13.3–17.7)
LIPASE SERPL-CCNC: 1547 U/L (ref 73–393)
MAGNESIUM SERPL-MCNC: 2 MG/DL (ref 1.6–2.3)
MCH RBC QN AUTO: 30.2 PG (ref 26.5–33)
MCHC RBC AUTO-ENTMCNC: 31.9 G/DL (ref 31.5–36.5)
MCV RBC AUTO: 95 FL (ref 78–100)
PHOSPHATE SERPL-MCNC: 2.3 MG/DL (ref 2.5–4.5)
PLATELET # BLD AUTO: 200 10E9/L (ref 150–450)
POTASSIUM SERPL-SCNC: 4.1 MMOL/L (ref 3.4–5.3)
RBC # BLD AUTO: 2.48 10E12/L (ref 4.4–5.9)
SODIUM SERPL-SCNC: 140 MMOL/L (ref 133–144)
TACROLIMUS BLD-MCNC: 11.4 UG/L (ref 5–15)
TME LAST DOSE: NORMAL H
UFH PPP CHRO-ACNC: <0.1 IU/ML
WBC # BLD AUTO: 8.4 10E9/L (ref 4–11)

## 2021-06-16 PROCEDURE — 258N000003 HC RX IP 258 OP 636: Performed by: PHYSICIAN ASSISTANT

## 2021-06-16 PROCEDURE — 250N000011 HC RX IP 250 OP 636: Performed by: PHYSICIAN ASSISTANT

## 2021-06-16 PROCEDURE — 83735 ASSAY OF MAGNESIUM: CPT | Performed by: PHYSICIAN ASSISTANT

## 2021-06-16 PROCEDURE — 250N000012 HC RX MED GY IP 250 OP 636 PS 637: Performed by: PHYSICIAN ASSISTANT

## 2021-06-16 PROCEDURE — 85520 HEPARIN ASSAY: CPT | Performed by: PHYSICIAN ASSISTANT

## 2021-06-16 PROCEDURE — 99233 SBSQ HOSP IP/OBS HIGH 50: CPT | Performed by: INTERNAL MEDICINE

## 2021-06-16 PROCEDURE — 250N000012 HC RX MED GY IP 250 OP 636 PS 637: Performed by: NURSE PRACTITIONER

## 2021-06-16 PROCEDURE — 250N000013 HC RX MED GY IP 250 OP 250 PS 637: Performed by: PHYSICIAN ASSISTANT

## 2021-06-16 PROCEDURE — 250N000013 HC RX MED GY IP 250 OP 250 PS 637: Performed by: NURSE PRACTITIONER

## 2021-06-16 PROCEDURE — 250N000011 HC RX IP 250 OP 636: Performed by: NURSE PRACTITIONER

## 2021-06-16 PROCEDURE — 83690 ASSAY OF LIPASE: CPT | Performed by: PHYSICIAN ASSISTANT

## 2021-06-16 PROCEDURE — 80197 ASSAY OF TACROLIMUS: CPT | Performed by: NURSE PRACTITIONER

## 2021-06-16 PROCEDURE — 80048 BASIC METABOLIC PNL TOTAL CA: CPT | Performed by: PHYSICIAN ASSISTANT

## 2021-06-16 PROCEDURE — 250N000013 HC RX MED GY IP 250 OP 250 PS 637: Performed by: SURGERY

## 2021-06-16 PROCEDURE — 36592 COLLECT BLOOD FROM PICC: CPT | Performed by: PHYSICIAN ASSISTANT

## 2021-06-16 PROCEDURE — 85027 COMPLETE CBC AUTOMATED: CPT | Performed by: NURSE PRACTITIONER

## 2021-06-16 PROCEDURE — 999N001017 HC STATISTIC GLUCOSE BY METER IP

## 2021-06-16 PROCEDURE — 82150 ASSAY OF AMYLASE: CPT | Performed by: PHYSICIAN ASSISTANT

## 2021-06-16 PROCEDURE — 97116 GAIT TRAINING THERAPY: CPT | Mod: GP | Performed by: REHABILITATION PRACTITIONER

## 2021-06-16 PROCEDURE — 36592 COLLECT BLOOD FROM PICC: CPT | Performed by: NURSE PRACTITIONER

## 2021-06-16 PROCEDURE — 120N000011 HC R&B TRANSPLANT UMMC

## 2021-06-16 PROCEDURE — 84100 ASSAY OF PHOSPHORUS: CPT | Performed by: PHYSICIAN ASSISTANT

## 2021-06-16 PROCEDURE — 97530 THERAPEUTIC ACTIVITIES: CPT | Mod: GP | Performed by: REHABILITATION PRACTITIONER

## 2021-06-16 RX ORDER — CLONIDINE HYDROCHLORIDE 0.1 MG/1
0.1 TABLET ORAL 3 TIMES DAILY
Status: DISCONTINUED | OUTPATIENT
Start: 2021-06-16 | End: 2021-06-17

## 2021-06-16 RX ORDER — OXYCODONE HYDROCHLORIDE 5 MG/1
5 TABLET ORAL EVERY 4 HOURS PRN
Status: DISCONTINUED | OUTPATIENT
Start: 2021-06-16 | End: 2021-06-21 | Stop reason: HOSPADM

## 2021-06-16 RX ORDER — CARVEDILOL 6.25 MG/1
6.25 TABLET ORAL 2 TIMES DAILY
Status: DISCONTINUED | OUTPATIENT
Start: 2021-06-16 | End: 2021-06-21 | Stop reason: HOSPADM

## 2021-06-16 RX ORDER — HYDRALAZINE HYDROCHLORIDE 25 MG/1
50 TABLET, FILM COATED ORAL EVERY 8 HOURS SCHEDULED
Status: DISCONTINUED | OUTPATIENT
Start: 2021-06-16 | End: 2021-06-16

## 2021-06-16 RX ORDER — TACROLIMUS 1 MG/1
2 CAPSULE ORAL
Status: DISCONTINUED | OUTPATIENT
Start: 2021-06-16 | End: 2021-06-17

## 2021-06-16 RX ADMIN — HEPARIN SODIUM 400 UNITS/HR: 10000 INJECTION, SOLUTION INTRAVENOUS at 03:19

## 2021-06-16 RX ADMIN — OXYCODONE HYDROCHLORIDE 5 MG: 5 TABLET ORAL at 08:24

## 2021-06-16 RX ADMIN — HYDRALAZINE HYDROCHLORIDE 20 MG: 20 INJECTION INTRAMUSCULAR; INTRAVENOUS at 00:47

## 2021-06-16 RX ADMIN — METHOCARBAMOL 500 MG: 500 TABLET, FILM COATED ORAL at 15:50

## 2021-06-16 RX ADMIN — SODIUM CHLORIDE 20 MG: 9 INJECTION, SOLUTION INTRAVENOUS at 08:24

## 2021-06-16 RX ADMIN — OCTREOTIDE ACETATE 150 MCG: 100 INJECTION, SOLUTION INTRAVENOUS; SUBCUTANEOUS at 14:21

## 2021-06-16 RX ADMIN — PANTOPRAZOLE 40 MG: 20 TABLET, DELAYED RELEASE ORAL at 08:21

## 2021-06-16 RX ADMIN — OXYCODONE HYDROCHLORIDE 5 MG: 5 TABLET ORAL at 22:02

## 2021-06-16 RX ADMIN — METHOCARBAMOL 500 MG: 500 TABLET, FILM COATED ORAL at 20:46

## 2021-06-16 RX ADMIN — VALGANCICLOVIR HYDROCHLORIDE 450 MG: 450 TABLET ORAL at 08:20

## 2021-06-16 RX ADMIN — TACROLIMUS 2 MG: 1 CAPSULE ORAL at 17:48

## 2021-06-16 RX ADMIN — AMLODIPINE BESYLATE 10 MG: 10 TABLET ORAL at 08:21

## 2021-06-16 RX ADMIN — SULFAMETHOXAZOLE AND TRIMETHOPRIM 1 TABLET: 400; 80 TABLET ORAL at 08:21

## 2021-06-16 RX ADMIN — HYDRALAZINE HYDROCHLORIDE 25 MG: 25 TABLET, FILM COATED ORAL at 06:27

## 2021-06-16 RX ADMIN — OXYCODONE HYDROCHLORIDE 5 MG: 5 TABLET ORAL at 17:49

## 2021-06-16 RX ADMIN — SODIUM CHLORIDE, SODIUM LACTATE, POTASSIUM CHLORIDE, CALCIUM CHLORIDE AND DEXTROSE MONOHYDRATE: 5; 600; 310; 30; 20 INJECTION, SOLUTION INTRAVENOUS at 13:17

## 2021-06-16 RX ADMIN — METHOCARBAMOL 500 MG: 500 TABLET, FILM COATED ORAL at 11:56

## 2021-06-16 RX ADMIN — INSULIN ASPART 1 UNITS: 100 INJECTION, SOLUTION INTRAVENOUS; SUBCUTANEOUS at 14:21

## 2021-06-16 RX ADMIN — ACETAMINOPHEN 975 MG: 325 TABLET, FILM COATED ORAL at 17:48

## 2021-06-16 RX ADMIN — DOCUSATE SODIUM 50 MG AND SENNOSIDES 8.6 MG 2 TABLET: 8.6; 5 TABLET, FILM COATED ORAL at 08:19

## 2021-06-16 RX ADMIN — CLONIDINE HYDROCHLORIDE 0.2 MG: 0.2 TABLET ORAL at 08:19

## 2021-06-16 RX ADMIN — ACETAMINOPHEN 975 MG: 325 TABLET, FILM COATED ORAL at 09:33

## 2021-06-16 RX ADMIN — TACROLIMUS 2.5 MG: 1 CAPSULE ORAL at 08:20

## 2021-06-16 RX ADMIN — ATORVASTATIN CALCIUM 40 MG: 40 TABLET, FILM COATED ORAL at 08:21

## 2021-06-16 RX ADMIN — SODIUM PHOSPHATE, DIBASIC, ANHYDROUS, POTASSIUM PHOSPHATE, MONOBASIC, AND SODIUM PHOSPHATE, MONOBASIC, MONOHYDRATE 250 MG: 852; 155; 130 TABLET, COATED ORAL at 20:46

## 2021-06-16 RX ADMIN — CLOTRIMAZOLE 10 MG: 10 LOZENGE ORAL at 08:21

## 2021-06-16 RX ADMIN — METHOCARBAMOL 500 MG: 500 TABLET, FILM COATED ORAL at 08:20

## 2021-06-16 RX ADMIN — MICAFUNGIN SODIUM 100 MG: 50 INJECTION, POWDER, LYOPHILIZED, FOR SOLUTION INTRAVENOUS at 17:55

## 2021-06-16 RX ADMIN — OCTREOTIDE ACETATE 150 MCG: 100 INJECTION, SOLUTION INTRAVENOUS; SUBCUTANEOUS at 20:45

## 2021-06-16 RX ADMIN — CLOTRIMAZOLE 10 MG: 10 LOZENGE ORAL at 11:56

## 2021-06-16 RX ADMIN — OCTREOTIDE ACETATE 150 MCG: 100 INJECTION, SOLUTION INTRAVENOUS; SUBCUTANEOUS at 08:19

## 2021-06-16 RX ADMIN — PREDNISONE 40 MG: 20 TABLET ORAL at 08:19

## 2021-06-16 RX ADMIN — Medication 1 TABLET: at 17:48

## 2021-06-16 RX ADMIN — CLOTRIMAZOLE 10 MG: 10 LOZENGE ORAL at 20:46

## 2021-06-16 RX ADMIN — CARVEDILOL 6.25 MG: 6.25 TABLET, FILM COATED ORAL at 20:46

## 2021-06-16 RX ADMIN — Medication 5 MG: at 22:19

## 2021-06-16 RX ADMIN — LIDOCAINE 1 PATCH: 560 PATCH PERCUTANEOUS; TOPICAL; TRANSDERMAL at 08:22

## 2021-06-16 RX ADMIN — INSULIN ASPART 1 UNITS: 100 INJECTION, SOLUTION INTRAVENOUS; SUBCUTANEOUS at 17:54

## 2021-06-16 RX ADMIN — Medication 1 TABLET: at 08:20

## 2021-06-16 RX ADMIN — MAGNESIUM OXIDE TAB 400 MG (241.3 MG ELEMENTAL MG) 400 MG: 400 (241.3 MG) TAB at 11:56

## 2021-06-16 RX ADMIN — CARVEDILOL 12.5 MG: 12.5 TABLET, FILM COATED ORAL at 08:21

## 2021-06-16 RX ADMIN — MYCOPHENOLATE MOFETIL 1000 MG: 250 CAPSULE ORAL at 08:20

## 2021-06-16 RX ADMIN — ACETAMINOPHEN 975 MG: 325 TABLET, FILM COATED ORAL at 00:47

## 2021-06-16 RX ADMIN — MYCOPHENOLATE MOFETIL 1000 MG: 250 CAPSULE ORAL at 17:49

## 2021-06-16 RX ADMIN — ASPIRIN 81 MG CHEWABLE TABLET 81 MG: 81 TABLET CHEWABLE at 08:20

## 2021-06-16 RX ADMIN — CLOTRIMAZOLE 10 MG: 10 LOZENGE ORAL at 15:50

## 2021-06-16 ASSESSMENT — MIFFLIN-ST. JEOR: SCORE: 1681.49

## 2021-06-16 ASSESSMENT — ACTIVITIES OF DAILY LIVING (ADL)
ADLS_ACUITY_SCORE: 14

## 2021-06-16 NOTE — PROGRESS NOTES
Transplant Surgery  Inpatient Daily Progress Note  06/16/2021    Assessment & Plan: 40 year old male with a past medical history significant for end stage kidney disease on HD every MWF via AVF, anuric prior to transplant. Other past medical history includes T2DM, HTN, BMI>30, bilateral shoulder pain, s/p lap cholecystectomy 12/19 due to symptomatic biliary dyskinesia and atypical nevi. Admitted for SPK-ED with ureteral stent on 6/11/21 with Dr. Kennedy.     S/p SPK: POD #5  Pancreas: Post-op graft pancreatitis. Lipase 1258->1547, improved RLQ pain. 6/14 fluid lipase ~2400. Insulin needs low. Octreotide TID, ASA, and heparin gtt. 6/14 US: small fluid collection, patent vessels.  Kidney: Cr 2.5->2.3. Good UOP. 6/11 US with moderately elevated velocities at the renal artery anastomosis and mid renal artery.  Acute post-op pain: Continue APAP, oxycodone, and lidoderm.  Immunosuppression management:  PRA 32, intermediate induction  Thymo 175 mg completed intra-op  Simulect POD 1 & 5  Solu-medrol 500/250/100, prednisone taper.  Cellcept 1000 mg BID  Tacrolimus: Goal 8-10. Level 11.4, reduce dose slightly.  Complexity of management: Medium. Contributing factors: anemia and induction  Hematology:   Acute blood loss anemia: Given 2 units pRBC intra-op for hgb of 6.6. HGB 7.5 today.  Vascular ppx: Heparin gtt @400/hour and ASA 81 mg daily.   Cardiorespiratory:   HTN: High this morning and then low after AM meds. Continue Norvasc 10 mg daily, Clonidine 0.2->0.1mg BID, and Coreg 12.5mg->6.25mg.  Hold hydralazine. PTA: Hydralazine 100 mg TID, Norvasc 10 daily, Clonidine 0.2 mg BID and Losartan 100 mg daily.   GI/Nutrition:   Diet: Low fat diet.  Bowel regimen: Senna and Miralax.   Endocrine: As above.  Fluid/Electrolytes: MIVF: TKO  Hypophosphatemia: Add Phospha.  : No acute issues.   Infectious disease: Afebrile. No leukocytosis.  Prophylaxis: Bactrim indefinitely, Valcyte x 3 months. CMV R+, EBV R+, donor info unknown at  this time. Micafungin x 7 days.  Disposition: 7A    Medical Decision Making: Medium  Subsequent visit 40823 (moderate level decision making)    ADI/Fellow/Resident Provider: Arleth Flaherty NP [id number]    Faculty: Arik Syed MD   _________________________________________________________________  Transplant History: Admitted 6/10/2021 for SPK.  6/11/2021 (Kidney / Pancreas), Postoperative day: 5     Interval History: History is obtained from the patient  Overnight events: Decreased RLQ pain, tolerating oral intake    ROS:   A 10-point review of systems was negative except as noted above.    Meds:    acetaminophen  975 mg Oral Q8H     amLODIPine  10 mg Oral or NG Tube Daily     aspirin  81 mg Oral Daily     atorvastatin  40 mg Oral Daily     calcium carbonate-vitamin D  1 tablet Oral BID w/meals     carvedilol  6.25 mg Oral BID     cloNIDine  0.1 mg Oral TID     clotrimazole  10 mg Buccal 4x Daily     insulin aspart  1-6 Units Subcutaneous Q4H     lidocaine  1-2 patch Transdermal Q24H     lidocaine   Transdermal Q8H     magnesium oxide  400 mg Oral Q24H     methocarbamol  500 mg Oral 4x Daily     micafungin  100 mg Intravenous Q24H     mycophenolate  1,000 mg Oral BID     octreotide  150 mcg Subcutaneous TID     pantoprazole  40 mg Oral QAM AC     polyethylene glycol  17 g Oral BID     predniSONE  40 mg Oral Daily    Followed by     [START ON 6/18/2021] predniSONE  20 mg Oral Daily    Followed by     [START ON 6/25/2021] predniSONE  15 mg Oral Daily    Followed by     [START ON 7/2/2021] predniSONE  10 mg Oral Daily    Followed by     [START ON 7/9/2021] predniSONE  5 mg Oral Daily     senna-docusate  2 tablet Oral BID     sodium chloride (PF)  3 mL Intravenous Q8H     sulfamethoxazole-trimethoprim  1 tablet Oral Daily     tacrolimus  2 mg Oral QPM     [START ON 6/17/2021] tacrolimus  2.5 mg Oral QAM     valGANciclovir  450 mg Oral Daily       Physical Exam:     Admit Weight: 87.2 kg (192 lb 3.9 oz)    Current vitals:  "  BP 98/55 (BP Location: Left arm)   Pulse 56   Temp 97.9  F (36.6  C) (Oral)   Resp 14   Ht 1.651 m (5' 5\")   Wt 84 kg (185 lb 1.6 oz)   SpO2 97%   BMI 30.80 kg/m      Vital sign ranges:    Temp:  [97.3  F (36.3  C)-98.2  F (36.8  C)] 97.9  F (36.6  C)  Pulse:  [56-60] 56  Resp:  [14-18] 14  BP: ()/(51-87) 98/55  SpO2:  [96 %-98 %] 97 %  Patient Vitals for the past 24 hrs:   BP Temp Temp src Pulse Resp SpO2 Weight   06/16/21 1151 98/55 97.9  F (36.6  C) Oral 56 14 97 % --   06/16/21 1014 97/60 -- -- 56 14 98 % --   06/16/21 0851 -- -- -- -- -- -- 84 kg (185 lb 1.6 oz)   06/16/21 0726 (!) 166/86 97.8  F (36.6  C) Oral 60 16 98 % --   06/16/21 0447 (!) 151/79 -- -- -- -- -- --   06/16/21 0325 (!) 175/85 97.5  F (36.4  C) Oral 59 17 96 % --   06/16/21 0100 138/71 -- -- -- -- -- --   06/16/21 0000 (!) 177/77 98.2  F (36.8  C) Oral 57 18 98 % --   06/15/21 1937 132/87 97.3  F (36.3  C) Oral 58 16 98 % --   06/15/21 1538 117/51 97.6  F (36.4  C) Oral 57 16 96 % --     General Appearance: in no apparent distress.   Skin: normal  Heart: perfused  Lungs: NLB on RA  Abdomen: The abdomen is obese, and tender RLQ. The wound is stapled, c/d/i, drain with serosanguinous output  : no edwards  Extremities: edema: none  Neurologic: awake, alert, oriented x4. Tremor absent.    Data:   CMP  Recent Labs   Lab 06/16/21  0616 06/15/21  0631 06/14/21  1143 06/14/21  1143 06/11/21  0100 06/11/21  0100 06/11/21  0000 06/10/21  0927 06/10/21  0927    140  --   --    < > 132* 133   < > 132*   POTASSIUM 4.1 4.2  --   --    < > 4.1 4.0   < > 4.3   CHLORIDE 111* 110*  --   --    < >  --   --   --  96   CO2 26 25  --   --    < >  --   --   --  29   GLC 97 82  --   --    < > 134* 142*   < > 119*   BUN 26 28  --   --    < >  --   --   --  34*   CR 2.34* 2.46*  --   --    < >  --   --   --  7.32*   GFRESTIMATED 34* 32*  --   --    < >  --   --   --  9*   GFRESTBLACK 39* 37*  --   --    < >  --   --   --  10*   SHAY 8.1* 8.6  --   " --    < >  --   --   --  8.8   ICAW  --   --   --   --   --  4.6 4.3*   < >  --    MAG 2.0 2.1  --   --    < >  --   --   --   --    PHOS 2.3* 2.5  --   --    < >  --   --   --   --    AMYLASE 179* 175*  --   --    < >  --   --   --  96   LIPASE 1,547* 1,258*   < >  --    < >  --   --   --  580*   ALBUMIN  --   --   --   --   --   --   --   --  3.9   BILITOTAL  --   --   --   --   --   --   --   --  0.4   ALKPHOS  --   --   --   --   --   --   --   --  84   AST  --   --   --   --   --   --   --   --  32   ALT  --   --   --   --   --   --   --   --  32   FLIPA  --   --   --  2,373  --   --   --   --   --     < > = values in this interval not displayed.     CBC  Recent Labs   Lab 06/16/21  0616 06/15/21  0631 06/10/21  0927 06/10/21  0927   HGB 7.5* 7.9*   < > 8.1*   WBC 8.4 9.2   < > 6.7    198   < > 231   A1C  --   --   --  6.0*    < > = values in this interval not displayed.     COAGS  Recent Labs   Lab 06/11/21  0540 06/10/21  0927   INR 1.09 0.98   PTT 34 37      Urinalysis  Recent Labs   Lab Test 06/10/21  1120 08/31/20  0000 07/29/19  1240   COLOR Light Yellow  --  Straw   APPEARANCE Clear  --  Clear   URINEGLC Negative  --  50*   URINEBILI Negative  --  Negative   URINEKETONE Negative  --  Negative   SG 1.007  --  1.010   UBLD Negative  --  Negative   URINEPH 7.0  --  5.0   PROTEIN 200*  --  >499*   NITRITE Negative  --  Negative   LEUKEST Negative  --  Negative   RBCU 3*  --  5*   WBCU 1  --  3   UTPG 5.90* 2.62*  --      Virology:  Hepatitis C Antibody   Date Value Ref Range Status   06/10/2021 Nonreactive NR^Nonreactive Final     Comment:     Assay performance characteristics have not been established for newborns,   infants, and children

## 2021-06-16 NOTE — TELEPHONE ENCOUNTER
"A pharmacist spent 50 minutes on the phone providing medication teaching with Erik Cramer for discharge with a focus on new medications/dose changes.  The discharge medication list was reviewed with the patient/family and the following points were discussed, as applicable: Name, description, purpose, dose/strength, duration of medications, common side effects, food/medications to avoid, action to be taken if dose is missed, when to call MD and how to obtain refills.  The patient will be responsible for managing medications. Additionally, the following transplant related education was covered: Purpose of medication card, Medication videos, Timing of medications and day of lab draw considerations , Emesis or missed dose, Prescription Insurance  and Discharge process for receiving meds    Tye stated that he was felling \"better.\" He appeared to comprehend very well. 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  Patient will  a BP monitor at discharge pharmacy along with medications. He has all other needed supplies.  Patient will use local pharmacy or other mail order for outpatient medications. [facility] in not contracted with his insurance. [facility] will fill his immuno's upon discharge. An outside pharmacy will need to fill his other discharge medications. I discussed at length with Tye about this, that he needs to make sure to talk to Arleth Flaherty or his nurse as to where to send the other discharge orders. His local pharmacy in [place] closes at 5PM, but he said someone else in the family could pick them up. Otherwise he thought he could  med's at a [facility] in [place] on his way home, but he does not know if they are in his network though. I told him he could find out by calling his insurance.  Clinical Pharmacy Consult:                                                      Transplant Specific:   Date of Transplant: 6/11/2021  Type of Transplant: kidney and pancreas  First Transplant: " yes  History of rejection: no    Immunosuppression Regimen   TAC 2.5mg qAM & 2mg qPM, Prednisone 40mg qAM and tapering off over a month, and MMF 1000mg qAM & 1000mg qPM  Patient specific goal: 8-10  Most recent level: 11.4, date 6/16/21  Immunosuppressant Levels:  Supratherapeutic  Pt adherent to lab draws: yes  Scr:   Lab Results   Component Value Date    CR 2.34 06/16/2021     Side effects: no side effects    Prophylactic Medications  Antibacterial:  Bactrim 400-80mg daily  Scheduled Discontinue Date: Lifelong    Antifungal: Clotrimazole   Scheduled Discontinue Date: 3 months    Antiviral: CrCl 40 to 59 mL/minute: Valcyte 450 mg once daily   Scheduled Discontinue Date: 3 months    Acid Reducer: Protonix (pantoprazole)  Scheduled Reviewed Date: long term possibly, since was on Omeprazole prior to admission    Thrombosis Prevention: Aspirin 81 mg PO daily  Scheduled Discontinue Date:     Blood Pressure Management  Frequency of home Blood Pressure checks: twice daily  Most recent home BP: 127/62  Patient Blood pressure goal: <150/90  Patient blood pressure at goal:  yes  Hospitalizations/ER visits since last assessment: 0      Med rec/DUR performed: yes  Med Rec Discrepancies: no    Reminders:    1. Bring to first clinic appt: med box, med card, bp monitor, all medications being taken, and lab book.  2.   MTM pharmacist visit on first clinic appt and if ok, again in 3 to 4 months during follow up appt.  3.   Avoid Grapefruit and Grapefruit juice.   4.   Avoid herbal supplements. If wish to take other medications or supplements, call your coordinator.   5.   Keep lab appts.   6.   Can use apps on phone like [employer] to help manage medication lists and reminders.   7.   Make sure you are protecting your skin by wearing long sleeves and applying sunscreen to exposed skin, for any significant time in the sun.     Transplant Coordinator is Twyla Chang.      Fidelia Martinez [facility]

## 2021-06-16 NOTE — PLAN OF CARE
"BP (!) 151/79   Pulse 59   Temp 97.5  F (36.4  C) (Oral)   Resp 17   Ht 1.651 m (5' 5\")   Wt 85 kg (187 lb 4.8 oz)   SpO2 96%   BMI 31.17 kg/m      [id number]  Patient A&O. Hypertensive, prn and scheduled hydralazine given, OVSS on RA. Up ad isak. Voiding adequately. No reports of a bowel movement overnight, 3 BMs yesterday. Clear liquid diet. Q2hr blood sugars, 103-150. Incision stapled, ABIODUN. TIA site leaky, serosang output. Scheduled tylenol for pain. No complaints of nausea. PIV SL. Triple lumen internal jugular running D5LR at 125ml/hr, heparin straight-rated at 400units, SL. Left arm AV fistula. Plans to meet with specialty pharmacy today.     Will continue to monitor and update the team with any changes.    "

## 2021-06-16 NOTE — PROGRESS NOTES
CLINICAL NUTRITION SERVICES - Bre[place] NOTE     Nutrition Prescription    Recommendations already ordered by Registered Dietitian (RD):  change nutritional supplement to ensure enlive twice daily (vanilla or strawberry) per patient supplement preference.     Future/Additional Recommendations:  -- monitor oral intake of meals and supplements      EVALUATION OF THE PROGRESS TOWARD GOALS   Diet: Low Fat with ensure clear twice daily   Intake: Per nursing documentation 100% intake (6/15) on clear liquids at that time. Diet advanced today         NEW FINDINGS   Labs (6/16): phos 2.3 mg/dL (L), BUN 26 mg/dL, Cr 2.34 mg/dL (H), Lipase 1547 U/L (H), Amylase 179 U/L (H)    Meds: Oscal w/D,Mag-ox     INTERVENTIONS  Implementation  Medical food supplement therapy - change nutritional supplement to ensure enlive twice daily (vanilla or strawberry) per patient supplement preference.     Monitoring/Evaluation  Progress toward goals will be monitored and evaluated per protocol.    Brenda Hansen, MS/RD/SANDI/CNSC  [address] RD Pager: [phone]

## 2021-06-16 NOTE — PLAN OF CARE
"BP 98/55 (BP Location: Left arm)   Pulse 56   Temp 97.9  F (36.6  C) (Oral)   Resp 14   Ht 1.651 m (5' 5\")   Wt 84 kg (185 lb 1.6 oz)   SpO2 97%   BMI 30.80 kg/m      5760-1998. Soft BP's, OVSS on RA. Q2 , 94, 179. Denies nausea. PRN oxy given x 1 for incisional pain. Tolerating a low fat diet, good oral intake. PIV is SL. R internal jugular w/ D5 LR @ 10 + heparin gtt @ 400 SR + SL, caps and dressing changed. Voiding adequate amounts. Pt reports x 2 BM. Incision stapled, ABIODUN. R TIA leaky at site, moderate amounts of bright red/bloody output. Med card and lab book up to date. Specialty pharmacy to meet with pt this afternoon. MTP videos started this morning. Up with SBA. Will continue to monitor and update with any changes.    "

## 2021-06-16 NOTE — PROGRESS NOTES
[facility]   Transplant Nephrology Progress Note  Date of Admission:  6/10/2021  Today's Date: 06/16/2021    Recommendations:  -Due to hypotension this morning stop PO hydral, decrease coreg to 6.25mg bid, decrease clonidine to 0.1mg q8h  -Monitor serum and drain lipase    Assessment & Plan   # DDKTX (SPK) trend down              - Baseline Creatinine: ~ TBD              - Proteinuria: Not checked post transplant              - Date DSA Last Checked: Jun/2021      Latest DSA: No DSA at time of transplant              - BK Viremia: No              - Kidney Tx Biopsy: No   -Stent placed. Remove 4-6 weeks post txp     # Pancreas Tx (SPK):               - Pancreatic Exocrine Drainage: Enteric drained                     - Blood glucose: near euglycemia       On insulin: yes, short acting              - HbA1c: Last checked at time of transplant     6%              - Pancreatic enzymes: stable, elevated, U/S with unchanged size of fluid collection, patent vessels. Plan to monitor               - Date DSA Last Checked: Jun/2021             Latest DSA: No DSA at time of transplant              - Pancreas Tx Biopsy: No     # Immunosuppression: Tacrolimus immediate release (goal 8-10), Mycophenolate mofetil (dose 1000 mg every 12 hours) and Prednisone (dose taper)               - Induction: intermediate risk protocol, PRA 32%              - Changes: No     # Infection Prophylaxis:   - PJP: Sulfa/TMP (Bactrim)  - CMV: Valcyte x3m  - Thrush: Micafungin (Mycamine) and myclex    # Hypertension: Hypotensive;  Goal BP: < 150/90   - Volume status: Euvolemic    - Changes: Yes, stop PO hydral, decrease coreg to 6.25mg bid, decrease clonidine to 0.1mg q8h    # Anemia in Chronic Renal Disease: Hgb: stable, low ROSA M: No   - Iron studies: Unknown at this time, but checked with dialysis    # Mineral Bone Disorder:   - Secondary renal hyperparathyroidism; PTH level: Unknown at this time, but  checked with dialysis        On treatment: None  - Vitamin D; level: Unknown at this time, but checked with dialysis        On supplement: Yes  - Calcium; level: Normal   On supplement: No  - Phosphorus; level: Normal        On supplement: No    # Electrolytes:   - Potassium; level: Normal        On supplement: No  - Magnesium; level: Normal        On supplement: Yes  - Bicarbonate; level: Normal        On supplement: No  - Sodium; level: Normal    # Transplant History:  Etiology of Kidney Failure: Diabetes mellitus type 2  Tx: SPK  Transplant: 6/11/2021 (Kidney / Pancreas)  DSA at time of Tx: No  Significant changes in immunosuppression: None  Significant transplant-related complications: None     Recommendations were communicated to the primary team verbally.    Saw Irby MD   Pager: [phone]    Interval History   Became hypotensive this morning and became tired. Had BM yesterday    Review of Systems   4 point ROS was obtained and negative except as noted in the Interval History.    MEDICATIONS:    acetaminophen  975 mg Oral Q8H     amLODIPine  10 mg Oral or NG Tube Daily     aspirin  81 mg Oral Daily     atorvastatin  40 mg Oral Daily     calcium carbonate-vitamin D  1 tablet Oral BID w/meals     carvedilol  6.25 mg Oral BID     cloNIDine  0.1 mg Oral TID     clotrimazole  10 mg Buccal 4x Daily     insulin aspart  1-6 Units Subcutaneous Q4H     lidocaine  1-2 patch Transdermal Q24H     lidocaine   Transdermal Q8H     magnesium oxide  400 mg Oral Q24H     methocarbamol  500 mg Oral 4x Daily     micafungin  100 mg Intravenous Q24H     mycophenolate  1,000 mg Oral BID     octreotide  150 mcg Subcutaneous TID     pantoprazole  40 mg Oral QAM AC     phosphorus tablet 250 mg  250 mg Oral BID     polyethylene glycol  17 g Oral BID     predniSONE  40 mg Oral Daily    Followed by     [START ON 6/18/2021] predniSONE  20 mg Oral Daily    Followed by     [START ON 6/25/2021] predniSONE  15 mg Oral Daily    Followed by  "    [START ON 2021] predniSONE  10 mg Oral Daily    Followed by     [START ON 2021] predniSONE  5 mg Oral Daily     senna-docusate  2 tablet Oral BID     sodium chloride (PF)  3 mL Intravenous Q8H     sulfamethoxazole-trimethoprim  1 tablet Oral Daily     tacrolimus  2 mg Oral QPM     [START ON 2021] tacrolimus  2.5 mg Oral QAM     valGANciclovir  450 mg Oral Daily       dextrose       IV fluid REPLACEMENT ONLY 10 mL/hr at 21 1317     heparin 400 Units/hr (21 0800)       Physical Exam   Temp  Av.5  F (36.9  C)  Min: 98.2  F (36.8  C)  Max: 98.9  F (37.2  C)  Arterial Line BP  Min: 216/75  Max: 221/84  Arterial Line MAP (mmHg)  Av mmHg  Min: 124 mmHg  Max: 137 mmHg      Pulse  Av.9  Min: 63  Max: 112 Resp  Av.6  Min: 12  Max: 24  SpO2  Av.7 %  Min: 89 %  Max: 100 %    CVP (mmHg): 3 mmHgBP 127/62 (BP Location: Right arm)   Pulse 59   Temp 97.5  F (36.4  C) (Oral)   Resp 14   Ht 1.651 m (5' 5\")   Wt 84 kg (185 lb 1.6 oz)   SpO2 98%   BMI 30.80 kg/m     Date 21 0700 - 21 0659   Shift 9935-8616 6600-3834 5250-1194 24 Hour Total   INTAKE   I.V. 2366.94   2366.94   NG/   120   Shift Total(mL/kg) 2486.94(27.33)   2486.94(27.33)   OUTPUT   Urine 1450   1450   Emesis/NG output 325   325   Drains 210   210   Shift Total(mL/kg) (21.81)   (21.81)   Weight (kg) 91 91 91 91      Admit Weight: 87.2 kg (192 lb 3.9 oz)     GENERAL APPEARANCE: alert and no distress  HENT: mouth without ulcers or lesions  RESP: lungs clear to auscultation - no rales, rhonchi or wheezes  CV: regular rhythm, normal rate, no rub, no murmur  EDEMA: no LE edema bilaterally  ABDOMEN: soft, nondistended, nontender, bowel sounds normal  MS: extremities normal - no gross deformities noted, no evidence of inflammation in joints, no muscle tenderness  SKIN: no rash    Data   All labs reviewed by me.  CMP  Recent Labs   Lab 21  0616 06/15/21  0631 21  0541 " 06/13/21  0717 06/10/21  0927 06/10/21  0927    140 141 141   < > 132*   POTASSIUM 4.1 4.2 4.3 4.2   < > 4.3   CHLORIDE 111* 110* 110* 112*   < > 96   CO2 26 25 27 27   < > 29   ANIONGAP 3 5 5 3   < > 7   GLC 97 82 96 104*   < > 119*   BUN 26 28 29 34*   < > 34*   CR 2.34* 2.46* 2.82* 3.92*   < > 7.32*   GFRESTIMATED 34* 32* 27* 18*   < > 9*   GFRESTBLACK 39* 37* 31* 21*   < > 10*   SHAY 8.1* 8.6 8.4* 8.3*   < > 8.8   MAG 2.0 2.1 2.0 2.0   < >  --    PHOS 2.3* 2.5 2.7 3.0   < >  --    PROTTOTAL  --   --   --   --   --  7.2   ALBUMIN  --   --   --   --   --  3.9   BILITOTAL  --   --   --   --   --  0.4   ALKPHOS  --   --   --   --   --  84   AST  --   --   --   --   --  32   ALT  --   --   --   --   --  32    < > = values in this interval not displayed.     CBC  Recent Labs   Lab 06/16/21  0616 06/15/21  0631 06/14/21  0541 06/13/21  0717   HGB 7.5* 7.9* 7.6* 8.1*   WBC 8.4 9.2 10.3 11.6*   RBC 2.48* 2.62* 2.50* 2.65*   HCT 23.5* 25.0* 24.5* 25.4*   MCV 95 95 98 96   MCH 30.2 30.2 30.4 30.6   MCHC 31.9 31.6 31.0* 31.9   RDW 15.6* 15.9* 16.9* 17.2*    198 163 167     INR  Recent Labs   Lab 06/11/21  0540 06/10/21  0927   INR 1.09 0.98   PTT 34 37     ABG  Recent Labs   Lab 06/11/21  0100 06/11/21  0000 06/10/21  2300 06/10/21  2152   PH 7.40 7.42 7.43 7.45   PCO2 35 35 34* 34*   PO2 121* 128* 177* 113*   HCO3 21 23 23 24   O2PER 40% 40% 40.0 40      Urine Studies  Recent Labs   Lab Test 06/10/21  1120 07/29/19  1240 07/18/19  1200   COLOR Light Yellow Straw Yellow   APPEARANCE Clear Clear Clear   URINEGLC Negative 50* 50*   URINEBILI Negative Negative Negative   URINEKETONE Negative Negative Negative   SG 1.007 1.010 1.011   UBLD Negative Negative Negative   URINEPH 7.0 5.0 6.0   PROTEIN 200* >499* >499*   NITRITE Negative Negative Negative   LEUKEST Negative Negative Negative   RBCU 3* 5* 9*   WBCU 1 3 5     Recent Labs   Lab Test 06/10/21  1120 08/31/20  0000 07/18/19  1200   UTPG 5.90* 2.62* 6.20*      PTH  No lab results found.  Iron Studies  No lab results found.    IMAGING:  All imaging studies reviewed by me.

## 2021-06-16 NOTE — DISCHARGE SUMMARY
[facility]    Discharge Summary  Transplant Surgery    Date of Admission:  6/10/2021  Date of Discharge:  2021  Discharging Provider: Twyla Raya NP / Julio Cesar Vega MD    Discharge Diagnoses   Principal Problem:    Pancreas transplanted (H)  Active Problems:    Hypertension secondary to other renal disorders    Type 2 diabetes mellitus (H)    Kidney replaced by transplant    Immunosuppressed status (H)    Pancreatitis of pancreas transplant    Anemia due to blood loss, acute    Acute post-operative pain    Hypophosphatemia    Hypomagnesemia      History of Present Illness   Erik Cramer is an 41 year old male with history of DM type 2, ESKD on hemodialysis, HTN, and obesity. S/p  donor kidney transplant with ureteral stent, pancreas transplant, and open appendectomy on 21.    Hospital Course   Kidney transplant: Cr trended down to 1.9. Good UOP.  US with moderately elevated velocities at the renal artery anastomosis and mid renal artery.  biopsy: final path pending.    Pancreas transplant:  Post-op graft pancreatitis. Lipase peak 1547, down to 912 by discharge.  fluid lipase ~2400. Glucose trended down, 's, requiring small amount of sliding scale Novolog.  US: small fluid collection, patent vessels. ITA removed prior to discharge. Will discharge on aspirin for vascular prophylaxis.    Immunosuppression:  Induction immunosuppression with thymoglobulin 175mg (2mg/kg), basiliximab 20mg x2, and steroid taper. Maintenance immunosuppression with tacrolimus and mycophenolate. Tacrolimus goal level 8-10 (12 hour trough). Infectious prophylaxis with valganciclovir (x12 weeks), Mycelex lozenges (x12 weeks), and bactrim (indefinitely).     Transplant coordinator Twyla Chang  [phone]  Donor type:  DBD  DSA at time of transplant:  NO  Ureteral stent: YES  CMV:  Donor - / Recipient +  EBV:  Donor + / Recipient  +  Thymoglobulin:  175mg, 2mg/kg    Acute post operative pain: well controlled with PRN Tylenol, oxycodone, and Lidoderm patches.    HTN: Difficult control post-op. BPs variable, will discharge on clonidine, Coreg, and amlodipine and monitor closely.    Acute blood loss anemia: Received 2 units pRBC intra-op for hgb of 6.6. Hgb 7 on day of discharge, stalbe. Continue to monitor outpatient.    Hypophosphatemia: Phos 2.4, continue Phospha neutral 250 mg BID.     Hypomagnesemia: secondary to tacrolimus. Mg 1.9, continue Mag-ox 400 mg daily.     Significant Results and Procedures    Procedure Date:  case start 6/10, case complete 06/11/21    Preoperative Diagnosis:  End Stage renal failure due to diabetes mellitus type 2    Postoperative Diagnosis:  Same    Procedure:  1. Right Kidney Donation after Brain Death Kidney, Left iliac fossa, with venous reconstruction. A J-J stent was placed.   2. Kidney allograft preparation on Back Table   3. Open appendectomy    4. Whole Pancreas Donation after Brain Death Pancreas Transplant   5. Pancreas allograft preparation on Back Table    Surgeon:  Surgeon(s) and Role:     * Russell Kennedy MD - Primary     * Julio Cesar Vega MD - Assisting     * Edmar Jacobo MD - Fellow - Assisting     * Kd Joel MD - Fellow - Assisting     Pending Results   These results will be followed up by transplant nephrology  Unresulted Labs Ordered in the Past 30 Days of this Admission     Date and Time Order Name Status Description    6/19/2021 2330 PRA Donor Specific Antibody In process     6/17/2021 0939 Surgical pathology exam - Transplant Renal In process           Code Status   Full    Primary Care Physician   Figueroa Washington    Physical Exam   Temp: 98  F (36.7  C) Temp src: Oral BP: 115/65 Pulse: 61   Resp: 18 SpO2: 100 % O2 Device: None (Room air)    Vitals:    06/19/21 1302 06/20/21 0853 06/21/21 1110   Weight: 79 kg (174 lb 2.6 oz) 78.7 kg (173 lb 8 oz)  79.1 kg (174 lb 6.4 oz)     Vital Signs with Ranges  Temp:  [98  F (36.7  C)-98.6  F (37  C)] 98  F (36.7  C)  Pulse:  [60-68] 61  Resp:  [16-18] 18  BP: (104-166)/(60-89) 115/65  SpO2:  [98 %-100 %] 100 %  I/O last 3 completed shifts:  In: 1420 [P.O.:1400; I.V.:20]  Out: 4435 [Urine:4350; Drains:85]    Constitutional: appears comfortable  Eyes: EOMI   Respiratory: unlabored on RA  Cardiovascular: perfused  GI: abdomen round, soft, non-tender to palpation. Midline incision stapled, open to air. TIA with serosanguinous output.  Genitourinary: no Fox  Skin: warm, dry  Musculoskeletal: no edema noted  Neurologic: A&Ox4  Neuropsychiatric: behavior appropriate to situation    Time Spent on this Encounter   ITwyla NP, personally saw the patient today and spent greater than 30 minutes discharging this patient.    Discharge Disposition   Discharged to home  Condition at discharge: Stable    Consultations This Hospital Stay   VASCULAR ACCESS CARE ADULT IP CONSULT  PHARMACY IP CONSULT  NEPHROLOGY KIDNEY/PANCREAS TRANSPLANT ADULT IP CONSULT  SOCIAL WORK IP CONSULT  PHARMACY IP CONSULT  SOT MEDICATION HISTORY IP PHARMACY CONSULT  NUTRITION SERVICES ADULT IP CONSULT  CNS DIABETES IP CONSULT  DIABETES EDUCATION IP CONSULT  PHYSICAL THERAPY ADULT IP CONSULT  CARE MANAGEMENT / SOCIAL WORK IP CONSULT  INTERVENTIONAL RADIOLOGY ADULT/PEDS IP CONSULT    Discharge Orders       Home care nursing referral      MD face to face encounter    Documentation of Face to Face and Certification for Home Health Services    I certify that patient: Erik Cramer is under my care and that I, or a nurse practitioner or physician's assistant working with me, had a face-to-face encounter that meets the physician face-to-face encounter requirements with this patient on: June 14, 2021.    This encounter with the patient was in whole, or in part, for the following medical condition, which is the primary reason for home health care: Kidney  Pancreas Transplants.    I certify that, based on my findings, the following services are medically necessary home health services: Nursing    My clinical findings support the need for the above services because: Skilled nursing visits to monitor cardiac and resp status. Monitor hydration, nutrition, urinary and bowel status. Monitor healing of incision. Instruct in medications and eval effects. Assist / teach patient to obtain and record lab results in handbook. Physical Therapy to address functional mobility, transfers, gait and endurance.     Further, I certify that my clinical findings support that this patient is homebound (i.e. absences from home require considerable and taxing effort and are for medical reasons or [employer] services or infrequently or of short duration when for other reasons) because: Leaving home is medically contraindicated for the following reason(s): Infection risk / immunocompromised state where it is safer for them to receive services in the home.     Based on the above findings. I certify that this patient is confined to the home and needs intermittent skilled nursing care, physical therapy and/or speech therapy.  The patient is under my care, and I have initiated the establishment of the plan of care.  This patient will be followed by a physician who will periodically review the plan of care.  Physician/Provider to provide follow up care: Figueroa Washington    Attending hospital physician (the Medicare certified Price provider): Julio Cesar Vega*  Physician Signature: See electronic signature associated with these discharge orders.  Date: 6/14/2021     Reason for your hospital stay    You were admitted for a simultaneous pancreas kidney transplant on 6/11/21 complicated by graft pancreatitis. You are discharging home in stable condition with close follow up.     [facility]ult [facility] Follow-up and recommended labs and tests    Over the next 3-5 days you will be seen in the  [facility] at 7 am (ph. [phone], option 7) .  Your labs will be drawn at the beginning of your appointment at 7:00 am.  DO NOT take your medications prior to having labs drawn. Please bring all your medications with you from home to take after labs are drawn.    LABS:  Transplant labs (CBC, BMP, amylase, lipase, mag, phos, and tacrolimus levels (12 hours post administration)) to be drawn daily while in [facility], then every Monday, Wednesday, Friday by home health care nurse if arranged, or at an outpatient lab.    FOLLOW UP APPOINTMENTS:  Remember to always bring an updated medication list to all appointments.   Follow up with your transplant surgeon, Dr. Kennedy, in 1-2 weeks.  Follow up with transplant nephrology at 1 month, 3 months, and annually, as scheduled.  Follow up with primary care provider in 4-8 weeks. (Pt to schedule)  You have a ureteral stent in place which needs to be removed in 4-6 weeks.  You will be scheduled for stent removal at the [facility].  If a  does not contact you for this, please contact your transplant coordinator.  If you have staples in place, they will be removed in 3 weeks after operation.  Call scheduling at [phone] if you have not heard about your appointments within 48 hours after discharge.     When to contact your care team    WHEN TO CONTACT YOUR  COORDINATOR:  Transplant Coordinator [phone]  Notify your coordinator if you have pain over your pancreas graft, increased redness or drainage from your incision, fever greater than 100.5F, or decreased urine output.  Notify your coordinator immediately if you are ever unable to take your immunosuppressive medications for any reason.  If it is outside of office hours, please call the hospital switchboard at [phone] and ask to have the pancreas transplant surgery fellow paged for urgent medical questions, or present to the emergency department.     Activity    Your activity upon discharge: Walk at least four  times a day, lift no greater than 10 pounds for 6-8 weeks from the time of surgery.  No driving while taking narcotics or 3 weeks after surgery.     Monitor and record    -Monitor blood glucose levels 4 times a day  -Monitor blood pressures before taking blood pressure medications until blood pressures stabilize     Wound care and dressings    Instructions to care for your wound at home: Keep your incision clean and dry. Wash daily with soap and water in the shower, but do not soak or scrub.     Diet    Diet recommendations post-transplant: Low potassium diet until potassium normalizes. Heart healthy dietary habits long term (low saturated/trans fat, low sodium). High protein diet x 8 weeks. Practice food safety precautions.     Discharge Medications    Current Discharge Medication List      START taking these medications    Details   acetaminophen (TYLENOL) 325 MG tablet Take 2 tablets (650 mg) by mouth every 4 hours as needed for other (For optimal non-opioid multimodal pain management to improve pain control.)  Qty:      Associated Diagnoses: Pancreas transplanted (H); Kidney replaced by transplant      aspirin (ASA) 325 MG EC tablet Take 1 tablet (325 mg) by mouth daily  Qty: 30 tablet, Refills: 5    Associated Diagnoses: Pancreas transplanted (H); Kidney replaced by transplant      carvedilol (COREG) 6.25 MG tablet Take 1 tablet (6.25 mg) by mouth 2 times daily  Qty: 60 tablet, Refills: 2    Associated Diagnoses: Pancreas transplanted (H); Kidney replaced by transplant      clotrimazole (MYCELEX) 10 MG lozenge Place 1 lozenge (10 mg) inside cheek 4 times daily  Qty: 120 lozenge, Refills: 2    Associated Diagnoses: Pancreas transplanted (H); Kidney replaced by transplant      !! insulin lispro (HUMALOG) 100 UNIT/ML Cartridge Inject 1-7 Units Subcutaneous 3 times daily (before meals)  Qty: 3 mL, Refills: 0    Associated Diagnoses: Pancreas transplanted (H); Type 2 diabetes mellitus with chronic kidney disease on  chronic dialysis, with long-term current use of insulin (H)      !! insulin lispro (HUMALOG) 100 UNIT/ML Cartridge Inject 1-5 Units Subcutaneous At Bedtime  Qty: 3 mL, Refills: 0    Associated Diagnoses: Pancreas transplanted (H); Type 2 diabetes mellitus with chronic kidney disease on chronic dialysis, with long-term current use of insulin (H)      magnesium oxide (MAG-OX) 400 MG tablet Take 1 tablet (400 mg) by mouth every 24 hours  Qty: 30 tablet, Refills: 2    Associated Diagnoses: Pancreas transplanted (H); Kidney replaced by transplant      methocarbamol (ROBAXIN) 500 MG tablet Take 1 tablet (500 mg) by mouth every 8 hours as needed for muscle spasms  Qty: 28 tablet, Refills: 0    Associated Diagnoses: Pancreas transplanted (H); Kidney replaced by transplant      mycophenolate (GENERIC EQUIVALENT) 250 MG capsule Take 4 capsules (1,000 mg) by mouth 2 times daily  Qty: 240 capsule, Refills: 11    Associated Diagnoses: Pancreas transplanted (H); Kidney replaced by transplant      oxyCODONE (ROXICODONE) 5 MG tablet Take 1 tablet (5 mg) by mouth every 4 hours as needed for moderate to severe pain  Qty: 8 tablet, Refills: 0    Associated Diagnoses: Pancreas transplanted (H); Kidney replaced by transplant      phosphorus tablet 250 mg (PHOSPHA 250 NEUTRAL) 250 MG per tablet Take 1 tablet (250 mg) by mouth 2 times daily  Qty: 60 tablet, Refills: 0    Associated Diagnoses: Pancreas transplanted (H); Kidney replaced by transplant      predniSONE (DELTASONE) 5 MG tablet Take 20 mg daily x 3 days (6/22-6/24), Take 15 mg daily x 7 days (6/25-7/1), Take 10 mg daily x 7 days (7/2-7/8), Take 5 mg x 7 days (7/9-7/15)  Qty: 54 tablet, Refills: 0    Comments: Medication order for PREDNISONE updated to ALL 5MG TABLETS WITH CORRESPONDING CORRECT DIRECTIONS per verbal order. Updated RX sent to the pharmacy and medication list in Epic updated.  Associated Diagnoses: Pancreas transplanted (H); Kidney replaced by transplant       sulfamethoxazole-trimethoprim (BACTRIM) 400-80 MG tablet Take 1 tablet by mouth daily  Qty: 30 tablet, Refills: 11    Associated Diagnoses: Pancreas transplanted (H); Kidney replaced by transplant      tacrolimus (GENERIC EQUIVALENT) 0.5 MG capsule Take 1 capsule (0.5 mg) by mouth 2 times daily Discharge dose will be 2.5 mg BID  Qty: 60 capsule, Refills: 11    Associated Diagnoses: Pancreas transplanted (H); Kidney replaced by transplant      tacrolimus (GENERIC EQUIVALENT) 1 MG capsule Take 2 capsules (2 mg) by mouth 2 times daily Discharge dose will be 2.5 mg BID  Qty: 120 capsule, Refills: 11    Associated Diagnoses: Pancreas transplanted (H); Kidney replaced by transplant      valGANciclovir (VALCYTE) 450 MG tablet Take 1 tab (450mg) daily. Increase dose up to a max of 2 tabs (900 mg) by mouth daily when directed by your transplant team.  Qty: 150 tablet, Refills: 0    Comments: Call Arleth Flaherty NP with questions: [phone]  Associated Diagnoses: Immunosuppressed status (H)       !! - Potential duplicate medications found. Please discuss with provider.      CONTINUE these medications which have CHANGED    Details   atorvastatin (LIPITOR) 80 MG tablet Take 0.5 tablets (40 mg) by mouth every evening  Refills: 2    Associated Diagnoses: Pancreas transplanted (H); Kidney replaced by transplant      cloNIDine (CATAPRES) 0.2 MG tablet Take 1 tablet (0.2 mg) by mouth 3 times daily Take 1 tablet by mouth three times daily. Hold for SBP < 130.    Associated Diagnoses: Pancreas transplanted (H); Kidney replaced by transplant         CONTINUE these medications which have NOT CHANGED    Details   amLODIPine (NORVASC) 10 MG tablet Take 10 mg by mouth daily Take 1 tablet by mouth in the evening      calcium carbonate (OS-SHAY) 1500 (600 Ca) MG tablet Take 600 mg by mouth 2 times daily (with meals)   Refills: 12      insulin pen needle (BD RAÚL U/F) 32G X 4 MM miscellaneous use as directed with lantus pen once a day       montelukast (SINGULAIR) 10 MG tablet Take 10 mg by mouth At Bedtime      omeprazole (PRILOSEC) 20 MG DR capsule Take 20 mg by mouth every morning      ondansetron (ZOFRAN-ODT) 4 MG ODT tab Take 4 mg by mouth every 8 hours as needed for nausea      vitamin D3 (CHOLECALCIFEROL) 2000 units (50 mcg) tablet Take 1 tablet (2,000 Units) by mouth daily  Qty: 90 tablet, Refills: 0    Associated Diagnoses: Vitamin deficiency         STOP taking these medications       calcium acetate (PHOSLO) 667 MG CAPS capsule Comments:   Reason for Stopping:         calcium carbonate (TUMS) 500 MG chewable tablet Comments:   Reason for Stopping:         doxazosin (CARDURA) 4 MG tablet Comments:   Reason for Stopping:         furosemide (LASIX) 80 MG tablet Comments:   Reason for Stopping:         hydrALAZINE (APRESOLINE) 100 MG tablet Comments:   Reason for Stopping:         LANTUS SOLOSTAR 100 UNIT/ML soln Comments:   Reason for Stopping:         lidocaine-prilocaine (EMLA) 2.5-2.5 % external cream Comments:   Reason for Stopping:         losartan (COZAAR) 100 MG tablet Comments:   Reason for Stopping:         Multiple Vitamins-Minerals (OCUVITE PO) Comments:   Reason for Stopping:         vitamin B-Complex Comments:   Reason for Stopping:         VITAMIN E PO Comments:   Reason for Stopping:             Allergies   Allergies   Allergen Reactions     Metoprolol      SOB, but was also experiencing significant edema not drug associated     Data   Most Recent 3 CBC's:  Recent Labs   Lab Test 06/21/21  0645 06/20/21  0602 06/19/21  0759   WBC 12.3* 10.1 11.8*   HGB 7.0* 7.2* 7.6*   MCV 96 97 95    290 299      Most Recent 3 BMP's:  Recent Labs   Lab Test 06/21/21  0645 06/20/21  0602 06/19/21  0759    138 139   POTASSIUM 4.7 5.2 4.7   CHLORIDE 109 109 109   CO2 24 25 25   BUN 24 25 30   CR 1.94* 2.03* 2.26*   ANIONGAP 5 5 4   SHAY 8.2* 8.2* 8.2*   * 94 100*     Most Recent 2 LFT's:  Recent Labs   Lab Test 06/10/21  0927  01/04/21 08/30/20  2338 08/30/20  2338   AST 32 31   < > 17   ALT 32 47   < > 25   ALKPHOS 84  --   --  109   BILITOTAL 0.4  --   --  0.4    < > = values in this interval not displayed.     Most Recent INR's and Anticoagulation Dosing History:  Anticoagulation Dose History     Recent Dosing and Labs Latest Ref Rng & Units 7/29/2019 12/10/2019 8/30/2020 6/10/2021 6/11/2021 6/18/2021    INR 0.86 - 1.14 1.03 0.97 0.96 0.98 1.09 0.96        Most Recent 3 Troponin's:No lab results found.  Most Recent Cholesterol Panel:  Recent Labs   Lab Test 08/30/20  2338   CHOL 133   LDL 49   HDL 62   TRIG 109     Most Recent 6 Bacteria Isolates From Any Culture (See EPIC Reports for Culture Details):No lab results found.  Most Recent TSH, T4 and A1c Labs:  Recent Labs   Lab Test 06/10/21  0927   A1C 6.0*     Results for orders placed or performed during the hospital encounter of 06/10/21   XR Chest 2 Views    Narrative    EXAM: XR CHEST 2 VW  6/10/2021 8:48 AM      HISTORY: pre-op PKT    COMPARISON: X-ray 8/31/2020    FINDINGS: Two views of the chest. No pleural effusion. No  pneumothorax. Cardiomegaly. No acute airspace opacities. No aggressive  osseous abnormalities. Visualized upper abdomen is unremarkable.      Impression    IMPRESSION:  Cardiomegaly. No acute airspace opacities.    I have personally reviewed the examination and initial interpretation  and I agree with the findings.    RAYA CESAR MD    Renal Transplant    Narrative    ULTRASOUND RENAL TRANSPLANT  6/11/2021 7:58 AM    CLINICAL HISTORY: s/p kidney transplant, transplant date 6/11/2021.    COMPARISONS: Abdominal ultrasound 12/3/2018    REFERRING PROVIDER: OSWALDO DENIS    TECHNIQUE: Left lower quadrant renal transplant evaluated with  grayscale, color Doppler, and Doppler waveform ultrasound. Transplant  renal vasculature evaluated with color Doppler and Doppler waveform  ultrasound.    Cine clips through the renal transplant and bladder were saved in  the  patient's record.    FINDINGS:   LEFT LOWER QUADRANT RENAL TRANSPLANT:         Fluid collections: None         Renal artery:            Hilum: 158/37 cm/s, resistive index of 0.76            Mid: 258/58, 310/59 cm/s, resistive index of 0.78-0.81            Anastomosis: 334/90 cm/s, resistive index of 0.73         Renal vein:            Hilum: 47 cm/s            Mid: 151 cm/s            Anastomosis: 83 cm/s         Length: 11.1 cm         Arcuate artery resistive index (superior / mid / inferior): 0.73  / 0.73 / 0.374         Parenchyma: Normal. No stone, mass, or hydronephrosis. Ureteral  stent partially visualized within the proximal ureter.    Iliac artery:       Above anastomosis: 143 cm/s       Below anastomosis: 159 cm/s    Iliac vein: Patent above and below the anastomosis.    Bladder: Decompressed with indwelling Fox catheter.      Impression    IMPRESSION:   1. Left lower quadrant transplant kidney with moderately elevated  velocities at the renal artery anastomosis and mid renal artery,  likely representing perianastomotic edema in the immediate  postoperative setting. Recommend short-term follow-up.    2. Normal grayscale evaluation of the transplant kidney. Partially  visualized ureteral stent.    GUIDELINES:  For native kidneys:       Diagnostic criteria suggestive of > 60% diameter stenosis             Renal artery:             Peak systolic velocity > 180 cm/s             RAR > 3.5             Turbulent flow         Arcuate artery Resistive Index Normal < 0.7    For renal transplants:       Renal transplant peak systolic velocity criteria range from > 180  cm/s to > 400 cm/s       Source suggests using:            Peak systolic velocity > or = 300 cm/s            Spectral broadening            Intraparenchymal arterial acceleration time > or = 0.1 s     Source: Berenice COLVIN, Olga WEBER, Hans ARNOLD, et al. Screening for  transplant renal artery stenosis: Ultrasound-based stenosis  probability  stratification. American Journal of Roentgenology.  2017;209: 1564-6057. [id number]    I have personally reviewed the examination and initial interpretation  and I agree with the findings.    TINO ESPARZA MD   US Pancreas Transplant    Narrative    Pancreas transplant ultrasound    INDICATION: Status post pancreas transplant    COMPARISON: None.    FINDINGS: Transplant performed earlier today. The transplanted  pancreas is located in the right lower quadrant. No fluid collections.    Artery flows: Within the pancreas, peak systolic velocity for the  artery is 43 cm/s with end-diastolic velocity of 12 cm/s. Resistive  index is 0.73. Outside of the pancreas, peak systolic velocity is  somewhat elevated at 271 cm/s with end diastolic velocity of 25 cm/s  and resistive index of 0.87. At the anastomosis, peak systolic  velocity is 219 cm/s with end diastolic velocity of 34 cm/s. Resistive  index is 0.89.    Venous flow: Within the pancreas, velocities 24 cm/s comment outside  the pancreas is 32-58 cm/s and at the anastomosis is 47 cm/s.    Iliac artery velocity above the anastomosis is measured at 161 cm/s  and below the anastomosis is 179 cm/s.      Impression    IMPRESSION: Somewhat elevated artery flows outside the pancreas  transplant with slightly elevated resistive indices, this may be due  to immediate postoperative status, continued follow-up recommended to  ensure lack of significant stenosis of the vascular supply. No fluid  collections.    FRANCHESKA LANIER MD   XR Chest Port 1 View    Narrative    EXAM: XR CHEST PORT 1 VIEW  6/11/2021 6:47 AM     HISTORY:  s/p SPK- line check       COMPARISON:  6/10/2021    FINDINGS:   Right IJ catheter terminates in the low SVC. Gastric tube terminates  within the stomach.    The trachea is midline. The cardiomediastinal silhouette is within  normal limits. The pulmonary vasculature are prominent and indistinct.      Mildly low lung volumes. No pleural effusion or  pneumothorax. Right  upper quadrant surgical clips.      Impression    IMPRESSION:   1. Right IJ catheter terminating at the low SVC.  2. Mildly low lung volumes.    I have personally reviewed the examination and initial interpretation  and I agree with the findings.    RAFIA GUIDRY,    US Pancreas Transplant    Narrative    EXAMINATION: US PANCREAS TRANSPLANT, 6/12/2021 3:04 PM     COMPARISON: 6/11/2021    HISTORY: S/p pancreas transplant, follow up doppler, some questionable  flow yesterday now requiring some insulin    TECHNIQUE:  Grey-scale, color Doppler and spectral flow analysis.    Findings: The transplanted pancreas is located in the right lower  quadrant and demonstrates normal echogenicity.  3.1 x 3.1 cm hypoechoic fluid adjacent to the transplanted pancreas.     Transplant pancreas arterial flow:   Within pancreas: 48/11 cm/sec.   Outside pancreas: 143 cm/sec.   Anastomosis: 181 cm/sec.    Transplant pancreas venous flow:  Within pancreas: 25 cm/sec.   Outside pancreas: 22 cm/sec.   Anastomosis: 35 cm/sec.    Iliac artery:  Above the anastomosis: 188 cm/sec.  Below the anastomosis: 174 cm/sec.    Iliac vein  Above the transplant: Not well visualized   Below the transplant: 11 cm/sec.    At the anastomosis:  Peak-systolic velocity is 181 cm/sec.      Impression    Impression:   1. New 3.1 cm hypoechoic fluid adjacent to the transplanted pancreas,  likely representing postoperative hematoma/seroma.  2. Patent Doppler ultrasound evaluation with decreased arterial  velocities outside the pancreas and at the anastomosis.    I have personally reviewed the examination and initial interpretation  and I agree with the findings.    RAYA CESAR MD   US Pancreas Transplant    Narrative    EXAMINATION: US PANCREAS TRANSPLANT, 6/14/2021 10:36 AM     COMPARISON: 6/12/2021    HISTORY: POD#4 SPK, elevated lipase and acute pain. Check doppler and  for fluid collection.    TECHNIQUE:  Grey-scale, color Doppler and  spectral flow analysis.    Findings: The transplanted pancreas is located in the right lower  quadrant and demonstrates normal parenchymal echogenicity. Crescentic  hypoechoic fluid tracking along the medial aspect of the pancreatic  body and tail. This collection measures up to 2.3 cm in greatest  thickness adjacent to the pancreatic tail, similar to 6/12/2021,  though it is now slightly more prominent adjacent to the pancreatic  body.    Transplant pancreas arterial flow:   Within pancreas: 37-47 cm/sec.   Outside pancreas: 187 cm/sec.   Anastomosis: 179 cm/sec.    Transplant pancreas venous flow:  Within pancreas: 13-15 cm/sec.   Outside pancreas: 27 cm/sec.   Anastomosis: 21 cm/sec.    Iliac artery:  Above the anastomosis: 124 cm/sec.  Below the anastomosis: 151 cm/sec.    Iliac vein  Above the transplant: 117 cm/sec.  Below the transplant: 19 cm/sec.    At the anastomosis:  Peak-systolic velocity is 179 cm/sec.  End-diastolic velocity is 26 cm/sec.  Resistive index: 0.86        Impression    Impression:   1.  Patent pancreas transplant vasculature.  2.  Crescentic hypoechoic fluid tracking along the medial aspect of  the pancreatic body and tail, slightly more prominent than on  6/12/2021, likely an evolving hematoma/seroma, though sequela of  transplant pancreatitis remains a consideration.      RAFIA GUIDRY, DO   IR Renal Biopsy Left    Narrative    Erik Cramer   [id number]      [id number]    ERIK CRAMER  [id number]  1980;  41 years    IR RENAL BIOPSY LEFT  LLQ renal transplant on 6/11    -----    PRE-OPERATIVE DIAGNOSIS:  Elevated kidney function labs; status post  kidney transplant    POST-OPERATIVE DIAGNOSIS:  Same    PROCEDURE:  Ultrasound guided transplant kidney biopsy      Impression    IMPRESSION:  Completed ultrasound guided LLQ transplant kidney biopsy.  ?A total of two passes yielded kidney tissue cores collected for  further pathological evaluation. ?Path present. ?No  "immediate  complications. ?Dx: s/p transplant. ?Leroy.     Sedation medications administered: 1 mg Versed, 50 mcg Fentanyl   Sedation time: 10 minutes     ----------    CLINICAL HISTORY:  Elevated kidney function labs; status post kidney  transplant    PERFORMED BY:  MADELEINE Emerson PA-C (Interventional  Radiology)    CONSENT:  The patient understood the limitations, alternatives, and  risks of the procedure and agreed to the procedure.  Written informed  consent was obtained and is documented in the patient record.    SEDATION CONSENT:  It was explained to the patient that medications  used for sedation and pain relief may be administered, if needed, to  reduce anxiety and discomfort that may be associated with the  procedure.  Serious side effects from the medications are rare but may  include prolonged drowsiness and difficulty breathing, possibly  requiring placement of a breathing tube to ventilate the lungs.    MEDICATIONS:  Intravenous sedation was administered (see below).  1%  lidocaine was used for local anesthesia.  Gelatin foam plugs were used  for hemostasis.    NURSING:  The patient was placed on continuous vital signs monitoring.   Vital signs and sedation were monitored by nursing staff under my  supervision.    DESCRIPTION:  The patient's low abdomen was prepped and draped in the  usual sterile fashion.  Under ultrasound guidance, the patient's skin,  subcutaneous tissue, and kidney capsule were anesthetized.  With  ultrasound guidance, using a 17/18 gauge coaxial needle system, kidney  tissue specimens were obtained.  Needle was removed and the tract was  sealed with Gelfoam rolls.  Pressure was held the site, cleansed, and  sterile dressing applied.  Images were saved throughout the procedure.    COMPLICATIONS:  No immediate complication; the patient remained stable  throughout the procedure.    ESTIMATED BLOOD LOSS:  Minimal    SPECIMENS:  Per above    -----  \"The physician assistant " "(PA) who performed this procedure and signed  the above report is licensed to practice in the [facility]  pursuant to [place] Statute 147A.09.  This includes meeting the Statute and  [place] Board of Medical Practice requirement of a collaborating  physician.\"  -----    POLINA BUTTS PA-C       "

## 2021-06-17 ENCOUNTER — APPOINTMENT (OUTPATIENT)
Dept: PHYSICAL THERAPY | Facility: CLINIC | Age: 41
DRG: 008 | End: 2021-06-17
Attending: TRANSPLANT SURGERY
Payer: MEDICARE

## 2021-06-17 LAB
AMYLASE SERPL-CCNC: 186 U/L (ref 30–110)
ANION GAP SERPL CALCULATED.3IONS-SCNC: 5 MMOL/L (ref 3–14)
BUN SERPL-MCNC: 27 MG/DL (ref 7–30)
CALCIUM SERPL-MCNC: 8.2 MG/DL (ref 8.5–10.1)
CHLORIDE SERPL-SCNC: 109 MMOL/L (ref 94–109)
CO2 SERPL-SCNC: 25 MMOL/L (ref 20–32)
CREAT SERPL-MCNC: 2.22 MG/DL (ref 0.66–1.25)
ERYTHROCYTE [DISTWIDTH] IN BLOOD BY AUTOMATED COUNT: 15.9 % (ref 10–15)
GFR SERPL CREATININE-BSD FRML MDRD: 36 ML/MIN/{1.73_M2}
GLUCOSE BLDC GLUCOMTR-MCNC: 101 MG/DL (ref 70–99)
GLUCOSE BLDC GLUCOMTR-MCNC: 105 MG/DL (ref 70–99)
GLUCOSE BLDC GLUCOMTR-MCNC: 109 MG/DL (ref 70–99)
GLUCOSE BLDC GLUCOMTR-MCNC: 116 MG/DL (ref 70–99)
GLUCOSE BLDC GLUCOMTR-MCNC: 123 MG/DL (ref 70–99)
GLUCOSE BLDC GLUCOMTR-MCNC: 201 MG/DL (ref 70–99)
GLUCOSE BLDC GLUCOMTR-MCNC: 202 MG/DL (ref 70–99)
GLUCOSE SERPL-MCNC: 89 MG/DL (ref 70–99)
HCT VFR BLD AUTO: 24.9 % (ref 40–53)
HGB BLD-MCNC: 7.9 G/DL (ref 13.3–17.7)
LABORATORY COMMENT REPORT: NORMAL
LIPASE SERPL-CCNC: 1417 U/L (ref 73–393)
MAGNESIUM SERPL-MCNC: 1.9 MG/DL (ref 1.6–2.3)
MCH RBC QN AUTO: 30.4 PG (ref 26.5–33)
MCHC RBC AUTO-ENTMCNC: 31.7 G/DL (ref 31.5–36.5)
MCV RBC AUTO: 96 FL (ref 78–100)
PHOSPHATE SERPL-MCNC: 2.1 MG/DL (ref 2.5–4.5)
PLATELET # BLD AUTO: 258 10E9/L (ref 150–450)
POTASSIUM SERPL-SCNC: 4.3 MMOL/L (ref 3.4–5.3)
RBC # BLD AUTO: 2.6 10E12/L (ref 4.4–5.9)
SARS-COV-2 RNA RESP QL NAA+PROBE: NEGATIVE
SODIUM SERPL-SCNC: 139 MMOL/L (ref 133–144)
SPECIMEN SOURCE: NORMAL
TACROLIMUS BLD-MCNC: 10.8 UG/L (ref 5–15)
TME LAST DOSE: NORMAL H
UFH PPP CHRO-ACNC: <0.1 IU/ML
WBC # BLD AUTO: 12.5 10E9/L (ref 4–11)

## 2021-06-17 PROCEDURE — U0005 INFEC AGEN DETEC AMPLI PROBE: HCPCS | Performed by: NURSE PRACTITIONER

## 2021-06-17 PROCEDURE — 120N000011 HC R&B TRANSPLANT UMMC

## 2021-06-17 PROCEDURE — 85027 COMPLETE CBC AUTOMATED: CPT | Performed by: PHYSICIAN ASSISTANT

## 2021-06-17 PROCEDURE — 250N000011 HC RX IP 250 OP 636: Performed by: NURSE PRACTITIONER

## 2021-06-17 PROCEDURE — 250N000013 HC RX MED GY IP 250 OP 250 PS 637: Performed by: SURGERY

## 2021-06-17 PROCEDURE — U0003 INFECTIOUS AGENT DETECTION BY NUCLEIC ACID (DNA OR RNA); SEVERE ACUTE RESPIRATORY SYNDROME CORONAVIRUS 2 (SARS-COV-2) (CORONAVIRUS DISEASE [COVID-19]), AMPLIFIED PROBE TECHNIQUE, MAKING USE OF HIGH THROUGHPUT TECHNOLOGIES AS DESCRIBED BY CMS-2020-01-R: HCPCS | Performed by: NURSE PRACTITIONER

## 2021-06-17 PROCEDURE — 250N000013 HC RX MED GY IP 250 OP 250 PS 637: Performed by: PHYSICIAN ASSISTANT

## 2021-06-17 PROCEDURE — 80197 ASSAY OF TACROLIMUS: CPT | Performed by: NURSE PRACTITIONER

## 2021-06-17 PROCEDURE — 0TB03ZX EXCISION OF RIGHT KIDNEY, PERCUTANEOUS APPROACH, DIAGNOSTIC: ICD-10-PCS | Performed by: PHYSICIAN ASSISTANT

## 2021-06-17 PROCEDURE — 84100 ASSAY OF PHOSPHORUS: CPT | Performed by: PHYSICIAN ASSISTANT

## 2021-06-17 PROCEDURE — 250N000012 HC RX MED GY IP 250 OP 636 PS 637: Performed by: PHYSICIAN ASSISTANT

## 2021-06-17 PROCEDURE — 99233 SBSQ HOSP IP/OBS HIGH 50: CPT | Performed by: INTERNAL MEDICINE

## 2021-06-17 PROCEDURE — 82150 ASSAY OF AMYLASE: CPT | Performed by: PHYSICIAN ASSISTANT

## 2021-06-17 PROCEDURE — 250N000013 HC RX MED GY IP 250 OP 250 PS 637: Performed by: NURSE PRACTITIONER

## 2021-06-17 PROCEDURE — 83735 ASSAY OF MAGNESIUM: CPT | Performed by: PHYSICIAN ASSISTANT

## 2021-06-17 PROCEDURE — 250N000012 HC RX MED GY IP 250 OP 636 PS 637: Performed by: NURSE PRACTITIONER

## 2021-06-17 PROCEDURE — 999N001017 HC STATISTIC GLUCOSE BY METER IP

## 2021-06-17 PROCEDURE — 83690 ASSAY OF LIPASE: CPT | Performed by: PHYSICIAN ASSISTANT

## 2021-06-17 PROCEDURE — 250N000011 HC RX IP 250 OP 636: Performed by: PHYSICIAN ASSISTANT

## 2021-06-17 PROCEDURE — 97530 THERAPEUTIC ACTIVITIES: CPT | Mod: GP

## 2021-06-17 PROCEDURE — 80048 BASIC METABOLIC PNL TOTAL CA: CPT | Performed by: PHYSICIAN ASSISTANT

## 2021-06-17 PROCEDURE — 36592 COLLECT BLOOD FROM PICC: CPT | Performed by: PHYSICIAN ASSISTANT

## 2021-06-17 PROCEDURE — 97112 NEUROMUSCULAR REEDUCATION: CPT | Mod: GP

## 2021-06-17 PROCEDURE — 85520 HEPARIN ASSAY: CPT | Performed by: PHYSICIAN ASSISTANT

## 2021-06-17 RX ORDER — CLONIDINE HYDROCHLORIDE 0.2 MG/1
0.2 TABLET ORAL 3 TIMES DAILY
Status: DISCONTINUED | OUTPATIENT
Start: 2021-06-17 | End: 2021-06-21 | Stop reason: HOSPADM

## 2021-06-17 RX ORDER — TACROLIMUS 1 MG/1
2 CAPSULE ORAL
Status: DISCONTINUED | OUTPATIENT
Start: 2021-06-17 | End: 2021-06-20

## 2021-06-17 RX ORDER — VALGANCICLOVIR 450 MG/1
TABLET, FILM COATED ORAL
Qty: 150 TABLET | Refills: 0 | Status: SHIPPED | OUTPATIENT
Start: 2021-06-19 | End: 2021-06-25

## 2021-06-17 RX ORDER — AMOXICILLIN 250 MG
2 CAPSULE ORAL
Status: DISCONTINUED | OUTPATIENT
Start: 2021-06-17 | End: 2021-06-21 | Stop reason: HOSPADM

## 2021-06-17 RX ORDER — SIMETHICONE 80 MG
80 TABLET,CHEWABLE ORAL EVERY 6 HOURS PRN
Status: DISCONTINUED | OUTPATIENT
Start: 2021-06-17 | End: 2021-06-21 | Stop reason: HOSPADM

## 2021-06-17 RX ADMIN — OXYCODONE HYDROCHLORIDE 5 MG: 5 TABLET ORAL at 02:08

## 2021-06-17 RX ADMIN — Medication 1 TABLET: at 18:24

## 2021-06-17 RX ADMIN — TACROLIMUS 2 MG: 1 CAPSULE ORAL at 18:24

## 2021-06-17 RX ADMIN — OCTREOTIDE ACETATE 150 MCG: 100 INJECTION, SOLUTION INTRAVENOUS; SUBCUTANEOUS at 14:48

## 2021-06-17 RX ADMIN — CLOTRIMAZOLE 10 MG: 10 LOZENGE ORAL at 08:54

## 2021-06-17 RX ADMIN — MAGNESIUM OXIDE TAB 400 MG (241.3 MG ELEMENTAL MG) 400 MG: 400 (241.3 MG) TAB at 11:46

## 2021-06-17 RX ADMIN — HYDROXYZINE HYDROCHLORIDE 50 MG: 25 TABLET, FILM COATED ORAL at 00:22

## 2021-06-17 RX ADMIN — METHOCARBAMOL 500 MG: 500 TABLET, FILM COATED ORAL at 20:50

## 2021-06-17 RX ADMIN — ASPIRIN 81 MG CHEWABLE TABLET 81 MG: 81 TABLET CHEWABLE at 08:55

## 2021-06-17 RX ADMIN — CLOTRIMAZOLE 10 MG: 10 LOZENGE ORAL at 16:38

## 2021-06-17 RX ADMIN — LIDOCAINE 1 PATCH: 560 PATCH PERCUTANEOUS; TOPICAL; TRANSDERMAL at 10:48

## 2021-06-17 RX ADMIN — CLONIDINE HYDROCHLORIDE 0.1 MG: 0.1 TABLET ORAL at 00:19

## 2021-06-17 RX ADMIN — SULFAMETHOXAZOLE AND TRIMETHOPRIM 1 TABLET: 400; 80 TABLET ORAL at 08:53

## 2021-06-17 RX ADMIN — SODIUM PHOSPHATE, DIBASIC, ANHYDROUS, POTASSIUM PHOSPHATE, MONOBASIC, AND SODIUM PHOSPHATE, MONOBASIC, MONOHYDRATE 250 MG: 852; 155; 130 TABLET, COATED ORAL at 20:50

## 2021-06-17 RX ADMIN — VALGANCICLOVIR HYDROCHLORIDE 450 MG: 450 TABLET ORAL at 08:53

## 2021-06-17 RX ADMIN — SODIUM PHOSPHATE, DIBASIC, ANHYDROUS, POTASSIUM PHOSPHATE, MONOBASIC, AND SODIUM PHOSPHATE, MONOBASIC, MONOHYDRATE 250 MG: 852; 155; 130 TABLET, COATED ORAL at 10:53

## 2021-06-17 RX ADMIN — Medication 1 TABLET: at 08:55

## 2021-06-17 RX ADMIN — MYCOPHENOLATE MOFETIL 1000 MG: 250 CAPSULE ORAL at 18:24

## 2021-06-17 RX ADMIN — CLONIDINE HYDROCHLORIDE 0.2 MG: 0.2 TABLET ORAL at 16:37

## 2021-06-17 RX ADMIN — OXYCODONE HYDROCHLORIDE 5 MG: 5 TABLET ORAL at 22:21

## 2021-06-17 RX ADMIN — METHOCARBAMOL 500 MG: 500 TABLET, FILM COATED ORAL at 08:56

## 2021-06-17 RX ADMIN — CLONIDINE HYDROCHLORIDE 0.1 MG: 0.1 TABLET ORAL at 08:55

## 2021-06-17 RX ADMIN — MYCOPHENOLATE MOFETIL 1000 MG: 250 CAPSULE ORAL at 08:50

## 2021-06-17 RX ADMIN — ACETAMINOPHEN 650 MG: 325 TABLET, FILM COATED ORAL at 22:21

## 2021-06-17 RX ADMIN — CLOTRIMAZOLE 10 MG: 10 LOZENGE ORAL at 20:50

## 2021-06-17 RX ADMIN — OXYCODONE HYDROCHLORIDE 5 MG: 5 TABLET ORAL at 14:47

## 2021-06-17 RX ADMIN — PANTOPRAZOLE 40 MG: 20 TABLET, DELAYED RELEASE ORAL at 08:54

## 2021-06-17 RX ADMIN — PREDNISONE 40 MG: 20 TABLET ORAL at 08:51

## 2021-06-17 RX ADMIN — ATORVASTATIN CALCIUM 40 MG: 40 TABLET, FILM COATED ORAL at 08:55

## 2021-06-17 RX ADMIN — METHOCARBAMOL 500 MG: 500 TABLET, FILM COATED ORAL at 11:46

## 2021-06-17 RX ADMIN — METHOCARBAMOL 500 MG: 500 TABLET, FILM COATED ORAL at 16:38

## 2021-06-17 RX ADMIN — CLOTRIMAZOLE 10 MG: 10 LOZENGE ORAL at 11:47

## 2021-06-17 RX ADMIN — TACROLIMUS 2.5 MG: 1 CAPSULE ORAL at 08:49

## 2021-06-17 RX ADMIN — INSULIN ASPART 2 UNITS: 100 INJECTION, SOLUTION INTRAVENOUS; SUBCUTANEOUS at 18:25

## 2021-06-17 RX ADMIN — AMLODIPINE BESYLATE 10 MG: 10 TABLET ORAL at 08:55

## 2021-06-17 RX ADMIN — CLONIDINE HYDROCHLORIDE 0.2 MG: 0.2 TABLET ORAL at 20:50

## 2021-06-17 RX ADMIN — HYDRALAZINE HYDROCHLORIDE 10 MG: 20 INJECTION INTRAMUSCULAR; INTRAVENOUS at 00:18

## 2021-06-17 RX ADMIN — ACETAMINOPHEN 650 MG: 325 TABLET, FILM COATED ORAL at 14:47

## 2021-06-17 RX ADMIN — SIMETHICONE 80 MG: 80 TABLET, CHEWABLE ORAL at 11:46

## 2021-06-17 RX ADMIN — CARVEDILOL 6.25 MG: 6.25 TABLET, FILM COATED ORAL at 08:56

## 2021-06-17 RX ADMIN — CARVEDILOL 6.25 MG: 6.25 TABLET, FILM COATED ORAL at 20:50

## 2021-06-17 RX ADMIN — OCTREOTIDE ACETATE 150 MCG: 100 INJECTION, SOLUTION INTRAVENOUS; SUBCUTANEOUS at 20:49

## 2021-06-17 ASSESSMENT — ACTIVITIES OF DAILY LIVING (ADL)
ADLS_ACUITY_SCORE: 12
ADLS_ACUITY_SCORE: 14
ADLS_ACUITY_SCORE: 12

## 2021-06-17 ASSESSMENT — MIFFLIN-ST. JEOR: SCORE: 1679.67

## 2021-06-17 NOTE — PROGRESS NOTES
Transplant Surgery  Inpatient Daily Progress Note  06/17/2021    Assessment & Plan: 40 year old male with a past medical history significant for end stage kidney disease on HD every MWF via AVF, anuric prior to transplant. Other past medical history includes T2DM, HTN, BMI>30, bilateral shoulder pain, s/p lap cholecystectomy 12/19 due to symptomatic biliary dyskinesia and atypical nevi. Admitted for SPK-ED with ureteral stent on 6/11/21 with Dr. Kennedy.     S/p SPK: POD #6  Pancreas: Post-op graft pancreatitis. Lipase 1417, improved RLQ pain. 6/14 fluid lipase ~2400. Insulin needs low. Octreotide TID, ASA, and heparin gtt. 6/14 US: small fluid collection, patent vessels.  Kidney: Cr 2.3->2.2. Good UOP. 6/11 US with moderately elevated velocities at the renal artery anastomosis and mid renal artery. Plan for biopsy tomorrow to r/o rejection.  Acute post-op pain: Continue APAP, oxycodone, and lidoderm.  Immunosuppression management:  PRA 32, intermediate induction  Thymo 175 mg completed intra-op  Simulect POD 1 & 5  Solu-medrol 500/250/100, prednisone taper.  Cellcept 1000 mg BID  Tacrolimus: Goal 8-10.   Complexity of management: Medium. Contributing factors: anemia and induction  Hematology:   Acute blood loss anemia: Given 2 units pRBC intra-op for hgb of 6.6. HGB 7.9 today.  Vascular ppx: Heparin gtt @400/hour and ASA 81 mg daily.   Cardiorespiratory:   HTN: Low yesterday and then high overnight. Continue Norvasc 10 mg daily, Clonidine 0.2mg TID, and Coreg 6.25mg. PTA: Hydralazine 100 mg TID, Norvasc 10 daily, Clonidine 0.2 mg BID and Losartan 100 mg daily.   GI/Nutrition:   Diet: Low fat diet.  Bowel regimen: Senna and Miralax.   Diarrhea: Check c-diff. Hold bowel regimen.  Endocrine: As above.  Fluid/Electrolytes: MIVF: TKO  Hypophosphatemia: On Phospha.  : No acute issues.   Infectious disease: Afebrile. No leukocytosis.  Prophylaxis: Bactrim indefinitely, Valcyte x 3 months. CMV R+, EBV R+, donor info  unknown at this time. Micafungin x 7 days.  Disposition: 7A    Medical Decision Making: Medium  Subsequent visit 44352 (moderate level decision making)    ADI/Fellow/Resident Provider: Arleth Flaherty NP [id number]    Faculty: Arik Syed MD   _________________________________________________________________  Transplant History: Admitted 6/10/2021 for SPK.  6/11/2021 (Kidney / Pancreas), Postoperative day: 6     Interval History: History is obtained from the patient  Overnight events: Minimal RLQ pain, some generalized abd pain and distension. 15-20 loose stools.    ROS:   A 10-point review of systems was negative except as noted above.    Meds:    amLODIPine  10 mg Oral or NG Tube Daily     aspirin  81 mg Oral Daily     atorvastatin  40 mg Oral Daily     calcium carbonate-vitamin D  1 tablet Oral BID w/meals     carvedilol  6.25 mg Oral BID     cloNIDine  0.1 mg Oral TID     clotrimazole  10 mg Buccal 4x Daily     insulin aspart  1-6 Units Subcutaneous Q4H     lidocaine  1-2 patch Transdermal Q24H     lidocaine   Transdermal Q8H     magnesium oxide  400 mg Oral Q24H     methocarbamol  500 mg Oral 4x Daily     mycophenolate  1,000 mg Oral BID     octreotide  150 mcg Subcutaneous TID     pantoprazole  40 mg Oral QAM AC     phosphorus tablet 250 mg  250 mg Oral BID     polyethylene glycol  17 g Oral BID     [START ON 6/18/2021] predniSONE  20 mg Oral Daily    Followed by     [START ON 6/25/2021] predniSONE  15 mg Oral Daily    Followed by     [START ON 7/2/2021] predniSONE  10 mg Oral Daily    Followed by     [START ON 7/9/2021] predniSONE  5 mg Oral Daily     senna-docusate  2 tablet Oral BID     sodium chloride (PF)  3 mL Intravenous Q8H     sulfamethoxazole-trimethoprim  1 tablet Oral Daily     tacrolimus  2 mg Oral QPM     tacrolimus  2.5 mg Oral QAM     valGANciclovir  450 mg Oral Daily       Physical Exam:     Admit Weight: 87.2 kg (192 lb 3.9 oz)    Current vitals:   BP (!) 156/80 (BP Location: Right arm)   Pulse  "64   Temp 98  F (36.7  C) (Oral)   Resp 16   Ht 1.651 m (5' 5\")   Wt 83.8 kg (184 lb 11.2 oz)   SpO2 98%   BMI 30.74 kg/m      Vital sign ranges:    Temp:  [97.5  F (36.4  C)-98.1  F (36.7  C)] 98  F (36.7  C)  Pulse:  [56-65] 64  Resp:  [14-16] 16  BP: ()/(55-96) 156/80  SpO2:  [97 %-100 %] 98 %  Patient Vitals for the past 24 hrs:   BP Temp Temp src Pulse Resp SpO2 Weight   06/17/21 0732 (!) 156/80 98  F (36.7  C) Oral 64 16 98 % --   06/17/21 0357 (!) 156/81 98  F (36.7  C) Oral 65 16 98 % --   06/17/21 0300 (!) 153/70 -- -- -- -- -- --   06/17/21 0145 (!) 161/75 -- -- -- -- -- --   06/17/21 0100 -- -- -- -- -- -- 83.8 kg (184 lb 11.2 oz)   06/17/21 0030 (!) 181/93 -- -- -- -- -- --   06/17/21 0012 (!) 193/82 -- -- -- -- -- --   06/17/21 0000 (!) 172/96 98.1  F (36.7  C) Oral 63 16 98 % --   06/16/21 2033 127/62 97.5  F (36.4  C) Oral 64 16 100 % --   06/16/21 1531 127/62 97.5  F (36.4  C) Oral 59 14 98 % --   06/16/21 1151 98/55 97.9  F (36.6  C) Oral 56 14 97 % --     General Appearance: in no apparent distress.   Skin: normal  Heart: perfused  Lungs: NLB on RA  Abdomen: The abdomen is obese, and tender RLQ. The wound is stapled, c/d/i, drain with serosanguinous output  : no edwards  Extremities: edema: none  Neurologic: awake, alert, oriented x4. Tremor absent.    Data:   CMP  Recent Labs   Lab 06/17/21  0742 06/16/21  0616 06/14/21  1143 06/14/21  1143 06/11/21  0100 06/11/21  0100 06/11/21  0000    140   < >  --    < > 132* 133   POTASSIUM 4.3 4.1   < >  --    < > 4.1 4.0   CHLORIDE 109 111*   < >  --    < >  --   --    CO2 25 26   < >  --    < >  --   --    GLC 89 97   < >  --    < > 134* 142*   BUN 27 26   < >  --    < >  --   --    CR 2.22* 2.34*   < >  --    < >  --   --    GFRESTIMATED 36* 34*   < >  --    < >  --   --    GFRESTBLACK 42* 39*   < >  --    < >  --   --    SHAY 8.2* 8.1*   < >  --    < >  --   --    ICAW  --   --   --   --   --  4.6 4.3*   MAG 1.9 2.0   < >  --    < >  --  "  --    PHOS 2.1* 2.3*   < >  --    < >  --   --    AMYLASE 186* 179*   < >  --    < >  --   --    LIPASE 1,417* 1,547*   < >  --    < >  --   --    FLIPA  --   --   --  2,373  --   --   --     < > = values in this interval not displayed.     CBC  Recent Labs   Lab 06/17/21  0742 06/16/21  0616   HGB 7.9* 7.5*   WBC 12.5* 8.4    200     COAGS  Recent Labs   Lab 06/11/21  0540   INR 1.09   PTT 34      Urinalysis  Recent Labs   Lab Test 06/10/21  1120 08/31/20  0000 07/29/19  1240   COLOR Light Yellow  --  Straw   APPEARANCE Clear  --  Clear   URINEGLC Negative  --  50*   URINEBILI Negative  --  Negative   URINEKETONE Negative  --  Negative   SG 1.007  --  1.010   UBLD Negative  --  Negative   URINEPH 7.0  --  5.0   PROTEIN 200*  --  >499*   NITRITE Negative  --  Negative   LEUKEST Negative  --  Negative   RBCU 3*  --  5*   WBCU 1  --  3   UTPG 5.90* 2.62*  --      Virology:  Hepatitis C Antibody   Date Value Ref Range Status   06/10/2021 Nonreactive NR^Nonreactive Final     Comment:     Assay performance characteristics have not been established for newborns,   infants, and children

## 2021-06-17 NOTE — PLAN OF CARE
VS: stable; BP range 125//90  Neuro: A&O  BG: q4,   Discomfort: endorses abdominal pain; receiving PRN oxy and sched robaxin/ lido patches. Complained of abdominal cramping; received simethicone. No complaints of nausea   Diet: tolerating Low fat diet with fair intake   LDA: R internal jugular -heparin @ 400units/hr          R TIA intact with serosang output   : Voiding clear yellow urine  GI: last BM 6/16  Skin: Midline incision stapled  Mobility: UAL, ambulating halls independently, showered this evening   Plan: med card reviewed with patient. He updated his own lab book, and received provider notes. Will continue to monitor

## 2021-06-17 NOTE — PROGRESS NOTES
[facility]   Transplant Nephrology Progress Note  Date of Admission:  6/10/2021  Today's Date: 06/17/2021    Recommendations:  -Increase clonidine to 0.2mg TID, continue decreased dose coreg  -Agree with plan for kidney transplant biopsy tmrw to rule out rejection due to elevated lipase without evidence of vascular compromise and slow to decline Scr  -Send C.diff and enteric panel due to diarrhea, stop stool softeners    Assessment & Plan   # DDKTX (SPK) trend down but slowly              - Baseline Creatinine: ~ TBD              - Proteinuria: Not checked post transplant              - Date DSA Last Checked: Jun/2021      Latest DSA: No DSA at time of transplant              - BK Viremia: No              - Kidney Tx Biopsy: No   -Stent placed. Remove 4-6 weeks post txp   -Recommend biopsy tmrw due to slow to decline Scr and elevated lipase     # Pancreas Tx (SPK):               - Pancreatic Exocrine Drainage: Enteric drained                     - Blood glucose: near euglycemia       On insulin: yes, short acting              - HbA1c: Last checked at time of transplant     6%              - Pancreatic enzymes: stable, elevated, U/S with unchanged size of fluid collection, patent vessels.              - Date DSA Last Checked: Jun/2021             Latest DSA: No DSA at time of transplant              - Pancreas Tx Biopsy: No     # Immunosuppression: Tacrolimus immediate release (goal 8-10), Mycophenolate mofetil (dose 1000 mg every 12 hours) and Prednisone (dose taper)               - Induction: intermediate risk protocol, PRA 32%              - Changes: No     # Infection Prophylaxis:   - PJP: Sulfa/TMP (Bactrim)  - CMV: Valcyte x3m  - Thrush: Micafungin (Mycamine) and myclex    # Hypertension: Controlled;  Goal BP: < 150/90   - Volume status: Euvolemic    - Changes: Yes, increase clonidine to 0.2mg q8h, continue decreased dose coreg    # Anemia in Chronic Renal Disease:  Hgb: stable, low ROSA M: No   - Iron studies: Unknown at this time, but checked with dialysis    # Mineral Bone Disorder:   - Secondary renal hyperparathyroidism; PTH level: Unknown at this time, but checked with dialysis        On treatment: None  - Vitamin D; level: Unknown at this time, but checked with dialysis        On supplement: Yes  - Calcium; level: Normal   On supplement: No  - Phosphorus; level: Normal        On supplement: No    # Electrolytes:   - Potassium; level: Normal        On supplement: No  - Magnesium; level: Normal        On supplement: Yes  - Bicarbonate; level: Normal        On supplement: No  - Sodium; level: Normal    # Diarrhea:   -stop stool softeners, check C.diff and enteric panel      # Transplant History:  Etiology of Kidney Failure: Diabetes mellitus type 2  Tx: SPK  Transplant: 6/11/2021 (Kidney / Pancreas)  DSA at time of Tx: No  Significant changes in immunosuppression: None  Significant transplant-related complications: None     Recommendations were communicated to the primary team verbally.    Saw Irby MD   Pager: [phone]    Interval History   Had profuse diarrhea yesterday    Review of Systems   4 point ROS was obtained and negative except as noted in the Interval History.    MEDICATIONS:    amLODIPine  10 mg Oral or NG Tube Daily     aspirin  81 mg Oral Daily     atorvastatin  40 mg Oral Daily     calcium carbonate-vitamin D  1 tablet Oral BID w/meals     carvedilol  6.25 mg Oral BID     cloNIDine  0.2 mg Oral TID     clotrimazole  10 mg Buccal 4x Daily     insulin aspart  1-6 Units Subcutaneous Q4H     lidocaine  1-2 patch Transdermal Q24H     lidocaine   Transdermal Q8H     magnesium oxide  400 mg Oral Q24H     methocarbamol  500 mg Oral 4x Daily     mycophenolate  1,000 mg Oral BID     octreotide  150 mcg Subcutaneous TID     pantoprazole  40 mg Oral QAM AC     phosphorus tablet 250 mg  250 mg Oral BID     [START ON 6/18/2021] predniSONE  20 mg Oral Daily    Followed by  "    [START ON 2021] predniSONE  15 mg Oral Daily    Followed by     [START ON 2021] predniSONE  10 mg Oral Daily    Followed by     [START ON 2021] predniSONE  5 mg Oral Daily     sodium chloride (PF)  3 mL Intravenous Q8H     sulfamethoxazole-trimethoprim  1 tablet Oral Daily     tacrolimus  2 mg Oral QPM     tacrolimus  2.5 mg Oral QAM     valGANciclovir  450 mg Oral Daily       dextrose       heparin 400 Units/hr (21 1545)       Physical Exam   Temp  Av.5  F (36.9  C)  Min: 98.2  F (36.8  C)  Max: 98.9  F (37.2  C)  Arterial Line BP  Min: 216/75  Max: 221/84  Arterial Line MAP (mmHg)  Av mmHg  Min: 124 mmHg  Max: 137 mmHg      Pulse  Av.9  Min: 63  Max: 112 Resp  Av.6  Min: 12  Max: 24  SpO2  Av.7 %  Min: 89 %  Max: 100 %    CVP (mmHg): 3 mmHgBP 125/75 (BP Location: Right arm)   Pulse 82   Temp 98  F (36.7  C) (Oral)   Resp 16   Ht 1.651 m (5' 5\")   Wt 83.8 kg (184 lb 11.2 oz)   SpO2 98%   BMI 30.74 kg/m     Date 21 07 - 21 0659   Shift 0830-3986 5701-0861 2718-8624 24 Hour Total   INTAKE   I.V. 2366.94   2366.94   NG/   120   Shift Total(mL/kg) 2486.94(27.33)   2486.94(27.33)   OUTPUT   Urine 1450   1450   Emesis/NG output 325   325   Drains 210   210   Shift Total(mL/kg) (21.81)   (21.81)   Weight (kg) 91 91 91 91      Admit Weight: 87.2 kg (192 lb 3.9 oz)     GENERAL APPEARANCE: alert and no distress  HENT: mouth without ulcers or lesions  RESP: lungs clear to auscultation - no rales, rhonchi or wheezes  CV: regular rhythm, normal rate, no rub, no murmur  EDEMA: no LE edema bilaterally  ABDOMEN: soft, nondistended, nontender, bowel sounds normal  MS: extremities normal - no gross deformities noted, no evidence of inflammation in joints, no muscle tenderness  SKIN: no rash    Data   All labs reviewed by me.  CMP  Recent Labs   Lab 21  0742 21  0616 06/15/21  0631 21  0541    140 140 141   POTASSIUM 4.3 4.1 " 4.2 4.3   CHLORIDE 109 111* 110* 110*   CO2 25 26 25 27   ANIONGAP 5 3 5 5   GLC 89 97 82 96   BUN 27 26 28 29   CR 2.22* 2.34* 2.46* 2.82*   GFRESTIMATED 36* 34* 32* 27*   GFRESTBLACK 42* 39* 37* 31*   SHAY 8.2* 8.1* 8.6 8.4*   MAG 1.9 2.0 2.1 2.0   PHOS 2.1* 2.3* 2.5 2.7     CBC  Recent Labs   Lab 06/17/21  0742 06/16/21  0616 06/15/21  0631 06/14/21  0541   HGB 7.9* 7.5* 7.9* 7.6*   WBC 12.5* 8.4 9.2 10.3   RBC 2.60* 2.48* 2.62* 2.50*   HCT 24.9* 23.5* 25.0* 24.5*   MCV 96 95 95 98   MCH 30.4 30.2 30.2 30.4   MCHC 31.7 31.9 31.6 31.0*   RDW 15.9* 15.6* 15.9* 16.9*    200 198 163     INR  Recent Labs   Lab 06/11/21  0540   INR 1.09   PTT 34     ABG  Recent Labs   Lab 06/11/21  0100 06/11/21  0000 06/10/21  2300 06/10/21  2152   PH 7.40 7.42 7.43 7.45   PCO2 35 35 34* 34*   PO2 121* 128* 177* 113*   HCO3 21 23 23 24   O2PER 40% 40% 40.0 40      Urine Studies  Recent Labs   Lab Test 06/10/21  1120 07/29/19  1240 07/18/19  1200   COLOR Light Yellow Straw Yellow   APPEARANCE Clear Clear Clear   URINEGLC Negative 50* 50*   URINEBILI Negative Negative Negative   URINEKETONE Negative Negative Negative   SG 1.007 1.010 1.011   UBLD Negative Negative Negative   URINEPH 7.0 5.0 6.0   PROTEIN 200* >499* >499*   NITRITE Negative Negative Negative   LEUKEST Negative Negative Negative   RBCU 3* 5* 9*   WBCU 1 3 5     Recent Labs   Lab Test 06/10/21  1120 08/31/20  0000 07/18/19  1200   UTPG 5.90* 2.62* 6.20*     PTH  No lab results found.  Iron Studies  No lab results found.    IMAGING:  All imaging studies reviewed by me.

## 2021-06-17 NOTE — PLAN OF CARE
"Vitals: /62 (BP Location: Right arm)   Pulse 64   Temp 97.5  F (36.4  C) (Oral)   Resp 16   Ht 1.651 m (5' 5\")   Wt 84 kg (185 lb 1.6 oz)   SpO2 100%   BMI 30.80 kg/m    Endocrine: BG checks Q2. (177, 149, 137, 105)  Labs: None on this shift  Pain: 7/10 incisional pain reported  PRN's: PRN Oxy 5mg PO given x2 and effective  Diet: Low calorie. Fair appetitie  LDA: R internal jugular, PIV. R TIA leaky.  GI: Voiding spontaneously  : Soft, frequent BM x3. Resolved on own per pt.   Skin: Abd incision stapled and ABIODUN.   Neuro: Intact  Mobility: UAL. Steady  Education: Specialty pharmacy met with pt today  Plan: Continue with current POC. Note left with SW to follow-up with pt regarding discharge planning per pt request.     "

## 2021-06-17 NOTE — PLAN OF CARE
"BP (!) 161/75 (BP Location: Right arm)   Pulse 63   Temp 98.1  F (36.7  C) (Oral)   Resp 16   Ht 1.651 m (5' 5\")   Wt 83.8 kg (184 lb 11.2 oz)   SpO2 98%   BMI 30.74 kg/m     Shift: 8470-3395  VS: Intermittent HTN, OVSS on RA.  Neuro: AOx4  BG: q4, 100-123 overnight  Cardiopulmonary: WDL  Pain/Nausea/PRN: C/o incisional pain, oxycodone x1. Denies nausea. Hydralazine 10mg x1 for HTN, also received evening dose of clonidine. Atarax x1 for itching.  Diet: Low fat  LDA: R internal jugular, R PIV, heparin at 400mL, D5LR at 10mL, R TIA leaking at site & 140mL serosanguinous output.   GI/: Voiding adequately, LBM 6/16  Skin: Midline incision stapled  Mobility: UAL  Plan: Discharge in 1-2 days with C    Will continue with POC, will notify MD with changes or concerns.     "

## 2021-06-17 NOTE — PROGRESS NOTES
Care Management Follow Up    Length of Stay (days): 6    Expected Discharge Date: 06/18/21     Concerns to be Addressed:    Home with home care   Patient plan of care discussed at interdisciplinary rounds: Yes    Anticipated Discharge Disposition:  Home     Anticipated Discharge Services:  Home Care  Anticipated Discharge DME:  None    Patient/family educated on Medicare website which has current facility and service quality ratings:  Yes  Education Provided on the Discharge Plan:  Yes  Patient/Family in Agreement with the Plan:  Yes    Referrals Placed by CM/SW:  Home Care      Additional Information:  [phone] Fax [phone] that they are able to accept patient with estimated start of care on Monday 6/21.      Cristina Boland RN BSN, PHN, ACM-RN  7A RN Care Coordinator  Phone: [phone]  Pager [phone]    6/17/2021 9:13 AM

## 2021-06-17 NOTE — CONSULTS
"    Interventional Radiology Consult Service Note    Patient is on IR schedule Friday 6/18/21 for an ultrasound guided LLQ renal transplant biopsy.   Labs WNL for procedure. Covid negative upon admission.    Orders for NPO have been entered.   Medications to be held include: heparin ggt  Consent will be done prior to procedure.     Please contact the IR [place] RN [place] [phone] for estimated time of procedure.     Case discussed with Arleth Flaherty CNPSinan Valencia is a 41 year old male with history of end stage kidney disease on HD every MWF via AVF, anuric, T2DM, HTN, BMI>30, bilateral shoulder pain, s/p lap cholecystectomy 12/19 due to symptomatic biliary dyskinesia and atypical nevi. Now s/p SPK-ED with ureteral stent on 6/11/21 with Dr. Kennedy. He has a LLQ intra-peritoneal kidney transplant in the iliac fossa. Request for transplant kidney biopsy on POD #7. Concern for pancrease and kidney function post transplant, requesting kidney transplant biopsy.    Expected date of discharge: TBD    Vitals:   BP (!) 156/80 (BP Location: Right arm)   Pulse 64   Temp 98  F (36.7  C) (Oral)   Resp 16   Ht 1.651 m (5' 5\")   Wt 83.8 kg (184 lb 11.2 oz)   SpO2 98%   BMI 30.74 kg/m      Pertinent Labs:     Lab Results   Component Value Date    WBC 12.5 (H) 06/17/2021    WBC 8.4 06/16/2021    WBC 9.2 06/15/2021       Lab Results   Component Value Date    HGB 7.9 06/17/2021    HGB 7.5 06/16/2021    HGB 7.9 06/15/2021       Lab Results   Component Value Date     06/17/2021     06/16/2021     06/15/2021       Lab Results   Component Value Date    INR 1.09 06/11/2021    PTT 34 06/11/2021       Lab Results   Component Value Date    POTASSIUM 4.3 06/17/2021        Natalie Mcdermott PA-C  Interventional Radiology  Pager: [phone]    "

## 2021-06-18 ENCOUNTER — APPOINTMENT (OUTPATIENT)
Dept: INTERVENTIONAL RADIOLOGY/VASCULAR | Facility: CLINIC | Age: 41
DRG: 008 | End: 2021-06-18
Attending: PHYSICIAN ASSISTANT
Payer: MEDICARE

## 2021-06-18 ENCOUNTER — APPOINTMENT (OUTPATIENT)
Dept: PHYSICAL THERAPY | Facility: CLINIC | Age: 41
DRG: 008 | End: 2021-06-18
Attending: TRANSPLANT SURGERY
Payer: MEDICARE

## 2021-06-18 PROBLEM — D62 ANEMIA DUE TO BLOOD LOSS, ACUTE: Status: ACTIVE | Noted: 2021-06-18

## 2021-06-18 LAB
AMYLASE SERPL-CCNC: 147 U/L (ref 30–110)
ANION GAP SERPL CALCULATED.3IONS-SCNC: 6 MMOL/L (ref 3–14)
BUN SERPL-MCNC: 29 MG/DL (ref 7–30)
CALCIUM SERPL-MCNC: 8.3 MG/DL (ref 8.5–10.1)
CHLORIDE SERPL-SCNC: 108 MMOL/L (ref 94–109)
CO2 SERPL-SCNC: 25 MMOL/L (ref 20–32)
CREAT SERPL-MCNC: 2.14 MG/DL (ref 0.66–1.25)
ERYTHROCYTE [DISTWIDTH] IN BLOOD BY AUTOMATED COUNT: 15.6 % (ref 10–15)
GFR SERPL CREATININE-BSD FRML MDRD: 38 ML/MIN/{1.73_M2}
GLUCOSE BLDC GLUCOMTR-MCNC: 105 MG/DL (ref 70–99)
GLUCOSE BLDC GLUCOMTR-MCNC: 110 MG/DL (ref 70–99)
GLUCOSE BLDC GLUCOMTR-MCNC: 119 MG/DL (ref 70–99)
GLUCOSE BLDC GLUCOMTR-MCNC: 130 MG/DL (ref 70–99)
GLUCOSE BLDC GLUCOMTR-MCNC: 132 MG/DL (ref 70–99)
GLUCOSE BLDC GLUCOMTR-MCNC: 132 MG/DL (ref 70–99)
GLUCOSE BLDC GLUCOMTR-MCNC: 97 MG/DL (ref 70–99)
GLUCOSE SERPL-MCNC: 90 MG/DL (ref 70–99)
HCT VFR BLD AUTO: 23.7 % (ref 40–53)
HGB BLD-MCNC: 7.5 G/DL (ref 13.3–17.7)
INR PPP: 0.96 (ref 0.86–1.14)
LIPASE SERPL-CCNC: 1285 U/L (ref 73–393)
MAGNESIUM SERPL-MCNC: 1.9 MG/DL (ref 1.6–2.3)
MCH RBC QN AUTO: 30.5 PG (ref 26.5–33)
MCHC RBC AUTO-ENTMCNC: 31.6 G/DL (ref 31.5–36.5)
MCV RBC AUTO: 96 FL (ref 78–100)
PHOSPHATE SERPL-MCNC: 2.1 MG/DL (ref 2.5–4.5)
PLATELET # BLD AUTO: 254 10E9/L (ref 150–450)
POTASSIUM SERPL-SCNC: 4.5 MMOL/L (ref 3.4–5.3)
RBC # BLD AUTO: 2.46 10E12/L (ref 4.4–5.9)
SODIUM SERPL-SCNC: 140 MMOL/L (ref 133–144)
TACROLIMUS BLD-MCNC: 9.9 UG/L (ref 5–15)
TME LAST DOSE: NORMAL H
UFH PPP CHRO-ACNC: <0.1 IU/ML
WBC # BLD AUTO: 10.1 10E9/L (ref 4–11)

## 2021-06-18 PROCEDURE — 250N000012 HC RX MED GY IP 250 OP 636 PS 637: Performed by: PHYSICIAN ASSISTANT

## 2021-06-18 PROCEDURE — 250N000013 HC RX MED GY IP 250 OP 250 PS 637: Performed by: PHYSICIAN ASSISTANT

## 2021-06-18 PROCEDURE — 99152 MOD SED SAME PHYS/QHP 5/>YRS: CPT | Performed by: PHYSICIAN ASSISTANT

## 2021-06-18 PROCEDURE — 50200 RENAL BIOPSY PERQ: CPT | Mod: LT | Performed by: PHYSICIAN ASSISTANT

## 2021-06-18 PROCEDURE — 88305 TISSUE EXAM BY PATHOLOGIST: CPT | Mod: TC | Performed by: NURSE PRACTITIONER

## 2021-06-18 PROCEDURE — 88313 SPECIAL STAINS GROUP 2: CPT | Mod: TC | Performed by: NURSE PRACTITIONER

## 2021-06-18 PROCEDURE — 999N001017 HC STATISTIC GLUCOSE BY METER IP

## 2021-06-18 PROCEDURE — 80048 BASIC METABOLIC PNL TOTAL CA: CPT | Performed by: PHYSICIAN ASSISTANT

## 2021-06-18 PROCEDURE — 88346 IMFLUOR 1ST 1ANTB STAIN PX: CPT | Mod: 26 | Performed by: PATHOLOGY

## 2021-06-18 PROCEDURE — 97530 THERAPEUTIC ACTIVITIES: CPT | Mod: GP

## 2021-06-18 PROCEDURE — 99233 SBSQ HOSP IP/OBS HIGH 50: CPT | Performed by: INTERNAL MEDICINE

## 2021-06-18 PROCEDURE — 36592 COLLECT BLOOD FROM PICC: CPT | Performed by: PHYSICIAN ASSISTANT

## 2021-06-18 PROCEDURE — 97110 THERAPEUTIC EXERCISES: CPT | Mod: GP

## 2021-06-18 PROCEDURE — 99152 MOD SED SAME PHYS/QHP 5/>YRS: CPT

## 2021-06-18 PROCEDURE — 83690 ASSAY OF LIPASE: CPT | Performed by: PHYSICIAN ASSISTANT

## 2021-06-18 PROCEDURE — 83735 ASSAY OF MAGNESIUM: CPT | Performed by: PHYSICIAN ASSISTANT

## 2021-06-18 PROCEDURE — 85027 COMPLETE CBC AUTOMATED: CPT | Performed by: NURSE PRACTITIONER

## 2021-06-18 PROCEDURE — 82150 ASSAY OF AMYLASE: CPT | Performed by: PHYSICIAN ASSISTANT

## 2021-06-18 PROCEDURE — 88348 ELECTRON MICROSCOPY DX: CPT | Mod: 26 | Performed by: PATHOLOGY

## 2021-06-18 PROCEDURE — 250N000012 HC RX MED GY IP 250 OP 636 PS 637: Performed by: NURSE PRACTITIONER

## 2021-06-18 PROCEDURE — 85610 PROTHROMBIN TIME: CPT | Performed by: PHYSICIAN ASSISTANT

## 2021-06-18 PROCEDURE — 272N000653 HC COIL/EMBOLIC DEVICE CR8

## 2021-06-18 PROCEDURE — 250N000009 HC RX 250: Performed by: STUDENT IN AN ORGANIZED HEALTH CARE EDUCATION/TRAINING PROGRAM

## 2021-06-18 PROCEDURE — 250N000013 HC RX MED GY IP 250 OP 250 PS 637: Performed by: NURSE PRACTITIONER

## 2021-06-18 PROCEDURE — 250N000011 HC RX IP 250 OP 636: Performed by: STUDENT IN AN ORGANIZED HEALTH CARE EDUCATION/TRAINING PROGRAM

## 2021-06-18 PROCEDURE — 250N000013 HC RX MED GY IP 250 OP 250 PS 637: Performed by: SURGERY

## 2021-06-18 PROCEDURE — 88313 SPECIAL STAINS GROUP 2: CPT | Mod: 26 | Performed by: PATHOLOGY

## 2021-06-18 PROCEDURE — 88350 IMFLUOR EA ADDL 1ANTB STN PX: CPT | Mod: TC | Performed by: NURSE PRACTITIONER

## 2021-06-18 PROCEDURE — 88348 ELECTRON MICROSCOPY DX: CPT | Mod: TC | Performed by: NURSE PRACTITIONER

## 2021-06-18 PROCEDURE — 84100 ASSAY OF PHOSPHORUS: CPT | Performed by: PHYSICIAN ASSISTANT

## 2021-06-18 PROCEDURE — 80197 ASSAY OF TACROLIMUS: CPT | Performed by: NURSE PRACTITIONER

## 2021-06-18 PROCEDURE — 85520 HEPARIN ASSAY: CPT | Performed by: PHYSICIAN ASSISTANT

## 2021-06-18 PROCEDURE — 999N001070 HC STATISTIC R-RUSH PROCESSING: Performed by: NURSE PRACTITIONER

## 2021-06-18 PROCEDURE — 120N000011 HC R&B TRANSPLANT UMMC

## 2021-06-18 PROCEDURE — 88305 TISSUE EXAM BY PATHOLOGIST: CPT | Mod: 26 | Performed by: PATHOLOGY

## 2021-06-18 PROCEDURE — 250N000011 HC RX IP 250 OP 636: Performed by: PHYSICIAN ASSISTANT

## 2021-06-18 PROCEDURE — 250N000011 HC RX IP 250 OP 636: Performed by: NURSE PRACTITIONER

## 2021-06-18 PROCEDURE — 76942 ECHO GUIDE FOR BIOPSY: CPT | Mod: 26 | Performed by: PHYSICIAN ASSISTANT

## 2021-06-18 PROCEDURE — 88346 IMFLUOR 1ST 1ANTB STAIN PX: CPT | Mod: TC | Performed by: NURSE PRACTITIONER

## 2021-06-18 PROCEDURE — 88350 IMFLUOR EA ADDL 1ANTB STN PX: CPT | Mod: 26 | Performed by: PATHOLOGY

## 2021-06-18 RX ORDER — NALOXONE HYDROCHLORIDE 0.4 MG/ML
0.2 INJECTION, SOLUTION INTRAMUSCULAR; INTRAVENOUS; SUBCUTANEOUS
Status: DISCONTINUED | OUTPATIENT
Start: 2021-06-18 | End: 2021-06-18 | Stop reason: HOSPADM

## 2021-06-18 RX ORDER — FLUMAZENIL 0.1 MG/ML
0.2 INJECTION, SOLUTION INTRAVENOUS
Status: DISCONTINUED | OUTPATIENT
Start: 2021-06-18 | End: 2021-06-18 | Stop reason: HOSPADM

## 2021-06-18 RX ORDER — NALOXONE HYDROCHLORIDE 0.4 MG/ML
0.4 INJECTION, SOLUTION INTRAMUSCULAR; INTRAVENOUS; SUBCUTANEOUS
Status: DISCONTINUED | OUTPATIENT
Start: 2021-06-18 | End: 2021-06-18 | Stop reason: HOSPADM

## 2021-06-18 RX ORDER — FENTANYL CITRATE 50 UG/ML
25-50 INJECTION, SOLUTION INTRAMUSCULAR; INTRAVENOUS EVERY 5 MIN PRN
Status: DISCONTINUED | OUTPATIENT
Start: 2021-06-18 | End: 2021-06-18 | Stop reason: HOSPADM

## 2021-06-18 RX ORDER — DEXTROSE MONOHYDRATE 25 G/50ML
25-50 INJECTION, SOLUTION INTRAVENOUS
Status: DISCONTINUED | OUTPATIENT
Start: 2021-06-18 | End: 2021-06-18

## 2021-06-18 RX ORDER — NICOTINE POLACRILEX 4 MG
15-30 LOZENGE BUCCAL
Status: DISCONTINUED | OUTPATIENT
Start: 2021-06-18 | End: 2021-06-18

## 2021-06-18 RX ADMIN — PANTOPRAZOLE 40 MG: 20 TABLET, DELAYED RELEASE ORAL at 08:48

## 2021-06-18 RX ADMIN — CLOTRIMAZOLE 10 MG: 10 LOZENGE ORAL at 16:20

## 2021-06-18 RX ADMIN — METHOCARBAMOL 500 MG: 500 TABLET, FILM COATED ORAL at 16:21

## 2021-06-18 RX ADMIN — CARVEDILOL 6.25 MG: 6.25 TABLET, FILM COATED ORAL at 20:28

## 2021-06-18 RX ADMIN — METHOCARBAMOL 500 MG: 500 TABLET, FILM COATED ORAL at 11:33

## 2021-06-18 RX ADMIN — MYCOPHENOLATE MOFETIL 1000 MG: 250 CAPSULE ORAL at 08:48

## 2021-06-18 RX ADMIN — Medication 1 TABLET: at 18:30

## 2021-06-18 RX ADMIN — LIDOCAINE HYDROCHLORIDE 10 ML: 10 INJECTION, SOLUTION EPIDURAL; INFILTRATION; INTRACAUDAL; PERINEURAL at 10:52

## 2021-06-18 RX ADMIN — Medication 1 TABLET: at 08:48

## 2021-06-18 RX ADMIN — CLONIDINE HYDROCHLORIDE 0.2 MG: 0.2 TABLET ORAL at 20:28

## 2021-06-18 RX ADMIN — SULFAMETHOXAZOLE AND TRIMETHOPRIM 1 TABLET: 400; 80 TABLET ORAL at 08:47

## 2021-06-18 RX ADMIN — AMLODIPINE BESYLATE 10 MG: 10 TABLET ORAL at 08:47

## 2021-06-18 RX ADMIN — MAGNESIUM OXIDE TAB 400 MG (241.3 MG ELEMENTAL MG) 400 MG: 400 (241.3 MG) TAB at 11:33

## 2021-06-18 RX ADMIN — VALGANCICLOVIR HYDROCHLORIDE 450 MG: 450 TABLET ORAL at 08:47

## 2021-06-18 RX ADMIN — SODIUM PHOSPHATE, DIBASIC, ANHYDROUS, POTASSIUM PHOSPHATE, MONOBASIC, AND SODIUM PHOSPHATE, MONOBASIC, MONOHYDRATE 250 MG: 852; 155; 130 TABLET, COATED ORAL at 20:26

## 2021-06-18 RX ADMIN — ACETAMINOPHEN 650 MG: 325 TABLET, FILM COATED ORAL at 20:43

## 2021-06-18 RX ADMIN — ATORVASTATIN CALCIUM 40 MG: 40 TABLET, FILM COATED ORAL at 08:47

## 2021-06-18 RX ADMIN — METHOCARBAMOL 500 MG: 500 TABLET, FILM COATED ORAL at 20:27

## 2021-06-18 RX ADMIN — OCTREOTIDE ACETATE 150 MCG: 100 INJECTION, SOLUTION INTRAVENOUS; SUBCUTANEOUS at 20:27

## 2021-06-18 RX ADMIN — FENTANYL CITRATE 50 MCG: 50 INJECTION, SOLUTION INTRAMUSCULAR; INTRAVENOUS at 10:39

## 2021-06-18 RX ADMIN — TACROLIMUS 2 MG: 1 CAPSULE ORAL at 18:30

## 2021-06-18 RX ADMIN — MIDAZOLAM 1 MG: 1 INJECTION INTRAMUSCULAR; INTRAVENOUS at 10:38

## 2021-06-18 RX ADMIN — LIDOCAINE 1 PATCH: 560 PATCH PERCUTANEOUS; TOPICAL; TRANSDERMAL at 08:48

## 2021-06-18 RX ADMIN — CLOTRIMAZOLE 10 MG: 10 LOZENGE ORAL at 11:33

## 2021-06-18 RX ADMIN — CARVEDILOL 6.25 MG: 6.25 TABLET, FILM COATED ORAL at 08:47

## 2021-06-18 RX ADMIN — MYCOPHENOLATE MOFETIL 1000 MG: 250 CAPSULE ORAL at 18:30

## 2021-06-18 RX ADMIN — HYDRALAZINE HYDROCHLORIDE 10 MG: 20 INJECTION INTRAMUSCULAR; INTRAVENOUS at 04:09

## 2021-06-18 RX ADMIN — TACROLIMUS 2 MG: 1 CAPSULE ORAL at 08:48

## 2021-06-18 RX ADMIN — PREDNISONE 20 MG: 20 TABLET ORAL at 08:47

## 2021-06-18 RX ADMIN — OCTREOTIDE ACETATE 150 MCG: 100 INJECTION, SOLUTION INTRAVENOUS; SUBCUTANEOUS at 08:56

## 2021-06-18 RX ADMIN — SIMETHICONE 80 MG: 80 TABLET, CHEWABLE ORAL at 20:43

## 2021-06-18 RX ADMIN — METHOCARBAMOL 500 MG: 500 TABLET, FILM COATED ORAL at 08:47

## 2021-06-18 RX ADMIN — CLOTRIMAZOLE 10 MG: 10 LOZENGE ORAL at 20:26

## 2021-06-18 RX ADMIN — OCTREOTIDE ACETATE 150 MCG: 100 INJECTION, SOLUTION INTRAVENOUS; SUBCUTANEOUS at 13:45

## 2021-06-18 RX ADMIN — CLONIDINE HYDROCHLORIDE 0.2 MG: 0.2 TABLET ORAL at 08:48

## 2021-06-18 RX ADMIN — SODIUM PHOSPHATE, DIBASIC, ANHYDROUS, POTASSIUM PHOSPHATE, MONOBASIC, AND SODIUM PHOSPHATE, MONOBASIC, MONOHYDRATE 250 MG: 852; 155; 130 TABLET, COATED ORAL at 08:48

## 2021-06-18 RX ADMIN — ASPIRIN 81 MG CHEWABLE TABLET 81 MG: 81 TABLET CHEWABLE at 08:47

## 2021-06-18 ASSESSMENT — ACTIVITIES OF DAILY LIVING (ADL)
ADLS_ACUITY_SCORE: 12

## 2021-06-18 ASSESSMENT — MIFFLIN-ST. JEOR: SCORE: 1719.59

## 2021-06-18 NOTE — DISCHARGE INSTRUCTIONS
Diet recommendations post-transplant: High protein diet x 8 weeks.  Heart healthy dietary habits long term (low saturated/trans fat, low sodium). Practice food safety precautions. See nutrition handout and food safety booklet for more information.     Nursing please fax final discharge orders to:   [facility],  [phone], Fax [phone].

## 2021-06-18 NOTE — PROGRESS NOTES
CLINICAL NUTRITION SERVICES - REASSESSMENT NOTE/DISCHARGE NOTE     Nutrition Prescription    RECOMMENDATIONS FOR MDs/PROVIDERS TO ORDER:  Recommend conservatively restarting bowel regimen to assist with constipation.    Malnutrition Status:    Severe malnutrition in the context of chronic illness    Recommendations already ordered by Registered Dietitian (RD):  Discharge diet instruction written     Future/Additional Recommendations:  If suspect eating poorly, recommend starting oral nutrition supplements. If intakes are variable, recommend start calorie count.      Minimize diet restrictions as able d/t high calorie/protein needs post-transplant.  Oral supplements as needed to help meet nutritional needs.      High protein food choices with meals to help meet high needs post-transplant over the next 6-8 weeks.      Heart-healthy diet (low saturated fat, low sodium, high fiber) and food safety precautions long term due to immunosuppression regimen post-transplant      EVALUATION OF THE PROGRESS TOWARD GOALS   Diet: Low Fat diet    Intake: Pt diet adv from [facility] mid-day 6/15.  Since then, pt has ordered 2 meals per day. Pt notes intake 50% of meals ordered.  States his appetite is slowly returning.  Pt was hesitant to eat at first d/t constipation and GI discomfort.      NEW FINDINGS   Weight:   83.8 kg (184 lb 11.2 oz) -  8# (4%) wt loss since admit (8 days)    Labs:     Cr 2.14 (H) - trending down  Phos 2.1(L)  Amylase 147 (H) - trending down  Lipase 1285 (H) - trending down  BG     Medications:    Ca Carbonate - Vit D 2x/day  MSSI  Mag Ox  Mycophenolate  Phos tab  Prednisone  Tacrolimus    GI:  ROBF 6/14. Last BM 6/16. Two to five BMs noted 6/14-6/16. Pt notes he recently had diarrhea so bowel regimen was discontinued, now feels constipated again.     Patient s discharge needs assessed and discharge planning has been conducted with the multidisciplinary transplant care team including physicians, pharmacy,  social work and transplant coordinator.    MALNUTRITION  % Intake: </= 50% for >/= 5 days (severe)  % Weight Loss: > 2% in 1 week (severe)  Subcutaneous Fat Loss: None observed  Muscle Loss: None observed  Fluid Accumulation/Edema: None noted  Malnutrition Diagnosis: Severe malnutrition in the context of chronic illness    Previous Goals   Diet to advance in 5-7 days   Evaluation: Met    Previous Nutrition Diagnosis  Food and nutrition-related knowledge deficit r/t length of time since previous post-transplant education AEB patient verbal report, review of chart record, and MD consult for nutrition education.  Evaluation: Updated below    CURRENT NUTRITION DIAGNOSIS  Inadequate oral intake related to slow advancement of diet s/p kidney/panc tx as evidenced by NPO status 6 days and minimal intake noted.       INTERVENTIONS  Implementation  Patient Education - Discussed home bowel regimen. Noted constipation could be due to pain meds so may not be an issue upon discharge.     Goals  Patient to consume % of nutritionally adequate meal trays TID, or the equivalent with supplements/snacks.    Monitoring/Evaluation  Progress toward goals will be monitored and evaluated per protocol.    Brittnee Jackman RDN, LD  7A/OBS Pg [phone]

## 2021-06-18 NOTE — PROGRESS NOTES
Transplant Surgery  Inpatient Daily Progress Note  06/18/2021    Assessment & Plan: 40 year old male with a past medical history significant for end stage kidney disease on HD every MWF via AVF, anuric prior to transplant. Other past medical history includes T2DM, HTN, BMI>30, bilateral shoulder pain, s/p lap cholecystectomy 12/19 due to symptomatic biliary dyskinesia and atypical nevi. Admitted for SPK-ED with ureteral stent on 6/11/21 with Dr. Kennedy.     S/p SPK: POD #7  Pancreas: Post-op graft pancreatitis. Lipase 1417->1285. 6/14 fluid lipase ~2400. Insulin needs low. Octreotide TID. 6/14 US: small fluid collection, patent vessels.  Kidney: Cr 2.2->2.1. Good UOP. 6/11 US with moderately elevated velocities at the renal artery anastomosis and mid renal artery. Biopsy today to r/o rejection.  Acute post-op pain: Continue APAP, oxycodone, and lidoderm.  Immunosuppression management:  PRA 32, intermediate induction  Thymo 175 mg completed intra-op  Simulect POD 1 & 5  Solu-medrol 500/250/100, prednisone taper.  Cellcept 1000 mg BID  Tacrolimus: Goal 8-10.   Complexity of management: Medium. Contributing factors: anemia and induction  Hematology:   Acute blood loss anemia: Given 2 units pRBC intra-op for hgb of 6.6. HGB 7.5 today.  Vascular ppx: Stop heparin and continue aspirin 325mg daily.  Cardiorespiratory:   HTN: Better control today. Continue Norvasc 10 mg daily, Clonidine 0.2mg TID, and Coreg 6.25mg. PTA: Hydralazine 100 mg TID, Norvasc 10 daily, Clonidine 0.2 mg BID and Losartan 100 mg daily.   GI/Nutrition:   Diet: Low fat diet d/t possible pancreatitis.  Bowel regimen: Senna PRN.   Diarrhea: Resolved.  Endocrine: As above.  Fluid/Electrolytes:   Hypophosphatemia: On Phospha.  : No acute issues.   Infectious disease: Afebrile. No leukocytosis.  Prophylaxis: Bactrim indefinitely, Valcyte x 3 months. CMV R+, EBV R+, donor info unknown at this time. Micafungin x 7 days.  Disposition: 7A    Medical Decision  Making: Medium  Subsequent visit 29907 (moderate level decision making)    ADI/Fellow/Resident Provider: Arleth Flaherty NP [id number]    Faculty: Julio Cesar Vega MD    Attestation: I saw and examined this patient with Arleth Flaherty NP, and the transplant team. I independently reviewed all pertinent laboratory and imaging results. I agree with the findings and plan as documented in this note.  Julio Cesar Vega MD  _________________________________________________________________  Transplant History: Admitted 6/10/2021 for SPK.  6/11/2021 (Kidney / Pancreas), Postoperative day: 7     Interval History: History is obtained from the patient  Overnight events: Minimal incisional pain. Diarrhea has resolved.     ROS:   A 10-point review of systems was negative except as noted above.    Meds:    amLODIPine  10 mg Oral or NG Tube Daily     [START ON 6/19/2021] aspirin  325 mg Oral Daily     atorvastatin  40 mg Oral Daily     calcium carbonate-vitamin D  1 tablet Oral BID w/meals     carvedilol  6.25 mg Oral BID     cloNIDine  0.2 mg Oral TID     clotrimazole  10 mg Buccal 4x Daily     insulin aspart  1-6 Units Subcutaneous Q4H     lidocaine  1-2 patch Transdermal Q24H     lidocaine   Transdermal Q8H     magnesium oxide  400 mg Oral Q24H     methocarbamol  500 mg Oral 4x Daily     mycophenolate  1,000 mg Oral BID     octreotide  150 mcg Subcutaneous TID     pantoprazole  40 mg Oral QAM AC     phosphorus tablet 250 mg  250 mg Oral BID     predniSONE  20 mg Oral Daily    Followed by     [START ON 6/25/2021] predniSONE  15 mg Oral Daily    Followed by     [START ON 7/2/2021] predniSONE  10 mg Oral Daily    Followed by     [START ON 7/9/2021] predniSONE  5 mg Oral Daily     sodium chloride (PF)  3 mL Intravenous Q8H     sulfamethoxazole-trimethoprim  1 tablet Oral Daily     tacrolimus  2 mg Oral BID IS     valGANciclovir  450 mg Oral Daily       Physical Exam:     Admit Weight: 87.2 kg (192 lb 3.9 oz)    Current vitals:   BP  "116/62 (BP Location: Right arm)   Pulse 57   Temp 98.2  F (36.8  C) (Oral)   Resp 14   Ht 1.651 m (5' 5\")   Wt 83.8 kg (184 lb 11.2 oz)   SpO2 98%   BMI 30.74 kg/m      Vital sign ranges:    Temp:  [98  F (36.7  C)-98.5  F (36.9  C)] 98.2  F (36.8  C)  Pulse:  [55-69] 57  Resp:  [8-17] 14  BP: (105-163)/(56-80) 116/62  SpO2:  [98 %-100 %] 98 %  Patient Vitals for the past 24 hrs:   BP Temp Temp src Pulse Resp SpO2   06/18/21 1130 116/62 -- -- 57 -- --   06/18/21 1115 115/63 -- -- 66 -- --   06/18/21 1100 111/57 -- -- 60 14 98 %   06/18/21 1055 -- -- -- 60 12 98 %   06/18/21 1050 105/56 -- -- 60 14 100 %   06/18/21 1045 111/57 -- -- 60 14 100 %   06/18/21 1040 -- -- -- 59 13 100 %   06/18/21 1035 115/58 -- -- 59 15 100 %   06/18/21 1030 117/57 -- -- 57 15 100 %   06/18/21 1025 118/60 -- -- 59 17 100 %   06/18/21 1020 112/59 -- -- 62 14 98 %   06/18/21 1015 114/59 -- -- 60 8 99 %   06/18/21 1010 -- -- -- 62 16 100 %   06/18/21 1005 122/64 -- -- 62 16 100 %   06/18/21 0831 (!) 157/71 98.2  F (36.8  C) Oral 63 16 100 %   06/18/21 0537 (!) 156/80 -- -- -- -- --   06/18/21 0350 (!) 163/74 98  F (36.7  C) Oral 55 16 100 %   06/18/21 0018 129/72 98.5  F (36.9  C) Oral 61 16 98 %   06/17/21 1539 (!) 152/79 98.1  F (36.7  C) Oral 69 16 99 %     General Appearance: in no apparent distress.   Skin: normal  Heart: perfused  Lungs: NLB on RA  Abdomen: The abdomen is obese, soft The wound is stapled, c/d/i, drain with serosanguinous output  : no edwards  Extremities: edema: none  Neurologic: awake, alert, oriented x4. Tremor absent.    Data:   CMP  Recent Labs   Lab 06/18/21  0749 06/17/21  0742 06/14/21  1143 06/14/21  1143    139   < >  --    POTASSIUM 4.5 4.3   < >  --    CHLORIDE 108 109   < >  --    CO2 25 25   < >  --    GLC 90 89   < >  --    BUN 29 27   < >  --    CR 2.14* 2.22*   < >  --    GFRESTIMATED 38* 36*   < >  --    GFRESTBLACK 44* 42*   < >  --    SHAY 8.3* 8.2*   < >  --    MAG 1.9 1.9   < >  --  "   PHOS 2.1* 2.1*   < >  --    AMYLASE 147* 186*   < >  --    LIPASE 1,285* 1,417*   < >  --    FLIPA  --   --   --  2,373    < > = values in this interval not displayed.     CBC  Recent Labs   Lab 06/18/21  0749 06/17/21  0742   HGB 7.5* 7.9*   WBC 10.1 12.5*    258     COAGS  Recent Labs   Lab 06/18/21  0749   INR 0.96      Urinalysis  Recent Labs   Lab Test 06/10/21  1120 08/31/20  0000 07/29/19  1240   COLOR Light Yellow  --  Straw   APPEARANCE Clear  --  Clear   URINEGLC Negative  --  50*   URINEBILI Negative  --  Negative   URINEKETONE Negative  --  Negative   SG 1.007  --  1.010   UBLD Negative  --  Negative   URINEPH 7.0  --  5.0   PROTEIN 200*  --  >499*   NITRITE Negative  --  Negative   LEUKEST Negative  --  Negative   RBCU 3*  --  5*   WBCU 1  --  3   UTPG 5.90* 2.62*  --      Virology:  Hepatitis C Antibody   Date Value Ref Range Status   06/10/2021 Nonreactive NR^Nonreactive Final     Comment:     Assay performance characteristics have not been established for newborns,   infants, and children

## 2021-06-18 NOTE — PRE-PROCEDURE
GENERAL PRE-PROCEDURE:   Procedure:  LLQ rebal transplant biopsy  Date/Time:  6/18/2021 10:13 AM    Written consent obtained?: Yes    Risks and benefits: Risks, benefits and alternatives were discussed    Consent given by:  Patient  Patient states understanding of procedure being performed: Yes    Patient's understanding of procedure matches consent: Yes    Procedure consent matches procedure scheduled: Yes    Expected level of sedation:  Moderate  Appropriately NPO:  Yes  ASA Class:  Class 2- mild systemic disease, no acute problems, no functional limitations  Mallampati  :  Grade 2- soft palate, base of uvula, tonsillar pillars, and portion of posterior pharyngeal wall visible  Lungs:  Lungs clear with good breath sounds bilaterally  Heart:  Normal heart sounds and rate  History & Physical reviewed:  History and physical reviewed and no updates needed  Statement of review:  I have reviewed the lab findings, diagnostic data, medications, and the plan for sedation

## 2021-06-18 NOTE — PLAN OF CARE
Physical Therapy Discharge Summary    Reason for therapy discharge:    All goals and outcomes met, no further needs identified.    Progress towards therapy goal(s). See goals on Care Plan in [facility] electronic health record for goal details.  Goals met    Therapy recommendation(s):    Continue home exercise program. Including standing LE HEP and walking program. Pt demonstrates safe mobility and completion of necessary stairs to discharge home safely with assist from family prn.

## 2021-06-18 NOTE — PROGRESS NOTES
Care Management Follow Up    Length of Stay (days): 7    Expected Discharge Date: 06/19/21     Concerns to be Addressed:  ATC, home care     Patient plan of care discussed at interdisciplinary rounds: Yes    Anticipated Discharge Disposition:  Home with home care     Anticipated Discharge Services:  Home Care  Anticipated Discharge DME:  None    Patient/family educated on Medicare website which has current facility and service quality ratings:  Yes  Education Provided on the Discharge Plan:  Yes  Patient/Family in Agreement with the Plan:  Yes    Referrals Placed by CM/SW:  Home Care, ATC    Additional Information:  Pt status reviewed/discussed during care team rounds.  Team anticipates possible discharge over the weekend.  Patient will need ATC arranged. Recommending home with home care.    Updated [facility],  [phone], Fax [phone].      Met with pt.  Reviewed anticipated plan for discharge.  Reviewed/discussed ATC.  Reviewed/discussed home care RN f/u.  Pt notes no concerns or questions.       Received f/u call from Mónica from [facility] confirming agency anticipates start of care next week Wednesday/Thursday.       Cristina Boland RN BSN, PHN, ACM-RN  7A RN Care Coordinator  Phone: [phone]  Pager [phone]    6/18/2021 1:17 PM

## 2021-06-18 NOTE — PLAN OF CARE
"/72 (BP Location: Right arm)   Pulse 61   Temp 98.2  F (36.8  C) (Oral)   Resp 14   Ht 1.651 m (5' 5\")   Wt 83.8 kg (184 lb 11.2 oz)   SpO2 98%   BMI 30.74 kg/m      2584-3242. AVSS on RA. BG 97 & 132. Denies nausea. Pt reports mild incisional pain, declines intervention. Tolerating a low fat diet, good oral intake. RIJ + PIV both SL. Voiding adequate amounts, not saving. No BM today, pt reports lots of gas. Incision stapled, ABIODUN. R TIA with moderate amounts of serosanguinous output, leaky at site. Up ad isak, walking in halls. Lab book up to date. Kidney biopsy completed this morning, LLQ site covered with primapore. Will continue to monitor and update with any changes.    "

## 2021-06-18 NOTE — PROCEDURES
[facility]    Procedure: IR Procedure Note    Date/Time: 6/18/2021 10:53 AM  Performed by: Bandar Harper PA-C  Authorized by: Bandar Harper PA-C     UNIVERSAL PROTOCOL   Site Marked: NA  Prior Images Obtained and Reviewed:  Yes  Required items: Required blood products, implants, devices and special equipment available    Patient identity confirmed:  Verbally with patient, arm band, provided demographic data and hospital-assigned identification number  Patient was reevaluated immediately before administering moderate or deep sedation or anesthesia  Confirmation Checklist:  Patient's identity using two indicators, relevant allergies, procedure was appropriate and matched the consent or emergent situation and correct equipment/implants were available  Time out: Immediately prior to the procedure a time out was called    Universal Protocol: the Joint Commission Universal Protocol was followed    Preparation: Patient was prepped and draped in usual sterile fashion           ANESTHESIA    Anesthesia: Local infiltration  Local Anesthetic:  Lidocaine 1% without epinephrine      SEDATION    Patient Sedated: Yes    Vital signs: Vital signs monitored during sedation    See dictated procedure note for full details.  Findings: Sedation medications administered: 1 mg Versed, 50 mcg Fentanyl  Sedation time: 10 minutes    Specimens: core needle biopsy specimens sent for pathological analysis    Complications: None    Condition: Stable    PROCEDURE   Patient Tolerance:  Patient tolerated the procedure well with no immediate complications  Describe Procedure: Erik Cramer  [id number]    Completed ultrasound guided LLQ transplant kidney biopsy.  A total of two passes yielded kidney tissue cores collected for further pathological evaluation.  Path present.  No immediate complications.  Dx: s/p transplant.  Leroy.    Sedation medications administered: 1 mg  Versed, 50 mcg Fentanyl  Sedation time: 10 minutes    Length of time physician/provider present for 1:1 monitoring during sedation: 10

## 2021-06-18 NOTE — PROGRESS NOTES
Patient Name: Erik Cramer  Medical Record Number: [id number]  Today's Date: 6/18/2021    Procedure: Renal transplant biopsy Left  Proceduralist: Kvng Harper PA-C    Patient in room: 1004  Procedure Start: 1043  Procedure end: 1051  Sedation medications administered: 1 mg Versed, 50 mcg Fentanyl.  Patient out of room: 1105    Report given to: CLAU Chamorro    Other Notes: Pt arrived to IR room 7 from . Consent reviewed. Pt denies any questions or concerns regarding procedure. Pt positioned supine and monitored per protocol. Pt tolerated procedure without any noted complications. Pt transferred back to .    Pathology staff here to collect samples.  Core samples sent in formalin, electron microscopy fluid and zues.

## 2021-06-18 NOTE — PROVIDER NOTIFICATION
7a [id number] Bela BLANTON  can you please clarify heparin orders for kidney biopsy  thx  [phone] RN    On call called. Pharmacy will auto hold at 7 am. Will follow per order.

## 2021-06-18 NOTE — PLAN OF CARE
Vitals: 163/74. Hydralazine x 1. 156/80, room air.  Blood glucose: every 4 hrs, 119, 112  Pain/nausea: oxy 5 mg x 1. Tylenol x 1.     Diet: NPO for renal biopsy@ 0930.     Lines: RUE PIV saline locked. TL infusing heparin @ 400 unit(s)/hr TKO NS.   : 190  GI: needs C diff sample.  Was having diarrhea yesterday.  Drains: TIA R OP 75 serosang. CDI  Skin: incision stapled ABIODUN. Binder on.   Mobility: up ad isak.   Education : med and lab updated. Special T pharmacy seen.

## 2021-06-19 LAB
AMYLASE SERPL-CCNC: 130 U/L (ref 30–110)
ANION GAP SERPL CALCULATED.3IONS-SCNC: 4 MMOL/L (ref 3–14)
BUN SERPL-MCNC: 30 MG/DL (ref 7–30)
CALCIUM SERPL-MCNC: 8.2 MG/DL (ref 8.5–10.1)
CHLORIDE SERPL-SCNC: 109 MMOL/L (ref 94–109)
CO2 SERPL-SCNC: 25 MMOL/L (ref 20–32)
CREAT SERPL-MCNC: 2.26 MG/DL (ref 0.66–1.25)
ERYTHROCYTE [DISTWIDTH] IN BLOOD BY AUTOMATED COUNT: 15.8 % (ref 10–15)
GFR SERPL CREATININE-BSD FRML MDRD: 35 ML/MIN/{1.73_M2}
GLUCOSE BLDC GLUCOMTR-MCNC: 104 MG/DL (ref 70–99)
GLUCOSE BLDC GLUCOMTR-MCNC: 110 MG/DL (ref 70–99)
GLUCOSE BLDC GLUCOMTR-MCNC: 113 MG/DL (ref 70–99)
GLUCOSE BLDC GLUCOMTR-MCNC: 135 MG/DL (ref 70–99)
GLUCOSE BLDC GLUCOMTR-MCNC: 138 MG/DL (ref 70–99)
GLUCOSE BLDC GLUCOMTR-MCNC: 174 MG/DL (ref 70–99)
GLUCOSE SERPL-MCNC: 100 MG/DL (ref 70–99)
HCT VFR BLD AUTO: 23.7 % (ref 40–53)
HGB BLD-MCNC: 7.6 G/DL (ref 13.3–17.7)
LIPASE SERPL-CCNC: 1032 U/L (ref 73–393)
MAGNESIUM SERPL-MCNC: 1.7 MG/DL (ref 1.6–2.3)
MCH RBC QN AUTO: 30.5 PG (ref 26.5–33)
MCHC RBC AUTO-ENTMCNC: 32.1 G/DL (ref 31.5–36.5)
MCV RBC AUTO: 95 FL (ref 78–100)
PHOSPHATE SERPL-MCNC: 2.2 MG/DL (ref 2.5–4.5)
PLATELET # BLD AUTO: 299 10E9/L (ref 150–450)
POTASSIUM SERPL-SCNC: 4.7 MMOL/L (ref 3.4–5.3)
RBC # BLD AUTO: 2.49 10E12/L (ref 4.4–5.9)
SODIUM SERPL-SCNC: 139 MMOL/L (ref 133–144)
TACROLIMUS BLD-MCNC: 8 UG/L (ref 5–15)
TME LAST DOSE: NORMAL H
WBC # BLD AUTO: 11.8 10E9/L (ref 4–11)

## 2021-06-19 PROCEDURE — 99233 SBSQ HOSP IP/OBS HIGH 50: CPT | Performed by: INTERNAL MEDICINE

## 2021-06-19 PROCEDURE — 85027 COMPLETE CBC AUTOMATED: CPT | Performed by: NURSE PRACTITIONER

## 2021-06-19 PROCEDURE — 250N000013 HC RX MED GY IP 250 OP 250 PS 637: Performed by: SURGERY

## 2021-06-19 PROCEDURE — 80197 ASSAY OF TACROLIMUS: CPT | Performed by: NURSE PRACTITIONER

## 2021-06-19 PROCEDURE — 250N000012 HC RX MED GY IP 250 OP 636 PS 637: Performed by: PHYSICIAN ASSISTANT

## 2021-06-19 PROCEDURE — 120N000011 HC R&B TRANSPLANT UMMC

## 2021-06-19 PROCEDURE — 83690 ASSAY OF LIPASE: CPT | Performed by: PHYSICIAN ASSISTANT

## 2021-06-19 PROCEDURE — 36592 COLLECT BLOOD FROM PICC: CPT | Performed by: PHYSICIAN ASSISTANT

## 2021-06-19 PROCEDURE — 250N000011 HC RX IP 250 OP 636: Performed by: NURSE PRACTITIONER

## 2021-06-19 PROCEDURE — 80048 BASIC METABOLIC PNL TOTAL CA: CPT | Performed by: PHYSICIAN ASSISTANT

## 2021-06-19 PROCEDURE — 258N000003 HC RX IP 258 OP 636: Performed by: PHYSICIAN ASSISTANT

## 2021-06-19 PROCEDURE — 82150 ASSAY OF AMYLASE: CPT | Performed by: PHYSICIAN ASSISTANT

## 2021-06-19 PROCEDURE — 250N000013 HC RX MED GY IP 250 OP 250 PS 637: Performed by: NURSE PRACTITIONER

## 2021-06-19 PROCEDURE — 84100 ASSAY OF PHOSPHORUS: CPT | Performed by: PHYSICIAN ASSISTANT

## 2021-06-19 PROCEDURE — 83735 ASSAY OF MAGNESIUM: CPT | Performed by: PHYSICIAN ASSISTANT

## 2021-06-19 PROCEDURE — 250N000013 HC RX MED GY IP 250 OP 250 PS 637: Performed by: PHYSICIAN ASSISTANT

## 2021-06-19 PROCEDURE — 250N000012 HC RX MED GY IP 250 OP 636 PS 637: Performed by: NURSE PRACTITIONER

## 2021-06-19 PROCEDURE — 999N001017 HC STATISTIC GLUCOSE BY METER IP

## 2021-06-19 PROCEDURE — 250N000011 HC RX IP 250 OP 636: Performed by: PHYSICIAN ASSISTANT

## 2021-06-19 RX ADMIN — BENZOCAINE AND MENTHOL 1 LOZENGE: 15; 3.6 LOZENGE ORAL at 20:30

## 2021-06-19 RX ADMIN — METHOCARBAMOL 500 MG: 500 TABLET, FILM COATED ORAL at 08:55

## 2021-06-19 RX ADMIN — MYCOPHENOLATE MOFETIL 1000 MG: 250 CAPSULE ORAL at 17:48

## 2021-06-19 RX ADMIN — OCTREOTIDE ACETATE 150 MCG: 100 INJECTION, SOLUTION INTRAVENOUS; SUBCUTANEOUS at 09:24

## 2021-06-19 RX ADMIN — MAGNESIUM OXIDE TAB 400 MG (241.3 MG ELEMENTAL MG) 400 MG: 400 (241.3 MG) TAB at 12:52

## 2021-06-19 RX ADMIN — PANTOPRAZOLE 40 MG: 20 TABLET, DELAYED RELEASE ORAL at 08:55

## 2021-06-19 RX ADMIN — CLOTRIMAZOLE 10 MG: 10 LOZENGE ORAL at 08:52

## 2021-06-19 RX ADMIN — CLOTRIMAZOLE 10 MG: 10 LOZENGE ORAL at 15:46

## 2021-06-19 RX ADMIN — LIDOCAINE 1 PATCH: 560 PATCH PERCUTANEOUS; TOPICAL; TRANSDERMAL at 09:17

## 2021-06-19 RX ADMIN — BENZOCAINE AND MENTHOL 1 LOZENGE: 15; 3.6 LOZENGE ORAL at 15:48

## 2021-06-19 RX ADMIN — METHOCARBAMOL 500 MG: 500 TABLET, FILM COATED ORAL at 15:46

## 2021-06-19 RX ADMIN — AMLODIPINE BESYLATE 10 MG: 10 TABLET ORAL at 08:52

## 2021-06-19 RX ADMIN — MAGNESIUM HYDROXIDE 30 ML: 400 SUSPENSION ORAL at 11:51

## 2021-06-19 RX ADMIN — SULFAMETHOXAZOLE AND TRIMETHOPRIM 1 TABLET: 400; 80 TABLET ORAL at 08:52

## 2021-06-19 RX ADMIN — TACROLIMUS 2 MG: 1 CAPSULE ORAL at 17:48

## 2021-06-19 RX ADMIN — Medication 5 MG: at 02:39

## 2021-06-19 RX ADMIN — VALGANCICLOVIR HYDROCHLORIDE 450 MG: 450 TABLET ORAL at 08:51

## 2021-06-19 RX ADMIN — OCTREOTIDE ACETATE 150 MCG: 100 INJECTION, SOLUTION INTRAVENOUS; SUBCUTANEOUS at 20:30

## 2021-06-19 RX ADMIN — TACROLIMUS 2 MG: 1 CAPSULE ORAL at 08:49

## 2021-06-19 RX ADMIN — CLOTRIMAZOLE 10 MG: 10 LOZENGE ORAL at 20:30

## 2021-06-19 RX ADMIN — CARVEDILOL 6.25 MG: 6.25 TABLET, FILM COATED ORAL at 20:30

## 2021-06-19 RX ADMIN — OCTREOTIDE ACETATE 150 MCG: 100 INJECTION, SOLUTION INTRAVENOUS; SUBCUTANEOUS at 14:00

## 2021-06-19 RX ADMIN — PREDNISONE 20 MG: 20 TABLET ORAL at 08:51

## 2021-06-19 RX ADMIN — Medication 1 TABLET: at 08:53

## 2021-06-19 RX ADMIN — CARVEDILOL 6.25 MG: 6.25 TABLET, FILM COATED ORAL at 08:55

## 2021-06-19 RX ADMIN — INSULIN ASPART 1 UNITS: 100 INJECTION, SOLUTION INTRAVENOUS; SUBCUTANEOUS at 12:55

## 2021-06-19 RX ADMIN — CLONIDINE HYDROCHLORIDE 0.2 MG: 0.2 TABLET ORAL at 14:48

## 2021-06-19 RX ADMIN — ONDANSETRON 4 MG: 4 TABLET, ORALLY DISINTEGRATING ORAL at 09:23

## 2021-06-19 RX ADMIN — SODIUM CHLORIDE 1000 ML: 9 INJECTION, SOLUTION INTRAVENOUS at 13:20

## 2021-06-19 RX ADMIN — Medication 1 TABLET: at 17:48

## 2021-06-19 RX ADMIN — SODIUM PHOSPHATE, DIBASIC, ANHYDROUS, POTASSIUM PHOSPHATE, MONOBASIC, AND SODIUM PHOSPHATE, MONOBASIC, MONOHYDRATE 250 MG: 852; 155; 130 TABLET, COATED ORAL at 08:56

## 2021-06-19 RX ADMIN — METHOCARBAMOL 500 MG: 500 TABLET, FILM COATED ORAL at 12:52

## 2021-06-19 RX ADMIN — ASPIRIN 325 MG: 325 TABLET, COATED ORAL at 08:52

## 2021-06-19 RX ADMIN — CLONIDINE HYDROCHLORIDE 0.2 MG: 0.2 TABLET ORAL at 08:54

## 2021-06-19 RX ADMIN — METHOCARBAMOL 500 MG: 500 TABLET, FILM COATED ORAL at 20:30

## 2021-06-19 RX ADMIN — SODIUM PHOSPHATE, DIBASIC, ANHYDROUS, POTASSIUM PHOSPHATE, MONOBASIC, AND SODIUM PHOSPHATE, MONOBASIC, MONOHYDRATE 250 MG: 852; 155; 130 TABLET, COATED ORAL at 20:30

## 2021-06-19 RX ADMIN — ATORVASTATIN CALCIUM 40 MG: 40 TABLET, FILM COATED ORAL at 08:53

## 2021-06-19 RX ADMIN — ACETAMINOPHEN 650 MG: 325 TABLET, FILM COATED ORAL at 15:46

## 2021-06-19 RX ADMIN — CLONIDINE HYDROCHLORIDE 0.2 MG: 0.2 TABLET ORAL at 20:30

## 2021-06-19 RX ADMIN — MYCOPHENOLATE MOFETIL 1000 MG: 250 CAPSULE ORAL at 08:49

## 2021-06-19 RX ADMIN — SIMETHICONE 80 MG: 80 TABLET, CHEWABLE ORAL at 20:30

## 2021-06-19 RX ADMIN — ACETAMINOPHEN 650 MG: 325 TABLET, FILM COATED ORAL at 20:30

## 2021-06-19 RX ADMIN — ACETAMINOPHEN 650 MG: 325 TABLET, FILM COATED ORAL at 09:24

## 2021-06-19 RX ADMIN — CLOTRIMAZOLE 10 MG: 10 LOZENGE ORAL at 12:52

## 2021-06-19 ASSESSMENT — ACTIVITIES OF DAILY LIVING (ADL)
ADLS_ACUITY_SCORE: 14
ADLS_ACUITY_SCORE: 12
ADLS_ACUITY_SCORE: 14
ADLS_ACUITY_SCORE: 12

## 2021-06-19 ASSESSMENT — MIFFLIN-ST. JEOR: SCORE: 1631.88

## 2021-06-19 NOTE — PROGRESS NOTES
[facility]   Transplant Nephrology Progress Note  Date of Admission:  6/10/2021  Today's Date: 06/19/2021    Recommendations:  - Prelim kidney transplant biopsy results negative for rejection. IF and DSA pending.  - Agree with IV fluids    Assessment & Plan   # DDKT (SPK) trend down but slowly. Transplant kidney biopsy final results pending.              - Baseline Creatinine: ~ TBD              - Proteinuria: Not checked post transplant              - Date DSA Last Checked: Jun/2021      Latest DSA: No DSA at time of transplant              - BK Viremia: No              - Kidney Tx Biopsy: No   -Stent placed. Remove 4-6 weeks post txp     # Pancreas Tx (SPK):               - Pancreatic Exocrine Drainage: Enteric drained                     - Blood glucose: near euglycemia       On insulin: SSI              - HbA1c: Last checked at time of transplant     6%              - Pancreatic enzymes: stable, elevated, U/S with unchanged size of fluid collection, patent vessels.              - Date DSA Last Checked: Jun/2021             Latest DSA: No DSA at time of transplant              - Pancreas Tx Biopsy: No     # Immunosuppression: Tacrolimus immediate release (goal 8-10), Mycophenolate mofetil (dose 1000 mg every 12 hours) and Prednisone (dose taper)               - Induction: intermediate risk protocol, PRA 32%              - Changes: No     # Infection Prophylaxis:   - PJP: Sulfa/TMP (Bactrim)  - CMV: Valcyte x3m  - Thrush: Micafungin (Mycamine) and myclex    # Hypertension: Controlled;  Goal BP: < 150/90   - Volume status: Euvolemic    - Changes: No    # Anemia in Chronic Renal Disease: Hgb: low stable, low ROSA M: No   - Iron studies: Unknown at this time, but checked with dialysis    # Mineral Bone Disorder:   - Secondary renal hyperparathyroidism; PTH level: Unknown at this time, but checked with dialysis        On treatment: None  - Vitamin D; level: Unknown at this  time, but checked with dialysis        On supplement: Yes  - Calcium; level: Normal   On supplement: Yes  - Phosphorus; level: Low        On supplement: Yes    # Electrolytes:   - Potassium; level: Normal        On supplement: No  - Magnesium; level: Normal        On supplement: Yes  - Bicarbonate; level: Normal        On supplement: No  - Sodium; level: Normal    # Diarrhea:   -stop stool softeners, check C.diff and enteric panel      # Transplant History:  Etiology of Kidney Failure: Diabetes mellitus type 2  Tx: SPK  Transplant: 6/11/2021 (Kidney / Pancreas)  DSA at time of Tx: No  Significant changes in immunosuppression: None  Significant transplant-related complications: None     Recommendations were communicated to the primary team verbally.  Seen and discussed with Dr. Trevor Desai PA-C   Pager: [phone]    Physician Attestation   IAudie, saw and evaluated Erik Cramer as part of a shared visit.  I have reviewed and discussed with the advanced practice provider their history, physical and plan.    I personally reviewed the vital signs, medications, labs and imaging.    My key history or physical exam findings: reports some nausea, no vomiting or diarrhea    Key management decisions made by me: agree with IVF, prelim kidney bx neg for rejection, will f/up IF/EM    Audie Lane  Date of Service (when I saw the patient): 06/19/21    Interval History   Kidney biopsy results still pending. Diarrhea has resolved. Had one episode of vomiting after medications this morning, otherwise no other n/v. No cough. No chest pain or SOB.    Review of Systems   4 point ROS was obtained and negative except as noted in the Interval History.    MEDICATIONS:    amLODIPine  10 mg Oral or NG Tube Daily     aspirin  325 mg Oral Daily     atorvastatin  40 mg Oral Daily     calcium carbonate-vitamin D  1 tablet Oral BID w/meals     carvedilol  6.25 mg Oral BID     cloNIDine  0.2 mg Oral TID      "clotrimazole  10 mg Buccal 4x Daily     insulin aspart  1-6 Units Subcutaneous Q4H     lidocaine  1-2 patch Transdermal Q24H     lidocaine   Transdermal Q8H     magnesium oxide  400 mg Oral Q24H     methocarbamol  500 mg Oral 4x Daily     mycophenolate  1,000 mg Oral BID     octreotide  150 mcg Subcutaneous TID     pantoprazole  40 mg Oral QAM AC     phosphorus tablet 250 mg  250 mg Oral BID     predniSONE  20 mg Oral Daily    Followed by     [START ON 2021] predniSONE  15 mg Oral Daily    Followed by     [START ON 2021] predniSONE  10 mg Oral Daily    Followed by     [START ON 2021] predniSONE  5 mg Oral Daily     sodium chloride (PF)  3 mL Intravenous Q8H     sulfamethoxazole-trimethoprim  1 tablet Oral Daily     tacrolimus  2 mg Oral BID IS     valGANciclovir  450 mg Oral Daily       dextrose         Physical Exam   Temp  Av.5  F (36.9  C)  Min: 98.2  F (36.8  C)  Max: 98.9  F (37.2  C)  Arterial Line BP  Min: 216/75  Max: 221/84  Arterial Line MAP (mmHg)  Av mmHg  Min: 124 mmHg  Max: 137 mmHg      Pulse  Av.9  Min: 63  Max: 112 Resp  Av.6  Min: 12  Max: 24  SpO2  Av.7 %  Min: 89 %  Max: 100 %    CVP (mmHg): 3 mmHgBP 132/77 (BP Location: Right arm)   Pulse 80   Temp 98.2  F (36.8  C) (Oral)   Resp 16   Ht 1.651 m (5' 5\")   Wt 87.8 kg (193 lb 8 oz)   SpO2 98%   BMI 32.20 kg/m     Date 21 07 - 21 0659   Shift 3524-4634 2026-5795 8808-1146 24 Hour Total   INTAKE   I.V. 2366.94   2366.94   NG/   120   Shift Total(mL/kg) 2486.94(27.33)   2486.94(27.33)   OUTPUT   Urine 1450   1450   Emesis/NG output 325   325   Drains 210   210   Shift Total(mL/kg) (21.81)   (21.81)   Weight (kg) 91 91 91 91      Admit Weight: 87.2 kg (192 lb 3.9 oz)     GENERAL APPEARANCE: alert and no distress  HENT: mouth without ulcers or lesions  RESP: lungs clear to auscultation - no rales, rhonchi or wheezes  CV: regular rhythm, normal rate, no rub, no murmur  EDEMA: no " LE edema bilaterally  ABDOMEN: soft, nondistended, nontender, bowel sounds normal  MS: extremities normal - no gross deformities noted, no evidence of inflammation in joints, no muscle tenderness  SKIN: no rash    Data   All labs reviewed by me.  CMP  Recent Labs   Lab 06/19/21 0759 06/18/21  0749 06/17/21  0742 06/16/21  0616    140 139 140   POTASSIUM 4.7 4.5 4.3 4.1   CHLORIDE 109 108 109 111*   CO2 25 25 25 26   ANIONGAP 4 6 5 3   * 90 89 97   BUN 30 29 27 26   CR 2.26* 2.14* 2.22* 2.34*   GFRESTIMATED 35* 38* 36* 34*   GFRESTBLACK 41* 44* 42* 39*   SHAY 8.2* 8.3* 8.2* 8.1*   MAG 1.7 1.9 1.9 2.0   PHOS 2.2* 2.1* 2.1* 2.3*     CBC  Recent Labs   Lab 06/19/21 0759 06/18/21  0749 06/17/21  0742 06/16/21  0616   HGB 7.6* 7.5* 7.9* 7.5*   WBC 11.8* 10.1 12.5* 8.4   RBC 2.49* 2.46* 2.60* 2.48*   HCT 23.7* 23.7* 24.9* 23.5*   MCV 95 96 96 95   MCH 30.5 30.5 30.4 30.2   MCHC 32.1 31.6 31.7 31.9   RDW 15.8* 15.6* 15.9* 15.6*    254 258 200     INR  Recent Labs   Lab 06/18/21  0749   INR 0.96     ABG  No lab results found in last 7 days.   Urine Studies  Recent Labs   Lab Test 06/10/21  1120 07/29/19  1240 07/18/19  1200   COLOR Light Yellow Straw Yellow   APPEARANCE Clear Clear Clear   URINEGLC Negative 50* 50*   URINEBILI Negative Negative Negative   URINEKETONE Negative Negative Negative   SG 1.007 1.010 1.011   UBLD Negative Negative Negative   URINEPH 7.0 5.0 6.0   PROTEIN 200* >499* >499*   NITRITE Negative Negative Negative   LEUKEST Negative Negative Negative   RBCU 3* 5* 9*   WBCU 1 3 5     Recent Labs   Lab Test 06/10/21  1120 08/31/20  0000 07/18/19  1200   UTPG 5.90* 2.62* 6.20*     PTH  No lab results found.  Iron Studies  No lab results found.    IMAGING:  All imaging studies reviewed by me.

## 2021-06-19 NOTE — PROGRESS NOTES
Transplant Surgery  Inpatient Daily Progress Note  06/19/2021    Assessment & Plan: 40 year old male with a past medical history significant for end stage kidney disease on HD every MWF via AVF, anuric prior to transplant. Other past medical history includes T2DM, HTN, BMI>30, bilateral shoulder pain, s/p lap cholecystectomy 12/19 due to symptomatic biliary dyskinesia and atypical nevi. Admitted for SPK-ED with ureteral stent on 6/11/21 with Dr. Kennedy.     S/p SPK: POD #8  Pancreas: Post-op graft pancreatitis. Lipase 1417->1285->1032. 6/14 fluid lipase ~2400. Insulin needs low. Octreotide TID. 6/14 US: small fluid collection, patent vessels.  Kidney: Cr 2.2->2.1->2.3. Good UOP. 6/11 US with moderately elevated velocities at the renal artery anastomosis and mid renal artery. Biopsy 6/18 with ATI, no evidence of rejection. Will give NS bolus x 1 L.  Acute post-op pain: Continue APAP, oxycodone, and lidoderm.  Immunosuppression management:  PRA 32, intermediate induction  Thymo 175 mg completed intra-op  Simulect POD 1 & 5  Solu-medrol 500/250/100, prednisone taper.  Cellcept 1000 mg BID  Tacrolimus: Goal 8-10. 6/18 9.9. 6/19 level pending.   Complexity of management: Medium. Contributing factors: anemia and induction  Hematology:   Acute blood loss anemia: Given 2 units pRBC intra-op for hgb of 6.6. HGB 7.6 today.  Vascular ppx: Stopped heparin and continue aspirin 325mg daily.  Cardiorespiratory:   HTN: -160's. Continue Norvasc 10 mg daily, Clonidine 0.2mg TID, and Coreg 6.25mg. PTA: Hydralazine 100 mg TID, Norvasc 10 daily, Clonidine 0.2 mg BID and Losartan 100 mg daily.   GI/Nutrition:   Diet: Low fat diet d/t possible pancreatitis.  Bowel regimen: Senna PRN. No BM over the last 1-2 days. Give MOM prn.   Diarrhea: Resolved.  Endocrine: As above.  Fluid/Electrolytes:   Hypophosphatemia: On Phospha.  Hypomagnesemia: Mag ox 400 daily  : No acute issues.   Infectious disease: Afebrile. No  leukocytosis.  Prophylaxis: Bactrim indefinitely, Valcyte x 3 months. CMV R+, EBV R+, donor info unknown at this time. Micafungin x 7 days.  Disposition: 7A    Medical Decision Making: Medium  Subsequent visit 23480 (moderate level decision making)    ADI/Fellow/Resident Provider: Monica Pérez PA-C     Faculty: Julio Cesar Vega MD     Attestation: I saw and examined this patient with Monica Pérez PA-C, and the transplant team. I independently reviewed all pertinent laboratory and imaging results. I agree with the findings and plan as documented in this note.  Julio Cesar Vega MD  _________________________________________________________________  Transplant History: Admitted 6/10/2021 for SPK.  6/11/2021 (Kidney / Pancreas), Postoperative day: 8     Interval History: History is obtained from the patient  Overnight events: Minimal incisional pain. Diarrhea has resolved, no bowel function over the last 1-2 days. Emesis this AM.     ROS:   A 10-point review of systems was negative except as noted above.    Meds:    amLODIPine  10 mg Oral or NG Tube Daily     aspirin  325 mg Oral Daily     atorvastatin  40 mg Oral Daily     calcium carbonate-vitamin D  1 tablet Oral BID w/meals     carvedilol  6.25 mg Oral BID     cloNIDine  0.2 mg Oral TID     clotrimazole  10 mg Buccal 4x Daily     insulin aspart  1-6 Units Subcutaneous Q4H     lidocaine  1-2 patch Transdermal Q24H     lidocaine   Transdermal Q8H     magnesium oxide  400 mg Oral Q24H     methocarbamol  500 mg Oral 4x Daily     mycophenolate  1,000 mg Oral BID     octreotide  150 mcg Subcutaneous TID     pantoprazole  40 mg Oral QAM AC     phosphorus tablet 250 mg  250 mg Oral BID     predniSONE  20 mg Oral Daily    Followed by     [START ON 6/25/2021] predniSONE  15 mg Oral Daily    Followed by     [START ON 7/2/2021] predniSONE  10 mg Oral Daily    Followed by     [START ON 7/9/2021] predniSONE  5 mg Oral Daily     sodium chloride (PF)  3 mL Intravenous Q8H  "    sulfamethoxazole-trimethoprim  1 tablet Oral Daily     tacrolimus  2 mg Oral BID IS     valGANciclovir  450 mg Oral Daily       Physical Exam:     Admit Weight: 87.2 kg (192 lb 3.9 oz)    Current vitals:   /77 (BP Location: Right arm)   Pulse 80   Temp 98.2  F (36.8  C) (Oral)   Resp 16   Ht 1.651 m (5' 5\")   Wt 66.8 kg (147 lb 3.2 oz)   SpO2 98%   BMI 24.50 kg/m      Vital sign ranges:    Temp:  [97.5  F (36.4  C)-98.8  F (37.1  C)] 98.2  F (36.8  C)  Pulse:  [65-80] 80  Resp:  [16] 16  BP: (121-169)/(60-84) 132/77  SpO2:  [97 %-99 %] 98 %  Patient Vitals for the past 24 hrs:   BP Temp Temp src Pulse Resp SpO2 Weight   06/19/21 1302 -- -- -- -- -- -- 66.8 kg (147 lb 3.2 oz)   06/19/21 1102 132/77 98.2  F (36.8  C) Oral 80 16 98 % --   06/19/21 1019 121/76 -- -- -- -- -- --   06/19/21 0723 (!) 165/84 98.4  F (36.9  C) Oral 65 16 99 % --   06/19/21 0347 134/69 98.4  F (36.9  C) Oral 67 16 99 % --   06/18/21 2351 (!) 157/73 98.8  F (37.1  C) Oral 68 16 98 % --   06/18/21 2100 (!) 153/79 -- -- -- -- -- --   06/18/21 2028 (!) 169/84 98.1  F (36.7  C) Oral 70 16 97 % --   06/18/21 1632 125/60 97.5  F (36.4  C) Oral 68 16 99 % --   06/18/21 1447 -- -- -- -- -- -- 87.8 kg (193 lb 8 oz)     General Appearance: in no apparent distress.   Skin: normal  Heart: perfused  Lungs: NLB on RA  Abdomen: The abdomen is obese, soft The wound is stapled, c/d/i, drain with serosanguinous output  : no edwards  Extremities: edema: none  Neurologic: awake, alert, oriented x4. Tremor absent.    Data:   CMP  Recent Labs   Lab 06/19/21  0759 06/18/21  0749 06/14/21  1143 06/14/21  1143    140   < >  --    POTASSIUM 4.7 4.5   < >  --    CHLORIDE 109 108   < >  --    CO2 25 25   < >  --    * 90   < >  --    BUN 30 29   < >  --    CR 2.26* 2.14*   < >  --    GFRESTIMATED 35* 38*   < >  --    GFRESTBLACK 41* 44*   < >  --    SHAY 8.2* 8.3*   < >  --    MAG 1.7 1.9   < >  --    PHOS 2.2* 2.1*   < >  --    AMYLASE 130* " 147*   < >  --    LIPASE 1,032* 1,285*   < >  --    FLIPA  --   --   --  2,373    < > = values in this interval not displayed.     CBC  Recent Labs   Lab 06/19/21  0759 06/18/21  0749   HGB 7.6* 7.5*   WBC 11.8* 10.1    254     COAGS  Recent Labs   Lab 06/18/21  0749   INR 0.96      Urinalysis  Recent Labs   Lab Test 06/10/21  1120 08/31/20  0000 07/29/19  1240   COLOR Light Yellow  --  Straw   APPEARANCE Clear  --  Clear   URINEGLC Negative  --  50*   URINEBILI Negative  --  Negative   URINEKETONE Negative  --  Negative   SG 1.007  --  1.010   UBLD Negative  --  Negative   URINEPH 7.0  --  5.0   PROTEIN 200*  --  >499*   NITRITE Negative  --  Negative   LEUKEST Negative  --  Negative   RBCU 3*  --  5*   WBCU 1  --  3   UTPG 5.90* 2.62*  --      Virology:  Hepatitis C Antibody   Date Value Ref Range Status   06/10/2021 Nonreactive NR^Nonreactive Final     Comment:     Assay performance characteristics have not been established for newborns,   infants, and children

## 2021-06-19 NOTE — PLAN OF CARE
Vitals: stable room air  Blood glucose: every 4 hrs, 110 & 104.   Pain/nausea: denies  Diet: low fat   Lines: PIV SL. RIJ SL.   : UOP good.   GI: no BM since 6/17. Rule out for c diff. Needs c diff sample.   Drains: R TIA OP 90 serosang. CDI.   Skin: incision stapled ABIODUN.   Mobility: up ad isak.   Education : med/lab book updated. [facility] pharmacy seen.   Plan : possible discharge HHC  Continue to monitor.

## 2021-06-19 NOTE — PHARMACY-CONSULT NOTE
Solid Organ Transplant Recipient Prior to Discharge Note    41 year old male s/p SPK-ED transplant on 6/11/21.    Pharmacy has monitored for medication interactions and immunosuppression levels in conjunction with the multidisciplinary team. In anticipation for discharge, medication therapy needs have been addressed daily throughout the current admission via multidisciplinary rounds and/or discussions, order verification, daily clinical pharmacy review, and communication with prescribers.  Leroy Osorio, Pharm.D, BCPS

## 2021-06-19 NOTE — PROGRESS NOTES
[facility]   Transplant Nephrology Progress Note  Date of Admission:  6/10/2021  Today's Date: 06/18/2021    Recommendations:  -will f/up kidney bx results  -continue current anti-HTN regimen, BP better controlled  -continue current immunosuppression: Fk within goals    Assessment & Plan   # DDKTX (SPK) trend down but slowly              - Baseline Creatinine: ~ TBD              - Proteinuria: Not checked post transplant              - Date DSA Last Checked: Jun/2021      Latest DSA: No DSA at time of transplant              - BK Viremia: No              - Kidney Tx Biopsy: No   -Stent placed. Remove 4-6 weeks post txp   -Recommend biopsy tmrw due to slow to decline Scr and elevated lipase     # Pancreas Tx (SPK):               - Pancreatic Exocrine Drainage: Enteric drained                     - Blood glucose: near euglycemia       On insulin: yes, short acting              - HbA1c: Last checked at time of transplant     6%              - Pancreatic enzymes: stable, elevated, U/S with unchanged size of fluid collection, patent vessels.              - Date DSA Last Checked: Jun/2021             Latest DSA: No DSA at time of transplant              - Pancreas Tx Biopsy: No     # Immunosuppression: Tacrolimus immediate release (goal 8-10), Mycophenolate mofetil (dose 1000 mg every 12 hours) and Prednisone (dose taper)               - Induction: intermediate risk protocol, PRA 32%              - Changes: No     # Infection Prophylaxis:   - PJP: Sulfa/TMP (Bactrim)  - CMV: Valcyte x3m  - Thrush: Micafungin (Mycamine) and myclex    # Hypertension: Controlled;  Goal BP: < 150/90   - Volume status: Euvolemic    - Changes: Yes, increase clonidine to 0.2mg q8h, continue decreased dose coreg    # Anemia in Chronic Renal Disease: Hgb: stable, low ROSA M: No   - Iron studies: Unknown at this time, but checked with dialysis    # Mineral Bone Disorder:   - Secondary renal  hyperparathyroidism; PTH level: Unknown at this time, but checked with dialysis        On treatment: None  - Vitamin D; level: Unknown at this time, but checked with dialysis        On supplement: Yes  - Calcium; level: Normal   On supplement: No  - Phosphorus; level: Normal        On supplement: No    # Electrolytes:   - Potassium; level: Normal        On supplement: No  - Magnesium; level: Normal        On supplement: Yes  - Bicarbonate; level: Normal        On supplement: No  - Sodium; level: Normal    # Diarrhea:   -stop stool softeners, check C.diff and enteric panel      # Transplant History:  Etiology of Kidney Failure: Diabetes mellitus type 2  Tx: SPK  Transplant: 6/11/2021 (Kidney / Pancreas)  DSA at time of Tx: No  Significant changes in immunosuppression: None  Significant transplant-related complications: None     Recommendations were communicated to the primary team verbally.    Audie Lane MD   Pager: [phone]    Interval History   Feels better, diarrhea resolved today  Underwent kidney bx , no graft pain/hematuria    Review of Systems   4 point ROS was obtained and negative except as noted in the Interval History.    MEDICATIONS:    amLODIPine  10 mg Oral or NG Tube Daily     [START ON 6/19/2021] aspirin  325 mg Oral Daily     atorvastatin  40 mg Oral Daily     calcium carbonate-vitamin D  1 tablet Oral BID w/meals     carvedilol  6.25 mg Oral BID     cloNIDine  0.2 mg Oral TID     clotrimazole  10 mg Buccal 4x Daily     insulin aspart  1-6 Units Subcutaneous Q4H     lidocaine  1-2 patch Transdermal Q24H     lidocaine   Transdermal Q8H     magnesium oxide  400 mg Oral Q24H     methocarbamol  500 mg Oral 4x Daily     mycophenolate  1,000 mg Oral BID     octreotide  150 mcg Subcutaneous TID     pantoprazole  40 mg Oral QAM AC     phosphorus tablet 250 mg  250 mg Oral BID     predniSONE  20 mg Oral Daily    Followed by     [START ON 6/25/2021] predniSONE  15 mg Oral Daily    Followed by     [START  "ON 2021] predniSONE  10 mg Oral Daily    Followed by     [START ON 2021] predniSONE  5 mg Oral Daily     sodium chloride (PF)  3 mL Intravenous Q8H     sulfamethoxazole-trimethoprim  1 tablet Oral Daily     tacrolimus  2 mg Oral BID IS     valGANciclovir  450 mg Oral Daily       dextrose         Physical Exam   Temp  Av.5  F (36.9  C)  Min: 98.2  F (36.8  C)  Max: 98.9  F (37.2  C)  Arterial Line BP  Min: 216/75  Max: 221/84  Arterial Line MAP (mmHg)  Av mmHg  Min: 124 mmHg  Max: 137 mmHg      Pulse  Av.9  Min: 63  Max: 112 Resp  Av.6  Min: 12  Max: 24  SpO2  Av.7 %  Min: 89 %  Max: 100 %    CVP (mmHg): 3 mmHgBP 125/60 (BP Location: Right arm)   Pulse 68   Temp 97.5  F (36.4  C) (Oral)   Resp 16   Ht 1.651 m (5' 5\")   Wt 87.8 kg (193 lb 8 oz)   SpO2 99%   BMI 32.20 kg/m     Date 21 0700 - 21 0659   Shift 6767-7593 1138-6769 4671-2331 24 Hour Total   INTAKE   I.V. 2366.94   2366.94   NG/   120   Shift Total(mL/kg) 2486.94(27.33)   2486.94(27.33)   OUTPUT   Urine 1450   1450   Emesis/NG output 325   325   Drains 210   210   Shift Total(mL/kg) (21.81)   (21.81)   Weight (kg) 91 91 91 91      Admit Weight: 87.2 kg (192 lb 3.9 oz)     GENERAL APPEARANCE: alert and no distress  HENT: mouth without ulcers or lesions  RESP: lungs clear to auscultation - no rales, rhonchi or wheezes  CV: regular rhythm, normal rate, no rub, no murmur  EDEMA: no LE edema bilaterally  ABDOMEN: soft, nondistended, nontender, bowel sounds normal  MS: extremities normal - no gross deformities noted, no evidence of inflammation in joints, no muscle tenderness  SKIN: no rash    Data   All labs reviewed by me.  CMP  Recent Labs   Lab 21  0749 21  0742 21  0616 06/15/21  0631    139 140 140   POTASSIUM 4.5 4.3 4.1 4.2   CHLORIDE 108 109 111* 110*   CO2 25 25 26 25   ANIONGAP 6 5 3 5   GLC 90 89 97 82   BUN 29 27 26 28   CR 2.14* 2.22* 2.34* 2.46*   GFRESTIMATED " 38* 36* 34* 32*   GFRESTBLACK 44* 42* 39* 37*   SHAY 8.3* 8.2* 8.1* 8.6   MAG 1.9 1.9 2.0 2.1   PHOS 2.1* 2.1* 2.3* 2.5     CBC  Recent Labs   Lab 06/18/21  0749 06/17/21  0742 06/16/21  0616 06/15/21  0631   HGB 7.5* 7.9* 7.5* 7.9*   WBC 10.1 12.5* 8.4 9.2   RBC 2.46* 2.60* 2.48* 2.62*   HCT 23.7* 24.9* 23.5* 25.0*   MCV 96 96 95 95   MCH 30.5 30.4 30.2 30.2   MCHC 31.6 31.7 31.9 31.6   RDW 15.6* 15.9* 15.6* 15.9*    258 200 198     INR  Recent Labs   Lab 06/18/21  0749   INR 0.96     ABG  No lab results found in last 7 days.   Urine Studies  Recent Labs   Lab Test 06/10/21  1120 07/29/19  1240 07/18/19  1200   COLOR Light Yellow Straw Yellow   APPEARANCE Clear Clear Clear   URINEGLC Negative 50* 50*   URINEBILI Negative Negative Negative   URINEKETONE Negative Negative Negative   SG 1.007 1.010 1.011   UBLD Negative Negative Negative   URINEPH 7.0 5.0 6.0   PROTEIN 200* >499* >499*   NITRITE Negative Negative Negative   LEUKEST Negative Negative Negative   RBCU 3* 5* 9*   WBCU 1 3 5     Recent Labs   Lab Test 06/10/21  1120 08/31/20  0000 07/18/19  1200   UTPG 5.90* 2.62* 6.20*     PTH  No lab results found.  Iron Studies  No lab results found.    IMAGING:  All imaging studies reviewed by me.

## 2021-06-19 NOTE — PLAN OF CARE
"Vitals: /77 (BP Location: Right arm)   Pulse 80   Temp 98.2  F (36.8  C) (Oral)   Resp 16   Ht 1.651 m (5' 5\")   Wt 87.8 kg (193 lb 8 oz)   SpO2 98%   BMI 32.20 kg/m      Endocrine: Blood glucose 100, 174, 1I Novolog.  Labs: Creatinine unchanged, 2.26, Lipase 1032.  Pain: Mild abdominal incisional pain.  PRN's: Tylenol 650 mg, Zofran ODT, MOM.  Diet: Regular diet, 1 Ensure.  LDA:  R PIV saline locked, R CVC saline locked, R TIA serosanguinous. 1L NS bolus infusing.   GI: Nausea with medium emesis in the toilet. 2 small, soft-loose BM's after laxative.  : Good urine output, voids spontaneously.  Skin: Abdominal incision with frank, R TIA. Patient was able to demonstrate emptying TIA.  Neuro: Pleasant, alert and oriented.  Mobility: Up ad isak, ambulated 2X in the giordano.  Education: Reviewed morning medications, patient updated his lab book.  Plan: Biopsy results pending, discharge home when medically stable.    "

## 2021-06-19 NOTE — PLAN OF CARE
"Vitals: BP (!) 153/79 (BP Location: Right arm)   Pulse 70   Temp 98.1  F (36.7  C) (Oral)   Resp 16   Ht 1.651 m (5' 5\")   Wt 87.8 kg (193 lb 8 oz)   SpO2 97%   BMI 32.20 kg/m    Endocrine: BG Q 4. 130, 105  Labs: None on this shift  Pain: Mild. Controlled with Tylenol.  PRN's: Tylenol given x1  Diet: Low fat, regular texture. Fair appetite.   LDA: R triple lumen internal jugular SL, R PIV SL. R TIA had 140 sersosang output.   GI: Voiding spontaneously without diffifulty. 1200 mL out.   : Passing flatus. No BM on this shift.   Skin: Abd incision stapled and ABIODUN. No drainage. R TIA dressing C,D,I.  Neuro: Intact  Mobility: UAL.   Education: Med card lab book up to date.  Plan: Possible discharge Sat or Sun to home with [facility].     "

## 2021-06-20 ENCOUNTER — RESULTS ONLY (OUTPATIENT)
Dept: OTHER | Facility: CLINIC | Age: 41
End: 2021-06-20

## 2021-06-20 LAB
AMYLASE SERPL-CCNC: 100 U/L (ref 30–110)
ANION GAP SERPL CALCULATED.3IONS-SCNC: 5 MMOL/L (ref 3–14)
BUN SERPL-MCNC: 25 MG/DL (ref 7–30)
CALCIUM SERPL-MCNC: 8.2 MG/DL (ref 8.5–10.1)
CHLORIDE SERPL-SCNC: 109 MMOL/L (ref 94–109)
CO2 SERPL-SCNC: 25 MMOL/L (ref 20–32)
CREAT SERPL-MCNC: 2.03 MG/DL (ref 0.66–1.25)
ERYTHROCYTE [DISTWIDTH] IN BLOOD BY AUTOMATED COUNT: 15.6 % (ref 10–15)
GFR SERPL CREATININE-BSD FRML MDRD: 40 ML/MIN/{1.73_M2}
GLUCOSE SERPL-MCNC: 94 MG/DL (ref 70–99)
HCT VFR BLD AUTO: 22.8 % (ref 40–53)
HGB BLD-MCNC: 7.2 G/DL (ref 13.3–17.7)
LIPASE SERPL-CCNC: 724 U/L (ref 73–393)
MAGNESIUM SERPL-MCNC: 1.9 MG/DL (ref 1.6–2.3)
MCH RBC QN AUTO: 30.8 PG (ref 26.5–33)
MCHC RBC AUTO-ENTMCNC: 31.6 G/DL (ref 31.5–36.5)
MCV RBC AUTO: 97 FL (ref 78–100)
PHOSPHATE SERPL-MCNC: 2.4 MG/DL (ref 2.5–4.5)
PLATELET # BLD AUTO: 290 10E9/L (ref 150–450)
POTASSIUM SERPL-SCNC: 5.2 MMOL/L (ref 3.4–5.3)
RBC # BLD AUTO: 2.34 10E12/L (ref 4.4–5.9)
SODIUM SERPL-SCNC: 138 MMOL/L (ref 133–144)
TACROLIMUS BLD-MCNC: 6 UG/L (ref 5–15)
TME LAST DOSE: NORMAL H
WBC # BLD AUTO: 10.1 10E9/L (ref 4–11)

## 2021-06-20 PROCEDURE — 84100 ASSAY OF PHOSPHORUS: CPT | Performed by: PHYSICIAN ASSISTANT

## 2021-06-20 PROCEDURE — 250N000013 HC RX MED GY IP 250 OP 250 PS 637: Performed by: PHYSICIAN ASSISTANT

## 2021-06-20 PROCEDURE — 250N000013 HC RX MED GY IP 250 OP 250 PS 637: Performed by: NURSE PRACTITIONER

## 2021-06-20 PROCEDURE — 99233 SBSQ HOSP IP/OBS HIGH 50: CPT | Performed by: INTERNAL MEDICINE

## 2021-06-20 PROCEDURE — 83735 ASSAY OF MAGNESIUM: CPT | Performed by: PHYSICIAN ASSISTANT

## 2021-06-20 PROCEDURE — 86832 HLA CLASS I HIGH DEFIN QUAL: CPT | Performed by: TRANSPLANT SURGERY

## 2021-06-20 PROCEDURE — 83690 ASSAY OF LIPASE: CPT | Performed by: PHYSICIAN ASSISTANT

## 2021-06-20 PROCEDURE — 250N000011 HC RX IP 250 OP 636: Performed by: NURSE PRACTITIONER

## 2021-06-20 PROCEDURE — 250N000012 HC RX MED GY IP 250 OP 636 PS 637: Performed by: PHYSICIAN ASSISTANT

## 2021-06-20 PROCEDURE — 80048 BASIC METABOLIC PNL TOTAL CA: CPT | Performed by: PHYSICIAN ASSISTANT

## 2021-06-20 PROCEDURE — 120N000011 HC R&B TRANSPLANT UMMC

## 2021-06-20 PROCEDURE — 86828 HLA CLASS I&II ANTIBODY QUAL: CPT | Performed by: TRANSPLANT SURGERY

## 2021-06-20 PROCEDURE — 258N000003 HC RX IP 258 OP 636: Performed by: PHYSICIAN ASSISTANT

## 2021-06-20 PROCEDURE — 36592 COLLECT BLOOD FROM PICC: CPT | Performed by: PHYSICIAN ASSISTANT

## 2021-06-20 PROCEDURE — 86833 HLA CLASS II HIGH DEFIN QUAL: CPT | Performed by: TRANSPLANT SURGERY

## 2021-06-20 PROCEDURE — 85027 COMPLETE CBC AUTOMATED: CPT | Performed by: NURSE PRACTITIONER

## 2021-06-20 PROCEDURE — 82150 ASSAY OF AMYLASE: CPT | Performed by: PHYSICIAN ASSISTANT

## 2021-06-20 PROCEDURE — 250N000012 HC RX MED GY IP 250 OP 636 PS 637: Performed by: NURSE PRACTITIONER

## 2021-06-20 PROCEDURE — 250N000013 HC RX MED GY IP 250 OP 250 PS 637: Performed by: SURGERY

## 2021-06-20 PROCEDURE — 80197 ASSAY OF TACROLIMUS: CPT | Performed by: NURSE PRACTITIONER

## 2021-06-20 RX ADMIN — OCTREOTIDE ACETATE 150 MCG: 100 INJECTION, SOLUTION INTRAVENOUS; SUBCUTANEOUS at 13:57

## 2021-06-20 RX ADMIN — METHOCARBAMOL 500 MG: 500 TABLET, FILM COATED ORAL at 19:22

## 2021-06-20 RX ADMIN — MAGNESIUM HYDROXIDE 30 ML: 400 SUSPENSION ORAL at 18:26

## 2021-06-20 RX ADMIN — VALGANCICLOVIR HYDROCHLORIDE 450 MG: 450 TABLET ORAL at 08:57

## 2021-06-20 RX ADMIN — ATORVASTATIN CALCIUM 40 MG: 40 TABLET, FILM COATED ORAL at 08:57

## 2021-06-20 RX ADMIN — OCTREOTIDE ACETATE 150 MCG: 100 INJECTION, SOLUTION INTRAVENOUS; SUBCUTANEOUS at 09:06

## 2021-06-20 RX ADMIN — ACETAMINOPHEN 650 MG: 325 TABLET, FILM COATED ORAL at 22:36

## 2021-06-20 RX ADMIN — SODIUM PHOSPHATE, DIBASIC, ANHYDROUS, POTASSIUM PHOSPHATE, MONOBASIC, AND SODIUM PHOSPHATE, MONOBASIC, MONOHYDRATE 250 MG: 852; 155; 130 TABLET, COATED ORAL at 08:57

## 2021-06-20 RX ADMIN — TACROLIMUS 2 MG: 1 CAPSULE ORAL at 08:57

## 2021-06-20 RX ADMIN — METHOCARBAMOL 500 MG: 500 TABLET, FILM COATED ORAL at 12:29

## 2021-06-20 RX ADMIN — CLONIDINE HYDROCHLORIDE 0.2 MG: 0.2 TABLET ORAL at 08:56

## 2021-06-20 RX ADMIN — OXYCODONE HYDROCHLORIDE 5 MG: 5 TABLET ORAL at 23:43

## 2021-06-20 RX ADMIN — CARVEDILOL 6.25 MG: 6.25 TABLET, FILM COATED ORAL at 08:56

## 2021-06-20 RX ADMIN — MYCOPHENOLATE MOFETIL 1000 MG: 250 CAPSULE ORAL at 18:26

## 2021-06-20 RX ADMIN — Medication 5 MG: at 22:36

## 2021-06-20 RX ADMIN — BENZOCAINE AND MENTHOL 1 LOZENGE: 15; 3.6 LOZENGE ORAL at 12:38

## 2021-06-20 RX ADMIN — MAGNESIUM OXIDE TAB 400 MG (241.3 MG ELEMENTAL MG) 400 MG: 400 (241.3 MG) TAB at 12:29

## 2021-06-20 RX ADMIN — AMLODIPINE BESYLATE 10 MG: 10 TABLET ORAL at 08:56

## 2021-06-20 RX ADMIN — MYCOPHENOLATE MOFETIL 1000 MG: 250 CAPSULE ORAL at 08:56

## 2021-06-20 RX ADMIN — INSULIN ASPART 1 UNITS: 100 INJECTION, SOLUTION INTRAVENOUS; SUBCUTANEOUS at 20:01

## 2021-06-20 RX ADMIN — ASPIRIN 325 MG: 325 TABLET, COATED ORAL at 08:57

## 2021-06-20 RX ADMIN — LIDOCAINE 2 PATCH: 560 PATCH PERCUTANEOUS; TOPICAL; TRANSDERMAL at 09:11

## 2021-06-20 RX ADMIN — METHOCARBAMOL 500 MG: 500 TABLET, FILM COATED ORAL at 15:52

## 2021-06-20 RX ADMIN — INSULIN ASPART 2 UNITS: 100 INJECTION, SOLUTION INTRAVENOUS; SUBCUTANEOUS at 15:52

## 2021-06-20 RX ADMIN — TACROLIMUS 2.5 MG: 1 CAPSULE ORAL at 18:27

## 2021-06-20 RX ADMIN — SODIUM PHOSPHATE, DIBASIC, ANHYDROUS, POTASSIUM PHOSPHATE, MONOBASIC, AND SODIUM PHOSPHATE, MONOBASIC, MONOHYDRATE 250 MG: 852; 155; 130 TABLET, COATED ORAL at 19:22

## 2021-06-20 RX ADMIN — ACETAMINOPHEN 650 MG: 325 TABLET, FILM COATED ORAL at 02:20

## 2021-06-20 RX ADMIN — Medication 1 TABLET: at 08:57

## 2021-06-20 RX ADMIN — CLONIDINE HYDROCHLORIDE 0.2 MG: 0.2 TABLET ORAL at 19:22

## 2021-06-20 RX ADMIN — CLOTRIMAZOLE 10 MG: 10 LOZENGE ORAL at 12:29

## 2021-06-20 RX ADMIN — PANTOPRAZOLE 40 MG: 20 TABLET, DELAYED RELEASE ORAL at 08:57

## 2021-06-20 RX ADMIN — CLOTRIMAZOLE 10 MG: 10 LOZENGE ORAL at 19:22

## 2021-06-20 RX ADMIN — SULFAMETHOXAZOLE AND TRIMETHOPRIM 1 TABLET: 400; 80 TABLET ORAL at 08:57

## 2021-06-20 RX ADMIN — OCTREOTIDE ACETATE 150 MCG: 100 INJECTION, SOLUTION INTRAVENOUS; SUBCUTANEOUS at 19:22

## 2021-06-20 RX ADMIN — SODIUM CHLORIDE 1000 ML: 9 INJECTION, SOLUTION INTRAVENOUS at 13:55

## 2021-06-20 RX ADMIN — ACETAMINOPHEN 650 MG: 325 TABLET, FILM COATED ORAL at 18:27

## 2021-06-20 RX ADMIN — INSULIN ASPART 1 UNITS: 100 INJECTION, SOLUTION INTRAVENOUS; SUBCUTANEOUS at 12:32

## 2021-06-20 RX ADMIN — CLOTRIMAZOLE 10 MG: 10 LOZENGE ORAL at 08:56

## 2021-06-20 RX ADMIN — Medication 1 TABLET: at 18:26

## 2021-06-20 RX ADMIN — CLOTRIMAZOLE 10 MG: 10 LOZENGE ORAL at 15:52

## 2021-06-20 RX ADMIN — METHOCARBAMOL 500 MG: 500 TABLET, FILM COATED ORAL at 08:57

## 2021-06-20 RX ADMIN — CARVEDILOL 6.25 MG: 6.25 TABLET, FILM COATED ORAL at 19:22

## 2021-06-20 RX ADMIN — ACETAMINOPHEN 650 MG: 325 TABLET, FILM COATED ORAL at 14:26

## 2021-06-20 RX ADMIN — PREDNISONE 20 MG: 20 TABLET ORAL at 08:57

## 2021-06-20 ASSESSMENT — ACTIVITIES OF DAILY LIVING (ADL)
ADLS_ACUITY_SCORE: 12

## 2021-06-20 ASSESSMENT — MIFFLIN-ST. JEOR: SCORE: 1628.87

## 2021-06-20 NOTE — PLAN OF CARE
"BP (!) 168/88 (BP Location: Right arm)   Pulse 57   Temp 98.3  F (36.8  C) (Oral)   Resp 16   Ht 1.651 m (5' 5\")   Wt 66.8 kg (147 lb 3.2 oz)   SpO2 98%   BMI 24.50 kg/m  RA.  Shift: 1816-2837  Neuro: A&O x4  Cardiac: Regular  Respiratory:Lungs clear  GI/: Voiding no BM during night.Had several yesterday.  Diet/Appetite: Low fat diet,denies nausea.  Activity: up to bathroom independent  Pain: Tylenol but refused oxy  Skin: Abd inc D&I. TIA x1  LDA's: internal jugular saline locked.  Other: Blood glucose 113 and no coverage.  Plan:  Discharge soon.  "

## 2021-06-20 NOTE — PROGRESS NOTES
[facility]   Transplant Nephrology Progress Note  Date of Admission:  6/10/2021  Today's Date: 06/20/2021    Recommendations:  - K+ high normal, continue to monitor  - Repeat BP remains low and so would hold clonidine and give bolus of IV fluids  - Await final transplant kidney biopsy report    Assessment & Plan   # DDKT (SPK) trend down but slowly. Transplant kidney biopsy prelim with no rejection.               - Baseline Creatinine: ~ TBD              - Proteinuria: Not checked post transplant              - Date DSA Last Checked: Jun/2021      Latest DSA: No DSA at time of transplant              - BK Viremia: No              - Kidney Tx Biopsy: No   -Stent placed. Remove 4-6 weeks post txp     # Pancreas Tx (SPK):               - Pancreatic Exocrine Drainage: Enteric drained                     - Blood glucose: near euglycemia       On insulin: SSI              - HbA1c: Last checked at time of transplant     6%              - Pancreatic enzymes: trending down, U/S with unchanged size of fluid collection, patent vessels.              - Date DSA Last Checked: Jun/2021             Latest DSA: No DSA at time of transplant              - Pancreas Tx Biopsy: No     # Immunosuppression: Tacrolimus immediate release (goal 8-10), Mycophenolate mofetil (dose 1000 mg every 12 hours) and Prednisone (dose taper)               - Induction: intermediate risk protocol, PRA 32%              - Changes: No     # Infection Prophylaxis:   - PJP: Sulfa/TMP (Bactrim)  - CMV: Valcyte x3m  - Thrush: Micafungin (Mycamine) and myclex    # Hypertension: had been at goal with the occasional high systolic BP, but now trending down/soft Goal BP: < 150/90   - Volume status: Euvolemic    - Changes: yes- hold clonidine and give IV fluid bolus    # Anemia in Chronic Renal Disease: Hgb: low stable. ROSA M: No   - Iron studies: Unknown at this time, but checked with dialysis    # Mineral Bone Disorder:    - Secondary renal hyperparathyroidism; PTH level: Unknown at this time, but checked with dialysis        On treatment: None  - Vitamin D; level: Unknown at this time, but checked with dialysis        On supplement: Yes  - Calcium; level: Low   On supplement: Yes  - Phosphorus; level: Low, improving        On supplement: Yes    # Electrolytes:   - Potassium; level: High normal        On supplement: No  - Magnesium; level: Normal        On supplement: Yes  - Bicarbonate; level: Normal        On supplement: No  - Sodium; level: Normal      # Transplant History:  Etiology of Kidney Failure: Diabetes mellitus type 2  Tx: SPK  Transplant: 6/11/2021 (Kidney / Pancreas)  DSA at time of Tx: No  Significant changes in immunosuppression: None  Significant transplant-related complications: None     Recommendations were communicated to the primary team verbally.  Seen and discussed with Dr. Trevor Desai PA-C   Pager: [phone]    Physician Attestation   I, Audie Lane, saw and evaluated Erik Cramer as part of a shared visit.  I have reviewed and discussed with the advanced practice provider their history, physical and plan.    I personally reviewed the vital signs, medications, labs and imaging.    My key history or physical exam findings: feels slightly dizzy when standing & BP running low 100s this afternoon  Lipase & CR downtrending  Key management decisions made by me: hold clonidine and give IVF bolus, goal BP<150/90 & >120/70    Audie Lane  Date of Service (when I saw the patient): 06/20/21      Interval History   Feeling better today. No further episodes of emesis. BP was high earlier today, patient thinks this was when he was abruptly woken up. Repeat BP now soft with some mild dizziness. Eating breakfast, monitoring high K+ foods. No f/s/c, n/v/d, chest pain or SOB.     Review of Systems   4 point ROS was obtained and negative except as noted in the Interval History.    MEDICATIONS:     "amLODIPine  10 mg Oral or NG Tube Daily     aspirin  325 mg Oral Daily     atorvastatin  40 mg Oral Daily     calcium carbonate-vitamin D  1 tablet Oral BID w/meals     carvedilol  6.25 mg Oral BID     cloNIDine  0.2 mg Oral TID     clotrimazole  10 mg Buccal 4x Daily     insulin aspart  1-6 Units Subcutaneous Q4H     lidocaine  1-2 patch Transdermal Q24H     lidocaine   Transdermal Q8H     magnesium oxide  400 mg Oral Q24H     methocarbamol  500 mg Oral 4x Daily     mycophenolate  1,000 mg Oral BID     octreotide  150 mcg Subcutaneous TID     pantoprazole  40 mg Oral QAM AC     phosphorus tablet 250 mg  250 mg Oral BID     predniSONE  20 mg Oral Daily    Followed by     [START ON 2021] predniSONE  15 mg Oral Daily    Followed by     [START ON 2021] predniSONE  10 mg Oral Daily    Followed by     [START ON 2021] predniSONE  5 mg Oral Daily     sodium chloride (PF)  3 mL Intravenous Q8H     sulfamethoxazole-trimethoprim  1 tablet Oral Daily     tacrolimus  2 mg Oral BID IS     valGANciclovir  450 mg Oral Daily       dextrose         Physical Exam   Temp  Av.5  F (36.9  C)  Min: 98.2  F (36.8  C)  Max: 98.9  F (37.2  C)  Arterial Line BP  Min: 216/75  Max: 221/84  Arterial Line MAP (mmHg)  Av mmHg  Min: 124 mmHg  Max: 137 mmHg      Pulse  Av.9  Min: 63  Max: 112 Resp  Av.6  Min: 12  Max: 24  SpO2  Av.7 %  Min: 89 %  Max: 100 %    CVP (mmHg): 3 mmHgBP (!) 148/81 (BP Location: Right arm)   Pulse 67   Temp 98.2  F (36.8  C) (Oral)   Resp 16   Ht 1.651 m (5' 5\")   Wt 78.7 kg (173 lb 8 oz)   SpO2 98%   BMI 28.87 kg/m     Date 21 07 - 21 0659   Shift 2203-2113 9618-4345 9064-3030 24 Hour Total   INTAKE   I.V. 2366.94   2366.94   NG/   120   Shift Total(mL/kg) 2486.94(27.33)   2486.94(27.33)   OUTPUT   Urine 1450   1450   Emesis/NG output 325   325   Drains 210   210   Shift Total(mL/kg) 1985(21.81)   1985(21.81)   Weight (kg) 91 91 91 91      Admit Weight: " 87.2 kg (192 lb 3.9 oz)     GENERAL APPEARANCE: alert and no distress  HENT: mouth without ulcers or lesions  RESP: lungs clear to auscultation - no rales, rhonchi or wheezes  CV: regular rhythm, normal rate, no rub, no murmur  EDEMA: no LE edema bilaterally  ABDOMEN: soft, nondistended, nontender, bowel sounds normal  MS: extremities normal - no gross deformities noted, no evidence of inflammation in joints, no muscle tenderness  SKIN: no rash    Data   All labs reviewed by me.  CMP  Recent Labs   Lab 06/20/21  0602 06/19/21  0759 06/18/21  0749 06/17/21  0742    139 140 139   POTASSIUM 5.2 4.7 4.5 4.3   CHLORIDE 109 109 108 109   CO2 25 25 25 25   ANIONGAP 5 4 6 5   GLC 94 100* 90 89   BUN 25 30 29 27   CR 2.03* 2.26* 2.14* 2.22*   GFRESTIMATED 40* 35* 38* 36*   GFRESTBLACK 46* 41* 44* 42*   SHAY 8.2* 8.2* 8.3* 8.2*   MAG 1.9 1.7 1.9 1.9   PHOS 2.4* 2.2* 2.1* 2.1*     CBC  Recent Labs   Lab 06/20/21  0602 06/19/21  0759 06/18/21  0749 06/17/21  0742   HGB 7.2* 7.6* 7.5* 7.9*   WBC 10.1 11.8* 10.1 12.5*   RBC 2.34* 2.49* 2.46* 2.60*   HCT 22.8* 23.7* 23.7* 24.9*   MCV 97 95 96 96   MCH 30.8 30.5 30.5 30.4   MCHC 31.6 32.1 31.6 31.7   RDW 15.6* 15.8* 15.6* 15.9*    299 254 258     INR  Recent Labs   Lab 06/18/21  0749   INR 0.96     ABG  No lab results found in last 7 days.   Urine Studies  Recent Labs   Lab Test 06/10/21  1120 07/29/19  1240 07/18/19  1200   COLOR Light Yellow Straw Yellow   APPEARANCE Clear Clear Clear   URINEGLC Negative 50* 50*   URINEBILI Negative Negative Negative   URINEKETONE Negative Negative Negative   SG 1.007 1.010 1.011   UBLD Negative Negative Negative   URINEPH 7.0 5.0 6.0   PROTEIN 200* >499* >499*   NITRITE Negative Negative Negative   LEUKEST Negative Negative Negative   RBCU 3* 5* 9*   WBCU 1 3 5     Recent Labs   Lab Test 06/10/21  1120 08/31/20  0000 07/18/19  1200   UTPG 5.90* 2.62* 6.20*     PTH  No lab results found.  Iron Studies  No lab results  found.    IMAGING:  All imaging studies reviewed by me.

## 2021-06-20 NOTE — PROGRESS NOTES
Transplant Surgery  Inpatient Daily Progress Note  06/20/2021    Assessment & Plan: 40 year old male with a past medical history significant for end stage kidney disease on HD every MWF via AVF, anuric prior to transplant. Other past medical history includes T2DM, HTN, BMI>30, bilateral shoulder pain, s/p lap cholecystectomy 12/19 due to symptomatic biliary dyskinesia and atypical nevi. Admitted for SPK-ED with ureteral stent on 6/11/21 with Dr. Kennedy.     S/p SPK: POD #9  Pancreas: Post-op graft pancreatitis. Lipase 1417->1285->1032-->724. 6/14 fluid lipase ~2400. Insulin needs low. Octreotide TID. 6/14 US: small fluid collection, patent vessels. Resolving  Kidney: Cr 2.2->2.1->2.3-->2.03. Good UOP. 6/11 US with moderately elevated velocities at the renal artery anastomosis and mid renal artery. Biopsy 6/18 with ATI, no evidence of rejection. Will give NS bolus x 1 L.  Acute post-op pain: Continue APAP, oxycodone, and lidoderm.  Immunosuppression management:  PRA 32, intermediate induction  Thymo 175 mg completed intra-op  Simulect POD 1 & 5  Solu-medrol 500/250/100, prednisone taper.  Cellcept 1000 mg BID  Tacrolimus: Goal 8-10. 6/18 9.9. 6/19 level-8.0.   Complexity of management: Medium. Contributing factors: anemia and induction  Hematology:   Acute blood loss anemia: Given 2 units pRBC intra-op for hgb of 6.6. HGB 7.2 today.  Vascular ppx: Stopped heparin and continue aspirin 325mg daily.  Cardiorespiratory:   HTN: -160's. Continue Norvasc 10 mg daily, Clonidine 0.2mg TID, and Coreg 6.25mg. PTA: Hydralazine 100 mg TID, Norvasc 10 daily, Clonidine 0.2 mg BID and Losartan 100 mg daily. BP high- nephrology for recommendations  GI/Nutrition:   Diet: Low fat diet d/t possible pancreatitis.  Bowel regimen: Senna PRN. No BM over the last 1-2 days. Give MOM prn.   Diarrhea: Resolved.  Endocrine: As above.  Fluid/Electrolytes:   Hypophosphatemia: On Phospha.  Hypomagnesemia: Mag ox 400 daily  Hyperkalemia: 5.2,  monitor  : No acute issues.   Infectious disease: Afebrile. No leukocytosis.  Prophylaxis: Bactrim indefinitely, Valcyte x 3 months. CMV R+, EBV R+, donor info unknown at this time. Micafungin x 7 days.  Disposition: 7A, will plan discharge tomorrow    Medical Decision Making: Medium  Subsequent visit 44181 (moderate level decision making)    ADI/Fellow/Resident Provider: Kd Joel MD    Faculty: Julio Cesar Vega MD     Attestation: I saw and examined the patient with Kd Joel MD, and the transplant team. I independently reviewed all pertinent laboratory and imaging results. I agree with the findings and plan as documented in this note.  Julio Cesar Vega MD  _________________________________________________________________  Transplant History: Admitted 6/10/2021 for SPK.  6/11/2021 (Kidney / Pancreas), Postoperative day: 9     Interval History: History is obtained from the patient  Overnight events: Minimal incisional pain. Diarrhea has resolved, normal bowel function Tolerating diet    ROS:   A 10-point review of systems was negative except as noted above.    Meds:    amLODIPine  10 mg Oral or NG Tube Daily     aspirin  325 mg Oral Daily     atorvastatin  40 mg Oral Daily     calcium carbonate-vitamin D  1 tablet Oral BID w/meals     carvedilol  6.25 mg Oral BID     cloNIDine  0.2 mg Oral TID     clotrimazole  10 mg Buccal 4x Daily     insulin aspart  1-6 Units Subcutaneous Q4H     lidocaine  1-2 patch Transdermal Q24H     lidocaine   Transdermal Q8H     magnesium oxide  400 mg Oral Q24H     methocarbamol  500 mg Oral 4x Daily     mycophenolate  1,000 mg Oral BID     octreotide  150 mcg Subcutaneous TID     pantoprazole  40 mg Oral QAM AC     phosphorus tablet 250 mg  250 mg Oral BID     predniSONE  20 mg Oral Daily    Followed by     [START ON 6/25/2021] predniSONE  15 mg Oral Daily    Followed by     [START ON 7/2/2021] predniSONE  10 mg Oral Daily    Followed by     [START ON 7/9/2021]  "predniSONE  5 mg Oral Daily     sodium chloride (PF)  3 mL Intravenous Q8H     sulfamethoxazole-trimethoprim  1 tablet Oral Daily     tacrolimus  2 mg Oral BID IS     valGANciclovir  450 mg Oral Daily       Physical Exam:     Admit Weight: 87.2 kg (192 lb 3.9 oz)    Current vitals:   BP 98/61 (BP Location: Right arm)   Pulse 71   Temp 98.1  F (36.7  C) (Oral)   Resp 16   Ht 1.651 m (5' 5\")   Wt 78.7 kg (173 lb 8 oz)   SpO2 98%   BMI 28.87 kg/m      Vital sign ranges:    Temp:  [98.1  F (36.7  C)-99  F (37.2  C)] 98.1  F (36.7  C)  Pulse:  [57-71] 71  Resp:  [16] 16  BP: ()/(61-91) 98/61  SpO2:  [97 %-99 %] 98 %  Patient Vitals for the past 24 hrs:   BP Temp Temp src Pulse Resp SpO2 Weight   06/20/21 1140 98/61 98.1  F (36.7  C) Oral 71 16 98 % --   06/20/21 1002 (!) 148/81 -- -- 67 -- -- --   06/20/21 0853 -- -- -- -- -- -- 78.7 kg (173 lb 8 oz)   06/20/21 0734 (!) 189/91 98.2  F (36.8  C) Oral 60 16 98 % --   06/20/21 0415 (!) 168/88 98.3  F (36.8  C) Oral 57 16 98 % --   06/20/21 0000 -- -- -- -- -- 99 % --   06/19/21 2318 (!) 151/74 98.2  F (36.8  C) Oral 59 16 99 % --   06/19/21 2007 133/65 98.5  F (36.9  C) Oral 62 16 99 % --   06/19/21 1534 126/80 99  F (37.2  C) Oral 67 16 97 % --   06/19/21 1302 -- -- -- -- -- -- 79 kg (174 lb 2.6 oz)     General Appearance: in no apparent distress.   Skin: normal  Heart: perfused  Lungs: NLB on RA  Abdomen: The abdomen is obese, soft The wound is stapled, c/d/i, drain with serosanguinous output  : no edwards  Extremities: edema: none  Neurologic: awake, alert, oriented x4. Tremor absent.    Data:   CMP  Recent Labs   Lab 06/20/21  0602 06/19/21  0759 06/14/21  1143 06/14/21  1143    139   < >  --    POTASSIUM 5.2 4.7   < >  --    CHLORIDE 109 109   < >  --    CO2 25 25   < >  --    GLC 94 100*   < >  --    BUN 25 30   < >  --    CR 2.03* 2.26*   < >  --    GFRESTIMATED 40* 35*   < >  --    GFRESTBLACK 46* 41*   < >  --    SHAY 8.2* 8.2*   < >  --    MAG " 1.9 1.7   < >  --    PHOS 2.4* 2.2*   < >  --    AMYLASE 100 130*   < >  --    LIPASE 724* 1,032*   < >  --    FLIPA  --   --   --  2,373    < > = values in this interval not displayed.     CBC  Recent Labs   Lab 06/20/21  0602 06/19/21  0759   HGB 7.2* 7.6*   WBC 10.1 11.8*    299     COAGS  Recent Labs   Lab 06/18/21  0749   INR 0.96      Urinalysis  Recent Labs   Lab Test 06/10/21  1120 08/31/20  0000 07/29/19  1240   COLOR Light Yellow  --  Straw   APPEARANCE Clear  --  Clear   URINEGLC Negative  --  50*   URINEBILI Negative  --  Negative   URINEKETONE Negative  --  Negative   SG 1.007  --  1.010   UBLD Negative  --  Negative   URINEPH 7.0  --  5.0   PROTEIN 200*  --  >499*   NITRITE Negative  --  Negative   LEUKEST Negative  --  Negative   RBCU 3*  --  5*   WBCU 1  --  3   UTPG 5.90* 2.62*  --      Virology:  Hepatitis C Antibody   Date Value Ref Range Status   06/10/2021 Nonreactive NR^Nonreactive Final     Comment:     Assay performance characteristics have not been established for newborns,   infants, and children

## 2021-06-20 NOTE — PLAN OF CARE
"Vitals: /65 (BP Location: Right arm)   Pulse 62   Temp 98.5  F (36.9  C) (Oral)   Resp 16   Ht 1.651 m (5' 5\")   Wt 66.8 kg (147 lb 3.2 oz)   SpO2 99%   BMI 24.50 kg/m    Endocrine: BG checks Q4. 135, 135 on this shift.   Labs: None on this shift  Pain: Well controlled  PRN's: Tylenol Q4 given x2  Diet: Regular. Good appetite.  LDA: R internal jugular saline locked.   GI: Voiding spontaneously without difficulty.   : No BM on this shift. Passing flatus.   Skin: abd incision ABIODUN  Neuro: Intact  Mobility: UAL  Education: Med card/lab book updated on am shift  Plan: Continue with current POC. Will update provider with any changes in condition.     "

## 2021-06-20 NOTE — PLAN OF CARE
"Vitals: /62   Pulse 71   Temp 98.1  F (36.7  C) (Oral)   Resp 16   Ht 1.651 m (5' 5\")   Wt 78.7 kg (173 lb 8 oz)   SpO2 98%   BMI 28.87 kg/m    189/91 this morning, dropped to 105/62 this afternoon.  Endocrine: Blood glucose , Novolog 1U X1.  Labs: Improving creatinine and Lipase, Potassium 5.2.  Pain: Mild headache.  PRN's: Cepacol lozenge x1.  Diet: Diet changed to 3GM K+ diet, patient with fair appetite, drinks Ensures.  LDA: R CVC in place, NS bolus.TIA.  GI: BM 6/19, did not take laxatives.  : Good urine output.  Skin: Abdominal incision with frank, R TIA.   Neuro: Alert and oriented, expresses a lot of anxiety about prescriptions being filled at time of discharge.  Mobility: Up ad isak.  Education: Reviewed morning medications, lab results given.  Plan: 1L NS bolus, plan for discharge home tomorrow.    "

## 2021-06-21 VITALS
HEART RATE: 61 BPM | TEMPERATURE: 98 F | WEIGHT: 174.4 LBS | OXYGEN SATURATION: 100 % | RESPIRATION RATE: 18 BRPM | BODY MASS INDEX: 29.06 KG/M2 | HEIGHT: 65 IN | SYSTOLIC BLOOD PRESSURE: 115 MMHG | DIASTOLIC BLOOD PRESSURE: 65 MMHG

## 2021-06-21 PROBLEM — E83.42 HYPOMAGNESEMIA: Status: ACTIVE | Noted: 2021-06-21

## 2021-06-21 PROBLEM — G89.18 ACUTE POST-OPERATIVE PAIN: Status: ACTIVE | Noted: 2021-06-21

## 2021-06-21 PROBLEM — E83.39 HYPOPHOSPHATEMIA: Status: ACTIVE | Noted: 2021-06-21

## 2021-06-21 LAB
AMYLASE SERPL-CCNC: 105 U/L (ref 30–110)
ANION GAP SERPL CALCULATED.3IONS-SCNC: 5 MMOL/L (ref 3–14)
BUN SERPL-MCNC: 24 MG/DL (ref 7–30)
CALCIUM SERPL-MCNC: 8.2 MG/DL (ref 8.5–10.1)
CHLORIDE SERPL-SCNC: 109 MMOL/L (ref 94–109)
CO2 SERPL-SCNC: 24 MMOL/L (ref 20–32)
CREAT SERPL-MCNC: 1.94 MG/DL (ref 0.66–1.25)
DONOR IDENTIFICATION: NORMAL
DSA COMMENTS: NORMAL
DSA PRESENT: NO
DSA TEST METHOD: NORMAL
ERYTHROCYTE [DISTWIDTH] IN BLOOD BY AUTOMATED COUNT: 15.3 % (ref 10–15)
GFR SERPL CREATININE-BSD FRML MDRD: 42 ML/MIN/{1.73_M2}
GLUCOSE SERPL-MCNC: 102 MG/DL (ref 70–99)
HCT VFR BLD AUTO: 22.2 % (ref 40–53)
HGB BLD-MCNC: 7 G/DL (ref 13.3–17.7)
INTERFERING SUBST TEST METHOD: NORMAL
INTERFERING SUBSTANCE COMMENT: NORMAL
INTERFERING SUBSTANCE RESULT: NORMAL
INTERFERING SUBSTANCE: NORMAL
LIPASE FLD-CCNC: 372 U/L
LIPASE SERPL-CCNC: 912 U/L (ref 73–393)
MAGNESIUM SERPL-MCNC: 2.1 MG/DL (ref 1.6–2.3)
MCH RBC QN AUTO: 30.3 PG (ref 26.5–33)
MCHC RBC AUTO-ENTMCNC: 31.5 G/DL (ref 31.5–36.5)
MCV RBC AUTO: 96 FL (ref 78–100)
ORGAN: NORMAL
PHOSPHATE SERPL-MCNC: 2.7 MG/DL (ref 2.5–4.5)
PLATELET # BLD AUTO: 327 10E9/L (ref 150–450)
POTASSIUM SERPL-SCNC: 4.7 MMOL/L (ref 3.4–5.3)
RBC # BLD AUTO: 2.31 10E12/L (ref 4.4–5.9)
SA1 CELL: NORMAL
SA1 COMMENTS: NORMAL
SA1 HI RISK ABY: NORMAL
SA1 MOD RISK ABY: NORMAL
SA1 TEST METHOD: NORMAL
SA2 CELL: NORMAL
SA2 COMMENTS: NORMAL
SA2 HI RISK ABY UA: NORMAL
SA2 MOD RISK ABY: NORMAL
SA2 TEST METHOD: NORMAL
SODIUM SERPL-SCNC: 138 MMOL/L (ref 133–144)
SPECIMEN SOURCE FLD: NORMAL
TACROLIMUS BLD-MCNC: 8.6 UG/L (ref 5–15)
TME LAST DOSE: NORMAL H
UNACCEPTABLE ANTIGEN: NORMAL
UNOS CPRA: 32
WBC # BLD AUTO: 12.3 10E9/L (ref 4–11)

## 2021-06-21 PROCEDURE — 83690 ASSAY OF LIPASE: CPT | Performed by: PHYSICIAN ASSISTANT

## 2021-06-21 PROCEDURE — 80048 BASIC METABOLIC PNL TOTAL CA: CPT | Performed by: PHYSICIAN ASSISTANT

## 2021-06-21 PROCEDURE — 250N000013 HC RX MED GY IP 250 OP 250 PS 637: Performed by: NURSE PRACTITIONER

## 2021-06-21 PROCEDURE — 250N000013 HC RX MED GY IP 250 OP 250 PS 637: Performed by: PHYSICIAN ASSISTANT

## 2021-06-21 PROCEDURE — 83690 ASSAY OF LIPASE: CPT | Performed by: NURSE PRACTITIONER

## 2021-06-21 PROCEDURE — 36415 COLL VENOUS BLD VENIPUNCTURE: CPT | Performed by: PHYSICIAN ASSISTANT

## 2021-06-21 PROCEDURE — 99232 SBSQ HOSP IP/OBS MODERATE 35: CPT | Mod: GC | Performed by: INTERNAL MEDICINE

## 2021-06-21 PROCEDURE — 250N000012 HC RX MED GY IP 250 OP 636 PS 637: Performed by: PHYSICIAN ASSISTANT

## 2021-06-21 PROCEDURE — 250N000011 HC RX IP 250 OP 636: Performed by: NURSE PRACTITIONER

## 2021-06-21 PROCEDURE — 84100 ASSAY OF PHOSPHORUS: CPT | Performed by: PHYSICIAN ASSISTANT

## 2021-06-21 PROCEDURE — 80197 ASSAY OF TACROLIMUS: CPT | Performed by: NURSE PRACTITIONER

## 2021-06-21 PROCEDURE — 85027 COMPLETE CBC AUTOMATED: CPT | Performed by: NURSE PRACTITIONER

## 2021-06-21 PROCEDURE — 82150 ASSAY OF AMYLASE: CPT | Performed by: PHYSICIAN ASSISTANT

## 2021-06-21 PROCEDURE — 83735 ASSAY OF MAGNESIUM: CPT | Performed by: PHYSICIAN ASSISTANT

## 2021-06-21 PROCEDURE — 250N000013 HC RX MED GY IP 250 OP 250 PS 637: Performed by: SURGERY

## 2021-06-21 RX ORDER — PREDNISONE 5 MG/1
5 TABLET ORAL DAILY
Qty: 14 TABLET | Refills: 0 | Status: SHIPPED | OUTPATIENT
Start: 2021-06-21 | End: 2021-06-21

## 2021-06-21 RX ORDER — MYCOPHENOLATE MOFETIL 250 MG/1
1000 CAPSULE ORAL 2 TIMES DAILY
Qty: 240 CAPSULE | Refills: 11 | Status: SHIPPED | OUTPATIENT
Start: 2021-06-21 | End: 2021-06-25

## 2021-06-21 RX ORDER — METHOCARBAMOL 500 MG/1
500 TABLET, FILM COATED ORAL EVERY 8 HOURS PRN
Qty: 28 TABLET | Refills: 0 | Status: SHIPPED | OUTPATIENT
Start: 2021-06-21 | End: 2021-10-11

## 2021-06-21 RX ORDER — CLONIDINE HYDROCHLORIDE 0.2 MG/1
0.2 TABLET ORAL 3 TIMES DAILY
Start: 2021-06-21 | End: 2021-06-25

## 2021-06-21 RX ORDER — ATORVASTATIN CALCIUM 80 MG/1
40 TABLET, FILM COATED ORAL EVERY EVENING
Refills: 2
Start: 2021-06-21 | End: 2021-06-22

## 2021-06-21 RX ORDER — TACROLIMUS 0.5 MG/1
0.5 CAPSULE ORAL 2 TIMES DAILY
Qty: 60 CAPSULE | Refills: 11 | Status: SHIPPED | OUTPATIENT
Start: 2021-06-21 | End: 2021-06-25

## 2021-06-21 RX ORDER — SULFAMETHOXAZOLE AND TRIMETHOPRIM 400; 80 MG/1; MG/1
1 TABLET ORAL DAILY
Qty: 30 TABLET | Refills: 11 | Status: SHIPPED | OUTPATIENT
Start: 2021-06-22 | End: 2022-03-30

## 2021-06-21 RX ORDER — ACETAMINOPHEN 325 MG/1
650 TABLET ORAL EVERY 4 HOURS PRN
Start: 2021-06-21 | End: 2021-08-12

## 2021-06-21 RX ORDER — TACROLIMUS 1 MG/1
2 CAPSULE ORAL 2 TIMES DAILY
Qty: 120 CAPSULE | Refills: 11 | Status: SHIPPED | OUTPATIENT
Start: 2021-06-21 | End: 2021-06-25

## 2021-06-21 RX ORDER — OXYCODONE HYDROCHLORIDE 5 MG/1
5 TABLET ORAL EVERY 4 HOURS PRN
Qty: 8 TABLET | Refills: 0 | Status: SHIPPED | OUTPATIENT
Start: 2021-06-21 | End: 2021-08-12

## 2021-06-21 RX ORDER — CARVEDILOL 6.25 MG/1
6.25 TABLET ORAL 2 TIMES DAILY
Qty: 60 TABLET | Refills: 2 | Status: SHIPPED | OUTPATIENT
Start: 2021-06-21 | End: 2021-07-08

## 2021-06-21 RX ORDER — PREDNISONE 5 MG/1
TABLET ORAL
Qty: 54 TABLET | Refills: 0 | Status: SHIPPED | OUTPATIENT
Start: 2021-06-21 | End: 2021-07-13

## 2021-06-21 RX ORDER — MAGNESIUM OXIDE 400 MG/1
400 TABLET ORAL EVERY 24 HOURS
Qty: 30 TABLET | Refills: 2 | Status: SHIPPED | OUTPATIENT
Start: 2021-06-22 | End: 2021-06-29

## 2021-06-21 RX ORDER — PREDNISONE 10 MG/1
10 TABLET ORAL DAILY
Qty: 20 TABLET | Refills: 0 | Status: SHIPPED | OUTPATIENT
Start: 2021-06-21 | End: 2021-06-21

## 2021-06-21 RX ORDER — CLOTRIMAZOLE 10 MG/1
10 LOZENGE ORAL 4 TIMES DAILY
Qty: 120 LOZENGE | Refills: 2 | Status: SHIPPED | OUTPATIENT
Start: 2021-06-21 | End: 2021-09-20

## 2021-06-21 RX ORDER — INSULIN LISPRO 100 [IU]/ML
1-5 INJECTION, SOLUTION INTRAVENOUS; SUBCUTANEOUS AT BEDTIME
Qty: 3 ML | Refills: 0 | Status: SHIPPED | OUTPATIENT
Start: 2021-06-21 | End: 2021-07-08

## 2021-06-21 RX ORDER — INSULIN LISPRO 100 [IU]/ML
1-7 INJECTION, SOLUTION INTRAVENOUS; SUBCUTANEOUS
Qty: 3 ML | Refills: 0 | Status: SHIPPED | OUTPATIENT
Start: 2021-06-21 | End: 2021-07-08

## 2021-06-21 RX ORDER — ASPIRIN 325 MG
325 TABLET, DELAYED RELEASE (ENTERIC COATED) ORAL DAILY
Qty: 30 TABLET | Refills: 5 | Status: SHIPPED | OUTPATIENT
Start: 2021-06-22 | End: 2022-06-01

## 2021-06-21 RX ADMIN — METHOCARBAMOL 500 MG: 500 TABLET, FILM COATED ORAL at 15:37

## 2021-06-21 RX ADMIN — PANTOPRAZOLE 40 MG: 20 TABLET, DELAYED RELEASE ORAL at 08:21

## 2021-06-21 RX ADMIN — INSULIN ASPART 1 UNITS: 100 INJECTION, SOLUTION INTRAVENOUS; SUBCUTANEOUS at 13:08

## 2021-06-21 RX ADMIN — AMLODIPINE BESYLATE 10 MG: 10 TABLET ORAL at 08:22

## 2021-06-21 RX ADMIN — ACETAMINOPHEN 650 MG: 325 TABLET, FILM COATED ORAL at 08:23

## 2021-06-21 RX ADMIN — MAGNESIUM OXIDE TAB 400 MG (241.3 MG ELEMENTAL MG) 400 MG: 400 (241.3 MG) TAB at 12:39

## 2021-06-21 RX ADMIN — SULFAMETHOXAZOLE AND TRIMETHOPRIM 1 TABLET: 400; 80 TABLET ORAL at 08:22

## 2021-06-21 RX ADMIN — ACETAMINOPHEN 650 MG: 325 TABLET, FILM COATED ORAL at 12:39

## 2021-06-21 RX ADMIN — OXYCODONE HYDROCHLORIDE 5 MG: 5 TABLET ORAL at 15:37

## 2021-06-21 RX ADMIN — SODIUM PHOSPHATE, DIBASIC, ANHYDROUS, POTASSIUM PHOSPHATE, MONOBASIC, AND SODIUM PHOSPHATE, MONOBASIC, MONOHYDRATE 250 MG: 852; 155; 130 TABLET, COATED ORAL at 08:22

## 2021-06-21 RX ADMIN — PREDNISONE 20 MG: 20 TABLET ORAL at 08:21

## 2021-06-21 RX ADMIN — SIMETHICONE 80 MG: 80 TABLET, CHEWABLE ORAL at 02:24

## 2021-06-21 RX ADMIN — METHOCARBAMOL 500 MG: 500 TABLET, FILM COATED ORAL at 12:39

## 2021-06-21 RX ADMIN — TACROLIMUS 2.5 MG: 1 CAPSULE ORAL at 08:21

## 2021-06-21 RX ADMIN — Medication 1 TABLET: at 08:21

## 2021-06-21 RX ADMIN — OCTREOTIDE ACETATE 150 MCG: 100 INJECTION, SOLUTION INTRAVENOUS; SUBCUTANEOUS at 08:24

## 2021-06-21 RX ADMIN — MYCOPHENOLATE MOFETIL 1000 MG: 250 CAPSULE ORAL at 08:21

## 2021-06-21 RX ADMIN — CLOTRIMAZOLE 10 MG: 10 LOZENGE ORAL at 15:37

## 2021-06-21 RX ADMIN — VALGANCICLOVIR HYDROCHLORIDE 450 MG: 450 TABLET ORAL at 08:21

## 2021-06-21 RX ADMIN — CARVEDILOL 6.25 MG: 6.25 TABLET, FILM COATED ORAL at 08:21

## 2021-06-21 RX ADMIN — CLOTRIMAZOLE 10 MG: 10 LOZENGE ORAL at 08:21

## 2021-06-21 RX ADMIN — CLONIDINE HYDROCHLORIDE 0.2 MG: 0.2 TABLET ORAL at 08:22

## 2021-06-21 RX ADMIN — OCTREOTIDE ACETATE 150 MCG: 100 INJECTION, SOLUTION INTRAVENOUS; SUBCUTANEOUS at 14:24

## 2021-06-21 RX ADMIN — ASPIRIN 325 MG: 325 TABLET, COATED ORAL at 08:22

## 2021-06-21 RX ADMIN — METHOCARBAMOL 500 MG: 500 TABLET, FILM COATED ORAL at 08:21

## 2021-06-21 RX ADMIN — LIDOCAINE 1 PATCH: 560 PATCH PERCUTANEOUS; TOPICAL; TRANSDERMAL at 08:34

## 2021-06-21 RX ADMIN — CLOTRIMAZOLE 10 MG: 10 LOZENGE ORAL at 12:39

## 2021-06-21 ASSESSMENT — ACTIVITIES OF DAILY LIVING (ADL)
ADLS_ACUITY_SCORE: 12

## 2021-06-21 ASSESSMENT — MIFFLIN-ST. JEOR: SCORE: 1632.95

## 2021-06-21 NOTE — PLAN OF CARE
"Vitals: /60   Pulse 64   Temp 98.1  F (36.7  C) (Oral)   Resp 18   Ht 1.651 m (5' 5\")   Wt 79.1 kg (174 lb 6.4 oz)   SpO2 99%   BMI 29.02 kg/m    Morning /84, 104/60 2 hours after morning medications.  Endocrine: Blood glucose 102, 174, Novolog correction.  Labs: Lipase elevated 912.  Pain: R abdominal pain below TIA.  PRN's: Tylenol 650 mg X1.  Diet: Regular diet, good appetite.  LDA: R CVC, discontinued this afternoon. L arm graft.  GI: Diarrhea overnight, none this morning.  : Good urine output.  Skin: Abdominal incision with frank, R TIA.  Neuro: A&O X4.  Mobility: Up ad isak.  Education: Reviewed transplant medications, TIA cares, supplies given.  Plan: Discharge home today, immunosuppression medications filled here, others per local hometown pharmacy in [place]. Discharge orders reviewed with the patient, waiting on medications to be filled here before his wife will be contacted for a ride.    "

## 2021-06-21 NOTE — PROGRESS NOTES
Care Management Discharge Note    Discharge Date: 06/22/21       Discharge Disposition:  Home     Discharge Services:  Home Care    Discharge DME:  None    Discharge Transportation: family or friend will provide        Education Provided on the Discharge Plan:  Yes  Persons Notified of Discharge Plans: Patient  Patient/Family in Agreement with the Plan:  Yes    Handoff Referral Completed: Yes    Additional Information:  Notified by AMALIA Wakefield that pt cleared to discharge home today.  Patient will need ATC arranged for Tuesday 6/22 and Wednesday 6/23.      Inbasket update sent to ATC   Updated [facility].  Final discharge orders faxed.        Cristina Boland RN BSN, PHN, ACM-RN  7A RN Care Coordinator  Phone: [phone]  Pager [phone]    6/21/2021 2:22 PM

## 2021-06-21 NOTE — PROGRESS NOTES
CLINICAL NUTRITION SERVICES - DISCHARGE NOTE    Patient s discharge needs assessed and discharge planning has been conducted with the multidisciplinary transplant care team including physicians, pharmacy, social work and transplant coordinator.    Follow up/Monitoring:  Once discharged, place outpatient nutrition consult via the transplant team if nutrition concerns arise.    Brenda Hansen, MS/RD/SANDI/CNSC  7A RD Pager: [phone]

## 2021-06-21 NOTE — PLAN OF CARE
"Vitals: BP (!) 144/80 (BP Location: Right arm)   Pulse 68   Temp 98  F (36.7  C) (Oral)   Resp 18   Ht 1.651 m (5' 5\")   Wt 78.7 kg (173 lb 8 oz)   SpO2 99%   BMI 28.87 kg/m    Endocrine: BG checks Q4 (203,152)  Labs: None on this shift  Pain: Well controlled  PRN's: Tylenol given x2  Diet: 3 gm K+  LDA: R internal jugular saline locked, R TIA  GI: Voiding without difficulty  : Small, formed BM on this shift  Skin: abd incision, ABIODUN, stapled.  Neuro: Intact  Mobility: UAL  Education: Med card/lab book up to date  Plan: Possible discharge tomorrow. Will continue to follow POC and update provider as needed.     "

## 2021-06-21 NOTE — PROGRESS NOTES
"SPIRITUAL HEALTH SERVICES Progress Note  Unit 7A Referral initiated by SH    SH introduced services to pt. We engaged in a life review and a review of resources as the pt is discharging shortly. Pt described feeling \"warm\" in his time here and has \"lots of people praying.\"    Plan: SH will remain available for any emotional/spiritual needs per referral from pt, family, or staff.    Marek Miller   Intern  Pager [phone]    "

## 2021-06-21 NOTE — PROGRESS NOTES
[facility]   Transplant Nephrology Progress Note  Date of Admission:  6/10/2021  Today's Date: 06/21/2021    Recommendations:  - monitor BP for now given fluctuation in recent readings.  - follow up in [facility] tomorrow.    Assessment & Plan   # DDKT (SPK) trend down but slowly. Transplant kidney biopsy prelim with no rejection.               - Baseline Creatinine: ~ TBD              - Proteinuria: Not checked post transplant              - Date DSA Last Checked: Jun/2021      Latest DSA: No DSA at time of transplant              - BK Viremia: No              - Kidney Tx Biopsy: 6/18/21: no rejection.   -Stent placed. Remove 4-6 weeks post txp     # Pancreas Tx (SPK):               - Pancreatic Exocrine Drainage: Enteric drained                     - Blood glucose: near euglycemia       On insulin: SSI              - HbA1c: Last checked at time of transplant     6%              - Pancreatic enzymes: trending down, U/S with unchanged size of fluid collection, patent vessels.              - Date DSA Last Checked: Jun/2021             Latest DSA: No DSA at time of transplant              - Pancreas Tx Biopsy: No     # Immunosuppression: Tacrolimus immediate release (goal 8-10), Mycophenolate mofetil (dose 1000 mg every 12 hours) and Prednisone (dose taper)               - Induction: intermediate risk protocol, PRA 32%              - Changes: No     # Infection Prophylaxis:   - PJP: Sulfa/TMP (Bactrim)  - CMV: Valcyte x3m  - Thrush: Micafungin (Mycamine) and myclex    # Hypertension: had been at goal with the occasional high systolic BP, but now trending down/soft Goal BP: < 150/90   - Volume status: Euvolemic    - Changes: No , monitor BP for now given fluctuation in recent readings.    # Anemia in Chronic Renal Disease: Hgb: low stable. ROSA M: No   - Iron studies: Unknown at this time, but checked with dialysis    # Mineral Bone Disorder:   - Secondary renal hyperparathyroidism;  PTH level: Unknown at this time, but checked with dialysis        On treatment: None  - Vitamin D; level: Unknown at this time, but checked with dialysis        On supplement: Yes  - Calcium; level: Low   On supplement: Yes  - Phosphorus; level: Low, improving        On supplement: Yes    # Electrolytes:   - Potassium; level: Normal        On supplement: No  - Magnesium; level: Normal        On supplement: Yes  - Bicarbonate; level: Normal        On supplement: No  - Sodium; level: Normal      # Transplant History:  Etiology of Kidney Failure: Diabetes mellitus type 2  Tx: SPK  Transplant: 6/11/2021 (Kidney / Pancreas)  DSA at time of Tx: No  Significant changes in immunosuppression: None  Significant transplant-related complications: None     Recommendations were communicated to the primary team verbally.    Randell May MD   Pager: [phone]    Physician Attestation   I, Saw Irby MD, personally examined and evaluated this patient.  I discussed the patient with the resident/fellow and care team, and agree with the assessment and plan of care as documented in the note on 06/21/21 .      I personally reviewed vital signs, medications and labs.  Saw Irby MD  Date of Service (when I saw the patient): 06/21/21          Interval History   Feeling better today. Eating breakfast, No f/s/c, n/v/d, chest pain or SOB.     Review of Systems   4 point ROS was obtained and negative except as noted in the Interval History.    MEDICATIONS:    amLODIPine  10 mg Oral or NG Tube Daily     aspirin  325 mg Oral Daily     atorvastatin  40 mg Oral Daily     calcium carbonate-vitamin D  1 tablet Oral BID w/meals     carvedilol  6.25 mg Oral BID     cloNIDine  0.2 mg Oral TID     clotrimazole  10 mg Buccal 4x Daily     insulin aspart  1-7 Units Subcutaneous TID AC     insulin aspart  1-5 Units Subcutaneous At Bedtime     lidocaine  1-2 patch Transdermal Q24H     lidocaine   Transdermal Q8H     magnesium oxide  400 mg Oral  "Q24H     methocarbamol  500 mg Oral 4x Daily     mycophenolate  1,000 mg Oral BID     octreotide  150 mcg Subcutaneous TID     pantoprazole  40 mg Oral QAM AC     phosphorus tablet 250 mg  250 mg Oral BID     predniSONE  20 mg Oral Daily    Followed by     [START ON 2021] predniSONE  15 mg Oral Daily    Followed by     [START ON 2021] predniSONE  10 mg Oral Daily    Followed by     [START ON 2021] predniSONE  5 mg Oral Daily     sodium chloride (PF)  3 mL Intravenous Q8H     sulfamethoxazole-trimethoprim  1 tablet Oral Daily     tacrolimus  2.5 mg Oral BID IS     valGANciclovir  450 mg Oral Daily       dextrose         Physical Exam   Temp  Av.5  F (36.9  C)  Min: 98.2  F (36.8  C)  Max: 98.9  F (37.2  C)  Arterial Line BP  Min: 216/75  Max: 221/84  Arterial Line MAP (mmHg)  Av mmHg  Min: 124 mmHg  Max: 137 mmHg      Pulse  Av.9  Min: 63  Max: 112 Resp  Av.6  Min: 12  Max: 24  SpO2  Av.7 %  Min: 89 %  Max: 100 %    CVP (mmHg): 3 mmHgBP 115/65 (BP Location: Right arm)   Pulse 61   Temp 98  F (36.7  C) (Oral)   Resp 18   Ht 1.651 m (5' 5\")   Wt 79.1 kg (174 lb 6.4 oz)   SpO2 100%   BMI 29.02 kg/m     Date 21 0700 - 21 0659   Shift 2179-9169 7867-5210 4760-0406 24 Hour Total   INTAKE   I.V. 2366.94   2366.94   NG/   120   Shift Total(mL/kg) 2486.94(27.33)   2486.94(27.33)   OUTPUT   Urine 1450   1450   Emesis/NG output 325   325   Drains 210   210   Shift Total(mL/kg) (21.81)   (21.81)   Weight (kg) 91 91 91 91      Admit Weight: 87.2 kg (192 lb 3.9 oz)     GENERAL APPEARANCE: alert and no distress  HENT: mouth without ulcers or lesions  RESP: lungs clear to auscultation - no rales, rhonchi or wheezes  CV: regular rhythm, normal rate, no rub, no murmur  EDEMA: no LE edema bilaterally  ABDOMEN: soft, nondistended, nontender, bowel sounds normal  MS: extremities normal - no gross deformities noted, no evidence of inflammation in joints, no muscle " tenderness  SKIN: no rash    Data   All labs reviewed by me.  CMP  Recent Labs   Lab 06/21/21  0645 06/20/21  0602 06/19/21  0759 06/18/21  0749    138 139 140   POTASSIUM 4.7 5.2 4.7 4.5   CHLORIDE 109 109 109 108   CO2 24 25 25 25   ANIONGAP 5 5 4 6   * 94 100* 90   BUN 24 25 30 29   CR 1.94* 2.03* 2.26* 2.14*   GFRESTIMATED 42* 40* 35* 38*   GFRESTBLACK 49* 46* 41* 44*   SHAY 8.2* 8.2* 8.2* 8.3*   MAG 2.1 1.9 1.7 1.9   PHOS 2.7 2.4* 2.2* 2.1*     CBC  Recent Labs   Lab 06/21/21  0645 06/20/21  0602 06/19/21  0759 06/18/21  0749   HGB 7.0* 7.2* 7.6* 7.5*   WBC 12.3* 10.1 11.8* 10.1   RBC 2.31* 2.34* 2.49* 2.46*   HCT 22.2* 22.8* 23.7* 23.7*   MCV 96 97 95 96   MCH 30.3 30.8 30.5 30.5   MCHC 31.5 31.6 32.1 31.6   RDW 15.3* 15.6* 15.8* 15.6*    290 299 254     INR  Recent Labs   Lab 06/18/21  0749   INR 0.96     ABG  No lab results found in last 7 days.   Urine Studies  Recent Labs   Lab Test 06/10/21  1120 07/29/19  1240 07/18/19  1200   COLOR Light Yellow Straw Yellow   APPEARANCE Clear Clear Clear   URINEGLC Negative 50* 50*   URINEBILI Negative Negative Negative   URINEKETONE Negative Negative Negative   SG 1.007 1.010 1.011   UBLD Negative Negative Negative   URINEPH 7.0 5.0 6.0   PROTEIN 200* >499* >499*   NITRITE Negative Negative Negative   LEUKEST Negative Negative Negative   RBCU 3* 5* 9*   WBCU 1 3 5     Recent Labs   Lab Test 06/10/21  1120 08/31/20  0000 07/18/19  1200   UTPG 5.90* 2.62* 6.20*     PTH  No lab results found.  Iron Studies  No lab results found.    IMAGING:  All imaging studies reviewed by me.

## 2021-06-21 NOTE — PROGRESS NOTES
CLINICAL NUTRITION SERVICES  Reason for Assessment:  Nutrition education regarding 3 gram K+ diet  Diet History: Per patient appetite improving  Nutrition Diagnosis:  Food- and nutrition-related knowledge deficit r/t no previous knowledge of low K+ diet AEB patient report  Interventions:  Provided instruction on 3 gram K+ diet  Provided handouts controlling your potassium intake   Goals:   Patient will verbalize understanding of high K+ foods to limit oral intake  Return demonstration was provided  Follow-up:    Patient to ask any further nutrition-related questions before discharge.  In addition, pt may request outpatient RD appointment.    Brenda Hansen MS/MELVIN/LD/CNSC  [address] RD Pager: [phone]

## 2021-06-22 ENCOUNTER — INFUSION THERAPY VISIT (OUTPATIENT)
Dept: INFUSION THERAPY | Facility: CLINIC | Age: 41
DRG: 008 | End: 2021-06-22
Attending: INTERNAL MEDICINE
Payer: MEDICARE

## 2021-06-22 ENCOUNTER — APPOINTMENT (OUTPATIENT)
Dept: LAB | Facility: CLINIC | Age: 41
End: 2021-06-22
Payer: MEDICARE

## 2021-06-22 ENCOUNTER — TELEPHONE (OUTPATIENT)
Dept: TRANSPLANT | Facility: CLINIC | Age: 41
End: 2021-06-22

## 2021-06-22 VITALS
HEART RATE: 87 BPM | OXYGEN SATURATION: 99 % | SYSTOLIC BLOOD PRESSURE: 135 MMHG | DIASTOLIC BLOOD PRESSURE: 76 MMHG | RESPIRATION RATE: 16 BRPM | BODY MASS INDEX: 28.64 KG/M2 | WEIGHT: 172.1 LBS | TEMPERATURE: 98.3 F

## 2021-06-22 DIAGNOSIS — Z48.298 AFTERCARE FOLLOWING ORGAN TRANSPLANT: Primary | ICD-10-CM

## 2021-06-22 DIAGNOSIS — Z94.83 PANCREAS REPLACED BY TRANSPLANT (H): Primary | ICD-10-CM

## 2021-06-22 DIAGNOSIS — Z94.0 KIDNEY REPLACED BY TRANSPLANT: ICD-10-CM

## 2021-06-22 DIAGNOSIS — Z20.828 CONTACT WITH AND (SUSPECTED) EXPOSURE TO OTHER VIRAL COMMUNICABLE DISEASES: ICD-10-CM

## 2021-06-22 DIAGNOSIS — Z48.298 AFTERCARE FOLLOWING ORGAN TRANSPLANT: ICD-10-CM

## 2021-06-22 DIAGNOSIS — Z94.83 PANCREAS TRANSPLANTED (H): Primary | ICD-10-CM

## 2021-06-22 DIAGNOSIS — Z79.899 ENCOUNTER FOR LONG-TERM CURRENT USE OF MEDICATION: ICD-10-CM

## 2021-06-22 LAB
ALBUMIN UR-MCNC: NEGATIVE MG/DL
AMYLASE SERPL-CCNC: 121 U/L (ref 30–110)
ANION GAP SERPL CALCULATED.3IONS-SCNC: 6 MMOL/L (ref 3–14)
APPEARANCE UR: CLEAR
BASOPHILS # BLD AUTO: 0.1 10E9/L (ref 0–0.2)
BASOPHILS NFR BLD AUTO: 0.4 %
BILIRUB UR QL STRIP: NEGATIVE
BUN SERPL-MCNC: 22 MG/DL (ref 7–30)
CALCIUM SERPL-MCNC: 9 MG/DL (ref 8.5–10.1)
CHLORIDE SERPL-SCNC: 107 MMOL/L (ref 94–109)
CO2 SERPL-SCNC: 26 MMOL/L (ref 20–32)
COLOR UR AUTO: YELLOW
CREAT SERPL-MCNC: 2.06 MG/DL (ref 0.66–1.25)
DIFFERENTIAL METHOD BLD: ABNORMAL
EOSINOPHIL # BLD AUTO: 0.3 10E9/L (ref 0–0.7)
EOSINOPHIL NFR BLD AUTO: 1.7 %
ERYTHROCYTE [DISTWIDTH] IN BLOOD BY AUTOMATED COUNT: 15.5 % (ref 10–15)
GFR SERPL CREATININE-BSD FRML MDRD: 39 ML/MIN/{1.73_M2}
GLUCOSE SERPL-MCNC: 102 MG/DL (ref 70–99)
GLUCOSE UR STRIP-MCNC: NEGATIVE MG/DL
HCT VFR BLD AUTO: 26.5 % (ref 40–53)
HGB BLD-MCNC: 8.7 G/DL (ref 13.3–17.7)
HGB UR QL STRIP: ABNORMAL
IMM GRANULOCYTES # BLD: 0.2 10E9/L (ref 0–0.4)
IMM GRANULOCYTES NFR BLD: 1.2 %
KETONES UR STRIP-MCNC: NEGATIVE MG/DL
LEUKOCYTE ESTERASE UR QL STRIP: NEGATIVE
LIPASE SERPL-CCNC: 937 U/L (ref 73–393)
LYMPHOCYTES # BLD AUTO: 2.3 10E9/L (ref 0.8–5.3)
LYMPHOCYTES NFR BLD AUTO: 13.8 %
MAGNESIUM SERPL-MCNC: 2.1 MG/DL (ref 1.6–2.3)
MCH RBC QN AUTO: 31 PG (ref 26.5–33)
MCHC RBC AUTO-ENTMCNC: 32.8 G/DL (ref 31.5–36.5)
MCV RBC AUTO: 94 FL (ref 78–100)
MONOCYTES # BLD AUTO: 0.8 10E9/L (ref 0–1.3)
MONOCYTES NFR BLD AUTO: 4.7 %
NEUTROPHILS # BLD AUTO: 13.3 10E9/L (ref 1.6–8.3)
NEUTROPHILS NFR BLD AUTO: 78.2 %
NITRATE UR QL: NEGATIVE
NRBC # BLD AUTO: 0 10*3/UL
NRBC BLD AUTO-RTO: 0 /100
PH UR STRIP: 6 PH (ref 5–7)
PHOSPHATE SERPL-MCNC: 2.5 MG/DL (ref 2.5–4.5)
PLATELET # BLD AUTO: 440 10E9/L (ref 150–450)
POTASSIUM SERPL-SCNC: 4.6 MMOL/L (ref 3.4–5.3)
RBC # BLD AUTO: 2.81 10E12/L (ref 4.4–5.9)
RBC #/AREA URNS AUTO: 38 /HPF (ref 0–2)
SODIUM SERPL-SCNC: 139 MMOL/L (ref 133–144)
SOURCE: ABNORMAL
SP GR UR STRIP: 1.01 (ref 1–1.03)
TACROLIMUS BLD-MCNC: 11.4 UG/L (ref 5–15)
TME LAST DOSE: 2000 H
UROBILINOGEN UR STRIP-MCNC: 0 MG/DL (ref 0–2)
WBC # BLD AUTO: 16.9 10E9/L (ref 4–11)
WBC #/AREA URNS AUTO: 4 /HPF (ref 0–5)

## 2021-06-22 PROCEDURE — 85025 COMPLETE CBC W/AUTO DIFF WBC: CPT | Performed by: INTERNAL MEDICINE

## 2021-06-22 PROCEDURE — 81001 URINALYSIS AUTO W/SCOPE: CPT | Performed by: INTERNAL MEDICINE

## 2021-06-22 PROCEDURE — 258N000003 HC RX IP 258 OP 636: Performed by: INTERNAL MEDICINE

## 2021-06-22 PROCEDURE — 82150 ASSAY OF AMYLASE: CPT | Performed by: INTERNAL MEDICINE

## 2021-06-22 PROCEDURE — 36415 COLL VENOUS BLD VENIPUNCTURE: CPT | Performed by: INTERNAL MEDICINE

## 2021-06-22 PROCEDURE — 80048 BASIC METABOLIC PNL TOTAL CA: CPT | Performed by: INTERNAL MEDICINE

## 2021-06-22 PROCEDURE — 83735 ASSAY OF MAGNESIUM: CPT | Performed by: INTERNAL MEDICINE

## 2021-06-22 PROCEDURE — 84100 ASSAY OF PHOSPHORUS: CPT | Performed by: INTERNAL MEDICINE

## 2021-06-22 PROCEDURE — 99215 OFFICE O/P EST HI 40 MIN: CPT | Performed by: INTERNAL MEDICINE

## 2021-06-22 PROCEDURE — 80197 ASSAY OF TACROLIMUS: CPT | Performed by: INTERNAL MEDICINE

## 2021-06-22 PROCEDURE — 83690 ASSAY OF LIPASE: CPT | Performed by: INTERNAL MEDICINE

## 2021-06-22 RX ORDER — ATORVASTATIN CALCIUM 20 MG/1
20 TABLET, FILM COATED ORAL DAILY
Qty: 90 TABLET | Refills: 3 | Status: SHIPPED | OUTPATIENT
Start: 2021-06-22 | End: 2021-10-11

## 2021-06-22 RX ORDER — HEPARIN SODIUM (PORCINE) LOCK FLUSH IV SOLN 100 UNIT/ML 100 UNIT/ML
5 SOLUTION INTRAVENOUS
Status: CANCELLED | OUTPATIENT
Start: 2021-06-23

## 2021-06-22 RX ORDER — HEPARIN SODIUM,PORCINE 10 UNIT/ML
5 VIAL (ML) INTRAVENOUS
Status: CANCELLED | OUTPATIENT
Start: 2021-06-23

## 2021-06-22 RX ADMIN — SODIUM CHLORIDE 1000 ML: 9 INJECTION, SOLUTION INTRAVENOUS at 08:54

## 2021-06-22 RX ADMIN — SODIUM CHLORIDE 1000 ML: 9 INJECTION, SOLUTION INTRAVENOUS at 10:30

## 2021-06-22 NOTE — PROGRESS NOTES
ACUTE TRANSPLANT NEPHROLOGY VISIT    Assessment & Plan   # DDKT (SPK) trend down but slowly. Transplant kidney biopsy prelim with no rejection.(report is pending)                - Baseline Creatinine: ~ TBD              - Proteinuria: Not checked post transplant              - Date DSA Last Checked: Jun/2021      Latest DSA: No DSA at time of transplant              - BK Viremia: No              - Kidney Tx Biopsy: 6/18/21: no rejection.              -Stent placed. Remove 4-6 weeks post txp     # Pancreas Tx (SPK):               - Pancreatic Exocrine Drainage: Enteric drained                     - Blood glucose: near euglycemia       On insulin: SSI              - HbA1c: Last checked at time of transplant     6%              - Pancreatic enzymes: trending down, U/S with unchanged size of fluid collection, patent vessels.              - Date DSA Last Checked: Jun/2021             Latest DSA: No DSA at time of transplant              - Pancreas Tx Biopsy: No, will likely need to obtain a pancreas biopsy at some point.      # Immunosuppression: Tacrolimus immediate release (goal 8-10), Mycophenolate mofetil (dose 1000 mg every 12 hours) and Prednisone (dose taper)               - Induction: intermediate risk protocol, PRA 32%              - Changes: No     # Infection Prophylaxis:   - PJP: Sulfa/TMP (Bactrim)  - CMV: Valcyte x3m  - Thrush: Micafungin (Mycamine) and myclex     # Hypertension: had been at goal with the occasional high systolic BP, but now trending down/soft Goal BP: < 150/90              - Volume status: Euvolemic                  - Changes: reduce the clonidine to 0.1 mg TID with the same holding parameters.      # Anemia in Chronic Renal Disease: Hgb: low stable. ROSA M: No              - Iron studies: Unknown at this time, but checked with dialysis     # Mineral Bone Disorder:   - Secondary renal hyperparathyroidism; PTH level: Unknown at this time, but checked with dialysis        On treatment: None  -  Vitamin D; level: Unknown at this time, but checked with dialysis        On supplement: Yes  - Calcium; level: Low               On supplement: Yes  - Phosphorus; level: Low, improving        On supplement: Yes     # Electrolytes:   - Potassium; level: Normal        On supplement: No  - Magnesium; level: Normal        On supplement: Yes  - Bicarbonate; level: Normal        On supplement: No  - Sodium; level: Normal    # Medical Compliance: Yes    # Transplant History:  Etiology of Kidney Failure: Diabetes mellitus type 2  Tx: DDKT (SPK)  Transplant: 6/11/2021 (Kidney / Pancreas)  Donor Type: Donation after Brain Death Donor Class:   Crossmatch at time of Tx: negative  DSA at time of Tx: No  Significant changes in immunosuppression: None  CMV IgG Ab High Risk Discordance (D+/R-): No  EBV IgG Ab High Risk Discordance (D+/R-): No  Significant transplant-related complications: None    Transplant Office Phone Number: [phone]    Assessment and plan was discussed with the patient and he voiced his understanding and agreement.    Return visit: No follow-ups on file.    Bertin Brand MD    Chief Complaint   Mr. Cramer is a 41 year old here for routine follow up and immunosuppression management.     History of Present Illness     Tye is a 41-year-old gentleman with a longstanding history of type 2 diabetes and renal failure.  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  His post transplant course was typical of SPK recovery with some discomfort related to the surgery and perhaps element of gastroparesis.  He was discharged from the hospital yesterday after had completed a kidney allograft biopsy for slow to decline creatinine and persistently elevated pancreatic enzymes.  He has no fevers no chills no abdominal pain but his white count is rising.  Talked about obtaining imaging but for now we will continue to monitor until tomorrow and if his white count is elevated or his pancreatic enzymes  continue to be elevated will get CT scan of his abdomen pelvis to assess for fluid collection and other matters that may be contributing to elevated lipase.  Is taking his medications regularly and we reviewed that at great details.  His blood pressure was low today and we discussed brisk hydration and we will start reducing his clonidine from 0.23 times a day to 0.1 mg 3 times a day.  Discussed return to the clinic tomorrow for assessment and determination of the need for imaging.    Home BP: Not checked    Problem List   Patient Active Problem List   Diagnosis     End stage kidney disease (H)     Hypertension secondary to other renal disorders     Secondary renal hyperparathyroidism (H)     Type 2 diabetes mellitus (H)     Hypertension     Anemia in chronic kidney disease     Vitamin D deficiency     Gallstones, common bile duct     Kidney transplant candidate     Pancreas transplanted (H)     Kidney replaced by transplant     Immunosuppressed status (H)     Pancreatitis of pancreas transplant     Anemia due to blood loss, acute     Acute post-operative pain     Hypophosphatemia     Hypomagnesemia       Allergies   Allergies   Allergen Reactions     Metoprolol      SOB, but was also experiencing significant edema not drug associated       Medications   Current Outpatient Medications   Medication Sig     atorvastatin (LIPITOR) 20 MG tablet Take 1 tablet (20 mg) by mouth daily     acetaminophen (TYLENOL) 325 MG tablet Take 2 tablets (650 mg) by mouth every 4 hours as needed for other (For optimal non-opioid multimodal pain management to improve pain control.)     amLODIPine (NORVASC) 10 MG tablet Take 10 mg by mouth daily Take 1 tablet by mouth in the evening     aspirin (ASA) 325 MG EC tablet Take 1 tablet (325 mg) by mouth daily     calcium carbonate (OS-SHAY) 1500 (600 Ca) MG tablet Take 600 mg by mouth 2 times daily (with meals)      carvedilol (COREG) 6.25 MG tablet Take 1 tablet (6.25 mg) by mouth 2 times daily      cloNIDine (CATAPRES) 0.2 MG tablet Take 1 tablet (0.2 mg) by mouth 3 times daily Take 1 tablet by mouth three times daily. Hold for SBP < 130.     clotrimazole (MYCELEX) 10 MG lozenge Place 1 lozenge (10 mg) inside cheek 4 times daily     insulin lispro (HUMALOG) 100 UNIT/ML Cartridge Inject 1-7 Units Subcutaneous 3 times daily (before meals)     insulin lispro (HUMALOG) 100 UNIT/ML Cartridge Inject 1-5 Units Subcutaneous At Bedtime     insulin pen needle (BD RAÚL U/F) 32G X 4 MM miscellaneous use as directed with lantus pen once a day     magnesium oxide (MAG-OX) 400 MG tablet Take 1 tablet (400 mg) by mouth every 24 hours     methocarbamol (ROBAXIN) 500 MG tablet Take 1 tablet (500 mg) by mouth every 8 hours as needed for muscle spasms     montelukast (SINGULAIR) 10 MG tablet Take 10 mg by mouth At Bedtime     mycophenolate (GENERIC EQUIVALENT) 250 MG capsule Take 4 capsules (1,000 mg) by mouth 2 times daily     omeprazole (PRILOSEC) 20 MG DR capsule Take 20 mg by mouth every morning     ondansetron (ZOFRAN-ODT) 4 MG ODT tab Take 4 mg by mouth every 8 hours as needed for nausea     oxyCODONE (ROXICODONE) 5 MG tablet Take 1 tablet (5 mg) by mouth every 4 hours as needed for moderate to severe pain     phosphorus tablet 250 mg (PHOSPHA 250 NEUTRAL) 250 MG per tablet Take 1 tablet (250 mg) by mouth 2 times daily     predniSONE (DELTASONE) 5 MG tablet Take 20 mg daily x 3 days (6/22-6/24), Take 15 mg daily x 7 days (6/25-7/1), Take 10 mg daily x 7 days (7/2-7/8), Take 5 mg x 7 days (7/9-7/15)     sulfamethoxazole-trimethoprim (BACTRIM) 400-80 MG tablet Take 1 tablet by mouth daily     tacrolimus (GENERIC EQUIVALENT) 0.5 MG capsule Take 1 capsule (0.5 mg) by mouth 2 times daily Discharge dose will be 2.5 mg BID     tacrolimus (GENERIC EQUIVALENT) 1 MG capsule Take 2 capsules (2 mg) by mouth 2 times daily Discharge dose will be 2.5 mg BID     valGANciclovir (VALCYTE) 450 MG tablet Take 1 tab (450mg) daily. Increase  dose up to a max of 2 tabs (900 mg) by mouth daily when directed by your transplant team.     vitamin D3 (CHOLECALCIFEROL) 2000 units (50 mcg) tablet Take 1 tablet (2,000 Units) by mouth daily     Current Facility-Administered Medications   Medication     0.9% sodium chloride BOLUS     Medications Discontinued During This Encounter   Medication Reason     atorvastatin (LIPITOR) 80 MG tablet        Physical Exam   reviewed    GENERAL APPEARANCE: alert and no distress  HENT: mouth without ulcers or lesions  LYMPHATICS: no cervical or supraclavicular nodes  RESP: lungs clear to auscultation - no rales, rhonchi or wheezes  CV: regular rhythm, normal rate, no rub, no murmur  EDEMA: no LE edema bilaterally  ABDOMEN: soft, nondistended, nontender, bowel sounds normal  MS: extremities normal - no gross deformities noted, no evidence of inflammation in joints, no muscle tenderness  SKIN: no rash    Data     Renal Latest Ref Rng & Units 6/22/2021 6/21/2021 6/20/2021   Na 133 - 144 mmol/L 139 138 138   K 3.4 - 5.3 mmol/L 4.6 4.7 5.2   Cl 94 - 109 mmol/L 107 109 109   CO2 20 - 32 mmol/L 26 24 25   BUN 7 - 30 mg/dL 22 24 25   Cr 0.66 - 1.25 mg/dL 2.06(H) 1.94(H) 2.03(H)   Glucose 70 - 99 mg/dL 102(H) 102(H) 94   Ca  8.5 - 10.1 mg/dL 9.0 8.2(L) 8.2(L)   Mg 1.6 - 2.3 mg/dL 2.1 2.1 1.9     Bone Health Latest Ref Rng & Units 6/22/2021 6/21/2021 6/20/2021   Phos 2.5 - 4.5 mg/dL 2.5 2.7 2.4(L)   Vit D Def 20 - 75 ug/L - - -     Heme Latest Ref Rng & Units 6/22/2021 6/21/2021 6/20/2021   WBC 4.0 - 11.0 10e9/L 16.9(H) 12.3(H) 10.1   Hgb 13.3 - 17.7 g/dL 8.7(L) 7.0(L) 7.2(L)   Plt 150 - 450 10e9/L 440 327 290   ABSOLUTE NEUTROPHIL 1.6 - 8.3 10e9/L 13.3(H) - -   ABSOLUTE LYMPHOCYTES 0.8 - 5.3 10e9/L 2.3 - -   ABSOLUTE MONOCYTES 0.0 - 1.3 10e9/L 0.8 - -   ABSOLUTE EOSINOPHILS 0.0 - 0.7 10e9/L 0.3 - -   ABSOLUTE BASOPHILS 0.0 - 0.2 10e9/L 0.1 - -   ABS IMMATURE GRANULOCYTES 0 - 0.4 10e9/L 0.2 - -   ABSOLUTE NUCLEATED RBC - 0.0 - -     Liver  Latest Ref Rng & Units 6/10/2021 1/4/2021 12/9/2020   AP 40 - 150 U/L 84 - -   TBili 0.2 - 1.3 mg/dL 0.4 - -   ALT 0 - 70 U/L 32 47 48   AST 0 - 45 U/L 32 31 21   Tot Protein 6.8 - 8.8 g/dL 7.2 - -   Albumin 3.4 - 5.0 g/dL 3.9 - -     Pancreas Latest Ref Rng & Units 6/22/2021 6/21/2021 6/20/2021   A1C 0 - 5.6 % - - -   Amylase 30 - 110 U/L 121(H) 105 100   Lipase 73 - 393 U/L 937(H) 912(H) 724(H)        [facility] Txp Virology Latest Ref Rng & Units 6/10/2021 8/30/2020 7/29/2019   EBV CAPSID ANTIBODY IGG 0.0 - 0.8 AI >8.0(H) >8.0(H) >8.0(H)   Hep B Core NR:Nonreactive Nonreactive - Nonreactive        Recent Labs   Lab Test 06/19/21  0759 06/20/21  0602 06/21/21  0645   DOSTAC Not Provided Not Provided Not Provided   TACROL 8.0 6.0 8.6

## 2021-06-22 NOTE — PROGRESS NOTES
"Erik Cramer came to [facility] today for labs and assessment following a kidney/pancreas transplant on 6/11/21.      Discharge date: 6/21/21  Transplant coordinator: Twyla Chang    Physical Assessment:  See physical assessment located under \"Document Flowsheets\".  Incision site: midline, closed with staples. No drainage. Open to air. TIA removal site RLQ, minimal serosanguinous drainage. Site cleansed and dressing replaced.  Lines: NA  Fox: NA  Urine clarity: pt reports voiding frequently. Clear, yellow.   Hydration: minimal hydration after getting home yesterday evening from hospital. Reviewed fluid requirements. 1 liter pitcher given to help patient record intake.   Nutrition: appetite is \"good.\" once home after discharge from hospital (around 1600) pt ate bread, egg, and pork sandwich.  Last BM: yesterday, while in hospital. Loose, medium. Holding stool softeners for now.   Pain: incisional, intermittent right lower abdomen. Worse with activity. 0/10 upon arrival, taking tylenol prn. Has oxy script but hasn't needed. ABD binder in place.    Labs drawn by [facility] staff: No. Drawn per first floor lab prior to [facility].    Plan of care for today:   Labs prior to [facility] in first floor. Printed copy given to patient. Vitals obtained and nursing assessment completed. Reviewed medication card for accuracy. Patient filled own medication box, also verified for accuracy. Education checklist completed. Dr. Brand in to evaluate patient. Medication changes as described below. Patient to check blood glucose once in am and once before largest meal of day. No need for insulin now, will continue to evaluate. Orders for 1liter IVF with repeat x 1 if continues to be orthostatic. 2 liters given in SIP[facility]. UA collected and sent. Possible CT/further imaging tomorrow if WBC continues to increase. Therapy plan entered for patient to receive 1 liter NS tomorrow morning before provider arrival if orthostatic. [facility]IP[facility] again tomorrow.     Medication " changes:   1. Valcyte dosed at 450 daily.  2. Decrease clonidine to 0.1mg three times daily (hold if SPB <130) (down from 0.2mg three times daily). Pill splitter given.  3. Decrease lipitor to 20mg daily (down from 40 daily)      Medications administered: 2 liters NS     Patient education:    The following teaching topics were addressed: Importance of drinking 2L of non-caffeinated fluids daily, Incisional care, Signs/symptoms of infection, Good handwashing, Medications (purposes, doses and times of administration), Phone numbers to call with concenrs (Transplant coordinator, [address] and [facility]) and Plan of care   Patient verbalized understanding and all questions answered.    Drug level:  Patient took 2.5mg of tacrolimus last evening at 8pm.  Care coordinator to follow up with the result.    Face to face time: 90 mins  Discharge Plan    [facility] labs prior[facility]  Discharge instructions reviewed with patient: YES  Patient/Representative verbalized understanding, all questions answered: YES    Discharged from unit at 1145 with whom: self (with ride from brother-in-law) to home in [facility].    Emilia Marcos RN

## 2021-06-22 NOTE — LETTER
"    6/22/2021         RE: Erik Cramer  [address] [place] [id number]        Dear Colleague,    Thank you for referring your patient, Erik Cramer, to the [facility]. Please see a copy of my visit note below.    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      Discharge date: 6/21/21  Transplant coordinator: Twyla Chang    Physical Assessment:  See physical assessment located under \"Document Flowsheets\".  Incision site: midline, closed with staples. No drainage. Open to air. TIA removal site RLQ, minimal serosanguinous drainage. Site cleansed and dressing replaced.  Lines: NA  Fox: NA  Urine clarity: pt reports voiding frequently. Clear, yellow.   Hydration: minimal hydration after getting home yesterday evening from hospital. Reviewed fluid requirements. 1 liter pitcher given to help patient record intake.   Nutrition: appetite is \"good.\" once home after discharge from hospital (around 1600) pt ate bread, egg, and pork sandwich.  Last BM: yesterday, while in hospital. Loose, medium. Holding stool softeners for now.   Pain: incisional, intermittent right lower abdomen. Worse with activity. 0/10 upon arrival, taking tylenol prn. Has oxy script but hasn't needed. ABD binder in place.    Labs drawn by [facility] staff: No. Drawn per first floor lab prior to SIP[facility].    Plan of care for today:   Labs prior to [facility]IP[facility] in first floor. Printed copy given to patient. Vitals obtained and nursing assessment completed. Reviewed medication card for accuracy. Patient filled own medication box, also verified for accuracy. Education checklist completed. Dr. Brand in to evaluate patient. Medication changes as described below. Patient to check blood glucose once in am and once before largest meal of day. No need for insulin now, will continue to evaluate. Orders for 1liter IVF with repeat x 1 if continues to be orthostatic. 2 " liters given in SIPC. UA collected and sent. Possible CT/further imaging tomorrow if WBC continues to increase. Therapy plan entered for patient to receive 1 liter NS tomorrow morning before provider arrival if orthostatic. SIPC again tomorrow.     Medication changes:   1. Valcyte dosed at 450 daily.  2. Decrease clonidine to 0.1mg three times daily (hold if SPB <130) (down from 0.2mg three times daily). Pill splitter given.  3. Decrease lipitor to 20mg daily (down from 40 daily)      Medications administered: 2 liters NS     Patient education:    The following teaching topics were addressed: Importance of drinking 2L of non-caffeinated fluids daily, Incisional care, Signs/symptoms of infection, Good handwashing, Medications (purposes, doses and times of administration), Phone numbers to call with concenrs (Transplant coordinator, [facility] and [facility]) and Plan of care   Patient verbalized understanding and all questions answered.    Drug level:  Patient took 2.5mg of tacrolimus last evening at 8pm.  Care coordinator to follow up with the result.    Face to face time: 90 mins  Discharge Plan    Pt will follow up with [facility] tomorrow at 7, with labs prior.  Discharge instructions reviewed with patient: YES  Patient/Representative verbalized understanding, all questions answered: YES    Discharged from unit at 1145 with whom: self (with ride from brother-in-law) to home in [facility].    Emilia Marcos RN      Again, thank you for allowing me to participate in the care of your patient.        Sincerely,        [facility]

## 2021-06-22 NOTE — TELEPHONE ENCOUNTER
Tye is a post kidney/pancreas transplant who discharged on Monday 06/21/2021. Patients prescription coverage created a challenging discharge situation. [facility] was only able to fill his immunosuppressants. His other meds were sent to [facility] - [place], [place]     **[facility] CAN FILL FOR MEDICARE PART B IMMUNOS SINCE THOARE ARE BILLED TO MEDICARE PART B INSURANCE.    **[facility] CANNOT FILL MEDICATIONS WITH THIS CIGNA PART D INSURANCE PLAN. PATIENT MUST USE [facility] PHONE [phone] FOR SPECIALTY MEDICATIONS. PATIENT MAY USE ANY OTHER LOCAL PHARMACY THAT ARE IN-NETWORK FOR NON-SPECIALTY MEDICATIONS    HEALTH BENEFIT: MEDICARE   ID#[id number] (PART A EFFECTIVE 1/1/21, PART B EFFECTIVE 1/1/21)   PROCESSING INFO: ID#[id number] GRP#[id number] BIN#[id number]  SUPPLEMENTAL HEALTH BENEFIT: [place] MEDICAL ASSISTANCE (EFFECTIVE 1/1/21)  PROCESSING INFO: [place] MANUAL ID#[id number] BIN#[id number]  PT WILL PAY $0 AT TIME OF SERVICE FOR ELIGIBLE PART B MEDS     PHARMACY BENEFIT: CIGNA PART D  PROCESSING INFO: ID#[id number] GRP#[id number] PCN#[id number] BIN#[id number] (EFFECTIVE 2/1/21)   NO DEDUCTIBLE OR OUT-OF-POCKET DUE TO LOW-INCOME SUBSIDY  COPAY STRUCTURE:  $3.70 FOR GENERIC  $9.20 FOR BRAND  $9.20 FOR NON-FORMULARY MEDICATIONS     TESTCLAIM SPECIALTY #28:   MYCOPHENOLATE 250MG=$0 AT TIME OF SERVICE  PROGRAF 1MG=$0 AT TIME OF SERVICE  TACROLIMUS 1MG=$0 AT TIME OF SERVICE  CYCLOSPORINE 100MG=$0 AT TIME OF SERVICE  VALGANCICLOVIR 450MG=MEDICATION NOT COVERED AT [facility], MUST USE [facility]       Jocelynn TaylorD.  [facility]  [phone]

## 2021-06-22 NOTE — LETTER
6/22/2021         RE: Erik Cramer  [address] [place] [id number]        Dear Colleague,    Thank you for referring your patient, Erik Cramer, to the [facility]. Please see a copy of my visit note below.    ACUTE TRANSPLANT NEPHROLOGY VISIT    Assessment & Plan   # DDKT (SPK) trend down but slowly. Transplant kidney biopsy prelim with no rejection.(report is pending)                - Baseline Creatinine: ~ TBD              - Proteinuria: Not checked post transplant              - Date DSA Last Checked: Jun/2021      Latest DSA: No DSA at time of transplant              - BK Viremia: No              - Kidney Tx Biopsy: 6/18/21: no rejection.              -Stent placed. Remove 4-6 weeks post txp     # Pancreas Tx (SPK):               - Pancreatic Exocrine Drainage: Enteric drained                     - Blood glucose: near euglycemia       On insulin: SSI              - HbA1c: Last checked at time of transplant     6%              - Pancreatic enzymes: trending down, U/S with unchanged size of fluid collection, patent vessels.              - Date DSA Last Checked: Jun/2021             Latest DSA: No DSA at time of transplant              - Pancreas Tx Biopsy: No, will likely need to obtain a pancreas biopsy at some point.      # Immunosuppression: Tacrolimus immediate release (goal 8-10), Mycophenolate mofetil (dose 1000 mg every 12 hours) and Prednisone (dose taper)               - Induction: intermediate risk protocol, PRA 32%              - Changes: No     # Infection Prophylaxis:   - PJP: Sulfa/TMP (Bactrim)  - CMV: Valcyte x3m  - Thrush: Micafungin (Mycamine) and myclex     # Hypertension: had been at goal with the occasional high systolic BP, but now trending down/soft Goal BP: < 150/90              - Volume status: Euvolemic                  - Changes: reduce the clonidine to 0.1 mg TID with the same holding parameters.      # Anemia in Chronic Renal  Disease: Hgb: low stable. ROSA M: No              - Iron studies: Unknown at this time, but checked with dialysis     # Mineral Bone Disorder:   - Secondary renal hyperparathyroidism; PTH level: Unknown at this time, but checked with dialysis        On treatment: None  - Vitamin D; level: Unknown at this time, but checked with dialysis        On supplement: Yes  - Calcium; level: Low               On supplement: Yes  - Phosphorus; level: Low, improving        On supplement: Yes     # Electrolytes:   - Potassium; level: Normal        On supplement: No  - Magnesium; level: Normal        On supplement: Yes  - Bicarbonate; level: Normal        On supplement: No  - Sodium; level: Normal    # Medical Compliance: Yes    # Transplant History:  Etiology of Kidney Failure: Diabetes mellitus type 2  Tx: DDKT (SPK)  Transplant: 6/11/2021 (Kidney / Pancreas)  Donor Type: Donation after Brain Death Donor Class:   Crossmatch at time of Tx: negative  DSA at time of Tx: No  Significant changes in immunosuppression: None  CMV IgG Ab High Risk Discordance (D+/R-): No  EBV IgG Ab High Risk Discordance (D+/R-): No  Significant transplant-related complications: None    Transplant Office Phone Number: [phone]    Assessment and plan was discussed with the patient and he voiced his understanding and agreement.    Return visit: No follow-ups on file.    Bertin Brand MD    Chief Complaint   Mr. Cramer is a 41 year old here for routine follow up and immunosuppression management.     History of Present Illness     Tye is a 41-year-old gentleman with a longstanding history of type 2 diabetes and renal failure.  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  His post transplant course was typical of SPK recovery with some discomfort related to the surgery and perhaps element of gastroparesis.  He was discharged from the hospital yesterday after had completed a kidney allograft biopsy for slow to decline creatinine  and persistently elevated pancreatic enzymes.  He has no fevers no chills no abdominal pain but his white count is rising.  Talked about obtaining imaging but for now we will continue to monitor until tomorrow and if his white count is elevated or his pancreatic enzymes continue to be elevated will get CT scan of his abdomen pelvis to assess for fluid collection and other matters that may be contributing to elevated lipase.  Is taking his medications regularly and we reviewed that at great details.  His blood pressure was low today and we discussed brisk hydration and we will start reducing his clonidine from 0.23 times a day to 0.1 mg 3 times a day.  Discussed return to the clinic tomorrow for assessment and determination of the need for imaging.    Home BP: Not checked    Problem List   Patient Active Problem List   Diagnosis     End stage kidney disease (H)     Hypertension secondary to other renal disorders     Secondary renal hyperparathyroidism (H)     Type 2 diabetes mellitus (H)     Hypertension     Anemia in chronic kidney disease     Vitamin D deficiency     Gallstones, common bile duct     Kidney transplant candidate     Pancreas transplanted (H)     Kidney replaced by transplant     Immunosuppressed status (H)     Pancreatitis of pancreas transplant     Anemia due to blood loss, acute     Acute post-operative pain     Hypophosphatemia     Hypomagnesemia       Allergies   Allergies   Allergen Reactions     Metoprolol      SOB, but was also experiencing significant edema not drug associated       Medications   Current Outpatient Medications   Medication Sig     atorvastatin (LIPITOR) 20 MG tablet Take 1 tablet (20 mg) by mouth daily     acetaminophen (TYLENOL) 325 MG tablet Take 2 tablets (650 mg) by mouth every 4 hours as needed for other (For optimal non-opioid multimodal pain management to improve pain control.)     amLODIPine (NORVASC) 10 MG tablet Take 10 mg by mouth daily Take 1 tablet by mouth in  the evening     aspirin (ASA) 325 MG EC tablet Take 1 tablet (325 mg) by mouth daily     calcium carbonate (OS-SHAY) 1500 (600 Ca) MG tablet Take 600 mg by mouth 2 times daily (with meals)      carvedilol (COREG) 6.25 MG tablet Take 1 tablet (6.25 mg) by mouth 2 times daily     cloNIDine (CATAPRES) 0.2 MG tablet Take 1 tablet (0.2 mg) by mouth 3 times daily Take 1 tablet by mouth three times daily. Hold for SBP < 130.     clotrimazole (MYCELEX) 10 MG lozenge Place 1 lozenge (10 mg) inside cheek 4 times daily     insulin lispro (HUMALOG) 100 UNIT/ML Cartridge Inject 1-7 Units Subcutaneous 3 times daily (before meals)     insulin lispro (HUMALOG) 100 UNIT/ML Cartridge Inject 1-5 Units Subcutaneous At Bedtime     insulin pen needle (BD RAÚL U/F) 32G X 4 MM miscellaneous use as directed with lantus pen once a day     magnesium oxide (MAG-OX) 400 MG tablet Take 1 tablet (400 mg) by mouth every 24 hours     methocarbamol (ROBAXIN) 500 MG tablet Take 1 tablet (500 mg) by mouth every 8 hours as needed for muscle spasms     montelukast (SINGULAIR) 10 MG tablet Take 10 mg by mouth At Bedtime     mycophenolate (GENERIC EQUIVALENT) 250 MG capsule Take 4 capsules (1,000 mg) by mouth 2 times daily     omeprazole (PRILOSEC) 20 MG DR capsule Take 20 mg by mouth every morning     ondansetron (ZOFRAN-ODT) 4 MG ODT tab Take 4 mg by mouth every 8 hours as needed for nausea     oxyCODONE (ROXICODONE) 5 MG tablet Take 1 tablet (5 mg) by mouth every 4 hours as needed for moderate to severe pain     phosphorus tablet 250 mg (PHOSPHA 250 NEUTRAL) 250 MG per tablet Take 1 tablet (250 mg) by mouth 2 times daily     predniSONE (DELTASONE) 5 MG tablet Take 20 mg daily x 3 days (6/22-6/24), Take 15 mg daily x 7 days (6/25-7/1), Take 10 mg daily x 7 days (7/2-7/8), Take 5 mg x 7 days (7/9-7/15)     sulfamethoxazole-trimethoprim (BACTRIM) 400-80 MG tablet Take 1 tablet by mouth daily     tacrolimus (GENERIC EQUIVALENT) 0.5 MG capsule Take 1 capsule  (0.5 mg) by mouth 2 times daily Discharge dose will be 2.5 mg BID     tacrolimus (GENERIC EQUIVALENT) 1 MG capsule Take 2 capsules (2 mg) by mouth 2 times daily Discharge dose will be 2.5 mg BID     valGANciclovir (VALCYTE) 450 MG tablet Take 1 tab (450mg) daily. Increase dose up to a max of 2 tabs (900 mg) by mouth daily when directed by your transplant team.     vitamin D3 (CHOLECALCIFEROL) 2000 units (50 mcg) tablet Take 1 tablet (2,000 Units) by mouth daily     Current Facility-Administered Medications   Medication     0.9% sodium chloride BOLUS     Medications Discontinued During This Encounter   Medication Reason     atorvastatin (LIPITOR) 80 MG tablet        Physical Exam   reviewed    GENERAL APPEARANCE: alert and no distress  HENT: mouth without ulcers or lesions  LYMPHATICS: no cervical or supraclavicular nodes  RESP: lungs clear to auscultation - no rales, rhonchi or wheezes  CV: regular rhythm, normal rate, no rub, no murmur  EDEMA: no LE edema bilaterally  ABDOMEN: soft, nondistended, nontender, bowel sounds normal  MS: extremities normal - no gross deformities noted, no evidence of inflammation in joints, no muscle tenderness  SKIN: no rash    Data     Renal Latest Ref Rng & Units 6/22/2021 6/21/2021 6/20/2021   Na 133 - 144 mmol/L 139 138 138   K 3.4 - 5.3 mmol/L 4.6 4.7 5.2   Cl 94 - 109 mmol/L 107 109 109   CO2 20 - 32 mmol/L 26 24 25   BUN 7 - 30 mg/dL 22 24 25   Cr 0.66 - 1.25 mg/dL 2.06(H) 1.94(H) 2.03(H)   Glucose 70 - 99 mg/dL 102(H) 102(H) 94   Ca  8.5 - 10.1 mg/dL 9.0 8.2(L) 8.2(L)   Mg 1.6 - 2.3 mg/dL 2.1 2.1 1.9     Bone Health Latest Ref Rng & Units 6/22/2021 6/21/2021 6/20/2021   Phos 2.5 - 4.5 mg/dL 2.5 2.7 2.4(L)   Vit D Def 20 - 75 ug/L - - -     Heme Latest Ref Rng & Units 6/22/2021 6/21/2021 6/20/2021   WBC 4.0 - 11.0 10e9/L 16.9(H) 12.3(H) 10.1   Hgb 13.3 - 17.7 g/dL 8.7(L) 7.0(L) 7.2(L)   Plt 150 - 450 10e9/L 440 327 290   ABSOLUTE NEUTROPHIL 1.6 - 8.3 10e9/L 13.3(H) - -   ABSOLUTE  LYMPHOCYTES 0.8 - 5.3 10e9/L 2.3 - -   ABSOLUTE MONOCYTES 0.0 - 1.3 10e9/L 0.8 - -   ABSOLUTE EOSINOPHILS 0.0 - 0.7 10e9/L 0.3 - -   ABSOLUTE BASOPHILS 0.0 - 0.2 10e9/L 0.1 - -   ABS IMMATURE GRANULOCYTES 0 - 0.4 10e9/L 0.2 - -   ABSOLUTE NUCLEATED RBC - 0.0 - -     Liver Latest Ref Rng & Units 6/10/2021 1/4/2021 12/9/2020   AP 40 - 150 U/L 84 - -   TBili 0.2 - 1.3 mg/dL 0.4 - -   ALT 0 - 70 U/L 32 47 48   AST 0 - 45 U/L 32 31 21   Tot Protein 6.8 - 8.8 g/dL 7.2 - -   Albumin 3.4 - 5.0 g/dL 3.9 - -     Pancreas Latest Ref Rng & Units 6/22/2021 6/21/2021 6/20/2021   A1C 0 - 5.6 % - - -   Amylase 30 - 110 U/L 121(H) 105 100   Lipase 73 - 393 U/L 937(H) 912(H) 724(H)        UMP Txp Virology Latest Ref Rng & Units 6/10/2021 8/30/2020 7/29/2019   EBV CAPSID ANTIBODY IGG 0.0 - 0.8 AI >8.0(H) >8.0(H) >8.0(H)   Hep B Core NR:Nonreactive Nonreactive - Nonreactive        Recent Labs   Lab Test 06/19/21  0759 06/20/21  0602 06/21/21  0645   DOSTAC Not Provided Not Provided Not Provided   TACROL 8.0 6.0 8.6               Again, thank you for allowing me to participate in the care of your patient.        Sincerely,        Bertin Brand MD

## 2021-06-22 NOTE — PATIENT INSTRUCTIONS
Dear Erik Cramer    Thank you for choosing [facility] ([facility]) for your transplant cares.  The following information is a summary of our appointment as well as important reminders.      PLAN    1. Return tomorrow at 0630 for labs and clinic at 7am.     2. Today's med changes:  - Confirmed Valcyte dose should be 450mg once daily.   -Decrease clonidine to 0.1mg three times daily. Use pill cutter to split 0.2mg tablets. **make sure to continue to check BP prior to clonidine and carvedilol doses and hold meds if SBP <130.  -Decrease atorvastatin (lipitor) to 20 mg daily    3. Your coordinator may call you later today if changes are needed to your tacrolimus (prograf) dose. If you don't hear from her, keep dose as is (2.5mg twice daily).    4. Take a set of vital signs, including BP, HR, temp later tonight and record in record book. Take blood glucose tonight before supper and record in record book.    5. Return to clinic tomorrow. Bring record book, med card, med box and bottles of medications.    6. If problems arise overnight, call numbers listed below.      Contact Information:    Transplant Coordinator: Twyla Chang  Transplant Office:  [phone]  [facility]:  [phone]  Ask for physician on call for kidney/pancreas transplant.  Unit 7A (Transplant Unit):  [phone]  [facility]:  [phone]  [facility]:  [phone]      We look forward in seeing you on your next appointment here at [facility] ([facility]).  Please don t hesitate to call us at [phone] to reschedule any of your appointments or to speak with one of the [facility] registered nurses.  It was a pleasure taking care of you today.    Sincerely,    [facility]  [facility]  [address] [place] [address]  [id number]  Phone:  [phone]

## 2021-06-23 ENCOUNTER — ALLIED HEALTH/NURSE VISIT (OUTPATIENT)
Dept: RESEARCH | Facility: CLINIC | Age: 41
End: 2021-06-23

## 2021-06-23 ENCOUNTER — OFFICE VISIT (OUTPATIENT)
Dept: INFUSION THERAPY | Facility: CLINIC | Age: 41
End: 2021-06-23
Attending: INTERNAL MEDICINE
Payer: MEDICARE

## 2021-06-23 ENCOUNTER — TELEPHONE (OUTPATIENT)
Dept: TRANSPLANT | Facility: CLINIC | Age: 41
End: 2021-06-23

## 2021-06-23 ENCOUNTER — APPOINTMENT (OUTPATIENT)
Dept: LAB | Facility: CLINIC | Age: 41
End: 2021-06-23
Payer: MEDICARE

## 2021-06-23 ENCOUNTER — ANCILLARY PROCEDURE (OUTPATIENT)
Dept: CT IMAGING | Facility: CLINIC | Age: 41
End: 2021-06-23
Attending: INTERNAL MEDICINE
Payer: MEDICARE

## 2021-06-23 VITALS
SYSTOLIC BLOOD PRESSURE: 161 MMHG | OXYGEN SATURATION: 99 % | BODY MASS INDEX: 28.54 KG/M2 | RESPIRATION RATE: 16 BRPM | TEMPERATURE: 98.2 F | DIASTOLIC BLOOD PRESSURE: 79 MMHG | WEIGHT: 171.51 LBS

## 2021-06-23 DIAGNOSIS — Z94.0 KIDNEY REPLACED BY TRANSPLANT: ICD-10-CM

## 2021-06-23 DIAGNOSIS — Z48.298 AFTERCARE FOLLOWING ORGAN TRANSPLANT: ICD-10-CM

## 2021-06-23 DIAGNOSIS — Z00.6 EXAMINATION OF PARTICIPANT IN CLINICAL TRIAL: Primary | ICD-10-CM

## 2021-06-23 DIAGNOSIS — Z94.83 PANCREAS TRANSPLANTED (H): ICD-10-CM

## 2021-06-23 DIAGNOSIS — Z94.83 PANCREAS REPLACED BY TRANSPLANT (H): Primary | ICD-10-CM

## 2021-06-23 DIAGNOSIS — Z48.298 AFTERCARE FOLLOWING ORGAN TRANSPLANT: Primary | ICD-10-CM

## 2021-06-23 LAB
AMYLASE SERPL-CCNC: 123 U/L (ref 30–110)
ANION GAP SERPL CALCULATED.3IONS-SCNC: 5 MMOL/L (ref 3–14)
BASOPHILS # BLD AUTO: 0 10E9/L (ref 0–0.2)
BASOPHILS NFR BLD AUTO: 0.3 %
BUN SERPL-MCNC: 18 MG/DL (ref 7–30)
CALCIUM SERPL-MCNC: 8.7 MG/DL (ref 8.5–10.1)
CHLORIDE SERPL-SCNC: 110 MMOL/L (ref 94–109)
CO2 SERPL-SCNC: 24 MMOL/L (ref 20–32)
COPATH REPORT: NORMAL
CREAT SERPL-MCNC: 1.94 MG/DL (ref 0.66–1.25)
DIFFERENTIAL METHOD BLD: ABNORMAL
EOSINOPHIL # BLD AUTO: 0.2 10E9/L (ref 0–0.7)
EOSINOPHIL NFR BLD AUTO: 1.2 %
ERYTHROCYTE [DISTWIDTH] IN BLOOD BY AUTOMATED COUNT: 15.5 % (ref 10–15)
GFR SERPL CREATININE-BSD FRML MDRD: 42 ML/MIN/{1.73_M2}
GLUCOSE SERPL-MCNC: 93 MG/DL (ref 70–99)
HCT VFR BLD AUTO: 24.4 % (ref 40–53)
HGB BLD-MCNC: 7.9 G/DL (ref 13.3–17.7)
IMM GRANULOCYTES # BLD: 0.2 10E9/L (ref 0–0.4)
IMM GRANULOCYTES NFR BLD: 1.3 %
LIPASE SERPL-CCNC: 996 U/L (ref 73–393)
LYMPHOCYTES # BLD AUTO: 2.2 10E9/L (ref 0.8–5.3)
LYMPHOCYTES NFR BLD AUTO: 14.3 %
MAGNESIUM SERPL-MCNC: 1.9 MG/DL (ref 1.6–2.3)
MCH RBC QN AUTO: 30.7 PG (ref 26.5–33)
MCHC RBC AUTO-ENTMCNC: 32.4 G/DL (ref 31.5–36.5)
MCV RBC AUTO: 95 FL (ref 78–100)
MONOCYTES # BLD AUTO: 0.7 10E9/L (ref 0–1.3)
MONOCYTES NFR BLD AUTO: 4.2 %
NEUTROPHILS # BLD AUTO: 12.2 10E9/L (ref 1.6–8.3)
NEUTROPHILS NFR BLD AUTO: 78.7 %
NRBC # BLD AUTO: 0 10*3/UL
NRBC BLD AUTO-RTO: 0 /100
PHOSPHATE SERPL-MCNC: 2.3 MG/DL (ref 2.5–4.5)
PLATELET # BLD AUTO: 393 10E9/L (ref 150–450)
POTASSIUM SERPL-SCNC: 4.8 MMOL/L (ref 3.4–5.3)
RBC # BLD AUTO: 2.57 10E12/L (ref 4.4–5.9)
SODIUM SERPL-SCNC: 139 MMOL/L (ref 133–144)
TACROLIMUS BLD-MCNC: 10.8 UG/L (ref 5–15)
TME LAST DOSE: 2000 H
WBC # BLD AUTO: 15.5 10E9/L (ref 4–11)

## 2021-06-23 PROCEDURE — 36415 COLL VENOUS BLD VENIPUNCTURE: CPT | Performed by: INTERNAL MEDICINE

## 2021-06-23 PROCEDURE — 96360 HYDRATION IV INFUSION INIT: CPT

## 2021-06-23 PROCEDURE — 99215 OFFICE O/P EST HI 40 MIN: CPT | Performed by: INTERNAL MEDICINE

## 2021-06-23 PROCEDURE — 80197 ASSAY OF TACROLIMUS: CPT | Performed by: INTERNAL MEDICINE

## 2021-06-23 PROCEDURE — 83735 ASSAY OF MAGNESIUM: CPT | Performed by: INTERNAL MEDICINE

## 2021-06-23 PROCEDURE — 83690 ASSAY OF LIPASE: CPT | Performed by: INTERNAL MEDICINE

## 2021-06-23 PROCEDURE — 85025 COMPLETE CBC W/AUTO DIFF WBC: CPT | Performed by: INTERNAL MEDICINE

## 2021-06-23 PROCEDURE — 84100 ASSAY OF PHOSPHORUS: CPT | Performed by: INTERNAL MEDICINE

## 2021-06-23 PROCEDURE — 74176 CT ABD & PELVIS W/O CONTRAST: CPT | Mod: ME | Performed by: RADIOLOGY

## 2021-06-23 PROCEDURE — 82150 ASSAY OF AMYLASE: CPT | Performed by: INTERNAL MEDICINE

## 2021-06-23 PROCEDURE — 258N000003 HC RX IP 258 OP 636: Performed by: INTERNAL MEDICINE

## 2021-06-23 PROCEDURE — G0463 HOSPITAL OUTPT CLINIC VISIT: HCPCS | Mod: 25

## 2021-06-23 PROCEDURE — 80048 BASIC METABOLIC PNL TOTAL CA: CPT | Performed by: INTERNAL MEDICINE

## 2021-06-23 PROCEDURE — G1004 CDSM NDSC: HCPCS | Performed by: RADIOLOGY

## 2021-06-23 RX ORDER — HEPARIN SODIUM,PORCINE 10 UNIT/ML
5 VIAL (ML) INTRAVENOUS
Status: CANCELLED | OUTPATIENT
Start: 2021-06-23

## 2021-06-23 RX ORDER — FLUCONAZOLE 200 MG/1
200 TABLET ORAL DAILY
Qty: 21 TABLET | Refills: 0 | Status: SHIPPED | OUTPATIENT
Start: 2021-06-23 | End: 2021-07-13

## 2021-06-23 RX ORDER — FLUDROCORTISONE ACETATE 0.1 MG/1
0.1 TABLET ORAL DAILY
Qty: 30 TABLET | Refills: 2 | Status: SHIPPED | OUTPATIENT
Start: 2021-06-23 | End: 2021-06-23

## 2021-06-23 RX ORDER — HEPARIN SODIUM (PORCINE) LOCK FLUSH IV SOLN 100 UNIT/ML 100 UNIT/ML
5 SOLUTION INTRAVENOUS
Status: CANCELLED | OUTPATIENT
Start: 2021-06-23

## 2021-06-23 RX ADMIN — SODIUM CHLORIDE 1000 ML: 9 INJECTION, SOLUTION INTRAVENOUS at 07:23

## 2021-06-23 NOTE — TELEPHONE ENCOUNTER
Per Dr. Brand, plan for prn daily fluids for positive orthostatic hypotension (drop in systolic or diastolic pressures by 10 mmHg or HR increase by 10 bpm). Therapy plan entered.

## 2021-06-23 NOTE — LETTER
"    2021         RE: Erik Cramer  [address] [place] [id number]        Dear Colleague,    Thank you for referring your patient, Erik Cramer, to the [facility]. Please see a copy of my visit note below.    Erik Cramer came to [facility] today for a lab and assess following a Kidney/Pancreas transplant on 21.      Discharge date: 21  Transplant coordinator: Twyla Chang  Phone number patient can be reached at: [phone]    Physical Assessment:  See physical assessment located under \"Document Flowsheets\".  Incision site: Clean/Dry/Intact closed with staples. Old TIA site covered with gauze- C/D/I changed daily per patient.  Abdominal binder in place.  Lines: N/A  Fox: N/A  Urine clarity: UOP clear/yellow. Reports voiding frequently.  Hydration: Drank approximately 1.5L yesterday.  Nutrition: Good appetite, intermittent nausea.   Last BM: 3 BM yesterday- 2 soft, 1 watery. Holding stool softeners.  Pain: Intermittent minimal pain around old TIA site. PRN Tylenol as needed.    B prior to meal last evening. 100 this AM. No insulin required.  Labs drawn by [facility] staff No    Plan of care for today:   -Slightly orthostatic BPs (139/82 sitting to 124/73 standing). Administered 1L NS  Per order.   -Therapy plan in place for patient to receive 1 liter NS tomorrow morning before provider arrival if orthostatic.   -[facility] again tomorrow.   -Scheduled CT scan at 1400 today.     Medication changes:   -No medication changes today.    Medications administered:  -1 L NS bolus    Patient education:    The following teaching topics were addressed: Importance of drinking 2L of non-caffeinated fluids daily, Incisional care, Signs/symptoms of infection, Good handwashing, Medications (purposes, doses and times of administration) and Plan of care   Patient verbalized understanding and all questions answered.    Drug level:  Patient took 2.5 mg of tacrolimus last evening " Message left for patient to return call to clinic.   at 2000.  Care coordinator to follow up with the result.    Face to face time: ~60 min    Discharge Plan    Pt will follow up in [facility] tomorrow.   Discharge instructions reviewed with patient: YES  Patient/Representative verbalized understanding, all questions answered: YES    Discharged from unit at 0910 with whom: self to family's home.    Shawnee Goodman, YING    Administrations This Visit     0.9% sodium chloride BOLUS     Admin Date  06/23/2021 Action  New Bag Dose  1,000 mL Rate  1,000 mL/hr Route  Intravenous Administered By  Shawnee Goodman, RN                Again, thank you for allowing me to participate in the care of your patient.        Sincerely,        [facility]

## 2021-06-23 NOTE — LETTER
6/23/2021         RE: Erik Cramer  [address] [place] [id number]        Dear Colleague,    Thank you for referring your patient, Erik Cramer, to the [facility]. Please see a copy of my visit note below.    ACUTE TRANSPLANT NEPHROLOGY VISIT    Assessment & Plan   # DDKT (SPK) trend down but slowly. Transplant kidney biopsy prelim with no rejection.(report is pending)                - Baseline Creatinine: ~ TBD              - Proteinuria: Not checked post transplant              - Date DSA Last Checked: Jun/2021      Latest DSA: No DSA at time of transplant              - BK Viremia: No              - Kidney Tx Biopsy: 6/18/21: no rejection.              -Stent placed. Remove 4-6 weeks post txp     # Pancreas Tx (SPK):               - Pancreatic Exocrine Drainage: Enteric drained                     - Blood glucose: near euglycemia       On insulin: SSI              - HbA1c: Last checked at time of transplant     6%              - Pancreatic enzymes: stable to slightly down              - Date DSA Last Checked: Jun/2021             Latest DSA: No DSA at time of transplant              - Pancreas Tx Biopsy: No, will likely need to obtain a pancreas biopsy at some point.      # Immunosuppression: Tacrolimus immediate release (goal 8-10), Mycophenolate mofetil (dose 1000 mg every 12 hours) and Prednisone (dose taper)               - Induction: intermediate risk protocol, PRA 32%              - Changes: No     # Infection Prophylaxis:   - PJP: Sulfa/TMP (Bactrim)  - CMV: Valcyte x3m  - Thrush: Micafungin (Mycamine) and myclex     # Hypertension: had been at goal with the occasional high systolic BP, but now trending down/soft Goal BP: < 150/90              - Volume status: Euvolemic                  - Changes: reduce the clonidine to 0.1 mg TID with the same holding parameters. And also discussed to go back up for SBP> 170     # Anemia in Chronic Renal Disease: Hgb:  low stable. ROSA M: No              - Iron studies: Unknown at this time, but checked with dialysis     # Mineral Bone Disorder:   - Secondary renal hyperparathyroidism; PTH level: Unknown at this time, but checked with dialysis        On treatment: None  - Vitamin D; level: Unknown at this time, but checked with dialysis        On supplement: Yes  - Calcium; level: Low               On supplement: Yes  - Phosphorus; level: Low, improving        On supplement: Yes     # Electrolytes:   - Potassium; level: Normal        On supplement: No  - Magnesium; level: Normal        On supplement: Yes  - Bicarbonate; level: Normal        On supplement: No  - Sodium; level: Normal    # Medical Compliance: Yes    # Transplant History:  Etiology of Kidney Failure: Diabetes mellitus type 2  Tx: DDKT (SPK)  Transplant: 6/11/2021 (Kidney / Pancreas)  Donor Type: Donation after Brain Death Donor Class:   Crossmatch at time of Tx: negative  DSA at time of Tx: No  Significant changes in immunosuppression: None  CMV IgG Ab High Risk Discordance (D+/R-): No  EBV IgG Ab High Risk Discordance (D+/R-): No  Significant transplant-related complications: None    Transplant Office Phone Number: [phone]    Assessment and plan was discussed with the patient and he voiced his understanding and agreement.    Return visit: No follow-ups on file.    Bertin Brand MD    Chief Complaint   Mr. Cramer is a 41 year old here for routine follow up and immunosuppression management.     History of Present Illness     Tye is a 41-year-old gentleman with a longstanding history of type 2 diabetes and renal failure.  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  His post transplant course was typical of SPK recovery with some discomfort related to the surgery and perhaps element of gastroparesis.  He was discharged from the hospital yesterday after had completed a kidney allograft biopsy for slow to decline creatinine and persistently  elevated pancreatic enzymes.  He has no fevers no chills no abdominal pain but his white count is rising.    Overall doing ok but now reports abdominal pain over the right side. No fevers or chills. No nausea or vomiting. Had 2 BMs.   Home BP: Not checked    Problem List   Patient Active Problem List   Diagnosis     End stage kidney disease (H)     Hypertension secondary to other renal disorders     Secondary renal hyperparathyroidism (H)     Type 2 diabetes mellitus (H)     Hypertension     Anemia in chronic kidney disease     Vitamin D deficiency     Gallstones, common bile duct     Kidney transplant candidate     Pancreas transplanted (H)     Kidney replaced by transplant     Immunosuppressed status (H)     Pancreatitis of pancreas transplant     Anemia due to blood loss, acute     Acute post-operative pain     Hypophosphatemia     Hypomagnesemia       Allergies   Allergies   Allergen Reactions     Metoprolol      SOB, but was also experiencing significant edema not drug associated       Medications   Current Outpatient Medications   Medication Sig     acetaminophen (TYLENOL) 325 MG tablet Take 2 tablets (650 mg) by mouth every 4 hours as needed for other (For optimal non-opioid multimodal pain management to improve pain control.)     amLODIPine (NORVASC) 10 MG tablet Take 10 mg by mouth daily Take 1 tablet by mouth in the evening     aspirin (ASA) 325 MG EC tablet Take 1 tablet (325 mg) by mouth daily     atorvastatin (LIPITOR) 20 MG tablet Take 1 tablet (20 mg) by mouth daily     calcium carbonate (OS-SHAY) 1500 (600 Ca) MG tablet Take 600 mg by mouth 2 times daily (with meals)      carvedilol (COREG) 6.25 MG tablet Take 1 tablet (6.25 mg) by mouth 2 times daily     cloNIDine (CATAPRES) 0.2 MG tablet Take 1 tablet (0.2 mg) by mouth 3 times daily Take 1 tablet by mouth three times daily. Hold for SBP < 130.     clotrimazole (MYCELEX) 10 MG lozenge Place 1 lozenge (10 mg) inside cheek 4 times daily     magnesium  oxide (MAG-OX) 400 MG tablet Take 1 tablet (400 mg) by mouth every 24 hours     methocarbamol (ROBAXIN) 500 MG tablet Take 1 tablet (500 mg) by mouth every 8 hours as needed for muscle spasms     montelukast (SINGULAIR) 10 MG tablet Take 10 mg by mouth At Bedtime     mycophenolate (GENERIC EQUIVALENT) 250 MG capsule Take 4 capsules (1,000 mg) by mouth 2 times daily     omeprazole (PRILOSEC) 20 MG DR capsule Take 20 mg by mouth every morning     ondansetron (ZOFRAN-ODT) 4 MG ODT tab Take 4 mg by mouth every 8 hours as needed for nausea     phosphorus tablet 250 mg (PHOSPHA 250 NEUTRAL) 250 MG per tablet Take 1 tablet (250 mg) by mouth 2 times daily     predniSONE (DELTASONE) 5 MG tablet Take 20 mg daily x 3 days (6/22-6/24), Take 15 mg daily x 7 days (6/25-7/1), Take 10 mg daily x 7 days (7/2-7/8), Take 5 mg x 7 days (7/9-7/15)     sulfamethoxazole-trimethoprim (BACTRIM) 400-80 MG tablet Take 1 tablet by mouth daily     tacrolimus (GENERIC EQUIVALENT) 1 MG capsule Take 2 capsules (2 mg) by mouth 2 times daily Discharge dose will be 2.5 mg BID     valGANciclovir (VALCYTE) 450 MG tablet Take 1 tab (450mg) daily. Increase dose up to a max of 2 tabs (900 mg) by mouth daily when directed by your transplant team.     vitamin D3 (CHOLECALCIFEROL) 2000 units (50 mcg) tablet Take 1 tablet (2,000 Units) by mouth daily     insulin lispro (HUMALOG) 100 UNIT/ML Cartridge Inject 1-7 Units Subcutaneous 3 times daily (before meals)     insulin lispro (HUMALOG) 100 UNIT/ML Cartridge Inject 1-5 Units Subcutaneous At Bedtime     insulin pen needle (BD RAÚL U/F) 32G X 4 MM miscellaneous use as directed with lantus pen once a day     oxyCODONE (ROXICODONE) 5 MG tablet Take 1 tablet (5 mg) by mouth every 4 hours as needed for moderate to severe pain     tacrolimus (GENERIC EQUIVALENT) 0.5 MG capsule Take 1 capsule (0.5 mg) by mouth 2 times daily Discharge dose will be 2.5 mg BID     No current facility-administered medications for this  visit.      Medications Discontinued During This Encounter   Medication Reason     fludrocortisone (FLORINEF) 0.1 MG tablet        Physical Exam   reviewed    GENERAL APPEARANCE: alert and no distress  HENT: mouth without ulcers or lesions  LYMPHATICS: no cervical or supraclavicular nodes  RESP: lungs clear to auscultation - no rales, rhonchi or wheezes  CV: regular rhythm, normal rate, no rub, no murmur  EDEMA: no LE edema bilaterally  ABDOMEN: soft, nondistended, nontender, bowel sounds normal  MS: extremities normal - no gross deformities noted, no evidence of inflammation in joints, no muscle tenderness  SKIN: no rash    Data     Renal Latest Ref Rng & Units 6/23/2021 6/22/2021 6/21/2021   Na 133 - 144 mmol/L 139 139 138   K 3.4 - 5.3 mmol/L 4.8 4.6 4.7   Cl 94 - 109 mmol/L 110(H) 107 109   CO2 20 - 32 mmol/L 24 26 24   BUN 7 - 30 mg/dL 18 22 24   Cr 0.66 - 1.25 mg/dL 1.94(H) 2.06(H) 1.94(H)   Glucose 70 - 99 mg/dL 93 102(H) 102(H)   Ca  8.5 - 10.1 mg/dL 8.7 9.0 8.2(L)   Mg 1.6 - 2.3 mg/dL 1.9 2.1 2.1     Bone Health Latest Ref Rng & Units 6/23/2021 6/22/2021 6/21/2021   Phos 2.5 - 4.5 mg/dL 2.3(L) 2.5 2.7   Vit D Def 20 - 75 ug/L - - -     Heme Latest Ref Rng & Units 6/23/2021 6/22/2021 6/21/2021   WBC 4.0 - 11.0 10e9/L 15.5(H) 16.9(H) 12.3(H)   Hgb 13.3 - 17.7 g/dL 7.9(L) 8.7(L) 7.0(L)   Plt 150 - 450 10e9/L 393 440 327   ABSOLUTE NEUTROPHIL 1.6 - 8.3 10e9/L 12.2(H) 13.3(H) -   ABSOLUTE LYMPHOCYTES 0.8 - 5.3 10e9/L 2.2 2.3 -   ABSOLUTE MONOCYTES 0.0 - 1.3 10e9/L 0.7 0.8 -   ABSOLUTE EOSINOPHILS 0.0 - 0.7 10e9/L 0.2 0.3 -   ABSOLUTE BASOPHILS 0.0 - 0.2 10e9/L 0.0 0.1 -   ABS IMMATURE GRANULOCYTES 0 - 0.4 10e9/L 0.2 0.2 -   ABSOLUTE NUCLEATED RBC - 0.0 0.0 -     Liver Latest Ref Rng & Units 6/10/2021 1/4/2021 12/9/2020   AP 40 - 150 U/L 84 - -   TBili 0.2 - 1.3 mg/dL 0.4 - -   ALT 0 - 70 U/L 32 47 48   AST 0 - 45 U/L 32 31 21   Tot Protein 6.8 - 8.8 g/dL 7.2 - -   Albumin 3.4 - 5.0 g/dL 3.9 - -     Pancreas  Latest Ref Rng & Units 6/23/2021 6/22/2021 6/21/2021   A1C 0 - 5.6 % - - -   Amylase 30 - 110 U/L 123(H) 121(H) 105   Lipase 73 - 393 U/L 996(H) 937(H) 912(H)        [facility] Txp Virology Latest Ref Rng & Units 6/10/2021 8/30/2020 7/29/2019   EBV CAPSID ANTIBODY IGG 0.0 - 0.8 AI >8.0(H) >8.0(H) >8.0(H)   Hep B Core NR:Nonreactive Nonreactive - Nonreactive        Recent Labs   Lab Test 06/20/21  0602 06/21/21  0645 06/22/21  0631   DOSTAC Not Provided Not Provided 2,000   TACROL 6.0 8.6 11.4     Addendum:  Fluid collection noted, with possible infection   Will start augmentin 875 and flucoazole 200 mg daily   Reduced tac dose to 2 mg twice daily      Again, thank you for allowing me to participate in the care of your patient.        Sincerely,        Bertin Brand MD

## 2021-06-23 NOTE — PROGRESS NOTES
ACUTE TRANSPLANT NEPHROLOGY VISIT    Assessment & Plan   # DDKT (SPK) trend down but slowly. Transplant kidney biopsy prelim with no rejection.(report is pending)                - Baseline Creatinine: ~ TBD              - Proteinuria: Not checked post transplant              - Date DSA Last Checked: Jun/2021      Latest DSA: No DSA at time of transplant              - BK Viremia: No              - Kidney Tx Biopsy: 6/18/21: no rejection.              -Stent placed. Remove 4-6 weeks post txp     # Pancreas Tx (SPK):               - Pancreatic Exocrine Drainage: Enteric drained                     - Blood glucose: near euglycemia       On insulin: SSI              - HbA1c: Last checked at time of transplant     6%              - Pancreatic enzymes: stable to slightly down              - Date DSA Last Checked: Jun/2021             Latest DSA: No DSA at time of transplant              - Pancreas Tx Biopsy: No, will likely need to obtain a pancreas biopsy at some point.      # Immunosuppression: Tacrolimus immediate release (goal 8-10), Mycophenolate mofetil (dose 1000 mg every 12 hours) and Prednisone (dose taper)               - Induction: intermediate risk protocol, PRA 32%              - Changes: No     # Infection Prophylaxis:   - PJP: Sulfa/TMP (Bactrim)  - CMV: Valcyte x3m  - Thrush: Micafungin (Mycamine) and myclex     # Hypertension: had been at goal with the occasional high systolic BP, but now trending down/soft Goal BP: < 150/90              - Volume status: Euvolemic                  - Changes: reduce the clonidine to 0.1 mg TID with the same holding parameters. And also discussed to go back up for SBP> 170     # Anemia in Chronic Renal Disease: Hgb: low stable. ROSA M: No              - Iron studies: Unknown at this time, but checked with dialysis     # Mineral Bone Disorder:   - Secondary renal hyperparathyroidism; PTH level: Unknown at this time, but checked with dialysis        On treatment: None  - Vitamin  D; level: Unknown at this time, but checked with dialysis        On supplement: Yes  - Calcium; level: Low               On supplement: Yes  - Phosphorus; level: Low, improving        On supplement: Yes     # Electrolytes:   - Potassium; level: Normal        On supplement: No  - Magnesium; level: Normal        On supplement: Yes  - Bicarbonate; level: Normal        On supplement: No  - Sodium; level: Normal    # Medical Compliance: Yes    # Transplant History:  Etiology of Kidney Failure: Diabetes mellitus type 2  Tx: DDKT (SPK)  Transplant: 6/11/2021 (Kidney / Pancreas)  Donor Type: Donation after Brain Death Donor Class:   Crossmatch at time of Tx: negative  DSA at time of Tx: No  Significant changes in immunosuppression: None  CMV IgG Ab High Risk Discordance (D+/R-): No  EBV IgG Ab High Risk Discordance (D+/R-): No  Significant transplant-related complications: None    Transplant Office Phone Number: [phone]    Assessment and plan was discussed with the patient and he voiced his understanding and agreement.    Return visit: No follow-ups on file.    Bertin Brand MD    Chief Complaint   Mr. Cramer is a 41 year old here for routine follow up and immunosuppression management.     History of Present Illness     Tye is a 41-year-old gentleman with a longstanding history of type 2 diabetes and renal failure.  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  His post transplant course was typical of SPK recovery with some discomfort related to the surgery and perhaps element of gastroparesis.  He was discharged from the hospital yesterday after had completed a kidney allograft biopsy for slow to decline creatinine and persistently elevated pancreatic enzymes.  He has no fevers no chills no abdominal pain but his white count is rising.    Overall doing ok but now reports abdominal pain over the right side. No fevers or chills. No nausea or vomiting. Had 2 BMs.   Home BP: Not  checked    Problem List   Patient Active Problem List   Diagnosis     End stage kidney disease (H)     Hypertension secondary to other renal disorders     Secondary renal hyperparathyroidism (H)     Type 2 diabetes mellitus (H)     Hypertension     Anemia in chronic kidney disease     Vitamin D deficiency     Gallstones, common bile duct     Kidney transplant candidate     Pancreas transplanted (H)     Kidney replaced by transplant     Immunosuppressed status (H)     Pancreatitis of pancreas transplant     Anemia due to blood loss, acute     Acute post-operative pain     Hypophosphatemia     Hypomagnesemia       Allergies   Allergies   Allergen Reactions     Metoprolol      SOB, but was also experiencing significant edema not drug associated       Medications   Current Outpatient Medications   Medication Sig     acetaminophen (TYLENOL) 325 MG tablet Take 2 tablets (650 mg) by mouth every 4 hours as needed for other (For optimal non-opioid multimodal pain management to improve pain control.)     amLODIPine (NORVASC) 10 MG tablet Take 10 mg by mouth daily Take 1 tablet by mouth in the evening     aspirin (ASA) 325 MG EC tablet Take 1 tablet (325 mg) by mouth daily     atorvastatin (LIPITOR) 20 MG tablet Take 1 tablet (20 mg) by mouth daily     calcium carbonate (OS-SHAY) 1500 (600 Ca) MG tablet Take 600 mg by mouth 2 times daily (with meals)      carvedilol (COREG) 6.25 MG tablet Take 1 tablet (6.25 mg) by mouth 2 times daily     cloNIDine (CATAPRES) 0.2 MG tablet Take 1 tablet (0.2 mg) by mouth 3 times daily Take 1 tablet by mouth three times daily. Hold for SBP < 130.     clotrimazole (MYCELEX) 10 MG lozenge Place 1 lozenge (10 mg) inside cheek 4 times daily     magnesium oxide (MAG-OX) 400 MG tablet Take 1 tablet (400 mg) by mouth every 24 hours     methocarbamol (ROBAXIN) 500 MG tablet Take 1 tablet (500 mg) by mouth every 8 hours as needed for muscle spasms     montelukast (SINGULAIR) 10 MG tablet Take 10 mg by  mouth At Bedtime     mycophenolate (GENERIC EQUIVALENT) 250 MG capsule Take 4 capsules (1,000 mg) by mouth 2 times daily     omeprazole (PRILOSEC) 20 MG DR capsule Take 20 mg by mouth every morning     ondansetron (ZOFRAN-ODT) 4 MG ODT tab Take 4 mg by mouth every 8 hours as needed for nausea     phosphorus tablet 250 mg (PHOSPHA 250 NEUTRAL) 250 MG per tablet Take 1 tablet (250 mg) by mouth 2 times daily     predniSONE (DELTASONE) 5 MG tablet Take 20 mg daily x 3 days (6/22-6/24), Take 15 mg daily x 7 days (6/25-7/1), Take 10 mg daily x 7 days (7/2-7/8), Take 5 mg x 7 days (7/9-7/15)     sulfamethoxazole-trimethoprim (BACTRIM) 400-80 MG tablet Take 1 tablet by mouth daily     tacrolimus (GENERIC EQUIVALENT) 1 MG capsule Take 2 capsules (2 mg) by mouth 2 times daily Discharge dose will be 2.5 mg BID     valGANciclovir (VALCYTE) 450 MG tablet Take 1 tab (450mg) daily. Increase dose up to a max of 2 tabs (900 mg) by mouth daily when directed by your transplant team.     vitamin D3 (CHOLECALCIFEROL) 2000 units (50 mcg) tablet Take 1 tablet (2,000 Units) by mouth daily     insulin lispro (HUMALOG) 100 UNIT/ML Cartridge Inject 1-7 Units Subcutaneous 3 times daily (before meals)     insulin lispro (HUMALOG) 100 UNIT/ML Cartridge Inject 1-5 Units Subcutaneous At Bedtime     insulin pen needle (BD RAÚL U/F) 32G X 4 MM miscellaneous use as directed with lantus pen once a day     oxyCODONE (ROXICODONE) 5 MG tablet Take 1 tablet (5 mg) by mouth every 4 hours as needed for moderate to severe pain     tacrolimus (GENERIC EQUIVALENT) 0.5 MG capsule Take 1 capsule (0.5 mg) by mouth 2 times daily Discharge dose will be 2.5 mg BID     No current facility-administered medications for this visit.      Medications Discontinued During This Encounter   Medication Reason     fludrocortisone (FLORINEF) 0.1 MG tablet        Physical Exam   reviewed    GENERAL APPEARANCE: alert and no distress  HENT: mouth without ulcers or  lesions  LYMPHATICS: no cervical or supraclavicular nodes  RESP: lungs clear to auscultation - no rales, rhonchi or wheezes  CV: regular rhythm, normal rate, no rub, no murmur  EDEMA: no LE edema bilaterally  ABDOMEN: soft, nondistended, nontender, bowel sounds normal  MS: extremities normal - no gross deformities noted, no evidence of inflammation in joints, no muscle tenderness  SKIN: no rash    Data     Renal Latest Ref Rng & Units 6/23/2021 6/22/2021 6/21/2021   Na 133 - 144 mmol/L 139 139 138   K 3.4 - 5.3 mmol/L 4.8 4.6 4.7   Cl 94 - 109 mmol/L 110(H) 107 109   CO2 20 - 32 mmol/L 24 26 24   BUN 7 - 30 mg/dL 18 22 24   Cr 0.66 - 1.25 mg/dL 1.94(H) 2.06(H) 1.94(H)   Glucose 70 - 99 mg/dL 93 102(H) 102(H)   Ca  8.5 - 10.1 mg/dL 8.7 9.0 8.2(L)   Mg 1.6 - 2.3 mg/dL 1.9 2.1 2.1     Bone Health Latest Ref Rng & Units 6/23/2021 6/22/2021 6/21/2021   Phos 2.5 - 4.5 mg/dL 2.3(L) 2.5 2.7   Vit D Def 20 - 75 ug/L - - -     Heme Latest Ref Rng & Units 6/23/2021 6/22/2021 6/21/2021   WBC 4.0 - 11.0 10e9/L 15.5(H) 16.9(H) 12.3(H)   Hgb 13.3 - 17.7 g/dL 7.9(L) 8.7(L) 7.0(L)   Plt 150 - 450 10e9/L 393 440 327   ABSOLUTE NEUTROPHIL 1.6 - 8.3 10e9/L 12.2(H) 13.3(H) -   ABSOLUTE LYMPHOCYTES 0.8 - 5.3 10e9/L 2.2 2.3 -   ABSOLUTE MONOCYTES 0.0 - 1.3 10e9/L 0.7 0.8 -   ABSOLUTE EOSINOPHILS 0.0 - 0.7 10e9/L 0.2 0.3 -   ABSOLUTE BASOPHILS 0.0 - 0.2 10e9/L 0.0 0.1 -   ABS IMMATURE GRANULOCYTES 0 - 0.4 10e9/L 0.2 0.2 -   ABSOLUTE NUCLEATED RBC - 0.0 0.0 -     Liver Latest Ref Rng & Units 6/10/2021 1/4/2021 12/9/2020   AP 40 - 150 U/L 84 - -   TBili 0.2 - 1.3 mg/dL 0.4 - -   ALT 0 - 70 U/L 32 47 48   AST 0 - 45 U/L 32 31 21   Tot Protein 6.8 - 8.8 g/dL 7.2 - -   Albumin 3.4 - 5.0 g/dL 3.9 - -     Pancreas Latest Ref Rng & Units 6/23/2021 6/22/2021 6/21/2021   A1C 0 - 5.6 % - - -   Amylase 30 - 110 U/L 123(H) 121(H) 105   Lipase 73 - 393 U/L 996(H) 937(H) 912(H)        [facility] Txp Virology Latest Ref Rng & Units 6/10/2021 8/30/2020 7/29/2019    EBV CAPSID ANTIBODY IGG 0.0 - 0.8 AI >8.0(H) >8.0(H) >8.0(H)   Hep B Core NR:Nonreactive Nonreactive - Nonreactive        Recent Labs   Lab Test 06/20/21  0602 06/21/21  0645 06/22/21  0631   DOSTAC Not Provided Not Provided 2,000   TACROL 6.0 8.6 11.4     Addendum:  Fluid collection noted, with possible infection   Will start augmentin 875 and flucoazole 200 mg daily   Reduced tac dose to 2 mg twice daily

## 2021-06-23 NOTE — PATIENT INSTRUCTIONS
Dear Erik Cramer    Thank you for choosing [facility] ([facility]) for your transplant cares.  The following information is a summary of our appointment as well as important reminders.      Please make sure your phone is available today because your care coordinator will call to update you with your anti-rejection drug levels and possibly make changes to your anti-rejection dosages.    We look forward in seeing you on your next appointment here at [facility] ([facility]).  Please don t hesitate to call us at [phone] to reschedule any of your appointments or to speak with one of the [facility] registered nurses.  It was a pleasure taking care of you today.    Sincerely,    [facility]  [address] [place] [address]  [address]  Phone:  [phone]

## 2021-06-23 NOTE — PROGRESS NOTES
"Erik Cramer came to [facility] today for a lab and assess following a Kidney/Pancreas transplant on 21.      Discharge date: 21  Transplant coordinator: Twyla Chang  Phone number patient can be reached at: [phone]    Physical Assessment:  See physical assessment located under \"Document Flowsheets\".  Incision site: Clean/Dry/Intact closed with staples. Old TIA site covered with gauze- C/D/I changed daily per patient.  Abdominal binder in place.  Lines: N/A  Fox: N/A  Urine clarity: UOP clear/yellow. Reports voiding frequently.  Hydration: Drank approximately 1.5L yesterday.  Nutrition: Good appetite, intermittent nausea.   Last BM: 3 BM yesterday- 2 soft, 1 watery. Holding stool softeners.  Pain: Intermittent minimal pain around old TIA site. PRN Tylenol as needed.    B prior to meal last evening. 100 this AM. No insulin required.  Labs drawn by [facility] staff No    Plan of care for today:   -Slightly orthostatic BPs (139/82 sitting to 124/73 standing). Administered 1L NS  Per order.   -Therapy plan in place for patient to receive 1 liter NS tomorrow morning before provider arrival if orthostatic.   -[facility] again tomorrow.   -Scheduled CT scan at 1400 today.     Medication changes:   -No medication changes today.    Medications administered:  -1 L NS bolus    Patient education:    The following teaching topics were addressed: Importance of drinking 2L of non-caffeinated fluids daily, Incisional care, Signs/symptoms of infection, Good handwashing, Medications (purposes, doses and times of administration) and Plan of care   Patient verbalized understanding and all questions answered.    Drug level:  Patient took 2.5 mg of tacrolimus last evening at .  Care coordinator to follow up with the result.    Face to face time: ~60 min    Discharge Plan    Pt will follow up in [facility] tomorrow.   Discharge instructions reviewed with patient: YES  Patient/Representative verbalized understanding, all questions " answered: YES    Discharged from unit at 0910 with whom: self to family's home.    Shawnee Goodman, RN    Administrations This Visit     0.9% sodium chloride BOLUS     Admin Date  06/23/2021 Action  New Bag Dose  1,000 mL Rate  1,000 mL/hr Route  Intravenous Administered By  Shawnee Goodman, RN

## 2021-06-24 ENCOUNTER — INFUSION THERAPY VISIT (OUTPATIENT)
Dept: INFUSION THERAPY | Facility: CLINIC | Age: 41
End: 2021-06-24
Attending: INTERNAL MEDICINE
Payer: MEDICARE

## 2021-06-24 ENCOUNTER — TELEPHONE (OUTPATIENT)
Dept: TRANSPLANT | Facility: CLINIC | Age: 41
End: 2021-06-24

## 2021-06-24 ENCOUNTER — APPOINTMENT (OUTPATIENT)
Dept: LAB | Facility: CLINIC | Age: 41
End: 2021-06-24
Attending: INTERNAL MEDICINE
Payer: MEDICARE

## 2021-06-24 VITALS
OXYGEN SATURATION: 100 % | BODY MASS INDEX: 28.69 KG/M2 | WEIGHT: 172.4 LBS | RESPIRATION RATE: 16 BRPM | TEMPERATURE: 98.1 F

## 2021-06-24 DIAGNOSIS — Z94.0 KIDNEY REPLACED BY TRANSPLANT: ICD-10-CM

## 2021-06-24 DIAGNOSIS — Z48.298 AFTERCARE FOLLOWING ORGAN TRANSPLANT: Primary | ICD-10-CM

## 2021-06-24 DIAGNOSIS — Z94.83 STATUS POST PANCREAS TRANSPLANTATION (H): ICD-10-CM

## 2021-06-24 DIAGNOSIS — Z94.83 PANCREAS TRANSPLANTED (H): ICD-10-CM

## 2021-06-24 DIAGNOSIS — K85.90: Primary | ICD-10-CM

## 2021-06-24 DIAGNOSIS — D84.9 IMMUNOSUPPRESSED STATUS (H): ICD-10-CM

## 2021-06-24 DIAGNOSIS — T86.898: Primary | ICD-10-CM

## 2021-06-24 DIAGNOSIS — G89.18 ACUTE POST-OPERATIVE PAIN: Primary | ICD-10-CM

## 2021-06-24 LAB
AMYLASE SERPL-CCNC: 126 U/L (ref 30–110)
ANION GAP SERPL CALCULATED.3IONS-SCNC: 6 MMOL/L (ref 3–14)
BASOPHILS # BLD AUTO: 0 10E9/L (ref 0–0.2)
BASOPHILS NFR BLD AUTO: 0.3 %
BUN SERPL-MCNC: 17 MG/DL (ref 7–30)
CALCIUM SERPL-MCNC: 8.9 MG/DL (ref 8.5–10.1)
CHLORIDE SERPL-SCNC: 110 MMOL/L (ref 94–109)
CO2 SERPL-SCNC: 24 MMOL/L (ref 20–32)
CREAT SERPL-MCNC: 1.73 MG/DL (ref 0.66–1.25)
DIFFERENTIAL METHOD BLD: ABNORMAL
EOSINOPHIL # BLD AUTO: 0.2 10E9/L (ref 0–0.7)
EOSINOPHIL NFR BLD AUTO: 1.3 %
ERYTHROCYTE [DISTWIDTH] IN BLOOD BY AUTOMATED COUNT: 15.5 % (ref 10–15)
GFR SERPL CREATININE-BSD FRML MDRD: 49 ML/MIN/{1.73_M2}
GLUCOSE SERPL-MCNC: 107 MG/DL (ref 70–99)
HCT VFR BLD AUTO: 23.5 % (ref 40–53)
HGB BLD-MCNC: 7.6 G/DL (ref 13.3–17.7)
IMM GRANULOCYTES # BLD: 0.2 10E9/L (ref 0–0.4)
IMM GRANULOCYTES NFR BLD: 1 %
LIPASE SERPL-CCNC: 1219 U/L (ref 73–393)
LYMPHOCYTES # BLD AUTO: 2.2 10E9/L (ref 0.8–5.3)
LYMPHOCYTES NFR BLD AUTO: 14.6 %
MAGNESIUM SERPL-MCNC: 1.9 MG/DL (ref 1.6–2.3)
MCH RBC QN AUTO: 31 PG (ref 26.5–33)
MCHC RBC AUTO-ENTMCNC: 32.3 G/DL (ref 31.5–36.5)
MCV RBC AUTO: 96 FL (ref 78–100)
MONOCYTES # BLD AUTO: 0.7 10E9/L (ref 0–1.3)
MONOCYTES NFR BLD AUTO: 4.7 %
MYCOPHENOLATE SERPL LC/MS/MS-MCNC: 4.31 MG/L (ref 1–3.5)
MYCOPHENOLATE-G SERPL LC/MS/MS-MCNC: 100.9 MG/L (ref 30–95)
NEUTROPHILS # BLD AUTO: 11.7 10E9/L (ref 1.6–8.3)
NEUTROPHILS NFR BLD AUTO: 78.1 %
NRBC # BLD AUTO: 0 10*3/UL
NRBC BLD AUTO-RTO: 0 /100
PHOSPHATE SERPL-MCNC: 2.5 MG/DL (ref 2.5–4.5)
PLATELET # BLD AUTO: 369 10E9/L (ref 150–450)
POTASSIUM SERPL-SCNC: 4.6 MMOL/L (ref 3.4–5.3)
RBC # BLD AUTO: 2.45 10E12/L (ref 4.4–5.9)
SODIUM SERPL-SCNC: 140 MMOL/L (ref 133–144)
TACROLIMUS BLD-MCNC: 10.7 UG/L (ref 5–15)
TME LAST DOSE: ABNORMAL H
TME LAST DOSE: NORMAL H
WBC # BLD AUTO: 14.9 10E9/L (ref 4–11)

## 2021-06-24 PROCEDURE — 83735 ASSAY OF MAGNESIUM: CPT | Performed by: INTERNAL MEDICINE

## 2021-06-24 PROCEDURE — 36415 COLL VENOUS BLD VENIPUNCTURE: CPT | Performed by: INTERNAL MEDICINE

## 2021-06-24 PROCEDURE — 80048 BASIC METABOLIC PNL TOTAL CA: CPT | Performed by: INTERNAL MEDICINE

## 2021-06-24 PROCEDURE — 82150 ASSAY OF AMYLASE: CPT | Performed by: INTERNAL MEDICINE

## 2021-06-24 PROCEDURE — 83690 ASSAY OF LIPASE: CPT | Performed by: INTERNAL MEDICINE

## 2021-06-24 PROCEDURE — 96360 HYDRATION IV INFUSION INIT: CPT

## 2021-06-24 PROCEDURE — 84100 ASSAY OF PHOSPHORUS: CPT | Performed by: INTERNAL MEDICINE

## 2021-06-24 PROCEDURE — 80180 DRUG SCRN QUAN MYCOPHENOLATE: CPT | Performed by: INTERNAL MEDICINE

## 2021-06-24 PROCEDURE — G0463 HOSPITAL OUTPT CLINIC VISIT: HCPCS | Mod: 25

## 2021-06-24 PROCEDURE — 80197 ASSAY OF TACROLIMUS: CPT | Performed by: INTERNAL MEDICINE

## 2021-06-24 PROCEDURE — 99215 OFFICE O/P EST HI 40 MIN: CPT | Performed by: INTERNAL MEDICINE

## 2021-06-24 PROCEDURE — 258N000003 HC RX IP 258 OP 636: Performed by: INTERNAL MEDICINE

## 2021-06-24 PROCEDURE — 85025 COMPLETE CBC W/AUTO DIFF WBC: CPT | Performed by: INTERNAL MEDICINE

## 2021-06-24 RX ORDER — HEPARIN SODIUM,PORCINE 10 UNIT/ML
5 VIAL (ML) INTRAVENOUS
Status: CANCELLED | OUTPATIENT
Start: 2021-06-24

## 2021-06-24 RX ORDER — HEPARIN SODIUM (PORCINE) LOCK FLUSH IV SOLN 100 UNIT/ML 100 UNIT/ML
5 SOLUTION INTRAVENOUS
Status: CANCELLED | OUTPATIENT
Start: 2021-06-24

## 2021-06-24 RX ADMIN — SODIUM CHLORIDE 1000 ML: 9 INJECTION, SOLUTION INTRAVENOUS at 07:17

## 2021-06-24 NOTE — PROGRESS NOTES
"Erik Cramer came to [facility] today for a lab and assess following a Kidney/Pancreas transplant on 21.       Discharge date: 21  Transplant coordinator: Twyla Chang  Phone number patient can be reached at: [phone]     Physical Assessment:  See physical assessment located under \"Document Flowsheets\".  Incision site: Clean/Dry/Intact closed with staples. Old TIA site covered with gauze- C/D/I changed daily per patient.  Abdominal binder in place.  Lines: N/A  Fox: N/A  Urine clarity: UOP clear/yellow. Reports voiding frequently.  Hydration: Drank approximately 2L yesterday.  Nutrition: Good appetite, intermittent nausea improving.   Last BM: 0 BM yesterday. Will take stool softeners today if no BM.  Pain: Intermittent minimal pain on RLQ of abdomen. PRN Tylenol as needed.    B prior to meal last evening. 99 this AM. No insulin required.  Labs drawn by [facility] staff No     Plan of care for today:   -Orthostatic BPs (126/69 sitting to 103/59 standing). Administered 1L NS  Per order.   -Twyla Chang to schedule drain placement in IR.  -Therapy plan in place for patient to receive 1 liter NS tomorrow morning before provider arrival if orthostatic.   -[facility] again tomorrow.      Medication changes:   -Started antibiotics yesterday per Dr. Brand.      Medications administered:  -1 L NS bolus     Patient education:     The following teaching topics were addressed: Importance of drinking 2L of non-caffeinated fluids daily, Incisional care, Signs/symptoms of infection, Good handwashing, Medications (purposes, doses and times of administration) and Plan of care   Patient verbalized understanding and all questions answered.     Drug level:  Patient took 2 mg of tacrolimus last evening at .  Care coordinator to follow up with the result.     Face to face time: ~60 min     Discharge Plan     Pt will follow up in [facility] tomorrow.   Discharge instructions reviewed with patient: YES  Patient/Representative " verbalized understanding, all questions answered: YES     Discharged from unit at 0915 with whom: self to family's home.     Shawnee Goodman RN

## 2021-06-24 NOTE — LETTER
6/24/2021         RE: Erik Cramer  [address] [place] [id number]        Dear Colleague,    Thank you for referring your patient, Erik Cramer, to the [facility]. Please see a copy of my visit note below.    ACUTE TRANSPLANT NEPHROLOGY VISIT    Assessment & Plan   # DDKT (SPK) trend down but slowly. Transplant kidney biopsy prelim with no rejection.(report is pending)                - Baseline Creatinine: ~ TBD              - Proteinuria: Not checked post transplant              - Date DSA Last Checked: Jun/2021      Latest DSA: No DSA at time of transplant              - BK Viremia: No              - Kidney Tx Biopsy: 6/18/21: no rejection.              -Stent placed. Remove 4-6 weeks post txp     # Pancreas Tx (SPK):               - Pancreatic Exocrine Drainage: Enteric drained                     - Blood glucose: near euglycemia       On insulin: SSI              - HbA1c: Last checked at time of transplant     6%              - Pancreatic enzymes: stable to slightly down              - Date DSA Last Checked: Jun/2021             Latest DSA: No DSA at time of transplant              - Pancreas Tx Biopsy: No, will likely need to obtain a pancreas biopsy at some point.      # Immunosuppression: Tacrolimus immediate release (goal 8-10), Mycophenolate mofetil (dose 1000 mg every 12 hours) and Prednisone (dose taper)               - Induction: intermediate risk protocol, PRA 32%              - Changes: No     # Infection Prophylaxis:   - PJP: Sulfa/TMP (Bactrim)  - CMV: Valcyte x3m  - Thrush: Micafungin (Mycamine) and myclex     # Hypertension: had been at goal with the occasional high systolic BP, but now trending down/soft Goal BP: < 150/90              - Volume status: Euvolemic                  - Changes: reduce the clonidine to 0.1 mg TID with the same holding parameters. And also discussed to go back up for SBP> 170     # Anemia in Chronic Renal Disease: Hgb:  low stable. ROSA M: No              - Iron studies: Unknown at this time, but checked with dialysis     # Mineral Bone Disorder:   - Secondary renal hyperparathyroidism; PTH level: Unknown at this time, but checked with dialysis        On treatment: None  - Vitamin D; level: Unknown at this time, but checked with dialysis        On supplement: Yes  - Calcium; level: Low               On supplement: Yes  - Phosphorus; level: Low, improving        On supplement: Yes     # Electrolytes:   - Potassium; level: Normal        On supplement: No  - Magnesium; level: Normal        On supplement: Yes  - Bicarbonate; level: Normal        On supplement: No  - Sodium; level: Normal    # Elevated WBC: started on antimicrobial, peripancreatic fluid collection will be scheduled for aspiration by IR     # Medical Compliance: Yes    # Transplant History:  Etiology of Kidney Failure: Diabetes mellitus type 2  Tx: DDKT (SPK)  Transplant: 6/11/2021 (Kidney / Pancreas)  Donor Type: Donation after Brain Death Donor Class:   Crossmatch at time of Tx: negative  DSA at time of Tx: No  Significant changes in immunosuppression: None  CMV IgG Ab High Risk Discordance (D+/R-): No  EBV IgG Ab High Risk Discordance (D+/R-): No  Significant transplant-related complications: None    Transplant Office Phone Number: [phone]    Assessment and plan was discussed with the patient and he voiced his understanding and agreement.    Return visit: No follow-ups on file.    Bertin Brand MD    Chief Complaint   Mr. Cramer is a 41 year old here for routine follow up and immunosuppression management.     History of Present Illness     Tye is a 41-year-old gentleman with a longstanding history of type 2 diabetes and renal failure.  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  His post transplant course was typical of SPK recovery with some discomfort related to the surgery and perhaps element of gastroparesis.  He was discharged from  the hospital yesterday after had completed a kidney allograft biopsy for slow to decline creatinine and persistently elevated pancreatic enzymes.  He has no fevers no chills no abdominal pain but his white count is rising.    He was started on antibiotic and fluconazole. He is trying to hydrate as usual. He was given IVF today based on his orthostatics   Home BP: Not checked    Problem List   Patient Active Problem List   Diagnosis     End stage kidney disease (H)     Hypertension secondary to other renal disorders     Secondary renal hyperparathyroidism (H)     Type 2 diabetes mellitus (H)     Hypertension     Anemia in chronic kidney disease     Vitamin D deficiency     Gallstones, common bile duct     Kidney transplant candidate     Pancreas transplanted (H)     Kidney replaced by transplant     Immunosuppressed status (H)     Pancreatitis of pancreas transplant     Anemia due to blood loss, acute     Acute post-operative pain     Hypophosphatemia     Hypomagnesemia       Allergies   Allergies   Allergen Reactions     Metoprolol      SOB, but was also experiencing significant edema not drug associated       Medications   Current Outpatient Medications   Medication Sig     acetaminophen (TYLENOL) 325 MG tablet Take 2 tablets (650 mg) by mouth every 4 hours as needed for other (For optimal non-opioid multimodal pain management to improve pain control.)     amLODIPine (NORVASC) 10 MG tablet Take 10 mg by mouth daily Take 1 tablet by mouth in the evening     amoxicillin-clavulanate (AUGMENTIN) 875-125 MG tablet Take 1 tablet by mouth 2 times daily     aspirin (ASA) 325 MG EC tablet Take 1 tablet (325 mg) by mouth daily     atorvastatin (LIPITOR) 20 MG tablet Take 1 tablet (20 mg) by mouth daily     calcium carbonate (OS-SHAY) 1500 (600 Ca) MG tablet Take 600 mg by mouth 2 times daily (with meals)      carvedilol (COREG) 6.25 MG tablet Take 1 tablet (6.25 mg) by mouth 2 times daily     clotrimazole (MYCELEX) 10 MG  lozenge Place 1 lozenge (10 mg) inside cheek 4 times daily     fluconazole (DIFLUCAN) 200 MG tablet Take 1 tablet (200 mg) by mouth daily     insulin lispro (HUMALOG) 100 UNIT/ML Cartridge Inject 1-7 Units Subcutaneous 3 times daily (before meals)     insulin lispro (HUMALOG) 100 UNIT/ML Cartridge Inject 1-5 Units Subcutaneous At Bedtime     insulin pen needle (BD RAÚL U/F) 32G X 4 MM miscellaneous use as directed with lantus pen once a day     magnesium oxide (MAG-OX) 400 MG tablet Take 1 tablet (400 mg) by mouth every 24 hours     methocarbamol (ROBAXIN) 500 MG tablet Take 1 tablet (500 mg) by mouth every 8 hours as needed for muscle spasms     montelukast (SINGULAIR) 10 MG tablet Take 10 mg by mouth At Bedtime     omeprazole (PRILOSEC) 20 MG DR capsule Take 20 mg by mouth every morning     ondansetron (ZOFRAN-ODT) 4 MG ODT tab Take 4 mg by mouth every 8 hours as needed for nausea     oxyCODONE (ROXICODONE) 5 MG tablet Take 1 tablet (5 mg) by mouth every 4 hours as needed for moderate to severe pain     phosphorus tablet 250 mg (PHOSPHA 250 NEUTRAL) 250 MG per tablet Take 1 tablet (250 mg) by mouth 2 times daily     predniSONE (DELTASONE) 5 MG tablet Take 20 mg daily x 3 days (6/22-6/24), Take 15 mg daily x 7 days (6/25-7/1), Take 10 mg daily x 7 days (7/2-7/8), Take 5 mg x 7 days (7/9-7/15)     sulfamethoxazole-trimethoprim (BACTRIM) 400-80 MG tablet Take 1 tablet by mouth daily     vitamin D3 (CHOLECALCIFEROL) 2000 units (50 mcg) tablet Take 1 tablet (2,000 Units) by mouth daily     cloNIDine (CATAPRES) 0.1 MG tablet Take 1 tablet (0.1 mg) by mouth 3 times daily Hold for SBP < 130.     mycophenolate (GENERIC EQUIVALENT) 250 MG capsule Take 4 capsules (1,000 mg) by mouth 2 times daily     phosphorus tablet 250 mg (PHOSPHA 250 NEUTRAL) 250 MG per tablet Take 1 tablet (250 mg) by mouth 2 times daily     sodium bicarbonate 650 MG tablet Take 1 tablet (650 mg) by mouth 2 times daily     sodium bicarbonate 650 MG  tablet Take 1 tablet (650 mg) by mouth 2 times daily     tacrolimus (GENERIC EQUIVALENT) 1 MG capsule Take 2 capsules (2 mg) by mouth 2 times daily     valGANciclovir (VALCYTE) 450 MG tablet Take 1 tab (450mg) daily. Increase dose up to a max of 2 tabs (900 mg) by mouth daily when directed by your transplant team.     No current facility-administered medications for this visit.      There are no discontinued medications.    Physical Exam   reviewed    GENERAL APPEARANCE: alert and no distress  HENT: mouth without ulcers or lesions  LYMPHATICS: no cervical or supraclavicular nodes  RESP: lungs clear to auscultation - no rales, rhonchi or wheezes  CV: regular rhythm, normal rate, no rub, no murmur  EDEMA: no LE edema bilaterally  ABDOMEN: soft, nondistended, nontender, bowel sounds normal  MS: extremities normal - no gross deformities noted, no evidence of inflammation in joints, no muscle tenderness  SKIN: no rash    Data     Renal Latest Ref Rng & Units 6/28/2021 6/27/2021 6/25/2021   Na 133 - 144 mmol/L - 142 142   K 3.4 - 5.3 mmol/L - 4.8 4.8   Cl 94 - 109 mmol/L - 112(H) 112(H)   CO2 20 - 32 mmol/L - 24 23   BUN 7 - 30 mg/dL - 15 15   Cr 0.66 - 1.25 mg/dL - 1.61(H) 1.62(H)   Glucose 70 - 99 mg/dL - 102(H) 110(H)   Ca  8.5 - 10.1 mg/dL - 8.9 8.7   Mg 1.6 - 2.3 mg/dL - 1.6 1.5(L)   Mg (external) 1.6 - 2.6 mg/dL 1.5(L) - -     Bone Health Latest Ref Rng & Units 6/28/2021 6/27/2021 6/25/2021   Phos 2.5 - 4.5 mg/dL - 3.0 2.6   Phos (external) 2.3 - 4.7 mg/dL 1.8(L) - -   Vit D Def 20 - 75 ug/L - - 35     Heme Latest Ref Rng & Units 6/28/2021 6/27/2021 6/25/2021   WBC 4.0 - 11.0 10e9/L - 13.4(H) 14.4(H)   WBC (external) 3.5 - 10.5 x10(9)/L 15.3(H) - -   Hgb 13.3 - 17.7 g/dL - 8.3(L) 7.5(L)   Hgb (external) 13.5 - 17.5 g/dL 8.4(L) - -   Plt 150 - 450 10e9/L - 386 373   Plt (external) 150 - 450 x10(9)/L 380 - -   ABSOLUTE NEUTROPHIL 1.6 - 8.3 10e9/L - 10.1(H) -   ABSOLUTE NEUTROPHILS (EXTERNAL) 1.7 - 7.0 10(9)/L 13.8(H)  - -   ABSOLUTE LYMPHOCYTES 0.8 - 5.3 10e9/L - 2.3 -   ABSOLUTE LYMPHOCYTES (EXTERNAL) 1.0 - 4.8 10(9)/L 1.0 - -   ABSOLUTE MONOCYTES 0.0 - 1.3 10e9/L - 0.7 -   ABSOLUTE MONOCYTES (EXTERNAL) 0.2 - 0.9 10(9)/L 0.3 - -   ABSOLUTE EOSINOPHILS 0.0 - 0.7 10e9/L - 0.2 -   ABSOLUTE EOSINOPHILS (EXTERNAL) 0.0 - 0.5 10(9)/L 0.0 - -   ABSOLUTE BASOPHILS 0.0 - 0.2 10e9/L - 0.1 -   ABSOLUTE BASOPHILS (EXTERNAL) 0.0 - 0.3 10(9)/L 0.1 - -   ABS IMMATURE GRANULOCYTES 0 - 0.4 10e9/L - 0.1 -   ABSOLUTE NUCLEATED RBC - - 0.0 -     Liver Latest Ref Rng & Units 6/10/2021 1/4/2021 12/9/2020   AP 40 - 150 U/L 84 - -   TBili 0.2 - 1.3 mg/dL 0.4 - -   ALT 0 - 70 U/L 32 47 48   AST 0 - 45 U/L 32 31 21   Tot Protein 6.8 - 8.8 g/dL 7.2 - -   Albumin 3.4 - 5.0 g/dL 3.9 - -     Pancreas Latest Ref Rng & Units 6/27/2021 6/25/2021 6/24/2021   A1C 0 - 5.6 % - - -   Amylase 30 - 110 U/L 145(H) 139(H) 126(H)   Lipase 73 - 393 U/L 1,211(H) 1,382(H) 1,219(H)        [facility] Virology Latest Ref Rng & Units 6/10/2021 8/30/2020 7/29/2019   EBV CAPSID ANTIBODY IGG 0.0 - 0.8 AI >8.0(H) >8.0(H) >8.0(H)   Hep B Core NR:Nonreactive Nonreactive - Nonreactive        Recent Labs   Lab Test 06/24/21  0642 06/25/21  0630 06/27/21  0702   DOSTAC 2000 6/23/2021 2,000 Not Provided   TACROL 10.7 9.5 8.4           Again, thank you for allowing me to participate in the care of your patient.        Sincerely,        Bertin Brand MD

## 2021-06-24 NOTE — PATIENT INSTRUCTIONS
Dear Erik Cramer    Thank you for choosing [facility] ([facility]) for your transplant cares.  The following information is a summary of our appointment as well as important reminders.      Please make sure your phone is available today because your transplant care coordinator will call to update you with your anti-rejection drug levels and possibly make changes to your anti-rejection dosages. She will also coordinate your new drain placement.     [facility] tomorrow [facility].     Continue to drink 2-3 L of fluids per day.     We look forward in seeing you on your next appointment here at [facility] ([facility]).  Please don t hesitate to call us at [phone] to reschedule any of your appointments or to speak with one of the [facility] registered nurses.  It was a pleasure taking care of you today.    Sincerely,    [facility]  [address] [place] [address]  [id number]  Phone:  [phone]

## 2021-06-24 NOTE — LETTER
"    2021         RE: Erik Cramer  [address] [place] [id number]        Dear Colleague,    Thank you for referring your patient, Erik Cramer, to the [facility]. Please see a copy of my visit note below.    Erik Cramer came to [facility] today for a lab and assess following a Kidney/Pancreas transplant on 21.       Discharge date: 21  Transplant coordinator: Twyla Chang  Phone number patient can be reached at: [phone]     Physical Assessment:  See physical assessment located under \"Document Flowsheets\".  Incision site: Clean/Dry/Intact closed with staples. Old TIA site covered with gauze- C/D/I changed daily per patient.  Abdominal binder in place.  Lines: N/A  Fox: N/A  Urine clarity: UOP clear/yellow. Reports voiding frequently.  Hydration: Drank approximately 2L yesterday.  Nutrition: Good appetite, intermittent nausea improving.   Last BM: 0 BM yesterday. Will take stool softeners today if no BM.  Pain: Intermittent minimal pain on RLQ of abdomen. PRN Tylenol as needed.    B prior to meal last evening. 99 this AM. No insulin required.  Labs drawn by [facility] staff No     Plan of care for today:   -Orthostatic BPs (126/69 sitting to 103/59 standing). Administered 1L NS  Per order.   -Twyla Chang to schedule drain placement in IR.  -Therapy plan in place for patient to receive 1 liter NS tomorrow morning before provider arrival if orthostatic.   -[facility] again tomorrow.      Medication changes:   -Started antibiotics yesterday per Dr. Brand.      Medications administered:  -1 L NS bolus     Patient education:     The following teaching topics were addressed: Importance of drinking 2L of non-caffeinated fluids daily, Incisional care, Signs/symptoms of infection, Good handwashing, Medications (purposes, doses and times of administration) and Plan of care   Patient verbalized understanding and all questions answered.     Drug level:  Patient " took 2 mg of tacrolimus last evening at 2000.  Care coordinator to follow up with the result.     Face to face time: ~60 min     Discharge Plan     Pt will follow up in [facility] tomorrow.   Discharge instructions reviewed with patient: YES  Patient/Representative verbalized understanding, all questions answered: YES     Discharged from unit at 0915 with whom: self to family's home.     Shawnee Goodman RN      Again, thank you for allowing me to participate in the care of your patient.        Sincerely,        [facility]

## 2021-06-24 NOTE — PROGRESS NOTES
Outpatient IR Referral    Patient is a 40 y/o male with a PMH of DM II, renal failure, S/P kidney/pancreas transplant 6/11/21.  Post surgical care notes slow to decline creatinine and persistently elevated pancreatic enzymes, elevated WBC 15.35 6/23/21 and new right abdominal pain without fevers, chills, n/v.  CT 6/23 notes The pancreas transplant in the right lower quadrant is surrounded by fluid. Additionally, there is a small amount of hemorrhage posterior to the pancreas measuring up to 2.3 cm.  IR has been asked to by Dr. Brand and Dr. Kennedy to aspirate and place a drain to the prepancreatic fluid collection and send fluid for culture.     Case and imaging was reviewed with Yepez from IR and US guided aspiration of the peripancreatic fluid with requested cultures is recommended. Drain placement may not be warranted at this time. Series 3 Image 245.          Procedure order, cell count, aerobic, anaerobic, and gram stain culture orders placed.    If requesting team would like sample sent for anything else please enter them.    Primary team, Dr. Brand, and Dr. Kennedy made aware of IR recommendations via epic messaging.     SHANIA Sparks CNP  Interventional Radiology   IR on-call pager: [phone]

## 2021-06-24 NOTE — TELEPHONE ENCOUNTER
CT scan reviewed with Dr. Kennedy, plan for aspiration of peripancreatic fluid collection and drain placement. Send fluid for culture.     Spoke with Joy in IR who will review with IR team.

## 2021-06-25 ENCOUNTER — MYC MEDICAL ADVICE (OUTPATIENT)
Dept: PHARMACY | Facility: CLINIC | Age: 41
End: 2021-06-25

## 2021-06-25 ENCOUNTER — HOSPITAL ENCOUNTER (OUTPATIENT)
Dept: ULTRASOUND IMAGING | Facility: CLINIC | Age: 41
End: 2021-06-25
Attending: INTERNAL MEDICINE
Payer: MEDICARE

## 2021-06-25 ENCOUNTER — OFFICE VISIT (OUTPATIENT)
Dept: INFUSION THERAPY | Facility: CLINIC | Age: 41
End: 2021-06-25
Attending: INTERNAL MEDICINE
Payer: MEDICARE

## 2021-06-25 ENCOUNTER — MYC MEDICAL ADVICE (OUTPATIENT)
Dept: INTERVENTIONAL RADIOLOGY/VASCULAR | Facility: CLINIC | Age: 41
End: 2021-06-25

## 2021-06-25 ENCOUNTER — APPOINTMENT (OUTPATIENT)
Dept: LAB | Facility: CLINIC | Age: 41
End: 2021-06-25
Payer: MEDICARE

## 2021-06-25 ENCOUNTER — TELEPHONE (OUTPATIENT)
Dept: PHARMACY | Facility: CLINIC | Age: 41
End: 2021-06-25

## 2021-06-25 VITALS
HEART RATE: 82 BPM | RESPIRATION RATE: 16 BRPM | TEMPERATURE: 98.3 F | BODY MASS INDEX: 28.54 KG/M2 | SYSTOLIC BLOOD PRESSURE: 131 MMHG | WEIGHT: 171.5 LBS | OXYGEN SATURATION: 98 % | DIASTOLIC BLOOD PRESSURE: 74 MMHG

## 2021-06-25 DIAGNOSIS — Z94.83 PANCREAS TRANSPLANTED (H): ICD-10-CM

## 2021-06-25 DIAGNOSIS — T86.899 PANCREAS TRANSPLANT COMPLICATION: ICD-10-CM

## 2021-06-25 DIAGNOSIS — Z79.899 ENCOUNTER FOR LONG-TERM CURRENT USE OF MEDICATION: ICD-10-CM

## 2021-06-25 DIAGNOSIS — Z94.83 PANCREAS REPLACED BY TRANSPLANT (H): ICD-10-CM

## 2021-06-25 DIAGNOSIS — R10.13 EPIGASTRIC PAIN: ICD-10-CM

## 2021-06-25 DIAGNOSIS — Z94.0 KIDNEY REPLACED BY TRANSPLANT: ICD-10-CM

## 2021-06-25 DIAGNOSIS — Z48.298 AFTERCARE FOLLOWING ORGAN TRANSPLANT: ICD-10-CM

## 2021-06-25 DIAGNOSIS — Z20.828 CONTACT WITH AND (SUSPECTED) EXPOSURE TO OTHER VIRAL COMMUNICABLE DISEASES: Primary | ICD-10-CM

## 2021-06-25 DIAGNOSIS — R74.8 ELEVATED LIPASE: ICD-10-CM

## 2021-06-25 DIAGNOSIS — Z48.298 AFTERCARE FOLLOWING ORGAN TRANSPLANT: Primary | ICD-10-CM

## 2021-06-25 LAB
AMYLASE SERPL-CCNC: 139 U/L (ref 30–110)
ANION GAP SERPL CALCULATED.3IONS-SCNC: 6 MMOL/L (ref 3–14)
BUN SERPL-MCNC: 15 MG/DL (ref 7–30)
CALCIUM SERPL-MCNC: 8.7 MG/DL (ref 8.5–10.1)
CHLORIDE SERPL-SCNC: 112 MMOL/L (ref 94–109)
CO2 SERPL-SCNC: 23 MMOL/L (ref 20–32)
CREAT SERPL-MCNC: 1.62 MG/DL (ref 0.66–1.25)
DEPRECATED CALCIDIOL+CALCIFEROL SERPL-MC: 35 UG/L (ref 20–75)
ERYTHROCYTE [DISTWIDTH] IN BLOOD BY AUTOMATED COUNT: 15.7 % (ref 10–15)
GFR SERPL CREATININE-BSD FRML MDRD: 53 ML/MIN/{1.73_M2}
GLUCOSE SERPL-MCNC: 110 MG/DL (ref 70–99)
HCT VFR BLD AUTO: 23.2 % (ref 40–53)
HGB BLD-MCNC: 7.5 G/DL (ref 13.3–17.7)
LIPASE SERPL-CCNC: 1382 U/L (ref 73–393)
MAGNESIUM SERPL-MCNC: 1.5 MG/DL (ref 1.6–2.3)
MCH RBC QN AUTO: 31.3 PG (ref 26.5–33)
MCHC RBC AUTO-ENTMCNC: 32.3 G/DL (ref 31.5–36.5)
MCV RBC AUTO: 97 FL (ref 78–100)
MYCOPHENOLATE SERPL LC/MS/MS-MCNC: 1.43 MG/L (ref 1–3.5)
MYCOPHENOLATE-G SERPL LC/MS/MS-MCNC: 79.8 MG/L (ref 30–95)
PHOSPHATE SERPL-MCNC: 2.6 MG/DL (ref 2.5–4.5)
PLATELET # BLD AUTO: 373 10E9/L (ref 150–450)
POTASSIUM SERPL-SCNC: 4.8 MMOL/L (ref 3.4–5.3)
RBC # BLD AUTO: 2.4 10E12/L (ref 4.4–5.9)
SODIUM SERPL-SCNC: 142 MMOL/L (ref 133–144)
TACROLIMUS BLD-MCNC: 9.5 UG/L (ref 5–15)
TME LAST DOSE: 2000 H
TME LAST DOSE: 2000 H
WBC # BLD AUTO: 14.4 10E9/L (ref 4–11)

## 2021-06-25 PROCEDURE — 84100 ASSAY OF PHOSPHORUS: CPT | Performed by: INTERNAL MEDICINE

## 2021-06-25 PROCEDURE — 93976 VASCULAR STUDY: CPT

## 2021-06-25 PROCEDURE — 80197 ASSAY OF TACROLIMUS: CPT | Performed by: INTERNAL MEDICINE

## 2021-06-25 PROCEDURE — 83690 ASSAY OF LIPASE: CPT | Performed by: INTERNAL MEDICINE

## 2021-06-25 PROCEDURE — 82306 VITAMIN D 25 HYDROXY: CPT | Performed by: INTERNAL MEDICINE

## 2021-06-25 PROCEDURE — 96360 HYDRATION IV INFUSION INIT: CPT

## 2021-06-25 PROCEDURE — 85027 COMPLETE CBC AUTOMATED: CPT | Performed by: INTERNAL MEDICINE

## 2021-06-25 PROCEDURE — 83735 ASSAY OF MAGNESIUM: CPT | Performed by: INTERNAL MEDICINE

## 2021-06-25 PROCEDURE — 258N000003 HC RX IP 258 OP 636: Performed by: INTERNAL MEDICINE

## 2021-06-25 PROCEDURE — 99215 OFFICE O/P EST HI 40 MIN: CPT | Performed by: INTERNAL MEDICINE

## 2021-06-25 PROCEDURE — 80048 BASIC METABOLIC PNL TOTAL CA: CPT | Performed by: INTERNAL MEDICINE

## 2021-06-25 PROCEDURE — 80180 DRUG SCRN QUAN MYCOPHENOLATE: CPT | Performed by: INTERNAL MEDICINE

## 2021-06-25 PROCEDURE — 93976 VASCULAR STUDY: CPT | Mod: 26 | Performed by: RADIOLOGY

## 2021-06-25 PROCEDURE — 82150 ASSAY OF AMYLASE: CPT | Performed by: INTERNAL MEDICINE

## 2021-06-25 PROCEDURE — 36415 COLL VENOUS BLD VENIPUNCTURE: CPT | Performed by: INTERNAL MEDICINE

## 2021-06-25 RX ORDER — TACROLIMUS 1 MG/1
2 CAPSULE ORAL 2 TIMES DAILY
Qty: 120 CAPSULE | Refills: 11 | Status: SHIPPED | OUTPATIENT
Start: 2021-06-25 | End: 2021-07-09

## 2021-06-25 RX ORDER — CLONIDINE HYDROCHLORIDE 0.1 MG/1
0.1 TABLET ORAL 3 TIMES DAILY
Qty: 90 TABLET | Refills: 1 | Status: SHIPPED | OUTPATIENT
Start: 2021-06-25 | End: 2021-07-08

## 2021-06-25 RX ORDER — MYCOPHENOLATE MOFETIL 250 MG/1
1000 CAPSULE ORAL 2 TIMES DAILY
Qty: 240 CAPSULE | Refills: 11 | Status: SHIPPED | OUTPATIENT
Start: 2021-06-25 | End: 2021-08-02

## 2021-06-25 RX ORDER — CLONIDINE HYDROCHLORIDE 0.2 MG/1
0.1 TABLET ORAL 3 TIMES DAILY
Qty: 90 TABLET | Refills: 1 | Status: SHIPPED | OUTPATIENT
Start: 2021-06-25 | End: 2021-06-25

## 2021-06-25 RX ORDER — HEPARIN SODIUM,PORCINE 10 UNIT/ML
5 VIAL (ML) INTRAVENOUS
Status: CANCELLED | OUTPATIENT
Start: 2021-06-25

## 2021-06-25 RX ORDER — VALGANCICLOVIR 450 MG/1
TABLET, FILM COATED ORAL
Qty: 150 TABLET | Refills: 0 | Status: SHIPPED | OUTPATIENT
Start: 2021-06-25 | End: 2021-08-11

## 2021-06-25 RX ORDER — HEPARIN SODIUM (PORCINE) LOCK FLUSH IV SOLN 100 UNIT/ML 100 UNIT/ML
5 SOLUTION INTRAVENOUS
Status: CANCELLED | OUTPATIENT
Start: 2021-06-25

## 2021-06-25 RX ADMIN — SODIUM CHLORIDE 1000 ML: 9 INJECTION, SOLUTION INTRAVENOUS at 07:51

## 2021-06-25 ASSESSMENT — PAIN SCALES - GENERAL: PAINLEVEL: NO PAIN (0)

## 2021-06-25 NOTE — LETTER
"    2021         RE: Erik Cramer  [address] [place] [id number]        Dear Colleague,    Thank you for referring your patient, Erik Cramer, to the [facility]. Please see a copy of my visit note below.    Erik Cramer came to [facility] today for a lab and assess following a Kid/Panc transplant on .      Discharge date:   Transplant coordinator: Rossi ELLIS  Phone number patient can be reached at: [phone]      Physical Assessment:  See physical assessment located under \"Document Flowsheets\".  Incision site: intact with staples. Small amount of drainage on binder, old TIA site covered with gauze. Changed daily.  Lines: n/a  Fox: n/a  Urine clarity: per urine it is clear yellow. Reports voiding often.  Hydration: Reports drinking 2L yesterday  Nutrition: good appetite. Continues to have intermitent nausea   Last BM: 4 BMs yesterday. 1 soft, 1 loose, 2 liquid  Pain: 0/10   B this am, did not need any insulin yesterday  Pt was orthostatic today but per patient being orthostatic is normal for patient (even prior to transplant).     BP has been in the 170's/190s at home. Pt and RN discussed home BP meds. Patient was accidentally not taking his 2 pm clonidine dose. Added to pill box in [facility].     Labs drawn by [facility] staff No    Plan of care for today:  -Labs and assessment reviewed with nephrlology team  -pt asked for anti-rejection meds and antimicrobials to be sent to home pharmacy  - would like a 0.1 mg of his clonidine  -Rossi ELLIS to set up appointment for IR drain (CC messaged to call patient)  - pancrease US  -new abdominal binder given    Medication changes:   Magnesium changed to 800 daily. Pt will update in pill box  Pt should take metamucil if having liquid stools.        Medications administered:    Administrations This Visit     0.9% sodium chloride BOLUS     Admin Date  2021 Action  New Bag Dose  1,000 mL Rate  1,000 mL/hr Route  Intravenous " Administered By  Nena Estevez, RN                  Patient education:    The following teaching topics were addressed: Importance of drinking 2L of non-caffeinated fluids daily, Incisional care, Medications (purposes, doses and times of administration), Phone numbers to call with concenrs (Transplant coordinator, [facility] and [facility]) and 7A discharge check list   Patient verbalized understanding and all questions answered.    Dr. Brand asked pt to wear compression stockings.       Drug level:  Patient took 2mg of tac last evening at 8.  Care coordinator to follow up with the result.    Face to face time: 60  Discharge Plan    Pt will follow up with [facility] on Sunday.   Discharge instructions reviewed with patient: YES  Patient/Representative verbalized understanding, all questions answered: YES    Discharged from unit at 0915 with whom: self to imaging.    TAMAR YepezN, RN       Again, thank you for allowing me to participate in the care of your patient.        Sincerely,        [facility]

## 2021-06-25 NOTE — PROGRESS NOTES
"..Surgery Clinical Trials Office Approach and Informed Consent Process Documentation    Study Name: [facility]/\"Microbiome and Immunosuppression: The [place] Study\" (IRB [id number])    ICF Version Date / IRB Approval Date:  Version dt 02/01/2021, IRB (Ivan) Approved 02/03/2021    I spoke with Mr. Ang Cramer in person at the [facility] on 06/23/2021. I proceeded with briefly describing the goals and details of the study. Mr. Cramer politely declined participating in the study. I thanked them for their time and consideration.  "

## 2021-06-25 NOTE — PROGRESS NOTES
"Erik Cramer came to [facility] today for a lab and assess following a Kid/Panc transplant on .      Discharge date:   Transplant coordinator: Rossi ELLIS  Phone number patient can be reached at: [phone]      Physical Assessment:  See physical assessment located under \"Document Flowsheets\".  Incision site: intact with staples. Small amount of drainage on binder, old TIA site covered with gauze. Changed daily.  Lines: n/a  Fox: n/a  Urine clarity: per urine it is clear yellow. Reports voiding often.  Hydration: Reports drinking 2L yesterday  Nutrition: good appetite. Continues to have intermitent nausea   Last BM: 4 BMs yesterday. 1 soft, 1 loose, 2 liquid  Pain: 0/10   B this am, did not need any insulin yesterday  Pt was orthostatic today but per patient being orthostatic is normal for patient (even prior to transplant).     BP has been in the 170's/190s at home. Pt and RN discussed home BP meds. Patient was accidentally not taking his 2 pm clonidine dose. [facility]     Labs drawn by [facility] staff No    Plan of care for today:  -Labs and assessment reviewed with nephrlology team  -pt asked for anti-rejection meds and antimicrobials to be sent to home pharmacy  - would like a 0.1 mg of his clonidine  -Rossi ELLIS to set up appointment for IR drain (CC messaged to call patient)  - pancrease US  -new abdominal binder given    Medication changes:   Magnesium changed to 800 daily. Pt will update in pill box  Pt should take metamucil if having liquid stools.        Medications administered:    Administrations This Visit     0.9% sodium chloride BOLUS     Admin Date  2021 Action  New Bag Dose  1,000 mL Rate  1,000 mL/hr Route  Intravenous Administered By  Nena Estevez, YING                  Patient education:    The following teaching topics were addressed: Importance of drinking 2L of non-caffeinated fluids daily, Incisional care, Medications (purposes, doses and times of administration), Phone " numbers to call with concenrs (Transplant coordinator, [facility] and [facility]) and 7A discharge check list   Patient verbalized understanding and all questions answered.    Dr. Brand asked pt to wear compression stockings.       Drug level:  Patient took 2mg of tac last evening at 8.  Care coordinator to follow up with the result.    Face to face time: 60  Discharge Plan    Pt will follow up with [facility] on Sunday.   Discharge instructions reviewed with patient: YES  Patient/Representative verbalized understanding, all questions answered: YES    Discharged from unit at 0915 with whom: self to imaging.    Nena Estevez, TAMARN, RN

## 2021-06-25 NOTE — PATIENT INSTRUCTIONS
Dear Erik Cramer    Thank you for choosing [facility] ([facility]) for your transplant cares.  The following information is a summary of our appointment as well as important reminders.      We changed your Magnesium to 800 mg daily (2 pills). Please change this in your box.  Dr. Brand would like you to wear your compression stockings.  Please  METAMUCIL from the pharmacy downstairs. Take this if you are getting liquid stools.   You will be called later today if your tac level needs to be changed    Contact Information:    Transplant Coordinator: Twyla Chang  Transplant Office:  [phone]  [facility]:  [phone]  Ask for physician on call for kidney/pancreas transplant.  Unit 7A (Transplant Unit):  [phone]  [facility]:  [phone]  [facility]:  [phone]      We look forward in seeing you on your next appointment here at [facility] ([facility]).  Please don t hesitate to call us at [phone] to reschedule any of your appointments or to speak with one of the [facility] registered nurses.  It was a pleasure taking care of you today.    Sincerely,    [facility]  [address] [place] [address]  [address]  Phone:  [phone]

## 2021-06-25 NOTE — TELEPHONE ENCOUNTER
IR drain placement scheduled for Tuesday 6/29, check in time at 7:30 am. Will collect pre-procedural covid swab on Sunday when patient is back in [facility].     Tye verbalized understanding of plan. Confirmed with [facility] that they can do the pre-procedural covid swab on Sunday.     Tye requests that all immunosuppression go to his local pharmacy, [facility], in [place]. Scripts sent.

## 2021-06-25 NOTE — LETTER
6/25/2021         RE: Erik Cramer  [address] [place] [id number]        Dear Colleague,    Thank you for referring your patient, Erik Cramer, to the [facility]. Please see a copy of my visit note below.    ACUTE TRANSPLANT NEPHROLOGY VISIT    Assessment & Plan   # DDKT (SPK) trending down. Transplant kidney biopsy prelim with no rejection.(report is pending)                - Baseline Creatinine: ~ TBD              - Proteinuria: Not checked post transplant              - Date DSA Last Checked: Jun/2021      Latest DSA: No DSA at time of transplant              - BK Viremia: No              - Kidney Tx Biopsy: 6/18/21: no rejection.              -Stent placed. Remove 4-6 weeks post txp     # Pancreas Tx (SPK):               - Pancreatic Exocrine Drainage: Enteric drained                     - Blood glucose: near euglycemia       On insulin: SSI              - HbA1c: Last checked at time of transplant     6%              - Pancreatic enzymes: trending up               - Date DSA Last Checked: Jun/2021             Latest DSA: No DSA at time of transplant              - Pancreas Tx Biopsy: No, will likely need to obtain a pancreas biopsy at some point.      Patient is scheduled for IR fluid aspiration and drain placement for Monday. We updated the ultrasound today to make sure no vascular cause.     # Immunosuppression: Tacrolimus immediate release (goal 8-10), Mycophenolate mofetil (dose 1000 mg every 12 hours) and Prednisone (dose taper)               - Induction: intermediate risk protocol, PRA 32%              - Changes: No     # Infection Prophylaxis:   - PJP: Sulfa/TMP (Bactrim)  - CMV: Valcyte x3m  - Thrush: Micafungin (Mycamine) and myclex     # Hypertension: had been at goal with the occasional high systolic BP, but now trending down/soft Goal BP: < 150/90              - Volume status: Euvolemic                  - Changes: reduce the clonidine to 0.1 mg  TID with the same holding parameters. And also discussed to go back up for SBP> 170     # Anemia in Chronic Renal Disease: Hgb: low stable. ROSA M: No              - Iron studies: Unknown at this time, but checked with dialysis     # Mineral Bone Disorder:   - Secondary renal hyperparathyroidism; PTH level: Unknown at this time, but checked with dialysis        On treatment: None  - Vitamin D; level: Unknown at this time, but checked with dialysis        On supplement: Yes  - Calcium; level: Low               On supplement: Yes  - Phosphorus; level: Low, improving        On supplement: Yes     # Electrolytes:   - Potassium; level: Normal        On supplement: No  - Magnesium; level: Normal        On supplement: Yes  - Bicarbonate; level: Normal        On supplement: No  - Sodium; level: Normal    # Medical Compliance: Yes    # Transplant History:  Etiology of Kidney Failure: Diabetes mellitus type 2  Tx: DDKT (SPK)  Transplant: 6/11/2021 (Kidney / Pancreas)  Donor Type: Donation after Brain Death Donor Class:   Crossmatch at time of Tx: negative  DSA at time of Tx: No  Significant changes in immunosuppression: None  CMV IgG Ab High Risk Discordance (D+/R-): No  EBV IgG Ab High Risk Discordance (D+/R-): No  Significant transplant-related complications: None    Transplant Office Phone Number: [phone]    Assessment and plan was discussed with the patient and he voiced his understanding and agreement.    Return visit: in 24-48 hrs     Bertin Brand MD    Chief Complaint   Mr. Cramer is a 41 year old here for routine follow up and immunosuppression management.     History of Present Illness     Tye is a 41-year-old gentleman with a longstanding history of type 2 diabetes and renal failure.  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  His post transplant course was typical of SPK recovery with some discomfort related to the surgery and perhaps element of gastroparesis.  He was discharged  from the hospital yesterday after had completed a kidney allograft biopsy for slow to decline creatinine and persistently elevated pancreatic enzymes.  He has no fevers no chills no abdominal pain but his white count is rising.    Overall doing ok, abdominal pain is better. No fevers or chills. No NVD. He is scheduled for IR procedure. Discussed the case with Dr. Kennedy.      Home BP: 130-170 mmHg     Problem List   Patient Active Problem List   Diagnosis     End stage kidney disease (H)     Hypertension secondary to other renal disorders     Secondary renal hyperparathyroidism (H)     Type 2 diabetes mellitus (H)     Hypertension     Anemia in chronic kidney disease     Vitamin D deficiency     Gallstones, common bile duct     Kidney transplant candidate     Pancreas transplanted (H)     Kidney replaced by transplant     Immunosuppressed status (H)     Pancreatitis of pancreas transplant     Anemia due to blood loss, acute     Acute post-operative pain     Hypophosphatemia     Hypomagnesemia       Allergies   Allergies   Allergen Reactions     Metoprolol      SOB, but was also experiencing significant edema not drug associated       Medications   Current Outpatient Medications   Medication Sig     cloNIDine (CATAPRES) 0.2 MG tablet Take 0.5 tablets (0.1 mg) by mouth 3 times daily Take 1 tablet by mouth three times daily. Hold for SBP < 130.     acetaminophen (TYLENOL) 325 MG tablet Take 2 tablets (650 mg) by mouth every 4 hours as needed for other (For optimal non-opioid multimodal pain management to improve pain control.)     amLODIPine (NORVASC) 10 MG tablet Take 10 mg by mouth daily Take 1 tablet by mouth in the evening     amoxicillin-clavulanate (AUGMENTIN) 875-125 MG tablet Take 1 tablet by mouth 2 times daily     aspirin (ASA) 325 MG EC tablet Take 1 tablet (325 mg) by mouth daily     atorvastatin (LIPITOR) 20 MG tablet Take 1 tablet (20 mg) by mouth daily     calcium carbonate (OS-SHAY) 1500 (600 Ca) MG  tablet Take 600 mg by mouth 2 times daily (with meals)      carvedilol (COREG) 6.25 MG tablet Take 1 tablet (6.25 mg) by mouth 2 times daily     clotrimazole (MYCELEX) 10 MG lozenge Place 1 lozenge (10 mg) inside cheek 4 times daily     fluconazole (DIFLUCAN) 200 MG tablet Take 1 tablet (200 mg) by mouth daily     insulin lispro (HUMALOG) 100 UNIT/ML Cartridge Inject 1-7 Units Subcutaneous 3 times daily (before meals)     insulin lispro (HUMALOG) 100 UNIT/ML Cartridge Inject 1-5 Units Subcutaneous At Bedtime     insulin pen needle (BD RAÚL U/F) 32G X 4 MM miscellaneous use as directed with lantus pen once a day     magnesium oxide (MAG-OX) 400 MG tablet Take 1 tablet (400 mg) by mouth every 24 hours     methocarbamol (ROBAXIN) 500 MG tablet Take 1 tablet (500 mg) by mouth every 8 hours as needed for muscle spasms     montelukast (SINGULAIR) 10 MG tablet Take 10 mg by mouth At Bedtime     mycophenolate (GENERIC EQUIVALENT) 250 MG capsule Take 4 capsules (1,000 mg) by mouth 2 times daily     omeprazole (PRILOSEC) 20 MG DR capsule Take 20 mg by mouth every morning     ondansetron (ZOFRAN-ODT) 4 MG ODT tab Take 4 mg by mouth every 8 hours as needed for nausea     oxyCODONE (ROXICODONE) 5 MG tablet Take 1 tablet (5 mg) by mouth every 4 hours as needed for moderate to severe pain     phosphorus tablet 250 mg (PHOSPHA 250 NEUTRAL) 250 MG per tablet Take 1 tablet (250 mg) by mouth 2 times daily     predniSONE (DELTASONE) 5 MG tablet Take 20 mg daily x 3 days (6/22-6/24), Take 15 mg daily x 7 days (6/25-7/1), Take 10 mg daily x 7 days (7/2-7/8), Take 5 mg x 7 days (7/9-7/15)     sulfamethoxazole-trimethoprim (BACTRIM) 400-80 MG tablet Take 1 tablet by mouth daily     tacrolimus (GENERIC EQUIVALENT) 0.5 MG capsule Take 1 capsule (0.5 mg) by mouth 2 times daily Discharge dose will be 2.5 mg BID     tacrolimus (GENERIC EQUIVALENT) 1 MG capsule Take 2 capsules (2 mg) by mouth 2 times daily Discharge dose will be 2.5 mg BID      valGANciclovir (VALCYTE) 450 MG tablet Take 1 tab (450mg) daily. Increase dose up to a max of 2 tabs (900 mg) by mouth daily when directed by your transplant team.     vitamin D3 (CHOLECALCIFEROL) 2000 units (50 mcg) tablet Take 1 tablet (2,000 Units) by mouth daily     No current facility-administered medications for this visit.      Facility-Administered Medications Ordered in Other Visits   Medication     sodium chloride (PF) 0.9% PF flush 3-20 mL     Medications Discontinued During This Encounter   Medication Reason     cloNIDine (CATAPRES) 0.2 MG tablet        Physical Exam   reviewed    GENERAL APPEARANCE: alert and no distress  HENT: mouth without ulcers or lesions  LYMPHATICS: no cervical or supraclavicular nodes  RESP: lungs clear to auscultation - no rales, rhonchi or wheezes  CV: regular rhythm, normal rate, no rub, no murmur  EDEMA: no LE edema bilaterally  ABDOMEN: soft, nondistended, nontender, bowel sounds normal  MS: extremities normal - no gross deformities noted, no evidence of inflammation in joints, no muscle tenderness  SKIN: no rash    Data     Renal Latest Ref Rng & Units 6/25/2021 6/24/2021 6/23/2021   Na 133 - 144 mmol/L 142 140 139   K 3.4 - 5.3 mmol/L 4.8 4.6 4.8   Cl 94 - 109 mmol/L 112(H) 110(H) 110(H)   CO2 20 - 32 mmol/L 23 24 24   BUN 7 - 30 mg/dL 15 17 18   Cr 0.66 - 1.25 mg/dL 1.62(H) 1.73(H) 1.94(H)   Glucose 70 - 99 mg/dL 110(H) 107(H) 93   Ca  8.5 - 10.1 mg/dL 8.7 8.9 8.7   Mg 1.6 - 2.3 mg/dL 1.5(L) 1.9 1.9     Bone Health Latest Ref Rng & Units 6/25/2021 6/24/2021 6/23/2021   Phos 2.5 - 4.5 mg/dL 2.6 2.5 2.3(L)   Vit D Def 20 - 75 ug/L - - -     Heme Latest Ref Rng & Units 6/25/2021 6/24/2021 6/23/2021   WBC 4.0 - 11.0 10e9/L 14.4(H) 14.9(H) 15.5(H)   Hgb 13.3 - 17.7 g/dL 7.5(L) 7.6(L) 7.9(L)   Plt 150 - 450 10e9/L 373 369 393   ABSOLUTE NEUTROPHIL 1.6 - 8.3 10e9/L - 11.7(H) 12.2(H)   ABSOLUTE LYMPHOCYTES 0.8 - 5.3 10e9/L - 2.2 2.2   ABSOLUTE MONOCYTES 0.0 - 1.3 10e9/L - 0.7 0.7    ABSOLUTE EOSINOPHILS 0.0 - 0.7 10e9/L - 0.2 0.2   ABSOLUTE BASOPHILS 0.0 - 0.2 10e9/L - 0.0 0.0   ABS IMMATURE GRANULOCYTES 0 - 0.4 10e9/L - 0.2 0.2   ABSOLUTE NUCLEATED RBC - - 0.0 0.0     Liver Latest Ref Rng & Units 6/10/2021 1/4/2021 12/9/2020   AP 40 - 150 U/L 84 - -   TBili 0.2 - 1.3 mg/dL 0.4 - -   ALT 0 - 70 U/L 32 47 48   AST 0 - 45 U/L 32 31 21   Tot Protein 6.8 - 8.8 g/dL 7.2 - -   Albumin 3.4 - 5.0 g/dL 3.9 - -     Pancreas Latest Ref Rng & Units 6/25/2021 6/24/2021 6/23/2021   A1C 0 - 5.6 % - - -   Amylase 30 - 110 U/L 139(H) 126(H) 123(H)   Lipase 73 - 393 U/L 1,382(H) 1,219(H) 996(H)        [facility] Txp Virology Latest Ref Rng & Units 6/10/2021 8/30/2020 7/29/2019   EBV CAPSID ANTIBODY IGG 0.0 - 0.8 AI >8.0(H) >8.0(H) >8.0(H)   Hep B Core NR:Nonreactive Nonreactive - Nonreactive        Recent Labs   Lab Test 06/22/21  0631 06/23/21  0641 06/24/21  0642   DOSTAC 2,000 2,000 2000 6/23/2021   TACROL 11.4 10.8 10.7       Again, thank you for allowing me to participate in the care of your patient.        Sincerely,        Bertin Brand MD

## 2021-06-25 NOTE — TELEPHONE ENCOUNTER
[facility] Call Center    Phone Message    May a detailed message be left on voicemail: yes     Reason for Call: Medication Question or concern regarding medication   Prescription Clarification  Name of Medication: cloNIDine (CATAPRES) 0.2 MG tablet [4757] (Order [id number])  Prescribing Provider: Bertin Brand MD     What on the order needs clarification? Mónica at the pharmacy would like to be speak with someone in regards to getting a clarification on dosage of medication.  Please reach out to pharmacy.          Action Taken: Message routed to:  [facility] Nephrology    Travel Screening: Not Applicable

## 2021-06-25 NOTE — PROGRESS NOTES
ACUTE TRANSPLANT NEPHROLOGY VISIT    Assessment & Plan   # DDKT (SPK) trending down. Transplant kidney biopsy prelim with no rejection.(report is pending)                - Baseline Creatinine: ~ TBD              - Proteinuria: Not checked post transplant              - Date DSA Last Checked: Jun/2021      Latest DSA: No DSA at time of transplant              - BK Viremia: No              - Kidney Tx Biopsy: 6/18/21: no rejection.              -Stent placed. Remove 4-6 weeks post txp     # Pancreas Tx (SPK):               - Pancreatic Exocrine Drainage: Enteric drained                     - Blood glucose: near euglycemia       On insulin: SSI              - HbA1c: Last checked at time of transplant     6%              - Pancreatic enzymes: trending up               - Date DSA Last Checked: Jun/2021             Latest DSA: No DSA at time of transplant              - Pancreas Tx Biopsy: No, will likely need to obtain a pancreas biopsy at some point.      Patient is scheduled for IR fluid aspiration and drain placement for Monday. We updated the ultrasound today to make sure no vascular cause.     # Immunosuppression: Tacrolimus immediate release (goal 8-10), Mycophenolate mofetil (dose 1000 mg every 12 hours) and Prednisone (dose taper)               - Induction: intermediate risk protocol, PRA 32%              - Changes: No     # Infection Prophylaxis:   - PJP: Sulfa/TMP (Bactrim)  - CMV: Valcyte x3m  - Thrush: Micafungin (Mycamine) and myclex     # Hypertension: had been at goal with the occasional high systolic BP, but now trending down/soft Goal BP: < 150/90              - Volume status: Euvolemic                  - Changes: reduce the clonidine to 0.1 mg TID with the same holding parameters. And also discussed to go back up for SBP> 170     # Anemia in Chronic Renal Disease: Hgb: low stable. ROSA M: No              - Iron studies: Unknown at this time, but checked with dialysis     # Mineral Bone Disorder:   -  Secondary renal hyperparathyroidism; PTH level: Unknown at this time, but checked with dialysis        On treatment: None  - Vitamin D; level: Unknown at this time, but checked with dialysis        On supplement: Yes  - Calcium; level: Low               On supplement: Yes  - Phosphorus; level: Low, improving        On supplement: Yes     # Electrolytes:   - Potassium; level: Normal        On supplement: No  - Magnesium; level: Normal        On supplement: Yes  - Bicarbonate; level: Normal        On supplement: No  - Sodium; level: Normal    # Medical Compliance: Yes    # Transplant History:  Etiology of Kidney Failure: Diabetes mellitus type 2  Tx: DDKT (SPK)  Transplant: 6/11/2021 (Kidney / Pancreas)  Donor Type: Donation after Brain Death Donor Class:   Crossmatch at time of Tx: negative  DSA at time of Tx: No  Significant changes in immunosuppression: None  CMV IgG Ab High Risk Discordance (D+/R-): No  EBV IgG Ab High Risk Discordance (D+/R-): No  Significant transplant-related complications: None    Transplant Office Phone Number: [phone]    Assessment and plan was discussed with the patient and he voiced his understanding and agreement.    Return visit: in 24-48 hrs     Bertin Brand MD    Chief Complaint   Mr. Cramer is a 41 year old here for routine follow up and immunosuppression management.     History of Present Illness     Tye is a 41-year-old gentleman with a longstanding history of type 2 diabetes and renal failure.  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  His post transplant course was typical of SPK recovery with some discomfort related to the surgery and perhaps element of gastroparesis.  He was discharged from the hospital yesterday after had completed a kidney allograft biopsy for slow to decline creatinine and persistently elevated pancreatic enzymes.  He has no fevers no chills no abdominal pain but his white count is rising.    Overall doing ok, abdominal  pain is better. No fevers or chills. No NVD. He is scheduled for IR procedure. Discussed the case with Dr. Kennedy.      Home BP: 130-170 mmHg     Problem List   Patient Active Problem List   Diagnosis     End stage kidney disease (H)     Hypertension secondary to other renal disorders     Secondary renal hyperparathyroidism (H)     Type 2 diabetes mellitus (H)     Hypertension     Anemia in chronic kidney disease     Vitamin D deficiency     Gallstones, common bile duct     Kidney transplant candidate     Pancreas transplanted (H)     Kidney replaced by transplant     Immunosuppressed status (H)     Pancreatitis of pancreas transplant     Anemia due to blood loss, acute     Acute post-operative pain     Hypophosphatemia     Hypomagnesemia       Allergies   Allergies   Allergen Reactions     Metoprolol      SOB, but was also experiencing significant edema not drug associated       Medications   Current Outpatient Medications   Medication Sig     cloNIDine (CATAPRES) 0.2 MG tablet Take 0.5 tablets (0.1 mg) by mouth 3 times daily Take 1 tablet by mouth three times daily. Hold for SBP < 130.     acetaminophen (TYLENOL) 325 MG tablet Take 2 tablets (650 mg) by mouth every 4 hours as needed for other (For optimal non-opioid multimodal pain management to improve pain control.)     amLODIPine (NORVASC) 10 MG tablet Take 10 mg by mouth daily Take 1 tablet by mouth in the evening     amoxicillin-clavulanate (AUGMENTIN) 875-125 MG tablet Take 1 tablet by mouth 2 times daily     aspirin (ASA) 325 MG EC tablet Take 1 tablet (325 mg) by mouth daily     atorvastatin (LIPITOR) 20 MG tablet Take 1 tablet (20 mg) by mouth daily     calcium carbonate (OS-SHAY) 1500 (600 Ca) MG tablet Take 600 mg by mouth 2 times daily (with meals)      carvedilol (COREG) 6.25 MG tablet Take 1 tablet (6.25 mg) by mouth 2 times daily     clotrimazole (MYCELEX) 10 MG lozenge Place 1 lozenge (10 mg) inside cheek 4 times daily     fluconazole (DIFLUCAN) 200  MG tablet Take 1 tablet (200 mg) by mouth daily     insulin lispro (HUMALOG) 100 UNIT/ML Cartridge Inject 1-7 Units Subcutaneous 3 times daily (before meals)     insulin lispro (HUMALOG) 100 UNIT/ML Cartridge Inject 1-5 Units Subcutaneous At Bedtime     insulin pen needle (BD RAÚL U/F) 32G X 4 MM miscellaneous use as directed with lantus pen once a day     magnesium oxide (MAG-OX) 400 MG tablet Take 1 tablet (400 mg) by mouth every 24 hours     methocarbamol (ROBAXIN) 500 MG tablet Take 1 tablet (500 mg) by mouth every 8 hours as needed for muscle spasms     montelukast (SINGULAIR) 10 MG tablet Take 10 mg by mouth At Bedtime     mycophenolate (GENERIC EQUIVALENT) 250 MG capsule Take 4 capsules (1,000 mg) by mouth 2 times daily     omeprazole (PRILOSEC) 20 MG DR capsule Take 20 mg by mouth every morning     ondansetron (ZOFRAN-ODT) 4 MG ODT tab Take 4 mg by mouth every 8 hours as needed for nausea     oxyCODONE (ROXICODONE) 5 MG tablet Take 1 tablet (5 mg) by mouth every 4 hours as needed for moderate to severe pain     phosphorus tablet 250 mg (PHOSPHA 250 NEUTRAL) 250 MG per tablet Take 1 tablet (250 mg) by mouth 2 times daily     predniSONE (DELTASONE) 5 MG tablet Take 20 mg daily x 3 days (6/22-6/24), Take 15 mg daily x 7 days (6/25-7/1), Take 10 mg daily x 7 days (7/2-7/8), Take 5 mg x 7 days (7/9-7/15)     sulfamethoxazole-trimethoprim (BACTRIM) 400-80 MG tablet Take 1 tablet by mouth daily     tacrolimus (GENERIC EQUIVALENT) 0.5 MG capsule Take 1 capsule (0.5 mg) by mouth 2 times daily Discharge dose will be 2.5 mg BID     tacrolimus (GENERIC EQUIVALENT) 1 MG capsule Take 2 capsules (2 mg) by mouth 2 times daily Discharge dose will be 2.5 mg BID     valGANciclovir (VALCYTE) 450 MG tablet Take 1 tab (450mg) daily. Increase dose up to a max of 2 tabs (900 mg) by mouth daily when directed by your transplant team.     vitamin D3 (CHOLECALCIFEROL) 2000 units (50 mcg) tablet Take 1 tablet (2,000 Units) by mouth  daily     No current facility-administered medications for this visit.      Facility-Administered Medications Ordered in Other Visits   Medication     sodium chloride (PF) 0.9% PF flush 3-20 mL     Medications Discontinued During This Encounter   Medication Reason     cloNIDine (CATAPRES) 0.2 MG tablet        Physical Exam   reviewed    GENERAL APPEARANCE: alert and no distress  HENT: mouth without ulcers or lesions  LYMPHATICS: no cervical or supraclavicular nodes  RESP: lungs clear to auscultation - no rales, rhonchi or wheezes  CV: regular rhythm, normal rate, no rub, no murmur  EDEMA: no LE edema bilaterally  ABDOMEN: soft, nondistended, nontender, bowel sounds normal  MS: extremities normal - no gross deformities noted, no evidence of inflammation in joints, no muscle tenderness  SKIN: no rash    Data     Renal Latest Ref Rng & Units 6/25/2021 6/24/2021 6/23/2021   Na 133 - 144 mmol/L 142 140 139   K 3.4 - 5.3 mmol/L 4.8 4.6 4.8   Cl 94 - 109 mmol/L 112(H) 110(H) 110(H)   CO2 20 - 32 mmol/L 23 24 24   BUN 7 - 30 mg/dL 15 17 18   Cr 0.66 - 1.25 mg/dL 1.62(H) 1.73(H) 1.94(H)   Glucose 70 - 99 mg/dL 110(H) 107(H) 93   Ca  8.5 - 10.1 mg/dL 8.7 8.9 8.7   Mg 1.6 - 2.3 mg/dL 1.5(L) 1.9 1.9     Bone Health Latest Ref Rng & Units 6/25/2021 6/24/2021 6/23/2021   Phos 2.5 - 4.5 mg/dL 2.6 2.5 2.3(L)   Vit D Def 20 - 75 ug/L - - -     Heme Latest Ref Rng & Units 6/25/2021 6/24/2021 6/23/2021   WBC 4.0 - 11.0 10e9/L 14.4(H) 14.9(H) 15.5(H)   Hgb 13.3 - 17.7 g/dL 7.5(L) 7.6(L) 7.9(L)   Plt 150 - 450 10e9/L 373 369 393   ABSOLUTE NEUTROPHIL 1.6 - 8.3 10e9/L - 11.7(H) 12.2(H)   ABSOLUTE LYMPHOCYTES 0.8 - 5.3 10e9/L - 2.2 2.2   ABSOLUTE MONOCYTES 0.0 - 1.3 10e9/L - 0.7 0.7   ABSOLUTE EOSINOPHILS 0.0 - 0.7 10e9/L - 0.2 0.2   ABSOLUTE BASOPHILS 0.0 - 0.2 10e9/L - 0.0 0.0   ABS IMMATURE GRANULOCYTES 0 - 0.4 10e9/L - 0.2 0.2   ABSOLUTE NUCLEATED RBC - - 0.0 0.0     Liver Latest Ref Rng & Units 6/10/2021 1/4/2021 12/9/2020   AP 40 -  150 U/L 84 - -   TBili 0.2 - 1.3 mg/dL 0.4 - -   ALT 0 - 70 U/L 32 47 48   AST 0 - 45 U/L 32 31 21   Tot Protein 6.8 - 8.8 g/dL 7.2 - -   Albumin 3.4 - 5.0 g/dL 3.9 - -     Pancreas Latest Ref Rng & Units 6/25/2021 6/24/2021 6/23/2021   A1C 0 - 5.6 % - - -   Amylase 30 - 110 U/L 139(H) 126(H) 123(H)   Lipase 73 - 393 U/L 1,382(H) 1,219(H) 996(H)        [facility] Txp Virology Latest Ref Rng & Units 6/10/2021 8/30/2020 7/29/2019   EBV CAPSID ANTIBODY IGG 0.0 - 0.8 AI >8.0(H) >8.0(H) >8.0(H)   Hep B Core NR:Nonreactive Nonreactive - Nonreactive        Recent Labs   Lab Test 06/22/21  0631 06/23/21  0641 06/24/21  0642   DOSTAC 2,000 2,000 2000 6/23/2021   TACROL 11.4 10.8 10.7

## 2021-06-27 ENCOUNTER — TELEPHONE (OUTPATIENT)
Dept: TRANSPLANT | Facility: CLINIC | Age: 41
End: 2021-06-27

## 2021-06-27 ENCOUNTER — INFUSION THERAPY VISIT (OUTPATIENT)
Dept: INFUSION THERAPY | Facility: CLINIC | Age: 41
End: 2021-06-27
Attending: INTERNAL MEDICINE
Payer: MEDICARE

## 2021-06-27 VITALS
HEART RATE: 94 BPM | WEIGHT: 170.7 LBS | DIASTOLIC BLOOD PRESSURE: 81 MMHG | RESPIRATION RATE: 16 BRPM | OXYGEN SATURATION: 99 % | SYSTOLIC BLOOD PRESSURE: 130 MMHG | BODY MASS INDEX: 28.41 KG/M2

## 2021-06-27 DIAGNOSIS — K85.90: ICD-10-CM

## 2021-06-27 DIAGNOSIS — Z48.298 AFTERCARE FOLLOWING ORGAN TRANSPLANT: Primary | ICD-10-CM

## 2021-06-27 DIAGNOSIS — T86.898: ICD-10-CM

## 2021-06-27 LAB
AMYLASE SERPL-CCNC: 145 U/L (ref 30–110)
ANION GAP SERPL CALCULATED.3IONS-SCNC: 6 MMOL/L (ref 3–14)
BASOPHILS # BLD AUTO: 0.1 10E9/L (ref 0–0.2)
BASOPHILS NFR BLD AUTO: 0.9 %
BUN SERPL-MCNC: 15 MG/DL (ref 7–30)
CALCIUM SERPL-MCNC: 8.9 MG/DL (ref 8.5–10.1)
CHLORIDE SERPL-SCNC: 112 MMOL/L (ref 94–109)
CO2 SERPL-SCNC: 24 MMOL/L (ref 20–32)
CREAT SERPL-MCNC: 1.61 MG/DL (ref 0.66–1.25)
DIFFERENTIAL METHOD BLD: ABNORMAL
EOSINOPHIL # BLD AUTO: 0.2 10E9/L (ref 0–0.7)
EOSINOPHIL NFR BLD AUTO: 1.3 %
ERYTHROCYTE [DISTWIDTH] IN BLOOD BY AUTOMATED COUNT: 16 % (ref 10–15)
GFR SERPL CREATININE-BSD FRML MDRD: 53 ML/MIN/{1.73_M2}
GLUCOSE SERPL-MCNC: 102 MG/DL (ref 70–99)
HCT VFR BLD AUTO: 25.9 % (ref 40–53)
HGB BLD-MCNC: 8.3 G/DL (ref 13.3–17.7)
IMM GRANULOCYTES # BLD: 0.1 10E9/L (ref 0–0.4)
IMM GRANULOCYTES NFR BLD: 0.9 %
LABORATORY COMMENT REPORT: NORMAL
LIPASE SERPL-CCNC: 1211 U/L (ref 73–393)
LYMPHOCYTES # BLD AUTO: 2.3 10E9/L (ref 0.8–5.3)
LYMPHOCYTES NFR BLD AUTO: 16.9 %
MAGNESIUM SERPL-MCNC: 1.6 MG/DL (ref 1.6–2.3)
MCH RBC QN AUTO: 30.9 PG (ref 26.5–33)
MCHC RBC AUTO-ENTMCNC: 32 G/DL (ref 31.5–36.5)
MCV RBC AUTO: 96 FL (ref 78–100)
MONOCYTES # BLD AUTO: 0.7 10E9/L (ref 0–1.3)
MONOCYTES NFR BLD AUTO: 4.9 %
NEUTROPHILS # BLD AUTO: 10.1 10E9/L (ref 1.6–8.3)
NEUTROPHILS NFR BLD AUTO: 75.1 %
NRBC # BLD AUTO: 0 10*3/UL
NRBC BLD AUTO-RTO: 0 /100
PHOSPHATE SERPL-MCNC: 3 MG/DL (ref 2.5–4.5)
PLATELET # BLD AUTO: 386 10E9/L (ref 150–450)
POTASSIUM SERPL-SCNC: 4.8 MMOL/L (ref 3.4–5.3)
RBC # BLD AUTO: 2.69 10E12/L (ref 4.4–5.9)
SARS-COV-2 RNA RESP QL NAA+PROBE: NEGATIVE
SARS-COV-2 RNA RESP QL NAA+PROBE: NORMAL
SODIUM SERPL-SCNC: 142 MMOL/L (ref 133–144)
SPECIMEN SOURCE: NORMAL
SPECIMEN SOURCE: NORMAL
TACROLIMUS BLD-MCNC: 8.4 UG/L (ref 5–15)
TME LAST DOSE: NORMAL H
WBC # BLD AUTO: 13.4 10E9/L (ref 4–11)

## 2021-06-27 PROCEDURE — 83690 ASSAY OF LIPASE: CPT | Performed by: INTERNAL MEDICINE

## 2021-06-27 PROCEDURE — 84100 ASSAY OF PHOSPHORUS: CPT | Performed by: INTERNAL MEDICINE

## 2021-06-27 PROCEDURE — 83735 ASSAY OF MAGNESIUM: CPT | Performed by: INTERNAL MEDICINE

## 2021-06-27 PROCEDURE — U0003 INFECTIOUS AGENT DETECTION BY NUCLEIC ACID (DNA OR RNA); SEVERE ACUTE RESPIRATORY SYNDROME CORONAVIRUS 2 (SARS-COV-2) (CORONAVIRUS DISEASE [COVID-19]), AMPLIFIED PROBE TECHNIQUE, MAKING USE OF HIGH THROUGHPUT TECHNOLOGIES AS DESCRIBED BY CMS-2020-01-R: HCPCS | Performed by: INTERNAL MEDICINE

## 2021-06-27 PROCEDURE — 80197 ASSAY OF TACROLIMUS: CPT | Performed by: INTERNAL MEDICINE

## 2021-06-27 PROCEDURE — 85025 COMPLETE CBC W/AUTO DIFF WBC: CPT | Performed by: INTERNAL MEDICINE

## 2021-06-27 PROCEDURE — 80048 BASIC METABOLIC PNL TOTAL CA: CPT | Performed by: INTERNAL MEDICINE

## 2021-06-27 PROCEDURE — 82150 ASSAY OF AMYLASE: CPT | Performed by: INTERNAL MEDICINE

## 2021-06-27 PROCEDURE — U0005 INFEC AGEN DETEC AMPLI PROBE: HCPCS | Performed by: INTERNAL MEDICINE

## 2021-06-27 NOTE — TELEPHONE ENCOUNTER
Per [facility] RN, pt will discharge from [facility] today, plan to start home care tomorrow.     Spoke w/ [facility] [phone], Fax [phone]   They state pt is on their list, nurse unable to confirm if pt is scheduled to be seen tomorrow. Will alert team that home care should start tomorrow morning. Lab letter faxed.

## 2021-06-27 NOTE — LETTER
"    6/27/2021         RE: Erik Cramer  [address] [place] [id number]        Dear Colleague,    Thank you for referring your patient, Erik Cramer, to the [facility]. Please see a copy of my visit note below.    Erik Cramer came to [facility] today for a lab and assess following a Kidney and Pancreas transplant on 6-.      Discharge date: 6-  Transplant coordinator: Twyla Chang RN   Phone number patient can be reached at: [phone]      Physical Assessment:  See physical assessment located under \"Document Flowsheets\".  Incision site: Abdominal incision to mid abdomen intact with staples; no s/s of infection noted. Incisional care and s/s of infection reviewed w/ patient who verbalized understanding.  Lines: n/a  Fox: n/a  Urine clarity: clear to light yellow  Hydration: Patient reports drinking 2-3 liters in the last 24 hours. Reviewed fluid intake requirements w/ patient who verbalized understanding.  Nutrition: Patient reports appetite is good he states he is eating a lot of chicken, fruits and vegetables.   Last BM: soft and normal his last BM was last night.  Pain: 0/10   Blood pressure/heart rate: 130/81 - 94 (sitting); 99/66 - 98 (standing)     Labs drawn by [facility] staff: Yes    Plan of care for today:   1. Today's labs, vitals and assessment were reviewed w/ Dr Fraser who gave the following orders:         1.Hold Phosphorus supplement for now.  2.Start Home Care.    Medication changes: Hold Phosphorus for now.    Medications administered: none     COVID test completed today in [facility].    Patient education:    The following teaching topics were addressed: Importance of drinking 2L of non-caffeinated fluids daily, Incisional care, Signs/symptoms of infection, Good handwashing, Medications (purposes, doses and times of administration), Phone numbers to call with concenrs (Transplant coordinator, Unit [facility] and [facility]), 7A discharge check list " and Plan of care   Patient verbalized understanding and all questions answered.    Drug level:  Tacrolimus level will be reviewed today by  and on call coordinator Tg Gomez RN.    Face to face time: 40 minutes  Discharge Plan    Pt will follow up with Home Health Care, Tg Gomez RN on call coordinator will follow up with the patient today. Patient lives in [facility] in [place].  Discharge instructions reviewed with patient: YES  Patient/Representative verbalized understanding, all questions answered: YES    Discharged from unit at 0830 with self to home in [facility].    Rubi Jackson RN        Again, thank you for allowing me to participate in the care of your patient.        Sincerely,        [facility] [place]

## 2021-06-27 NOTE — PROGRESS NOTES
"Erik Cramer came to [facility] today for a lab and assess following a Kidney and Pancreas transplant on 6-.      Discharge date: 6-  Transplant coordinator: Twyla Chang RN   Phone number patient can be reached at: [phone]      Physical Assessment:  See physical assessment located under \"Document Flowsheets\".  Incision site: Abdominal incision to mid abdomen intact with staples; no s/s of infection noted. Incisional care and s/s of infection reviewed w/ patient who verbalized understanding.  Lines: n/a  Fox: n/a  Urine clarity: clear to light yellow  Hydration: Patient reports drinking 2-3 liters in the last 24 hours. Reviewed fluid intake requirements w/ patient who verbalized understanding.  Nutrition: Patient reports appetite is good he states he is eating a lot of chicken, fruits and vegetables.   Last BM: soft and normal his last BM was last night.  Pain: 0/10   Blood pressure/heart rate: 130/81 - 94 (sitting); 99/66 - 98 (standing)     Labs drawn by [facility] staff: Yes    Plan of care for today:   1. Today's labs, vitals and assessment were reviewed w/ Dr Fraser who gave the following orders:         1.Hold Phosphorus supplement for now.  2.Start Home Care.    Medication changes: Hold Phosphorus for now.    Medications administered: none     COVID test completed today in [facility].    Patient education:    The following teaching topics were addressed: Importance of drinking 2L of non-caffeinated fluids daily, Incisional care, Signs/symptoms of infection, Good handwashing, Medications (purposes, doses and times of administration), Phone numbers to call with concenrs (Transplant coordinator, Unit [facility] and [facility]), 7A discharge check list and Plan of care   Patient verbalized understanding and all questions answered.    Drug level:  Tacrolimus level will be reviewed today by  and on call coordinator Tg Gomez RN.    Face to face time: 40 minutes  Discharge Plan    Pt will follow up with " Home Health Care, Tg Gomez RN on call coordinator will follow up with the patient today. Patient lives in [facility].  Discharge instructions reviewed with patient: YES  Patient/Representative verbalized understanding, all questions answered: YES    Discharged from unit at 0830 with self to home in [facility].    Rubi Jackson RN

## 2021-06-27 NOTE — PATIENT INSTRUCTIONS
Dear Erik Cramer    Thank you for choosing [facility] ([facility]) for your transplant cares.  The following information is a summary of our appointment as well as important reminders.      Please hold Phosphorus supplement for now.  Please start Home Health Care.      We look forward in seeing you on your next appointment here at [facility]).  Please don t hesitate to call us at [phone] to reschedule any of your appointments or to speak with one of the [facility] registered nurses.  It was a pleasure taking care of you today.    Sincerely,    [facility]  [address] [place] [address]  [address]  Phone:  [phone]

## 2021-06-27 NOTE — LETTER
OUTPATIENT LABORATORY TEST ORDER    Patient Name: Tye Cramer                          Transplant Date: 6/11/2021  YOB: 1980                                   Issue Date & Time: 6/22/2021  8:59 AM  [facility] MR: [id number]                                 Exp. Date (1 year after date issued)    Diagnoses: Kidney Transplant (ICD-10  Z94.0)    Pancreas Transplant (ICD-10  Z94.83)   Long term use of medications (ICD-10  Z79.899)    ?Lab results to be available on the same day drawn.  ?Please release results to patient upon their request   ?Please fax to the Transplant Center at [phone].    2x/week, (M, TH) through first 3 months post-transplant Due: 6/11/2021 - 9/11/2021  1x/week, (M or T) 4 - 6 months post-transplant Due: 9/12/2021 - 12/12/2021   Every other week (M or T) months 7 - 13 post-transplant Due: 12/13/2021 - 6/11/2022          ?Complete Blood Count with Platelets & Differentials with Absolutes        ?Basic Metabolic Panel (Sodium, Potassium, Chloride, CO2, Creatinine, Urea Nitrogen, Glucose, Calcium)        ?Amylase, Lipase        ?/Tacrolimus/Prograf drug level                   Weekly through first 4 months post-transplant, then  Monthly       ?Phosphorous, Magnesium    Every other week through the 2 months post-transplant       ?Mycophenolic Acid (MPA level)    Monthly   ?BK PCR QT    At 1 month post-transplant  Due: 7/11/2021   ?Random urine protein/creatinine ratio   ?PRA/DSA (Donor Specific Antibodies)     At 2 months post-transplant  Due: 8/11/2021   ?PRA/DSA (Donor Specific Antibodies)          At 4 months post-transplant  Due: 10/11/2021   ?Random urine protein/creatinine ratio   ?PTH   ?PRA/DSA (Donor Specific Antibodies)    At 6 months post-transplant  Due: (Fasting labs) 12/11/2021   ?LFT's   ?Hgb A1c   ?Uric acid   ?Lipid panel    At 7 months post-transplant  Due: 1/11/2022   ?PRA/DSA (Donor Specific Antibodies)    At 9 months post-transplant  Due: 3/11/2022   ?Random  urine protein/creatinine ratio   ?PRA/DSA (Donor Specific Antibodies)    At 12 months post-transplant  Due: (Fasting lab) 6/11/2022   ?PRA/DSA (Donor Specific Antibodies)   ?LFT's   ?Lipid panel   ?Hgb A1c   ?Fasting C Peptide   ?Auto Antibodies    At 13 months post-transplant  Due: 7/11/2022   ?PRA/DSA (Donor Specific Antibodies)        If you have any questions, please call [facility] at [phone] or [phone].    Please fax all results to [phone].    .

## 2021-06-28 ENCOUNTER — OFFICE VISIT (OUTPATIENT)
Dept: TRANSPLANT | Facility: CLINIC | Age: 41
End: 2021-06-28
Attending: TRANSPLANT SURGERY
Payer: MEDICARE

## 2021-06-28 VITALS
HEART RATE: 79 BPM | BODY MASS INDEX: 28.17 KG/M2 | SYSTOLIC BLOOD PRESSURE: 121 MMHG | WEIGHT: 169.3 LBS | OXYGEN SATURATION: 99 % | DIASTOLIC BLOOD PRESSURE: 79 MMHG

## 2021-06-28 DIAGNOSIS — Z94.0 KIDNEY REPLACED BY TRANSPLANT: ICD-10-CM

## 2021-06-28 DIAGNOSIS — E87.5 HYPERKALEMIA: Primary | ICD-10-CM

## 2021-06-28 DIAGNOSIS — D84.9 IMMUNOSUPPRESSED STATUS (H): ICD-10-CM

## 2021-06-28 DIAGNOSIS — Z94.83 PANCREAS TRANSPLANTED (H): ICD-10-CM

## 2021-06-28 DIAGNOSIS — E83.42 HYPOMAGNESEMIA: ICD-10-CM

## 2021-06-28 DIAGNOSIS — E83.39 HYPOPHOSPHATEMIA: ICD-10-CM

## 2021-06-28 PROCEDURE — 99214 OFFICE O/P EST MOD 30 MIN: CPT | Performed by: TRANSPLANT SURGERY

## 2021-06-28 RX ORDER — SODIUM BICARBONATE 650 MG/1
650 TABLET ORAL 2 TIMES DAILY
Qty: 60 TABLET | Refills: 0 | Status: SHIPPED | OUTPATIENT
Start: 2021-06-28 | End: 2021-06-29

## 2021-06-28 ASSESSMENT — PAIN SCALES - GENERAL: PAINLEVEL: NO PAIN (0)

## 2021-06-28 NOTE — NURSING NOTE
Chief Complaint   Patient presents with     RECHECK     Post op kidney/panc. f/u     Blood pressure 121/79, pulse 79, weight 76.8 kg (169 lb 4.8 oz), SpO2 99 %.    Rain Ruiz, CMA

## 2021-06-28 NOTE — LETTER
6/28/2021         RE: Erik Cramer  [address] [place] [id number]        Dear Colleague,    Thank you for referring your patient, Erik Cramer, to the [facility]. Please see a copy of my visit note below.    Transplant Surgery Progress Note    Date of Visit: 06/28/2021    Assessment: Kidney and pancreas working well.  Has some fluid around pancreas    Plan:    1. Graft function: Stable  2. Immunosuppression Management: No change , may need to adjust if K does not improve. .  Complexity of management:Medium.  Contributing factors: hyperkalemia  3. HyperK: Discussed with nephrology.  Hold bactrim x 3 days.  Drink plenty of fluids.  Bicarb is low so start Na bicarb 650 po bid x 30 d.  4.  Hypo mag:  On replacement  5.  Hypophos:  Resume phos suppl  6.  Prophy.  Hold bacrtrim x 3 d until K better.  Contin valgan, mycelex  7.  Fluid collection around panc.   For drain placement tomorrow.  On augmentin for now.  8.  RTC next week for nurse appt for staple removal.        Total Time: 30 min,         Russell Kennedy MD, PhD  Associate Professor of Surgery          Transplants:  6/11/2021 (Kidney / Pancreas); Postoperative day:  17  History of Present Illness:  S/p simultaneous kidney and pancreas transplant .  Uneventful postop course except for fluid collection around pancreas.  Scheduled for IR aspiration or drainage tomorrow.  On augmentin.  WBC 13  Cr 1.6 and dropping  Lipase 1211 and dropping    Had some leg cramping and ate a avocado.   Then had blood draw.    Patient was called from homecare. K 5.9.  Bicarb low.    Also reports some orthostatic symptoms with light headedness on standing.        Had kidney bx:  Neg rejection.    Pancreas pathology:  FINAL DIAGNOSIS:   Appendix; Incidental Appendectomy:   No acute inflammation or other significant morphologic abnormality         Transplant coordinator Twyla Chang  [phone]  Donor type:  DBD  DSA at time of transplant:   NO  Ureteral stent: YES  CMV:  Donor - / Recipient +  EBV:  Donor + / Recipient +  Thymoglobulin:  175mg, 2mg/kg    Review of Systems:   CONSTITUTIONAL:  No fevers or chills  EYES: negative for icterus  ENT:  negative for hearing loss, tinnitus and sore throat  RESPIRATORY:  negative for cough, sputum, dyspnea  CARDIOVASCULAR:  negative for chest pain   GASTROINTESTINAL:  negative for nausea, vomiting, diarrhea or constipation  GENITOURINARY:  negative for incontinence, dysuria, bladder emptying problems  HEME:  No easy bruising  INTEGUMENT:  negative for rash and pruritus  NEURO:  Negative for headache, seizure disorder    Immunosuppression:    Immunosuppressant Medications     Immunosuppressive Agents Disp Start End     mycophenolate (GENERIC EQUIVALENT) 250 MG capsule    240 capsule 6/25/2021     Sig - Route: Take 4 capsules (1,000 mg) by mouth 2 times daily - Oral    Class: E-Prescribe    Notes to Pharmacy: TXP DT 6/11/2021 (Kidney / Pancreas) TXP Dischg DT 6/21/2021 DX Kidney replaced by transplant Z94.0 TX [facility] ([place], [place])    Prior authorization: Closed     tacrolimus (GENERIC EQUIVALENT) 1 MG capsule    120 capsule 6/25/2021     Sig - Route: Take 2 capsules (2 mg) by mouth 2 times daily - Oral    Class: E-Prescribe    Notes to Pharmacy: TXP DT 6/11/2021 (Kidney / Pancreas) TXP Dischg DT 6/21/2021 DX Kidney replaced by transplant Z94.0 TX [facility] ([place], [place])    Prior authorization: Closed          Possible Immunosuppression-related side effects:   []             headache  []             vivid dreams  []             irritability  []             fine tremor  []             nausea  []             diarrhea  []             neuropathy      []             edema  []             renal calcineurin toxicity  [x]             hyperkalemia  []             post-transplant diabetes  []             decreased  appetite  []             increased appetite  []             other:  []             None    Prophylaxis:    [x]             Valcyte  [x]             Mycelex  [x]             Bactrim  []             Dapsone  []             Protonix  []             Other:      Prescription Medications as of 6/28/2021       Rx Number Disp Refills Start End Last Dispensed Date Next Fill Date Owning Pharmacy    acetaminophen (TYLENOL) 325 MG tablet     6/21/2021    [facility] - [place], [place] - [address]    Sig: Take 2 tablets (650 mg) by mouth every 4 hours as needed for other (For optimal non-opioid multimodal pain management to improve pain control.)    Class: No Print Out    Route: Oral    amLODIPine (NORVASC) 10 MG tablet            Sig: Take 10 mg by mouth daily Take 1 tablet by mouth in the evening    Class: Historical    Route: Oral    amoxicillin-clavulanate (AUGMENTIN) 875-125 MG tablet  42 tablet 0 6/23/2021    [facility] - [place], [place] - [address]    Sig: Take 1 tablet by mouth 2 times daily    Class: E-Prescribe    Route: Oral    aspirin (ASA) 325 MG EC tablet  30 tablet 5 6/22/2021    [facility] - [place], [place] - [address]    Sig: Take 1 tablet (325 mg) by mouth daily    Class: E-Prescribe    Route: Oral    atorvastatin (LIPITOR) 20 MG tablet  90 tablet 3 6/22/2021    [place], [place] - [address]    Sig: Take 1 tablet (20 mg) by mouth daily    Class: E-Prescribe    Route: Oral    calcium carbonate (OS-SHAY) 1500 (600 Ca) MG tablet   12 11/18/2019    [place], [place] - [address] [facility]    Sig: Take 600 mg by mouth 2 times daily (with meals)     Class: Historical    Route: Oral    carvedilol (COREG) 6.25 MG tablet  60 tablet 2 6/21/2021    [facility] - [place], [place] - [address]    Sig: Take 1 tablet (6.25 mg) by mouth 2 times daily    Class: E-Prescribe    Route: Oral     cloNIDine (CATAPRES) 0.1 MG tablet  90 tablet 1 6/25/2021    [facility] - [place], [place] - [address]    Sig: Take 1 tablet (0.1 mg) by mouth 3 times daily Hold for SBP < 130.    Class: E-Prescribe    Route: Oral    clotrimazole (MYCELEX) 10 MG lozenge  120 lozenge 2 6/21/2021    [facility] - [place], [place] - [address]    Sig: Place 1 lozenge (10 mg) inside cheek 4 times daily    Class: E-Prescribe    Route: Buccal    fluconazole (DIFLUCAN) 200 MG tablet  21 tablet 0 6/23/2021    [facility] - [place], [place] - [address]    Sig: Take 1 tablet (200 mg) by mouth daily    Class: E-Prescribe    Route: Oral    insulin lispro (HUMALOG) 100 UNIT/ML Cartridge  3 mL 0 6/21/2021    [facility] - [place], [place] - [address]    Sig: Inject 1-7 Units Subcutaneous 3 times daily (before meals)    Class: E-Prescribe    Route: Subcutaneous    insulin lispro (HUMALOG) 100 UNIT/ML Cartridge  3 mL 0 6/21/2021    [facility] - [place], [place] - [address]    Sig: Inject 1-5 Units Subcutaneous At Bedtime    Class: E-Prescribe    Route: Subcutaneous    insulin pen needle (BD RAÚL U/F) 32G X 4 MM miscellaneous    9/13/2018        Sig: use as directed with lantus pen once a day    Class: Historical    magnesium oxide (MAG-OX) 400 MG tablet  30 tablet 2 6/22/2021    [facility]  [place], [place] - [address]    Sig: Take 1 tablet (400 mg) by mouth every 24 hours    Class: E-Prescribe    Route: Oral    methocarbamol (ROBAXIN) 500 MG tablet  28 tablet 0 6/21/2021    [facility]  [place], [place] - [address]    Sig: Take 1 tablet (500 mg) by mouth every 8 hours as needed for muscle spasms    Class: E-Prescribe    Route: Oral    montelukast (SINGULAIR) 10 MG tablet            Sig: Take 10 mg by mouth At Bedtime    Class: Historical    Route: Oral    mycophenolate (GENERIC EQUIVALENT) 250 MG capsule  240 capsule 11 6/25/2021    [facility],  [employer] [place] [address]    Sig: Take 4 capsules (1,000 mg) by mouth 2 times daily    Class: E-Prescribe    Notes to Pharmacy: TXP DT 6/11/2021 (Kidney / Pancreas) TXP Dischg DT 6/21/2021 DX Kidney replaced by transplant Z94.0 TX [facility] [place] ([place], [place])    Route: Oral    Prior authorization: Closed    omeprazole (PRILOSEC) 20 MG DR capsule            Sig: Take 20 mg by mouth every morning    Class: Historical    Route: Oral    ondansetron (ZOFRAN-ODT) 4 MG ODT tab            Sig: Take 4 mg by mouth every 8 hours as needed for nausea    Class: Historical    Route: Oral    oxyCODONE (ROXICODONE) 5 MG tablet  8 tablet 0 6/21/2021    [facility]  [place] [address]    Sig: Take 1 tablet (5 mg) by mouth every 4 hours as needed for moderate to severe pain    Class: Local Print    Earliest Fill Date: 6/21/2021    Route: Oral    phosphorus tablet 250 mg (PHOSPHA 250 NEUTRAL) 250 MG per tablet  60 tablet 0 6/21/2021    [facility]  [place] [address]    Sig: Take 1 tablet (250 mg) by mouth 2 times daily    Class: E-Prescribe    Route: Oral    predniSONE (DELTASONE) 5 MG tablet  54 tablet 0 6/21/2021    [facility] - [place], [place] - [address]    Sig: Take 20 mg daily x 3 days (6/22-6/24), Take 15 mg daily x 7 days (6/25-7/1), Take 10 mg daily x 7 days (7/2-7/8), Take 5 mg x 7 days (7/9-7/15)    Class: E-Prescribe    Notes to Pharmacy: Medication order for PREDNISONE updated to ALL 5MG TABLETS WITH CORRESPONDING CORRECT DIRECTIONS per verbal order. Updated RX sent to the pharmacy and medication list in Epic updated.    sulfamethoxazole-trimethoprim (BACTRIM) 400-80 MG tablet  30 tablet 11 6/22/2021    [facility] [facility] [place] [address]    Sig: Take 1 tablet by mouth daily    Class: E-Prescribe    Route: Oral    tacrolimus (GENERIC EQUIVALENT) 1 MG capsule  120 capsule  11 6/25/2021    [facility] [employer]. [place], [place] - [address]    Sig: Take 2 capsules (2 mg) by mouth 2 times daily    Class: E-Prescribe    Notes to Pharmacy: TXP DT 6/11/2021 (Kidney / Pancreas) TXP Dischg DT 6/21/2021 DX Kidney replaced by transplant Z94.0 [facility] ([place], [place])    Route: Oral    Prior authorization: Closed    valGANciclovir (VALCYTE) 450 MG tablet  150 tablet 0 6/25/2021    [facility] [employer]. [place], [place] - [address]    Sig: Take 1 tab (450mg) daily. Increase dose up to a max of 2 tabs (900 mg) by mouth daily when directed by your transplant team.    Class: E-Prescribe    vitamin D3 (CHOLECALCIFEROL) 2000 units (50 mcg) tablet  90 tablet 0 7/22/2019    [facility], [place] - [address]    Sig: Take 1 tablet (2,000 Units) by mouth daily    Class: E-Prescribe    Route: Oral          Exam:           /79   Pulse 79   Wt 76.8 kg (169 lb 4.8 oz)   SpO2 99%   BMI 28.17 kg/m      General Appearance: in no apparent distress.   Skin: Normal, no rashes or jaundice  Lungs: easy respirations, no audible wheezing.  Abdomen: rounded, The wound is dry and intact, without hernia. The abdomen is non-tender. The kidney graft and pancreas are not tender.  There is no ascites.  Extremities: Tremor   Edema: absent.      Transplant Immunosuppression Labs Latest Ref Rng & Units 6/27/2021 6/25/2021 6/24/2021 6/23/2021 6/22/2021   [place] Level 5.0 - 15.0 ug/L 8.4 9.5 10.7 10.8 11.4   [place] Level - Not Provided 2,000 2000 6/23/2021 2,000 2,000   Creat 0.66 - 1.25 mg/dL 1.61(H) 1.62(H) 1.73(H) 1.94(H) 2.06(H)   BUN 7 - 30 mg/dL 15 15 17 18 22   WBC 4.0 - 11.0 10e9/L 13.4(H) 14.4(H) 14.9(H) 15.5(H) 16.9(H)   Neutrophil % 75.1 - 78.1 78.7 78.2   ANEU 1.6 - 8.3 10e9/L 10.1(H) - 11.7(H) 12.2(H) 13.3(H)       Chemistries:   Recent Labs   Lab Test 06/27/21  0702   BUN 15   CR 1.61*   GFRESTIMATED 53*   *     Lab Results    Component Value Date    A1C 6.0 06/10/2021    CPEPT 7.8 07/29/2019     Recent Labs   Lab Test 06/10/21  0927   ALBUMIN 3.9   BILITOTAL 0.4   ALKPHOS 84   AST 32   ALT 32     Urine Studies:  Recent Labs   Lab Test 06/22/21  0925   COLOR Yellow   APPEARANCE Clear   URINEGLC Negative   URINEBILI Negative   URINEKETONE Negative   SG 1.012   UBLD Moderate*   URINEPH 6.0   PROTEIN Negative   NITRITE Negative   LEUKEST Negative   RBCU 38*   WBCU 4     Recent Labs   Lab Test 06/10/21  1120 08/31/20  0000   UTPG 5.90* 2.62*     Hematology:   Recent Labs   Lab Test 06/27/21  0702 06/25/21  0631 06/24/21  0642   HGB 8.3* 7.5* 7.6*    373 369   WBC 13.4* 14.4* 14.9*     Coags:   Recent Labs   Lab Test 06/18/21  0749 06/11/21  0540 06/10/21  0927   INR 0.96 1.09 0.98     HLA antibodies:   SA1 Hi Risk Tamie   Date Value Ref Range Status   06/20/2021 None  Final     SA1 Mod Risk Tamie   Date Value Ref Range Status   06/20/2021 None  Final     SA2 Hi Risk Tamie   Date Value Ref Range Status   06/20/2021 None  Final     SA2 Mod Risk Tamie   Date Value Ref Range Status   06/20/2021 None  Final             Again, thank you for allowing me to participate in the care of your patient.        Sincerely,        Russell Kennedy MD

## 2021-06-28 NOTE — PROGRESS NOTES
Transplant Surgery Progress Note    Date of Visit: 06/28/2021    Assessment: Kidney and pancreas working well.  Has some fluid around pancreas    Plan:    1. Graft function: Stable  2. Immunosuppression Management: No change , may need to adjust if K does not improve. .  Complexity of management:Medium.  Contributing factors: hyperkalemia  3. HyperK: Discussed with nephrology.  Hold bactrim x 3 days.  Drink plenty of fluids.  Bicarb is low so start Na bicarb 650 po bid x 30 d.  4.  Hypo mag:  On replacement  5.  Hypophos:  Resume phos suppl  6.  Prophy.  Hold bacrtrim x 3 d until K better.  Contin valgan, mycelex  7.  Fluid collection around panc.   For drain placement tomorrow.  On augmentin for now.  8.  RTC next week for nurse appt for staple removal.        Total Time: 30 min,         Russell Kennedy MD, PhD  Associate Professor of Surgery          Transplants:  6/11/2021 (Kidney / Pancreas); Postoperative day:  17  History of Present Illness:  S/p simultaneous kidney and pancreas transplant .  Uneventful postop course except for fluid collection around pancreas.  Scheduled for IR aspiration or drainage tomorrow.  On augmentin.  WBC 13  Cr 1.6 and dropping  Lipase 1211 and dropping    Had some leg cramping and ate a avocado.   Then had blood draw.    Patient was called from homecare. K 5.9.  Bicarb low.    Also reports some orthostatic symptoms with light headedness on standing.        Had kidney bx:  Neg rejection.    Pancreas pathology:  FINAL DIAGNOSIS:   Appendix; Incidental Appendectomy:   No acute inflammation or other significant morphologic abnormality         Transplant coordinator Twyla Chang  [phone]  Donor type:  DBD  DSA at time of transplant:  NO  Ureteral stent: YES  CMV:  Donor - / Recipient +  EBV:  Donor + / Recipient +  Thymoglobulin:  175mg, 2mg/kg    Review of Systems:   CONSTITUTIONAL:  No fevers or chills  EYES: negative for icterus  ENT:  negative for hearing loss, tinnitus and  sore throat  RESPIRATORY:  negative for cough, sputum, dyspnea  CARDIOVASCULAR:  negative for chest pain   GASTROINTESTINAL:  negative for nausea, vomiting, diarrhea or constipation  GENITOURINARY:  negative for incontinence, dysuria, bladder emptying problems  HEME:  No easy bruising  INTEGUMENT:  negative for rash and pruritus  NEURO:  Negative for headache, seizure disorder    Immunosuppression:    Immunosuppressant Medications     Immunosuppressive Agents Disp Start End     mycophenolate (GENERIC EQUIVALENT) 250 MG capsule    240 capsule 6/25/2021     Sig - Route: Take 4 capsules (1,000 mg) by mouth 2 times daily - Oral    Class: E-Prescribe    Notes to Pharmacy: TXP DT 6/11/2021 (Kidney / Pancreas) TXP Dischg DT 6/21/2021 DX Kidney replaced by transplant Z94.0 TX [facility] ([place], [place])    Prior authorization: Closed     tacrolimus (GENERIC EQUIVALENT) 1 MG capsule    120 capsule 6/25/2021     Sig - Route: Take 2 capsules (2 mg) by mouth 2 times daily - Oral    Class: E-Prescribe    Notes to Pharmacy: TXP DT 6/11/2021 (Kidney / Pancreas) TXP Dischg DT 6/21/2021 DX Kidney replaced by transplant Z94.0 TX [facility] ([place], [place])    Prior authorization: Closed          Possible Immunosuppression-related side effects:   []             headache  []             vivid dreams  []             irritability  []             fine tremor  []             nausea  []             diarrhea  []             neuropathy      []             edema  []             renal calcineurin toxicity  [x]             hyperkalemia  []             post-transplant diabetes  []             decreased appetite  []             increased appetite  []             other:  []             None    Prophylaxis:    [x]             Valcyte  [x]             Mycelex  [x]             Bactrim  []             Dapsone  []             Protonix  []              Other:      Prescription Medications as of 6/28/2021       Rx Number Disp Refills Start End Last Dispensed Date Next Fill Date Owning Pharmacy    acetaminophen (TYLENOL) 325 MG tablet     6/21/2021    [facility] - [place], [place] - [address]    Sig: Take 2 tablets (650 mg) by mouth every 4 hours as needed for other (For optimal non-opioid multimodal pain management to improve pain control.)    Class: No Print Out    Route: Oral    amLODIPine (NORVASC) 10 MG tablet            Sig: Take 10 mg by mouth daily Take 1 tablet by mouth in the evening    Class: Historical    Route: Oral    amoxicillin-clavulanate (AUGMENTIN) 875-125 MG tablet  42 tablet 0 6/23/2021    [facility] - [place], [place] - [address]    Sig: Take 1 tablet by mouth 2 times daily    Class: E-Prescribe    Route: Oral    aspirin (ASA) 325 MG EC tablet  30 tablet 5 6/22/2021    [facility] - [place], [place] - [address]    Sig: Take 1 tablet (325 mg) by mouth daily    Class: E-Prescribe    Route: Oral    atorvastatin (LIPITOR) 20 MG tablet  90 tablet 3 6/22/2021    [place], [place] - [phone]    Sig: Take 1 tablet (20 mg) by mouth daily    Class: E-Prescribe    Route: Oral    calcium carbonate (OS-SHAY) 1500 (600 Ca) MG tablet   12 11/18/2019    [facility] [place], [place] - [address]    Sig: Take 600 mg by mouth 2 times daily (with meals)     Class: Historical    Route: Oral    carvedilol (COREG) 6.25 MG tablet  60 tablet 2 6/21/2021    [facility] - [place], [place] - [address]    Sig: Take 1 tablet (6.25 mg) by mouth 2 times daily    Class: E-Prescribe    Route: Oral    cloNIDine (CATAPRES) 0.1 MG tablet  90 tablet 1 6/25/2021    [facility] - [place], [place] - [address]    Sig: Take 1 tablet (0.1 mg) by mouth 3 times daily Hold for SBP < 130.    Class: E-Prescribe    Route: Oral    clotrimazole  (MYCELEX) 10 MG lozenge  120 lozenge 2 6/21/2021    [facility] [place] [facility] [place] [address]    Sig: Place 1 lozenge (10 mg) inside cheek 4 times daily    Class: E-Prescribe    Route: Buccal    fluconazole (DIFLUCAN) 200 MG tablet  21 tablet 0 6/23/2021    [facility] [place] [place] [place] [address]    Sig: Take 1 tablet (200 mg) by mouth daily    Class: E-Prescribe    Route: Oral    insulin lispro (HUMALOG) 100 UNIT/ML Cartridge  3 mL 0 6/21/2021    [facility] - [place] [address]    Sig: Inject 1-7 Units Subcutaneous 3 times daily (before meals)    Class: E-Prescribe    Route: Subcutaneous    insulin lispro (HUMALOG) 100 UNIT/ML Cartridge  3 mL 0 6/21/2021    [facility] [place] [facility] [address]    Sig: Inject 1-5 Units Subcutaneous At Bedtime    Class: E-Prescribe    Route: Subcutaneous    insulin pen needle (BD RAÚL U/F) 32G X 4 MM miscellaneous    9/13/2018        Sig: use as directed with lantus pen once a day    Class: Historical    magnesium oxide (MAG-OX) 400 MG tablet  30 tablet 2 6/22/2021    [facility] - [place] - [address]    Sig: Take 1 tablet (400 mg) by mouth every 24 hours    Class: E-Prescribe    Route: Oral    methocarbamol (ROBAXIN) 500 MG tablet  28 tablet 0 6/21/2021    [facility] [place] [facility] [address]    Sig: Take 1 tablet (500 mg) by mouth every 8 hours as needed for muscle spasms    Class: E-Prescribe    Route: Oral    montelukast (SINGULAIR) 10 MG tablet            Sig: Take 10 mg by mouth At Bedtime    Class: Historical    Route: Oral    mycophenolate (GENERIC EQUIVALENT) 250 MG capsule  240 capsule 11 6/25/2021    [facility] [place] [facility] [address]    Sig: Take 4 capsules (1,000 mg) by mouth 2 times daily    Class: E-Prescribe    Notes to Pharmacy: TXP DT 6/11/2021 (Kidney / Pancreas) TXP Dischg DT 6/21/2021 DX Kidney replaced by transplant Z94.0 TX  [facility] ([place], [place])    Route: Oral    Prior authorization: Closed    omeprazole (PRILOSEC) 20 MG DR capsule            Sig: Take 20 mg by mouth every morning    Class: Historical    Route: Oral    ondansetron (ZOFRAN-ODT) 4 MG ODT tab            Sig: Take 4 mg by mouth every 8 hours as needed for nausea    Class: Historical    Route: Oral    oxyCODONE (ROXICODONE) 5 MG tablet  8 tablet 0 6/21/2021    [facility]  [place], [place] - [address]    Sig: Take 1 tablet (5 mg) by mouth every 4 hours as needed for moderate to severe pain    Class: Local Print    Earliest Fill Date: 6/21/2021    Route: Oral    phosphorus tablet 250 mg (PHOSPHA 250 NEUTRAL) 250 MG per tablet  60 tablet 0 6/21/2021    [facility]  [place], [place] - [address]    Sig: Take 1 tablet (250 mg) by mouth 2 times daily    Class: E-Prescribe    Route: Oral    predniSONE (DELTASONE) 5 MG tablet  54 tablet 0 6/21/2021    [facility] [place], [place] - [address]    Sig: Take 20 mg daily x 3 days (6/22-6/24), Take 15 mg daily x 7 days (6/25-7/1), Take 10 mg daily x 7 days (7/2-7/8), Take 5 mg x 7 days (7/9-7/15)    Class: E-Prescribe    Notes to Pharmacy: Medication order for PREDNISONE updated to ALL 5MG TABLETS WITH CORRESPONDING CORRECT DIRECTIONS per verbal order. Updated RX sent to the pharmacy and medication list in Epic updated.    sulfamethoxazole-trimethoprim (BACTRIM) 400-80 MG tablet  30 tablet 11 6/22/2021    [facility] - [place], [place] - [address]    Sig: Take 1 tablet by mouth daily    Class: E-Prescribe    Route: Oral    tacrolimus (GENERIC EQUIVALENT) 1 MG capsule  120 capsule 11 6/25/2021    [facility] - [place], [place] - [address]    Sig: Take 2 capsules (2 mg) by mouth 2 times daily    Class: E-Prescribe    Notes to Pharmacy: TXP DT 6/11/2021 (Kidney / Pancreas) TXP Dischg DT 6/21/2021 DX Kidney  replaced by transplant Z94.0 TX [facility] [place] ([place], [place])    Route: Oral    Prior authorization: Closed    valGANciclovir (VALCYTE) 450 MG tablet  150 tablet 0 6/25/2021    [facility] - [place], [place] - [address]    Sig: Take 1 tab (450mg) daily. Increase dose up to a max of 2 tabs (900 mg) by mouth daily when directed by your transplant team.    Class: E-Prescribe    vitamin D3 (CHOLECALCIFEROL) 2000 units (50 mcg) tablet  90 tablet 0 7/22/2019    [facility] - [place], [place] - [address]    Sig: Take 1 tablet (2,000 Units) by mouth daily    Class: E-Prescribe    Route: Oral          Exam:           /79   Pulse 79   Wt 76.8 kg (169 lb 4.8 oz)   SpO2 99%   BMI 28.17 kg/m      General Appearance: in no apparent distress.   Skin: Normal, no rashes or jaundice  Lungs: easy respirations, no audible wheezing.  Abdomen: rounded, The wound is dry and intact, without hernia. The abdomen is non-tender. The kidney graft and pancreas are not tender.  There is no ascites.  Extremities: Tremor   Edema: absent.      Transplant Immunosuppression Labs Latest Ref Rng & Units 6/27/2021 6/25/2021 6/24/2021 6/23/2021 6/22/2021   T[place]ro Level 5.0 - 15.0 ug/L 8.4 9.5 10.7 10.8 11.4   [place]ro Level - Not Provided 2,000 2000 6/23/2021 2,000 2,000   Creat 0.66 - 1.25 mg/dL 1.61(H) 1.62(H) 1.73(H) 1.94(H) 2.06(H)   BUN 7 - 30 mg/dL 15 15 17 18 22   WBC 4.0 - 11.0 10e9/L 13.4(H) 14.4(H) 14.9(H) 15.5(H) 16.9(H)   Neutrophil % 75.1 - 78.1 78.7 78.2   ANEU 1.6 - 8.3 10e9/L 10.1(H) - 11.7(H) 12.2(H) 13.3(H)       Chemistries:   Recent Labs   Lab Test 06/27/21  0702   BUN 15   CR 1.61*   GFRESTIMATED 53*   *     Lab Results   Component Value Date    A1C 6.0 06/10/2021    CPEPT 7.8 07/29/2019     Recent Labs   Lab Test 06/10/21  0927   ALBUMIN 3.9   BILITOTAL 0.4   ALKPHOS 84   AST 32   ALT 32     Urine Studies:  Recent Labs   Lab Test 06/22/21  0925   COLOR  Yellow   APPEARANCE Clear   URINEGLC Negative   URINEBILI Negative   URINEKETONE Negative   SG 1.012   UBLD Moderate*   URINEPH 6.0   PROTEIN Negative   NITRITE Negative   LEUKEST Negative   RBCU 38*   WBCU 4     Recent Labs   Lab Test 06/10/21  1120 08/31/20  0000   UTPG 5.90* 2.62*     Hematology:   Recent Labs   Lab Test 06/27/21  0702 06/25/21  0631 06/24/21  0642   HGB 8.3* 7.5* 7.6*    373 369   WBC 13.4* 14.4* 14.9*     Coags:   Recent Labs   Lab Test 06/18/21  0749 06/11/21  0540 06/10/21  0927   INR 0.96 1.09 0.98     HLA antibodies:   SA1 Hi Risk Tamie   Date Value Ref Range Status   06/20/2021 None  Final     SA1 Mod Risk Tamie   Date Value Ref Range Status   06/20/2021 None  Final     SA2 Hi Risk Tamie   Date Value Ref Range Status   06/20/2021 None  Final     SA2 Mod Risk Tamie   Date Value Ref Range Status   06/20/2021 None  Final

## 2021-06-29 ENCOUNTER — HOSPITAL ENCOUNTER (OUTPATIENT)
Facility: CLINIC | Age: 41
Discharge: HOME | End: 2021-06-29
Attending: INTERNAL MEDICINE | Admitting: RADIOLOGY
Payer: MEDICARE

## 2021-06-29 ENCOUNTER — APPOINTMENT (OUTPATIENT)
Dept: INTERVENTIONAL RADIOLOGY/VASCULAR | Facility: CLINIC | Age: 41
End: 2021-06-29
Attending: NURSE PRACTITIONER
Payer: MEDICARE

## 2021-06-29 ENCOUNTER — TELEPHONE (OUTPATIENT)
Dept: TRANSPLANT | Facility: CLINIC | Age: 41
End: 2021-06-29

## 2021-06-29 ENCOUNTER — APPOINTMENT (OUTPATIENT)
Dept: MEDSURG UNIT | Facility: CLINIC | Age: 41
End: 2021-06-29
Attending: INTERNAL MEDICINE
Payer: MEDICARE

## 2021-06-29 ENCOUNTER — VIRTUAL VISIT (OUTPATIENT)
Dept: PHARMACY | Facility: CLINIC | Age: 41
End: 2021-06-29
Payer: MEDICARE

## 2021-06-29 VITALS
DIASTOLIC BLOOD PRESSURE: 84 MMHG | OXYGEN SATURATION: 99 % | BODY MASS INDEX: 28.21 KG/M2 | SYSTOLIC BLOOD PRESSURE: 129 MMHG | WEIGHT: 169.3 LBS | HEART RATE: 96 BPM | TEMPERATURE: 98.3 F | RESPIRATION RATE: 16 BRPM | HEIGHT: 65 IN

## 2021-06-29 DIAGNOSIS — E86.0 DEHYDRATION: ICD-10-CM

## 2021-06-29 DIAGNOSIS — E87.5 HYPERKALEMIA: ICD-10-CM

## 2021-06-29 DIAGNOSIS — I15.0 RENOVASCULAR HYPERTENSION: ICD-10-CM

## 2021-06-29 DIAGNOSIS — E83.42 HYPOMAGNESEMIA: ICD-10-CM

## 2021-06-29 DIAGNOSIS — G89.18 ACUTE POST-OPERATIVE PAIN: ICD-10-CM

## 2021-06-29 DIAGNOSIS — Z94.0 KIDNEY REPLACED BY TRANSPLANT: ICD-10-CM

## 2021-06-29 DIAGNOSIS — E87.8 LOW BICARBONATE LEVEL: ICD-10-CM

## 2021-06-29 DIAGNOSIS — Z48.288 ENCOUNTER FOR AFTERCARE FOLLOWING MULTIPLE ORGAN TRANSPLANT: ICD-10-CM

## 2021-06-29 DIAGNOSIS — Z94.83 STATUS POST PANCREAS TRANSPLANTATION (H): ICD-10-CM

## 2021-06-29 DIAGNOSIS — Z94.0 KIDNEY REPLACED BY TRANSPLANT: Primary | ICD-10-CM

## 2021-06-29 DIAGNOSIS — E86.0 DEHYDRATION: Primary | ICD-10-CM

## 2021-06-29 DIAGNOSIS — E78.5 HYPERLIPIDEMIA LDL GOAL <130: ICD-10-CM

## 2021-06-29 DIAGNOSIS — J30.2 SEASONAL ALLERGIC RHINITIS: ICD-10-CM

## 2021-06-29 DIAGNOSIS — E83.39 HYPOPHOSPHATEMIA: Primary | ICD-10-CM

## 2021-06-29 DIAGNOSIS — E83.39 HYPOPHOSPHATEMIA: ICD-10-CM

## 2021-06-29 DIAGNOSIS — Z94.83 PANCREAS TRANSPLANTED (H): ICD-10-CM

## 2021-06-29 LAB
GLUCOSE BLDC GLUCOMTR-MCNC: 113 MG/DL (ref 70–99)
INR PPP: 0.95 (ref 0.86–1.14)

## 2021-06-29 PROCEDURE — G0463 HOSPITAL OUTPT CLINIC VISIT: HCPCS

## 2021-06-29 PROCEDURE — 99607 MTMS BY PHARM ADDL 15 MIN: CPT | Performed by: PHARMACIST

## 2021-06-29 PROCEDURE — 82962 GLUCOSE BLOOD TEST: CPT | Mod: GZ

## 2021-06-29 PROCEDURE — 85610 PROTHROMBIN TIME: CPT | Performed by: NURSE PRACTITIONER

## 2021-06-29 PROCEDURE — 76705 ECHO EXAM OF ABDOMEN: CPT | Mod: 26 | Performed by: RADIOLOGY

## 2021-06-29 PROCEDURE — 99605 MTMS BY PHARM NP 15 MIN: CPT | Performed by: PHARMACIST

## 2021-06-29 PROCEDURE — 258N000003 HC RX IP 258 OP 636: Performed by: INTERNAL MEDICINE

## 2021-06-29 PROCEDURE — 999N000142 HC STATISTIC PROCEDURE PREP ONLY

## 2021-06-29 RX ORDER — NALOXONE HYDROCHLORIDE 0.4 MG/ML
0.2 INJECTION, SOLUTION INTRAMUSCULAR; INTRAVENOUS; SUBCUTANEOUS
Status: DISCONTINUED | OUTPATIENT
Start: 2021-06-29 | End: 2021-06-29 | Stop reason: HOSPADM

## 2021-06-29 RX ORDER — FENTANYL CITRATE 50 UG/ML
25-50 INJECTION, SOLUTION INTRAMUSCULAR; INTRAVENOUS EVERY 5 MIN PRN
Status: DISCONTINUED | OUTPATIENT
Start: 2021-06-29 | End: 2021-06-29 | Stop reason: HOSPADM

## 2021-06-29 RX ORDER — NALOXONE HYDROCHLORIDE 0.4 MG/ML
0.4 INJECTION, SOLUTION INTRAMUSCULAR; INTRAVENOUS; SUBCUTANEOUS
Status: DISCONTINUED | OUTPATIENT
Start: 2021-06-29 | End: 2021-06-29 | Stop reason: HOSPADM

## 2021-06-29 RX ORDER — SODIUM BICARBONATE 650 MG/1
650 TABLET ORAL 2 TIMES DAILY
Qty: 60 TABLET | Refills: 3 | Status: SHIPPED | OUTPATIENT
Start: 2021-06-29 | End: 2021-08-11

## 2021-06-29 RX ORDER — LIDOCAINE 40 MG/G
CREAM TOPICAL
Status: DISCONTINUED | OUTPATIENT
Start: 2021-06-29 | End: 2021-06-29 | Stop reason: HOSPADM

## 2021-06-29 RX ORDER — FLUMAZENIL 0.1 MG/ML
0.2 INJECTION, SOLUTION INTRAVENOUS
Status: DISCONTINUED | OUTPATIENT
Start: 2021-06-29 | End: 2021-06-29 | Stop reason: HOSPADM

## 2021-06-29 RX ORDER — MAGNESIUM OXIDE 400 MG/1
800 TABLET ORAL EVERY 24 HOURS
Qty: 30 TABLET | Refills: 2 | COMMUNITY
Start: 2021-06-29 | End: 2021-06-30

## 2021-06-29 RX ADMIN — SODIUM CHLORIDE 1000 ML: 9 INJECTION, SOLUTION INTRAVENOUS at 09:07

## 2021-06-29 ASSESSMENT — MIFFLIN-ST. JEOR: SCORE: 1609.82

## 2021-06-29 NOTE — PROGRESS NOTES
ACUTE TRANSPLANT NEPHROLOGY VISIT    Assessment & Plan   # DDKT (SPK) trend down but slowly. Transplant kidney biopsy prelim with no rejection.(report is pending)                - Baseline Creatinine: ~ TBD              - Proteinuria: Not checked post transplant              - Date DSA Last Checked: Jun/2021      Latest DSA: No DSA at time of transplant              - BK Viremia: No              - Kidney Tx Biopsy: 6/18/21: no rejection.              -Stent placed. Remove 4-6 weeks post txp     # Pancreas Tx (SPK):               - Pancreatic Exocrine Drainage: Enteric drained                     - Blood glucose: near euglycemia       On insulin: SSI              - HbA1c: Last checked at time of transplant     6%              - Pancreatic enzymes: stable to slightly down              - Date DSA Last Checked: Jun/2021             Latest DSA: No DSA at time of transplant              - Pancreas Tx Biopsy: No, will likely need to obtain a pancreas biopsy at some point.      # Immunosuppression: Tacrolimus immediate release (goal 8-10), Mycophenolate mofetil (dose 1000 mg every 12 hours) and Prednisone (dose taper)               - Induction: intermediate risk protocol, PRA 32%              - Changes: No     # Infection Prophylaxis:   - PJP: Sulfa/TMP (Bactrim)  - CMV: Valcyte x3m  - Thrush: Micafungin (Mycamine) and myclex     # Hypertension: had been at goal with the occasional high systolic BP, but now trending down/soft Goal BP: < 150/90              - Volume status: Euvolemic                  - Changes: reduce the clonidine to 0.1 mg TID with the same holding parameters. And also discussed to go back up for SBP> 170     # Anemia in Chronic Renal Disease: Hgb: low stable. ROSA M: No              - Iron studies: Unknown at this time, but checked with dialysis     # Mineral Bone Disorder:   - Secondary renal hyperparathyroidism; PTH level: Unknown at this time, but checked with dialysis        On treatment: None  - Vitamin  D; level: Unknown at this time, but checked with dialysis        On supplement: Yes  - Calcium; level: Low               On supplement: Yes  - Phosphorus; level: Low, improving        On supplement: Yes     # Electrolytes:   - Potassium; level: Normal        On supplement: No  - Magnesium; level: Normal        On supplement: Yes  - Bicarbonate; level: Normal        On supplement: No  - Sodium; level: Normal    # Elevated WBC: started on antimicrobial, peripancreatic fluid collection will be scheduled for aspiration by IR     # Medical Compliance: Yes    # Transplant History:  Etiology of Kidney Failure: Diabetes mellitus type 2  Tx: DDKT (SPK)  Transplant: 6/11/2021 (Kidney / Pancreas)  Donor Type: Donation after Brain Death Donor Class:   Crossmatch at time of Tx: negative  DSA at time of Tx: No  Significant changes in immunosuppression: None  CMV IgG Ab High Risk Discordance (D+/R-): No  EBV IgG Ab High Risk Discordance (D+/R-): No  Significant transplant-related complications: None    Transplant Office Phone Number: [phone]    Assessment and plan was discussed with the patient and he voiced his understanding and agreement.    Return visit: No follow-ups on file.    Bertin Brand MD    Chief Complaint   Mr. Cramer is a 41 year old here for routine follow up and immunosuppression management.     History of Present Illness     Tye is a 41-year-old gentleman with a longstanding history of type 2 diabetes and renal failure.  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  His post transplant course was typical of SPK recovery with some discomfort related to the surgery and perhaps element of gastroparesis.  He was discharged from the hospital yesterday after had completed a kidney allograft biopsy for slow to decline creatinine and persistently elevated pancreatic enzymes.  He has no fevers no chills no abdominal pain but his white count is rising.    He was started on antibiotic and  fluconazole. He is trying to hydrate as usual. He was given IVF today based on his orthostatics   Home BP: Not checked    Problem List   Patient Active Problem List   Diagnosis     End stage kidney disease (H)     Hypertension secondary to other renal disorders     Secondary renal hyperparathyroidism (H)     Type 2 diabetes mellitus (H)     Hypertension     Anemia in chronic kidney disease     Vitamin D deficiency     Gallstones, common bile duct     Kidney transplant candidate     Pancreas transplanted (H)     Kidney replaced by transplant     Immunosuppressed status (H)     Pancreatitis of pancreas transplant     Anemia due to blood loss, acute     Acute post-operative pain     Hypophosphatemia     Hypomagnesemia       Allergies   Allergies   Allergen Reactions     Metoprolol      SOB, but was also experiencing significant edema not drug associated       Medications   Current Outpatient Medications   Medication Sig     acetaminophen (TYLENOL) 325 MG tablet Take 2 tablets (650 mg) by mouth every 4 hours as needed for other (For optimal non-opioid multimodal pain management to improve pain control.)     amLODIPine (NORVASC) 10 MG tablet Take 10 mg by mouth daily Take 1 tablet by mouth in the evening     amoxicillin-clavulanate (AUGMENTIN) 875-125 MG tablet Take 1 tablet by mouth 2 times daily     aspirin (ASA) 325 MG EC tablet Take 1 tablet (325 mg) by mouth daily     atorvastatin (LIPITOR) 20 MG tablet Take 1 tablet (20 mg) by mouth daily     calcium carbonate (OS-SHAY) 1500 (600 Ca) MG tablet Take 600 mg by mouth 2 times daily (with meals)      carvedilol (COREG) 6.25 MG tablet Take 1 tablet (6.25 mg) by mouth 2 times daily     clotrimazole (MYCELEX) 10 MG lozenge Place 1 lozenge (10 mg) inside cheek 4 times daily     fluconazole (DIFLUCAN) 200 MG tablet Take 1 tablet (200 mg) by mouth daily     insulin lispro (HUMALOG) 100 UNIT/ML Cartridge Inject 1-7 Units Subcutaneous 3 times daily (before meals)     insulin  lispro (HUMALOG) 100 UNIT/ML Cartridge Inject 1-5 Units Subcutaneous At Bedtime     insulin pen needle (BD RAÚL U/F) 32G X 4 MM miscellaneous use as directed with lantus pen once a day     magnesium oxide (MAG-OX) 400 MG tablet Take 1 tablet (400 mg) by mouth every 24 hours     methocarbamol (ROBAXIN) 500 MG tablet Take 1 tablet (500 mg) by mouth every 8 hours as needed for muscle spasms     montelukast (SINGULAIR) 10 MG tablet Take 10 mg by mouth At Bedtime     omeprazole (PRILOSEC) 20 MG DR capsule Take 20 mg by mouth every morning     ondansetron (ZOFRAN-ODT) 4 MG ODT tab Take 4 mg by mouth every 8 hours as needed for nausea     oxyCODONE (ROXICODONE) 5 MG tablet Take 1 tablet (5 mg) by mouth every 4 hours as needed for moderate to severe pain     phosphorus tablet 250 mg (PHOSPHA 250 NEUTRAL) 250 MG per tablet Take 1 tablet (250 mg) by mouth 2 times daily     predniSONE (DELTASONE) 5 MG tablet Take 20 mg daily x 3 days (6/22-6/24), Take 15 mg daily x 7 days (6/25-7/1), Take 10 mg daily x 7 days (7/2-7/8), Take 5 mg x 7 days (7/9-7/15)     sulfamethoxazole-trimethoprim (BACTRIM) 400-80 MG tablet Take 1 tablet by mouth daily     vitamin D3 (CHOLECALCIFEROL) 2000 units (50 mcg) tablet Take 1 tablet (2,000 Units) by mouth daily     cloNIDine (CATAPRES) 0.1 MG tablet Take 1 tablet (0.1 mg) by mouth 3 times daily Hold for SBP < 130.     mycophenolate (GENERIC EQUIVALENT) 250 MG capsule Take 4 capsules (1,000 mg) by mouth 2 times daily     phosphorus tablet 250 mg (PHOSPHA 250 NEUTRAL) 250 MG per tablet Take 1 tablet (250 mg) by mouth 2 times daily     sodium bicarbonate 650 MG tablet Take 1 tablet (650 mg) by mouth 2 times daily     sodium bicarbonate 650 MG tablet Take 1 tablet (650 mg) by mouth 2 times daily     tacrolimus (GENERIC EQUIVALENT) 1 MG capsule Take 2 capsules (2 mg) by mouth 2 times daily     valGANciclovir (VALCYTE) 450 MG tablet Take 1 tab (450mg) daily. Increase dose up to a max of 2 tabs (900 mg) by  mouth daily when directed by your transplant team.     No current facility-administered medications for this visit.      There are no discontinued medications.    Physical Exam   reviewed    GENERAL APPEARANCE: alert and no distress  HENT: mouth without ulcers or lesions  LYMPHATICS: no cervical or supraclavicular nodes  RESP: lungs clear to auscultation - no rales, rhonchi or wheezes  CV: regular rhythm, normal rate, no rub, no murmur  EDEMA: no LE edema bilaterally  ABDOMEN: soft, nondistended, nontender, bowel sounds normal  MS: extremities normal - no gross deformities noted, no evidence of inflammation in joints, no muscle tenderness  SKIN: no rash    Data     Renal Latest Ref Rng & Units 6/28/2021 6/27/2021 6/25/2021   Na 133 - 144 mmol/L - 142 142   K 3.4 - 5.3 mmol/L - 4.8 4.8   Cl 94 - 109 mmol/L - 112(H) 112(H)   CO2 20 - 32 mmol/L - 24 23   BUN 7 - 30 mg/dL - 15 15   Cr 0.66 - 1.25 mg/dL - 1.61(H) 1.62(H)   Glucose 70 - 99 mg/dL - 102(H) 110(H)   Ca  8.5 - 10.1 mg/dL - 8.9 8.7   Mg 1.6 - 2.3 mg/dL - 1.6 1.5(L)   Mg (external) 1.6 - 2.6 mg/dL 1.5(L) - -     Bone Health Latest Ref Rng & Units 6/28/2021 6/27/2021 6/25/2021   Phos 2.5 - 4.5 mg/dL - 3.0 2.6   Phos (external) 2.3 - 4.7 mg/dL 1.8(L) - -   Vit D Def 20 - 75 ug/L - - 35     Heme Latest Ref Rng & Units 6/28/2021 6/27/2021 6/25/2021   WBC 4.0 - 11.0 10e9/L - 13.4(H) 14.4(H)   WBC (external) 3.5 - 10.5 x10(9)/L 15.3(H) - -   Hgb 13.3 - 17.7 g/dL - 8.3(L) 7.5(L)   Hgb (external) 13.5 - 17.5 g/dL 8.4(L) - -   Plt 150 - 450 10e9/L - 386 373   Plt (external) 150 - 450 x10(9)/L 380 - -   ABSOLUTE NEUTROPHIL 1.6 - 8.3 10e9/L - 10.1(H) -   ABSOLUTE NEUTROPHILS (EXTERNAL) 1.7 - 7.0 10(9)/L 13.8(H) - -   ABSOLUTE LYMPHOCYTES 0.8 - 5.3 10e9/L - 2.3 -   ABSOLUTE LYMPHOCYTES (EXTERNAL) 1.0 - 4.8 10(9)/L 1.0 - -   ABSOLUTE MONOCYTES 0.0 - 1.3 10e9/L - 0.7 -   ABSOLUTE MONOCYTES (EXTERNAL) 0.2 - 0.9 10(9)/L 0.3 - -   ABSOLUTE EOSINOPHILS 0.0 - 0.7 10e9/L - 0.2 -    ABSOLUTE EOSINOPHILS (EXTERNAL) 0.0 - 0.5 10(9)/L 0.0 - -   ABSOLUTE BASOPHILS 0.0 - 0.2 10e9/L - 0.1 -   ABSOLUTE BASOPHILS (EXTERNAL) 0.0 - 0.3 10(9)/L 0.1 - -   ABS IMMATURE GRANULOCYTES 0 - 0.4 10e9/L - 0.1 -   ABSOLUTE NUCLEATED RBC - - 0.0 -     Liver Latest Ref Rng & Units 6/10/2021 1/4/2021 12/9/2020   AP 40 - 150 U/L 84 - -   TBili 0.2 - 1.3 mg/dL 0.4 - -   ALT 0 - 70 U/L 32 47 48   AST 0 - 45 U/L 32 31 21   Tot Protein 6.8 - 8.8 g/dL 7.2 - -   Albumin 3.4 - 5.0 g/dL 3.9 - -     Pancreas Latest Ref Rng & Units 6/27/2021 6/25/2021 6/24/2021   A1C 0 - 5.6 % - - -   Amylase 30 - 110 U/L 145(H) 139(H) 126(H)   Lipase 73 - 393 U/L 1,211(H) 1,382(H) 1,219(H)        [facility] T[facility] Virology Latest Ref Rng & Units 6/10/2021 8/30/2020 7/29/2019   EBV CAPSID ANTIBODY IGG 0.0 - 0.8 AI >8.0(H) >8.0(H) >8.0(H)   Hep B Core NR:Nonreactive Nonreactive - Nonreactive        Recent Labs   Lab Test 06/24/21  0642 06/25/21  0630 06/27/21  0702   DOSTAC 2000 6/23/2021 2,000 Not Provided   TACROL 10.7 9.5 8.4

## 2021-06-29 NOTE — PROGRESS NOTES
The patient was brought to the IR department for aspiration of a peripancreatic fluid collection with possible drain placement. The fluid collection was evaluated under ultrasound and was observed to be significantly smaller than it was on prior CT measuring up to 15mm in greatest thickness with a crescent shape following the transplanted pancrease in the in RLQ. The patient has no pain or febrile feelings. Additionally the patients WBC and lipase have been down trending. Given this Trend Dr Kennedy was contacted prior to performing aspiration and he was in agreement that this could be follow with CT in 2-3 weeks instead of undergoing aspiration given the above. Procedure was cancelled.

## 2021-06-29 NOTE — PROGRESS NOTES
"Pt c/o L hand cramping \"past 2 days.\" Denies pain w/ cramping. Last labs drawn yesterday. This RN received call from Twyla tx coordinator & informed her for pt c/o & yesterday's lab results. 1L NS bolus infusing per Dr. Brand. Order to check lipase level w/ pancreatic fluid drainage procedure for today. Pt stable, ready for consent.   "

## 2021-06-29 NOTE — Clinical Note
Gera Wakefield,    A few things:  1) I'll update his Epic order to reflect how he is taking MagOx -- can yo verify if we are going to do another dose increase given most recent level.  If not, can you sent updated rx to his pharmacy (he recently picked up, but they only had instructions for 1 tablet daily).  2) I think he should be holding his Bactrim for 3-4 days per your most recent note, however pt wanted to verify this b/c he saw a previous note that he should hold for 7 days  3) Can we mail him a pill box -- his is 2 years old, he never got one at discharge  4) Can you also let him know when we want to get labs/potassium next?    THANK YOU and sorry for the list!  Please please call if you have any questions.    Trinity

## 2021-06-29 NOTE — PATIENT INSTRUCTIONS
Recommendations from today's MTM visit:                                                      1) We will get an updated prescription of the MagOx (magnesium) sent to the pharmacy, let the pharmacy know when you need a refill.    2) I will have Twyla touch base with you about how long you should hold Bactrim, from the most recent notes I believe that you should hold for 3 to 4 days.  I will also ask her to touch base if you regarding when you should recheck your potassium level.    Next MTM visit: Lobo Bartlett will call you again on 8/12/2021 at 2 PM.    To schedule another MTM appointment, please call the clinic directly or you may call the MTM scheduling line at [phone] or toll-free at [phone].     I value your experience and would be very thankful for your time with providing feedback on our clinic survey. You may receive a survey via email or text message in the next few days.     Please feel free to contact me with any questions or concerns you have.      Take care!  Trinity Will, PharmD, BCACP

## 2021-06-29 NOTE — PROGRESS NOTES
Arrived to [facility] for US guided tx pancreatic fluid drainage procedure in IR. AFVSS. Denies pain.  mg/dl. INR pending. Stable, ready for consent. Brother, David @[phone], off site to drive pt home post procedure.

## 2021-06-29 NOTE — TELEPHONE ENCOUNTER
Dr. Fraser reviewed labs in Care Everywhere. K 5.9. Tye c/o cramping and increased his dietary potassium.     PLAN:  -low potassium diet  -start sodium bicarbonate 650 mg bid  -hold Bactrim 3-4 days  -start phos 250 mg bid     Per Dr. Brand, send fluid around pancreas for lipase and give 1L IVF while in IR today. Spoke with 2A nurse, agreeable to both. Orders placed in signed and held.

## 2021-06-29 NOTE — SEDATION DOCUMENTATION
[facility] Procedure Note        Sedation:      Performed by: Kyler Wolf MD  Authorized by: Kyler Wolf MD    Pre-Procedure Assessment done at 0930.    Expected Level:  Moderate Sedation    Indication:  Sedation is required to allow for imaging guided aspiration of peripancreatic fluid collection with possible drain placement    Consent obtained from patient after discussing the risks, benefits and alternatives.    PO Intake:  Appropriately NPO for procedure    ASA Class:  Class 2 - MILD SYSTEMIC DISEASE, NO ACUTE PROBLEMS, NO FUNCTIONAL LIMITATIONS.    Mallampati:  Grade 2:  Soft palate, base of uvula, tonsillar pillars, and portion of posterior pharyngeal wall visible    Lungs: Lungs Clear with good breath sounds bilaterally.     Heart: Normal heart sounds and rate    History and physical reviewed and no updates needed. I have reviewed the lab findings, diagnostic data, medications, and the plan for sedation. I have determined this patient to be an appropriate candidate for the planned sedation and procedure and have reassessed the patient IMMEDIATELY PRIOR to sedation and procedure.

## 2021-06-29 NOTE — PROGRESS NOTES
Patient returned to 2A post cancelled procedure.  No meds given, no procedure sites to assess.  Appropriate for immediate discharge.  Primary team to follow patient with further plan/instructions.  PIV was discontinued.  PO fluids at bedside, declined PO food.  Patient discharged from hospital to home with  @ 0551.

## 2021-06-29 NOTE — TELEPHONE ENCOUNTER
Patient Call: Voicemail  Date/Time: 3:09pm 6/29/2021    Reason for call: connect with blanquita regarding lab orders, drug kit(admin sent to pts home)

## 2021-06-29 NOTE — PROGRESS NOTES
Ultrasound image showed resolving fluid abscess.  Dr Pam PETTIT called and notified transplant team.  Ultrasound images saved and procedure cancelled.  Transplant team will follow up with patient.

## 2021-06-29 NOTE — PROGRESS NOTES
Medication Therapy Management (MTM) Encounter    ASSESSMENT:                            Kidney/Pancreas Transplant: See below    Hyperkalemia: Hyperkalemia possibly due to diuretics, pt is wondering how long he should hold Bactrim.    Hypomagnesemia: Patient has taking magnesium oxide 800 mg daily since 6/25, most recent magnesium level low.    Pain: well-controlled, has not needed to use oxycodone or acetaminophen      Stools/GI: stable, soft daily bowel movement     Hypertension: at goal of <150/90, confusion with clonidine dose resolved as he was not taking it thrice daily.  He notes lightheadedness and dizziness for about 5 seconds each time he stands which could be a medication side effect from his blood pressure medications.    HLD: Stable    Hyperglycemia: Blood sugars at goal without Humalog use    Seasonal allergies: Controlled with montelukast       PLAN:                            Pharmacist will...  - Update magnesium dose in Epic  - Review if pt will have further magnesium dose increase with coordinator and get new rx sent to [facility]  - Review with coordinator when potassium should be re-checked, pt not sure  - Request new pill box be sent to pt (never got one at discharge)  - Review how long pt should hold Bactrim       Follow-up: 8/12/2021 at 2 PM    Trinity Will, PharmD, BCACP      SUBJECTIVE/OBJECTIVE:                          Erik Cramer is a 41 year old male called for a transitions of care visit. He was discharged from [facility] on 6/21/2021 for kidney/pancreas transplant.      Allergies/ADRs: Reviewed in chart  Tobacco: He reports that he has never smoked. He has never used smokeless tobacco.  Alcohol: not currently using    Reason for visit: post-transplant med review.     Medication Adherence/Access:   Patient uses pill box(es).  Patient takes medications 4 time(s) per day - AM, noon, evening, bedtime   Per patient, misses medication 0 times per week.   Medication barriers: none.   The patient  fills medications at [place]: NO      Medication Adherence/Access: No issues identified    Kidney/Pancreas Transplant:  Transplant date: 6/11/2021  Tx Coordinator: Twyla Chang RN; Tx MD: Dr. Kennedy; Using Med Card: Yes    Current immunosuppressants include TAC 2 mg mg BID, MMF 1000 mg BID and Prednisone 15 mg qAM (taper).      Pt reports no side effects per se, notes nocturia every 1-2 hours which is similar to pretransplant.  States his appetite is improving/good, he does note early satiety but this too is improving.  He is drinking 2 to 3 L of fluids per day and is following a low potassium and low carbohydrate diet.     Component      Latest Ref Rng & Units 6/27/2021   Tacrolimus Level      5.0 - 15.0 ug/L 8.4     Estimated Creatinine Clearance: 58.9 mL/min (A) (based on SCr of 1.61 mg/dL (H)).  CMV prophylaxis: CrCl 40 to 59 mL/minute: Valcyte 450 mg once daily Treat 3 months post tx   PCP prophylaxis: Bactrim 400/80 daily  Antifungal Prophylaxis: Clotrimazole 10 mg QID, fluconazole 200 mg - 1 tablet daily, bedtime   Vascular prophylaxis: Aspirin 325 mg - 1 tablet daily, PM  Augmentin/clavulanate 875/125 mg - 1 tablet twice daily    PPI use: Omeprazole 20 mg - 1 tablet twice daily  Supplements: Mag Oxide 800 mg once daily at noon, dose increased on 6/25 ( 2 hours from MMF), Calcium Carbonate 600 mg/Vitamin D3 mg twice daily and phosphorus 250 mg twice daily, sodium bicarbonate 650 mg twice daily, vitamin D3 2000 international unit(s) daily    Component      Latest Ref Rng & Units 6/25/2021 6/27/2021 6/28/2021   Magnesium      1.6 - 2.3 mg/dL 1.5 (L) 1.6    Phosphorus      2.5 - 4.5 mg/dL 2.6 3.0    Magnesium (External)      1.6 - 2.6 mg/dL   1.5 (L)   Phosphorus (External)      2.3 - 4.7 mg/dL   1.8 (L)     Patient feels that recently elevated potassium levels were due to eating and avocado and then immediately having his blood drawn.  He states he was eating avocado for the potassium to help with  his muscle cramps.    Immunizations: annual flu shot due; [id number]:  2017; Prevnar 13: due; TDaP:  2017; HBV: immune per titers    Component      Latest Ref Rng & Units 6/10/2021   Hepatitis B Core Tamie      NR:Nonreactive Nonreactive   Hepatitis B Surface Antibody      <8.00 m[IU]/mL >1,000.00 (H)   Hep B Surface Agn      NR:Nonreactive Nonreactive     Pain  Oxycodone 5 mg - as needed, not using  Acetaminophen 325 mg - as needed, not using   Methocarbamol 500 mg - as needed, used 1 tablet last Friday- for cramping (helped him to be able to rest)     Stools/GI  Ondansetron ODT 4 mg - as needed, not using     Now having soft daily bowel movement, sometimes has another episode of liquid bowel movement or gas      Hypertension   Amlodipine 10 mg - 1 tablet daily, PM  Carvedilol 6.25 mg - 1 tablet twice daily  Clonidine 0.1 mg - 1 tablet thrice daily as needed (hold for SBP <130), normally taking at noon and PM    There was some confusion regarding his clonidine dose and he did not it was rx'ed as thrice daily, this is now resolved and has been taking it 3 times per day since yesterday.  He does not note lightheaded/dizziness with positional changes/getting up, lasts for about 5 seconds and then resolves - wonders if he should be on medication for this.    Patient does self-monitor blood pressure.   AM: 129 today, 130s normally, highest = 168 (day after discharge)  Afternoon: 119 yesterday, 160-180s previously    BP Readings from Last 3 Encounters:   21 129/84   21 121/79   21 130/81     HLD  Atorvastatin 20 mg - 1 tablet daily, PM (dose reduced after transplant, previously on 80 mg)     More cramps recently - resolved with methocarbamol use     Recent Labs   Lab Test 20  2338   CHOL 133   HDL 62   LDL 49   TRIG 109     Hyperglycemia  Humalog - not using     Blood sugar monitorin time(s) daily.   AM: 110, 115  PM: 121, 127, 118, 123  Hypoglycemia: none    Symptoms of low blood sugar?  none  Symptoms of high blood sugar? none    Lab Results   Component Value Date    A1C 6.0 06/10/2021    A1C 6.1 10/19/2020    A1C 5.9 08/30/2020    A1C 6.4 07/29/2019     No results found for: UMALCR   Lab Results   Component Value Date    CR 1.61 06/27/2021     Seasonal allergies  Montelukast 10 mg -1 tablet daily at bedtime    Well-controlled    Today's Vitals: There were no vitals taken for this visit.  ----------------  Post Discharge Medication Reconciliation Status: discharge medications reconciled and changed, per note/orders.    I spent 43 minutes with this patient today. All changes were made via collaborative practice agreement with ANDREEA Brand. A copy of the visit note was provided to the patient's referring provider.  The patient was sent via conXt a summary of these recommendations.     Telemedicine Visit Details  Type of service:  Telephone visit  Start Time: 3:04 PM  End Time: 3:47 PM  Originating Location (patient location): Home  Distant Location (provider location):  [facility] AND INFECTIOUS DISEASES [facility]      Medication Therapy Recommendations  Hyperkalemia    Current Medication: sulfamethoxazole-trimethoprim (BACTRIM) 400-80 MG tablet   Rationale: Does not understand instructions - Adherence - Adherence   Recommendation: Provide Adherence Intervention - Pt unclear about how long he should hold Bactrim for   Status: Contact Provider - Awaiting Response         Hypomagnesemia    Current Medication: magnesium oxide (MAG-OX) 400 MG tablet   Rationale: Dose too low - Dosage too low - Effectiveness   Recommendation: Increase Dose - Magnesium Oxide 400 240 MG Pack - Will touch base with transplant team about increasing dose   Status: Accepted per CPA

## 2021-06-30 RX ORDER — MAGNESIUM OXIDE 400 MG/1
400 TABLET ORAL 2 TIMES DAILY
Qty: 60 TABLET | Refills: 11 | Status: SHIPPED | OUTPATIENT
Start: 2021-06-30 | End: 2021-12-02

## 2021-06-30 NOTE — TELEPHONE ENCOUNTER
Spoke with patient who confirmed he starts phos and sodium bicarbonate. Is currently following a low potassium diet and will repeat labs tomorrow with home care. Ate an avocado before lab draw on Monday.     Patient took his tacrolimus before lab draw on Monday so no tac level was drawn. He will repeat a 12 hour trough tomorrow (Thusday).     BPs vary, 138/82, 119/70, 142/75, 156/80  HR 80s  Weight 172 lbs the last 3 mornings   No edema. Has slight dizziness if he stands up too quickly, but overall much improved from last week.  Drinking 2+ liters per day. Received IVF yesterday.   A febrile  Having 2-3 soft BMs per day. No N/V. Eating well.

## 2021-07-01 ENCOUNTER — TELEPHONE (OUTPATIENT)
Dept: TRANSPLANT | Facility: CLINIC | Age: 41
End: 2021-07-01

## 2021-07-01 NOTE — TELEPHONE ENCOUNTER
Per Dr. Kennedy at committee review meeting yesterday:   -plan for repeat CT on Tuesday and biopsy on Thursday of next week if lipase is not trending down.     Reviewed labs in Care Everywhere:   -Lipase 201 (down from 296 on low scale)  -Creat 1.2 (down from 1.6)  -WBC 11.5 (down from 15.3)    Bicarb improved, potassium 4.7. Repeat labs on Tuesday of next week. If potassium WNL again, restart bactrim. Tac pending.     Reviewed labs and plan with Tye who verbalized undersanding.

## 2021-07-07 ENCOUNTER — TELEPHONE (OUTPATIENT)
Dept: TRANSPLANT | Facility: CLINIC | Age: 41
End: 2021-07-07

## 2021-07-07 NOTE — PROGRESS NOTES
ACUTE TRANSPLANT NEPHROLOGY VISIT    Assessment & Plan   # DDKT (SPK) trending down               - Baseline Creatinine: ~ 1.2              - Proteinuria: mild (0.2-0.5g/g)              - Date DSA Last Checked: Jun/2021      Latest DSA: No DSA at time of transplant              - BK Viremia: Not checked post transplant              - Kidney Tx Biopsy: 6/18/21: no rejection.              -Stent placed. Remove today     # Pancreas Tx (SPK):               - Pancreatic Exocrine Drainage: Enteric drained                     - Blood glucose: near euglycemia       On insulin: No              - HbA1c: Last checked at time of transplant     A1c: 6%              - Pancreatic enzymes: down trending, was going to different labs and lipase down to 868 from 1211 on our scale and 201->187 on outside scale              - Date DSA Last Checked: Jun/2021             Latest DSA: No DSA at time of transplant              - Pancreas Tx Biopsy: No     -linden pancreatic fluid collection was not collected due to decreasing size   -Change ASA 325mg to 81mg at 6m post txp    # Immunosuppression: Tacrolimus immediate release (goal 8-10), Mycophenolate mofetil (dose 1000 mg every 12 hours) and Prednisone (dose taper)    -Continue with intensive monitoring of immunosuppression for efficacy and toxicity              - Induction: intermediate risk protocol, PRA 32%              - Changes: Not at this time. Fluconazole will stop 7/14/21. At that time will need to increase tac dose     # Infection Prophylaxis:   - PJP: Sulfa/TMP (Bactrim) was on hold. Restart daily  - CMV: Valcyte x3m  - Thrush: myclex     # Hypertension: controlled but low at times; Goal BP: < 140/90              - Volume status: Euvolemic                  - Changes: Yes, takes BP meds very infrequently.      # Peripancretic fluid collection:   -Put on 3 weeks of augmentin through 7/14. Continue. Fluid collection improving in size    # Anemia in Chronic Renal Disease: Hgb: trending  "up ROSA M: No              - Iron studies: Unknown at this time, but checked with dialysis     # Mineral Bone Disorder:   - Secondary renal hyperparathyroidism; PTH level: Unknown at this time, but checked with dialysis        On treatment: None  - Vitamin D; level: Unknown at this time, but checked with dialysis        On supplement: Yes  - Calcium; level: Normal              On supplement: Yes  - Phosphorus; level: Normal        On supplement: Yes, stop phosphorous  -Check PTH, vitamin D      # Electrolytes:   - Potassium; level: normal        On supplement: No  - Magnesium; level: Low normal        On supplement: Yes  - Bicarbonate; level: normal        On supplement: Yes, continue bicarb 650mg bid  - Sodium; level: Normal    # Medical Compliance: Yes    # Transplant History:  Etiology of Kidney Failure: Diabetes mellitus type 2  Tx: SPK  Transplant: 6/11/2021 (Kidney / Pancreas)  Donor Type: Donation after Brain Death Donor Class:   Crossmatch at time of Tx: negative  DSA at time of Tx: No  Significant changes in immunosuppression: None  CMV IgG Ab High Risk Discordance (D+/R-): No  EBV IgG Ab High Risk Discordance (D+/R-): No  Significant transplant-related complications: None    Transplant Office Phone Number: [phone]    Assessment and plan was discussed with the patient and he voiced his understanding and agreement.    Return visit: 1 month, 8/11/21 for 2 month post transplant visit    Saw Irby MD    Chief Complaint   Mr. Cramer is a 41 year old here for routine follow up and immunosuppression management.     History of Present Illness     The patient feels well generally. He has some pain in his RLQ. He states that nothing makes it better or worse. It feels like a \"pinching\", can come and go. He denies N/V/D, fever, chills, SOB, chest pain, LE edema, dysuria.      Home BP: 130-170 mmHg     Problem List   Patient Active Problem List   Diagnosis     End stage kidney disease (H)     Hypertension " secondary to other renal disorders     Secondary renal hyperparathyroidism (H)     Type 2 diabetes mellitus (H)     Hypertension     Anemia in chronic kidney disease     Vitamin D deficiency     Gallstones, common bile duct     Kidney transplant candidate     Pancreas transplanted (H)     Kidney replaced by transplant     Immunosuppressed status (H)     Pancreatitis of pancreas transplant     Anemia due to blood loss, acute     Acute post-operative pain     Hypophosphatemia     Hypomagnesemia       Allergies   Allergies   Allergen Reactions     Metoprolol      SOB, but was also experiencing significant edema not drug associated       Medications   Current Outpatient Medications   Medication Sig     acetaminophen (TYLENOL) 325 MG tablet Take 2 tablets (650 mg) by mouth every 4 hours as needed for other (For optimal non-opioid multimodal pain management to improve pain control.)     amoxicillin-clavulanate (AUGMENTIN) 875-125 MG tablet Take 1 tablet by mouth 2 times daily     aspirin (ASA) 325 MG EC tablet Take 1 tablet (325 mg) by mouth daily     atorvastatin (LIPITOR) 20 MG tablet Take 1 tablet (20 mg) by mouth daily     calcium carbonate (OS-SHAY) 1500 (600 Ca) MG tablet Take 600 mg by mouth 2 times daily (with meals)      carvedilol (COREG) 6.25 MG tablet Take 0.5 tablets (3.125 mg) by mouth 2 times daily     clotrimazole (MYCELEX) 10 MG lozenge Place 1 lozenge (10 mg) inside cheek 4 times daily     fluconazole (DIFLUCAN) 200 MG tablet Take 1 tablet (200 mg) by mouth daily     magnesium oxide (MAG-OX) 400 MG tablet Take 1 tablet (400 mg) by mouth 2 times daily     methocarbamol (ROBAXIN) 500 MG tablet Take 1 tablet (500 mg) by mouth every 8 hours as needed for muscle spasms     montelukast (SINGULAIR) 10 MG tablet Take 10 mg by mouth At Bedtime     mycophenolate (GENERIC EQUIVALENT) 250 MG capsule Take 4 capsules (1,000 mg) by mouth 2 times daily     omeprazole (PRILOSEC) 20 MG DR capsule Take 20 mg by mouth every  morning     ondansetron (ZOFRAN-ODT) 4 MG ODT tab Take 4 mg by mouth every 8 hours as needed for nausea     oxyCODONE (ROXICODONE) 5 MG tablet Take 1 tablet (5 mg) by mouth every 4 hours as needed for moderate to severe pain     predniSONE (DELTASONE) 5 MG tablet Take 20 mg daily x 3 days (6/22-6/24), Take 15 mg daily x 7 days (6/25-7/1), Take 10 mg daily x 7 days (7/2-7/8), Take 5 mg x 7 days (7/9-7/15)     sodium bicarbonate 650 MG tablet Take 1 tablet (650 mg) by mouth 2 times daily     tacrolimus (GENERIC EQUIVALENT) 1 MG capsule Take 2 capsules (2 mg) by mouth 2 times daily     valGANciclovir (VALCYTE) 450 MG tablet Take 1 tab (450mg) daily. Increase dose up to a max of 2 tabs (900 mg) by mouth daily when directed by your transplant team.     vitamin D3 (CHOLECALCIFEROL) 2000 units (50 mcg) tablet Take 1 tablet (2,000 Units) by mouth daily     sulfamethoxazole-trimethoprim (BACTRIM) 400-80 MG tablet Take 1 tablet by mouth daily (Patient not taking: Reported on 7/8/2021)     No current facility-administered medications for this visit.      Medications Discontinued During This Encounter   Medication Reason     insulin lispro (HUMALOG) 100 UNIT/ML Cartridge      insulin lispro (HUMALOG) 100 UNIT/ML Cartridge      insulin pen needle (BD RAÚL U/F) 32G X 4 MM miscellaneous      cloNIDine (CATAPRES) 0.1 MG tablet      amLODIPine (NORVASC) 10 MG tablet      carvedilol (COREG) 6.25 MG tablet      phosphorus tablet 250 mg (PHOSPHA 250 NEUTRAL) 250 MG per tablet        Physical Exam   Blood pressure 137/89, pulse 92, weight 77.7 kg (171 lb 3.2 oz), SpO2 99 %.    GENERAL APPEARANCE: alert and no distress  HENT: mouth without ulcers or lesions  LYMPHATICS: no cervical or supraclavicular nodes  RESP: lungs clear to auscultation - no rales, rhonchi or wheezes  CV: regular rhythm, normal rate, no rub, no murmur  EDEMA: no LE edema bilaterally  ABDOMEN: soft, nondistended, nontender, bowel sounds normal  MS: extremities normal  - no gross deformities noted, no evidence of inflammation in joints, no muscle tenderness  SKIN: no rash    Data     Renal Latest Ref Rng & Units 7/8/2021 7/6/2021 7/1/2021   Na 133 - 144 mmol/L 143 - -   Na (external) 136 - 145 mmol/L - 143 143   K 3.4 - 5.3 mmol/L 4.4 - -   K (external) 3.5 - 5.1 mmol/L - 5.0 4.7   Cl 94 - 109 mmol/L 111(H) - -   Cl (external) 98 - 109 mmol/L - 110(H) 111(H)   CO2 20 - 32 mmol/L 25 - -   CO2 (external) 20 - 29 mmol/L - 21 22   BUN 7 - 30 mg/dL 15 - -   BUN (external) 7 - 26 mg/dL - 17 18   Cr 0.66 - 1.25 mg/dL 1.23 - -   Cr (external) 0.73 - 1.18 mg/dL - 1.17 1.22(H)   Glucose 70 - 99 mg/dL 106(H) - -   Glucose (external) 70 - 100 mg/dL - 113(H) 102(H)   Ca  8.5 - 10.1 mg/dL 8.8 - -   Ca (external) 8.4 - 10.4 mg/dL - 9.3 9.4   Mg 1.6 - 2.3 mg/dL 1.6 - -   Mg (external) 1.6 - 2.6 mg/dL - - -     Bone Health Latest Ref Rng & Units 7/8/2021 6/28/2021 6/27/2021   Phos 2.5 - 4.5 mg/dL 2.7 - 3.0   Phos (external) 2.3 - 4.7 mg/dL - 1.8(L) -   Vit D Def 20 - 75 ug/L - - -     Heme Latest Ref Rng & Units 7/8/2021 7/6/2021 7/1/2021   WBC 4.0 - 11.0 10e9/L 8.4 - -   WBC (external) 3.5 - 10.5 x10(9)/L - 8.3 11.5(H)   Hgb 13.3 - 17.7 g/dL 9.3(L) - -   Hgb (external) 13.5 - 17.5 g/dL - 9.1(L) 8.0(L)   Plt 150 - 450 10e9/L 310 - -   Plt (external) 150 - 450 x10(9)/L - 337 365   ABSOLUTE NEUTROPHIL 1.6 - 8.3 10e9/L - - -   ABSOLUTE NEUTROPHILS (EXTERNAL) 1.7 - 7.0 10(9)/L - 5.4 8.7(H)   ABSOLUTE LYMPHOCYTES 0.8 - 5.3 10e9/L - - -   ABSOLUTE LYMPHOCYTES (EXTERNAL) 1.0 - 4.8 10(9)/L - 2.2 2.0   ABSOLUTE MONOCYTES 0.0 - 1.3 10e9/L - - -   ABSOLUTE MONOCYTES (EXTERNAL) 0.2 - 0.9 10(9)/L - 0.5 0.5   ABSOLUTE EOSINOPHILS 0.0 - 0.7 10e9/L - - -   ABSOLUTE EOSINOPHILS (EXTERNAL) 0.0 - 0.5 10(9)/L - 0.1 0.1   ABSOLUTE BASOPHILS 0.0 - 0.2 10e9/L - - -   ABSOLUTE BASOPHILS (EXTERNAL) 0.0 - 0.3 10(9)/L - 0.1 0.1   ABS IMMATURE GRANULOCYTES 0 - 0.4 10e9/L - - -   ABSOLUTE NUCLEATED RBC - - - -     Liver  Latest Ref Rng & Units 6/10/2021 1/4/2021 12/9/2020   AP 40 - 150 U/L 84 - -   TBili 0.2 - 1.3 mg/dL 0.4 - -   ALT 0 - 70 U/L 32 47 48   AST 0 - 45 U/L 32 31 21   Tot Protein 6.8 - 8.8 g/dL 7.2 - -   Albumin 3.4 - 5.0 g/dL 3.9 - -     Pancreas Latest Ref Rng & Units 7/8/2021 7/6/2021 7/1/2021   A1C 0 - 5.6 % - - -   Amylase 30 - 110 U/L 113(H) - -   Amylase (external) 25 - 125 U/L - 121 132(H)   Lipase 73 - 393 U/L 868(H) - -   Lipase (external) 25 - 125 U/L - 121 132(H)        [facility] Txp Virology Latest Ref Rng & Units 6/10/2021 8/30/2020 7/29/2019   EBV CAPSID ANTIBODY IGG 0.0 - 0.8 AI >8.0(H) >8.0(H) >8.0(H)   Hep B Core NR:Nonreactive Nonreactive - Nonreactive        Recent Labs   Lab Test 06/24/21  0642 06/25/21  0630 06/27/21  0702   DOSTAC 2000 6/23/2021 2,000 Not Provided   TACROL 10.7 9.5 8.4

## 2021-07-07 NOTE — TELEPHONE ENCOUNTER
Post Kidney and Pancreas Transplant Team Conference  Date: 7/7/2021  Transplant Coordinator: Rossi Chang   Attendees:  [x]  Dr. Fraser  [x] Anika Gann LPN     [x]  Dr. Brand [] Dolores Horn, YING [] Lela Lazaro LPN   [x]  Dr. Lane [] Ene Melvin, YING    [x]  Dr. Irby [x] Annalise Lozano RN [] Lobo Bartlett, SandraD   [x] Dr. Kennedy [] Mia Marvin, YING    [] Dr. Syed [] Dilip Plaza RN    [x] Dr. Welsh [x] Jewell Page, YING    [] Dr. Randhawa [x] Tg Gomez RN    []  Dr. Vega [x] Sinai Smith, YING    [] Surgery Fellow [x] Twyla Chang RN    [x] Leilani Vincent, AMALIA [x] Kate Ackerman, RN        Verbal Plan Read Back:   No changes at this time    Routed to RN Coordinator   Anika Gann LPN

## 2021-07-08 ENCOUNTER — TELEPHONE (OUTPATIENT)
Dept: TRANSPLANT | Facility: CLINIC | Age: 41
End: 2021-07-08

## 2021-07-08 ENCOUNTER — RESULTS ONLY (OUTPATIENT)
Dept: OTHER | Facility: CLINIC | Age: 41
End: 2021-07-08

## 2021-07-08 ENCOUNTER — OFFICE VISIT (OUTPATIENT)
Dept: TRANSPLANT | Facility: CLINIC | Age: 41
End: 2021-07-08
Attending: NURSE PRACTITIONER
Payer: MEDICARE

## 2021-07-08 ENCOUNTER — HOSPITAL ENCOUNTER (OUTPATIENT)
Dept: ULTRASOUND IMAGING | Facility: CLINIC | Age: 41
End: 2021-07-08
Attending: NURSE PRACTITIONER
Payer: MEDICARE

## 2021-07-08 ENCOUNTER — OFFICE VISIT (OUTPATIENT)
Dept: NEPHROLOGY | Facility: CLINIC | Age: 41
End: 2021-07-08
Attending: SURGERY
Payer: MEDICARE

## 2021-07-08 VITALS
DIASTOLIC BLOOD PRESSURE: 89 MMHG | SYSTOLIC BLOOD PRESSURE: 137 MMHG | OXYGEN SATURATION: 99 % | WEIGHT: 171.2 LBS | BODY MASS INDEX: 28.49 KG/M2 | HEART RATE: 92 BPM

## 2021-07-08 VITALS
HEART RATE: 92 BPM | WEIGHT: 171.3 LBS | OXYGEN SATURATION: 99 % | DIASTOLIC BLOOD PRESSURE: 89 MMHG | SYSTOLIC BLOOD PRESSURE: 137 MMHG | BODY MASS INDEX: 28.51 KG/M2

## 2021-07-08 DIAGNOSIS — Z94.0 HTN, KIDNEY TRANSPLANT RELATED: ICD-10-CM

## 2021-07-08 DIAGNOSIS — Z48.298 AFTERCARE FOLLOWING ORGAN TRANSPLANT: ICD-10-CM

## 2021-07-08 DIAGNOSIS — M79.602 LEFT ARM PAIN: ICD-10-CM

## 2021-07-08 DIAGNOSIS — I15.1 HTN, KIDNEY TRANSPLANT RELATED: ICD-10-CM

## 2021-07-08 DIAGNOSIS — Z94.0 KIDNEY REPLACED BY TRANSPLANT: ICD-10-CM

## 2021-07-08 DIAGNOSIS — Z20.828 CONTACT WITH AND (SUSPECTED) EXPOSURE TO OTHER VIRAL COMMUNICABLE DISEASES: ICD-10-CM

## 2021-07-08 DIAGNOSIS — Z46.6 ENCOUNTER FOR REMOVAL OF URETERAL STENT: Primary | ICD-10-CM

## 2021-07-08 DIAGNOSIS — D84.9 IMMUNOSUPPRESSION (H): ICD-10-CM

## 2021-07-08 DIAGNOSIS — Z94.83 PANCREAS TRANSPLANTED (H): Primary | ICD-10-CM

## 2021-07-08 DIAGNOSIS — Z79.899 ENCOUNTER FOR LONG-TERM CURRENT USE OF MEDICATION: ICD-10-CM

## 2021-07-08 DIAGNOSIS — Z94.83 PANCREAS REPLACED BY TRANSPLANT (H): ICD-10-CM

## 2021-07-08 DIAGNOSIS — Z94.83 PANCREAS TRANSPLANTED (H): ICD-10-CM

## 2021-07-08 LAB
AMYLASE SERPL-CCNC: 113 U/L (ref 30–110)
ANION GAP SERPL CALCULATED.3IONS-SCNC: 7 MMOL/L (ref 3–14)
BUN SERPL-MCNC: 15 MG/DL (ref 7–30)
CALCIUM SERPL-MCNC: 8.8 MG/DL (ref 8.5–10.1)
CHLORIDE SERPL-SCNC: 111 MMOL/L (ref 94–109)
CO2 SERPL-SCNC: 25 MMOL/L (ref 20–32)
CREAT SERPL-MCNC: 1.23 MG/DL (ref 0.66–1.25)
CREAT UR-MCNC: 65 MG/DL
ERYTHROCYTE [DISTWIDTH] IN BLOOD BY AUTOMATED COUNT: 15.9 % (ref 10–15)
GFR SERPL CREATININE-BSD FRML MDRD: 73 ML/MIN/{1.73_M2}
GLUCOSE SERPL-MCNC: 106 MG/DL (ref 70–99)
HCT VFR BLD AUTO: 29.5 % (ref 40–53)
HGB BLD-MCNC: 9.3 G/DL (ref 13.3–17.7)
LIPASE SERPL-CCNC: 868 U/L (ref 73–393)
MAGNESIUM SERPL-MCNC: 1.6 MG/DL (ref 1.6–2.3)
MCH RBC QN AUTO: 30.7 PG (ref 26.5–33)
MCHC RBC AUTO-ENTMCNC: 31.5 G/DL (ref 31.5–36.5)
MCV RBC AUTO: 97 FL (ref 78–100)
MYCOPHENOLATE SERPL LC/MS/MS-MCNC: 7.94 MG/L (ref 1–3.5)
MYCOPHENOLATE-G SERPL LC/MS/MS-MCNC: 152.5 MG/L (ref 30–95)
PHOSPHATE SERPL-MCNC: 2.7 MG/DL (ref 2.5–4.5)
PLATELET # BLD AUTO: 310 10E9/L (ref 150–450)
POTASSIUM SERPL-SCNC: 4.4 MMOL/L (ref 3.4–5.3)
PROT UR-MCNC: 0.21 G/L
PROT/CREAT 24H UR: 0.32 G/G CR (ref 0–0.2)
RBC # BLD AUTO: 3.03 10E12/L (ref 4.4–5.9)
SODIUM SERPL-SCNC: 143 MMOL/L (ref 133–144)
TACROLIMUS BLD-MCNC: 17.6 UG/L (ref 5–15)
TME LAST DOSE: ABNORMAL H
TME LAST DOSE: ABNORMAL H
WBC # BLD AUTO: 8.4 10E9/L (ref 4–11)

## 2021-07-08 PROCEDURE — 85027 COMPLETE CBC AUTOMATED: CPT | Performed by: PATHOLOGY

## 2021-07-08 PROCEDURE — 82150 ASSAY OF AMYLASE: CPT | Performed by: PATHOLOGY

## 2021-07-08 PROCEDURE — 86832 HLA CLASS I HIGH DEFIN QUAL: CPT | Performed by: INTERNAL MEDICINE

## 2021-07-08 PROCEDURE — 80197 ASSAY OF TACROLIMUS: CPT | Performed by: INTERNAL MEDICINE

## 2021-07-08 PROCEDURE — 86833 HLA CLASS II HIGH DEFIN QUAL: CPT | Performed by: INTERNAL MEDICINE

## 2021-07-08 PROCEDURE — 87799 DETECT AGENT NOS DNA QUANT: CPT | Performed by: INTERNAL MEDICINE

## 2021-07-08 PROCEDURE — 84100 ASSAY OF PHOSPHORUS: CPT | Performed by: PATHOLOGY

## 2021-07-08 PROCEDURE — 93971 EXTREMITY STUDY: CPT | Mod: LT

## 2021-07-08 PROCEDURE — 86828 HLA CLASS I&II ANTIBODY QUAL: CPT | Performed by: INTERNAL MEDICINE

## 2021-07-08 PROCEDURE — 84156 ASSAY OF PROTEIN URINE: CPT | Performed by: PATHOLOGY

## 2021-07-08 PROCEDURE — 93971 EXTREMITY STUDY: CPT | Mod: 26 | Performed by: RADIOLOGY

## 2021-07-08 PROCEDURE — 80048 BASIC METABOLIC PNL TOTAL CA: CPT | Performed by: PATHOLOGY

## 2021-07-08 PROCEDURE — 36415 COLL VENOUS BLD VENIPUNCTURE: CPT | Performed by: PATHOLOGY

## 2021-07-08 PROCEDURE — 83690 ASSAY OF LIPASE: CPT | Performed by: PATHOLOGY

## 2021-07-08 PROCEDURE — 250N000013 HC RX MED GY IP 250 OP 250 PS 637: Performed by: NURSE PRACTITIONER

## 2021-07-08 PROCEDURE — 83735 ASSAY OF MAGNESIUM: CPT | Performed by: PATHOLOGY

## 2021-07-08 PROCEDURE — 52310 CYSTOSCOPY AND TREATMENT: CPT | Performed by: NURSE PRACTITIONER

## 2021-07-08 PROCEDURE — 99215 OFFICE O/P EST HI 40 MIN: CPT | Mod: 24

## 2021-07-08 PROCEDURE — 80180 DRUG SCRN QUAN MYCOPHENOLATE: CPT | Performed by: INTERNAL MEDICINE

## 2021-07-08 PROCEDURE — 250N000009 HC RX 250: Performed by: NURSE PRACTITIONER

## 2021-07-08 RX ORDER — LEVOFLOXACIN 250 MG/1
500 TABLET, FILM COATED ORAL ONCE
Status: COMPLETED | OUTPATIENT
Start: 2021-07-08 | End: 2021-07-08

## 2021-07-08 RX ORDER — CARVEDILOL 6.25 MG/1
3.12 TABLET ORAL 2 TIMES DAILY
Qty: 60 TABLET | Refills: 2 | Status: SHIPPED | OUTPATIENT
Start: 2021-07-08 | End: 2021-07-09

## 2021-07-08 RX ORDER — LIDOCAINE HYDROCHLORIDE 20 MG/ML
JELLY TOPICAL ONCE
Status: COMPLETED | OUTPATIENT
Start: 2021-07-08 | End: 2021-07-08

## 2021-07-08 RX ADMIN — LIDOCAINE HYDROCHLORIDE: 20 JELLY TOPICAL at 16:22

## 2021-07-08 RX ADMIN — LEVOFLOXACIN 500 MG: 250 TABLET, FILM COATED ORAL at 16:23

## 2021-07-08 ASSESSMENT — PAIN SCALES - GENERAL
PAINLEVEL: NO PAIN (0)
PAINLEVEL: NO PAIN (0)

## 2021-07-08 NOTE — LETTER
7/8/2021       RE: Erik Cramer  [address] [place] [id number]     Dear Colleague,    Thank you for referring your patient, Erik Cramer, to the [facility] [place] at [facility]. Please see a copy of my visit note below.    ACUTE TRANSPLANT NEPHROLOGY VISIT    Assessment & Plan   # DDKT (SPK) trending down               - Baseline Creatinine: ~ 1.2              - Proteinuria: mild (0.2-0.5g/g)              - Date DSA Last Checked: Jun/2021      Latest DSA: No DSA at time of transplant              - BK Viremia: Not checked post transplant              - Kidney Tx Biopsy: 6/18/21: no rejection.              -Stent placed. Remove today     # Pancreas Tx (SPK):               - Pancreatic Exocrine Drainage: Enteric drained                     - Blood glucose: near euglycemia       On insulin: No              - HbA1c: Last checked at time of transplant     A1c: 6%              - Pancreatic enzymes: down trending, was going to different labs and lipase down to 868 from 1211 on our scale and 201->187 on outside scale              - Date DSA Last Checked: Jun/2021             Latest DSA: No DSA at time of transplant              - Pancreas Tx Biopsy: No     -linden pancreatic fluid collection was not collected due to decreasing size   -Change ASA 325mg to 81mg at 6m post txp    # Immunosuppression: Tacrolimus immediate release (goal 8-10), Mycophenolate mofetil (dose 1000 mg every 12 hours) and Prednisone (dose taper)    -Continue with intensive monitoring of immunosuppression for efficacy and toxicity              - Induction: intermediate risk protocol, PRA 32%              - Changes: Not at this time. Fluconazole will stop 7/14/21. At that time will need to increase tac dose     # Infection Prophylaxis:   - PJP: Sulfa/TMP (Bactrim) was on hold. Restart daily  - CMV: Valcyte x3m  - Thrush: myclex     # Hypertension: controlled but low at times;  Goal BP: < 140/90              - Volume status: Euvolemic                  - Changes: Yes, takes BP meds very infrequently.      # Peripancretic fluid collection:   -Put on 3 weeks of augmentin through 7/14. Continue. Fluid collection improving in size    # Anemia in Chronic Renal Disease: Hgb: trending up ROSA M: No              - Iron studies: Unknown at this time, but checked with dialysis     # Mineral Bone Disorder:   - Secondary renal hyperparathyroidism; PTH level: Unknown at this time, but checked with dialysis        On treatment: None  - Vitamin D; level: Unknown at this time, but checked with dialysis        On supplement: Yes  - Calcium; level: Normal              On supplement: Yes  - Phosphorus; level: Normal        On supplement: Yes, stop phosphorous  -Check PTH, vitamin D      # Electrolytes:   - Potassium; level: normal        On supplement: No  - Magnesium; level: Low normal        On supplement: Yes  - Bicarbonate; level: normal        On supplement: Yes, continue bicarb 650mg bid  - Sodium; level: Normal    # Medical Compliance: Yes    # Transplant History:  Etiology of Kidney Failure: Diabetes mellitus type 2  Tx: SPK  Transplant: 6/11/2021 (Kidney / Pancreas)  Donor Type: Donation after Brain Death Donor Class:   Crossmatch at time of Tx: negative  DSA at time of Tx: No  Significant changes in immunosuppression: None  CMV IgG Ab High Risk Discordance (D+/R-): No  EBV IgG Ab High Risk Discordance (D+/R-): No  Significant transplant-related complications: None    Transplant Office Phone Number: [phone]    Assessment and plan was discussed with the patient and he voiced his understanding and agreement.    Return visit: 1 month, 8/11/21 for 2 month post transplant visit    Saw Irby MD    Chief Complaint   Mr. Cramer is a 41 year old here for routine follow up and immunosuppression management.     History of Present Illness     The patient feels well generally. He has some pain in his RLQ.  "He states that nothing makes it better or worse. It feels like a \"pinching\", can come and go. He denies N/V/D, fever, chills, SOB, chest pain, LE edema, dysuria.      Home BP: 130-170 mmHg     Problem List   Patient Active Problem List   Diagnosis     End stage kidney disease (H)     Hypertension secondary to other renal disorders     Secondary renal hyperparathyroidism (H)     Type 2 diabetes mellitus (H)     Hypertension     Anemia in chronic kidney disease     Vitamin D deficiency     Gallstones, common bile duct     Kidney transplant candidate     Pancreas transplanted (H)     Kidney replaced by transplant     Immunosuppressed status (H)     Pancreatitis of pancreas transplant     Anemia due to blood loss, acute     Acute post-operative pain     Hypophosphatemia     Hypomagnesemia       Allergies   Allergies   Allergen Reactions     Metoprolol      SOB, but was also experiencing significant edema not drug associated       Medications   Current Outpatient Medications   Medication Sig     acetaminophen (TYLENOL) 325 MG tablet Take 2 tablets (650 mg) by mouth every 4 hours as needed for other (For optimal non-opioid multimodal pain management to improve pain control.)     amoxicillin-clavulanate (AUGMENTIN) 875-125 MG tablet Take 1 tablet by mouth 2 times daily     aspirin (ASA) 325 MG EC tablet Take 1 tablet (325 mg) by mouth daily     atorvastatin (LIPITOR) 20 MG tablet Take 1 tablet (20 mg) by mouth daily     calcium carbonate (OS-SHAY) 1500 (600 Ca) MG tablet Take 600 mg by mouth 2 times daily (with meals)      carvedilol (COREG) 6.25 MG tablet Take 0.5 tablets (3.125 mg) by mouth 2 times daily     clotrimazole (MYCELEX) 10 MG lozenge Place 1 lozenge (10 mg) inside cheek 4 times daily     fluconazole (DIFLUCAN) 200 MG tablet Take 1 tablet (200 mg) by mouth daily     magnesium oxide (MAG-OX) 400 MG tablet Take 1 tablet (400 mg) by mouth 2 times daily     methocarbamol (ROBAXIN) 500 MG tablet Take 1 tablet (500 " mg) by mouth every 8 hours as needed for muscle spasms     montelukast (SINGULAIR) 10 MG tablet Take 10 mg by mouth At Bedtime     mycophenolate (GENERIC EQUIVALENT) 250 MG capsule Take 4 capsules (1,000 mg) by mouth 2 times daily     omeprazole (PRILOSEC) 20 MG DR capsule Take 20 mg by mouth every morning     ondansetron (ZOFRAN-ODT) 4 MG ODT tab Take 4 mg by mouth every 8 hours as needed for nausea     oxyCODONE (ROXICODONE) 5 MG tablet Take 1 tablet (5 mg) by mouth every 4 hours as needed for moderate to severe pain     predniSONE (DELTASONE) 5 MG tablet Take 20 mg daily x 3 days (6/22-6/24), Take 15 mg daily x 7 days (6/25-7/1), Take 10 mg daily x 7 days (7/2-7/8), Take 5 mg x 7 days (7/9-7/15)     sodium bicarbonate 650 MG tablet Take 1 tablet (650 mg) by mouth 2 times daily     tacrolimus (GENERIC EQUIVALENT) 1 MG capsule Take 2 capsules (2 mg) by mouth 2 times daily     valGANciclovir (VALCYTE) 450 MG tablet Take 1 tab (450mg) daily. Increase dose up to a max of 2 tabs (900 mg) by mouth daily when directed by your transplant team.     vitamin D3 (CHOLECALCIFEROL) 2000 units (50 mcg) tablet Take 1 tablet (2,000 Units) by mouth daily     sulfamethoxazole-trimethoprim (BACTRIM) 400-80 MG tablet Take 1 tablet by mouth daily (Patient not taking: Reported on 7/8/2021)     No current facility-administered medications for this visit.      Medications Discontinued During This Encounter   Medication Reason     insulin lispro (HUMALOG) 100 UNIT/ML Cartridge      insulin lispro (HUMALOG) 100 UNIT/ML Cartridge      insulin pen needle (BD RAÚL U/F) 32G X 4 MM miscellaneous      cloNIDine (CATAPRES) 0.1 MG tablet      amLODIPine (NORVASC) 10 MG tablet      carvedilol (COREG) 6.25 MG tablet      phosphorus tablet 250 mg (PHOSPHA 250 NEUTRAL) 250 MG per tablet        Physical Exam   Blood pressure 137/89, pulse 92, weight 77.7 kg (171 lb 3.2 oz), SpO2 99 %.    GENERAL APPEARANCE: alert and no distress  HENT: mouth without  ulcers or lesions  LYMPHATICS: no cervical or supraclavicular nodes  RESP: lungs clear to auscultation - no rales, rhonchi or wheezes  CV: regular rhythm, normal rate, no rub, no murmur  EDEMA: no LE edema bilaterally  ABDOMEN: soft, nondistended, nontender, bowel sounds normal  MS: extremities normal - no gross deformities noted, no evidence of inflammation in joints, no muscle tenderness  SKIN: no rash    Data     Renal Latest Ref Rng & Units 7/8/2021 7/6/2021 7/1/2021   Na 133 - 144 mmol/L 143 - -   Na (external) 136 - 145 mmol/L - 143 143   K 3.4 - 5.3 mmol/L 4.4 - -   K (external) 3.5 - 5.1 mmol/L - 5.0 4.7   Cl 94 - 109 mmol/L 111(H) - -   Cl (external) 98 - 109 mmol/L - 110(H) 111(H)   CO2 20 - 32 mmol/L 25 - -   CO2 (external) 20 - 29 mmol/L - 21 22   BUN 7 - 30 mg/dL 15 - -   BUN (external) 7 - 26 mg/dL - 17 18   Cr 0.66 - 1.25 mg/dL 1.23 - -   Cr (external) 0.73 - 1.18 mg/dL - 1.17 1.22(H)   Glucose 70 - 99 mg/dL 106(H) - -   Glucose (external) 70 - 100 mg/dL - 113(H) 102(H)   Ca  8.5 - 10.1 mg/dL 8.8 - -   Ca (external) 8.4 - 10.4 mg/dL - 9.3 9.4   Mg 1.6 - 2.3 mg/dL 1.6 - -   Mg (external) 1.6 - 2.6 mg/dL - - -     Bone Health Latest Ref Rng & Units 7/8/2021 6/28/2021 6/27/2021   Phos 2.5 - 4.5 mg/dL 2.7 - 3.0   Phos (external) 2.3 - 4.7 mg/dL - 1.8(L) -   Vit D Def 20 - 75 ug/L - - -     Heme Latest Ref Rng & Units 7/8/2021 7/6/2021 7/1/2021   WBC 4.0 - 11.0 10e9/L 8.4 - -   WBC (external) 3.5 - 10.5 x10(9)/L - 8.3 11.5(H)   Hgb 13.3 - 17.7 g/dL 9.3(L) - -   Hgb (external) 13.5 - 17.5 g/dL - 9.1(L) 8.0(L)   Plt 150 - 450 10e9/L 310 - -   Plt (external) 150 - 450 x10(9)/L - 337 365   ABSOLUTE NEUTROPHIL 1.6 - 8.3 10e9/L - - -   ABSOLUTE NEUTROPHILS (EXTERNAL) 1.7 - 7.0 10(9)/L - 5.4 8.7(H)   ABSOLUTE LYMPHOCYTES 0.8 - 5.3 10e9/L - - -   ABSOLUTE LYMPHOCYTES (EXTERNAL) 1.0 - 4.8 10(9)/L - 2.2 2.0   ABSOLUTE MONOCYTES 0.0 - 1.3 10e9/L - - -   ABSOLUTE MONOCYTES (EXTERNAL) 0.2 - 0.9 10(9)/L - 0.5 0.5    ABSOLUTE EOSINOPHILS 0.0 - 0.7 10e9/L - - -   ABSOLUTE EOSINOPHILS (EXTERNAL) 0.0 - 0.5 10(9)/L - 0.1 0.1   ABSOLUTE BASOPHILS 0.0 - 0.2 10e9/L - - -   ABSOLUTE BASOPHILS (EXTERNAL) 0.0 - 0.3 10(9)/L - 0.1 0.1   ABS IMMATURE GRANULOCYTES 0 - 0.4 10e9/L - - -   ABSOLUTE NUCLEATED RBC - - - -     Liver Latest Ref Rng & Units 6/10/2021 1/4/2021 12/9/2020   AP 40 - 150 U/L 84 - -   TBili 0.2 - 1.3 mg/dL 0.4 - -   ALT 0 - 70 U/L 32 47 48   AST 0 - 45 U/L 32 31 21   Tot Protein 6.8 - 8.8 g/dL 7.2 - -   Albumin 3.4 - 5.0 g/dL 3.9 - -     Pancreas Latest Ref Rng & Units 7/8/2021 7/6/2021 7/1/2021   A1C 0 - 5.6 % - - -   Amylase 30 - 110 U/L 113(H) - -   Amylase (external) 25 - 125 U/L - 121 132(H)   Lipase 73 - 393 U/L 868(H) - -   Lipase (external) 25 - 125 U/L - 121 132(H)        [facility] Txp Virology Latest Ref Rng & Units 6/10/2021 8/30/2020 7/29/2019   EBV CAPSID ANTIBODY IGG 0.0 - 0.8 AI >8.0(H) >8.0(H) >8.0(H)   Hep B Core NR:Nonreactive Nonreactive - Nonreactive        Recent Labs   Lab Test 06/24/21  0642 06/25/21  0630 06/27/21  0702   DOSTAC 2000 6/23/2021 2,000 Not Provided   TACROL 10.7 9.5 8.4       Again, thank you for allowing me to participate in the care of your patient.      Sincerely,    [facility]

## 2021-07-08 NOTE — LETTER
7/8/2021         RE: Erik Cramer  [address] [place] [id number]        Dear Colleague,    Thank you for referring your patient, Erik Cramer, to the [facility]PLANT [facility]. Please see a copy of my visit note below.    See procedure note       Again, thank you for allowing me to participate in the care of your patient.        Sincerely,        Leilani Vincent NP

## 2021-07-08 NOTE — NURSING NOTE
Chief Complaint   Patient presents with     Cystoscopy     Stent removal     Blood pressure 137/89, pulse 92, weight 77.7 kg (171 lb 4.8 oz), SpO2 99 %.    Prepped patient for procedure with no complications.  Assisted with stent removal.  Administered Levaquin after procedure.  Patient was discharged in stable condition.    Rain Ruiz, CMA

## 2021-07-08 NOTE — PATIENT INSTRUCTIONS
Restart bactrim daily   Stop amlodipine  Stop clonidine  Stop phosphorous  Decrease coreg to 3.125mg twice daily. Check blood pressure and don't take if systolic (top number) is less than 130.

## 2021-07-09 DIAGNOSIS — Z94.83 PANCREAS TRANSPLANTED (H): ICD-10-CM

## 2021-07-09 DIAGNOSIS — Z94.0 KIDNEY REPLACED BY TRANSPLANT: ICD-10-CM

## 2021-07-09 LAB
BKV DNA # SPEC NAA+PROBE: NORMAL COPIES/ML
BKV DNA SPEC NAA+PROBE-LOG#: NORMAL LOG COPIES/ML
DONOR IDENTIFICATION: NORMAL
DSA COMMENTS: NORMAL
DSA PRESENT: NO
DSA TEST METHOD: NORMAL
INTERFERING SUBST TEST METHOD: NORMAL
INTERFERING SUBSTANCE COMMENT: NORMAL
INTERFERING SUBSTANCE RESULT: NORMAL
INTERFERING SUBSTANCE: NORMAL
ORGAN: NORMAL
SA1 CELL: NORMAL
SA1 COMMENTS: NORMAL
SA1 HI RISK ABY: NORMAL
SA1 MOD RISK ABY: NORMAL
SA1 TEST METHOD: NORMAL
SA2 CELL: NORMAL
SA2 COMMENTS: NORMAL
SA2 HI RISK ABY UA: NORMAL
SA2 MOD RISK ABY: NORMAL
SA2 TEST METHOD: NORMAL
SPECIMEN SOURCE: NORMAL
UNACCEPTABLE ANTIGEN: NORMAL
UNOS CPRA: 32

## 2021-07-09 RX ORDER — CARVEDILOL 3.12 MG/1
3.12 TABLET ORAL 2 TIMES DAILY
Qty: 60 TABLET | Refills: 3 | Status: CANCELLED | OUTPATIENT
Start: 2021-07-09

## 2021-07-09 RX ORDER — TACROLIMUS 0.5 MG/1
0.5 CAPSULE ORAL 2 TIMES DAILY
Qty: 60 CAPSULE | Refills: 11 | Status: SHIPPED | OUTPATIENT
Start: 2021-07-09 | End: 2021-07-13

## 2021-07-09 RX ORDER — TACROLIMUS 1 MG/1
1 CAPSULE ORAL 2 TIMES DAILY
Qty: 60 CAPSULE | Refills: 11 | Status: SHIPPED | OUTPATIENT
Start: 2021-07-09 | End: 2021-07-13

## 2021-07-09 RX ORDER — CARVEDILOL 3.12 MG/1
3.12 TABLET ORAL 2 TIMES DAILY
Qty: 60 TABLET | Refills: 3 | Status: SHIPPED | OUTPATIENT
Start: 2021-07-09 | End: 2021-08-12

## 2021-07-09 NOTE — TELEPHONE ENCOUNTER
Health Call Center    Phone Message    May a detailed message be left on voicemail: yes     Reason for Call: Medication Question or concern regarding medication   Prescription Clarification  Name of Medication: carvedilol (COREG) 6.25 MG tablet [24911] (Order [id number])  Prescribing Provider: Dr. Irby   Pharmacy: [facility] [place] [id number]   What on the order needs clarification? Tye calling stating that medication was supposed to be adjusted after appointment yesterday with Dr. Irby. States that he was supposed to be getting 3.125 MG tablet for twice daily instead of 6.25 MG tablet which Tye states that he already has the 6.25.     States that Dr. Irby wants him on a lower prescription.    Please advise and call Tye back with any questions or concerns.     Tye is requesting to be called back once new orders have been sent.     Action Taken: Other:  MEDICINE RENAL     Travel Screening: Not Applicable

## 2021-07-09 NOTE — TELEPHONE ENCOUNTER
ISSUE:  Tac 17.6 (goal 8-10)  MPA 7.9 (goal 1-3.5)    Spoke with Tye who states he did not take his medications before lab draw, states this was a good 12 hour trough. No diarrhea. No HA or tremors. Continues on fluconazole until next Tuesday and clotrimazole troches.     Will reduce tac from 2 mg bid to 1.5 mg bid and repeat level on Monday. No N/V/D, stay on MMF 1000 mg bid and repeat level on Monday.     Drinking 2-3L of fluid per day. Feels well. Potassium has improved and he restarted his Bactrim. Ok to try gatorade or powerade now.     Recommended holding lipitor for 1-2 weeks to see if this helps muscle cramping, If no improvement, Tye instructed to restart the lipitor in 2 weeks. Verbalized understanding.     Tye will continue with home care until he is off fluconazole with stable drug levels. He would then like to use the [facility] for labs. Will revisit the week of July 19th.

## 2021-07-11 LAB
HBV DNA SERPL QL NAA+PROBE: NORMAL
HCV RNA SERPL QL NAA+PROBE: NORMAL
HIV1+2 RNA SERPL QL NAA+PROBE: NORMAL

## 2021-07-12 NOTE — PROCEDURES
Transplant Surgery  Operative Note    Preop dx: Status post  Donor kidney transplant.  Post op dx: same   Procedure: Flexible cystoscopy and ureteral stent removal   Surgeon: chase ibarra cnp  Assistant: madan charles MA  Anesthesia: local  EBL: 0   Specimens: none.  Findings: none abnormal. Stent inspected and noted to be intact.   Indication: The patient is status post kidney transplant and the ureteral stent is no longer needed.    Procedure: The patient was positioned supine on the table.  The groin was sterilely prepped and draped in the usual fashion. Time out was done. Urojet was applied to the urethra. A flexible cystoscope was inserted and advanced thru the urethra into the bladder. The stent was visualized and grasped. The cystoscope, grasper and stent were removed en-mass. The stent was visualized to be intact.  The bladder mucosa was normal in appearance. Antibiotics were administered. The patient tolerated the procedure well and was asked to void before leaving the clinic.

## 2021-07-13 ENCOUNTER — TELEPHONE (OUTPATIENT)
Dept: TRANSPLANT | Facility: CLINIC | Age: 41
End: 2021-07-13

## 2021-07-13 DIAGNOSIS — Z94.0 KIDNEY REPLACED BY TRANSPLANT: ICD-10-CM

## 2021-07-13 DIAGNOSIS — Z94.83 PANCREAS TRANSPLANTED (H): ICD-10-CM

## 2021-07-13 RX ORDER — POLYETHYLENE GLYCOL 3350 17 G/17G
17 POWDER, FOR SOLUTION ORAL DAILY PRN
Qty: 510 G | Refills: 0 | Status: SHIPPED | OUTPATIENT
Start: 2021-07-13 | End: 2021-08-12

## 2021-07-13 RX ORDER — TACROLIMUS 0.5 MG/1
0.5 CAPSULE ORAL 2 TIMES DAILY
Qty: 60 CAPSULE | Refills: 11 | Status: SHIPPED | OUTPATIENT
Start: 2021-07-13 | End: 2021-07-22

## 2021-07-13 RX ORDER — TACROLIMUS 1 MG/1
2 CAPSULE ORAL 2 TIMES DAILY
Qty: 120 CAPSULE | Refills: 11 | Status: SHIPPED | OUTPATIENT
Start: 2021-07-13 | End: 2021-07-22

## 2021-07-13 NOTE — TELEPHONE ENCOUNTER
Last dose of fluconazole today. Currently taking tacrolimus 1.5 mg bid, last level 11.6 on 7/6, repeat from yesterday pending. Increase to 2.5 mg bid starting tomorrow, Tye verbalized understanding.     Labs reviewed in Care Everywhere:   -Creat 1.4 (from 1.2)  -Lipase 254 (from 187)    States he is having 1-2 hard stools per day. Recommended using daily miralax, goal for 2-3 loose stools per day. Tye also said milk gives him diarrhea He will try that tonight and try miralax tomorrow if he doesn't have results.     Denies abdominal pain. No fevers. Drinking 2-3L of fluid per day. No greasy or fatty foods.     Cramping continues in bilateral shins despite holding Lipitor. Will review with MDs.

## 2021-07-14 ENCOUNTER — TELEPHONE (OUTPATIENT)
Dept: TRANSPLANT | Facility: CLINIC | Age: 41
End: 2021-07-14

## 2021-07-14 NOTE — TELEPHONE ENCOUNTER
Post Kidney and Pancreas Transplant Team Conference  Date: 7/14/2021  Transplant Coordinator: Rossi Chang     Attendees:  []  Dr. Fraser  [x] Anika Gann LPN     [x]  Dr. Brand [] Dolores Horn, YING [] Lela Lazaro LPN   []  Dr. Lane [x] Ene Melvin, YING    [x]  Dr. Irby [x] Annalise Lozano, YING [] Lobo Bartlett, PharmD   [x] Dr. Kennedy [] Mia Marvin, YING    [] Dr. Syed [x] Dilip Plaza RN    [] Dr. Welsh [] Jewell Page RN    [] Dr. Randhawa [x] Tg Gomez RN    []  Dr. Vega [] Sinai Smith, YING    [] Surgery Fellow [x] Twyla Chang RN    [] Leilani Vincent, AMALIA [x] Kate Ackerman, RN        Verbal Plan Read Back:   If lab results are not better tomorrow (lipase), biopsy pancreas    Routed to RN Coordinator   Anika Gann LPN

## 2021-07-20 ENCOUNTER — TELEPHONE (OUTPATIENT)
Dept: TRANSPLANT | Facility: CLINIC | Age: 41
End: 2021-07-20

## 2021-07-20 NOTE — TELEPHONE ENCOUNTER
ISSUE:  K 5.4  Creat up  Lipase up  Tac elevated    Bertin Brand MD Poucher, Jessica, RN  I think its time for biopsy, lets discuss on Wednesday     Left  for patient requesting a return phone call. Instructed to hold ASA

## 2021-07-21 ENCOUNTER — TELEPHONE (OUTPATIENT)
Dept: TRANSPLANT | Facility: CLINIC | Age: 41
End: 2021-07-21

## 2021-07-21 NOTE — TELEPHONE ENCOUNTER
Post Kidney and Pancreas Transplant Team Conference  Date: 7/21/2021  Transplant Coordinator: Twyla Chang     Attendees:  [x]  Dr. Fraser  [x] Anika Gann LPN     [x]  Dr. Brand [] Dolores Horn, YING [] Lela Lazaro LPN   []  Dr. Lane [x] Ene Melvin, YING    [x]  Dr. Irby [x] Annalise Lozano RN [] Lobo Bartlett, PharmD   [x] Dr. Kennedy [] Mia Marvin, YING    [] Dr. Syed [x] Dilip Plaza RN    [] Dr. Welsh [] Jewell Page, YING    [] Dr. Randhawa [x] Tg Gomez RN    []  Dr. Vega [x] Sinai Smith, YING    [] Surgery Fellow [x] Twyla Chang RN    [] Leilani Vincent, AMALIA [x] Kate Ackerman RN        Verbal Plan Read Back:   Biopsy next week     Routed to RN Coordinator   Anika Gann LPN

## 2021-07-21 NOTE — TELEPHONE ENCOUNTER
Tye states he is having 3-4 soft, easy to pass BMs per day. Continues on daily miralax. No fevers, no abdominal pain. Discussed biopsy next week if lipase doesn't improve, Tye verbalized understanding and will continue to hold his ASA.     Tac level from 7/19 pending. Recommend no further dose change at this time until tac results.

## 2021-07-22 ENCOUNTER — TELEPHONE (OUTPATIENT)
Dept: TRANSPLANT | Facility: CLINIC | Age: 41
End: 2021-07-22

## 2021-07-22 DIAGNOSIS — Z94.83 PANCREAS TRANSPLANTED (H): ICD-10-CM

## 2021-07-22 DIAGNOSIS — Z94.0 KIDNEY REPLACED BY TRANSPLANT: ICD-10-CM

## 2021-07-22 DIAGNOSIS — T86.899 COMPLICATION OF TRANSPLANTED PANCREAS: Primary | ICD-10-CM

## 2021-07-22 RX ORDER — TACROLIMUS 0.5 MG/1
CAPSULE ORAL
Qty: 60 CAPSULE | Refills: 11 | Status: SHIPPED
Start: 2021-07-22 | End: 2021-08-10

## 2021-07-22 RX ORDER — TACROLIMUS 1 MG/1
2 CAPSULE ORAL 2 TIMES DAILY
Qty: 120 CAPSULE | Refills: 11 | Status: SHIPPED
Start: 2021-07-22 | End: 2021-08-10

## 2021-07-22 NOTE — LETTER
PHYSICIAN ORDERS      DATE & TIME ISSUED: 2021 9:11 AM  PATIENT NAME: Erik Cramer   : 1980     [facility] MR# [if applicable]: [id number]     DIAGNOSIS:  kidney/pancreas transplant   ICD-10 CODE: Z94.0, Z94.83     Please complete a pre-procedural covid swab by PCR (nasopharyngeal) either  or . Procedure scheduled on  at our facility.     Any questions please call: [phone]  Fax results to:   [phone].      Bertin Brand MD

## 2021-07-22 NOTE — TELEPHONE ENCOUNTER
ISSUE:   Tacrolimus IR level 12.9 on 7/19, goal 8-10, dose 2.5 mg BID.    PLAN:   Please call patient and confirm this was an accurate 12-hour trough. Verify Tacrolimus IR dose 2.5 mg BID. Confirm no new medications or illness. Confirm no missed doses. If accurate trough and accurate dose, decrease Tacrolimus IR dose to 2 mg BID and repeat labs in 3 days    OUTCOME:   Spoke with patient, they confirm accurate trough level and current dose 2.5 mg BID. Patient confirmed dose change to 2 mg BID and to repeat labs in 3 days. Orders sent to preferred pharmacy for dose change and lab for repeat labs. Patient voiced understanding of plan.

## 2021-07-22 NOTE — TELEPHONE ENCOUNTER
Transplant Coordinator Renal Biopsy Communication    Call placed to Erik Cramer to discuss indication for kidney transplant biopsy per Dr. Brand.     Indication for transplant pancreas biopsy: elevated lipase    Date and time of biopsy: Tuesday 7/27, check in at  9:00  Date and time of covid swab: TBD - orders sent to [facility] and patient to call and scheduled swab.     Patient location within 70 miles of [facility]: Yes.  If no, must stay overnight locally. Pt verbalizes staying N/A.     Erik Cramer's medication list was reviewed.   Anticoagulant: aspirin   Ibuprofen: No.  Fish Oil:  No.  Medications held: ASA held on 7/21/21    Recent blood pressure readings are WNL or not applicable. Instructed to take medication, especially blood pressure medications, before arriving.    Procedure expectations and duration of stay discussed. Expressed pt can expect a phone call from hospital IR team to confirm biopsy date/time/location/directions/review of medications.   Patient verbalized understanding they will need to arrive by 9:00 am on 7/27 and plan to stay 4-5 hours post biopsy for monitoring. Discussed need for  if patient chooses to receive conscious sedation. Pt has no additional questions at this time. Transplant Office phone number given to pt for future questions.      Twyla Chang RN  Kidney/Pancreas Transplant Coordinator  [phone]

## 2021-07-23 NOTE — PROGRESS NOTES
Outpatient IR Biopsy Referral    Patient is a 42 y/o male with a PMH of HTN, DM II, renal failure, S/P kidney/pancreas transplant 6/11/21. Patient has history of peripancreatic fluid that is decreasing per last US of RLQ 6/29/21 compared to US 6/25/21.   IR has been asked to biopsy the transplanted pancreas due to elevated lipase levels 7/8/21 868 down from 6/27/21 1211.    Case and imaging CT 6/23/21 was reviewed with Dr. Yepez from IR who approves CT guided transplanted pancreas biopsy.    Procedure order and surgical pathology order placed by Dr. Brand.    If requesting team would like sample sent for anything else please enter them.    Primary team and Dr. Brand made aware of IR recommendations via epic messaging.       SHANIA Sparks CNP  Interventional Radiology   IR on-call pager: [phone]

## 2021-07-23 NOTE — TELEPHONE ENCOUNTER
IR reviewed case, confirmed that no additional CT is needed before pancreas transplant biopsy. CT completed on 6/23 will be used to proceed with biopsy.     Ok to cancel pancreas transplant biopsy if lipase is improving on labs on Monday. Tye verbalized understanding and will call on Monday for results.

## 2021-07-26 ENCOUNTER — MYC MEDICAL ADVICE (OUTPATIENT)
Dept: TRANSPLANT | Facility: CLINIC | Age: 41
End: 2021-07-26

## 2021-07-26 ENCOUNTER — TELEPHONE (OUTPATIENT)
Dept: TRANSPLANT | Facility: CLINIC | Age: 41
End: 2021-07-26

## 2021-07-26 NOTE — TELEPHONE ENCOUNTER
7/22/21 dose decreased from 2.5 bid to 2 mg bid    Tac = 15.5  (7/22/21) --- Does not reflect dose change made.  Had labs again today 7/26, Tac level not resulted at this time.

## 2021-07-26 NOTE — TELEPHONE ENCOUNTER
Lipase=188 today, results in care everywhere.     See Dr Irby's note below, patient needs pancreas biopsy tomorrow. Patient notified. Confirmed timing and instructions. Patient aware that he needs a  for procedure.     Confirmed w/ patient that he had COVID swab done yesterday, results available in care everywhere, IR notified.     Saw Irby MD Rockers, Emily S, RN  Yes we will proceed because he hasn't descended to normal           Previous Messages       ----- Message -----   From: Ene Melvin RN   Sent: 7/26/2021   2:26 PM CDT   To: Saw Irby MD   Subject: lipase, biopsy tomorrow                           Labs in care everywhere, lipase vuqxk=090. Should he proceed w/ biopsy scheduled for tomorrow?     ThanksEne

## 2021-07-27 ENCOUNTER — APPOINTMENT (OUTPATIENT)
Dept: MEDSURG UNIT | Facility: CLINIC | Age: 41
End: 2021-07-27
Attending: INTERNAL MEDICINE
Payer: MEDICARE

## 2021-07-27 ENCOUNTER — APPOINTMENT (OUTPATIENT)
Dept: INTERVENTIONAL RADIOLOGY/VASCULAR | Facility: CLINIC | Age: 41
End: 2021-07-27
Attending: INTERNAL MEDICINE
Payer: MEDICARE

## 2021-07-27 ENCOUNTER — HOSPITAL ENCOUNTER (OUTPATIENT)
Facility: CLINIC | Age: 41
Discharge: HOME | End: 2021-07-27
Attending: INTERNAL MEDICINE | Admitting: RADIOLOGY
Payer: MEDICARE

## 2021-07-27 VITALS
RESPIRATION RATE: 14 BRPM | HEART RATE: 80 BPM | TEMPERATURE: 98.3 F | DIASTOLIC BLOOD PRESSURE: 96 MMHG | OXYGEN SATURATION: 100 % | SYSTOLIC BLOOD PRESSURE: 155 MMHG

## 2021-07-27 DIAGNOSIS — T86.899 COMPLICATION OF TRANSPLANTED PANCREAS: ICD-10-CM

## 2021-07-27 LAB
ERYTHROCYTE [DISTWIDTH] IN BLOOD BY AUTOMATED COUNT: 13.9 % (ref 10–15)
HCT VFR BLD AUTO: 34.4 % (ref 40–53)
HGB BLD-MCNC: 10.7 G/DL (ref 13.3–17.7)
INR BLD: 1 (ref 2–3)
MCH RBC QN AUTO: 30.7 PG (ref 26.5–33)
MCHC RBC AUTO-ENTMCNC: 31.1 G/DL (ref 31.5–36.5)
MCV RBC AUTO: 99 FL (ref 78–100)
PLATELET # BLD AUTO: 273 10E3/UL (ref 150–450)
RBC # BLD AUTO: 3.49 10E6/UL (ref 4.4–5.9)
WBC # BLD AUTO: 4.8 10E3/UL (ref 4–11)

## 2021-07-27 PROCEDURE — 77012 CT SCAN FOR NEEDLE BIOPSY: CPT

## 2021-07-27 PROCEDURE — 272N000653 HC COIL/EMBOLIC DEVICE CR8

## 2021-07-27 PROCEDURE — 272N000506 HC NEEDLE CR6

## 2021-07-27 PROCEDURE — 999N000134 HC STATISTIC PP CARE STAGE 3

## 2021-07-27 PROCEDURE — 36415 COLL VENOUS BLD VENIPUNCTURE: CPT | Performed by: NURSE PRACTITIONER

## 2021-07-27 PROCEDURE — 85027 COMPLETE CBC AUTOMATED: CPT | Performed by: NURSE PRACTITIONER

## 2021-07-27 PROCEDURE — 77012 CT SCAN FOR NEEDLE BIOPSY: CPT | Mod: 26 | Performed by: RADIOLOGY

## 2021-07-27 PROCEDURE — 88346 IMFLUOR 1ST 1ANTB STAIN PX: CPT | Mod: TC | Performed by: INTERNAL MEDICINE

## 2021-07-27 PROCEDURE — 48102 NEEDLE BIOPSY PANCREAS: CPT | Mod: GC | Performed by: RADIOLOGY

## 2021-07-27 PROCEDURE — 258N000003 HC RX IP 258 OP 636: Performed by: NURSE PRACTITIONER

## 2021-07-27 PROCEDURE — 250N000009 HC RX 250: Performed by: RADIOLOGY

## 2021-07-27 PROCEDURE — 85610 PROTHROMBIN TIME: CPT

## 2021-07-27 RX ORDER — LIDOCAINE HYDROCHLORIDE 10 MG/ML
1-30 INJECTION, SOLUTION EPIDURAL; INFILTRATION; INTRACAUDAL; PERINEURAL
Status: COMPLETED | OUTPATIENT
Start: 2021-07-27 | End: 2021-07-27

## 2021-07-27 RX ORDER — LIDOCAINE 40 MG/G
CREAM TOPICAL
Status: DISCONTINUED | OUTPATIENT
Start: 2021-07-27 | End: 2021-07-27 | Stop reason: HOSPADM

## 2021-07-27 RX ORDER — SODIUM CHLORIDE 9 MG/ML
INJECTION, SOLUTION INTRAVENOUS CONTINUOUS
Status: DISCONTINUED | OUTPATIENT
Start: 2021-07-27 | End: 2021-07-27 | Stop reason: HOSPADM

## 2021-07-27 RX ADMIN — LIDOCAINE HYDROCHLORIDE 6 ML: 10 INJECTION, SOLUTION EPIDURAL; INFILTRATION; INTRACAUDAL; PERINEURAL at 11:36

## 2021-07-27 RX ADMIN — SODIUM CHLORIDE: 9 INJECTION, SOLUTION INTRAVENOUS at 09:51

## 2021-07-27 NOTE — PROGRESS NOTES
Patient Name: Erik Cramer  Medical Record Number: [id number]  Today's Date: 7/27/2021    Procedure: Transplant pancreas biopsy  Proceduralist: Dr. Lauren Malloy    Procedure Start: 1126  Procedure end: 1155  Sedation medications administered: NA     Report given to: Dari HE 2A  : ELIZABETH    Other Notes: Pt arrived to IR room CT2 from . Consent reviewed. Pt denies any questions or concerns regarding procedure. Pt positioned supine and monitored per protocol. Pt tolerated procedure without any noted complications. Pt transferred back to .    2 cores taken, KRISTINA team present for specimen examination.

## 2021-07-27 NOTE — PROGRESS NOTES
S/p pancrease biopsy without sedation. Pt alert and oriented. Denies pain or nausea. Biopsy site on Right lateral abd. Site intact. HTN. Bedrest x 4 hours. Voided using urinal

## 2021-07-27 NOTE — DISCHARGE INSTRUCTIONS
[facility]    Interventional Radiology  Patient Instructions Following Biopsy    AFTER YOU GO HOME          Relax and take it easy for 48 hours, no strenuous activity for 24 hours  ? DO drink plenty of fluids  ? DO resume your regular diet, unless otherwise instructed by your Primary Physician  ? Keep the dressing dry and in place for 24 hours.  ? DO NOT drink alcoholic beverages the day of your procedure  ? DO NOT do any strenuous exercise or lifting (> 10 lbs) for at least 7 days following your procedure  ? DO NOT take a bath or shower for at least 12 hours following your procedure  ? Remove dressing after shower the next day. Replace with Band aid for 2 days.  Never leave a wet dressing in place.  ? There should be minimum drainage from the biopsy site    CALL THE PHYSICIAN IF:  ? You start bleeding from the procedure site.  If you do start to bleed from that site, lie down flat and hold pressure on the site for a minimum of 10 minutes.  Your physician will tell you if you need to return to the hospital  ? You develop nausea or vomiting  ? You have excessive swelling, redness, or tenderness at the site  ? You have drainage that looks like it is infected.  ? You experience severe pain  ? You develop shortness of breath  ? You develop a temperature of 101 degrees F or greater  ? You develop bloody clots or red urine after you are discharged      ADDITIONAL INSTRUCTIONS  If you are taking Coumadin, restart tonight.  Follow up with your Coumadin Clinic or Primary Care MD to have your INR rechecked.    [facility] INTERVENTIONAL RADIOLOGY DEPARTMENT  Procedure Physician:         Dr. Yepez  Date of procedure: July 27, 2021  Telephone Numbers: [phone] Monday-Friday 8:00 am to 4:30 pm  [phone] After 4:30 pm Monday-Friday, Weekends & Holidays.   Ask for the Interventional Radiologist on call.  Someone is on call 24 hrs/day  [facility] toll free number: [phone] Monday-Friday 8:00 am to 4:30  pm  [facility] Emergency Dept: [phone]

## 2021-07-27 NOTE — PROCEDURES
[facility]    Procedure: IR Procedure Note    Date/Time: 7/27/2021 12:02 PM  Performed by: Di Aguilar DO  Authorized by: Lauro Yepez MD   IR Fellow Physician:  Radiology Resident Physician: Di Aguilar DO      UNIVERSAL PROTOCOL   Site Marked: NA  Prior Images Obtained and Reviewed:  Yes  Required items: Required blood products, implants, devices and special equipment available    Patient identity confirmed:  Verbally with patient, arm band, provided demographic data and hospital-assigned identification number  Patient was reevaluated immediately before administering moderate or deep sedation or anesthesia  Confirmation Checklist:  Patient's identity using two indicators, relevant allergies, procedure was appropriate and matched the consent or emergent situation and correct equipment/implants were available  Time out: Immediately prior to the procedure a time out was called    Universal Protocol: the Joint Commission Universal Protocol was followed    Preparation: Patient was prepped and draped in usual sterile fashion    ESBL (mL):  1         ANESTHESIA    Anesthesia: Local infiltration  Local Anesthetic:  Lidocaine 1% without epinephrine  Anesthetic Total (mL):  5      SEDATION    Patient Sedated: Yes    Sedation:  Fentanyl and midazolam  Vital signs: Vital signs monitored during sedation    See dictated procedure note for full details.  Findings: Pre-procedure CT scan was obtained to select appropriate biopsy window. Patient was prepped in usual sterile fashion. Coaxial 17 gauge biopsy system was advanced into the transplant pancreas and two core samples were obtained. Pathology was present to confirm adequate sample. There was a small hematoma in the biopsy tract. This was embolized using gel-foam. Biopsy system was removed. Post procedure scan was obtained. Hematoma was stable in size. Biopsy site was dressed with sterile primapore dressing.    Specimens:  none    Complications: Hematoma / Bleed, at needle, device path - nonvascular procedure    Condition: Stable    Plan: -4 Hours bed rest.  -Discharge when patient meets criteria.    PROCEDURE   Patient Tolerance:  Patient tolerated the procedure well with no immediate complications    Length of time physician/provider present for 1:1 monitoring during sedation: 20

## 2021-07-27 NOTE — PROGRESS NOTES
2A prep for Panc biopsy. Pt alert, oriented and aware of planned procedure. HTN- VSS. Approprietly NPO. Pt drove self and would not be able to find a ride until 7pm. Notified MD about pt driving self- pt will not have sedation. Right PIV in placed and infusing. Left arm fistula. Labs pending. Istat 1.0. Pt currently drinking oral contrast. Prep complete.

## 2021-07-28 ENCOUNTER — TELEPHONE (OUTPATIENT)
Dept: TRANSPLANT | Facility: CLINIC | Age: 41
End: 2021-07-28

## 2021-07-28 ENCOUNTER — TEAM CONFERENCE (OUTPATIENT)
Dept: TRANSPLANT | Facility: CLINIC | Age: 41
End: 2021-07-28

## 2021-07-28 LAB
PATH REPORT.COMMENTS IMP SPEC: NORMAL
PATH REPORT.COMMENTS IMP SPEC: NORMAL
PATH REPORT.FINAL DX SPEC: NORMAL
PATH REPORT.GROSS SPEC: NORMAL
PATH REPORT.MICROSCOPIC SPEC OTHER STN: NORMAL
PATH REPORT.MICROSCOPIC SPEC OTHER STN: NORMAL
PATH REPORT.RELEVANT HX SPEC: NORMAL
PHOTO IMAGE: NORMAL

## 2021-07-28 PROCEDURE — 88346 IMFLUOR 1ST 1ANTB STAIN PX: CPT | Mod: 26 | Performed by: PATHOLOGY

## 2021-07-28 PROCEDURE — 88307 TISSUE EXAM BY PATHOLOGIST: CPT | Mod: 26 | Performed by: PATHOLOGY

## 2021-07-28 PROCEDURE — 88350 IMFLUOR EA ADDL 1ANTB STN PX: CPT | Mod: 26 | Performed by: PATHOLOGY

## 2021-07-28 PROCEDURE — 99207 SURGICAL PATHOLOGY EXAM: CPT | Mod: 26 | Performed by: PATHOLOGY

## 2021-07-28 NOTE — TELEPHONE ENCOUNTER
Saw Irby MD Poucher, Jessica, RN  Yes, negative biopsy of the pancreas     Tye verbalized understanding. He may resume ASA.

## 2021-07-28 NOTE — TELEPHONE ENCOUNTER
Post Kidney and Pancreas Transplant Team Conference  Date: 7/28/2021  Transplant Coordinator: Twyla Chang     Attendees:  []  Dr. Fraser  [] Anika Gann LPN     []  Dr. Brand [] Dolores Horn RN [] Lela Lazaro LPN   []  Dr. Lane [] Ene Melvin RN    []  Dr. Irby [] Annalise Lozano RN [] Lobo Bartlett, SandraD   [] Dr. Kennedy [] Mia Marvin RN    [] Dr. Syed [] Dilip Plaza RN    [] Dr. Welsh [] Jewell Page RN    [] Dr. Randhawa [] Tg Gomez RN    []  Dr. Vega [] Sinai Smith RN    [] Surgery Fellow [] Twyla Chang RN    [] Leilani Vincent NP [] Kate Ackerman RN        Verbal Plan Read Back:   No changes to lab or medication schedule  Will continue to monitor    Routed to RN Coordinator   Lela Lazaro LPN

## 2021-08-02 ENCOUNTER — TELEPHONE (OUTPATIENT)
Dept: TRANSPLANT | Facility: CLINIC | Age: 41
End: 2021-08-02

## 2021-08-02 DIAGNOSIS — Z94.0 KIDNEY REPLACED BY TRANSPLANT: ICD-10-CM

## 2021-08-02 DIAGNOSIS — Z94.83 PANCREAS TRANSPLANTED (H): ICD-10-CM

## 2021-08-02 RX ORDER — MYCOPHENOLATE MOFETIL 250 MG/1
CAPSULE ORAL
Qty: 210 CAPSULE | Refills: 11 | Status: SHIPPED | OUTPATIENT
Start: 2021-08-02 | End: 2021-08-04

## 2021-08-02 NOTE — TELEPHONE ENCOUNTER
No need for fasting labs unless indicated. Tye was on omeprazole and Singulair before transplant, decision to continue both should come from original prescribing MD. Tye verbalized understanding.

## 2021-08-02 NOTE — TELEPHONE ENCOUNTER
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     Current immunosuppression:   Tac goal 8-10, at goal.  MMF 1000 mg bid, last level 3.8    Will check BK q2 weeks and IgG. Orders sent.     Sent to Dr. Brand for review.     Bertin Brand MD Poucher, Jessica, RN  Lets check IgG   Ok to lower the 750/1000 mg

## 2021-08-02 NOTE — LETTER
PHYSICIAN ORDERS      DATE & TIME ISSUED: 2021 10:16 AM  PATIENT NAME: Erik Cramer   : 1980     [facility] MR# [if applicable]: [id number]     DIAGNOSIS:  kidney/pancreas transplant  ICD-10 CODE: Z94,0, Z94.83     Please check the following:   -BK Virus PCR Quant (blood) every other week x4  -total IgG once    Any questions please call: [phone]  Fax results to:   [phone].      Bertin Brand MD

## 2021-08-02 NOTE — TELEPHONE ENCOUNTER
Pt wonders if he needs to fast before his lab draws  Wonders if needs to keep taking Omeprozole and Singular

## 2021-08-04 DIAGNOSIS — Z94.0 KIDNEY REPLACED BY TRANSPLANT: ICD-10-CM

## 2021-08-04 DIAGNOSIS — Z94.83 PANCREAS TRANSPLANTED (H): ICD-10-CM

## 2021-08-04 RX ORDER — MYCOPHENOLATE MOFETIL 250 MG/1
750 CAPSULE ORAL 2 TIMES DAILY
Qty: 180 CAPSULE | Refills: 11 | Status: SHIPPED | OUTPATIENT
Start: 2021-08-04 | End: 2021-11-30

## 2021-08-04 NOTE — PROGRESS NOTES
Per pancreas committee review, reduce MMF from 750 mg/1000 mg to 750 mg bid. Tye verbalized understanding.

## 2021-08-10 ENCOUNTER — TELEPHONE (OUTPATIENT)
Dept: TRANSPLANT | Facility: CLINIC | Age: 41
End: 2021-08-10

## 2021-08-10 DIAGNOSIS — Z94.83 PANCREAS TRANSPLANTED (H): ICD-10-CM

## 2021-08-10 DIAGNOSIS — Z94.0 KIDNEY REPLACED BY TRANSPLANT: ICD-10-CM

## 2021-08-10 RX ORDER — TACROLIMUS 1 MG/1
1 CAPSULE ORAL 2 TIMES DAILY
Qty: 120 CAPSULE | Refills: 11 | Status: SHIPPED
Start: 2021-08-10 | End: 2021-08-17

## 2021-08-10 RX ORDER — TACROLIMUS 0.5 MG/1
0.5 CAPSULE ORAL 2 TIMES DAILY
Qty: 60 CAPSULE | Refills: 11 | Status: SHIPPED | OUTPATIENT
Start: 2021-08-10 | End: 2021-08-17

## 2021-08-10 NOTE — TELEPHONE ENCOUNTER
ISSUE:   Tacrolimus IR level 14.2 on 8/5, goal 8-10, dose 2 mg BID.    PLAN:   Please call patient and confirm this was an accurate 12-hour trough. Verify Tacrolimus IR dose 2 mg BID. Confirm no new medications or illness. Confirm no missed doses. If accurate trough and accurate dose, decrease Tacrolimus IR dose to 1.5 mg BID and repeat labs in 2 days    OUTCOME:   Spoke with patient, they confirm accurate trough level and current dose 2 mg BID. Patient confirmed dose change to 1.5 mg BID and to repeat labs in 2 days. Orders sent to preferred pharmacy for dose change and lab for repeat labs. Patient voiced understanding of plan.

## 2021-08-11 ENCOUNTER — VIRTUAL VISIT (OUTPATIENT)
Dept: NEPHROLOGY | Facility: CLINIC | Age: 41
End: 2021-08-11
Attending: TRANSPLANT SURGERY
Payer: MEDICARE

## 2021-08-11 DIAGNOSIS — I15.1 HTN, KIDNEY TRANSPLANT RELATED: ICD-10-CM

## 2021-08-11 DIAGNOSIS — E87.20 METABOLIC ACIDOSIS: ICD-10-CM

## 2021-08-11 DIAGNOSIS — Z48.288 ENCOUNTER FOR AFTERCARE FOLLOWING MULTIPLE ORGAN TRANSPLANT: ICD-10-CM

## 2021-08-11 DIAGNOSIS — Z94.0 KIDNEY REPLACED BY TRANSPLANT: Primary | ICD-10-CM

## 2021-08-11 DIAGNOSIS — Z94.83 PANCREAS TRANSPLANTED (H): ICD-10-CM

## 2021-08-11 DIAGNOSIS — E87.5 HYPERKALEMIA: ICD-10-CM

## 2021-08-11 DIAGNOSIS — Z94.0 HTN, KIDNEY TRANSPLANT RELATED: ICD-10-CM

## 2021-08-11 DIAGNOSIS — D84.9 IMMUNOSUPPRESSED STATUS (H): ICD-10-CM

## 2021-08-11 PROCEDURE — 99214 OFFICE O/P EST MOD 30 MIN: CPT | Mod: 95

## 2021-08-11 RX ORDER — VALGANCICLOVIR 450 MG/1
900 TABLET, FILM COATED ORAL DAILY
Qty: 60 TABLET | Refills: 1 | Status: SHIPPED | OUTPATIENT
Start: 2021-08-11 | End: 2021-09-03

## 2021-08-11 RX ORDER — SODIUM BICARBONATE 650 MG/1
1300 TABLET ORAL 2 TIMES DAILY
Qty: 120 TABLET | Refills: 3 | Status: SHIPPED | OUTPATIENT
Start: 2021-08-11 | End: 2021-09-30

## 2021-08-11 NOTE — LETTER
8/11/2021       RE: Erik Cramer  [address] [place] [id number]     Dear Colleague,    Thank you for referring your patient, Erik Cramer, to the [facility] [place] [facility]. Please see a copy of my visit note below.    Tye is a 41 year old who is being evaluated via a billable video visit.      How would you like to obtain your AVS? MyChart    Will anyone else be joining your video visit? No      Video Start Time: 9:38 AM  Video-Visit Details    Type of service:  Video Visit    Video End Time:9.55 AM    Originating Location (pt. Location): Home    Distant Location (provider location):  [facility] NEPHROLOGY [facility]     Platform used for Video Visit: [employer]    ACUTE TRANSPLANT NEPHROLOGY VISIT    Recommendations  BK PCR every 2 weeks  Increase valcyte to 900 mg daily  Increase sodium bicarbonate to 1300 mg BID  Stop calcium carbonate supplement   Increase coreg to 6.25 mg BID .   2 Weeks telephone follow up by Tx Coordinator to assess vitals to assess response to coreg dose increase      Assessment & Plan      # DDKT (SPK) stable              - Baseline Creatinine: ~ 1.2-1.3              - Proteinuria: mild (0.2-0.5g/g), July 2021               - Date DSA Last Checked: July/2021      Latest DSA: No DSA              - BK Viremia: yes ( July 2021) : 783 copis/ml --> continue to monitor [id number]. MMF  dose reduced               - Kidney Tx Biopsy: 6/18/21: no rejection.              # Pancreas Tx (SPK):               - Pancreatic Exocrine Drainage: Enteric drained                     - Blood glucose: near euglycemia       On insulin: No              - HbA1c: Last checked at time of transplant     A1c: 6%              - Pancreatic enzymes: remains elevated ~ 177               - Date DSA Last Checked: Jun/2021             Latest DSA: No DSA at time of transplant              - Pancreas Tx Biopsy:7/27/21: neg for CMR,  ABMR     - Marika pancreatic fluid collection was not collected due to decreasing size   - Change ASA 325mg to 81mg at 6m post txp    # Immunosuppression: Tacrolimus immediate release (goal 8-10), Mycophenolate mofetil (dose 750 mg every 12 hours)    -Continue with intensive monitoring of immunosuppression for efficacy and toxicity              - Induction: intermediate risk protocol, PRA 32%              - Changes: Not at this time. MMF decreased 2/2 BK viremia       # Infection Prophylaxis:   - PJP: Sulfa/TMP (Bactrim)   - CMV: Valcyte x3m - through 9/11/2021 --> increase dose to 900 mg daily   - Thrush: mycelex       # Hypertension: Uncontrolled ; Goal BP: < 140/90              - Volume status: Euvolemic                  - Changes: YES . Increase coreg to 6.25 mg BID . If he again develops dizziness , we will have to consider alternative antiHTN like amlodipine .             - 2 weeks follow up by TX coordinator regarding response to dose increase     # Peripancretic fluid collection:   - completed 3 weeks of augmentin through 7/14.     # Anemia in Chronic Renal Disease: Hgb: trending up ROSA M: No              - Iron studies: Unknown at this time, but checked with dialysis     # Mineral Bone Disorder:   - Secondary renal hyperparathyroidism; PTH level: Unknown at this time, but checked with dialysis        On treatment: None  - Vitamin D; level: normal         On supplement: Yes  - Calcium; level: Normal              On supplement: Yes, stop Calcium supplement   - Phosphorus; level: normal      On supplement: no     # Electrolytes:   - Potassium; level: normal        On supplement: No  - Magnesium; level: normal        On supplement: Yes  - Bicarbonate; level: normal        On supplement: Yes, increase continue bicarb 650mg bid  - Sodium; level: Normal    # Medical Compliance: Yes    # Transplant History:  Etiology of Kidney Failure: Diabetes mellitus type 2  Tx: SPK  Transplant: 6/11/2021 (Kidney / Pancreas)  Donor  Type: Donation after Brain Death Donor Class:   Crossmatch at time of Tx: negative  DSA at time of Tx: No  Significant changes in immunosuppression: None  CMV IgG Ab High Risk Discordance (D+/R-): No  EBV IgG Ab High Risk Discordance (D+/R-): No  Significant transplant-related complications: None and BK Viremia . MMF dose reduced to 750 mg BID     Transplant Office Phone Number: [phone]    Assessment and plan was discussed with the patient and he voiced his understanding and agreement.    Return visit: 10/11/21 for 4 month post transplant visit  Patient seen and staffed with Dr Mahamed Raymond MD     Physician Attestation   I, Saw Irby MD, personally examined and evaluated this patient.  I discussed the patient with the resident/fellow and care team, and agree with the assessment and plan of care as documented in the note on 08/11/21 .      I personally reviewed vital signs, medications, labs and imaging.    Saw Irby MD  Date of Service (when I saw the patient): 08/11/21          Chief Complaint   Mr. Cramer is a 41 year old here for routine follow up and immunosuppression management.     History of Present Illness     Patient states he is doing well  No SOB/CP/dizziness/lightheadedness  No Leg swelling   No Abd pain/N/V/D/Constipation  No focal weakness/altered sensation.  No voiding difficulty/hematuria /flank pain  No rash   No cheesy material in his mouth    Home vitals  150 - 170 /  , HR 85-92   Weight: 170-180 lbs   Wt Readings from Last 3 Encounters:   07/08/21 77.7 kg (171 lb 4.8 oz)   07/08/21 77.7 kg (171 lb 3.2 oz)   06/29/21 76.8 kg (169 lb 4.8 oz)         Problem List   Patient Active Problem List   Diagnosis     End stage kidney disease (H)     Hypertension secondary to other renal disorders     Secondary renal hyperparathyroidism (H)     Type 2 diabetes mellitus (H)     Hypertension     Anemia in chronic kidney disease     Vitamin D deficiency     Gallstones, common  bile duct     Kidney transplant candidate     Pancreas transplanted (H)     Kidney replaced by transplant     Immunosuppressed status (H)     Pancreatitis of pancreas transplant     Anemia due to blood loss, acute     Acute post-operative pain     Hypophosphatemia     Hypomagnesemia       Allergies   Allergies   Allergen Reactions     Metoprolol      SOB, but was also experiencing significant edema not drug associated       Medications   Current Outpatient Medications   Medication Sig     aspirin (ASA) 325 MG EC tablet Take 1 tablet (325 mg) by mouth daily     calcium carbonate (OS-SHAY) 1500 (600 Ca) MG tablet Take 600 mg by mouth 2 times daily (with meals)      carvedilol (COREG) 3.125 MG tablet Take 1 tablet (3.125 mg) by mouth 2 times daily     clotrimazole (MYCELEX) 10 MG lozenge Place 1 lozenge (10 mg) inside cheek 4 times daily     magnesium oxide (MAG-OX) 400 MG tablet Take 1 tablet (400 mg) by mouth 2 times daily     methocarbamol (ROBAXIN) 500 MG tablet Take 1 tablet (500 mg) by mouth every 8 hours as needed for muscle spasms     montelukast (SINGULAIR) 10 MG tablet Take 10 mg by mouth At Bedtime     mycophenolate (GENERIC EQUIVALENT) 250 MG capsule Take 3 capsules (750 mg) by mouth 2 times daily     omeprazole (PRILOSEC) 20 MG DR capsule Take 20 mg by mouth every morning     sodium bicarbonate 650 MG tablet Take 1 tablet (650 mg) by mouth 2 times daily     sulfamethoxazole-trimethoprim (BACTRIM) 400-80 MG tablet Take 1 tablet by mouth daily     tacrolimus (GENERIC EQUIVALENT) 0.5 MG capsule Take 1 capsule (0.5 mg) by mouth 2 times daily Total dose = 1.5 mg twice daily     tacrolimus (GENERIC EQUIVALENT) 1 MG capsule Take 1 capsule (1 mg) by mouth 2 times daily Total dose = 1.5 mg twice daily     valGANciclovir (VALCYTE) 450 MG tablet Take 1 tab (450mg) daily. Increase dose up to a max of 2 tabs (900 mg) by mouth daily when directed by your transplant team.     vitamin D3 (CHOLECALCIFEROL) 2000 units (50  mcg) tablet Take 1 tablet (2,000 Units) by mouth daily     acetaminophen (TYLENOL) 325 MG tablet Take 2 tablets (650 mg) by mouth every 4 hours as needed for other (For optimal non-opioid multimodal pain management to improve pain control.) (Patient not taking: Reported on 8/11/2021)     atorvastatin (LIPITOR) 20 MG tablet Take 1 tablet (20 mg) by mouth daily     ondansetron (ZOFRAN-ODT) 4 MG ODT tab Take 4 mg by mouth every 8 hours as needed for nausea (Patient not taking: Reported on 8/11/2021)     oxyCODONE (ROXICODONE) 5 MG tablet Take 1 tablet (5 mg) by mouth every 4 hours as needed for moderate to severe pain (Patient not taking: Reported on 8/11/2021)     polyethylene glycol (MIRALAX) 17 GM/Dose powder Take 17 g by mouth daily as needed for constipation (Patient not taking: Reported on 8/11/2021)     No current facility-administered medications for this visit.     There are no discontinued medications.    Physical Exam   There were no vitals taken for this visit.    GENERAL APPEARANCE: alert and no distress  HENT: mouth without ulcers or lesions  LYMPHATICS: no cervical or supraclavicular nodes  RESP: lungs clear to auscultation - no rales, rhonchi or wheezes  CV: regular rhythm, normal rate, no rub, no murmur  EDEMA: no LE edema bilaterally  ABDOMEN: soft, nondistended, nontender, bowel sounds normal  MS: extremities normal - no gross deformities noted, no evidence of inflammation in joints, no muscle tenderness  SKIN: no rash    Data     Renal Latest Ref Rng & Units 8/9/2021 8/5/2021 8/2/2021   Na 133 - 144 mmol/L - - -   Na (external) 136 - 145 mmol/L 141 140 143   K 3.4 - 5.3 mmol/L - - -   K (external) 3.5 - 5.1 mmol/L 4.8 5.0 4.9   Cl 94 - 109 mmol/L - - -   Cl (external) 98 - 109 mmol/L 110(H) 110(H) 111(H)   CO2 20 - 32 mmol/L - - -   CO2 (external) 20 - 29 mmol/L 20 20 22   BUN 7 - 30 mg/dL - - -   BUN (external) 7 - 26 mg/dL 23 19 22   Cr 0.66 - 1.25 mg/dL - - -   Cr (external) 0.73 - 1.18 mg/dL  1.27(H) 1.27(H) 1.35(H)   Glucose 70 - 99 mg/dL - - -   Glucose (external) 70 - 100 mg/dL 104(H) 106(H) 101(H)   Ca  8.5 - 10.1 mg/dL - - -   Ca (external) 8.4 - 10.4 mg/dL 9.6 9.7 9.2   Mg 1.6 - 2.3 mg/dL - - -   Mg (external) 1.6 - 2.6 mg/dL - 1.5(L) -     Bone Health Latest Ref Rng & Units 8/5/2021 7/29/2021 7/22/2021   Phos 2.5 - 4.5 mg/dL - - -   Phos (external) 2.3 - 4.7 mg/dL 2.1(L) 2.3 2.6   Vit D Def 20 - 75 ug/L - - -     Heme Latest Ref Rng & Units 8/9/2021 8/5/2021 8/2/2021   WBC 4.0 - 11.0 10e3/uL - - -   WBC (external) 3.5 - 10.5 x10(9)/L 4.7 4.6 4.4   Hgb 13.3 - 17.7 g/dL - - -   Hgb (external) 13.5 - 17.5 g/dL 12.0(L) 11.7(L) 11.2(L)   Plt 150 - 450 10e3/uL - - -   Plt (external) 150 - 450 x10(9)/L 285 304 292   ABSOLUTE NEUTROPHIL 1.6 - 8.3 10e9/L - - -   ABSOLUTE NEUTROPHILS (EXTERNAL) 1.7 - 7.0 10(9)/L 2.9 3.1 2.8   ABSOLUTE LYMPHOCYTES 0.8 - 5.3 10e9/L - - -   ABSOLUTE LYMPHOCYTES (EXTERNAL) 1.0 - 4.8 10(9)/L 1.2 1.1 1.1   ABSOLUTE MONOCYTES 0.0 - 1.3 10e9/L - - -   ABSOLUTE MONOCYTES (EXTERNAL) 0.2 - 0.9 10(9)/L 0.4 0.3 0.4   ABSOLUTE EOSINOPHILS 0.0 - 0.7 10e9/L - - -   ABSOLUTE EOSINOPHILS (EXTERNAL) 0.0 - 0.5 10(9)/L 0.1 0.1 0.1   ABSOLUTE BASOPHILS 0.0 - 0.2 10e9/L - - -   ABSOLUTE BASOPHILS (EXTERNAL) 0.0 - 0.3 10(9)/L 0.1 0.0 0.0   ABS IMMATURE GRANULOCYTES 0 - 0.4 10e9/L - - -   ABSOLUTE NUCLEATED RBC - - - -     Liver Latest Ref Rng & Units 6/10/2021 1/4/2021 12/9/2020   AP 40 - 150 U/L 84 - -   TBili 0.2 - 1.3 mg/dL 0.4 - -   ALT 0 - 70 U/L 32 47 48   AST 0 - 45 U/L 32 31 21   Tot Protein 6.8 - 8.8 g/dL 7.2 - -   Albumin 3.4 - 5.0 g/dL 3.9 - -     Pancreas Latest Ref Rng & Units 8/9/2021 8/5/2021 8/2/2021   A1C 0 - 5.6 % - - -   Amylase 30 - 110 U/L - - -   Amylase (external) 25 - 125 U/L 107 109 105   Lipase 73 - 393 U/L - - -   Lipase (external) 25 - 125 U/L 107 109 105        [facility] Txp Virology Latest Ref Rng & Units 7/26/2021 7/8/2021 6/10/2021   BK Spec - - Plasma -   BK Res BKNEG:BK  Virus DNA Not Detected copies/mL - BK Virus DNA Not Detected -   BK Log <2.7 Log copies/mL - Not Calculated -   BK Quant Log Ext log copies/mL 2.9 - -   BK Quant Result Ext Copies/mL 783 - -   EBV CAPSID ANTIBODY IGG 0.0 - 0.8 AI - - >8.0(H)   Hep B Core NR:Nonreactive - - Nonreactive        Recent Labs   Lab Test 06/25/21  0630 06/27/21  0702 07/08/21  0829   DOSTAC 2,000 Not Provided Not Provided   TACROL 9.5 8.4 17.6*       Again, thank you for allowing me to participate in the care of your patient.      Sincerely,    [facility] Transplant

## 2021-08-11 NOTE — PROGRESS NOTES
Tye is a 41 year old who is being evaluated via a billable video visit.      How would you like to obtain your AVS? MyChart    Will anyone else be joining your video visit? No      Video Start Time: 9:38 AM  Video-Visit Details    Type of service:  Video Visit    Video End Time:9.55 AM    Originating Location (pt. Location): Home    Distant Location (provider location):  [facility] [place]     Platform used for Video Visit: [employer]    ACUTE TRANSPLANT NEPHROLOGY VISIT    Recommendations  BK PCR every 2 weeks  Increase valcyte to 900 mg daily  Increase sodium bicarbonate to 1300 mg BID  Stop calcium carbonate supplement   Increase coreg to 6.25 mg BID .   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 MMF  dose reduced               - Kidney Tx Biopsy: 6/18/21: no rejection.              # Pancreas Tx (SPK):               - Pancreatic Exocrine Drainage: Enteric drained                     - Blood glucose: near euglycemia       On insulin: No              - HbA1c: Last checked at time of transplant     A1c: 6%              - Pancreatic enzymes: remains elevated ~ 177               - Date DSA Last Checked: Jun/2021             Latest DSA: No DSA at time of transplant              - Pancreas Tx Biopsy:7/27/21: neg for CMR, ABMR     - Marika pancreatic fluid collection was not collected due to decreasing size   - Change ASA 325mg to 81mg at 6m post txp    # Immunosuppression: Tacrolimus immediate release (goal 8-10), Mycophenolate mofetil (dose 750 mg every 12 hours)    -Continue with intensive monitoring of immunosuppression  for efficacy and toxicity              - Induction: intermediate risk protocol, PRA 32%              - Changes: Not at this time. MMF decreased 2/2 BK viremia       # Infection Prophylaxis:   - PJP: Sulfa/TMP (Bactrim)   - CMV: Valcyte x3m - through 9/11/2021 --> increase dose to 900 mg daily   - Thrush: mycelex       # Hypertension: Uncontrolled ; Goal BP: < 140/90              - Volume status: Euvolemic                  - Changes: YES . Increase coreg to 6.25 mg BID . If he again develops dizziness , we will have to consider alternative antiHTN like amlodipine .             - 2 weeks follow up by TX coordinator regarding response to dose increase     # Peripancretic fluid collection:   - completed 3 weeks of augmentin through 7/14.     # Anemia in Chronic Renal Disease: Hgb: trending up ROSA M: No              - Iron studies: Unknown at this time, but checked with dialysis     # Mineral Bone Disorder:   - Secondary renal hyperparathyroidism; PTH level: Unknown at this time, but checked with dialysis        On treatment: None  - Vitamin D; level: normal         On supplement: Yes  - Calcium; level: Normal              On supplement: Yes, stop Calcium supplement   - Phosphorus; level: normal      On supplement: no     # Electrolytes:   - Potassium; level: normal        On supplement: No  - Magnesium; level: normal        On supplement: Yes  - Bicarbonate; level: normal        On supplement: Yes, increase continue bicarb 650mg bid  - Sodium; level: Normal    # Medical Compliance: Yes    # Transplant History:  Etiology of Kidney Failure: Diabetes mellitus type 2  Tx: [facility]  Transplant: 6/11/2021 (Kidney / Pancreas)  Donor Type: Donation after Brain Death Donor Class:   Crossmatch at time of Tx: negative  DSA at time of Tx: No  Significant changes in immunosuppression: None  CMV IgG Ab High Risk Discordance (D+/R-): No  EBV IgG Ab High Risk Discordance (D+/R-): No  Significant transplant-related complications: None and BK  Viremia . MMF dose reduced to 750 mg BID     Transplant Office Phone Number: [phone]    Assessment and plan was discussed with the patient and he voiced his understanding and agreement.    Return visit: 10/11/21 for 4 month post transplant visit  Patient seen and staffed with Dr Mahamed Raymond MD     Physician Attestation   I, Saw Irby MD, personally examined and evaluated this patient.  I discussed the patient with the resident/fellow and care team, and agree with the assessment and plan of care as documented in the note on 08/11/21 .      I personally reviewed vital signs, medications, labs and imaging.    Saw Irby MD  Date of Service (when I saw the patient): 08/11/21          Chief Complaint   Mr. Cramer is a 41 year old here for routine follow up and immunosuppression management.     History of Present Illness     Patient states he is doing well  No SOB/CP/dizziness/lightheadedness  No Leg swelling   No Abd pain/N/V/D/Constipation  No focal weakness/altered sensation.  No voiding difficulty/hematuria /flank pain  No rash   No cheesy material in his mouth    Home vitals  150 - 170 /  , HR 85-92   Weight: 170-180 lbs   Wt Readings from Last 3 Encounters:   07/08/21 77.7 kg (171 lb 4.8 oz)   07/08/21 77.7 kg (171 lb 3.2 oz)   06/29/21 76.8 kg (169 lb 4.8 oz)         Problem List   Patient Active Problem List   Diagnosis     End stage kidney disease (H)     Hypertension secondary to other renal disorders     Secondary renal hyperparathyroidism (H)     Type 2 diabetes mellitus (H)     Hypertension     Anemia in chronic kidney disease     Vitamin D deficiency     Gallstones, common bile duct     Kidney transplant candidate     Pancreas transplanted (H)     Kidney replaced by transplant     Immunosuppressed status (H)     Pancreatitis of pancreas transplant     Anemia due to blood loss, acute     Acute post-operative pain     Hypophosphatemia     Hypomagnesemia       Allergies    Allergies   Allergen Reactions     Metoprolol      SOB, but was also experiencing significant edema not drug associated       Medications   Current Outpatient Medications   Medication Sig     aspirin (ASA) 325 MG EC tablet Take 1 tablet (325 mg) by mouth daily     calcium carbonate (OS-SHAY) 1500 (600 Ca) MG tablet Take 600 mg by mouth 2 times daily (with meals)      carvedilol (COREG) 3.125 MG tablet Take 1 tablet (3.125 mg) by mouth 2 times daily     clotrimazole (MYCELEX) 10 MG lozenge Place 1 lozenge (10 mg) inside cheek 4 times daily     magnesium oxide (MAG-OX) 400 MG tablet Take 1 tablet (400 mg) by mouth 2 times daily     methocarbamol (ROBAXIN) 500 MG tablet Take 1 tablet (500 mg) by mouth every 8 hours as needed for muscle spasms     montelukast (SINGULAIR) 10 MG tablet Take 10 mg by mouth At Bedtime     mycophenolate (GENERIC EQUIVALENT) 250 MG capsule Take 3 capsules (750 mg) by mouth 2 times daily     omeprazole (PRILOSEC) 20 MG DR capsule Take 20 mg by mouth every morning     sodium bicarbonate 650 MG tablet Take 1 tablet (650 mg) by mouth 2 times daily     sulfamethoxazole-trimethoprim (BACTRIM) 400-80 MG tablet Take 1 tablet by mouth daily     tacrolimus (GENERIC EQUIVALENT) 0.5 MG capsule Take 1 capsule (0.5 mg) by mouth 2 times daily Total dose = 1.5 mg twice daily     tacrolimus (GENERIC EQUIVALENT) 1 MG capsule Take 1 capsule (1 mg) by mouth 2 times daily Total dose = 1.5 mg twice daily     valGANciclovir (VALCYTE) 450 MG tablet Take 1 tab (450mg) daily. Increase dose up to a max of 2 tabs (900 mg) by mouth daily when directed by your transplant team.     vitamin D3 (CHOLECALCIFEROL) 2000 units (50 mcg) tablet Take 1 tablet (2,000 Units) by mouth daily     acetaminophen (TYLENOL) 325 MG tablet Take 2 tablets (650 mg) by mouth every 4 hours as needed for other (For optimal non-opioid multimodal pain management to improve pain control.) (Patient not taking: Reported on 8/11/2021)     atorvastatin  (LIPITOR) 20 MG tablet Take 1 tablet (20 mg) by mouth daily     ondansetron (ZOFRAN-ODT) 4 MG ODT tab Take 4 mg by mouth every 8 hours as needed for nausea (Patient not taking: Reported on 8/11/2021)     oxyCODONE (ROXICODONE) 5 MG tablet Take 1 tablet (5 mg) by mouth every 4 hours as needed for moderate to severe pain (Patient not taking: Reported on 8/11/2021)     polyethylene glycol (MIRALAX) 17 GM/Dose powder Take 17 g by mouth daily as needed for constipation (Patient not taking: Reported on 8/11/2021)     No current facility-administered medications for this visit.     There are no discontinued medications.    Physical Exam   There were no vitals taken for this visit.    GENERAL APPEARANCE: alert and no distress  HENT: mouth without ulcers or lesions  LYMPHATICS: no cervical or supraclavicular nodes  RESP: lungs clear to auscultation - no rales, rhonchi or wheezes  CV: regular rhythm, normal rate, no rub, no murmur  EDEMA: no LE edema bilaterally  ABDOMEN: soft, nondistended, nontender, bowel sounds normal  MS: extremities normal - no gross deformities noted, no evidence of inflammation in joints, no muscle tenderness  SKIN: no rash    Data     Renal Latest Ref Rng & Units 8/9/2021 8/5/2021 8/2/2021   Na 133 - 144 mmol/L - - -   Na (external) 136 - 145 mmol/L 141 140 143   K 3.4 - 5.3 mmol/L - - -   K (external) 3.5 - 5.1 mmol/L 4.8 5.0 4.9   Cl 94 - 109 mmol/L - - -   Cl (external) 98 - 109 mmol/L 110(H) 110(H) 111(H)   CO2 20 - 32 mmol/L - - -   CO2 (external) 20 - 29 mmol/L 20 20 22   BUN 7 - 30 mg/dL - - -   BUN (external) 7 - 26 mg/dL 23 19 22   Cr 0.66 - 1.25 mg/dL - - -   Cr (external) 0.73 - 1.18 mg/dL 1.27(H) 1.27(H) 1.35(H)   Glucose 70 - 99 mg/dL - - -   Glucose (external) 70 - 100 mg/dL 104(H) 106(H) 101(H)   Ca  8.5 - 10.1 mg/dL - - -   Ca (external) 8.4 - 10.4 mg/dL 9.6 9.7 9.2   Mg 1.6 - 2.3 mg/dL - - -   Mg (external) 1.6 - 2.6 mg/dL - 1.5(L) -     Bone Health Latest Ref Rng & Units 8/5/2021  7/29/2021 7/22/2021   Phos 2.5 - 4.5 mg/dL - - -   Phos (external) 2.3 - 4.7 mg/dL 2.1(L) 2.3 2.6   Vit D Def 20 - 75 ug/L - - -     Heme Latest Ref Rng & Units 8/9/2021 8/5/2021 8/2/2021   WBC 4.0 - 11.0 10e3/uL - - -   WBC (external) 3.5 - 10.5 x10(9)/L 4.7 4.6 4.4   Hgb 13.3 - 17.7 g/dL - - -   Hgb (external) 13.5 - 17.5 g/dL 12.0(L) 11.7(L) 11.2(L)   Plt 150 - 450 10e3/uL - - -   Plt (external) 150 - 450 x10(9)/L 285 304 292   ABSOLUTE NEUTROPHIL 1.6 - 8.3 10e9/L - - -   ABSOLUTE NEUTROPHILS (EXTERNAL) 1.7 - 7.0 10(9)/L 2.9 3.1 2.8   ABSOLUTE LYMPHOCYTES 0.8 - 5.3 10e9/L - - -   ABSOLUTE LYMPHOCYTES (EXTERNAL) 1.0 - 4.8 10(9)/L 1.2 1.1 1.1   ABSOLUTE MONOCYTES 0.0 - 1.3 10e9/L - - -   ABSOLUTE MONOCYTES (EXTERNAL) 0.2 - 0.9 10(9)/L 0.4 0.3 0.4   ABSOLUTE EOSINOPHILS 0.0 - 0.7 10e9/L - - -   ABSOLUTE EOSINOPHILS (EXTERNAL) 0.0 - 0.5 10(9)/L 0.1 0.1 0.1   ABSOLUTE BASOPHILS 0.0 - 0.2 10e9/L - - -   ABSOLUTE BASOPHILS (EXTERNAL) 0.0 - 0.3 10(9)/L 0.1 0.0 0.0   ABS IMMATURE GRANULOCYTES 0 - 0.4 10e9/L - - -   ABSOLUTE NUCLEATED RBC - - - -     Liver Latest Ref Rng & Units 6/10/2021 1/4/2021 12/9/2020   AP 40 - 150 U/L 84 - -   TBili 0.2 - 1.3 mg/dL 0.4 - -   ALT 0 - 70 U/L 32 47 48   AST 0 - 45 U/L 32 31 21   Tot Protein 6.8 - 8.8 g/dL 7.2 - -   Albumin 3.4 - 5.0 g/dL 3.9 - -     Pancreas Latest Ref Rng & Units 8/9/2021 8/5/2021 8/2/2021   A1C 0 - 5.6 % - - -   Amylase 30 - 110 U/L - - -   Amylase (external) 25 - 125 U/L 107 109 105   Lipase 73 - 393 U/L - - -   Lipase (external) 25 - 125 U/L 107 109 105        [facility] Txp Virology Latest Ref Rng & Units 7/26/2021 7/8/2021 6/10/2021   BK Spec - - Plasma -   BK Res BKNEG:BK Virus DNA Not Detected copies/mL - BK Virus DNA Not Detected -   BK Log <2.7 Log copies/mL - Not Calculated -   BK Quant Log Ext log copies/mL 2.9 - -   BK Quant Result Ext Copies/mL 783 - -   EBV CAPSID ANTIBODY IGG 0.0 - 0.8 AI - - >8.0(H)   Hep B Core NR:Nonreactive - - Nonreactive        Recent  Labs   Lab Test 06/25/21  0630 06/27/21  0702 07/08/21  0829   DOSTAC 2,000 Not Provided Not Provided   TACROL 9.5 8.4 17.6*

## 2021-08-11 NOTE — PATIENT INSTRUCTIONS
Increase valcyte to 900 mg daily  Increase sodium bicarbonate to 1300 mg BID  Stop calcium carbonate supplement   Increase coreg to 6.25 mg BID .

## 2021-08-12 ENCOUNTER — VIRTUAL VISIT (OUTPATIENT)
Dept: PHARMACY | Facility: CLINIC | Age: 41
End: 2021-08-12
Attending: TRANSPLANT SURGERY

## 2021-08-12 DIAGNOSIS — I15.1 HTN, KIDNEY TRANSPLANT RELATED: ICD-10-CM

## 2021-08-12 DIAGNOSIS — Z94.0 KIDNEY REPLACED BY TRANSPLANT: ICD-10-CM

## 2021-08-12 DIAGNOSIS — J30.2 SEASONAL ALLERGIC RHINITIS, UNSPECIFIED TRIGGER: ICD-10-CM

## 2021-08-12 DIAGNOSIS — K21.9 GASTROESOPHAGEAL REFLUX DISEASE, UNSPECIFIED WHETHER ESOPHAGITIS PRESENT: Primary | ICD-10-CM

## 2021-08-12 DIAGNOSIS — Z94.83 PANCREAS TRANSPLANTED (H): ICD-10-CM

## 2021-08-12 DIAGNOSIS — E78.5 HYPERLIPIDEMIA LDL GOAL <130: ICD-10-CM

## 2021-08-12 DIAGNOSIS — Z94.0 HTN, KIDNEY TRANSPLANT RELATED: ICD-10-CM

## 2021-08-12 PROCEDURE — 86832 HLA CLASS I HIGH DEFIN QUAL: CPT | Performed by: INTERNAL MEDICINE

## 2021-08-12 PROCEDURE — 86828 HLA CLASS I&II ANTIBODY QUAL: CPT | Mod: XU | Performed by: INTERNAL MEDICINE

## 2021-08-12 PROCEDURE — 99207 PR NO CHARGE LOS: CPT | Performed by: PHARMACIST

## 2021-08-12 PROCEDURE — 86833 HLA CLASS II HIGH DEFIN QUAL: CPT | Performed by: INTERNAL MEDICINE

## 2021-08-12 RX ORDER — CARVEDILOL 6.25 MG/1
6.25 TABLET ORAL 2 TIMES DAILY
Qty: 60 TABLET | Refills: 3 | Status: SHIPPED | OUTPATIENT
Start: 2021-08-12 | End: 2021-12-21

## 2021-08-12 RX ORDER — FAMOTIDINE 20 MG/1
20 TABLET, FILM COATED ORAL 2 TIMES DAILY
Qty: 60 TABLET | Refills: 1 | Status: SHIPPED | OUTPATIENT
Start: 2021-08-12 | End: 2021-10-11

## 2021-08-12 NOTE — PROGRESS NOTES
Medication Therapy Management (MTM) Encounter    ASSESSMENT:                            Medication Adherence/Access: No issues identified    Renal/Pancreas Transplant:  Stable.     GERD: Competed 8 weeks of omeprazole treatment. Will taper off. Use Famotidine PRN GERD sx.     Hypertension: BPs not at goal <140/90 2 months post kidney transplant. Carvedilol was increased to 6.25mg twice daily yesterday. Sent over updated script for patient.     Hyperlipidemia: Patient states leg cramps have been improving, will consider restarting or trying another statin at 4 months post transplant.     Allergic Rhinitis: Stable.     PLAN:                            1. Take Omeprazole 20mg M, W,F for 2 weeks then stop.  2. Start Famotidine 20mg twice daily as needed for rebound heartburn.     Follow-up: 2 months post txp    SUBJECTIVE/OBJECTIVE:                          Erik Cramer is a 41 year old male called for a follow-up visit. He was referred to me from txp team.  Today's visit is a follow-up MTM visit from 2 months ago     Reason for visit: 2 months post txp.    Allergies/ADRs: Reviewed in chart  Past Medical History: Reviewed in chart  Tobacco: He reports that he has never smoked. He has never used smokeless tobacco.  Alcohol: not currently using    Medication Adherence/Access: no issues reported    Renal/Pancreas Transplant:  Current immunosuppressants include Tacrolimus 1.5mg twice daily and Mycophenolate Mofetil 750mg twice daily.  Pt reports no side effects  Aspirin 325mg daily  Transplant date: 6/11/21  CMV prophylaxis: CrCl >/=60 mL/minute: Valcyte 900mg daily Treat 3 months post tx   PCP prophylaxis: Bactrim S S daily  Antifungal Prophylaxis: Clotrimazole QID   Supplements: Sodium Bicarb 1300mg twice daily, Mag oxide 800mg daily, Vitamin D daily  Tx Coordinator: Ibrahima Blackmon MD: Dr. Irby, Using Med Card: Yes  Immunizations: annual flu shot 2020; [id number]:  2017; Prevnar 13: 2020; TDaP:  2017; Shingrix:  none, HBV: 2020, COVID: 2021x2 Pfizer   Ref. Range 8/2/2021 08:15 8/5/2021 08:10 8/9/2021 08:00   CO2 (External) Latest Ref Range: 20 - 29 mmol/L 22 20 20     Magnesium   Date Value Ref Range Status   07/08/2021 1.6 1.6 - 2.3 mg/dL Final     GERD: Current medications include: Prilosec (omeprazole) 20mg once daily. Pt reports no current symptoms.  Patient feels that current regimen is effective. Has been taking Omeprazole since before transplant.     Hypertension: Current medications include Carvedilol 6.25mg twice daily (increased yesterday).  Patient 155.  Patient reports no current medication side effects.  BP Readings from Last 3 Encounters:   07/27/21 (!) 155/96   07/08/21 137/89   07/08/21 137/89     Hyperlipidemia: Current therapy includes no medications. Atorvastatin was stopped due to muscle cramping.  Patient reports no significant myalgias or other side effects recently.   The ASCVD Risk score ([place] DC Jr., et al., 2013) failed to calculate for the following reasons:    The 2013 ASCVD risk score is only valid for ages 40 to 79  Recent Labs   Lab Test 08/30/20  2338   CHOL 133   HDL 62   LDL 49   TRIG 109     Allergic Rhinitis: Current medications include Montelukast 10mg daily. Primary symptoms are nasal congestion and post-nasal drip, coughing.  Primary triggers are unknown.  Patient feels that current therapy is effective.       Today's Vitals: There were no vitals taken for this visit.  ----------------    I spent 15 minutes with this patient today. All changes were made via collaborative practice agreement with Dr. vargas. A copy of the visit note was provided to the patient's referring provider.    The patient was sent via [employer] a summary of these recommendations.     Lobo Bartlett, PharmD  MTM Pharmacist    Phone: [phone]     Telemedicine Visit Details  Type of service:  Telephone visit  Start Time: 2:04 PM  End Time: 2:17 PM  Originating Location (patient location): Home  Distant Location  (provider location):  [facility] SPECIALTY MTM     Medication Therapy Recommendations  Gastroesophageal reflux disease, unspecified whether esophagitis present    Current Medication: omeprazole (PRILOSEC) 20 MG DR capsule   Rationale: No medical indication at this time - Unnecessary medication therapy - Indication   Recommendation: Discontinue Medication   Status: Accepted per CPA

## 2021-08-12 NOTE — PATIENT INSTRUCTIONS
Recommendations from today's MTM visit:                                                       1. Take Omeprazole 20mg M, W,F for 2 weeks then stop.  2. Start Famotidine 20mg twice daily as needed for rebound heartburn.     Follow-up: 2 months    It was great to speak with you today.  I value your experience and would be very thankful for your time with providing feedback on our clinic survey. You may receive a survey via email or text message in the next few days.     To schedule another MTM appointment, please call the clinic directly or you may call the MTM scheduling line at [phone] or toll-free at [phone].     My Clinical Pharmacist's contact information:                                                      Please feel free to contact me with any questions or concerns you have.      Lobo Bartlett, PharmD  [facility] Pharmacist    Phone: [phone]

## 2021-08-13 ENCOUNTER — TELEPHONE (OUTPATIENT)
Dept: TRANSPLANT | Facility: CLINIC | Age: 41
End: 2021-08-13

## 2021-08-13 NOTE — TELEPHONE ENCOUNTER
ISSUE:  Creat 1.4 (baseline 1.2-1.3) on 8/12  Tac pending, has been elevated.     Last tac level 17.9 on 8/9, dose was reduced from 2 mg bid to 1.5 mg bid on 8/10. Currently on clotrimazole troches. Await repeat tacrolimus level from 8/12.

## 2021-08-17 DIAGNOSIS — Z94.0 KIDNEY REPLACED BY TRANSPLANT: Primary | ICD-10-CM

## 2021-08-17 DIAGNOSIS — Z94.83 PANCREAS TRANSPLANTED (H): ICD-10-CM

## 2021-08-17 RX ORDER — TACROLIMUS 0.5 MG/1
CAPSULE ORAL
Qty: 60 CAPSULE | Refills: 11
Start: 2021-08-17 | End: 2021-12-07

## 2021-08-17 RX ORDER — TACROLIMUS 1 MG/1
1 CAPSULE ORAL 2 TIMES DAILY
Qty: 60 CAPSULE | Refills: 11 | Status: SHIPPED | OUTPATIENT
Start: 2021-08-17 | End: 2021-09-03

## 2021-08-17 NOTE — TELEPHONE ENCOUNTER
ISSUE:   Tacrolimus IR level 15.6 on 8/12, goal 8-10, dose 1.5 mg BID.    PLAN:   Please call patient and confirm this was an accurate 12-hour trough. Verify Tacrolimus IR dose 1.5 mg BID. Confirm no new medications or illness. Confirm no missed doses. If accurate trough and accurate dose, decrease Tacrolimus IR dose to 1 mg BID and repeat labs as scheduled.    Twyla Chang RN    OUTCOME:   Spoke with patient, they confirm accurate trough level and current dose 1.5 mg BID. Patient confirmed dose change to 1 mg BID and to repeat labs in 2 days. Orders sent to preferred pharmacy for dose change and lab for repeat labs. Patient voiced understanding of plan.

## 2021-08-17 NOTE — TELEPHONE ENCOUNTER
Left message and sent [employer] message to patient regarding:  Tacrolimus IR level 15.6 on 8/12, goal 8-10, dose 1.5 mg BID.     PLAN:   Please call patient and confirm this was an accurate 12-hour trough. Verify Tacrolimus IR dose 1.5 mg BID. Confirm no new medications or illness. Confirm no missed doses. If accurate trough and accurate dose, decrease Tacrolimus IR dose to 1 mg BID and repeat labs as scheduled

## 2021-08-18 ENCOUNTER — TELEPHONE (OUTPATIENT)
Dept: TRANSPLANT | Facility: CLINIC | Age: 41
End: 2021-08-18

## 2021-08-18 NOTE — TELEPHONE ENCOUNTER
Spoke to patient who verbalizes understanding of the following:  Ok to take famotidine with other medication. Dose is 20 mg up to twice daily as needed for GERD symptoms.

## 2021-08-18 NOTE — TELEPHONE ENCOUNTER
Ok to take famotidine with other medication. Dose is 20 mg up to twice daily as needed for GERD symptoms. Please notify patient.

## 2021-08-18 NOTE — TELEPHONE ENCOUNTER
Patient Call: Medication Clarification  Famotidine 20mg  Tye is wondering if he can take with other medications and what is the dose and if it is PRN?     Call back needed? Yes    Return Call Needed  Same as documented in contacts section  When to return call?: Same day: Route High Priority

## 2021-09-01 ENCOUNTER — TEAM CONFERENCE (OUTPATIENT)
Dept: TRANSPLANT | Facility: CLINIC | Age: 41
End: 2021-09-01

## 2021-09-01 NOTE — TELEPHONE ENCOUNTER
Post Kidney and Pancreas Transplant Team Conference  Date: 9/1/2021  Transplant Coordinator: Twyla Chang     Attendees:  []  Dr. Fraser  [] Anika Gann LPN     []  Dr. Brand [] Dolores Horn, YING [] Lela Lazaro LPN   []  Dr. Lane [] Ene Melvin RN    []  Dr. Irby [] Annalise Lzoano RN [] Lobo Bartlett, SandraD   [] Dr. Kennedy [] Mia Marvin RN    [] Dr. Syed [] Dilip Plaza RN    [] Dr. Welsh [] Jewell Page RN    [] Dr. Randhawa [] Tg Gomez RN    []  Dr. Vega [] Sinai Smith, YING    [] Surgery Fellow [] Twyla Chang RN    [] Leilani Vincent NP [] Kate Ackerman RN        Verbal Plan Read Back:   No changes to lab or medication schedule.   Will continue to monitor    Routed to RN Coordinator   Lela Lazaro LPN

## 2021-09-02 DIAGNOSIS — Z94.83 PANCREAS TRANSPLANTED (H): Primary | ICD-10-CM

## 2021-09-02 DIAGNOSIS — Z94.0 KIDNEY REPLACED BY TRANSPLANT: Primary | ICD-10-CM

## 2021-09-02 DIAGNOSIS — Z94.0 KIDNEY REPLACED BY TRANSPLANT: ICD-10-CM

## 2021-09-02 DIAGNOSIS — Z94.83 PANCREAS TRANSPLANTED (H): ICD-10-CM

## 2021-09-02 RX ORDER — CLOTRIMAZOLE 10 MG/1
10 LOZENGE ORAL 4 TIMES DAILY
Qty: 120 LOZENGE | Refills: 2 | OUTPATIENT
Start: 2021-09-02

## 2021-09-02 NOTE — TELEPHONE ENCOUNTER
Patient Call: Medication Refill  Route to LPN  Instruct the patient to first contact their pharmacy. If they have called their pharmacy and require further assistance, route to LPN.    clotrimazole (MYCELEX) 10 MG lozenge    Confirm the dose   When will the patient be out of this medication?: Less than 3 days (Route high priority)

## 2021-09-02 NOTE — TELEPHONE ENCOUNTER
Provider Call: Medication Refill  Route to LPN  [facility] Name: [facility]  Pharmacy Location: [place]   Name of Medication: clotrimazole (MYCELEX) 10 MG lozenge  When will the patient be out of this medication?: Less than 3 days (Route high priority)  Callback needed? No

## 2021-09-03 ENCOUNTER — TELEPHONE (OUTPATIENT)
Dept: TRANSPLANT | Facility: CLINIC | Age: 41
End: 2021-09-03

## 2021-09-03 DIAGNOSIS — Z94.0 KIDNEY REPLACED BY TRANSPLANT: ICD-10-CM

## 2021-09-03 DIAGNOSIS — Z94.83 PANCREAS TRANSPLANTED (H): ICD-10-CM

## 2021-09-03 RX ORDER — TACROLIMUS 1 MG/1
2 CAPSULE ORAL 2 TIMES DAILY
Qty: 120 CAPSULE | Refills: 11 | Status: SHIPPED | OUTPATIENT
Start: 2021-09-03 | End: 2021-09-20

## 2021-09-03 NOTE — TELEPHONE ENCOUNTER
Ok to stop clotrimazole troches and Valcyte (CMV IgG+) next week, 3 moths post-transplant. Will increase tacrolimus from 1mg bid to 2 mg bid on day he stops the troches.

## 2021-09-20 DIAGNOSIS — Z94.83 PANCREAS TRANSPLANTED (H): ICD-10-CM

## 2021-09-20 DIAGNOSIS — Z94.0 KIDNEY REPLACED BY TRANSPLANT: ICD-10-CM

## 2021-09-20 RX ORDER — TACROLIMUS 1 MG/1
4 CAPSULE ORAL 2 TIMES DAILY
Qty: 240 CAPSULE | Refills: 11 | Status: SHIPPED | OUTPATIENT
Start: 2021-09-20 | End: 2021-10-12

## 2021-09-20 NOTE — TELEPHONE ENCOUNTER
ISSUE:   Tacrolimus IR level 4.8 on 9/15, goal 8-10, dose 2 mg BID.  Tye stopped clotrimazole troches on 9/8 and increased tac from 1 mg bid to 2 mg bid.     PLAN:   Please call patient and confirm this was an accurate 12-hour trough. Verify Tacrolimus IR dose 2 mg BID. Confirm no new medications or illness. Confirm no missed doses. If accurate trough and accurate dose, increase Tacrolimus IR dose to 4 mg BID and repeat labs in 1 weeks    OUTCOME:   Tye confirmed current tacrolimus dose, trough, and no illness. Will increase dose to 4 mg bid and repeat a level on Thursday.

## 2021-09-24 ENCOUNTER — TELEPHONE (OUTPATIENT)
Dept: TRANSPLANT | Facility: CLINIC | Age: 41
End: 2021-09-24

## 2021-09-24 NOTE — TELEPHONE ENCOUNTER
ISSUE:  Elevated lipase.     Recommended good bowel clean out, 2-3 soft BMs per day, and repeat labs again in 1 week. Verbalized understanding.

## 2021-09-30 ENCOUNTER — TELEPHONE (OUTPATIENT)
Dept: TRANSPLANT | Facility: CLINIC | Age: 41
End: 2021-09-30

## 2021-09-30 DIAGNOSIS — Z48.288 ENCOUNTER FOR AFTERCARE FOLLOWING MULTIPLE ORGAN TRANSPLANT: ICD-10-CM

## 2021-09-30 DIAGNOSIS — E87.20 METABOLIC ACIDOSIS: ICD-10-CM

## 2021-09-30 DIAGNOSIS — E87.5 HYPERKALEMIA: ICD-10-CM

## 2021-09-30 RX ORDER — SODIUM BICARBONATE 650 MG/1
650 TABLET ORAL 2 TIMES DAILY
Qty: 120 TABLET | Refills: 3
Start: 2021-09-30 | End: 2021-12-01

## 2021-10-01 NOTE — TELEPHONE ENCOUNTER
ISSUE:  Low level BK  Currently checking every 2 weeks    Current immunosuppression:   -Tac goal 8-10, last level 8.2  -, last MPA level 3.0    Send to Dr. Lacy for review.

## 2021-10-05 ENCOUNTER — TELEPHONE (OUTPATIENT)
Dept: TRANSPLANT | Facility: CLINIC | Age: 41
End: 2021-10-05

## 2021-10-05 NOTE — TELEPHONE ENCOUNTER
Patient Call: General  Route to LPN    Reason for call: connect with pt regarding question for coordinator     Call back needed? Yes    Return Call Needed  Same as documented in contacts section  When to return call?: Greater than one day: Route standard priority

## 2021-10-10 ENCOUNTER — HEALTH MAINTENANCE LETTER (OUTPATIENT)
Age: 41
End: 2021-10-10

## 2021-10-11 ENCOUNTER — TELEPHONE (OUTPATIENT)
Dept: TRANSPLANT | Facility: CLINIC | Age: 41
End: 2021-10-11

## 2021-10-11 ENCOUNTER — VIRTUAL VISIT (OUTPATIENT)
Dept: PHARMACY | Facility: CLINIC | Age: 41
End: 2021-10-11

## 2021-10-11 DIAGNOSIS — Z94.83 PANCREAS TRANSPLANTED (H): ICD-10-CM

## 2021-10-11 DIAGNOSIS — K21.9 GASTROESOPHAGEAL REFLUX DISEASE, UNSPECIFIED WHETHER ESOPHAGITIS PRESENT: ICD-10-CM

## 2021-10-11 DIAGNOSIS — E78.5 HYPERLIPIDEMIA LDL GOAL <130: ICD-10-CM

## 2021-10-11 DIAGNOSIS — Z94.0 KIDNEY REPLACED BY TRANSPLANT: ICD-10-CM

## 2021-10-11 DIAGNOSIS — Z94.83 HISTORY OF SIMULTANEOUS KIDNEY AND PANCREAS TRANSPLANT (H): Primary | ICD-10-CM

## 2021-10-11 DIAGNOSIS — Z94.0 HISTORY OF SIMULTANEOUS KIDNEY AND PANCREAS TRANSPLANT (H): Primary | ICD-10-CM

## 2021-10-11 DIAGNOSIS — Z94.0 HTN, KIDNEY TRANSPLANT RELATED: ICD-10-CM

## 2021-10-11 DIAGNOSIS — I15.1 HTN, KIDNEY TRANSPLANT RELATED: ICD-10-CM

## 2021-10-11 PROCEDURE — 99207 PR NO CHARGE LOS: CPT | Performed by: PHARMACIST

## 2021-10-11 RX ORDER — ROSUVASTATIN CALCIUM 5 MG/1
5 TABLET, COATED ORAL DAILY
Qty: 30 TABLET | Refills: 3 | Status: SHIPPED | OUTPATIENT
Start: 2021-10-11 | End: 2022-01-05

## 2021-10-11 NOTE — LETTER
PHYSICIAN ORDERS      DATE & TIME ISSUED: 2021 12:43 PM  PATIENT NAME: Erik Cramer   : 1980     [facility] MR# [if applicable]: [id number]     DIAGNOSIS:  kidney/pancreas transplant   ICD-10 CODE: Z94.0, Z94.83     In addition to regularly scheduled labs, please check the following:   -BK Virus Quant (blood) every other week x8  -IgG once  -mycophenolic acid drug level once    Any questions please call: [phone]  Fax results to:   [phone].      Flaco Lacy MD   of Medicine  Division of Nephrology and Hypertension   Transplant Nephrology

## 2021-10-11 NOTE — PATIENT INSTRUCTIONS
Recommendations from today's MTM visit:                                                       1. Reduce Tacrolimus to 3.5mg twice daily.  2. Replace the batteries in your cuff and check. Let us know if your blood pressures are running over 140/90. We want you around 130/80.   3. Start taking Rosuvastatin 5mg daily. Let me know if this gives you muscle cramps.  4. Recommend Shingrix vaccine at 6 months post transplant.     Follow-up: as needed    It was great to speak with you today.  I value your experience and would be very thankful for your time with providing feedback on our clinic survey. You may receive a survey via email or text message in the next few days.     To schedule another MTM appointment, please call the clinic directly or you may call the MTM scheduling line at [phone] or toll-free at [phone].     My Clinical Pharmacist's contact information:                                                      Please feel free to contact me with any questions or concerns you have.      Lobo Bartlett, Carlos  MTM Pharmacist    Phone: [phone]

## 2021-10-11 NOTE — TELEPHONE ENCOUNTER
ISSUE:  Tac 13.3 (goal 8-10)   Missing BK    Spoke with Tye who confirmed trough, no diarrhea. Currently taking 4 mg bid. Will reduce to 3 mg bid and repeat a level tomorrow.     Due for BK check. Tye will remind  to draw this tomorrow.

## 2021-10-11 NOTE — PROGRESS NOTES
Medication Therapy Management (MTM) Encounter    ASSESSMENT:                            Medication Adherence/Access: No issues identified    Renal Transplant/ Pancreas Transplant:  Last Tacrolimus level was 13.1, spoke with his coordinator, we will reduce his Tacrolimus to 3.5mg twice daily. At 6 months post transplant he will be due for Shingrix, which is approved for txp patients <51 yo.      GERD: Resolved.     Hypertension: BP not at goal 130/80, patient states his BPs run better in the clinic and he thinks his BP cuff is misreading as it is low on a batteries.     Hyperlipidemia: Moderate intensity statin recommended for post transplant patients, will start Rosuvastatin 5mg daily for patient.     PLAN:                            1. Reduce Tacrolimus to 3.5mg twice daily.  2. Replace the batteries in your cuff and check. Let us know if your blood pressures are running over 140/90. We want you around 130/80.   3. Start taking Rosuvastatin 5mg daily.   4. Recommend Shingrix vaccine at 6 months post transplant.     Follow-up: as needed      SUBJECTIVE/OBJECTIVE:                          Erik Cramer is a 41 year old male called for a follow-up visit. He was referred to me from txp team.  Today's visit is a follow-up MTM visit from 8/12.    Reason for visit: 4 months post txp.    Allergies/ADRs: Reviewed in chart  Past Medical History: Reviewed in chart  Tobacco: He reports that he has never smoked. He has never used smokeless tobacco.  Alcohol: not currently using    Medication Adherence/Access: no issues reported    Renal Transplant/ Pancreas Transplant:  Current immunosuppressants include TAC 4mg BID and Mycophenolate Mofetil 750mg twice daily.  Pt reports no side effects  Aspirin 325mg daily (denies bleed sx).  Transplant date: 6/11/21  CMV prophylaxis: Treated 3 months post tx   PCP prophylaxis: Bactrim S S daily  Supplements: Mag Oxide 400mg BID ( 2 hours from MMF), Sodium Bicarb 650mg twice daily  Tx  Coordinator: Ibrahima Blackmon MD: Dr. Irby, Using Med Card: Yes  Immunizations: annual flu shot 2020; Oaghtfpmdi14:  2017; Prevnar 13: 2020; TDaP:  2017; Shingrix: none, HBV: 2020, COVID: 2021x2 Pfizer  Results for ANAY MCKENNA (MRN [id number]) as of 10/11/2021 12:24   Ref. Range 2021 07:56 2021 08:08 10/6/2021 08:13   CO2 (External) Latest Ref Range: 20 - 29 mmol/L  24 23      GERD: Current medications include: no medications, tapered off PPI. No GERD sx.     Hypertension: Current medications include Carvedilol 6.25mg twice daily.  Patient does self-monitor blood pressure. This mornin/85. 145/84 right now. His cuff has a low battery.  Patient reports no current medication side effects.  BP Readings from Last 3 Encounters:   21 (!) 155/96   21 137/89   21 137/89     Hyperlipidemia: Taking no medications currently. Held Atorvastatin due to   Patient reports no significant myalgias or other side effects.  The ASCVD Risk score (Hazel Hurst DC Jr., et al., 2013) failed to calculate for the following reasons:    The 2013 ASCVD risk score is only valid for ages 40 to 79  Recent Labs   Lab Test 20  2338   CHOL 133   HDL 62   LDL 49   TRIG 109     Today's Vitals: There were no vitals taken for this visit.  ----------------    I spent 20 minutes with this patient today. All changes were made via collaborative practice agreement with Dr. Brand. A copy of the visit note was provided to the patient's referring provider.    The patient was sent via [employer] a summary of these recommendations.     Lobo Bartlett, PharmD  [facility] Pharmacist    Phone: [phone]     Telemedicine Visit Details  Type of service:  Telephone visit  Start Time: 12:28 PM  End Time: 12:48 PM  Originating Location (patient location): [place]  Distant Location (provider location):  [facility]     Medication Therapy Recommendations  History of simultaneous kidney and pancreas transplant (H)    Current  Medication: tacrolimus (GENERIC EQUIVALENT) 1 MG capsule   Rationale: Dose too high - Dosage too high - Safety   Recommendation: Decrease Dose   Status: Accepted per Provider          Rationale: Preventive therapy - Needs additional medication therapy - Indication   Recommendation: Start Medication - Shingrix 50 MCG/0.5ML Susr   Status: Accepted - no CPA Needed         Hyperlipidemia LDL goal <130    Rationale: Untreated condition - Needs additional medication therapy - Indication   Recommendation: Start Medication - rosuvastatin 5 MG half-tab   Status: Accepted per CPA

## 2021-10-12 RX ORDER — TACROLIMUS 1 MG/1
3 CAPSULE ORAL 2 TIMES DAILY
Qty: 240 CAPSULE | Refills: 11
Start: 2021-10-12 | End: 2021-12-07

## 2021-10-13 ENCOUNTER — LAB (OUTPATIENT)
Dept: LAB | Facility: CLINIC | Age: 41
End: 2021-10-13
Payer: MEDICARE

## 2021-10-13 DIAGNOSIS — N18.6 ESRD (END STAGE RENAL DISEASE) (H): ICD-10-CM

## 2021-10-13 DIAGNOSIS — Z76.82 ORGAN TRANSPLANT CANDIDATE: ICD-10-CM

## 2021-10-13 PROCEDURE — 86833 HLA CLASS II HIGH DEFIN QUAL: CPT

## 2021-10-13 PROCEDURE — 86832 HLA CLASS I HIGH DEFIN QUAL: CPT

## 2021-10-19 ENCOUNTER — LAB REQUISITION (OUTPATIENT)
Dept: LAB | Facility: CLINIC | Age: 41
End: 2021-10-19
Payer: MEDICARE

## 2021-10-19 LAB
DONOR IDENTIFICATION: NORMAL
DSA COMMENTS: NORMAL
DSA PRESENT: NO
DSA TEST METHOD: NORMAL
ORGAN: NORMAL
SA 1 CELL: NORMAL
SA 1 TEST METHOD: NORMAL
SA 2 CELL: NORMAL
SA 2 TEST METHOD: NORMAL
SA1 HI RISK ABY: NORMAL
SA1 MOD RISK ABY: NORMAL
SA2 HI RISK ABY: NORMAL
SA2 MOD RISK ABY: NORMAL
UNACCEPTABLE ANTIGENS: NORMAL
UNOS CPRA: 35
ZZZSA 1  COMMENTS: NORMAL
ZZZSA 2 COMMENTS: NORMAL

## 2021-10-20 ENCOUNTER — VIRTUAL VISIT (OUTPATIENT)
Dept: NEPHROLOGY | Facility: CLINIC | Age: 41
End: 2021-10-20
Attending: INTERNAL MEDICINE
Payer: MEDICARE

## 2021-10-20 ENCOUNTER — VIRTUAL VISIT (OUTPATIENT)
Dept: TRANSPLANT | Facility: CLINIC | Age: 41
End: 2021-10-20
Attending: NURSE PRACTITIONER
Payer: MEDICARE

## 2021-10-20 DIAGNOSIS — Z94.83 PANCREAS REPLACED BY TRANSPLANT (H): ICD-10-CM

## 2021-10-20 DIAGNOSIS — Z94.0 KIDNEY REPLACED BY TRANSPLANT: Primary | ICD-10-CM

## 2021-10-20 DIAGNOSIS — Z48.298 AFTERCARE FOLLOWING ORGAN TRANSPLANT: Primary | ICD-10-CM

## 2021-10-20 PROCEDURE — 99214 OFFICE O/P EST MOD 30 MIN: CPT | Mod: 95 | Performed by: INTERNAL MEDICINE

## 2021-10-20 PROCEDURE — G0463 HOSPITAL OUTPT CLINIC VISIT: HCPCS | Mod: PN,RTG | Performed by: INTERNAL MEDICINE

## 2021-10-20 ASSESSMENT — PAIN SCALES - GENERAL: PAINLEVEL: NO PAIN (0)

## 2021-10-20 NOTE — LETTER
10/20/2021         RE: Erik Cramer  [address] [place] [address]        Dear Colleague,    Thank you for referring your patient, Erik Cramer, to the [facility]. Please see a copy of my visit note below.    Post Transplant Patient Social Work Assessment -Outpatient    Patient Name: Erik Cramer  : 1980  Age: 41 year old  MRN: [id number]  Date of transplant: 2021 Kidney/Pancreas DD Transplant    Patient known to this writer from follow up in the transplant program. Writer called Tye today to update assessment.      Presenting Information   Living Situation: in [place] with wife Saniya, their son and his in-laws.  Functional Status: Indpendent  Cultural/Language/Spiritual Considerations: None indicated by patient.    Support System  Primary Support Person Saniya  Other support:  Family    Health Care Directive  Decision Maker: Self  Alternate Decision Maker: Saniya  Health Care Directive: Declined completing    Mental Health/Coping:   History of Mental Health: No known history.  History of Chemical Health: No known history.  Current status: Appropriate  Coping: Tye indicated when under stress he will talk to his wife or play games.  Services Needed/Recommended: None indicated at this time.    Financial   Income: SSDI  Impact of transplant on income: Patient may no longer be eligible one year post successful kidney transplant.    Insurance and medication coverage: [place]  Financial concerns: None indicated at this time.  Resources needed: None indicated at this time.      Education provided by : Social Work role outpatient setting and provided this writer's contact information.    Assessment and recommendations and plan:  Tye indicated his transplanted organs are doing very well.  Discussed when he might become ineligible for disability and also how long Medicare will last.            Again, thank you for allowing me to participate in the care of your  patient.        Sincerely,        Katelyn Powell, LICSW

## 2021-10-20 NOTE — LETTER
10/20/2021       RE: Erik Cramer  [address] [place] [address]     Dear Colleague,    Thank you for referring your patient, Erik Cramer, to the [facility]PH[facility] [place] [facility]. Please see a copy of my visit note below.    Tye is a 41 year old who is being evaluated via a billable video visit.      How would you like to obtain your AVS? MyChart  If the video visit is dropped, the invitation should be resent by: Text to cell phone: [phone]  Will anyone else be joining your video visit? No      Video Start Time: 1105  Video-Visit Details    Type of service:  Video Visit    Video End Time:11:24 AM    Originating Location (pt. 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 MMF  dose reduced               - Kidney Tx Biopsy: 6/18/21: no rejection.              # Pancreas Tx (SPK):               - Pancreatic Exocrine Drainage: Enteric drained                     - Blood glucose: near euglycemia       On insulin: No              - HbA1c: Last checked at time of transplant     A1c: 6%              - Pancreatic enzymes: remains elevated ~ 177               - Date DSA Last Checked: Jun/2021             Latest DSA: No DSA at time of transplant              - Pancreas Tx Biopsy:7/27/21: neg for CMR, ABMR     - Marika pancreatic fluid collection was not collected due to decreasing size   - Change ASA 325mg to 81mg at 6m post txp    # Immunosuppression: Tacrolimus immediate release (goal 8-10),  Mycophenolate mofetil (dose 750 mg every 12 hours)    -Continue with intensive monitoring of immunosuppression for efficacy and toxicity              - Induction: intermediate risk protocol, PRA 32%              - Changes: Not at this time. 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 Will check BK every 2 weeks. Will need to update IgG level and keep tac under 10    # Infection Prophylaxis:   - PJP: Sulfa/TMP (Bactrim)   - CMV: Valcyte x3m - through 9/11/2021 --> increase dose to 900 mg daily   - Thrush: mycelex     # Hypertension: Uncontrolled ; Goal BP: < 140/90              - Volume status: Euvolemic                  - Changes: YES . Increase coreg to 6.25 mg BID . If he again develops dizziness , we will have to consider alternative antiHTN like amlodipine .             - 2 weeks follow up by TX coordinator regarding response to dose increase     # Anemia in Chronic Renal Disease: Hgb: trending up ROSA M: No              - Iron studies: Unknown at this time, but checked with dialysis     # Mineral Bone Disorder:   - Secondary renal hyperparathyroidism; PTH level: Unknown at this time, but checked with dialysis        On treatment: None  - Vitamin D; level: normal         On supplement: Yes  - Calcium; level: Normal              On supplement: Yes, stop Calcium supplement   - Phosphorus; level: normal      On supplement: no     # Electrolytes:   - Potassium; level: normal        On supplement: No  - Magnesium; level: normal        On supplement: Yes  - Bicarbonate; level: normal        On supplement: Yes, increase continue bicarb 650mg bid  - Sodium; level: Normal    # Medical Compliance: Yes    # COVID 19: booster was completed in 9/2021    # Flu vaccine: was completed 10/2021    # Transplant History:  Etiology of Kidney Failure: Diabetes mellitus type 2  Tx: BIBK  Transplant: 6/11/2021 (Kidney / Pancreas)  Donor Type: Donation after Brain Death Donor Class:   Crossmatch at time of  Tx: negative  DSA at time of Tx: No  Significant changes in immunosuppression: None  CMV IgG Ab High Risk Discordance (D+/R-): No  EBV IgG Ab High Risk Discordance (D+/R-): No  Significant transplant-related complications: None and BK Viremia . MMF dose reduced to 750 mg BID     Transplant Office Phone Number: [phone]    Assessment and plan was discussed with the patient and he voiced his understanding and agreement.    Return visit: as scheduled     Bertin Brand MD            Chief Complaint   Mr. Cramer is a 41 year old here for routine follow up and immunosuppression management.     History of Present Illness   Tye is a very delightful 41-year-old gentleman with end-stage kidney disease secondary to diabetes and hypertension. 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 His post transplant course was typical. His lipase remains elevated without evidence for rejection we attempted biopsying the pancreas but were not able to retrieve tissue. His kidney remains stable and his lipase remains stable but slightly elevated. He is doing extremely well except for some occasional dizziness. He is hydrating himself well we discussed improving hydration if possible. He had no fevers chills nausea vomiting diarrhea or abdominal pain. He is taking his medications regularly.    Recently, he was noted to have BK viremia. His viral load remains stable under 1000 for the last 2 draws. We reduced his CellCept from 1000 twice a day to 750 twice a day. His tacrolimus was slightly elevated and his dose was reduced from 4 mg twice a day to 3 mg gram and a half twice a day. We discussed that if his level remains above 10 we will consider dropping him down to 3 mg twice a day.    Problem List   Patient Active Problem List   Diagnosis     End stage kidney disease (H)     Hypertension secondary to other renal disorders     Secondary renal hyperparathyroidism (H)     Type 2 diabetes mellitus (H)      Hypertension     Anemia in chronic kidney disease     Vitamin D deficiency     Gallstones, common bile duct     Kidney transplant candidate     Pancreas transplanted (H)     Kidney replaced by transplant     Immunosuppressed status (H)     Pancreatitis of pancreas transplant     Anemia due to blood loss, acute     Acute post-operative pain     Hypophosphatemia     Hypomagnesemia       Allergies   Allergies   Allergen Reactions     Metoprolol      SOB, but was also experiencing significant edema not drug associated       Medications   Current Outpatient Medications   Medication Sig     aspirin (ASA) 325 MG EC tablet Take 1 tablet (325 mg) by mouth daily     carvedilol (COREG) 6.25 MG tablet Take 1 tablet (6.25 mg) by mouth 2 times daily Dose increase, discuss with patient     magnesium oxide (MAG-OX) 400 MG tablet Take 1 tablet (400 mg) by mouth 2 times daily     montelukast (SINGULAIR) 10 MG tablet Take 10 mg by mouth At Bedtime     mycophenolate (GENERIC EQUIVALENT) 250 MG capsule Take 3 capsules (750 mg) by mouth 2 times daily     rosuvastatin (CRESTOR) 5 MG tablet Take 1 tablet (5 mg) by mouth daily     sodium bicarbonate 650 MG tablet Take 1 tablet (650 mg) by mouth 2 times daily     sulfamethoxazole-trimethoprim (BACTRIM) 400-80 MG tablet Take 1 tablet by mouth daily     tacrolimus (GENERIC EQUIVALENT) 0.5 MG capsule HOLD     tacrolimus (GENERIC EQUIVALENT) 1 MG capsule Take 3 capsules (3 mg) by mouth 2 times daily     vitamin D3 (CHOLECALCIFEROL) 2000 units (50 mcg) tablet Take 1 tablet (2,000 Units) by mouth daily     No current facility-administered medications for this visit.     There are no discontinued medications.    Physical Exam   Exam is within normal for video visit.    Data     Renal Latest Ref Rng & Units 10/13/2021 10/6/2021 9/30/2021   Na 133 - 144 mmol/L - - -   Na (external) 136 - 145 mmol/L 141 140 140   K 3.4 - 5.3 mmol/L - - -   K (external) 3.5 - 5.1 mmol/L 4.6 5.1 4.5   Cl 94 - 109 mmol/L  - - -   Cl (external) 98 - 109 mmol/L 109 108 108   CO2 20 - 32 mmol/L - - -   CO2 (external) 20 - 29 mmol/L 22 23 24   BUN 7 - 30 mg/dL - - -   BUN (external) 7 - 26 mg/dL 21 18 23   Cr 0.66 - 1.25 mg/dL - - -   Cr (external) 0.73 - 1.18 mg/dL 1.29(H) 1.15 1.28(H)   Glucose 70 - 99 mg/dL - - -   Glucose (external) 70 - 100 mg/dL 102(H) 109(H) 99   Ca  8.5 - 10.1 mg/dL - - -   Ca (external) 8.4 - 10.4 mg/dL 9.1 9.7 9.6   Mg 1.6 - 2.3 mg/dL - - -   Mg (external) 1.6 - 2.6 mg/dL 1.5(L) 1.5(L) 1.6     Bone Health Latest Ref Rng & Units 10/13/2021 10/6/2021 9/30/2021   Phos 2.5 - 4.5 mg/dL - - -   Phos (external) 2.3 - 4.7 mg/dL 2.3 2.6 2.8   PTHi (external) 10 - 100 pg/mL 153(H) - -   Vit D Def 20 - 75 ug/L - - -     Heme Latest Ref Rng & Units 10/13/2021 10/6/2021 9/30/2021   WBC 4.0 - 11.0 10e3/uL - - -   WBC (external) 3.5 - 10.5 x10(9)/L 7.8 6.6 7.2   Hgb 13.3 - 17.7 g/dL - - -   Hgb (external) 13.5 - 17.5 g/dL 15.2 15.0 15.0   Plt 150 - 450 10e3/uL - - -   Plt (external) 150 - 450 x10(9)/L 228 233 247   ABSOLUTE NEUTROPHIL 1.6 - 8.3 10e9/L - - -   ABSOLUTE NEUTROPHILS (EXTERNAL) 1.70 - 7.00 10(9)/L 4.29 3.89 4.7   ABSOLUTE LYMPHOCYTES 0.8 - 5.3 10e9/L - - -   ABSOLUTE LYMPHOCYTES (EXTERNAL) 1.00 - 4.80 10(9)/L 1.56 0.99(L) 1.3   ABSOLUTE MONOCYTES 0.0 - 1.3 10e9/L - - -   ABSOLUTE MONOCYTES (EXTERNAL) 0.20 - 0.90 10(9)/L 0.70 0.46 0.9   ABSOLUTE EOSINOPHILS 0.0 - 0.7 10e9/L - - -   ABSOLUTE EOSINOPHILS (EXTERNAL) 0.00 - 0.50 10(9)/L 0.16 0.20 0.2   ABSOLUTE BASOPHILS 0.0 - 0.2 10e9/L - - -   ABSOLUTE BASOPHILS (EXTERNAL) 0.00 - 0.30 10(9)/L 0.08 - 0.0   ABS IMMATURE GRANULOCYTES 0 - 0.4 10e9/L - - -   ABSOLUTE NUCLEATED RBC - - - -     Liver Latest Ref Rng & Units 6/10/2021 1/4/2021 12/9/2020   AP 40 - 150 U/L 84 - -   TBili 0.2 - 1.3 mg/dL 0.4 - -   ALT 0 - 70 U/L 32 47 48   AST 0 - 45 U/L 32 31 21   Tot Protein 6.8 - 8.8 g/dL 7.2 - -   Albumin 3.4 - 5.0 g/dL 3.9 - -     Pancreas Latest Ref Rng & Units 10/13/2021  10/6/2021 9/30/2021   A1C 0 - 5.6 % - - -   Amylase 30 - 110 U/L - - -   Amylase (external) 25 - 125 U/L 83 80 81   Lipase 73 - 393 U/L - - -   Lipase (external) 25 - 125 U/L 83 80 81        [facility] Txp Virology Latest Ref Rng & Units 10/13/2021 9/22/2021 9/7/2021   BK Spec - - - -   BK Res BKNEG:BK Virus DNA Not Detected copies/mL - - -   BK Log <2.7 Log copies/mL - - -   BK Quant Log Ext log copies/mL 3.0 3.0 Not Quantified   BK Quant Result Ext Copies/mL 904 1,020 Not Quantified   BK Quant Spec Ext - Blood - -   EBV CAPSID ANTIBODY IGG 0.0 - 0.8 AI - - -   Hep B Core NR:Nonreactive - - -        Recent Labs   Lab Test 06/25/21  0630 06/27/21  0702 07/08/21  0829   DOSTAC 2,000 Not Provided Not Provided   TACROL 9.5 8.4 17.6*       Again, thank you for allowing me to participate in the care of your patient.      Sincerely,    Bertin Brand MD

## 2021-10-20 NOTE — PROGRESS NOTES
Tye is a 41 year old who is being evaluated via a billable video visit.      How would you like to obtain your AVS? MyChart  If the video visit is dropped, the invitation should be resent by: Text to cell phone: [phone]  Will anyone else be joining your video visit? No      Video Start Time: 1109  Video-Visit Details    Type of service:  Video Visit    Video End Time:11:24 AM    Originating Location (pt. Location): Home    Distant Location (provider location):  [facility] [place]     Platform used for Video Visit: [employer]lio

## 2021-10-20 NOTE — PROGRESS NOTES
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 MMF  dose reduced               - Kidney Tx Biopsy: 6/18/21: no rejection.              # Pancreas Tx (SPK):               - Pancreatic Exocrine Drainage: Enteric drained                     - Blood glucose: near euglycemia       On insulin: No              - HbA1c: Last checked at time of transplant     A1c: 6%              - Pancreatic enzymes: remains elevated ~ 177               - Date DSA Last Checked: Jun/2021             Latest DSA: No DSA at time of transplant              - Pancreas Tx Biopsy:7/27/21: neg for CMR, ABMR     - Marika pancreatic fluid collection was not collected due to decreasing size   - Change ASA 325mg to 81mg at 6m post txp    # Immunosuppression: Tacrolimus immediate release (goal 8-10), Mycophenolate mofetil (dose 750 mg every 12 hours)    -Continue with intensive monitoring of immunosuppression for efficacy and toxicity              - Induction: intermediate risk protocol, PRA 32%              - Changes: Not at this time. 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 Will check BK every 2 weeks. Will need to update IgG level and keep tac under 10    # Infection Prophylaxis:   - PJP: Sulfa/TMP (Bactrim)   - CMV: Valcyte x3m - through 9/11/2021 --> increase dose to 900 mg daily   - Thrush: mycelex     # Hypertension: Uncontrolled ; Goal BP: < 140/90              - Volume status: Euvolemic                  - Changes: YES . Increase coreg to 6.25 mg BID . If he again develops dizziness , we will have to consider alternative antiHTN like amlodipine .             - 2 weeks follow up by TX coordinator regarding response to  dose increase     # Anemia in Chronic Renal Disease: Hgb: trending up ROSA M: No              - Iron studies: Unknown at this time, but checked with dialysis     # Mineral Bone Disorder:   - Secondary renal hyperparathyroidism; PTH level: Unknown at this time, but checked with dialysis        On treatment: None  - Vitamin D; level: normal         On supplement: Yes  - Calcium; level: Normal              On supplement: Yes, stop Calcium supplement   - Phosphorus; level: normal      On supplement: no     # Electrolytes:   - Potassium; level: normal        On supplement: No  - Magnesium; level: normal        On supplement: Yes  - Bicarbonate; level: normal        On supplement: Yes, increase continue bicarb 650mg bid  - Sodium; level: Normal    # Medical Compliance: Yes    # COVID 19: booster was completed in 9/2021    # Flu vaccine: was completed 10/2021    # Transplant History:  Etiology of Kidney Failure: Diabetes mellitus type 2  Tx: SPK  Transplant: 6/11/2021 (Kidney / Pancreas)  Donor Type: Donation after Brain Death Donor Class:   Crossmatch at time of Tx: negative  DSA at time of Tx: No  Significant changes in immunosuppression: None  CMV IgG Ab High Risk Discordance (D+/R-): No  EBV IgG Ab High Risk Discordance (D+/R-): No  Significant transplant-related complications: None and BK Viremia . MMF dose reduced to 750 mg BID     Transplant Office Phone Number: [phone]    Assessment and plan was discussed with the patient and he voiced his understanding and agreement.    Return visit: as scheduled     Bertin Brand MD            Chief Complaint   Mr. Cramer is a 41 year old here for routine follow up and immunosuppression management.     History of Present Illness   Tye is a very delightful 41-year-old gentleman with end-stage kidney disease secondary to diabetes and hypertension. 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 His post transplant course was typical. His lipase remains  elevated without evidence for rejection we attempted biopsying the pancreas but were not able to retrieve tissue. His kidney remains stable and his lipase remains stable but slightly elevated. He is doing extremely well except for some occasional dizziness. He is hydrating himself well we discussed improving hydration if possible. He had no fevers chills nausea vomiting diarrhea or abdominal pain. He is taking his medications regularly.    Recently, he was noted to have BK viremia. His viral load remains stable under 1000 for the last 2 draws. We reduced his CellCept from 1000 twice a day to 750 twice a day. His tacrolimus was slightly elevated and his dose was reduced from 4 mg twice a day to 3 mg gram and a half twice a day. We discussed that if his level remains above 10 we will consider dropping him down to 3 mg twice a day.    Problem List   Patient Active Problem List   Diagnosis     End stage kidney disease (H)     Hypertension secondary to other renal disorders     Secondary renal hyperparathyroidism (H)     Type 2 diabetes mellitus (H)     Hypertension     Anemia in chronic kidney disease     Vitamin D deficiency     Gallstones, common bile duct     Kidney transplant candidate     Pancreas transplanted (H)     Kidney replaced by transplant     Immunosuppressed status (H)     Pancreatitis of pancreas transplant     Anemia due to blood loss, acute     Acute post-operative pain     Hypophosphatemia     Hypomagnesemia       Allergies   Allergies   Allergen Reactions     Metoprolol      SOB, but was also experiencing significant edema not drug associated       Medications   Current Outpatient Medications   Medication Sig     aspirin (ASA) 325 MG EC tablet Take 1 tablet (325 mg) by mouth daily     carvedilol (COREG) 6.25 MG tablet Take 1 tablet (6.25 mg) by mouth 2 times daily Dose increase, discuss with patient     magnesium oxide (MAG-OX) 400 MG tablet Take 1 tablet (400 mg) by mouth 2 times daily      montelukast (SINGULAIR) 10 MG tablet Take 10 mg by mouth At Bedtime     mycophenolate (GENERIC EQUIVALENT) 250 MG capsule Take 3 capsules (750 mg) by mouth 2 times daily     rosuvastatin (CRESTOR) 5 MG tablet Take 1 tablet (5 mg) by mouth daily     sodium bicarbonate 650 MG tablet Take 1 tablet (650 mg) by mouth 2 times daily     sulfamethoxazole-trimethoprim (BACTRIM) 400-80 MG tablet Take 1 tablet by mouth daily     tacrolimus (GENERIC EQUIVALENT) 0.5 MG capsule HOLD     tacrolimus (GENERIC EQUIVALENT) 1 MG capsule Take 3 capsules (3 mg) by mouth 2 times daily     vitamin D3 (CHOLECALCIFEROL) 2000 units (50 mcg) tablet Take 1 tablet (2,000 Units) by mouth daily     No current facility-administered medications for this visit.     There are no discontinued medications.    Physical Exam   Exam is within normal for video visit.    Data     Renal Latest Ref Rng & Units 10/13/2021 10/6/2021 9/30/2021   Na 133 - 144 mmol/L - - -   Na (external) 136 - 145 mmol/L 141 140 140   K 3.4 - 5.3 mmol/L - - -   K (external) 3.5 - 5.1 mmol/L 4.6 5.1 4.5   Cl 94 - 109 mmol/L - - -   Cl (external) 98 - 109 mmol/L 109 108 108   CO2 20 - 32 mmol/L - - -   CO2 (external) 20 - 29 mmol/L 22 23 24   BUN 7 - 30 mg/dL - - -   BUN (external) 7 - 26 mg/dL 21 18 23   Cr 0.66 - 1.25 mg/dL - - -   Cr (external) 0.73 - 1.18 mg/dL 1.29(H) 1.15 1.28(H)   Glucose 70 - 99 mg/dL - - -   Glucose (external) 70 - 100 mg/dL 102(H) 109(H) 99   Ca  8.5 - 10.1 mg/dL - - -   Ca (external) 8.4 - 10.4 mg/dL 9.1 9.7 9.6   Mg 1.6 - 2.3 mg/dL - - -   Mg (external) 1.6 - 2.6 mg/dL 1.5(L) 1.5(L) 1.6     Bone Health Latest Ref Rng & Units 10/13/2021 10/6/2021 9/30/2021   Phos 2.5 - 4.5 mg/dL - - -   Phos (external) 2.3 - 4.7 mg/dL 2.3 2.6 2.8   PTHi (external) 10 - 100 pg/mL 153(H) - -   Vit D Def 20 - 75 ug/L - - -     Heme Latest Ref Rng & Units 10/13/2021 10/6/2021 9/30/2021   WBC 4.0 - 11.0 10e3/uL - - -   WBC (external) 3.5 - 10.5 x10(9)/L 7.8 6.6 7.2   Hgb 13.3  - 17.7 g/dL - - -   Hgb (external) 13.5 - 17.5 g/dL 15.2 15.0 15.0   Plt 150 - 450 10e3/uL - - -   Plt (external) 150 - 450 x10(9)/L 228 233 247   ABSOLUTE NEUTROPHIL 1.6 - 8.3 10e9/L - - -   ABSOLUTE NEUTROPHILS (EXTERNAL) 1.70 - 7.00 10(9)/L 4.29 3.89 4.7   ABSOLUTE LYMPHOCYTES 0.8 - 5.3 10e9/L - - -   ABSOLUTE LYMPHOCYTES (EXTERNAL) 1.00 - 4.80 10(9)/L 1.56 0.99(L) 1.3   ABSOLUTE MONOCYTES 0.0 - 1.3 10e9/L - - -   ABSOLUTE MONOCYTES (EXTERNAL) 0.20 - 0.90 10(9)/L 0.70 0.46 0.9   ABSOLUTE EOSINOPHILS 0.0 - 0.7 10e9/L - - -   ABSOLUTE EOSINOPHILS (EXTERNAL) 0.00 - 0.50 10(9)/L 0.16 0.20 0.2   ABSOLUTE BASOPHILS 0.0 - 0.2 10e9/L - - -   ABSOLUTE BASOPHILS (EXTERNAL) 0.00 - 0.30 10(9)/L 0.08 - 0.0   ABS IMMATURE GRANULOCYTES 0 - 0.4 10e9/L - - -   ABSOLUTE NUCLEATED RBC - - - -     Liver Latest Ref Rng & Units 6/10/2021 1/4/2021 12/9/2020   AP 40 - 150 U/L 84 - -   TBili 0.2 - 1.3 mg/dL 0.4 - -   ALT 0 - 70 U/L 32 47 48   AST 0 - 45 U/L 32 31 21   Tot Protein 6.8 - 8.8 g/dL 7.2 - -   Albumin 3.4 - 5.0 g/dL 3.9 - -     Pancreas Latest Ref Rng & Units 10/13/2021 10/6/2021 9/30/2021   A1C 0 - 5.6 % - - -   Amylase 30 - 110 U/L - - -   Amylase (external) 25 - 125 U/L 83 80 81   Lipase 73 - 393 U/L - - -   Lipase (external) 25 - 125 U/L 83 80 81        [facility] Txp Virology Latest Ref Rng & Units 10/13/2021 9/22/2021 9/7/2021   BK Spec - - - -   BK Res BKNEG:BK Virus DNA Not Detected copies/mL - - -   BK Log <2.7 Log copies/mL - - -   BK Quant Log Ext log copies/mL 3.0 3.0 Not Quantified   BK Quant Result Ext Copies/mL 904 1,020 Not Quantified   BK Quant Spec Ext - Blood - -   EBV CAPSID ANTIBODY IGG 0.0 - 0.8 AI - - -   Hep B Core NR:Nonreactive - - -        Recent Labs   Lab Test 06/25/21  0630 06/27/21  0702 07/08/21  0829   DOSTAC 2,000 Not Provided Not Provided   TACROL 9.5 8.4 17.6*

## 2021-10-20 NOTE — PROGRESS NOTES
Post Transplant Patient Social Work Assessment -Outpatient    Patient Name: Erik Cramer  : 1980  Age: 41 year old  MRN: [id number]  Date of transplant: 2021 Kidney/Pancreas DD Transplant    Patient known to this writer from follow up in the transplant program. Writer called Tye today to update assessment.      Presenting Information   Living Situation: in [place], [place] with wife Saniya, their son and his in-laws.  Functional Status: Indpendent  Cultural/Language/Spiritual Considerations: None indicated by patient.    Support System  Primary Support Person Saniya  Other support:  Family    Health Care Directive  Decision Maker: Self  Alternate Decision Maker: Saniya  Health Care Directive: Declined completing    Mental Health/Coping:   History of Mental Health: No known history.  History of Chemical Health: No known history.  Current status: Appropriate  Coping: Tye indicated when under stress he will talk to his wife or play games.  Services Needed/Recommended: None indicated at this time.    Financial   Income: SSDI  Impact of transplant on income: Patient may no longer be eligible one year post successful kidney transplant.    Insurance and medication coverage: [employer] and [place]  Financial concerns: None indicated at this time.  Resources needed: None indicated at this time.      Education provided by SW: Social Work role outpatient setting and provided this writer's contact information.    Assessment and recommendations and plan:  Tye indicated his transplanted organs are doing very well.  Discussed when he might become ineligible for disability and also how long Medicare will last.

## 2021-10-26 ENCOUNTER — TELEPHONE (OUTPATIENT)
Dept: TRANSPLANT | Facility: CLINIC | Age: 41
End: 2021-10-26

## 2021-11-08 ENCOUNTER — TELEPHONE (OUTPATIENT)
Dept: TRANSPLANT | Facility: CLINIC | Age: 41
End: 2021-11-08
Payer: MEDICARE

## 2021-11-08 NOTE — TELEPHONE ENCOUNTER
patient confirmed this was an accurate 12-hour trough. Verified Tacrolimus IR dose 3.5 mg BID. Confirmed no new medications or illness. Confirmed no missed doses. Patient agrees to decrease Tacrolimus IR dose to 3 mg BID and repeat labs in 1 week

## 2021-11-08 NOTE — LETTER
PHYSICIAN ORDERS      DATE & TIME ISSUED: 2021 2:57 PM  PATIENT NAME: Erik Cramer   : 1980     [facility] MR# [if applicable]: [id number]     DIAGNOSIS:  Kidney/Pancreas Transplant  ICD-10 CODE: Z94.0/Z94.83     Please repeat the following labs in 1 week:  Tacrolimus drug level  CBC  BMP  Amylase  Lipase    Any questions please call: [phone]  Please fax lab results to [phone].      Dr. Bertin Brand

## 2021-11-08 NOTE — TELEPHONE ENCOUNTER
Left message and sent [employer] message to patient regarding:  Tacrolimus IR level 13.4 on 11/4, goal 8-10, dose 3 mg BID.     PLAN:   Please call patient and confirm this was an accurate 12-hour trough. Verify Tacrolimus IR dose 3 mg BID. Confirm no new medications or illness. Confirm no missed doses. If accurate trough and accurate dose, decrease Tacrolimus IR dose to 2.5 mg BID and repeat labs in 1 week

## 2021-11-23 ENCOUNTER — TELEPHONE (OUTPATIENT)
Dept: TRANSPLANT | Facility: CLINIC | Age: 41
End: 2021-11-23
Payer: MEDICARE

## 2021-11-23 DIAGNOSIS — Z94.0 KIDNEY REPLACED BY TRANSPLANT: ICD-10-CM

## 2021-11-23 DIAGNOSIS — Z94.83 PANCREAS TRANSPLANTED (H): ICD-10-CM

## 2021-11-23 DIAGNOSIS — B34.8 BK VIREMIA: Primary | ICD-10-CM

## 2021-11-30 ENCOUNTER — TELEPHONE (OUTPATIENT)
Dept: TRANSPLANT | Facility: CLINIC | Age: 41
End: 2021-11-30
Payer: MEDICARE

## 2021-11-30 DIAGNOSIS — Z94.83 PANCREAS TRANSPLANTED (H): ICD-10-CM

## 2021-11-30 DIAGNOSIS — Z94.0 KIDNEY REPLACED BY TRANSPLANT: ICD-10-CM

## 2021-11-30 RX ORDER — MYCOPHENOLATE MOFETIL 250 MG/1
CAPSULE ORAL
Qty: 180 CAPSULE | Refills: 11 | Status: SHIPPED | OUTPATIENT
Start: 2021-11-30 | End: 2021-12-28

## 2021-11-30 NOTE — LETTER
PHYSICIAN ORDERS      DATE & TIME ISSUED: 2021 at 10:03 AM  PATIENT NAME: Erik Cramer   : 1980     [facility] MR# [if applicable]: [id number]     DIAGNOSIS:  Kidney/Pancreas Transplant  ICD-10 CODE: Z94.0/Z94.83     Please complete these additional labs with patient's next draw:  -MPA level (Mycophenolic Acid by Tandem Spectrometry)  -IgG  -BK virus Quantitative, PCR (every other week starting next draw x4)    Any questions please call: [phone]  Please fax lab results to [phone].      Dr. Bertin Brand

## 2021-11-30 NOTE — TELEPHONE ENCOUNTER
Bertin Brand MD Colianni, Lauren, RN    Last time we discussed this we requested an MPA level and IgG total   So please make sure have him   If not go a head and lower mmf to 750 in the am and 500 at night check mpa level asap and IgG level as well.   Check bk q 2 weeks until gone. You can message me with his results

## 2021-11-30 NOTE — LETTER
PHYSICIAN ORDERS      DATE & TIME ISSUED: 2021 at 9:51 AM  PATIENT NAME: Erik Cramer   : 1980     [facility] MR# [if applicable]: [id number]     DIAGNOSIS:  Kidney/Pancreas Transplant  ICD-10 CODE: Z94.0/Z94.83     Please complete these additional labs with patient's next draw:  -MPA level (Mycophenolic Acid by Tandem Spectrometry)  -IgG    Any questions please call: [phone]  Please fax lab results to [phone].      Dr. Bertin Brand

## 2021-12-01 ENCOUNTER — TELEPHONE (OUTPATIENT)
Dept: TRANSPLANT | Facility: CLINIC | Age: 41
End: 2021-12-01

## 2021-12-01 NOTE — TELEPHONE ENCOUNTER
Patient Call: Voicemail  Date/Time: 12/1/2021  1936  Reason for call: Pt wonders if he needs to refill his Sodium bicarb or is he done with it  Also when can he start getting labs every other week draw?

## 2021-12-02 DIAGNOSIS — Z94.83 PANCREAS TRANSPLANTED (H): ICD-10-CM

## 2021-12-02 DIAGNOSIS — Z94.0 KIDNEY REPLACED BY TRANSPLANT: ICD-10-CM

## 2021-12-02 RX ORDER — MAGNESIUM OXIDE 400 MG/1
400 TABLET ORAL 2 TIMES DAILY
Qty: 60 TABLET | Refills: 11 | Status: SHIPPED | OUTPATIENT
Start: 2021-12-02 | End: 2022-06-01

## 2021-12-03 ENCOUNTER — VIRTUAL VISIT (OUTPATIENT)
Dept: NEPHROLOGY | Facility: CLINIC | Age: 41
End: 2021-12-03
Attending: TRANSPLANT SURGERY
Payer: MEDICARE

## 2021-12-03 DIAGNOSIS — Z48.298 AFTERCARE FOLLOWING ORGAN TRANSPLANT: Primary | ICD-10-CM

## 2021-12-03 PROCEDURE — 99214 OFFICE O/P EST MOD 30 MIN: CPT | Mod: 95

## 2021-12-03 NOTE — LETTER
12/3/2021     RE: Erik Cramer  [address] [place] [id number]     Dear Colleague,    Thank you for referring your patient, Erik Cramer, to the [facility] [place] at [facility]. Please see a copy of my visit note below.    Tye is a 41 year old who is being evaluated via a billable video visit.      How would you like to obtain your AVS? MyChart  If the video visit is dropped, the invitation should be resent by: Send to e-mail at: [email or web address]  Will anyone else be joining your video visit? No      Video Start Time: 1021  Video-Visit Details    Type of service:  Video Visit    Video End Time:1050    Originating Location (pt. 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 MMF  dose reduced               - Kidney Tx Biopsy: 6/18/21: no rejection.              # Pancreas Tx (SPK):               - Pancreatic Exocrine Drainage: Enteric drained                     - Blood glucose: near euglycemia       On insulin: No              - HbA1c: Last checked at time of transplant     A1c: 6%              - Pancreatic enzymes: stable              - Date DSA Last Checked: Jun/2021             Latest DSA: No DSA at time of transplant              - Pancreas Tx Biopsy:7/27/21: neg for CMR, ABMR     - Marika pancreatic fluid collection was not collected due to decreasing size   - Change ASA 325mg to 81mg at 6m post txp    # Immunosuppression: Tacrolimus immediate release (goal 8-10), Mycophenolate mofetil (dose 750 mg every 12 hours)     -Continue with intensive monitoring of immunosuppression for efficacy and toxicity              - Induction: intermediate risk protocol, PRA 32%              - Changes: Not at this time. 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 Will check BK every 2 weeks. Will need to update IgG level and keep tac under 10    # Infection Prophylaxis:   - PJP: Sulfa/TMP (Bactrim)   - CMV: Valcyte x3m - through 9/11/2021 --> increase dose to 900 mg daily   - Thrush: mycelex     # Hypertension: Uncontrolled ; Goal BP: < 140/90              - Volume status: Euvolemic                  - Changes: YES . Increase coreg to 6.25 mg BID . If he again develops dizziness , we will have to consider alternative antiHTN like amlodipine .             - 2 weeks follow up by TX coordinator regarding response to dose increase     # Anemia in Chronic Renal Disease: Hgb: trending up ROSA M: No              - Iron studies: Unknown at this time, but checked with dialysis     # Mineral Bone Disorder:   - Secondary renal hyperparathyroidism; PTH level: Unknown at this time, but checked with dialysis        On treatment: None  - Vitamin D; level: normal         On supplement: Yes  - Calcium; level: Normal              On supplement: Yes, stop Calcium supplement   - Phosphorus; level: normal      On supplement: no     # Electrolytes:   - Potassium; level: normal        On supplement: No  - Magnesium; level: normal        On supplement: Yes  - Bicarbonate; level: normal        On supplement: Yes, increase continue bicarb 650mg bid  - Sodium; level: Normal    # Medical Compliance: Yes    # COVID 19: booster was completed in 9/2021    # Flu vaccine: was completed 10/2021    # Retrograde Ejaculation: I offered him a referral to urology.    # Transplant History:  Etiology of Kidney Failure: Diabetes mellitus type 2  Tx: BIBK  Transplant: 6/11/2021 (Kidney / Pancreas)  Donor Type: Donation after Brain Death Donor Class:    Crossmatch at time of Tx: negative  DSA at time of Tx: No  Significant changes in immunosuppression: None  CMV IgG Ab High Risk Discordance (D+/R-): No  EBV IgG Ab High Risk Discordance (D+/R-): No  Significant transplant-related complications: None and BK Viremia . MMF dose reduced to 750 mg BID     Transplant Office Phone Number: [phone]    Assessment and plan was discussed with the patient and he voiced his understanding and agreement.    Return visit: as scheduled     Bertin Brand MD            Chief Complaint   Mr. Cramer is a 41 year old here for routine follow up and immunosuppression management.     History of Present Illness   Tye is a very delightful 41-year-old gentleman with end-stage kidney disease secondary to diabetes and hypertension. 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 His post transplant course was typical. His lipase remains elevated without evidence for rejection we attempted biopsying the pancreas but were not able to retrieve tissue. His kidney remains stable and his lipase remains stable but slightly elevated. He is doing extremely well except for some occasional dizziness. He is hydrating himself well we discussed improving hydration if possible. He had no fevers chills nausea vomiting diarrhea or abdominal pain. He is taking his medications regularly.    Recently, he was noted to have BK viremia. His viral load remains stable under 1000 for the last 2 draws, then recently increased to 5000. We reduced his CellCept from 1000 twice a day to 750 twice a day, the 750 bid and currently on 750/500. His tacrolimus was slightly elevated and his dose was reduced from 4 mg twice a day to 3 mg twice a day. We discussed that if his level remains above 9 we will consider dropping him down to 3/2.5 mg twice a day.    Problem List   Patient Active Problem List   Diagnosis     End stage kidney disease (H)     Hypertension secondary to other renal disorders      Secondary renal hyperparathyroidism (H)     Type 2 diabetes mellitus (H)     Hypertension     Anemia in chronic kidney disease     Vitamin D deficiency     Gallstones, common bile duct     Kidney transplant candidate     Pancreas transplanted (H)     Kidney replaced by transplant     Immunosuppressed status (H)     Pancreatitis of pancreas transplant     Anemia due to blood loss, acute     Acute post-operative pain     Hypophosphatemia     Hypomagnesemia       Allergies   Allergies   Allergen Reactions     Metoprolol      SOB, but was also experiencing significant edema not drug associated       Medications   Current Outpatient Medications   Medication Sig     aspirin (ASA) 325 MG EC tablet Take 1 tablet (325 mg) by mouth daily     carvedilol (COREG) 6.25 MG tablet Take 1 tablet (6.25 mg) by mouth 2 times daily Dose increase, discuss with patient     magnesium oxide (MAG-OX) 400 MG tablet Take 1 tablet (400 mg) by mouth 2 times daily     montelukast (SINGULAIR) 10 MG tablet Take 10 mg by mouth At Bedtime     mycophenolate (GENERIC EQUIVALENT) 250 MG capsule Take 3 capsules (750 mg) by mouth every morning AND 2 capsules (500 mg) every evening.     rosuvastatin (CRESTOR) 5 MG tablet Take 1 tablet (5 mg) by mouth daily     sulfamethoxazole-trimethoprim (BACTRIM) 400-80 MG tablet Take 1 tablet by mouth daily     tacrolimus (GENERIC EQUIVALENT) 0.5 MG capsule HOLD     tacrolimus (GENERIC EQUIVALENT) 1 MG capsule Take 3 capsules (3 mg) by mouth 2 times daily     vitamin D3 (CHOLECALCIFEROL) 2000 units (50 mcg) tablet Take 1 tablet (2,000 Units) by mouth daily     No current facility-administered medications for this visit.     There are no discontinued medications.    Physical Exam   Exam is within normal for video visit.    Data     Renal Latest Ref Rng & Units 12/1/2021 11/24/2021 11/18/2021   Na 133 - 144 mmol/L - - -   Na (external) 136 - 145 mmol/L 141 141 141   K 3.4 - 5.3 mmol/L - - -   K (external) 3.5 - 5.1  mmol/L 4.9 4.8 4.7   Cl 94 - 109 mmol/L - - -   Cl (external) 98 - 109 mmol/L 109 109 109   CO2 20 - 32 mmol/L - - -   CO2 (external) 20 - 29 mmol/L 24 24 24   BUN 7 - 30 mg/dL - - -   BUN (external) 7 - 26 mg/dL 19 22 17   Cr 0.66 - 1.25 mg/dL - - -   Cr (external) 0.73 - 1.18 mg/dL 1.20(H) 1.16 1.21(H)   Glucose 70 - 99 mg/dL - - -   Glucose (external) 70 - 100 mg/dL 116(H) 109(H) 101(H)   Ca  8.5 - 10.1 mg/dL - - -   Ca (external) 8.4 - 10.4 mg/dL 9.1 9.3 8.7   Mg 1.6 - 2.3 mg/dL - - -   Mg (external) 1.6 - 2.6 mg/dL - 1.5(L) -     Bone Health Latest Ref Rng & Units 11/24/2021 10/27/2021 10/20/2021   Phos 2.5 - 4.5 mg/dL - - -   Phos (external) 2.3 - 4.7 mg/dL 2.3 2.3 2.4   PTHi (external) 10 - 100 pg/mL - - -   Vit D Def 20 - 75 ug/L - - -     Heme Latest Ref Rng & Units 12/1/2021 11/24/2021 11/18/2021   WBC 4.0 - 11.0 10e3/uL - - -   WBC (external) 3.5 - 10.5 x10(9)/L 5.4 5.8 4.9   Hgb 13.3 - 17.7 g/dL - - -   Hgb (external) 13.5 - 17.5 g/dL 14.8 14.8 14.7   Plt 150 - 450 10e3/uL - - -   Plt (external) 150 - 450 x10(9)/L 195 206 188   ABSOLUTE NEUTROPHIL 1.6 - 8.3 10e9/L - - -   ABSOLUTE NEUTROPHILS (EXTERNAL) 1.7 - 7.0 10(9)/L 3.6 3.6 3.19   ABSOLUTE LYMPHOCYTES 0.8 - 5.3 10e9/L - - -   ABSOLUTE LYMPHOCYTES (EXTERNAL) 1.0 - 4.8 10(9)/L 1.0 1.1 1.18   ABSOLUTE MONOCYTES 0.0 - 1.3 10e9/L - - -   ABSOLUTE MONOCYTES (EXTERNAL) 0.2 - 0.9 10(9)/L 0.5 0.7 0.20   ABSOLUTE EOSINOPHILS 0.0 - 0.7 10e9/L - - -   ABSOLUTE EOSINOPHILS (EXTERNAL) 0.0 - 0.5 10(9)/L 0.1 0.1 0.10   ABSOLUTE BASOPHILS 0.0 - 0.2 10e9/L - - -   ABSOLUTE BASOPHILS (EXTERNAL) 0.0 - 0.3 10(9)/L 0.0 0.1 -   ABS IMMATURE GRANULOCYTES 0 - 0.4 10e9/L - - -   ABSOLUTE NUCLEATED RBC - - - -     Liver Latest Ref Rng & Units 6/10/2021 1/4/2021 12/9/2020   AP 40 - 150 U/L 84 - -   TBili 0.2 - 1.3 mg/dL 0.4 - -   ALT 0 - 70 U/L 32 47 48   AST 0 - 45 U/L 32 31 21   Tot Protein 6.8 - 8.8 g/dL 7.2 - -   Albumin 3.4 - 5.0 g/dL 3.9 - -     Pancreas Latest Ref Rng &  Units 12/1/2021 11/24/2021 11/18/2021   A1C 0 - 5.6 % - - -   Amylase 30 - 110 U/L - - -   Amylase (external) 25 - 125 U/L 78 76 74   Lipase 73 - 393 U/L - - -   Lipase (external) 25 - 125 U/L 78 76 74        [facility] Txp Virology Latest Ref Rng & Units 11/24/2021 10/20/2021 10/13/2021   BK Spec - - - -   BK Res BKNEG:BK Virus DNA Not Detected copies/mL - - -   BK Log <2.7 Log copies/mL - - -   BK Quant Log Ext log copies/mL 3.7 3.0 3.0   BK Quant Result Ext Copies/mL 5,170 1,100 904   BK Quant Spec Ext - - Blood Blood   EBV CAPSID ANTIBODY IGG 0.0 - 0.8 AI - - -   Hep B Core NR:Nonreactive - - -        Recent Labs   Lab Test 06/25/21  0630 06/27/21  0702 07/08/21  0829   DOSTAC 2,000 Not Provided Not Provided   TACROL 9.5 8.4 17.6*     Again, thank you for allowing me to participate in the care of your patient.      Sincerely,    [facility]

## 2021-12-03 NOTE — PROGRESS NOTES
ACUTE TRANSPLANT NEPHROLOGY VISIT    Assessment & Plan      # DDKT (SPK) stable              - Baseline Creatinine: ~ 1.2-1.3              - Proteinuria: mild (0.2-0.5g/g), July 2021               - Date DSA Last Checked: July/2021      Latest DSA: No DSA              - BK Viremia: yes ( July 2021) : 783 copis/ml --> continue to monitor [id number]. MMF  dose reduced               - Kidney Tx Biopsy: 6/18/21: no rejection.              # Pancreas Tx (SPK):               - Pancreatic Exocrine Drainage: Enteric drained                     - Blood glucose: near euglycemia       On insulin: No              - HbA1c: Last checked at time of transplant     A1c: 6%              - Pancreatic enzymes: stable              - Date DSA Last Checked: Jun/2021             Latest DSA: No DSA at time of transplant              - Pancreas Tx Biopsy:7/27/21: neg for CMR, ABMR     - Marika pancreatic fluid collection was not collected due to decreasing size   - Change ASA 325mg to 81mg at 6m post txp    # Immunosuppression: Tacrolimus immediate release (goal 8-10), Mycophenolate mofetil (dose 750 mg every 12 hours)    -Continue with intensive monitoring of immunosuppression for efficacy and toxicity              - Induction: intermediate risk protocol, PRA 32%              - Changes: Not at this time. 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 Will check BK every 2 weeks. Will need to update IgG level and keep tac under 10    # Infection Prophylaxis:   - PJP: Sulfa/TMP (Bactrim)   - CMV: Valcyte x3m - through 9/11/2021 --> increase dose to 900 mg daily   - Thrush: mycelex     # Hypertension: Uncontrolled ; Goal BP: < 140/90              - Volume status: Euvolemic                  - Changes: YES . Increase coreg to 6.25 mg BID . If he again develops dizziness , we will have to consider alternative antiHTN like amlodipine .             - 2 weeks follow up by TX coordinator regarding response to dose increase      # Anemia in Chronic Renal Disease: Hgb: trending up ROSA M: No              - Iron studies: Unknown at this time, but checked with dialysis     # Mineral Bone Disorder:   - Secondary renal hyperparathyroidism; PTH level: Unknown at this time, but checked with dialysis        On treatment: None  - Vitamin D; level: normal         On supplement: Yes  - Calcium; level: Normal              On supplement: Yes, stop Calcium supplement   - Phosphorus; level: normal      On supplement: no     # Electrolytes:   - Potassium; level: normal        On supplement: No  - Magnesium; level: normal        On supplement: Yes  - Bicarbonate; level: normal        On supplement: Yes, increase continue bicarb 650mg bid  - Sodium; level: Normal    # Medical Compliance: Yes    # COVID 19: booster was completed in 9/2021    # Flu vaccine: was completed 10/2021    # Retrograde Ejaculation: I offered him a referral to urology.    # Transplant History:  Etiology of Kidney Failure: Diabetes mellitus type 2  Tx: SPK  Transplant: 6/11/2021 (Kidney / Pancreas)  Donor Type: Donation after Brain Death Donor Class:   Crossmatch at time of Tx: negative  DSA at time of Tx: No  Significant changes in immunosuppression: None  CMV IgG Ab High Risk Discordance (D+/R-): No  EBV IgG Ab High Risk Discordance (D+/R-): No  Significant transplant-related complications: None and BK Viremia . MMF dose reduced to 750 mg BID     Transplant Office Phone Number: [phone]    Assessment and plan was discussed with the patient and he voiced his understanding and agreement.    Return visit: as scheduled     Bertin Brand MD            Chief Complaint   Mr. Cramer is a 41 year old here for routine follow up and immunosuppression management.     History of Present Illness   Tye is a very delightful 41-year-old gentleman with end-stage kidney disease secondary to diabetes and hypertension. 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 His post  transplant course was typical. His lipase remains elevated without evidence for rejection we attempted biopsying the pancreas but were not able to retrieve tissue. His kidney remains stable and his lipase remains stable but slightly elevated. He is doing extremely well except for some occasional dizziness. He is hydrating himself well we discussed improving hydration if possible. He had no fevers chills nausea vomiting diarrhea or abdominal pain. He is taking his medications regularly.    Recently, he was noted to have BK viremia. His viral load remains stable under 1000 for the last 2 draws, then recently increased to 5000. We reduced his CellCept from 1000 twice a day to 750 twice a day, the 750 bid and currently on 750/500. His tacrolimus was slightly elevated and his dose was reduced from 4 mg twice a day to 3 mg twice a day. We discussed that if his level remains above 9 we will consider dropping him down to 3/2.5 mg twice a day.    Problem List   Patient Active Problem List   Diagnosis     End stage kidney disease (H)     Hypertension secondary to other renal disorders     Secondary renal hyperparathyroidism (H)     Type 2 diabetes mellitus (H)     Hypertension     Anemia in chronic kidney disease     Vitamin D deficiency     Gallstones, common bile duct     Kidney transplant candidate     Pancreas transplanted (H)     Kidney replaced by transplant     Immunosuppressed status (H)     Pancreatitis of pancreas transplant     Anemia due to blood loss, acute     Acute post-operative pain     Hypophosphatemia     Hypomagnesemia       Allergies   Allergies   Allergen Reactions     Metoprolol      SOB, but was also experiencing significant edema not drug associated       Medications   Current Outpatient Medications   Medication Sig     aspirin (ASA) 325 MG EC tablet Take 1 tablet (325 mg) by mouth daily     carvedilol (COREG) 6.25 MG tablet Take 1 tablet (6.25 mg) by mouth 2 times daily Dose increase, discuss with  patient     magnesium oxide (MAG-OX) 400 MG tablet Take 1 tablet (400 mg) by mouth 2 times daily     montelukast (SINGULAIR) 10 MG tablet Take 10 mg by mouth At Bedtime     mycophenolate (GENERIC EQUIVALENT) 250 MG capsule Take 3 capsules (750 mg) by mouth every morning AND 2 capsules (500 mg) every evening.     rosuvastatin (CRESTOR) 5 MG tablet Take 1 tablet (5 mg) by mouth daily     sulfamethoxazole-trimethoprim (BACTRIM) 400-80 MG tablet Take 1 tablet by mouth daily     tacrolimus (GENERIC EQUIVALENT) 0.5 MG capsule HOLD     tacrolimus (GENERIC EQUIVALENT) 1 MG capsule Take 3 capsules (3 mg) by mouth 2 times daily     vitamin D3 (CHOLECALCIFEROL) 2000 units (50 mcg) tablet Take 1 tablet (2,000 Units) by mouth daily     No current facility-administered medications for this visit.     There are no discontinued medications.    Physical Exam   Exam is within normal for video visit.    Data     Renal Latest Ref Rng & Units 12/1/2021 11/24/2021 11/18/2021   Na 133 - 144 mmol/L - - -   Na (external) 136 - 145 mmol/L 141 141 141   K 3.4 - 5.3 mmol/L - - -   K (external) 3.5 - 5.1 mmol/L 4.9 4.8 4.7   Cl 94 - 109 mmol/L - - -   Cl (external) 98 - 109 mmol/L 109 109 109   CO2 20 - 32 mmol/L - - -   CO2 (external) 20 - 29 mmol/L 24 24 24   BUN 7 - 30 mg/dL - - -   BUN (external) 7 - 26 mg/dL 19 22 17   Cr 0.66 - 1.25 mg/dL - - -   Cr (external) 0.73 - 1.18 mg/dL 1.20(H) 1.16 1.21(H)   Glucose 70 - 99 mg/dL - - -   Glucose (external) 70 - 100 mg/dL 116(H) 109(H) 101(H)   Ca  8.5 - 10.1 mg/dL - - -   Ca (external) 8.4 - 10.4 mg/dL 9.1 9.3 8.7   Mg 1.6 - 2.3 mg/dL - - -   Mg (external) 1.6 - 2.6 mg/dL - 1.5(L) -     Bone Health Latest Ref Rng & Units 11/24/2021 10/27/2021 10/20/2021   Phos 2.5 - 4.5 mg/dL - - -   Phos (external) 2.3 - 4.7 mg/dL 2.3 2.3 2.4   PTHi (external) 10 - 100 pg/mL - - -   Vit D Def 20 - 75 ug/L - - -     Heme Latest Ref Rng & Units 12/1/2021 11/24/2021 11/18/2021   WBC 4.0 - 11.0 10e3/uL - - -   WBC  (external) 3.5 - 10.5 x10(9)/L 5.4 5.8 4.9   Hgb 13.3 - 17.7 g/dL - - -   Hgb (external) 13.5 - 17.5 g/dL 14.8 14.8 14.7   Plt 150 - 450 10e3/uL - - -   Plt (external) 150 - 450 x10(9)/L 195 206 188   ABSOLUTE NEUTROPHIL 1.6 - 8.3 10e9/L - - -   ABSOLUTE NEUTROPHILS (EXTERNAL) 1.7 - 7.0 10(9)/L 3.6 3.6 3.19   ABSOLUTE LYMPHOCYTES 0.8 - 5.3 10e9/L - - -   ABSOLUTE LYMPHOCYTES (EXTERNAL) 1.0 - 4.8 10(9)/L 1.0 1.1 1.18   ABSOLUTE MONOCYTES 0.0 - 1.3 10e9/L - - -   ABSOLUTE MONOCYTES (EXTERNAL) 0.2 - 0.9 10(9)/L 0.5 0.7 0.20   ABSOLUTE EOSINOPHILS 0.0 - 0.7 10e9/L - - -   ABSOLUTE EOSINOPHILS (EXTERNAL) 0.0 - 0.5 10(9)/L 0.1 0.1 0.10   ABSOLUTE BASOPHILS 0.0 - 0.2 10e9/L - - -   ABSOLUTE BASOPHILS (EXTERNAL) 0.0 - 0.3 10(9)/L 0.0 0.1 -   ABS IMMATURE GRANULOCYTES 0 - 0.4 10e9/L - - -   ABSOLUTE NUCLEATED RBC - - - -     Liver Latest Ref Rng & Units 6/10/2021 1/4/2021 12/9/2020   AP 40 - 150 U/L 84 - -   TBili 0.2 - 1.3 mg/dL 0.4 - -   ALT 0 - 70 U/L 32 47 48   AST 0 - 45 U/L 32 31 21   Tot Protein 6.8 - 8.8 g/dL 7.2 - -   Albumin 3.4 - 5.0 g/dL 3.9 - -     Pancreas Latest Ref Rng & Units 12/1/2021 11/24/2021 11/18/2021   A1C 0 - 5.6 % - - -   Amylase 30 - 110 U/L - - -   Amylase (external) 25 - 125 U/L 78 76 74   Lipase 73 - 393 U/L - - -   Lipase (external) 25 - 125 U/L 78 76 74        [facility] Txp Virology Latest Ref Rng & Units 11/24/2021 10/20/2021 10/13/2021   BK Spec - - - -   BK Res BKNEG:BK Virus DNA Not Detected copies/mL - - -   BK Log <2.7 Log copies/mL - - -   BK Quant Log Ext log copies/mL 3.7 3.0 3.0   BK Quant Result Ext Copies/mL 5,170 1,100 904   BK Quant Spec Ext - - Blood Blood   EBV CAPSID ANTIBODY IGG 0.0 - 0.8 AI - - -   Hep B Core NR:Nonreactive - - -        Recent Labs   Lab Test 06/25/21  0630 06/27/21  0702 07/08/21  0829   DOSTAC 2,000 Not Provided Not Provided   TACROL 9.5 8.4 17.6*

## 2021-12-03 NOTE — PROGRESS NOTES
Tye is a 41 year old who is being evaluated via a billable video visit.      How would you like to obtain your AVS? MyChart  If the video visit is dropped, the invitation should be resent by: Send to e-mail at: [email or web address]  Will anyone else be joining your video visit? No      Video Start Time: 1021  Video-Visit Details    Type of service:  Video Visit    Video End Time:1050    Originating Location (pt. Location): Home    Distant Location (provider location):  [facility]PH[facility]OLOG[facility] [place]

## 2021-12-06 DIAGNOSIS — Z94.83 PANCREAS TRANSPLANTED (H): Primary | ICD-10-CM

## 2021-12-06 DIAGNOSIS — Z94.0 KIDNEY REPLACED BY TRANSPLANT: ICD-10-CM

## 2021-12-06 NOTE — TELEPHONE ENCOUNTER
Left message and sent [employer] message to patient regarding:  Tacrolimus IR level 10.5 on 12/1, goal 8-10, dose 3 mg BID.     PLAN:   Please call patient and confirm this was an accurate 12-hour trough. Verify Tacrolimus IR dose 3 mg BID. Confirm no new medications or illness. Confirm no missed doses. If accurate trough and accurate dose, decrease Tacrolimus IR dose to 3 mg AM/2.5mg PM and repeat labs in 1 weeks

## 2021-12-06 NOTE — TELEPHONE ENCOUNTER
ISSUE:   Tacrolimus IR level 10.5 on 12/1, goal 8-10, dose 3 mg BID.    PLAN:   Please call patient and confirm this was an accurate 12-hour trough. Verify Tacrolimus IR dose 3 mg BID. Confirm no new medications or illness. Confirm no missed doses. If accurate trough and accurate dose, decrease Tacrolimus IR dose to 3 mg AM/2.5mg PM and repeat labs in 1 weeks

## 2021-12-07 RX ORDER — TACROLIMUS 1 MG/1
CAPSULE ORAL
Qty: 150 CAPSULE | Refills: 11 | Status: SHIPPED | OUTPATIENT
Start: 2021-12-07 | End: 2021-12-27

## 2021-12-07 RX ORDER — TACROLIMUS 0.5 MG/1
0.5 CAPSULE ORAL EVERY EVENING
Qty: 30 CAPSULE | Refills: 11 | Status: SHIPPED | OUTPATIENT
Start: 2021-12-07 | End: 2021-12-27

## 2021-12-07 NOTE — TELEPHONE ENCOUNTER
confirmed this was an accurate 12-hour trough. Verified Tacrolimus IR dose 3 mg BID. Confirmed no new medications or illness. Confirmed no missed doses.  Patient confirms dose change of Tacrolimus IR to 3 mg AM/2.5mg PM and repeat labs in 1 weeks

## 2021-12-21 DIAGNOSIS — Z94.83 PANCREAS TRANSPLANTED (H): ICD-10-CM

## 2021-12-21 DIAGNOSIS — Z94.0 KIDNEY REPLACED BY TRANSPLANT: ICD-10-CM

## 2021-12-21 RX ORDER — CARVEDILOL 6.25 MG/1
6.25 TABLET ORAL 2 TIMES DAILY
Qty: 60 TABLET | Refills: 3 | Status: SHIPPED | OUTPATIENT
Start: 2021-12-21 | End: 2022-05-13

## 2021-12-27 DIAGNOSIS — Z94.83 PANCREAS TRANSPLANTED (H): Primary | ICD-10-CM

## 2021-12-27 DIAGNOSIS — Z94.0 KIDNEY REPLACED BY TRANSPLANT: ICD-10-CM

## 2021-12-27 RX ORDER — TACROLIMUS 0.5 MG/1
CAPSULE ORAL
Qty: 30 CAPSULE | Refills: 11
Start: 2021-12-27 | End: 2022-01-05

## 2021-12-27 RX ORDER — TACROLIMUS 1 MG/1
3 CAPSULE ORAL 2 TIMES DAILY
Qty: 180 CAPSULE | Refills: 11 | Status: SHIPPED | OUTPATIENT
Start: 2021-12-27 | End: 2022-04-01

## 2021-12-27 NOTE — TELEPHONE ENCOUNTER
confirmed this was an accurate 12-hour trough. Verified Tacrolimus IR dose 3/2.5 mg AM/PM. Confirmed no new medications or illness. Confirmed no missed doses. Patient confirms increase Tacrolimus IR dose to 3 mg BID and repeat labs in 1 weeks

## 2021-12-27 NOTE — TELEPHONE ENCOUNTER
ISSUE:   Tacrolimus IR level 6.7 on 8-10, goal 8-10, dose 3/2.5 mg AM/PM.    PLAN:   Please call patient and confirm this was an accurate 12-hour trough. Verify Tacrolimus IR dose 3/2.5 mg AM/PM. Confirm no new medications or illness. Confirm no missed doses. If accurate trough and accurate dose, increase Tacrolimus IR dose to 3 mg BID and repeat labs in 1 weeks

## 2021-12-28 ENCOUNTER — TELEPHONE (OUTPATIENT)
Dept: TRANSPLANT | Facility: CLINIC | Age: 41
End: 2021-12-28
Payer: MEDICARE

## 2021-12-28 DIAGNOSIS — Z94.83 PANCREAS TRANSPLANTED (H): ICD-10-CM

## 2021-12-28 DIAGNOSIS — Z94.0 KIDNEY REPLACED BY TRANSPLANT: ICD-10-CM

## 2021-12-28 RX ORDER — MYCOPHENOLATE MOFETIL 250 MG/1
500 CAPSULE ORAL 2 TIMES DAILY
Qty: 120 CAPSULE | Refills: 11 | Status: SHIPPED | OUTPATIENT
Start: 2021-12-28 | End: 2022-04-01

## 2022-01-05 ENCOUNTER — TELEPHONE (OUTPATIENT)
Dept: TRANSPLANT | Facility: CLINIC | Age: 42
End: 2022-01-05
Payer: MEDICARE

## 2022-01-05 DIAGNOSIS — Z94.83 PANCREAS TRANSPLANTED (H): ICD-10-CM

## 2022-01-05 DIAGNOSIS — B34.8 BK VIREMIA: Primary | ICD-10-CM

## 2022-01-05 DIAGNOSIS — Z94.0 KIDNEY REPLACED BY TRANSPLANT: ICD-10-CM

## 2022-01-05 DIAGNOSIS — E78.5 HYPERLIPIDEMIA LDL GOAL <130: ICD-10-CM

## 2022-01-05 RX ORDER — ROSUVASTATIN CALCIUM 5 MG/1
5 TABLET, COATED ORAL DAILY
Qty: 30 TABLET | Refills: 3 | Status: SHIPPED | OUTPATIENT
Start: 2022-01-05 | End: 2022-06-14

## 2022-01-05 RX ORDER — TACROLIMUS 0.5 MG/1
0.5 CAPSULE ORAL EVERY EVENING
Qty: 30 CAPSULE | Refills: 11 | Status: SHIPPED | OUTPATIENT
Start: 2022-01-05 | End: 2022-04-01

## 2022-01-05 NOTE — TELEPHONE ENCOUNTER
Patient Call: Medication Refill  Route to LPN  Instruct the patient to first contact their pharmacy. If they have called their pharmacy and require further assistance, route to LPN.    Pharmacy Name:[facility]  Pharmacy Location: [place], [place]   Name of Medication: Crestor 5 mg   When will the patient be out of this medication?: Less than 3 days (Route high priority)

## 2022-01-05 NOTE — TELEPHONE ENCOUNTER
Patient had clarifying questions on dosage of medications. Questions answered and new refills sent.

## 2022-01-25 ENCOUNTER — TELEPHONE (OUTPATIENT)
Dept: TRANSPLANT | Facility: CLINIC | Age: 42
End: 2022-01-25
Payer: MEDICARE

## 2022-01-25 DIAGNOSIS — D80.1 HYPOGAMMAGLOBULINEMIA (H): ICD-10-CM

## 2022-01-25 RX ORDER — NALOXONE HYDROCHLORIDE 0.4 MG/ML
0.2 INJECTION, SOLUTION INTRAMUSCULAR; INTRAVENOUS; SUBCUTANEOUS
Status: CANCELLED | OUTPATIENT
Start: 2022-01-25

## 2022-01-25 RX ORDER — DIPHENHYDRAMINE HYDROCHLORIDE 50 MG/ML
50 INJECTION INTRAMUSCULAR; INTRAVENOUS
Status: CANCELLED
Start: 2022-01-25

## 2022-01-25 RX ORDER — HEPARIN SODIUM (PORCINE) LOCK FLUSH IV SOLN 100 UNIT/ML 100 UNIT/ML
5 SOLUTION INTRAVENOUS
Status: CANCELLED | OUTPATIENT
Start: 2022-01-25

## 2022-01-25 RX ORDER — ACETAMINOPHEN 325 MG/1
650 TABLET ORAL ONCE
Status: CANCELLED
Start: 2022-01-25

## 2022-01-25 RX ORDER — DIPHENHYDRAMINE HCL 25 MG
25 CAPSULE ORAL ONCE
Status: CANCELLED
Start: 2022-01-25

## 2022-01-25 RX ORDER — ALBUTEROL SULFATE 90 UG/1
1-2 AEROSOL, METERED RESPIRATORY (INHALATION)
Status: CANCELLED
Start: 2022-01-25

## 2022-01-25 RX ORDER — ALBUTEROL SULFATE 0.83 MG/ML
2.5 SOLUTION RESPIRATORY (INHALATION)
Status: CANCELLED | OUTPATIENT
Start: 2022-01-25

## 2022-01-25 RX ORDER — METHYLPREDNISOLONE SODIUM SUCCINATE 125 MG/2ML
125 INJECTION, POWDER, LYOPHILIZED, FOR SOLUTION INTRAMUSCULAR; INTRAVENOUS
Status: CANCELLED
Start: 2022-01-25

## 2022-01-25 RX ORDER — EPINEPHRINE 1 MG/ML
0.3 INJECTION, SOLUTION, CONCENTRATE INTRAVENOUS EVERY 5 MIN PRN
Status: CANCELLED | OUTPATIENT
Start: 2022-01-25

## 2022-01-25 RX ORDER — HEPARIN SODIUM,PORCINE 10 UNIT/ML
5 VIAL (ML) INTRAVENOUS
Status: CANCELLED | OUTPATIENT
Start: 2022-01-25

## 2022-01-25 RX ORDER — MEPERIDINE HYDROCHLORIDE 25 MG/ML
25 INJECTION INTRAMUSCULAR; INTRAVENOUS; SUBCUTANEOUS EVERY 30 MIN PRN
Status: CANCELLED | OUTPATIENT
Start: 2022-01-25

## 2022-01-25 NOTE — TELEPHONE ENCOUNTER
Bertin Brand MD Colianni, Lauren, RN  Can we get him approved for IVIG   ---------------------------------------------------  Therapy plan placed. Will reach out to infusion specialists for approval

## 2022-01-26 ENCOUNTER — TELEPHONE (OUTPATIENT)
Dept: TRANSPLANT | Facility: CLINIC | Age: 42
End: 2022-01-26
Payer: MEDICARE

## 2022-01-26 NOTE — TELEPHONE ENCOUNTER
Post Kidney and Pancreas Transplant Team Conference  Date: 1/26/2022  Transplant Coordinator: Patricia James     Attendees:  [x]  Dr. Fraser [x] Patricia James, RN [] Anika Gann LPN     [x]  Dr. Brand [] Dolores Horn RN [] Lela Lazaro LPN   []  Dr. Lane [x] Ene Melvin RN    [x]  Dr. Irby [x] Annalise Lozano RN [] Lobo Bartlett, PharmD   [] Dr. Lacy [x] Chantel Art RN    [] Dr. Syed [] Dilip Plaza RN    [] Dr. Welsh [x] Jewell Page RN [x] Mia Marvin RN   [] Dr. Randhawa [] Tg Gomez RN    [x]  Dr. Vega [x] Laura Cristina RN    [] Dr. Kennedy [x] Twyla Chang RN    [] Leilani Vincent, AMALIA [x] Kate Ackerman RN        Verbal Plan Read Back:   Await IVIG approval and proceed.    Routed to RN Coordinator   Marielos Marvin RN

## 2022-01-30 ENCOUNTER — HEALTH MAINTENANCE LETTER (OUTPATIENT)
Age: 42
End: 2022-01-30

## 2022-01-31 PROBLEM — B34.8 BK VIREMIA: Status: ACTIVE | Noted: 2022-01-31

## 2022-03-08 ENCOUNTER — TELEPHONE (OUTPATIENT)
Dept: TRANSPLANT | Facility: CLINIC | Age: 42
End: 2022-03-08
Payer: MEDICARE

## 2022-03-08 DIAGNOSIS — T86.13 KIDNEY TRANSPLANT INFECTION: ICD-10-CM

## 2022-03-09 ENCOUNTER — TELEPHONE (OUTPATIENT)
Dept: TRANSPLANT | Facility: CLINIC | Age: 42
End: 2022-03-09

## 2022-03-09 NOTE — TELEPHONE ENCOUNTER
Post Kidney and Pancreas Transplant Team Conference  Date: 3/9/2022  Transplant Coordinator: Patricia James RN     Attendees:  [x]  Dr. Fraser [x] Patricia James, RN [] Anika Gann LPN     [x]  Dr. Brand [] Dolores Horn, YING [] Lela Lazaro LPN   [x]  Dr. Lane [x] Ene Melvin RN    [x]  Dr. Irby [] Annalise Lozano RN [] Lobo Bartlett, PharmD   [x] Dr. Lacy [x] Chantel rAt, YING    [] Dr. Syed [x] Dilip Plaza RN    [] Dr. Welsh [] Jewell Page RN [x] Mia Marvin RN   [] Dr. Randhawa [x] Tg Gomez RN    []  Dr. Vega [x] Laura Cristina, YING    [x] Dr. Kennedy [x] Twyla Chang RN    [] Leilani Vincent, AMALIA [x] Kate Ackerman RN        Verbal Plan Read Back:   Continue with IVIG infusion.    Routed to RN Coordinator   Marielos Marvin RN

## 2022-03-10 PROBLEM — T86.13 KIDNEY TRANSPLANT INFECTION: Status: ACTIVE | Noted: 2022-03-10

## 2022-03-18 ENCOUNTER — TELEPHONE (OUTPATIENT)
Dept: TRANSPLANT | Facility: CLINIC | Age: 42
End: 2022-03-18
Payer: MEDICARE

## 2022-03-26 ENCOUNTER — HEALTH MAINTENANCE LETTER (OUTPATIENT)
Age: 42
End: 2022-03-26

## 2022-03-30 DIAGNOSIS — Z94.0 KIDNEY REPLACED BY TRANSPLANT: ICD-10-CM

## 2022-03-30 DIAGNOSIS — Z94.83 PANCREAS TRANSPLANTED (H): Primary | ICD-10-CM

## 2022-03-30 RX ORDER — SULFAMETHOXAZOLE AND TRIMETHOPRIM 400; 80 MG/1; MG/1
1 TABLET ORAL DAILY
Qty: 30 TABLET | Refills: 11 | Status: SHIPPED | OUTPATIENT
Start: 2022-03-30 | End: 2022-04-01

## 2022-03-30 NOTE — TELEPHONE ENCOUNTER
Patient Call: Medication Refill  Route to LPN  Instruct the patient to first contact their pharmacy. If they have called their pharmacy and require further assistance, route to LPN.    Pharmacy Name: [facility] - [place] - [id number]      Name of Medication: sulfamethoxazole-trimethoprim (BACTRIM) 400-80 MG tablet     When will the patient be out of this medication?: Less than 3 days (Route high priority)

## 2022-03-31 DIAGNOSIS — Z94.83 PANCREAS TRANSPLANTED (H): Primary | ICD-10-CM

## 2022-03-31 DIAGNOSIS — Z94.0 KIDNEY REPLACED BY TRANSPLANT: ICD-10-CM

## 2022-03-31 DIAGNOSIS — Z94.83 PANCREAS TRANSPLANTED (H): ICD-10-CM

## 2022-04-01 RX ORDER — TACROLIMUS 1 MG/1
3 CAPSULE ORAL 2 TIMES DAILY
Qty: 180 CAPSULE | Refills: 11 | Status: SHIPPED | OUTPATIENT
Start: 2022-04-01 | End: 2022-05-31

## 2022-04-01 RX ORDER — SULFAMETHOXAZOLE AND TRIMETHOPRIM 400; 80 MG/1; MG/1
1 TABLET ORAL DAILY
Qty: 30 TABLET | Refills: 11 | Status: SHIPPED | OUTPATIENT
Start: 2022-04-01 | End: 2022-07-21

## 2022-04-01 RX ORDER — MYCOPHENOLATE MOFETIL 250 MG/1
500 CAPSULE ORAL 2 TIMES DAILY
Qty: 120 CAPSULE | Refills: 11 | Status: SHIPPED | OUTPATIENT
Start: 2022-04-01 | End: 2023-03-08

## 2022-04-01 RX ORDER — TACROLIMUS 0.5 MG/1
0.5 CAPSULE ORAL EVERY EVENING
Qty: 30 CAPSULE | Refills: 11 | Status: SHIPPED | OUTPATIENT
Start: 2022-04-01 | End: 2022-05-31

## 2022-04-20 ENCOUNTER — TELEPHONE (OUTPATIENT)
Dept: TRANSPLANT | Facility: CLINIC | Age: 42
End: 2022-04-20

## 2022-04-20 NOTE — TELEPHONE ENCOUNTER
Post Kidney and Pancreas Transplant Team Conference  Date: 4/20/2022  Transplant Coordinator: Patricia James, RN     Attendees:  []  Dr. Fraser [x] Patricia James, RN [] Anika Gann LPN     []  Dr. Brand [] Dolores Horn RN [] Lela Lazaro LPN   [x]  Dr. Lane [x] Ene Melvin RN    []  Dr. Irby [x] Annalise Lozano RN [] Lobo Bartlett, PharmD   [x] Dr. Lacy [] Chantel Art, YING    [] Dr. Syed [x] Dilip Plaza RN    [] Dr. Welsh [x] Jewell Page RN [x] Mia Marvin RN   [] Dr. Randhawa [x] Tg Gomez RN    [x]  Dr. Vega []     [] Dr. Kennedy [x] Twyla Chang RN    [] Leilani Vincent, AMALIA [x] Kate Ackerman RN        Verbal Plan Read Back:   Due to high hgb, plan to image native and transplant kidneys (US) with next follow up appointment.    Routed to RN Coordinator   Marielos Marvin,

## 2022-05-12 DIAGNOSIS — Z94.0 KIDNEY REPLACED BY TRANSPLANT: ICD-10-CM

## 2022-05-12 DIAGNOSIS — Z94.83 PANCREAS TRANSPLANTED (H): ICD-10-CM

## 2022-05-13 RX ORDER — CARVEDILOL 6.25 MG/1
6.25 TABLET ORAL 2 TIMES DAILY
Qty: 60 TABLET | Refills: 3 | Status: SHIPPED | OUTPATIENT
Start: 2022-05-13 | End: 2022-09-08

## 2022-05-19 ENCOUNTER — TELEPHONE (OUTPATIENT)
Dept: TRANSPLANT | Facility: CLINIC | Age: 42
End: 2022-05-19
Payer: MEDICARE

## 2022-05-19 NOTE — TELEPHONE ENCOUNTER
Bertin Brand MD Colianni, Lauren, RN  Lets check MPA level     Lab orders faxed to local lab and sent to patient via [employer].

## 2022-05-19 NOTE — LETTER
PHYSICIAN ORDERS      DATE & TIME ISSUED: May 19, 2022 8:45 AM  PATIENT NAME: Erik Cramer   : 1980     [facility] MR# [if applicable]: [id number]     DIAGNOSIS:  kidney/pancreas transplant   ICD-10 CODE: Z94.0, Z94.83     In addition to regularly scheduled labs, please check mycophenolic acid drug level with next scheduled lab visit.     Any questions please call: [phone]  Fax results to:   [phone].      Bertin Brand MD

## 2022-05-27 DIAGNOSIS — Z94.0 KIDNEY REPLACED BY TRANSPLANT: ICD-10-CM

## 2022-05-27 DIAGNOSIS — Z94.83 PANCREAS TRANSPLANTED (H): Primary | ICD-10-CM

## 2022-05-27 NOTE — TELEPHONE ENCOUNTER
ISSUE:   Tacrolimus IR level 12.7 on 5/26, goal 8-10, dose 3/2.5 mg BID.    PLAN:   Please call patient and confirm this was an accurate 12-hour trough. Verify Tacrolimus IR dose 3/2.5 mg BID. Confirm no new medications or illness. Confirm no missed doses. If accurate trough and accurate dose, decrease Tacrolimus IR dose to 2.5 mg BID and repeat labs in 1-2 weeks

## 2022-05-31 DIAGNOSIS — Z94.0 KIDNEY REPLACED BY TRANSPLANT: ICD-10-CM

## 2022-05-31 DIAGNOSIS — Z94.83 PANCREAS REPLACED BY TRANSPLANT (H): Primary | ICD-10-CM

## 2022-05-31 RX ORDER — TACROLIMUS 0.5 MG/1
0.5 CAPSULE ORAL 2 TIMES DAILY
Qty: 60 CAPSULE | Refills: 11 | Status: SHIPPED | OUTPATIENT
Start: 2022-05-31 | End: 2022-07-22

## 2022-05-31 RX ORDER — TACROLIMUS 1 MG/1
2 CAPSULE ORAL 2 TIMES DAILY
Qty: 120 CAPSULE | Refills: 11 | Status: SHIPPED | OUTPATIENT
Start: 2022-05-31 | End: 2022-07-22

## 2022-05-31 NOTE — PROGRESS NOTES
TRANSPLANT NEPHROLOGY CHRONIC POST TRANSPLANT VISIT    Assessment & Plan   # DDKT (SPK): Stable   - Baseline Creatinine:  ~ 1-1.2   - Proteinuria: Normal (<0.2 grams)   - Date DSA Last Checked: Oct/2021      Latest DSA: No   - BK Viremia: Yes, moderately elevated (> 10-100K)   - Kidney Tx Biopsy: Jun 2021; Result: negative for rejection   -Labs on 6/8 and then ~2 weeks later, and then monthly    # Pancreas Tx (SPK):    - Pancreatic Exocrine Drainage: Enteric drained     - Blood glucose: Near euglycemia      On insulin: No   - HbA1c: Stable      Latest HbA1c: 5.7%   - Pancreatic enzymes: Stable   - Date DSA Last Checked: Oct/2021  Latest DSA: No   - Pancreas Tx Biopsy: Jul 2021; Result:  Negative for rejection   -Decrease ASA 81mg daily    # Immunosuppression: Tacrolimus immediate release (goal 8-10) and Mycophenolate mofetil (dose 500 mg every 12 hours)   - Continue with intensive monitoring of immunosuppression for efficacy and toxicity.   - Changes: Not at this time. Repeat MPA level. At 1y post txp decrease tac goal 5-8    # Infection Prophylaxis:   - PJP: Sulfa/TMP (Bactrim)    # Hypertension: Controlled;  Goal BP: < 130/80   - Changes: Not at this time. Continue coreg 6.25mg bid    # Post-Transplant Erythrocytosis: Hgb: Stable;  On ACEI/ARB: No   Imaging: No, obtain U/S native and transplanted kidneys to look for mass    # Mineral Bone Disorder:   - Secondary renal hyperparathyroidism; PTH level: Mildly elevated (151-300 pg/ml)        On treatment: None  - Vitamin D; level: Normal        On supplement: Yes  - Calcium; level: Normal        On supplement: Yes  - Phosphorus; level: Normal        On supplement: No    # Electrolytes:   - Potassium; level: Normal        On supplement: No  - Magnesium; level: Normal        On supplement: Yes, decrease mag to 400mg daily   - Bicarbonate; level: Normal        On supplement: No    # BK viremia:    -Repeat MPA level, IgG level. At 1y decrease tac goal to 5-8    # Skin  Cancer Risk:    - Discussed sun protection and recommend regular follow up with Dermatology.    # Medical Compliance: Yes    # Obesity:    -Weight is up to 202lbs from 192lbs pre panc transplant.   -Recommend weight loss due to concern for impending insulin resistance    # COVID-19 Virus Review: Discussed COVID-19 virus and the potential medical risks.  Reviewed preventative health recommendations, including wearing a mask where appropriate.  Recommended COVID vaccination should be up to date with either an initial vaccination or booster shot when appropriate.  Asked the patient to inform the transplant center if they are exposed or diagnosed with this virus.    # COVID Vaccination Up To Date: No, due for next dose    # Transplant History:  Etiology of Kidney Failure: Diabetes mellitus type 2  Tx: SPK  Transplant: 6/11/2021 (Kidney / Pancreas)  Significant changes in immunosuppression: None  Significant transplant-related complications: BK Viremia    Transplant Office Phone Number: [phone]    Assessment and plan was discussed with the patient and he voiced his understanding and agreement.    Return visit: Return in about 6 months (around 12/1/2022).    Saw Irby MD    Chief Complaint   Mr. Cramer is a 42 year old here for routine follow up, kidney transplant, pancreas transplant and immunosuppression management.    History of Present Illness   The patient overall feels well. He denies any recent hospitalizations. He denies nausea, vomiting, diarrhea, fever, chills, shortness of breath, chest pain, LE edema, unintentional weight loss, nights sweats, dysuria, hematuria.       Home BP: Not checked    Problem List   Patient Active Problem List   Diagnosis     End stage kidney disease (H)     Hypertension secondary to other renal disorders     Secondary renal hyperparathyroidism (H)     Type 2 diabetes mellitus (H)     Hypertension     Anemia in chronic kidney disease     Vitamin D deficiency     Gallstones,  common bile duct     Kidney transplant candidate     Pancreas transplanted (H)     Kidney replaced by transplant     Immunosuppressed status (H)     Pancreatitis of pancreas transplant     Anemia due to blood loss, acute     Acute post-operative pain     Hypophosphatemia     Hypomagnesemia     Hypogammaglobulinemia (H)     BK viremia     Kidney transplant infection       Allergies   Allergies   Allergen Reactions     Metoprolol      SOB, but was also experiencing significant edema not drug associated       Medications   Current Outpatient Medications   Medication Sig     aspirin (ASA) 81 MG EC tablet Take 1 tablet (81 mg) by mouth daily     carvedilol (COREG) 6.25 MG tablet Take 1 tablet (6.25 mg) by mouth 2 times daily Dose increase, discuss with patient     magnesium oxide (MAG-OX) 400 MG tablet Take 1 tablet (400 mg) by mouth daily     montelukast (SINGULAIR) 10 MG tablet Take 10 mg by mouth At Bedtime     mycophenolate (GENERIC EQUIVALENT) 250 MG capsule Take 2 capsules (500 mg) by mouth 2 times daily     rosuvastatin (CRESTOR) 5 MG tablet Take 1 tablet (5 mg) by mouth daily     sulfamethoxazole-trimethoprim (BACTRIM) 400-80 MG tablet Take 1 tablet by mouth daily     tacrolimus (GENERIC EQUIVALENT) 0.5 MG capsule Take 1 capsule (0.5 mg) by mouth 2 times daily Total dose =2.5 MG BID     tacrolimus (GENERIC EQUIVALENT) 1 MG capsule Take 2 capsules (2 mg) by mouth 2 times daily Total dose = 2.5 mg BID     vitamin D3 (CHOLECALCIFEROL) 2000 units (50 mcg) tablet Take 1 tablet (2,000 Units) by mouth daily     No current facility-administered medications for this visit.     There are no discontinued medications.    Physical Exam   Vital Signs: There were no vitals taken for this visit.    GENERAL APPEARANCE: alert and no distress  HENT: no obvious abnormalities on appearance  RESP: breathing appears unremarkable with normal rate, no audible wheezing or cough and no apparent shortness of breath with conversation  MS:  extremities normal - no gross deformities noted  SKIN: no apparent rash and normal skin tone  NEURO: speech is clear with no obvious neurological deficits  PSYCH: mentation appears normal and affect normal      Data     Renal Latest Ref Rng & Units 5/25/2022 5/11/2022 4/27/2022   Na 133 - 144 mmol/L - - -   Na (external) 136 - 145 mmol/L 142 142 142   K 3.4 - 5.3 mmol/L - - -   K (external) 3.5 - 5.1 mmol/L 4.4 4.6 5.0   Cl 98 - 109 mmol/L 107 108 107   CO2 20 - 32 mmol/L - - -   CO2 (external) 20 - 29 mmol/L 23 24 25   BUN 7 - 30 mg/dL - - -   BUN (external) 7 - 26 mg/dL 16 16 17   Cr 0.66 - 1.25 mg/dL - - -   Cr (external) 0.73 - 1.18 mg/dL 1.14 1.05 1.07   Glucose 70 - 99 mg/dL - - -   Glucose (external) 70 - 100 mg/dL 114(H) 117(H) 109(H)   Ca  8.5 - 10.1 mg/dL - - -   Ca (external) 8.4 - 10.4 mg/dL 9.6 9.0 9.7   Mg 1.6 - 2.3 mg/dL - - -   Mg (external) 1.6 - 2.6 mg/dL - 1.6 -     Bone Health Latest Ref Rng & Units 5/11/2022 4/13/2022 3/16/2022   Phos 2.5 - 4.5 mg/dL - - -   Phos (external) 2.3 - 4.7 mg/dL 2.3 2.9 2.6   PTHi (external) 10 - 100 pg/mL - - -   Vit D Def 20 - 75 ug/L - - -     Heme Latest Ref Rng & Units 5/25/2022 5/11/2022 4/27/2022   WBC 4.0 - 11.0 10e3/uL - - -   WBC (external) 3.5 - 10.5 x10(9)/L 6.3 5.5 6.0   Hgb 13.3 - 17.7 g/dL - - -   Hgb (external) 13.5 - 17.5 g/dL 16.1 16.1 16.6   Plt 150 - 450 10e3/uL - - -   Plt (external) 150 - 450 x10(9)/L 187 203 197   ABSOLUTE NEUTROPHIL 1.6 - 8.3 10e9/L - - -   ABSOLUTE NEUTROPHILS (EXTERNAL) 1.7 - 7.0 10(9)/L 3.7 3.2 3.5   ABSOLUTE LYMPHOCYTES 0.8 - 5.3 10e9/L - - -   ABSOLUTE LYMPHOCYTES (EXTERNAL) 1.0 - 4.8 10(9)/L 1.8 1.6 1.7   ABSOLUTE MONOCYTES 0.0 - 1.3 10e9/L - - -   ABSOLUTE MONOCYTES (EXTERNAL) 0.2 - 0.9 10(9)/L 0.7 0.6 0.6   ABSOLUTE EOSINOPHILS 0.0 - 0.7 10e9/L - - -   ABSOLUTE EOSINOPHILS (EXTERNAL) 0.0 - 0.5 10(9)/L 0.1 0.1 0.1   ABSOLUTE BASOPHILS 0.0 - 0.2 10e9/L - - -   ABSOLUTE BASOPHILS (EXTERNAL) 0.0 - 0.3 10(9)/L 0.0 0.0 0.0    ABS IMMATURE GRANULOCYTES 0 - 0.4 10e9/L - - -   ABSOLUTE NUCLEATED RBC - - - -     Liver Latest Ref Rng & Units 12/8/2021 6/10/2021 1/4/2021   AP 40 - 150 U/L - 84 -   AP (external) 40 - 150 U/L 121 - -   TBili 0.2 - 1.3 mg/dL - 0.4 -   TBili (external) 0.2 - 1.2 mg/dL 0.5 - -   DBili (external) 0.0 - 0.5 mg/dL 0.2 - -   ALT 0 - 70 U/L - 32 47   ALT (external) 0 - 55 U/L 18 - -   AST 0 - 45 U/L - 32 31   AST (external) 10 - 40 U/L 27 - -   Tot Protein 6.8 - 8.8 g/dL - 7.2 -   Tot Protein (external) 6.4 - 8.3 g/dL 7.5 - -   Albumin 3.4 - 5.0 g/dL - 3.9 -   Albumin (external) 3.5 - 5.0 g/dL 4.0 - -     Pancreas Latest Ref Rng & Units 5/25/2022 5/11/2022 4/27/2022   A1C 0 - 5.6 % - - -   A1C (external) <=5.6 % - - -   Amylase 30 - 110 U/L - - -   Amylase (external) 25 - 125 U/L 71 74 81   Lipase 73 - 393 U/L - - -   Lipase (external) 25 - 125 U/L 71 74 81        [facility] Txp Virology Latest Ref Rng & Units 5/11/2022 4/13/2022 3/16/2022   BK Spec - - - -   BK Res BKNEG:BK Virus DNA Not Detected copies/mL - - -   BK Log <2.7 Log copies/mL - - -   BK Quant Log Ext log copies/mL 4.4 4.0 4.2   BK Quant Result Ext Copies/mL 26,400 10,900 16,500   BK Quant Spec Ext - - Plasma -   EBV CAPSID ANTIBODY IGG 0.0 - 0.8 AI - - -   Hep B Core NR:Nonreactive - - -        Recent Labs   Lab Test 06/25/21  0630 06/27/21  0702 07/08/21  0829   DOSTAC 2,000 Not Provided Not Provided   TACROL 9.5 8.4 17.6*     Recent Labs   Lab Test 06/24/21  0642 06/25/21  0630 07/08/21  0829   DOSMPA 2000 6/23/2021 2,000 2015pm 7/7/21   MPACID 4.31* 1.43 7.94*   MPAG 100.9* 79.8 152.5*

## 2022-05-31 NOTE — TELEPHONE ENCOUNTER
[employer] message sent to patient regarding:  Tacrolimus IR level 12.7 on 5/26, goal 8-10, dose 3/2.5 mg BID.     PLAN:   Please call patient and confirm this was an accurate 12-hour trough. Verify Tacrolimus IR dose 3/2.5 mg BID. Confirm no new medications or illness. Confirm no missed doses. If accurate trough and accurate dose, decrease Tacrolimus IR dose to 2.5 mg BID and repeat labs in 1-2 weeks

## 2022-06-01 ENCOUNTER — VIRTUAL VISIT (OUTPATIENT)
Dept: NEPHROLOGY | Facility: CLINIC | Age: 42
End: 2022-06-01
Attending: INTERNAL MEDICINE
Payer: MEDICARE

## 2022-06-01 ENCOUNTER — TELEPHONE (OUTPATIENT)
Dept: TRANSPLANT | Facility: CLINIC | Age: 42
End: 2022-06-01

## 2022-06-01 DIAGNOSIS — D75.1 POST-TRANSPLANT ERYTHROCYTOSIS: ICD-10-CM

## 2022-06-01 DIAGNOSIS — Z94.0 KIDNEY REPLACED BY TRANSPLANT: Primary | ICD-10-CM

## 2022-06-01 DIAGNOSIS — D84.9 IMMUNOSUPPRESSION (H): ICD-10-CM

## 2022-06-01 DIAGNOSIS — Z94.0 HTN, KIDNEY TRANSPLANT RELATED: ICD-10-CM

## 2022-06-01 DIAGNOSIS — B34.8 BK VIREMIA: ICD-10-CM

## 2022-06-01 DIAGNOSIS — I15.1 HTN, KIDNEY TRANSPLANT RELATED: ICD-10-CM

## 2022-06-01 DIAGNOSIS — Z94.83 PANCREAS REPLACED BY TRANSPLANT (H): ICD-10-CM

## 2022-06-01 PROBLEM — K85.90 PANCREATITIS OF PANCREAS TRANSPLANT: Status: RESOLVED | Noted: 2021-06-16 | Resolved: 2022-06-01

## 2022-06-01 PROBLEM — E83.39 HYPOPHOSPHATEMIA: Status: RESOLVED | Noted: 2021-06-21 | Resolved: 2022-06-01

## 2022-06-01 PROBLEM — I10 HYPERTENSION: Status: RESOLVED | Noted: 2018-01-01 | Resolved: 2022-06-01

## 2022-06-01 PROBLEM — N25.81 SECONDARY RENAL HYPERPARATHYROIDISM (H): Status: RESOLVED | Noted: 2019-07-18 | Resolved: 2022-06-01

## 2022-06-01 PROBLEM — D62 ANEMIA DUE TO BLOOD LOSS, ACUTE: Status: RESOLVED | Noted: 2021-06-18 | Resolved: 2022-06-01

## 2022-06-01 PROBLEM — D80.1 HYPOGAMMAGLOBULINEMIA (H): Status: RESOLVED | Noted: 2022-01-25 | Resolved: 2022-06-01

## 2022-06-01 PROBLEM — Z76.82 KIDNEY TRANSPLANT CANDIDATE: Status: RESOLVED | Noted: 2020-08-30 | Resolved: 2022-06-01

## 2022-06-01 PROBLEM — T86.898 PANCREATITIS OF PANCREAS TRANSPLANT: Status: RESOLVED | Noted: 2021-06-16 | Resolved: 2022-06-01

## 2022-06-01 PROBLEM — G89.18 ACUTE POST-OPERATIVE PAIN: Status: RESOLVED | Noted: 2021-06-21 | Resolved: 2022-06-01

## 2022-06-01 PROBLEM — N18.6 END STAGE KIDNEY DISEASE (H): Status: RESOLVED | Noted: 2019-07-18 | Resolved: 2022-06-01

## 2022-06-01 PROCEDURE — 99214 OFFICE O/P EST MOD 30 MIN: CPT | Mod: 95 | Performed by: INTERNAL MEDICINE

## 2022-06-01 PROCEDURE — G0463 HOSPITAL OUTPT CLINIC VISIT: HCPCS | Mod: PN,RTG | Performed by: INTERNAL MEDICINE

## 2022-06-01 RX ORDER — MAGNESIUM OXIDE 400 MG/1
400 TABLET ORAL DAILY
Qty: 60 TABLET | Refills: 11 | COMMUNITY
Start: 2022-06-01

## 2022-06-01 ASSESSMENT — PAIN SCALES - GENERAL: PAINLEVEL: NO PAIN (0)

## 2022-06-01 NOTE — LETTER
PHYSICIAN ORDERS      DATE & TIME ISSUED: 2022 at 11:57 AM  PATIENT NAME: Erik Cramer   : 1980     [facility] MR# [if applicable]: [id number]     DIAGNOSIS:  kidney/pancreas transplant   ICD-10 CODE: Z94.0, Z94.83     In addition to regularly scheduled labs, please check:  HbA1c; Magnesium; PRA Donor Specific Antibodies (DSA); Mycophenolic Acid by Tandem Mass spectrometry (MPA level); BK Virus, PCR; IgG level    Please forward the below order to the imaging department at [facility] to call and schedule the patient:  -Ultrasound Renal Transplant, with Doppler    Any questions please call: [phone]  Fax results to:   [phone].      Bertin Brand MD

## 2022-06-01 NOTE — PROGRESS NOTES
Tye is a 42 year old who is being evaluated via a billable video visit.      How would you like to obtain your AVS? MyChart  If the video visit is dropped, the invitation should be resent by: Send to e-mail at: [email or web address]  Will anyone else be joining your video visit? No    Video Start Time: 8:59 AM  Video-Visit Details    Type of service:  Video Visit    Video End Time:9:16 AM    Originating Location (pt. Location): Home    Distant Location (provider location):  [facility] [place]     Platform used for Video Visit: [employer]

## 2022-06-01 NOTE — TELEPHONE ENCOUNTER
Saw Irby MD Colianni, Lauren, RN  He has a lab draw on 6/8. He needs his usual labs plus HbA1c, mag, DSA, MPA level, BK, IgG level.     Please order U/S of native and transplanted kidneys to look for evidence of mass due to post transplant erythrocytosis.     He will get labs on 6/8 and then later in the month and then labs should be monthly as BK has been stable. Based on his tac level on 6/8 we can decrease tac goal to 5-8.     Thanks,   Saw

## 2022-06-01 NOTE — LETTER
6/1/2022       RE: Erik Cramer  [address] [place] [id number]     Dear Colleague,    Thank you for referring your patient, Erik Cramer, to the [facility] [place] at [facility]. Please see a copy of my visit note below.    TRANSPLANT NEPHROLOGY CHRONIC POST TRANSPLANT VISIT    Assessment & Plan   # DDKT (SPK): Stable   - Baseline Creatinine:  ~ 1-1.2   - Proteinuria: Normal (<0.2 grams)   - Date DSA Last Checked: Oct/2021      Latest DSA: No   - BK Viremia: Yes, moderately elevated (> 10-100K)   - Kidney Tx Biopsy: Jun 2021; Result: negative for rejection   -Labs on 6/8 and then ~2 weeks later, and then monthly    # Pancreas Tx (SPK):    - Pancreatic Exocrine Drainage: Enteric drained     - Blood glucose: Near euglycemia      On insulin: No   - HbA1c: Stable      Latest HbA1c: 5.7%   - Pancreatic enzymes: Stable   - Date DSA Last Checked: Oct/2021  Latest DSA: No   - Pancreas Tx Biopsy: Jul 2021; Result:  Negative for rejection   -Decrease ASA 81mg daily    # Immunosuppression: Tacrolimus immediate release (goal 8-10) and Mycophenolate mofetil (dose 500 mg every 12 hours)   - Continue with intensive monitoring of immunosuppression for efficacy and toxicity.   - Changes: Not at this time. Repeat MPA level. At 1y post txp decrease tac goal 5-8    # Infection Prophylaxis:   - PJP: Sulfa/TMP (Bactrim)    # Hypertension: Controlled;  Goal BP: < 130/80   - Changes: Not at this time. Continue coreg 6.25mg bid    # Post-Transplant Erythrocytosis: Hgb: Stable;  On ACEI/ARB: No   Imaging: No, obtain U/S native and transplanted kidneys to look for mass    # Mineral Bone Disorder:   - Secondary renal hyperparathyroidism; PTH level: Mildly elevated (151-300 pg/ml)        On treatment: None  - Vitamin D; level: Normal        On supplement: Yes  - Calcium; level: Normal        On supplement: Yes  - Phosphorus; level: Normal        On supplement:  No    # Electrolytes:   - Potassium; level: Normal        On supplement: No  - Magnesium; level: Normal        On supplement: Yes, decrease mag to 400mg daily   - Bicarbonate; level: Normal        On supplement: No    # BK viremia:    -Repeat MPA level, IgG level. At 1y decrease tac goal to 5-8    # Skin Cancer Risk:    - Discussed sun protection and recommend regular follow up with Dermatology.    # Medical Compliance: Yes    # Obesity:    -Weight is up to 202lbs from 192lbs pre panc transplant.   -Recommend weight loss due to concern for impending insulin resistance    # COVID-19 Virus Review: Discussed COVID-19 virus and the potential medical risks.  Reviewed preventative health recommendations, including wearing a mask where appropriate.  Recommended COVID vaccination should be up to date with either an initial vaccination or booster shot when appropriate.  Asked the patient to inform the transplant center if they are exposed or diagnosed with this virus.    # COVID Vaccination Up To Date: No, due for next dose    # Transplant History:  Etiology of Kidney Failure: Diabetes mellitus type 2  Tx: SPK  Transplant: 6/11/2021 (Kidney / Pancreas)  Significant changes in immunosuppression: None  Significant transplant-related complications: BK Viremia    Transplant Office Phone Number: [phone]    Assessment and plan was discussed with the patient and he voiced his understanding and agreement.    Return visit: Return in about 6 months (around 12/1/2022).    Saw Irby MD    Chief Complaint   Mr. Cramer is a 42 year old here for routine follow up, kidney transplant, pancreas transplant and immunosuppression management.    History of Present Illness   The patient overall feels well. He denies any recent hospitalizations. He denies nausea, vomiting, diarrhea, fever, chills, shortness of breath, chest pain, LE edema, unintentional weight loss, nights sweats, dysuria, hematuria.       Home BP: Not checked    Problem  List   Patient Active Problem List   Diagnosis     End stage kidney disease (H)     Hypertension secondary to other renal disorders     Secondary renal hyperparathyroidism (H)     Type 2 diabetes mellitus (H)     Hypertension     Anemia in chronic kidney disease     Vitamin D deficiency     Gallstones, common bile duct     Kidney transplant candidate     Pancreas transplanted (H)     Kidney replaced by transplant     Immunosuppressed status (H)     Pancreatitis of pancreas transplant     Anemia due to blood loss, acute     Acute post-operative pain     Hypophosphatemia     Hypomagnesemia     Hypogammaglobulinemia (H)     BK viremia     Kidney transplant infection       Allergies   Allergies   Allergen Reactions     Metoprolol      SOB, but was also experiencing significant edema not drug associated       Medications   Current Outpatient Medications   Medication Sig     aspirin (ASA) 81 MG EC tablet Take 1 tablet (81 mg) by mouth daily     carvedilol (COREG) 6.25 MG tablet Take 1 tablet (6.25 mg) by mouth 2 times daily Dose increase, discuss with patient     magnesium oxide (MAG-OX) 400 MG tablet Take 1 tablet (400 mg) by mouth daily     montelukast (SINGULAIR) 10 MG tablet Take 10 mg by mouth At Bedtime     mycophenolate (GENERIC EQUIVALENT) 250 MG capsule Take 2 capsules (500 mg) by mouth 2 times daily     rosuvastatin (CRESTOR) 5 MG tablet Take 1 tablet (5 mg) by mouth daily     sulfamethoxazole-trimethoprim (BACTRIM) 400-80 MG tablet Take 1 tablet by mouth daily     tacrolimus (GENERIC EQUIVALENT) 0.5 MG capsule Take 1 capsule (0.5 mg) by mouth 2 times daily Total dose =2.5 MG BID     tacrolimus (GENERIC EQUIVALENT) 1 MG capsule Take 2 capsules (2 mg) by mouth 2 times daily Total dose = 2.5 mg BID     vitamin D3 (CHOLECALCIFEROL) 2000 units (50 mcg) tablet Take 1 tablet (2,000 Units) by mouth daily     No current facility-administered medications for this visit.     There are no discontinued  medications.    Physical Exam   Vital Signs: There were no vitals taken for this visit.    GENERAL APPEARANCE: alert and no distress  HENT: no obvious abnormalities on appearance  RESP: breathing appears unremarkable with normal rate, no audible wheezing or cough and no apparent shortness of breath with conversation  MS: extremities normal - no gross deformities noted  SKIN: no apparent rash and normal skin tone  NEURO: speech is clear with no obvious neurological deficits  PSYCH: mentation appears normal and affect normal      Data     Renal Latest Ref Rng & Units 5/25/2022 5/11/2022 4/27/2022   Na 133 - 144 mmol/L - - -   Na (external) 136 - 145 mmol/L 142 142 142   K 3.4 - 5.3 mmol/L - - -   K (external) 3.5 - 5.1 mmol/L 4.4 4.6 5.0   Cl 98 - 109 mmol/L 107 108 107   CO2 20 - 32 mmol/L - - -   CO2 (external) 20 - 29 mmol/L 23 24 25   BUN 7 - 30 mg/dL - - -   BUN (external) 7 - 26 mg/dL 16 16 17   Cr 0.66 - 1.25 mg/dL - - -   Cr (external) 0.73 - 1.18 mg/dL 1.14 1.05 1.07   Glucose 70 - 99 mg/dL - - -   Glucose (external) 70 - 100 mg/dL 114(H) 117(H) 109(H)   Ca  8.5 - 10.1 mg/dL - - -   Ca (external) 8.4 - 10.4 mg/dL 9.6 9.0 9.7   Mg 1.6 - 2.3 mg/dL - - -   Mg (external) 1.6 - 2.6 mg/dL - 1.6 -     Bone Health Latest Ref Rng & Units 5/11/2022 4/13/2022 3/16/2022   Phos 2.5 - 4.5 mg/dL - - -   Phos (external) 2.3 - 4.7 mg/dL 2.3 2.9 2.6   PTHi (external) 10 - 100 pg/mL - - -   Vit D Def 20 - 75 ug/L - - -     Heme Latest Ref Rng & Units 5/25/2022 5/11/2022 4/27/2022   WBC 4.0 - 11.0 10e3/uL - - -   WBC (external) 3.5 - 10.5 x10(9)/L 6.3 5.5 6.0   Hgb 13.3 - 17.7 g/dL - - -   Hgb (external) 13.5 - 17.5 g/dL 16.1 16.1 16.6   Plt 150 - 450 10e3/uL - - -   Plt (external) 150 - 450 x10(9)/L 187 203 197   ABSOLUTE NEUTROPHIL 1.6 - 8.3 10e9/L - - -   ABSOLUTE NEUTROPHILS (EXTERNAL) 1.7 - 7.0 10(9)/L 3.7 3.2 3.5   ABSOLUTE LYMPHOCYTES 0.8 - 5.3 10e9/L - - -   ABSOLUTE LYMPHOCYTES (EXTERNAL) 1.0 - 4.8 10(9)/L 1.8 1.6 1.7    ABSOLUTE MONOCYTES 0.0 - 1.3 10e9/L - - -   ABSOLUTE MONOCYTES (EXTERNAL) 0.2 - 0.9 10(9)/L 0.7 0.6 0.6   ABSOLUTE EOSINOPHILS 0.0 - 0.7 10e9/L - - -   ABSOLUTE EOSINOPHILS (EXTERNAL) 0.0 - 0.5 10(9)/L 0.1 0.1 0.1   ABSOLUTE BASOPHILS 0.0 - 0.2 10e9/L - - -   ABSOLUTE BASOPHILS (EXTERNAL) 0.0 - 0.3 10(9)/L 0.0 0.0 0.0   ABS IMMATURE GRANULOCYTES 0 - 0.4 10e9/L - - -   ABSOLUTE NUCLEATED RBC - - - -     Liver Latest Ref Rng & Units 12/8/2021 6/10/2021 1/4/2021   AP 40 - 150 U/L - 84 -   AP (external) 40 - 150 U/L 121 - -   TBili 0.2 - 1.3 mg/dL - 0.4 -   TBili (external) 0.2 - 1.2 mg/dL 0.5 - -   DBili (external) 0.0 - 0.5 mg/dL 0.2 - -   ALT 0 - 70 U/L - 32 47   ALT (external) 0 - 55 U/L 18 - -   AST 0 - 45 U/L - 32 31   AST (external) 10 - 40 U/L 27 - -   Tot Protein 6.8 - 8.8 g/dL - 7.2 -   Tot Protein (external) 6.4 - 8.3 g/dL 7.5 - -   Albumin 3.4 - 5.0 g/dL - 3.9 -   Albumin (external) 3.5 - 5.0 g/dL 4.0 - -     Pancreas Latest Ref Rng & Units 5/25/2022 5/11/2022 4/27/2022   A1C 0 - 5.6 % - - -   A1C (external) <=5.6 % - - -   Amylase 30 - 110 U/L - - -   Amylase (external) 25 - 125 U/L 71 74 81   Lipase 73 - 393 U/L - - -   Lipase (external) 25 - 125 U/L 71 74 81        [facility] Txp Virology Latest Ref Rng & Units 5/11/2022 4/13/2022 3/16/2022   BK Spec - - - -   BK Res BKNEG:BK Virus DNA Not Detected copies/mL - - -   BK Log <2.7 Log copies/mL - - -   BK Quant Log Ext log copies/mL 4.4 4.0 4.2   BK Quant Result Ext Copies/mL 26,400 10,900 16,500   BK Quant Spec Ext - - Plasma -   EBV CAPSID ANTIBODY IGG 0.0 - 0.8 AI - - -   Hep B Core NR:Nonreactive - - -        Recent Labs   Lab Test 06/25/21  0630 06/27/21  0702 07/08/21  0829   DOSTAC 2,000 Not Provided Not Provided   TACROL 9.5 8.4 17.6*     Recent Labs   Lab Test 06/24/21  0642 06/25/21  0630 07/08/21  0829   DOSMPA 2000 6/23/2021 2,000 2015pm 7/7/21   MPACID 4.31* 1.43 7.94*   MPAG 100.9* 79.8 152.5*           Sincerely,    Saw Irby MD

## 2022-06-01 NOTE — PATIENT INSTRUCTIONS
Decrease Aspirin to 81mg daily  Decrease magnesium oxide to 400mg daily  Obtain 4th dose of COVID vaccine

## 2022-06-03 ENCOUNTER — TELEPHONE (OUTPATIENT)
Dept: TRANSPLANT | Facility: CLINIC | Age: 42
End: 2022-06-03
Payer: MEDICARE

## 2022-06-03 NOTE — TELEPHONE ENCOUNTER
[facility] called to get clarification on orders that were just received. They need a new order that clarifies if this is a one time draw or standing order from the letter sent today 06/03.

## 2022-06-03 NOTE — LETTER
PHYSICIAN ORDERS      DATE & TIME ISSUED: 2022 at 11:57 AM  PATIENT NAME: Erik Cramer   : 1980     [facility] MR# [if applicable]: [id number]     DIAGNOSIS:  kidney/pancreas transplant   ICD-10 CODE: Z94.0, Z94.83     In addition to regularly scheduled labs, please check below labs one time:  HbA1c; Magnesium; PRA Donor Specific Antibodies (DSA); Mycophenolic Acid by Tandem Mass spectrometry (MPA level); BK Virus, PCR; IgG level    Please forward the below order to the imaging department at [facility] to call and schedule the patient:  -Ultrasound Renal Transplant, with Doppler    Any questions please call: [phone]  Fax results to:   [phone].      Bertin Brand MD

## 2022-06-07 ENCOUNTER — TELEPHONE (OUTPATIENT)
Dept: TRANSPLANT | Facility: CLINIC | Age: 42
End: 2022-06-07
Payer: MEDICARE

## 2022-06-07 NOTE — TELEPHONE ENCOUNTER
[facility] called regarding the Auto Antibodies test ordered by Dr. Kennedy last year needs more information.

## 2022-06-07 NOTE — LETTER
PHYSICIAN ORDERS      DATE & TIME ISSUED: 2022 at 11:13AM  PATIENT NAME: Erik Cramer   : 1980     [facility] MR# [if applicable]: [id number]     DIAGNOSIS:  kidney/pancreas transplant   ICD-10 CODE: Z94.0, Z94.83     Please check the following labs at 1 year (on or around 22):  Auto Antibodies, which include:  -Glutamic Acid Decarboxylase Auto Antibody (SANDRA)  -Insulin Auto Antibody (IAA)  -Islet Cell Auto Antibody (ICA)  -Zinc Transporter 8 Antibody       Any questions please call: [phone]  Fax results to:   [phone].      Bertin Brand MD

## 2022-06-08 ENCOUNTER — LAB (OUTPATIENT)
Dept: LAB | Facility: CLINIC | Age: 42
End: 2022-06-08
Payer: MEDICARE

## 2022-06-08 DIAGNOSIS — Z94.0 KIDNEY REPLACED BY TRANSPLANT: ICD-10-CM

## 2022-06-08 DIAGNOSIS — Z94.83 PANCREAS REPLACED BY TRANSPLANT (H): ICD-10-CM

## 2022-06-08 PROCEDURE — 86832 HLA CLASS I HIGH DEFIN QUAL: CPT

## 2022-06-08 PROCEDURE — 86833 HLA CLASS II HIGH DEFIN QUAL: CPT

## 2022-06-14 DIAGNOSIS — E78.5 HYPERLIPIDEMIA LDL GOAL <130: ICD-10-CM

## 2022-06-14 DIAGNOSIS — Z48.298 AFTERCARE FOLLOWING ORGAN TRANSPLANT: Primary | ICD-10-CM

## 2022-06-14 RX ORDER — ROSUVASTATIN CALCIUM 5 MG/1
10 TABLET, COATED ORAL AT BEDTIME
Qty: 60 TABLET | Refills: 3 | Status: SHIPPED | OUTPATIENT
Start: 2022-06-14 | End: 2022-06-20

## 2022-06-20 DIAGNOSIS — E78.5 HYPERLIPIDEMIA LDL GOAL <130: ICD-10-CM

## 2022-06-20 DIAGNOSIS — Z48.298 AFTERCARE FOLLOWING ORGAN TRANSPLANT: Primary | ICD-10-CM

## 2022-06-20 RX ORDER — ROSUVASTATIN CALCIUM 5 MG/1
10 TABLET, COATED ORAL AT BEDTIME
Qty: 90 TABLET | Refills: 3 | Status: SHIPPED | OUTPATIENT
Start: 2022-06-20 | End: 2022-06-20

## 2022-06-20 RX ORDER — ROSUVASTATIN CALCIUM 10 MG/1
10 TABLET, COATED ORAL DAILY
Qty: 90 TABLET | Refills: 3 | Status: SHIPPED | OUTPATIENT
Start: 2022-06-20 | End: 2022-12-15

## 2022-06-22 ENCOUNTER — TELEPHONE (OUTPATIENT)
Dept: TRANSPLANT | Facility: CLINIC | Age: 42
End: 2022-06-22

## 2022-06-22 NOTE — TELEPHONE ENCOUNTER
Saw Irby MD Colianni, Lauren, RN   Needs imaging of native and transplanted kidneys to look for mass

## 2022-07-21 ENCOUNTER — TELEPHONE (OUTPATIENT)
Dept: TRANSPLANT | Facility: CLINIC | Age: 42
End: 2022-07-21

## 2022-07-21 DIAGNOSIS — Z94.0 KIDNEY REPLACED BY TRANSPLANT: ICD-10-CM

## 2022-07-21 DIAGNOSIS — Z94.83 PANCREAS TRANSPLANTED (H): ICD-10-CM

## 2022-07-21 RX ORDER — SULFAMETHOXAZOLE AND TRIMETHOPRIM 400; 80 MG/1; MG/1
1 TABLET ORAL DAILY
Qty: 90 TABLET | Refills: 3 | Status: SHIPPED | OUTPATIENT
Start: 2022-07-21 | End: 2022-08-11

## 2022-07-21 NOTE — LETTER
PHYSICIAN ORDERS      DATE & TIME ISSUED: 2022 at 11:37 AM  PATIENT NAME: Erik Cramer   : 1980     [facility] MR# [if applicable]: [id number]     DIAGNOSIS:  kidney/pancreas transplant   ICD-10 CODE: Z94.0, Z94.83     In addition to regularly scheduled labs, please check below labs one time:  -t-cell subset    Please forward the below order to the imaging department at [facility] to call and schedule the patient:  -Ultrasound Renal Transplant, with Doppler    Any questions please call: [phone]  Fax results to:   [phone].      Bertin Brand MD

## 2022-07-22 DIAGNOSIS — Z94.0 KIDNEY REPLACED BY TRANSPLANT: ICD-10-CM

## 2022-07-22 DIAGNOSIS — Z94.83 PANCREAS TRANSPLANTED (H): ICD-10-CM

## 2022-07-22 RX ORDER — TACROLIMUS 0.5 MG/1
0.5 CAPSULE ORAL 2 TIMES DAILY
Qty: 60 CAPSULE | Refills: 11 | Status: SHIPPED | OUTPATIENT
Start: 2022-07-22 | End: 2023-03-08

## 2022-07-22 RX ORDER — TACROLIMUS 1 MG/1
2 CAPSULE ORAL 2 TIMES DAILY
Qty: 120 CAPSULE | Refills: 11 | Status: SHIPPED | OUTPATIENT
Start: 2022-07-22 | End: 2023-01-17

## 2022-08-11 ENCOUNTER — TELEPHONE (OUTPATIENT)
Dept: TRANSPLANT | Facility: CLINIC | Age: 42
End: 2022-08-11

## 2022-08-11 NOTE — TELEPHONE ENCOUNTER
Saw Irby MD Colianni, Lauren, RN  Ok to stop bactrim. CD4 is 421     triptap message sent to Tye.

## 2022-09-08 DIAGNOSIS — Z94.0 KIDNEY REPLACED BY TRANSPLANT: ICD-10-CM

## 2022-09-08 DIAGNOSIS — Z94.83 PANCREAS TRANSPLANTED (H): ICD-10-CM

## 2022-09-08 DIAGNOSIS — Z94.0 KIDNEY REPLACED BY TRANSPLANT: Primary | ICD-10-CM

## 2022-09-08 RX ORDER — CARVEDILOL 6.25 MG/1
6.25 TABLET ORAL 2 TIMES DAILY
Qty: 120 TABLET | Refills: 3 | Status: SHIPPED | OUTPATIENT
Start: 2022-09-08

## 2022-09-08 RX ORDER — CARVEDILOL 6.25 MG/1
6.25 TABLET ORAL 2 TIMES DAILY
Qty: 60 TABLET | Refills: 0 | Status: SHIPPED | OUTPATIENT
Start: 2022-09-08 | End: 2022-09-08

## 2022-09-08 RX ORDER — CARVEDILOL 6.25 MG/1
6.25 TABLET ORAL 2 TIMES DAILY
Qty: 60 TABLET | Refills: 3 | Status: SHIPPED | OUTPATIENT
Start: 2022-09-08 | End: 2022-09-08

## 2022-09-08 NOTE — TELEPHONE ENCOUNTER
Medication Refill  Route to LYNNE Gamble called the pharmacy to get the below medication filled & reports the medication was discontinued and unable to fill.     Pharmacy Name: [facility] - [place], [place] - [address]    Name of Medication: carvedilol (COREG) 6.25 MG tablet   Dose: Take 1 tablet (6.25 mg) by mouth 2 times daily Dose increase, discuss with patient, Disp-60 tablet, R-3, E-Prescribe    When will the patient be out of this medication?: Less than 24 hours (Papo BATISTA, then page if no answer)

## 2022-09-18 ENCOUNTER — HEALTH MAINTENANCE LETTER (OUTPATIENT)
Age: 42
End: 2022-09-18

## 2022-11-15 ENCOUNTER — VIRTUAL VISIT (OUTPATIENT)
Dept: NEPHROLOGY | Facility: CLINIC | Age: 42
End: 2022-11-15
Attending: INTERNAL MEDICINE
Payer: MEDICARE

## 2022-11-15 DIAGNOSIS — D75.1 POST-TRANSPLANT ERYTHROCYTOSIS: Primary | ICD-10-CM

## 2022-11-15 DIAGNOSIS — Z48.298 AFTERCARE FOLLOWING ORGAN TRANSPLANT: ICD-10-CM

## 2022-11-15 PROCEDURE — 99214 OFFICE O/P EST MOD 30 MIN: CPT | Mod: 95 | Performed by: NURSE PRACTITIONER

## 2022-11-15 PROCEDURE — G0463 HOSPITAL OUTPT CLINIC VISIT: HCPCS | Mod: PN,RTG | Performed by: NURSE PRACTITIONER

## 2022-11-15 NOTE — PROGRESS NOTES
TRANSPLANT NEPHROLOGY CHRONIC POST TRANSPLANT VISIT    Recommendations:  - transplant and native renal US.   - Tac level slightly high, with new goal 5-8. Recheck in 1-2 weeks. If still high will decrease dose.   - cont monthly labs  - update BK      Assessment & Plan   # DDKT (SPK): Stable   - Baseline Creatinine:  ~ 1-1.2   - Proteinuria: Normal (<0.2 grams)   - Date DSA Last Checked: Oct/2021      Latest DSA: No   - BK Viremia: Yes, moderately elevated (> 10-100K)   - Kidney Tx Biopsy: Jun 2021; Result: negative for rejection       # Pancreas Tx (SPK):    - Pancreatic Exocrine Drainage: Enteric drained     - Blood glucose: Near euglycemia      On insulin: No   - HbA1c: Stable      Latest HbA1c: 5.7%   - Pancreatic enzymes: Stable   - Date DSA Last Checked: Oct/2021  Latest DSA: No   - Pancreas Tx Biopsy: Jul 2021; Result:  Negative for rejection   -Decrease ASA 81mg daily    # Immunosuppression: Tacrolimus immediate release (goal 5-8) and Mycophenolate mofetil (dose 500 mg every 12 hours)   - Continue with intensive monitoring of immunosuppression for efficacy and toxicity.   - Changes: Not at this time. Tac level slightly high, recheck in 1-2 weeks. If still high will decrease dose.     # Infection Prophylaxis:   - PJP: Sulfa/TMP (Bactrim)    # Hypertension: Controlled;  Goal BP: < 130/80   - Changes: Not at this time. Continue coreg 6.25mg bid    # Post-Transplant Erythrocytosis: Hgb: Stable;  On ACEI/ARB: No   Imaging: No, obtain U/S native and transplanted kidneys to look for mass    # Mineral Bone Disorder:   - Secondary renal hyperparathyroidism; PTH level: Mildly elevated (151-300 pg/ml)        On treatment: None  - Vitamin D; level: Normal        On supplement: Yes  - Calcium; level: Normal        On supplement: Yes  - Phosphorus; level: Normal        On supplement: No    # Electrolytes:   - Potassium; level: Normal        On supplement: No  - Magnesium; level: Normal        On supplement: Yes, decrease  mag to 400mg daily   - Bicarbonate; level: Normal        On supplement: No    # BK viremia:  - trending down ~ 11 K as of 6/8/22.    - tac goal now 5-8    # Skin Cancer Risk:    - Discussed sun protection and recommend regular follow up with Dermatology.    # Medical Compliance: Yes   - Discussed importance of checking labs regularly as recommended, taking medications as prescribed and attending scheduled medical appointments.      # Obesity:    -Weight is up to 202lbs from 192lbs pre panc transplant.   -Recommend weight loss due to concern for impending insulin resistance    # COVID-19 Virus Review: Discussed COVID-19 virus and the potential medical risks.  Reviewed preventative health recommendations, including wearing a mask where appropriate.  Recommended COVID vaccination should be up to date with either an initial vaccination or booster shot when appropriate.  Asked the patient to inform the transplant center if they are exposed or diagnosed with this virus.    # COVID Vaccination Up To Date: No, due for next dose    # Transplant History:  Etiology of Kidney Failure: Diabetes mellitus type 2  Tx: SPK  Transplant: 6/11/2021 (Kidney / Pancreas)  Significant changes in immunosuppression: None  Significant transplant-related complications: BK Viremia    Transplant Office Phone Number: [phone]    Assessment and plan was discussed with the patient and he voiced his understanding and agreement.    Return visit: No follow-ups on file.    SHANIA Meier CNP    Chief Complaint   Mr. Cramer is a 42 year old here for routine follow up, kidney transplant, pancreas transplant and immunosuppression management.    History of Present Illness   The patient overall feels well. He denies any recent hospitalizations. He denies nausea, vomiting, diarrhea, fever, chills, shortness of breath, chest pain, LE edema,  nights sweats, dysuria, hematuria.  Reports 10-15 lbs of weight gain in the past few months. He is going  to try boxing for exercise.       Home BP: Not checked    Problem List   Patient Active Problem List   Diagnosis     HTN, kidney transplant related     Type 2 diabetes mellitus (H)     Vitamin D deficiency     Gallstones, common bile duct     Pancreas replaced by transplant (H)     Kidney replaced by transplant     Immunosuppression (H)     Hypomagnesemia     BK viremia     Kidney transplant infection     Post-transplant erythrocytosis       Allergies   Allergies   Allergen Reactions     Metoprolol      SOB, but was also experiencing significant edema not drug associated       Medications   Current Outpatient Medications   Medication Sig     aspirin (ASA) 81 MG EC tablet Take 1 tablet (81 mg) by mouth daily     carvedilol (COREG) 6.25 MG tablet Take 1 tablet (6.25 mg) by mouth 2 times daily     magnesium oxide (MAG-OX) 400 MG tablet Take 1 tablet (400 mg) by mouth daily     montelukast (SINGULAIR) 10 MG tablet Take 10 mg by mouth At Bedtime     mycophenolate (GENERIC EQUIVALENT) 250 MG capsule Take 2 capsules (500 mg) by mouth 2 times daily     rosuvastatin (CRESTOR) 10 MG tablet Take 1 tablet (10 mg) by mouth daily     tacrolimus (GENERIC EQUIVALENT) 0.5 MG capsule Take 1 capsule (0.5 mg) by mouth 2 times daily Total dose =2.5 MG BID     tacrolimus (GENERIC EQUIVALENT) 1 MG capsule Take 2 capsules (2 mg) by mouth 2 times daily Total dose = 2.5 mg BID     vitamin D3 (CHOLECALCIFEROL) 2000 units (50 mcg) tablet Take 1 tablet (2,000 Units) by mouth daily     No current facility-administered medications for this visit.     There are no discontinued medications.    Physical Exam   Vital Signs: There were no vitals taken for this visit.    GENERAL APPEARANCE: alert and no distress  HENT: no obvious abnormalities on appearance  RESP: breathing appears unremarkable with normal rate, no audible wheezing or cough and no apparent shortness of breath with conversation  MS: extremities normal - no gross deformities noted  SKIN:  no apparent rash and normal skin tone  NEURO: speech is clear with no obvious neurological deficits  PSYCH: mentation appears normal and affect normal      Data     Renal Latest Ref Rng & Units 11/9/2022 10/5/2022 9/7/2022   Na 133 - 144 mmol/L - - -   Na (external) 136 - 145 mmol/L 141 142 140   K 3.4 - 5.3 mmol/L - - -   K (external) 3.5 - 5.1 mmol/L 4.4 4.0 4.2   Cl 98 - 109 mmol/L 109 108 109   CO2 20 - 32 mmol/L - - -   CO2 (external) 20 - 29 mmol/L 24 25 25   BUN 7 - 30 mg/dL - - -   BUN (external) 7 - 26 mg/dL 18 15 15   Cr 0.66 - 1.25 mg/dL - - -   Cr (external) 0.73 - 1.18 mg/dL 1.20(H) 1.01 0.97   Glucose 70 - 99 mg/dL - - -   Glucose (external) 70 - 100 mg/dL 114(H) 117(H) 123(H)   Ca  8.5 - 10.1 mg/dL - - -   Ca (external) 8.4 - 10.4 mg/dL 9.1 9.0 9.1   Mg 1.6 - 2.3 mg/dL - - -   Mg (external) 1.6 - 2.6 mg/dL - - -     Bone Health Latest Ref Rng & Units 6/8/2022 5/11/2022 4/13/2022   Phos 2.5 - 4.5 mg/dL - - -   Phos (external) 2.3 - 4.7 mg/dL 2.4 2.3 2.9   PTHi (external) 10 - 100 pg/mL - - -   Vit D Def 20 - 75 ug/L - - -     Heme Latest Ref Rng & Units 11/9/2022 10/5/2022 9/7/2022   WBC 4.0 - 11.0 10e3/uL - - -   WBC (external) 3.5 - 10.5 x10(9)/L 6.5 6.3 6.2   Hgb 13.3 - 17.7 g/dL - - -   Hgb (external) 13.5 - 17.5 g/dL 16.5 16.3 16.4   Plt 150 - 450 10e3/uL - - -   Plt (external) 150 - 450 x10(9)/L 202 207 202   ABSOLUTE NEUTROPHIL 1.6 - 8.3 10e9/L - - -   ABSOLUTE NEUTROPHILS (EXTERNAL) 1.7 - 7.0 10(9)/L - - -   ABSOLUTE LYMPHOCYTES 0.8 - 5.3 10e9/L - - -   ABSOLUTE LYMPHOCYTES (EXTERNAL) 1.0 - 4.8 10(9)/L - - -   ABSOLUTE MONOCYTES 0.0 - 1.3 10e9/L - - -   ABSOLUTE MONOCYTES (EXTERNAL) 0.2 - 0.9 10(9)/L - - -   ABSOLUTE EOSINOPHILS 0.0 - 0.7 10e9/L - - -   ABSOLUTE EOSINOPHILS (EXTERNAL) 0.0 - 0.5 10(9)/L - - -   ABSOLUTE BASOPHILS 0.0 - 0.2 10e9/L - - -   ABSOLUTE BASOPHILS (EXTERNAL) 0.0 - 0.3 10(9)/L - - -   ABS IMMATURE GRANULOCYTES 0 - 0.4 10e9/L - - -   ABSOLUTE NUCLEATED RBC - - - -      Liver Latest Ref Rng & Units 6/8/2022 12/8/2021 6/10/2021   AP 40 - 150 U/L - - 84   AP (external) 40 - 150 U/L 106 121 -   TBili 0.2 - 1.3 mg/dL - - 0.4   TBili (external) 0.2 - 1.2 mg/dL 0.5 0.5 -   DBili (external) 0.0 - 0.5 mg/dL 0.1 0.2 -   ALT 0 - 70 U/L - - 32   ALT (external) 0 - 55 U/L 10 18 -   AST 0 - 45 U/L - - 32   AST (external) 10 - 40 U/L 22 27 -   Tot Protein 6.8 - 8.8 g/dL - - 7.2   Tot Protein (external) 6.4 - 8.3 g/dL 7.6 7.5 -   Albumin 3.4 - 5.0 g/dL - - 3.9   Albumin (external) 3.5 - 5.0 g/dL 3.9 4.0 -     Pancreas Latest Ref Rng & Units 11/9/2022 10/5/2022 9/7/2022   A1C 0 - 5.6 % - - -   A1C (external) <=5.6 % - - -   Amylase 30 - 110 U/L - - -   Amylase (external) 25 - 125 U/L 70 65 70   Lipase 73 - 393 U/L - - -   Lipase (external) 25 - 125 U/L 70 65 70     Iron studies Latest Ref Rng & Units 6/8/2022   Iron (external) 8 - 78 U/L 124(H)     [facility] Txp Virology Latest Ref Rng & Units 6/8/2022 5/11/2022 4/13/2022   BK Spec - - - -   BK Res BKNEG:BK Virus DNA Not Detected copies/mL - - -   BK Log <2.7 Log copies/mL - - -   BK Quant Log Ext log IU/mL 4.0 4.4 4.0   BK Quant Result Ext Copies/mL 10,800 26,400 10,900   BK Quant Spec Ext - Blood - Plasma   EBV CAPSID ANTIBODY IGG 0.0 - 0.8 AI - - -   Hep B Core NR:Nonreactive - - -        Recent Labs   Lab Test 06/25/21  0630 06/27/21  0702 07/08/21  0829   DOSTAC 2,000 Not Provided Not Provided   TACROL 9.5 8.4 17.6*     Recent Labs   Lab Test 06/24/21  0642 06/25/21  0630 07/08/21  0829   DOSMPA 2000 6/23/2021 2,000 2015pm 7/7/21   MPACID 4.31* 1.43 7.94*   MPAG 100.9* 79.8 152.5*

## 2022-11-15 NOTE — LETTER
11/15/2022       RE: Erik Cramer  [address] [place] [id number]     Dear Colleague,    Thank you for referring your patient, Erik Cramer, to the [facility] [place] [facility]. Please see a copy of my visit note below.    TRANSPLANT NEPHROLOGY CHRONIC POST TRANSPLANT VISIT    Recommendations:  - transplant and native renal US.   - Tac level slightly high, with new goal 5-8. Recheck in 1-2 weeks. If still high will decrease dose.   - cont monthly labs  - update BK      Assessment & Plan   # DDKT (SPK): Stable   - Baseline Creatinine:  ~ 1-1.2   - Proteinuria: Normal (<0.2 grams)   - Date DSA Last Checked: Oct/2021      Latest DSA: No   - BK Viremia: Yes, moderately elevated (> 10-100K)   - Kidney Tx Biopsy: Jun 2021; Result: negative for rejection       # Pancreas Tx (K):    - Pancreatic Exocrine Drainage: Enteric drained     - Blood glucose: Near euglycemia      On insulin: No   - HbA1c: Stable      Latest HbA1c: 5.7%   - Pancreatic enzymes: Stable   - Date DSA Last Checked: Oct/2021  Latest DSA: No   - Pancreas Tx Biopsy: Jul 2021; Result:  Negative for rejection   -Decrease ASA 81mg daily    # Immunosuppression: Tacrolimus immediate release (goal 5-8) and Mycophenolate mofetil (dose 500 mg every 12 hours)   - Continue with intensive monitoring of immunosuppression for efficacy and toxicity.   - Changes: Not at this time. Tac level slightly high, recheck in 1-2 weeks. If still high will decrease dose.     # Infection Prophylaxis:   - PJP: Sulfa/TMP (Bactrim)    # Hypertension: Controlled;  Goal BP: < 130/80   - Changes: Not at this time. Continue coreg 6.25mg bid    # Post-Transplant Erythrocytosis: Hgb: Stable;  On ACEI/ARB: No   Imaging: No, obtain U/S native and transplanted kidneys to look for mass    # Mineral Bone Disorder:   - Secondary renal hyperparathyroidism; PTH level: Mildly elevated (151-300 pg/ml)        On  treatment: None  - Vitamin D; level: Normal        On supplement: Yes  - Calcium; level: Normal        On supplement: Yes  - Phosphorus; level: Normal        On supplement: No    # Electrolytes:   - Potassium; level: Normal        On supplement: No  - Magnesium; level: Normal        On supplement: Yes, decrease mag to 400mg daily   - Bicarbonate; level: Normal        On supplement: No    # BK viremia:  - trending down ~ 11 K as of 6/8/22.    - tac goal now 5-8    # Skin Cancer Risk:    - Discussed sun protection and recommend regular follow up with Dermatology.    # Medical Compliance: Yes   - Discussed importance of checking labs regularly as recommended, taking medications as prescribed and attending scheduled medical appointments.      # Obesity:    -Weight is up to 202lbs from 192lbs pre panc transplant.   -Recommend weight loss due to concern for impending insulin resistance    # COVID-19 Virus Review: Discussed COVID-19 virus and the potential medical risks.  Reviewed preventative health recommendations, including wearing a mask where appropriate.  Recommended COVID vaccination should be up to date with either an initial vaccination or booster shot when appropriate.  Asked the patient to inform the transplant center if they are exposed or diagnosed with this virus.    # COVID Vaccination Up To Date: No, due for next dose    # Transplant History:  Etiology of Kidney Failure: Diabetes mellitus type 2  Tx: SPK  Transplant: 6/11/2021 (Kidney / Pancreas)  Significant changes in immunosuppression: None  Significant transplant-related complications: BK Viremia    Transplant Office Phone Number: [phone]    Assessment and plan was discussed with the patient and he voiced his understanding and agreement.    Return visit: No follow-ups on file.    SHANIA Meier CNP    Chief Complaint   Mr. Cramer is a 42 year old here for routine follow up, kidney transplant, pancreas transplant and immunosuppression  management.    History of Present Illness   The patient overall feels well. He denies any recent hospitalizations. He denies nausea, vomiting, diarrhea, fever, chills, shortness of breath, chest pain, LE edema,  nights sweats, dysuria, hematuria.  Reports 10-15 lbs of weight gain in the past few months. He is going to try boxing for exercise.       Home BP: Not checked    Problem List   Patient Active Problem List   Diagnosis     HTN, kidney transplant related     Type 2 diabetes mellitus (H)     Vitamin D deficiency     Gallstones, common bile duct     Pancreas replaced by transplant (H)     Kidney replaced by transplant     Immunosuppression (H)     Hypomagnesemia     BK viremia     Kidney transplant infection     Post-transplant erythrocytosis       Allergies   Allergies   Allergen Reactions     Metoprolol      SOB, but was also experiencing significant edema not drug associated       Medications   Current Outpatient Medications   Medication Sig     aspirin (ASA) 81 MG EC tablet Take 1 tablet (81 mg) by mouth daily     carvedilol (COREG) 6.25 MG tablet Take 1 tablet (6.25 mg) by mouth 2 times daily     magnesium oxide (MAG-OX) 400 MG tablet Take 1 tablet (400 mg) by mouth daily     montelukast (SINGULAIR) 10 MG tablet Take 10 mg by mouth At Bedtime     mycophenolate (GENERIC EQUIVALENT) 250 MG capsule Take 2 capsules (500 mg) by mouth 2 times daily     rosuvastatin (CRESTOR) 10 MG tablet Take 1 tablet (10 mg) by mouth daily     tacrolimus (GENERIC EQUIVALENT) 0.5 MG capsule Take 1 capsule (0.5 mg) by mouth 2 times daily Total dose =2.5 MG BID     tacrolimus (GENERIC EQUIVALENT) 1 MG capsule Take 2 capsules (2 mg) by mouth 2 times daily Total dose = 2.5 mg BID     vitamin D3 (CHOLECALCIFEROL) 2000 units (50 mcg) tablet Take 1 tablet (2,000 Units) by mouth daily     No current facility-administered medications for this visit.     There are no discontinued medications.    Physical Exam   Vital Signs: There were no  vitals taken for this visit.    GENERAL APPEARANCE: alert and no distress  HENT: no obvious abnormalities on appearance  RESP: breathing appears unremarkable with normal rate, no audible wheezing or cough and no apparent shortness of breath with conversation  MS: extremities normal - no gross deformities noted  SKIN: no apparent rash and normal skin tone  NEURO: speech is clear with no obvious neurological deficits  PSYCH: mentation appears normal and affect normal      Data     Renal Latest Ref Rng & Units 11/9/2022 10/5/2022 9/7/2022   Na 133 - 144 mmol/L - - -   Na (external) 136 - 145 mmol/L 141 142 140   K 3.4 - 5.3 mmol/L - - -   K (external) 3.5 - 5.1 mmol/L 4.4 4.0 4.2   Cl 98 - 109 mmol/L 109 108 109   CO2 20 - 32 mmol/L - - -   CO2 (external) 20 - 29 mmol/L 24 25 25   BUN 7 - 30 mg/dL - - -   BUN (external) 7 - 26 mg/dL 18 15 15   Cr 0.66 - 1.25 mg/dL - - -   Cr (external) 0.73 - 1.18 mg/dL 1.20(H) 1.01 0.97   Glucose 70 - 99 mg/dL - - -   Glucose (external) 70 - 100 mg/dL 114(H) 117(H) 123(H)   Ca  8.5 - 10.1 mg/dL - - -   Ca (external) 8.4 - 10.4 mg/dL 9.1 9.0 9.1   Mg 1.6 - 2.3 mg/dL - - -   Mg (external) 1.6 - 2.6 mg/dL - - -     Bone Health Latest Ref Rng & Units 6/8/2022 5/11/2022 4/13/2022   Phos 2.5 - 4.5 mg/dL - - -   Phos (external) 2.3 - 4.7 mg/dL 2.4 2.3 2.9   PTHi (external) 10 - 100 pg/mL - - -   Vit D Def 20 - 75 ug/L - - -     Heme Latest Ref Rng & Units 11/9/2022 10/5/2022 9/7/2022   WBC 4.0 - 11.0 10e3/uL - - -   WBC (external) 3.5 - 10.5 x10(9)/L 6.5 6.3 6.2   Hgb 13.3 - 17.7 g/dL - - -   Hgb (external) 13.5 - 17.5 g/dL 16.5 16.3 16.4   Plt 150 - 450 10e3/uL - - -   Plt (external) 150 - 450 x10(9)/L 202 207 202   ABSOLUTE NEUTROPHIL 1.6 - 8.3 10e9/L - - -   ABSOLUTE NEUTROPHILS (EXTERNAL) 1.7 - 7.0 10(9)/L - - -   ABSOLUTE LYMPHOCYTES 0.8 - 5.3 10e9/L - - -   ABSOLUTE LYMPHOCYTES (EXTERNAL) 1.0 - 4.8 10(9)/L - - -   ABSOLUTE MONOCYTES 0.0 - 1.3 10e9/L - - -   ABSOLUTE MONOCYTES  (EXTERNAL) 0.2 - 0.9 10(9)/L - - -   ABSOLUTE EOSINOPHILS 0.0 - 0.7 10e9/L - - -   ABSOLUTE EOSINOPHILS (EXTERNAL) 0.0 - 0.5 10(9)/L - - -   ABSOLUTE BASOPHILS 0.0 - 0.2 10e9/L - - -   ABSOLUTE BASOPHILS (EXTERNAL) 0.0 - 0.3 10(9)/L - - -   ABS IMMATURE GRANULOCYTES 0 - 0.4 10e9/L - - -   ABSOLUTE NUCLEATED RBC - - - -     Liver Latest Ref Rng & Units 6/8/2022 12/8/2021 6/10/2021   AP 40 - 150 U/L - - 84   AP (external) 40 - 150 U/L 106 121 -   TBili 0.2 - 1.3 mg/dL - - 0.4   TBili (external) 0.2 - 1.2 mg/dL 0.5 0.5 -   DBili (external) 0.0 - 0.5 mg/dL 0.1 0.2 -   ALT 0 - 70 U/L - - 32   ALT (external) 0 - 55 U/L 10 18 -   AST 0 - 45 U/L - - 32   AST (external) 10 - 40 U/L 22 27 -   Tot Protein 6.8 - 8.8 g/dL - - 7.2   Tot Protein (external) 6.4 - 8.3 g/dL 7.6 7.5 -   Albumin 3.4 - 5.0 g/dL - - 3.9   Albumin (external) 3.5 - 5.0 g/dL 3.9 4.0 -     Pancreas Latest Ref Rng & Units 11/9/2022 10/5/2022 9/7/2022   A1C 0 - 5.6 % - - -   A1C (external) <=5.6 % - - -   Amylase 30 - 110 U/L - - -   Amylase (external) 25 - 125 U/L 70 65 70   Lipase 73 - 393 U/L - - -   Lipase (external) 25 - 125 U/L 70 65 70     Iron studies Latest Ref Rng & Units 6/8/2022   Iron (external) 8 - 78 U/L 124(H)     [facility] Txp Virology Latest Ref Rng & Units 6/8/2022 5/11/2022 4/13/2022   BK Spec - - - -   BK Res BKNEG:BK Virus DNA Not Detected copies/mL - - -   BK Log <2.7 Log copies/mL - - -   BK Quant Log Ext log IU/mL 4.0 4.4 4.0   BK Quant Result Ext Copies/mL 10,800 26,400 10,900   BK Quant Spec Ext - Blood - Plasma   EBV CAPSID ANTIBODY IGG 0.0 - 0.8 AI - - -   Hep B Core NR:Nonreactive - - -        Recent Labs   Lab Test 06/25/21  0630 06/27/21  0702 07/08/21  0829   DOSTAC 2,000 Not Provided Not Provided   TACROL 9.5 8.4 17.6*     Recent Labs   Lab Test 06/24/21  0642 06/25/21  0630 07/08/21  0829   DOSMPA 2000 6/23/2021 2,000 2015pm 7/7/21   MPACID 4.31* 1.43 7.94*   MPAG 100.9* 79.8 152.5*       Again, thank you for allowing me to  participate in the care of your patient.      Sincerely,    SHANIA Meier CNP

## 2022-11-15 NOTE — PROGRESS NOTES
Tye is a 42 year old who is being evaluated via a billable video visit.      PT is in [place]    How would you like to obtain your AVS? MyChart  If the video visit is dropped, the invitation should be resent by: Send to e-mail at: [email or web address]  Will anyone else be joining your video visit? No        Video-Visit Details    Video Start Time: 3:40 pm     Type of service:  Video Visit    Video End Time:3:53 pm     Originating Location (pt. Location): Home        Distant Location (provider location):  On-site    Platform used for Video Visit: [employer]

## 2022-12-15 DIAGNOSIS — Z48.298 AFTERCARE FOLLOWING ORGAN TRANSPLANT: Primary | ICD-10-CM

## 2022-12-15 RX ORDER — ROSUVASTATIN CALCIUM 10 MG/1
10 TABLET, COATED ORAL DAILY
Qty: 90 TABLET | Refills: 0 | Status: SHIPPED | OUTPATIENT
Start: 2022-12-15

## 2023-01-17 DIAGNOSIS — Z94.0 KIDNEY REPLACED BY TRANSPLANT: Primary | ICD-10-CM

## 2023-01-17 DIAGNOSIS — Z94.83 PANCREAS TRANSPLANTED (H): ICD-10-CM

## 2023-01-17 RX ORDER — TACROLIMUS 1 MG/1
2 CAPSULE ORAL 2 TIMES DAILY
Qty: 360 CAPSULE | Refills: 3 | Status: SHIPPED | OUTPATIENT
Start: 2023-01-17 | End: 2023-03-08

## 2023-01-17 NOTE — TELEPHONE ENCOUNTER
ISSUE:   Tacrolimus IR level 11.9 on 1/17/23, goal 5-8, dose 2.5 mg BID.    PLAN:   Please call patient and confirm this was an accurate 12-hour trough. Verify Tacrolimus IR dose 2.5 mg BID. [id number] no new medications or illness. Confirm no missed doses. If accurate trough and accurate dose, decrease Tacrolimus IR dose to 2 mg BID and repeat labs in 1 months

## 2023-01-17 NOTE — TELEPHONE ENCOUNTER
[employer] message sent to patient regarding:  Tacrolimus IR level 11.9 on 1/17/23, goal 5-8, dose 2.5 mg BID.     PLAN:   Please call patient and confirm this was an accurate 12-hour trough. Verify Tacrolimus IR dose 2.5 mg BID. [id number] no new medications or illness. Confirm no missed doses. If accurate trough and accurate dose, decrease Tacrolimus IR dose to 2 mg BID and repeat labs in 1 months

## 2023-01-17 NOTE — TELEPHONE ENCOUNTER
Patient confirmed this was an accurate 12-hour trough. Verified Tacrolimus IR dose 2.5 mg BID. Confirmed no new medications or illness. Confirmed no missed doses. Patient confirms decrease Tacrolimus IR dose to 2 mg BID and repeat labs in 1 months

## 2023-03-08 DIAGNOSIS — Z94.83 PANCREAS TRANSPLANTED (H): Primary | ICD-10-CM

## 2023-03-08 DIAGNOSIS — Z94.0 KIDNEY REPLACED BY TRANSPLANT: ICD-10-CM

## 2023-03-08 RX ORDER — TACROLIMUS 0.5 MG/1
0.5 CAPSULE ORAL 2 TIMES DAILY
Qty: 180 CAPSULE | Refills: 3 | Status: SHIPPED | OUTPATIENT
Start: 2023-03-08 | End: 2023-08-09

## 2023-03-08 RX ORDER — TACROLIMUS 1 MG/1
2 CAPSULE ORAL 2 TIMES DAILY
Qty: 360 CAPSULE | Refills: 3 | Status: SHIPPED | OUTPATIENT
Start: 2023-03-08 | End: 2023-08-09

## 2023-03-08 RX ORDER — MYCOPHENOLATE MOFETIL 250 MG/1
500 CAPSULE ORAL 2 TIMES DAILY
Qty: 360 CAPSULE | Refills: 3 | Status: SHIPPED | OUTPATIENT
Start: 2023-03-08 | End: 2023-10-20

## 2023-03-09 ENCOUNTER — TELEPHONE (OUTPATIENT)
Dept: TRANSPLANT | Facility: CLINIC | Age: 43
End: 2023-03-09
Payer: MEDICARE

## 2023-03-09 NOTE — LETTER
PHYSICIAN ORDERS      DATE & TIME ISSUED: 2023 1:47 PM  PATIENT NAME: Erik Cramer   : 1980     [facility] MR# [if applicable]: [id number]     DIAGNOSIS:  kidney/pancreas transplanted  ICD-10 CODE: Z94.0/Z98.83     Please update standard monthly lab orders for a count of 12 for above patient:  -cbc   -bmp  -amylase/lipase  -tacrolimus by tandem mass spectrometry   -bk virus quantitative, pcr  Orders to  3/9/24    Any questions please call: transplant coordinator, [phone].      Saw Irby MD

## 2023-03-09 NOTE — LETTER
PHYSICIAN ORDERS      DATE & TIME ISSUED: 2023 1:47 PM  PATIENT NAME: Erik Cramer   : 1980     [facility] MR# [if applicable]: [id number]     DIAGNOSIS:  kidney/pancreas transplanted  ICD-10 CODE: Z94.0/Z98.83     Please update standard monthly lab orders for above patient:  -cbc   -bmp  -amylase/lipase  -tacrolimus by tandem mass spectrometry   -bk virus quantitative, pcr    Any questions please call: transplant coordinator, [phone].      Saw Irby MD

## 2023-04-17 DIAGNOSIS — Z94.0 KIDNEY REPLACED BY TRANSPLANT: ICD-10-CM

## 2023-04-17 DIAGNOSIS — Z94.83 PANCREAS TRANSPLANTED (H): ICD-10-CM

## 2023-05-07 ENCOUNTER — HEALTH MAINTENANCE LETTER (OUTPATIENT)
Age: 43
End: 2023-05-07

## 2023-06-07 ENCOUNTER — LAB (OUTPATIENT)
Dept: LAB | Facility: CLINIC | Age: 43
End: 2023-06-07
Payer: MEDICARE

## 2023-06-07 DIAGNOSIS — Z94.0 KIDNEY REPLACED BY TRANSPLANT: ICD-10-CM

## 2023-06-07 DIAGNOSIS — Z94.83 PANCREAS REPLACED BY TRANSPLANT (H): ICD-10-CM

## 2023-06-07 PROCEDURE — 86833 HLA CLASS II HIGH DEFIN QUAL: CPT

## 2023-06-07 PROCEDURE — 86832 HLA CLASS I HIGH DEFIN QUAL: CPT

## 2023-06-09 DIAGNOSIS — Z94.83 PANCREAS REPLACED BY TRANSPLANT (H): Primary | ICD-10-CM

## 2023-06-09 DIAGNOSIS — Z94.0 KIDNEY REPLACED BY TRANSPLANT: ICD-10-CM

## 2023-06-14 LAB
DONOR IDENTIFICATION: NORMAL
DSA COMMENTS: NORMAL
DSA PRESENT: NO
DSA TEST METHOD: NORMAL
ORGAN: NORMAL
SA 1 CELL: NORMAL
SA 1 TEST METHOD: NORMAL
SA 2 CELL: NORMAL
SA 2 TEST METHOD: NORMAL
SA1 HI RISK ABY: NORMAL
SA1 MOD RISK ABY: NORMAL
SA2 HI RISK ABY: NORMAL
SA2 MOD RISK ABY: NORMAL
UNACCEPTABLE ANTIGENS: NORMAL
UNOS CPRA: 36
ZZZSA 1  COMMENTS: NORMAL
ZZZSA 2 COMMENTS: NORMAL

## 2023-06-20 DIAGNOSIS — Z48.298 AFTERCARE FOLLOWING ORGAN TRANSPLANT: Primary | ICD-10-CM

## 2023-07-11 ENCOUNTER — VIRTUAL VISIT (OUTPATIENT)
Dept: TRANSPLANT | Facility: CLINIC | Age: 43
End: 2023-07-11
Attending: INTERNAL MEDICINE
Payer: MEDICARE

## 2023-07-11 ENCOUNTER — TELEPHONE (OUTPATIENT)
Dept: TRANSPLANT | Facility: CLINIC | Age: 43
End: 2023-07-11
Payer: MEDICARE

## 2023-07-11 VITALS — WEIGHT: 190 LBS | BODY MASS INDEX: 31.62 KG/M2

## 2023-07-11 DIAGNOSIS — Z29.89 NEED FOR PNEUMOCYSTIS PROPHYLAXIS: ICD-10-CM

## 2023-07-11 DIAGNOSIS — B34.8 BK VIREMIA: ICD-10-CM

## 2023-07-11 DIAGNOSIS — Z94.0 KIDNEY REPLACED BY TRANSPLANT: ICD-10-CM

## 2023-07-11 DIAGNOSIS — I15.1 HTN, KIDNEY TRANSPLANT RELATED: ICD-10-CM

## 2023-07-11 DIAGNOSIS — D84.9 IMMUNOSUPPRESSED STATUS (H): ICD-10-CM

## 2023-07-11 DIAGNOSIS — E83.42 HYPOMAGNESEMIA: ICD-10-CM

## 2023-07-11 DIAGNOSIS — D75.1 POST-TRANSPLANT ERYTHROCYTOSIS: ICD-10-CM

## 2023-07-11 DIAGNOSIS — Z48.298 AFTERCARE FOLLOWING ORGAN TRANSPLANT: ICD-10-CM

## 2023-07-11 DIAGNOSIS — E55.9 VITAMIN D DEFICIENCY: ICD-10-CM

## 2023-07-11 DIAGNOSIS — Z94.0 HTN, KIDNEY TRANSPLANT RELATED: ICD-10-CM

## 2023-07-11 DIAGNOSIS — Z94.83 PANCREAS REPLACED BY TRANSPLANT (H): Primary | ICD-10-CM

## 2023-07-11 PROCEDURE — 99214 OFFICE O/P EST MOD 30 MIN: CPT | Mod: VID | Performed by: INTERNAL MEDICINE

## 2023-07-11 ASSESSMENT — PAIN SCALES - GENERAL: PAINLEVEL: NO PAIN (0)

## 2023-07-11 NOTE — PROGRESS NOTES
Erik Cramer is a 43 year old male who is being evaluated via a billable video visit.      Virtual Visit Details    Type of service:  Video Visit   Video Start Time:  1438  Video End Time: 1452    Originating Location (pt. Location): Home  Distant Location (provider location):  On-site  Platform used for Video Visit: [facility]      TRANSPLANT NEPHROLOGY CHRONIC POST TRANSPLANT VISIT    Assessment & Plan   # DDKT (SPK): Stable   - Baseline Creatinine:  ~ 1.0-1.2   - Proteinuria: Normal (<0.2 grams)   - Date DSA Last Checked: Jun/2023      Latest DSA: No cPRA: 36%   - BK Viremia: Yes, minimally elevated (< 10K)   - Kidney Tx Biopsy: Jun 18, 2021; Result: No diagnostic evidence of acute rejection.  Unremarkable.     # Pancreas Tx (SPK):    - Pancreatic Exocrine Drainage: Enteric drained     - Blood glucose: Near euglycemia      On insulin: No   - HbA1c: Stable      Latest HbA1c: 5.8%   - Pancreatic enzymes: Stable   - Date DSA Last Checked: Jun/2023  Latest DSA: No   - Pancreas Tx Biopsy: Jul 27, 2021; Result: No diagnostic evidence of acute rejection.    # Immunosuppression: Tacrolimus immediate release (goal 5-8) and Mycophenolate mofetil (dose 500 mg every 12 hours)   - Continue with intensive monitoring of immunosuppression for efficacy and toxicity.   - Changes: Not at this time    # Infection Prophylaxis:   - PJP: Sulfa/TMP (Bactrim)    # Hypertension: Not checked recently;  Goal BP: < 130/80   - Changes: Not at this time; Recommend patient check blood pressure at home on a regular basis, such as once every week or two, and follow up with primary Nephrologist if above goal.    # Mineral Bone Disorder:   - Vitamin D; level: Not checked recently        On supplement: Yes  - Calcium; level: Normal        On supplement: No    # Electrolytes:   - Potassium; level: Normal        On supplement: No  - Magnesium; level: Not checked recently        On supplement: Yes  - Bicarbonate; level: Normal        On supplement:  No    # BK Viremia: Minimal BK PCR in 300s with last check 7/2023.  Patient is on slightly lower mycophenolate dosing.   - Will continue to follow BK PCR monthly.    # Obesity, Class I (BMI = 31.6): Patient has gained ~ 20 lbs.   - Recommend weight loss for overall health by increasing exercise and watching caloric intake.    # Skin Cancer Risk:    - Discussed sun protection and recommend regular follow up with Dermatology.    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  Significant transplant-related complications: BK Viremia    Transplant Office Phone Number: [phone]    Assessment and plan was discussed with the patient and he voiced his understanding and agreement.    Return visit: Return in about 6 months (around 1/11/2024).    Roman Fraser MD    Chief Complaint   Mr. Cramer is a 43 year old here for kidney transplant, pancreas transplant, and immunosuppression management.    History of Present Illness    Mr. Cramer reports feeling good overall with some medical complaints.  Since last clinic visit, patient reports no hospitalizations or new medical complaints and has been doing well overall.  His energy level has been good, although not quite normal.  He is active and does get some exercise.  Denies any chest pain, but a little shortness of breath with exertion, which he feels is due to deconditioning.  No leg swelling.    Appetite is good and he has gained about 20 lbs.  No nausea, vomiting or diarrhea.  He has a normal bowel movement about every other day.  Rare heartburn symptoms and is on no medications for this.  No fever, sweats or chills.  No night sweats.    Home BP: Not checked    Problem List   Patient Active Problem List    Diagnosis    HTN, kidney transplant related    Type 2 diabetes mellitus (H)    Vitamin D deficiency    Gallstones, common bile duct    Pancreas replaced by transplant (H)    Kidney replaced by transplant    Immunosuppressed status (H)    Hypomagnesemia    BK viremia    Kidney transplant infection    Post-transplant erythrocytosis    Aftercare following organ transplant    Need for pneumocystis prophylaxis       Allergies   Allergies   Allergen Reactions    Metoprolol      SOB, but was also experiencing significant edema not drug associated       Medications   Current Outpatient Medications   Medication Sig    aspirin (ASA) 81 MG EC tablet Take 1 tablet (81 mg) by mouth daily    carvedilol (COREG) 6.25 MG tablet Take 1 tablet (6.25 mg) by mouth 2 times daily    magnesium oxide (MAG-OX) 400 MG tablet Take 1 tablet (400 mg) by mouth daily    montelukast (SINGULAIR) 10 MG tablet Take 10 mg by mouth At Bedtime    mycophenolate (GENERIC EQUIVALENT) 250 MG capsule Take 2 capsules (500 mg) by mouth 2 times daily    rosuvastatin (CRESTOR) 10 MG tablet Take 1 tablet (10 mg) by mouth daily    tacrolimus (GENERIC EQUIVALENT) 0.5 MG capsule Take 1 capsule (0.5 mg) by mouth 2 times daily Total dose =2.5 MG BID    tacrolimus (GENERIC EQUIVALENT) 1 MG capsule Take 2 capsules (2 mg) by mouth 2 times daily    vitamin D3 (CHOLECALCIFEROL) 2000 units (50 mcg) tablet Take 1 tablet (2,000 Units) by mouth daily     No current facility-administered medications for this visit.     There are no discontinued medications.    Physical Exam   Vital Signs: Wt 86.2 kg (190 lb)   BMI 31.62 kg/m      GENERAL APPEARANCE: alert and no distress  HENT: no obvious abnormalities on appearance  RESP: breathing appears unremarkable with normal rate, no audible wheezing or cough and no apparent shortness of breath with conversation  MS: extremities normal - no gross deformities noted  SKIN: no apparent rash and normal skin tone  NEURO: speech is clear  with no obvious neurological deficits  PSYCH: mentation appears normal and affect normal     Data         Latest Ref Rng & Units 7/11/2023     7:22 AM 6/7/2023     8:07 AM 5/10/2023     7:40 AM   Renal   Na (external) 136 - 145 mmol/L 138  141     140    K (external) 3.5 - 5.1 mmol/L 4.2  4.2     4.3    Cl 98 - 109 mmol/L 108  109     108    Cl (external) 98 - 109 mmol/L 108  109     108    CO2 (external) 20 - 29 mmol/L 21  22     22    BUN (external) 7 - 26 mg/dL 16  11     16    Cr (external) 0.73 - 1.18 mg/dL 0.93  0.91     0.98    Glucose (external) 70 - 100 mg/dL 113  116     127    Ca (external) 8.4 - 10.4 mg/dL 9.4  9.3     9.5        This result is from an external source.         Latest Ref Rng & Units 6/8/2022     8:11 AM 5/11/2022     8:11 AM 4/13/2022     8:04 AM   Bone Health   Phos (external) 2.3 - 4.7 mg/dL 2.4  2.3     2.9           This result is from an external source.         Latest Ref Rng & Units 7/11/2023     7:22 AM 6/7/2023     8:07 AM 5/10/2023     7:40 AM   Heme   WBC (external) 3.5 - 10.5 x10(9)/L 7.0  6.1     11.9    Hgb (external) 13.5 - 17.5 g/dL 16.1  16.0     16.5    Plt (external) 150 - 450 x10(9)/L 203  216     213        This result is from an external source.         Latest Ref Rng & Units 6/8/2022     8:11 AM 12/8/2021     8:00 AM 6/10/2021     9:27 AM   Liver   AP 40 - 150 U/L   84    AP (external) 40 - 150 U/L 106  121     TBili 0.2 - 1.3 mg/dL   0.4    TBili (external) 0.2 - 1.2 mg/dL 0.5  0.5     DBili (external) 0.0 - 0.5 mg/dL 0.1  0.2     ALT 0 - 70 U/L   32    ALT (external) 0 - 55 U/L 10  18     AST 0 - 45 U/L   32    AST (external) 10 - 40 U/L 22  27     Tot Protein 6.8 - 8.8 g/dL   7.2    Tot Protein (external) 6.4 - 8.3 g/dL 7.6  7.5     Albumin 3.4 - 5.0 g/dL   3.9    Albumin (external) 3.5 - 5.0 g/dL 3.9  4.0           Latest Ref Rng & Units 7/11/2023     7:22 AM 6/7/2023     8:07 AM 5/10/2023     7:40 AM   Pancreas   Amylase (external) 25 - 125 U/L 63  60     62     Lipase (external) 25 - 125 U/L 63  60     62        This result is from an external source.         Latest Ref Rng & Units 6/8/2022     8:11 AM   Iron studies   Iron (external) 8 - 78 U/L 124          Latest Ref Rng & Units 7/11/2023     7:22 AM 6/7/2023     8:07 AM 5/10/2023     7:40 AM   [facility] Txp Virology   BK Quant Log Ext log IU/mL 2.53  C 2.49     2.26    BK Quant Result Ext IU/mL 335  C 311     183    BK Quant Spec Ext  Plasma  C Plasma     Plasma       C Corrected result    This result is from an external source.       Recent Labs   Lab Test 06/25/21  0630 06/27/21  0702 07/08/21  0829   DOSTAC 2,000 Not Provided Not Provided   TACROL 9.5 8.4 17.6*     Recent Labs   Lab Test 06/24/21  0642 06/25/21  0630 07/08/21  0829   DOSMPA 2000 6/23/2021 2,000 2015pm 7/7/21   MPACID 4.31* 1.43 7.94*   MPAG 100.9* 79.8 152.5*

## 2023-07-11 NOTE — NURSING NOTE
Date: 7/19/2022  PCP: Carolina Collins MD    Chief Complaint   Patient presents with   • Medicare Wellness Visit     Subsequent Labs drawn 7/15/2022       HISTORY OF PRESENT ILLNESS: Yas Hendricks is a 87 year old female presenting to clinic for MWV and follow up.    Mixed Hyperlipidemia: Not currently on any medications. Last lipid panel was on 7/15/2022 cholesterol-225, LDL-158, HDL-56, triglyceride-54.     Hypothyroidism: Maintained on Levothyroxine 75 mcg daily, patient reports compliance with medications. Denies any symptoms of difficulty swallowing, neck swelling, constipation, weight gain, edema, hoarseness, depressed mood, thinning of hair, dry skin, menstrual irregularities. Last TSH was 2.411 on 7/15/2022.      Anxiety: Patient maintained on Paxil 10 mg daily, reports compliance. Symptoms are well controlled.     HTN: Maintained on Losartan 25 mg daily, patient reports compliance with medications. BP today is 160/82. Patient denies any current symptoms of chest pain, shortness of breath, palpitations, headaches, visual disturbances, weakness on any one side of the body, speech abnormalities. Last BUN/Cr was 15/0.80 on 7/15/2022.      Osteoporosis: Patient has been on Fosamax in the past. Had a compression fracture 5/2021.    Patient reports intermittent R sided back pain for the past year after she sustained a fall. Considering PT referral. Uses OTC analgesia as needed.      No other concerns today.    Complete review of systems completed and is otherwise negative except for those mentioned in the history of presenting complaints.   Past Medical History, Surgical History, Social History and Allergies were all reviewed and updated within EPIC.    MEDS:  Current Outpatient Medications   Medication Sig   • RED YEAST RICE EXTRACT PO    • Multiple Vitamins-Minerals (Ocuvite Extra) Tab    • Omega-3 Fatty Acids (FISH OIL OMEGA-3 PO)    • Calcium Carbonate (CALCIUM 500 PO) Take 2 tablets by mouth.   • Biotin 5 MG  Is the patient currently in the state of [place]? YES    Visit mode:VIDEO    If the visit is dropped, the patient can be reconnected by: TELEPHONE VISIT: Phone number: [phone]    Will anyone else be joining the visit? NO      How would you like to obtain your AVS? MyChart    Are changes needed to the allergy or medication list? NO    Reason for visit: Video Visit (Recheck)         Cap    • losartan (COZAAR) 25 MG tablet Take 1 tablet by mouth daily.   • levothyroxine 75 MCG tablet TAKE 1 TABLET BY MOUTH  DAILY   • PARoxetine (PAXIL) 10 MG tablet TAKE 1 TABLET BY MOUTH IN  THE MORNING   • Homeopathic Products (ARNICARE EX) Apply topically as needed.   • zoster vaccine recomb adjuvanted (SHINGRIX) 50 MCG/0.5ML injection Inject 0.5 mLs into the muscle 1 time. Indications: Second dose to be administered 2-6 months after the initial dose.     No current facility-administered medications for this visit.     PHYSICAL EXAM:  Visit Vitals  BP (!) 160/82 (BP Location: LUE - Left upper extremity, Patient Position: Sitting, Cuff Size: Regular)   Pulse 63   Temp 97.6 °F (36.4 °C) (Temporal)   Resp 16   Ht 5' 2.5\" (1.588 m)   Wt 69.4 kg (153 lb)   SpO2 98%   BMI 27.54 kg/m²     General: No acute distress, well hydrated, well groomed.  HEENT: Mucous membranes moist, neck supple, no cervical or supraclavicular lymphadenopathy, TMs clear bilaterally, pharynx without erythema, swelling, exudate, nasal turbinates normal bilaterally, no thyromegaly appreciated   CV: regular rate and rhythm, normal S1/S2, systolic murmur appreciated no rubs/gallops, non-displaced PMI  Resp: RR- 16, good air entry, CTAB, no wheeze/rales/rhonchi.  Abd: Soft, nontender/non-distended, normoactive bowel sounds, no rebound or guarding, no hepatosplenomegaly.  Ext: No cyanosis or edema, moves all extremities.  Neuro: CN 2-12 grossly intact, no focal deficits   Psych: Mood and affect appropriate.    ASSESSMENT & PLAN:  This patient is a 87 year old female presenting with:      Hypothyroidism:  - Continue Levothyroxine 75 mcg daily.  - TSH prior to next clinic visit.      Mixed Hyperlipidemia:  - Discussed lifestyle and dietary modifications.    - Lipid panel prior to next clinic visit.      Anxiety:  - Continue Paxil 10 mg daily      HTN:  - Continue Losartan 25 mg daily   - CMP prior to next clinic visit.      Osteoporosis: with  compression fracture   - Discussed signs and symptoms that would warrant immediate evaluation.      Chronic lower back pain:  - Consider PT referral     Health Maintenance:  Immunizations - Pneumococcal vaccine given today      Results will be discussed at next appointment.     FOLLOW-UP:  Patient is to return in 1 year or sooner as needed.     Carolina Collins MD  7/19/2022

## 2023-07-11 NOTE — LETTER
7/11/2023      RE: Erik Cramer  [address] [place] [id number]       Erik Cramer is a 43 year old male who is being evaluated via a billable video visit.      Virtual Visit Details    Type of service:  Video Visit   Video Start Time:  1438  Video End Time: 1452    Originating Location (pt. Location): Home  Distant Location (provider location):  On-site  Platform used for Video Visit: [facility]      TRANSPLANT NEPHROLOGY CHRONIC POST TRANSPLANT VISIT    Assessment & Plan  # DDKT (SPK): Stable   - Baseline Creatinine:  ~ 1.0-1.2   - Proteinuria: Normal (<0.2 grams)   - Date DSA Last Checked: Jun/2023      Latest DSA: No cPRA: 36%   - BK Viremia: Yes, minimally elevated (< 10K)   - Kidney Tx Biopsy: Jun 18, 2021; Result: No diagnostic evidence of acute rejection.  Unremarkable.     # Pancreas Tx (SPK):    - Pancreatic Exocrine Drainage: Enteric drained     - Blood glucose: Near euglycemia      On insulin: No   - HbA1c: Stable      Latest HbA1c: 5.8%   - Pancreatic enzymes: Stable   - Date DSA Last Checked: Jun/2023  Latest DSA: No   - Pancreas Tx Biopsy: Jul 27, 2021; Result: No diagnostic evidence of acute rejection.    # Immunosuppression: Tacrolimus immediate release (goal 5-8) and Mycophenolate mofetil (dose 500 mg every 12 hours)   - Continue with intensive monitoring of immunosuppression for efficacy and toxicity.   - Changes: Not at this time    # Infection Prophylaxis:   - PJP: Sulfa/TMP (Bactrim)    # Hypertension: Not checked recently;  Goal BP: < 130/80   - Changes: Not at this time; Recommend patient check blood pressure at home on a regular basis, such as once every week or two, and follow up with primary Nephrologist if above goal.    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  Patient is on slightly lower mycophenolate dosing.   - Will continue to follow BK PCR monthly.    # Obesity, Class I (BMI = 31.6): Patient has gained ~ 20 lbs.   - Recommend weight loss for overall health by increasing exercise and watching caloric intake.    # Skin Cancer Risk:    - Discussed sun protection and recommend regular follow up with Dermatology.    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  Significant transplant-related complications: BK Viremia    Transplant Office Phone Number: [phone]    Assessment and plan was discussed with the patient and he voiced his understanding and agreement.    Return visit: Return in about 6 months (around 1/11/2024).    Roman Fraser MD    Chief Complaint  Mr. Cramer is a 43 year old here for kidney transplant, pancreas transplant, and immunosuppression management.    History of Present Illness   Mr. Cramer reports feeling good overall with some medical complaints.  Since last clinic visit, patient reports no hospitalizations or new medical complaints and has been doing well overall.  His energy level has been good, although not quite normal.  He is active and does get some exercise.  Denies any chest pain, but a little shortness of breath with exertion, which he feels is due to deconditioning.  No leg swelling.    Appetite is good and he has gained about 20 lbs.  No nausea, vomiting or diarrhea.  He has a normal bowel movement about every other day.  Rare heartburn symptoms and is on no medications for this.  No fever, sweats or chills.  No night sweats.    Home  BP: Not checked    Problem List  Patient Active Problem List   Diagnosis     HTN, kidney transplant related     Type 2 diabetes mellitus (H)     Vitamin D deficiency     Gallstones, common bile duct     Pancreas replaced by transplant (H)     Kidney replaced by transplant     Immunosuppressed status (H)     Hypomagnesemia     BK viremia     Kidney transplant infection     Post-transplant erythrocytosis     Aftercare following organ transplant     Need for pneumocystis prophylaxis       Allergies  Allergies   Allergen Reactions     Metoprolol      SOB, but was also experiencing significant edema not drug associated       Medications  Current Outpatient Medications   Medication Sig     aspirin (ASA) 81 MG EC tablet Take 1 tablet (81 mg) by mouth daily     carvedilol (COREG) 6.25 MG tablet Take 1 tablet (6.25 mg) by mouth 2 times daily     magnesium oxide (MAG-OX) 400 MG tablet Take 1 tablet (400 mg) by mouth daily     montelukast (SINGULAIR) 10 MG tablet Take 10 mg by mouth At Bedtime     mycophenolate (GENERIC EQUIVALENT) 250 MG capsule Take 2 capsules (500 mg) by mouth 2 times daily     rosuvastatin (CRESTOR) 10 MG tablet Take 1 tablet (10 mg) by mouth daily     tacrolimus (GENERIC EQUIVALENT) 0.5 MG capsule Take 1 capsule (0.5 mg) by mouth 2 times daily Total dose =2.5 MG BID     tacrolimus (GENERIC EQUIVALENT) 1 MG capsule Take 2 capsules (2 mg) by mouth 2 times daily     vitamin D3 (CHOLECALCIFEROL) 2000 units (50 mcg) tablet Take 1 tablet (2,000 Units) by mouth daily     No current facility-administered medications for this visit.     There are no discontinued medications.    Physical Exam  Vital Signs: Wt 86.2 kg (190 lb)   BMI 31.62 kg/m      GENERAL APPEARANCE: alert and no distress  HENT: no obvious abnormalities on appearance  RESP: breathing appears unremarkable with normal rate, no audible wheezing or cough and no apparent shortness of breath with conversation  MS: extremities normal - no gross  deformities noted  SKIN: no apparent rash and normal skin tone  NEURO: speech is clear with no obvious neurological deficits  PSYCH: mentation appears normal and affect normal     Data        Latest Ref Rng & Units 7/11/2023     7:22 AM 6/7/2023     8:07 AM 5/10/2023     7:40 AM   Renal   Na (external) 136 - 145 mmol/L 138  141     140    K (external) 3.5 - 5.1 mmol/L 4.2  4.2     4.3    Cl 98 - 109 mmol/L 108  109     108    Cl (external) 98 - 109 mmol/L 108  109     108    CO2 (external) 20 - 29 mmol/L 21  22     22    BUN (external) 7 - 26 mg/dL 16  11     16    Cr (external) 0.73 - 1.18 mg/dL 0.93  0.91     0.98    Glucose (external) 70 - 100 mg/dL 113  116     127    Ca (external) 8.4 - 10.4 mg/dL 9.4  9.3     9.5        This result is from an external source.         Latest Ref Rng & Units 6/8/2022     8:11 AM 5/11/2022     8:11 AM 4/13/2022     8:04 AM   Bone Health   Phos (external) 2.3 - 4.7 mg/dL 2.4  2.3     2.9           This result is from an external source.         Latest Ref Rng & Units 7/11/2023     7:22 AM 6/7/2023     8:07 AM 5/10/2023     7:40 AM   Heme   WBC (external) 3.5 - 10.5 x10(9)/L 7.0  6.1     11.9    Hgb (external) 13.5 - 17.5 g/dL 16.1  16.0     16.5    Plt (external) 150 - 450 x10(9)/L 203  216     213        This result is from an external source.         Latest Ref Rng & Units 6/8/2022     8:11 AM 12/8/2021     8:00 AM 6/10/2021     9:27 AM   Liver   AP 40 - 150 U/L   84    AP (external) 40 - 150 U/L 106  121     TBili 0.2 - 1.3 mg/dL   0.4    TBili (external) 0.2 - 1.2 mg/dL 0.5  0.5     DBili (external) 0.0 - 0.5 mg/dL 0.1  0.2     ALT 0 - 70 U/L   32    ALT (external) 0 - 55 U/L 10  18     AST 0 - 45 U/L   32    AST (external) 10 - 40 U/L 22  27     Tot Protein 6.8 - 8.8 g/dL   7.2    Tot Protein (external) 6.4 - 8.3 g/dL 7.6  7.5     Albumin 3.4 - 5.0 g/dL   3.9    Albumin (external) 3.5 - 5.0 g/dL 3.9  4.0           Latest Ref Rng & Units 7/11/2023     7:22 AM 6/7/2023     8:07  AM 5/10/2023     7:40 AM   Pancreas   Amylase (external) 25 - 125 U/L 63  60     62    Lipase (external) 25 - 125 U/L 63  60     62        This result is from an external source.         Latest Ref Rng & Units 6/8/2022     8:11 AM   Iron studies   Iron (external) 8 - 78 U/L 124          Latest Ref Rng & Units 7/11/2023     7:22 AM 6/7/2023     8:07 AM 5/10/2023     7:40 AM   [facility] Txp Virology   BK Quant Log Ext log IU/mL 2.53  C 2.49     2.26    BK Quant Result Ext IU/mL 335  C 311     183    BK Quant Spec Ext  Plasma  C Plasma     Plasma       C Corrected result    This result is from an external source.       Recent Labs   Lab Test 06/25/21  0630 06/27/21  0702 07/08/21  0829   DOSTAC 2,000 Not Provided Not Provided   TACROL 9.5 8.4 17.6*     Recent Labs   Lab Test 06/24/21  0642 06/25/21  0630 07/08/21  0829   DOSMPA 2000 6/23/2021 2,000 2015pm 7/7/21   MPACID 4.31* 1.43 7.94*   MPAG 100.9* 79.8 152.5*        Roman Fraser MD

## 2023-07-11 NOTE — LETTER
7/11/2023         RE: Erik Cramer  [address] [place] [id number]        Dear Colleague,    Thank you for referring your patient, Erik Cramer, to the [facility]. Please see a copy of my visit note below.        TRANSPLANT NEPHROLOGY CHRONIC POST TRANSPLANT VISIT    Assessment & Plan  # DDKT (SPK): Stable   - Baseline Creatinine:  ~ 1.0-1.2   - Proteinuria: Normal (<0.2 grams)   - Date DSA Last Checked: Jun/2023      Latest DSA: No cPRA: 36%   - BK Viremia: Yes, minimally elevated (< 10K)   - Kidney Tx Biopsy: Jun 18, 2021; Result: No diagnostic evidence of acute rejection.  Unremarkable.     # Pancreas Tx (SPK):    - Pancreatic Exocrine Drainage: Enteric drained     - Blood glucose: Near euglycemia      On insulin: No   - HbA1c: Stable      Latest HbA1c: 5.8%   - Pancreatic enzymes: Stable   - Date DSA Last Checked: Jun/2023  Latest DSA: No   - Pancreas Tx Biopsy: Jul 27, 2021; Result: No diagnostic evidence of acute rejection.    # Immunosuppression: Tacrolimus immediate release (goal 5-8) and Mycophenolate mofetil (dose 500 mg every 12 hours)   - Continue with intensive monitoring of immunosuppression for efficacy and toxicity.   - Changes: Not at this time    # Infection Prophylaxis:   - PJP: Sulfa/TMP (Bactrim)    # Hypertension: Not checked recently;  Goal BP: < 130/80   - Changes: Not at this time; Recommend patient check blood pressure at home on a regular basis, such as once every week or two, and follow up with primary Nephrologist if above goal.    # Mineral Bone Disorder:   - Vitamin D; level: Not checked recently        On supplement: Yes  - Calcium; level: Normal        On supplement: No    # Electrolytes:   - Potassium; level: Normal        On supplement: No  - Magnesium; level: Not checked recently        On supplement: Yes  - Bicarbonate; level: Normal        On supplement: No    # BK Viremia: Minimal BK PCR in 300s with last check 7/2023.  Patient is on slightly  lower mycophenolate dosing.   - Will continue to follow BK PCR monthly.    # Obesity, Class I (BMI = 31.6): Patient has gained ~ 20 lbs.   - Recommend weight loss for overall health by increasing exercise and watching caloric intake.    # Skin Cancer Risk:    - Discussed sun protection and recommend regular follow up with Dermatology.    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  Significant transplant-related complications: BK Viremia    Transplant Office Phone Number: [phone]    Assessment and plan was discussed with the patient and he voiced his understanding and agreement.    Return visit: Return in about 6 months (around 1/11/2024).    Roman Fraser MD    Chief Complaint  Mr. Cramer is a 43 year old here for kidney transplant, pancreas transplant, and immunosuppression management.    History of Present Illness   Mr. Cramer reports feeling good overall with some medical complaints.  Since last clinic visit, patient reports no hospitalizations or new medical complaints and has been doing well overall.  His energy level has been good, although not quite normal.  He is active and does get some exercise.  Denies any chest pain, but a little shortness of breath with exertion, which he feels is due to deconditioning.  No leg swelling.    Appetite is good and he has gained about 20 lbs.  No nausea, vomiting or diarrhea.  He has a normal bowel movement about every other day.  Rare heartburn symptoms and is on no medications for this.  No fever, sweats or chills.  No night sweats.    Home BP: Not checked    Problem List  Patient Active Problem List   Diagnosis    HTN, kidney transplant related    Type 2 diabetes mellitus (H)    Vitamin D  deficiency    Gallstones, common bile duct    Pancreas replaced by transplant (H)    Kidney replaced by transplant    Immunosuppressed status (H)    Hypomagnesemia    BK viremia    Kidney transplant infection    Post-transplant erythrocytosis    Aftercare following organ transplant    Need for pneumocystis prophylaxis       Allergies  Allergies   Allergen Reactions    Metoprolol      SOB, but was also experiencing significant edema not drug associated       Medications  Current Outpatient Medications   Medication Sig    aspirin (ASA) 81 MG EC tablet Take 1 tablet (81 mg) by mouth daily    carvedilol (COREG) 6.25 MG tablet Take 1 tablet (6.25 mg) by mouth 2 times daily    magnesium oxide (MAG-OX) 400 MG tablet Take 1 tablet (400 mg) by mouth daily    montelukast (SINGULAIR) 10 MG tablet Take 10 mg by mouth At Bedtime    mycophenolate (GENERIC EQUIVALENT) 250 MG capsule Take 2 capsules (500 mg) by mouth 2 times daily    rosuvastatin (CRESTOR) 10 MG tablet Take 1 tablet (10 mg) by mouth daily    tacrolimus (GENERIC EQUIVALENT) 0.5 MG capsule Take 1 capsule (0.5 mg) by mouth 2 times daily Total dose =2.5 MG BID    tacrolimus (GENERIC EQUIVALENT) 1 MG capsule Take 2 capsules (2 mg) by mouth 2 times daily    vitamin D3 (CHOLECALCIFEROL) 2000 units (50 mcg) tablet Take 1 tablet (2,000 Units) by mouth daily     No current facility-administered medications for this visit.     There are no discontinued medications.    Physical Exam  Vital Signs: Wt 86.2 kg (190 lb)   BMI 31.62 kg/m      GENERAL APPEARANCE: alert and no distress  HENT: no obvious abnormalities on appearance  RESP: breathing appears unremarkable with normal rate, no audible wheezing or cough and no apparent shortness of breath with conversation  MS: extremities normal - no gross deformities noted  SKIN: no apparent rash and normal skin tone  NEURO: speech is clear with no obvious neurological deficits  PSYCH: mentation appears normal and affect normal      Data        Latest Ref Rng & Units 7/11/2023     7:22 AM 6/7/2023     8:07 AM 5/10/2023     7:40 AM   Renal   Na (external) 136 - 145 mmol/L 138  141     140    K (external) 3.5 - 5.1 mmol/L 4.2  4.2     4.3    Cl 98 - 109 mmol/L 108  109     108    Cl (external) 98 - 109 mmol/L 108  109     108    CO2 (external) 20 - 29 mmol/L 21  22     22    BUN (external) 7 - 26 mg/dL 16  11     16    Cr (external) 0.73 - 1.18 mg/dL 0.93  0.91     0.98    Glucose (external) 70 - 100 mg/dL 113  116     127    Ca (external) 8.4 - 10.4 mg/dL 9.4  9.3     9.5        This result is from an external source.         Latest Ref Rng & Units 6/8/2022     8:11 AM 5/11/2022     8:11 AM 4/13/2022     8:04 AM   Bone Health   Phos (external) 2.3 - 4.7 mg/dL 2.4  2.3     2.9           This result is from an external source.         Latest Ref Rng & Units 7/11/2023     7:22 AM 6/7/2023     8:07 AM 5/10/2023     7:40 AM   Heme   WBC (external) 3.5 - 10.5 x10(9)/L 7.0  6.1     11.9    Hgb (external) 13.5 - 17.5 g/dL 16.1  16.0     16.5    Plt (external) 150 - 450 x10(9)/L 203  216     213        This result is from an external source.         Latest Ref Rng & Units 6/8/2022     8:11 AM 12/8/2021     8:00 AM 6/10/2021     9:27 AM   Liver   AP 40 - 150 U/L   84    AP (external) 40 - 150 U/L 106  121     TBili 0.2 - 1.3 mg/dL   0.4    TBili (external) 0.2 - 1.2 mg/dL 0.5  0.5     DBili (external) 0.0 - 0.5 mg/dL 0.1  0.2     ALT 0 - 70 U/L   32    ALT (external) 0 - 55 U/L 10  18     AST 0 - 45 U/L   32    AST (external) 10 - 40 U/L 22  27     Tot Protein 6.8 - 8.8 g/dL   7.2    Tot Protein (external) 6.4 - 8.3 g/dL 7.6  7.5     Albumin 3.4 - 5.0 g/dL   3.9    Albumin (external) 3.5 - 5.0 g/dL 3.9  4.0           Latest Ref Rng & Units 7/11/2023     7:22 AM 6/7/2023     8:07 AM 5/10/2023     7:40 AM   Pancreas   Amylase (external) 25 - 125 U/L 63  60     62    Lipase (external) 25 - 125 U/L 63  60     62        This result is from an external  source.         Latest Ref Rng & Units 6/8/2022     8:11 AM   Iron studies   Iron (external) 8 - 78 U/L 124          Latest Ref Rng & Units 7/11/2023     7:22 AM 6/7/2023     8:07 AM 5/10/2023     7:40 AM   [facility] Txp Virology   BK Quant Log Ext log IU/mL 2.53  C 2.49     2.26    BK Quant Result Ext IU/mL 335  C 311     183    BK Quant Spec Ext  Plasma  C Plasma     Plasma       C Corrected result    This result is from an external source.       Recent Labs   Lab Test 06/25/21  0630 06/27/21  0702 07/08/21  0829   DOSTAC 2,000 Not Provided Not Provided   TACROL 9.5 8.4 17.6*     Recent Labs   Lab Test 06/24/21  0642 06/25/21  0630 07/08/21  0829   DOSMPA 2000 6/23/2021 2,000 2015pm 7/7/21   MPACID 4.31* 1.43 7.94*   MPAG 100.9* 79.8 152.5*          Again, thank you for allowing me to participate in the care of your patient.        Sincerely,        Roman Fraser MD

## 2023-07-11 NOTE — TELEPHONE ENCOUNTER
Patient calling would like a call back regarding his appt today with Dr Fraser and what FU was needed and he also needs to talk to a  regarding his Medicare.  Please return call when you are able.

## 2023-08-06 NOTE — TELEPHONE ENCOUNTER
ISSUE:   Tacrolimus IR level 13.4 on 11/4, goal 8-10, dose 3 mg BID.    PLAN:   Please call patient and confirm this was an accurate 12-hour trough. Verify Tacrolimus IR dose 3 mg BID. Confirm no new medications or illness. Confirm no missed doses. If accurate trough and accurate dose, decrease Tacrolimus IR dose to 2.5 mg BID and repeat labs in 1 week.        WEARING THE LUMBAR BRACE:    * Wear the brace when out of bed or sitting upright in bed.  * You should apply the brace before standing.  * You may shower seated without brace.   Sit down on shower chair, remove brace   Shower   Dry   Re-apply brace before standing.   Take care not to fall  *If your brace is not fitting call [facility] [phone]  *Check  your incision and the skin under your brace at least daily.    Call to schedule a follow up appointment as soon as possible with [facility] regarding back pain, back brace, fracture above fusion hardware.     Avoid bending, twisting, lifting greater than 5-10 pounds.

## 2023-08-09 DIAGNOSIS — Z94.83 PANCREAS TRANSPLANTED (H): ICD-10-CM

## 2023-08-09 DIAGNOSIS — Z94.0 KIDNEY REPLACED BY TRANSPLANT: Primary | ICD-10-CM

## 2023-08-09 PROBLEM — Z29.89 NEED FOR PNEUMOCYSTIS PROPHYLAXIS: Status: ACTIVE | Noted: 2023-08-09

## 2023-08-09 PROBLEM — Z48.298 AFTERCARE FOLLOWING ORGAN TRANSPLANT: Status: ACTIVE | Noted: 2023-08-09

## 2023-08-09 RX ORDER — TACROLIMUS 1 MG/1
2 CAPSULE ORAL 2 TIMES DAILY
Qty: 120 CAPSULE | Refills: 11 | Status: SHIPPED | OUTPATIENT
Start: 2023-08-09 | End: 2023-10-20

## 2023-08-09 RX ORDER — TACROLIMUS 0.5 MG/1
0.5 CAPSULE ORAL 2 TIMES DAILY
Qty: 60 CAPSULE | Refills: 11 | Status: SHIPPED | OUTPATIENT
Start: 2023-08-09 | End: 2023-10-20

## 2023-08-09 NOTE — PATIENT INSTRUCTIONS
Patient Recommendations:  - Change oral magnesium supplement to be taken at lunch or supper, but not in the morning as it should be taken 2 hours before or after mycophenolate.  - Recommend patient check blood pressure at home on a regular basis, such as once every week or two, and follow up with primary Nephrologist if above goal.    Transplant Patient Information  Your Post Transplant Coordinator is: Patricia James  For non urgent items, we encourage you to contact your coordinator/care team online via [employer]  You and your care team can also contact your transplant coordinator Monday - Friday, 8am - 5pm at [phone] (Option 2 to reach the coordinator or Option 4 to schedule an appointment).  After hours for urgent matters, please call [facility] at [phone].

## 2023-08-10 ENCOUNTER — TELEPHONE (OUTPATIENT)
Dept: TRANSPLANT | Facility: CLINIC | Age: 43
End: 2023-08-10
Payer: MEDICARE

## 2023-08-10 ASSESSMENT — ENCOUNTER SYMPTOMS: NEW SYMPTOMS OF CORONARY ARTERY DISEASE: 0

## 2023-08-12 ENCOUNTER — TELEPHONE (OUTPATIENT)
Dept: TRANSPLANT | Facility: CLINIC | Age: 43
End: 2023-08-12
Payer: MEDICARE

## 2023-08-12 NOTE — TELEPHONE ENCOUNTER
LVM & sent mychart // pt needs to schedule 6 month Return Kidney TX with Dr. Fraser, or any other provider, around 1.11.24 // first attempt, AN 8.12.23

## 2023-08-19 ENCOUNTER — TELEPHONE (OUTPATIENT)
Dept: TRANSPLANT | Facility: CLINIC | Age: 43
End: 2023-08-19
Payer: MEDICARE

## 2023-08-19 NOTE — TELEPHONE ENCOUNTER
LVM // pt needs to schedule 6 month Return Kidney TX with any available provider around 1.11.24 // seconda attempt, AN 8.19.23

## 2023-10-08 ENCOUNTER — HEALTH MAINTENANCE LETTER (OUTPATIENT)
Age: 43
End: 2023-10-08

## 2023-10-20 DIAGNOSIS — Z94.83 PANCREAS TRANSPLANTED (H): Primary | ICD-10-CM

## 2023-10-20 DIAGNOSIS — Z94.0 KIDNEY REPLACED BY TRANSPLANT: ICD-10-CM

## 2023-10-20 RX ORDER — TACROLIMUS 0.5 MG/1
0.5 CAPSULE ORAL 2 TIMES DAILY
Qty: 60 CAPSULE | Refills: 11 | Status: SHIPPED | OUTPATIENT
Start: 2023-10-20 | End: 2024-03-12

## 2023-10-20 RX ORDER — MYCOPHENOLATE MOFETIL 250 MG/1
500 CAPSULE ORAL 2 TIMES DAILY
Qty: 120 CAPSULE | Refills: 11 | Status: SHIPPED | OUTPATIENT
Start: 2023-10-20

## 2023-10-20 RX ORDER — TACROLIMUS 1 MG/1
2 CAPSULE ORAL 2 TIMES DAILY
Qty: 120 CAPSULE | Refills: 11 | Status: SHIPPED | OUTPATIENT
Start: 2023-10-20 | End: 2024-03-12

## 2024-01-12 ENCOUNTER — TELEPHONE (OUTPATIENT)
Dept: TRANSPLANT | Facility: CLINIC | Age: 44
End: 2024-01-12
Payer: MEDICARE

## 2024-01-12 DIAGNOSIS — Z94.83 PANCREAS TRANSPLANTED (H): ICD-10-CM

## 2024-01-12 DIAGNOSIS — Z94.0 KIDNEY REPLACED BY TRANSPLANT: ICD-10-CM

## 2024-01-12 NOTE — TELEPHONE ENCOUNTER
ISSUE:   Tacrolimus IR level 3.1 on 1/12, goal 5-8, dose 2 mg BID.    PLAN:   Call Patientand confirm this was an accurate 12-hour trough.   Verify Tacrolimus IR dose 2 mg BID.   Confirm no new medications or illness.   Confirm no missed doses.   If accurate trough and accurate dose, increase Tacrolimus IR dose to 2.5 mg BID     Is this more than a 50% increase or decrease in current IS dose: No  If YES, justification: na    Repeat labs in 1 months.  *If > 50% change in immunosuppression dose, repeat labs in 1 week.     OUTCOME:   *hard to tell if he's taking 2 or 2.5mg bid, whichever it is, increase by 0.5 BID. Thanks!

## 2024-01-12 NOTE — LETTER
OUTPATIENT LABORATORY TEST ORDER     Patient Name: Erik Cramer   YOB: 1980     [facility] MR# [if applicable]: [id number]   Date & Time: January 12, 2024  3:32 PM  Expiration Date: 1 year after date issued      Diagnosis: Kidney Transplant (ICD-10 Z94.0)  Pancreas Transplant (ICD-10 Z94.83)    Aftercare following organ transplant (ICD-10 Z48.288)    Long term use of medications (ICD-10 Z79.899)    Other specified postprocedural states (Z98.890)     We ask your assistance in obtaining the following laboratory tests, which are part of our routine surveillance program for Solid Organ Transplant patients.     Please fax each result to [phone], same day as resulted/available    Critical lab results page [phone]    Monthly   CBC with platelets  Basic Metabolic Panel (Sodium, Potassium, Chloride, CO2, Creatinine, Urea Nitrogen, glucose, Calcium)  Tacrolimus/Prograf/ drug level     Amylase  Lipase    At 3 years post-transplant (Due: 6/2024)  PRA/DSA (patient to supply )    If you have any questions please call the Transplant Center at [phone]. All lab results should be faxed to [phone]    .

## 2024-01-12 NOTE — TELEPHONE ENCOUNTER
Patient confirmed this was an accurate 12-hour trough.   Verified Tacrolimus IR dose 2 mg BID.   Confirmed no new medications or illness.   Confirmed no missed doses.   Patient confirmed increase Tacrolimus IR dose to 2.5 mg BID and repeat labs in 1 month.

## 2024-01-12 NOTE — TELEPHONE ENCOUNTER
Left message and sent [employer] message to patient regarding:  Tacrolimus IR level 3.1 on 1/12, goal 5-8, dose 2 mg BID.     PLAN:   Call Patientand confirm this was an accurate 12-hour trough.   Verify Tacrolimus IR dose 2 mg BID.   Confirm no new medications or illness.   Confirm no missed doses.   If accurate trough and accurate dose, increase Tacrolimus IR dose to 2.5 mg BID      Is this more than a 50% increase or decrease in current IS dose: No  If YES, justification: na     Repeat labs in 1 months.

## 2024-01-16 ENCOUNTER — VIRTUAL VISIT (OUTPATIENT)
Dept: TRANSPLANT | Facility: CLINIC | Age: 44
End: 2024-01-16
Attending: INTERNAL MEDICINE
Payer: MEDICARE

## 2024-01-16 ENCOUNTER — TELEPHONE (OUTPATIENT)
Dept: TRANSPLANT | Facility: CLINIC | Age: 44
End: 2024-01-16
Payer: MEDICARE

## 2024-01-16 DIAGNOSIS — E83.42 HYPOMAGNESEMIA: ICD-10-CM

## 2024-01-16 DIAGNOSIS — E66.09 CLASS 1 OBESITY DUE TO EXCESS CALORIES WITHOUT SERIOUS COMORBIDITY WITH BODY MASS INDEX (BMI) OF 32.0 TO 32.9 IN ADULT: ICD-10-CM

## 2024-01-16 DIAGNOSIS — Z29.89 NEED FOR PNEUMOCYSTIS PROPHYLAXIS: ICD-10-CM

## 2024-01-16 DIAGNOSIS — B34.8 BK VIREMIA: ICD-10-CM

## 2024-01-16 DIAGNOSIS — Z48.298 AFTERCARE FOLLOWING ORGAN TRANSPLANT: ICD-10-CM

## 2024-01-16 DIAGNOSIS — Z94.0 HTN, KIDNEY TRANSPLANT RELATED: Primary | ICD-10-CM

## 2024-01-16 DIAGNOSIS — Z94.0 KIDNEY REPLACED BY TRANSPLANT: ICD-10-CM

## 2024-01-16 DIAGNOSIS — N18.2 CKD (CHRONIC KIDNEY DISEASE) STAGE 2, GFR 60-89 ML/MIN: ICD-10-CM

## 2024-01-16 DIAGNOSIS — I15.1 HTN, KIDNEY TRANSPLANT RELATED: Primary | ICD-10-CM

## 2024-01-16 DIAGNOSIS — D84.9 IMMUNOSUPPRESSED STATUS (H): ICD-10-CM

## 2024-01-16 DIAGNOSIS — E66.811 CLASS 1 OBESITY DUE TO EXCESS CALORIES WITHOUT SERIOUS COMORBIDITY WITH BODY MASS INDEX (BMI) OF 32.0 TO 32.9 IN ADULT: ICD-10-CM

## 2024-01-16 DIAGNOSIS — E55.9 VITAMIN D DEFICIENCY: ICD-10-CM

## 2024-01-16 DIAGNOSIS — Z94.83 PANCREAS REPLACED BY TRANSPLANT (H): ICD-10-CM

## 2024-01-16 PROBLEM — D75.1 POST-TRANSPLANT ERYTHROCYTOSIS: Status: RESOLVED | Noted: 2022-06-01 | Resolved: 2024-01-16

## 2024-01-16 PROBLEM — T86.13 KIDNEY TRANSPLANT INFECTION: Status: RESOLVED | Noted: 2022-03-10 | Resolved: 2024-01-16

## 2024-01-16 PROCEDURE — 99214 OFFICE O/P EST MOD 30 MIN: CPT | Mod: 95 | Performed by: INTERNAL MEDICINE

## 2024-01-16 RX ORDER — LOSARTAN POTASSIUM 25 MG/1
25 TABLET ORAL AT BEDTIME
Qty: 90 TABLET | Refills: 3 | Status: SHIPPED | OUTPATIENT
Start: 2024-01-16

## 2024-01-16 NOTE — LETTER
1/16/2024      RE: Erik Cramer  [address] [place] [address]       Virtual Visit Details    Type of service:  Video Visit   Video Start Time:  1157  Video End Time: 1209    Originating Location (pt. Location): Home  Distant Location (provider location):  On-site  Platform used for Video Visit: [facility]      TRANSPLANT NEPHROLOGY CHRONIC POST TRANSPLANT VISIT    Assessment & Plan  # DDKT (SPK): Stable   - Baseline Creatinine:  ~ 1.0-1.2   - Proteinuria: Normal (<0.2 grams)   - Date DSA Last Checked: Jun/2023      Latest DSA: No cPRA: 36%   - BK Viremia: Yes, minimally elevated (< 10K)   - Kidney Tx Biopsy: Jun 18, 2021; Result: No diagnostic evidence of acute rejection.  Unremarkable.     # Pancreas Tx (SPK): Stable glucose levels, but trend up slightly in HbA1c.  Patient has gained weight post transplant and discussed insulin resistance and recommendation for weight loss.   - Pancreatic Exocrine Drainage: Enteric drained     - Blood glucose: Near euglycemia      On insulin: No   - HbA1c: Trend up      Latest HbA1c: 6.0%   - Pancreatic enzymes: Stable   - Date DSA Last Checked: Jun/2023  Latest DSA: No   - Pancreas Tx Biopsy: Jul 27, 2021; Result: No diagnostic evidence of acute rejection.    # Immunosuppression: Tacrolimus immediate release (goal 5-8) and Mycophenolate mofetil (dose 500 mg every 12 hours)   - Continue with intensive monitoring of immunosuppression for efficacy and toxicity.   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  Patient is on slightly lower mycophenolate dosing.  Normal IgG level.   - Will continue to follow BK PCR monthly.    # Obesity, Class I (BMI = 32.5): Weight has trended up another 5 lbs.   - Recommend weight loss for overall health by increasing exercise and watching caloric intake.    # Skin Cancer Risk:    - Discussed sun protection and recommend regular follow up with Dermatology.    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  Significant transplant-related complications: BK Viremia    Transplant Office Phone Number: [phone]    Assessment and plan was discussed with the patient and he voiced his understanding and agreement.    Return visit: Return in about 6 months (around 7/16/2024).    Roman Fraser MD    Chief Complaint  Mr. Cramer is a 43 year old here for kidney transplant, pancreas transplant, and immunosuppression management.    History of Present Illness   Mr. Cramer reports feeling good overall with some medical complaints.  Since last clinic visit, patient reports no hospitalizations or new medical complaints and has been doing well overall.  His energy level is good and remains normal.  He is active and does get some exercise.  Patient reports occasional soreness in his right abdomen with twisting or bending over, but not much of an issue.  Denies any chest pain or shortness of breath with exertion.  No leg swelling.    Appetite is good and he has gained ~ 5 lbs.  No nausea, vomiting or diarrhea.  He has 1-2 bowel movements daily.  No heartburn symptoms.  No fever, sweats or chills.  No night sweats.    Home BP:   130/80s    Problem List  Patient Active Problem List   Diagnosis     HTN, kidney transplant related     Type 2 diabetes mellitus (H)     Vitamin D deficiency     Gallstones, common bile duct     Pancreas replaced by transplant (H)     Kidney replaced by transplant     Immunosuppressed status (H24)     Hypomagnesemia     BK viremia     Aftercare following organ transplant     Need for pneumocystis prophylaxis     CKD (chronic kidney disease) stage 2, GFR 60-89 ml/min     Class 1 obesity due to excess calories without serious comorbidity with body mass index (BMI) of 32.0 to 32.9 in adult       Allergies  Allergies   Allergen Reactions     Metoprolol      SOB, but was also experiencing significant edema not drug associated       Medications  Current Outpatient Medications   Medication Sig     losartan (COZAAR) 25 MG tablet Take 1 tablet (25 mg) by mouth at bedtime     aspirin (ASA) 81 MG EC tablet Take 1 tablet (81 mg) by mouth daily     carvedilol (COREG) 6.25 MG tablet Take 1 tablet (6.25 mg) by mouth 2 times daily     magnesium oxide (MAG-OX) 400 MG tablet Take 1 tablet (400 mg) by mouth daily     montelukast (SINGULAIR) 10 MG tablet Take 10 mg by mouth At Bedtime     mycophenolate (GENERIC EQUIVALENT) 250 MG capsule Take 2 capsules (500 mg) by mouth 2 times daily     rosuvastatin (CRESTOR) 10 MG tablet Take 1 tablet (10 mg) by mouth daily     tacrolimus (GENERIC) 0.5 MG capsule Take 1 capsule (0.5 mg) by mouth 2 times daily Total dose =2.5 MG BID     tacrolimus (GENERIC) 1 MG capsule Take 2 capsules (2 mg) by mouth 2 times daily     vitamin D3 (CHOLECALCIFEROL) 2000 units (50 mcg) tablet Take 1 tablet (2,000 Units) by mouth daily     No current facility-administered medications for this visit.     There are no discontinued medications.    Physical Exam  Vital Signs: There were no vitals taken for this visit.    GENERAL APPEARANCE: alert and no distress  HENT: no obvious abnormalities on appearance  RESP: breathing  appears unremarkable with normal rate, no audible wheezing or cough and no apparent shortness of breath with conversation  MS: extremities normal - no gross deformities noted  SKIN: no apparent rash and normal skin tone  NEURO: speech is clear with no obvious neurological deficits  PSYCH: mentation appears normal and affect normal     Data        Latest Ref Rng & Units 1/10/2024     7:26 AM 12/6/2023     7:31 AM 11/8/2023     7:36 AM   Renal   Na (external) 136 - 145 mmol/L 140  143  141    K (external) 3.5 - 5.1 mmol/L 3.8  3.9  4.2    Cl 98 - 109 mmol/L 106  107  108    Cl (external) 98 - 109 mmol/L 106  107  108    CO2 (external) 20 - 29 mmol/L 25  25  23    BUN (external) 7 - 26 mg/dL 13  13  11    Cr (external) 0.73 - 1.18 mg/dL 1.06  1.00  0.97    Glucose (external) 70 - 100 mg/dL 103  120  122    Ca (external) 8.4 - 10.4 mg/dL 9.1  9.3  8.9          Latest Ref Rng & Units 6/8/2022     8:11 AM 5/11/2022     8:11 AM 4/13/2022     8:04 AM   Bone Health   Phos (external) 2.3 - 4.7 mg/dL 2.4  2.3     2.9           This result is from an external source.         Latest Ref Rng & Units 1/10/2024     7:26 AM 12/6/2023     7:31 AM 11/8/2023     7:36 AM   Heme   WBC (external) 3.5 - 10.5 x10(9)/L 7.1  6.9  7.2    Hgb (external) 13.5 - 17.5 g/dL 15.9  16.0  15.8    Plt (external) 150 - 450 x10(9)/L 231  236  218          Latest Ref Rng & Units 6/8/2022     8:11 AM 12/8/2021     8:00 AM 6/10/2021     9:27 AM   Liver   AP 40 - 150 U/L   84    AP (external) 40 - 150 U/L 106  121     TBili 0.2 - 1.3 mg/dL   0.4    TBili (external) 0.2 - 1.2 mg/dL 0.5  0.5     DBili (external) 0.0 - 0.5 mg/dL 0.1  0.2     ALT 0 - 70 U/L   32    ALT (external) 0 - 55 U/L 10  18     AST 0 - 45 U/L   32    AST (external) 10 - 40 U/L 22  27     Tot Protein 6.8 - 8.8 g/dL   7.2    Tot Protein (external) 6.4 - 8.3 g/dL 7.6  7.5     Albumin 3.4 - 5.0 g/dL   3.9    Albumin (external) 3.5 - 5.0 g/dL 3.9  4.0           Latest Ref Rng & Units 1/10/2024      7:26 AM 12/6/2023     7:31 AM 11/8/2023     7:36 AM   Pancreas   Amylase (external) 25 - 125 U/L 48  61  61    Lipase (external) 25 - 125 U/L 48  61  61          Latest Ref Rng & Units 6/8/2022     8:11 AM   Iron studies   Iron (external) 8 - 78 U/L 124          Latest Ref Rng & Units 1/10/2024     7:26 AM 12/6/2023     7:31 AM 11/8/2023     7:36 AM   [facility] Virology   BK Quant Log Ext log IU/mL 1.54  1.73  1.66    BK Quant Result Ext IU/mL 34  54  45    BK Quant Spec Ext  Plasma          Recent Labs   Lab Test 06/25/21  0630 06/27/21  0702 07/08/21  0829   DOSTAC 2,000 Not Provided Not Provided   TACROL 9.5 8.4 17.6*     Recent Labs   Lab Test 06/24/21  0642 06/25/21  0630 07/08/21  0829   DOSMPA 2000 6/23/2021 2,000 2015pm 7/7/21   MPACID 4.31* 1.43 7.94*   MPAG 100.9* 79.8 152.5*        Roman Fraser MD

## 2024-01-16 NOTE — PATIENT INSTRUCTIONS
Patient Recommendations:  - Start losartan 25 mg nightly.  - Recommend weight loss for overall health by increasing exercise and watching caloric intake.  - Recommend routine follow up with Dr. Hidalgo in 2-3 months or so to resume care with a goal of ongoing co-management between your primary Nephrologist and the Transplant Team.     Transplant Patient Information  Your Post Transplant Coordinator is: Patricia James  For non urgent items, we encourage you to contact your coordinator/care team online via [facility]  You and your care team can also contact your transplant coordinator Monday - Friday, 8am - 5pm at [phone] (Option 2 to reach the coordinator or Option 4 to schedule an appointment).  After hours for urgent matters, please call [facility] at [phone].

## 2024-01-16 NOTE — NURSING NOTE
Is the patient currently in the state of [place]? YES    Visit mode:VIDEO    If the visit is dropped, the patient can be reconnected by: VIDEO VISIT: Send to e-mail at: [email or web address]    Will anyone else be joining the visit? NO  (If patient encounters technical issues they should call [phone])    How would you like to obtain your AVS? MyChart    Are changes needed to the allergy or medication list? No    Reason for visit: [facility]

## 2024-01-16 NOTE — PROGRESS NOTES
Virtual Visit Details    Type of service:  Video Visit   Video Start Time:  1157  Video End Time: 1209    Originating Location (pt. Location): Home  Distant Location (provider location):  On-site  Platform used for Video Visit: [facility]      TRANSPLANT NEPHROLOGY CHRONIC POST TRANSPLANT VISIT    Assessment & Plan   # DDKT (SPK): Stable   - Baseline Creatinine:  ~ 1.0-1.2   - Proteinuria: Normal (<0.2 grams)   - Date DSA Last Checked: Jun/2023      Latest DSA: No cPRA: 36%   - BK Viremia: Yes, minimally elevated (< 10K)   - Kidney Tx Biopsy: Jun 18, 2021; Result: No diagnostic evidence of acute rejection.  Unremarkable.     # Pancreas Tx (SPK): Stable glucose levels, but trend up slightly in HbA1c.  Patient has gained weight post transplant and discussed insulin resistance and recommendation for weight loss.   - Pancreatic Exocrine Drainage: Enteric drained     - Blood glucose: Near euglycemia      On insulin: No   - HbA1c: Trend up      Latest HbA1c: 6.0%   - Pancreatic enzymes: Stable   - Date DSA Last Checked: Jun/2023  Latest DSA: No   - Pancreas Tx Biopsy: Jul 27, 2021; Result: No diagnostic evidence of acute rejection.    # Immunosuppression: Tacrolimus immediate release (goal 5-8) and Mycophenolate mofetil (dose 500 mg every 12 hours)   - Continue with intensive monitoring of immunosuppression for efficacy and toxicity.   - Changes: Not at this time    # Infection Prophylaxis:   - PJP: Sulfa/TMP (Bactrim)    # Hypertension: Borderline control;  Goal BP: < 130/80   - Changes: Yes - Will start losartan 25 mg nightly    # Mineral Bone Disorder:   - Vitamin D; level: Not checked recently        On supplement: Yes  - Calcium; level: Normal        On supplement: No    # Electrolytes:   - Potassium; level: Normal        On supplement: No  - Magnesium; level: Not checked recently        On supplement: Yes  - Bicarbonate; level: Normal        On supplement: No    # BK Viremia: Stable to slight trend down, minimal BK PCR  at ~ 34 with last check Jan/2024.  Patient is on slightly lower mycophenolate dosing.  Normal IgG level.   - Will continue to follow BK PCR monthly.    # Obesity, Class I (BMI = 32.5): Weight has trended up another 5 lbs.   - Recommend weight loss for overall health by increasing exercise and watching caloric intake.    # Skin Cancer Risk:    - Discussed sun protection and recommend regular follow up with Dermatology.    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  Significant transplant-related complications: BK Viremia    Transplant Office Phone Number: [phone]    Assessment and plan was discussed with the patient and he voiced his understanding and agreement.    Return visit: Return in about 6 months (around 7/16/2024).    Roman Fraser MD    Chief Complaint   Mr. Cramer is a 43 year old here for kidney transplant, pancreas transplant, and immunosuppression management.    History of Present Illness    Mr. Cramer reports feeling good overall with some medical complaints.  Since last clinic visit, patient reports no hospitalizations or new medical complaints and has been doing well overall.  His energy level is good and remains normal.  He is active and does get some exercise.  Patient reports occasional soreness in his right abdomen with twisting or bending over, but not much of an issue.  Denies any chest pain or shortness of breath with exertion.  No leg swelling.    Appetite is good and he has gained ~ 5 lbs.  No nausea, vomiting or diarrhea.  He has 1-2 bowel movements daily.  No heartburn symptoms.  No fever, sweats or chills.  No night sweats.    Home BP:  130/80s    Problem List   Patient Active Problem List   Diagnosis    HTN,  kidney transplant related    Type 2 diabetes mellitus (H)    Vitamin D deficiency    Gallstones, common bile duct    Pancreas replaced by transplant (H)    Kidney replaced by transplant    Immunosuppressed status (H24)    Hypomagnesemia    BK viremia    Aftercare following organ transplant    Need for pneumocystis prophylaxis    CKD (chronic kidney disease) stage 2, GFR 60-89 ml/min    Class 1 obesity due to excess calories without serious comorbidity with body mass index (BMI) of 32.0 to 32.9 in adult       Allergies   Allergies   Allergen Reactions    Metoprolol      SOB, but was also experiencing significant edema not drug associated       Medications   Current Outpatient Medications   Medication Sig    losartan (COZAAR) 25 MG tablet Take 1 tablet (25 mg) by mouth at bedtime    aspirin (ASA) 81 MG EC tablet Take 1 tablet (81 mg) by mouth daily    carvedilol (COREG) 6.25 MG tablet Take 1 tablet (6.25 mg) by mouth 2 times daily    magnesium oxide (MAG-OX) 400 MG tablet Take 1 tablet (400 mg) by mouth daily    montelukast (SINGULAIR) 10 MG tablet Take 10 mg by mouth At Bedtime    mycophenolate (GENERIC EQUIVALENT) 250 MG capsule Take 2 capsules (500 mg) by mouth 2 times daily    rosuvastatin (CRESTOR) 10 MG tablet Take 1 tablet (10 mg) by mouth daily    tacrolimus (GENERIC) 0.5 MG capsule Take 1 capsule (0.5 mg) by mouth 2 times daily Total dose =2.5 MG BID    tacrolimus (GENERIC) 1 MG capsule Take 2 capsules (2 mg) by mouth 2 times daily    vitamin D3 (CHOLECALCIFEROL) 2000 units (50 mcg) tablet Take 1 tablet (2,000 Units) by mouth daily     No current facility-administered medications for this visit.     There are no discontinued medications.    Physical Exam   Vital Signs: There were no vitals taken for this visit.    GENERAL APPEARANCE: alert and no distress  HENT: no obvious abnormalities on appearance  RESP: breathing appears unremarkable with normal rate, no audible wheezing or cough and no apparent shortness  of breath with conversation  MS: extremities normal - no gross deformities noted  SKIN: no apparent rash and normal skin tone  NEURO: speech is clear with no obvious neurological deficits  PSYCH: mentation appears normal and affect normal     Data         Latest Ref Rng & Units 1/10/2024     7:26 AM 12/6/2023     7:31 AM 11/8/2023     7:36 AM   Renal   Na (external) 136 - 145 mmol/L 140  143  141    K (external) 3.5 - 5.1 mmol/L 3.8  3.9  4.2    Cl 98 - 109 mmol/L 106  107  108    Cl (external) 98 - 109 mmol/L 106  107  108    CO2 (external) 20 - 29 mmol/L 25  25  23    BUN (external) 7 - 26 mg/dL 13  13  11    Cr (external) 0.73 - 1.18 mg/dL 1.06  1.00  0.97    Glucose (external) 70 - 100 mg/dL 103  120  122    Ca (external) 8.4 - 10.4 mg/dL 9.1  9.3  8.9          Latest Ref Rng & Units 6/8/2022     8:11 AM 5/11/2022     8:11 AM 4/13/2022     8:04 AM   Bone Health   Phos (external) 2.3 - 4.7 mg/dL 2.4  2.3     2.9           This result is from an external source.         Latest Ref Rng & Units 1/10/2024     7:26 AM 12/6/2023     7:31 AM 11/8/2023     7:36 AM   Heme   WBC (external) 3.5 - 10.5 x10(9)/L 7.1  6.9  7.2    Hgb (external) 13.5 - 17.5 g/dL 15.9  16.0  15.8    Plt (external) 150 - 450 x10(9)/L 231  236  218          Latest Ref Rng & Units 6/8/2022     8:11 AM 12/8/2021     8:00 AM 6/10/2021     9:27 AM   Liver   AP 40 - 150 U/L   84    AP (external) 40 - 150 U/L 106  121     TBili 0.2 - 1.3 mg/dL   0.4    TBili (external) 0.2 - 1.2 mg/dL 0.5  0.5     DBili (external) 0.0 - 0.5 mg/dL 0.1  0.2     ALT 0 - 70 U/L   32    ALT (external) 0 - 55 U/L 10  18     AST 0 - 45 U/L   32    AST (external) 10 - 40 U/L 22  27     Tot Protein 6.8 - 8.8 g/dL   7.2    Tot Protein (external) 6.4 - 8.3 g/dL 7.6  7.5     Albumin 3.4 - 5.0 g/dL   3.9    Albumin (external) 3.5 - 5.0 g/dL 3.9  4.0           Latest Ref Rng & Units 1/10/2024     7:26 AM 12/6/2023     7:31 AM 11/8/2023     7:36 AM   Pancreas   Amylase (external) 25 -  125 U/L 48  61  61    Lipase (external) 25 - 125 U/L 48  61  61          Latest Ref Rng & Units 6/8/2022     8:11 AM   Iron studies   Iron (external) 8 - 78 U/L 124          Latest Ref Rng & Units 1/10/2024     7:26 AM 12/6/2023     7:31 AM 11/8/2023     7:36 AM   [facility] Txp Virology   BK Quant Log Ext log IU/mL 1.54  1.73  1.66    BK Quant Result Ext IU/mL 34  54  45    BK Quant Spec Ext  Plasma          Recent Labs   Lab Test 06/25/21  0630 06/27/21  0702 07/08/21  0829   DOSTAC 2,000 Not Provided Not Provided   TACROL 9.5 8.4 17.6*     Recent Labs   Lab Test 06/24/21  0642 06/25/21  0630 07/08/21  0829   DOSMPA 2000 6/23/2021 2,000 2015pm 7/7/21   MPACID 4.31* 1.43 7.94*   MPAG 100.9* 79.8 152.5*

## 2024-01-16 NOTE — LETTER
1/16/2024         RE: Erik Cramer  [address] [place] [id number]        Dear Colleague,    Thank you for referring your patient, Erik Cramer, to the [facility]. Please see a copy of my visit note below.    Virtual Visit Details    Type of service:  Video Visit   Video Start Time:  1157  Video End Time: 1209    Originating Location (pt. 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  Unremarkable.     # Pancreas Tx (SPK): Stable glucose levels, but trend up slightly in HbA1c.  Patient has gained weight post transplant and discussed insulin resistance and recommendation for weight loss.   - Pancreatic Exocrine Drainage: Enteric drained     - Blood glucose: Near euglycemia      On insulin: No   - HbA1c: Trend up      Latest HbA1c: 6.0%   - Pancreatic enzymes: Stable   - Date DSA Last Checked: Jun/2023  Latest DSA: No   - Pancreas Tx Biopsy: Jul 27, 2021; Result: No diagnostic evidence of acute rejection.    # Immunosuppression: Tacrolimus immediate release (goal 5-8) and Mycophenolate mofetil (dose 500 mg every 12 hours)   - Continue with intensive monitoring of immunosuppression for efficacy and toxicity.   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  Patient is on slightly lower mycophenolate dosing.  Normal IgG level.   - Will continue to follow BK PCR monthly.    # Obesity, Class I (BMI = 32.5): Weight has trended up another 5 lbs.   - Recommend weight loss for overall health by increasing exercise and watching caloric intake.    # Skin Cancer Risk:    - Discussed sun protection and recommend regular follow up with Dermatology.    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  Significant transplant-related complications: BK Viremia    Transplant Office Phone Number: [phone]    Assessment and plan was discussed with the patient and he voiced his understanding and agreement.    Return visit: Return in about 6 months (around 7/16/2024).    Roman Fraser MD    Chief Complaint  Mr. Cramer is a 43 year old here for kidney transplant, pancreas transplant, and immunosuppression management.    History of Present Illness   Mr. Cramer reports feeling good overall with some medical complaints.  Since last clinic visit, patient reports no hospitalizations or new medical complaints and has been doing well overall.  His energy level is good and remains normal.  He is active and does get some exercise.  Patient reports occasional soreness in his right abdomen with twisting or bending over, but not much of an issue.  Denies any chest pain or shortness of breath with exertion.  No leg swelling.    Appetite is good and he  has gained ~ 5 lbs.  No nausea, vomiting or diarrhea.  He has 1-2 bowel movements daily.  No heartburn symptoms.  No fever, sweats or chills.  No night sweats.    Home BP:  130/80s    Problem List  Patient Active Problem List   Diagnosis     HTN, kidney transplant related     Type 2 diabetes mellitus (H)     Vitamin D deficiency     Gallstones, common bile duct     Pancreas replaced by transplant (H)     Kidney replaced by transplant     Immunosuppressed status (H24)     Hypomagnesemia     BK viremia     Aftercare following organ transplant     Need for pneumocystis prophylaxis     CKD (chronic kidney disease) stage 2, GFR 60-89 ml/min     Class 1 obesity due to excess calories without serious comorbidity with body mass index (BMI) of 32.0 to 32.9 in adult       Allergies  Allergies   Allergen Reactions     Metoprolol      SOB, but was also experiencing significant edema not drug associated       Medications  Current Outpatient Medications   Medication Sig     losartan (COZAAR) 25 MG tablet Take 1 tablet (25 mg) by mouth at bedtime     aspirin (ASA) 81 MG EC tablet Take 1 tablet (81 mg) by mouth daily     carvedilol (COREG) 6.25 MG tablet Take 1 tablet (6.25 mg) by mouth 2 times daily     magnesium oxide (MAG-OX) 400 MG tablet Take 1 tablet (400 mg) by mouth daily     montelukast (SINGULAIR) 10 MG tablet Take 10 mg by mouth At Bedtime     mycophenolate (GENERIC EQUIVALENT) 250 MG capsule Take 2 capsules (500 mg) by mouth 2 times daily     rosuvastatin (CRESTOR) 10 MG tablet Take 1 tablet (10 mg) by mouth daily     tacrolimus (GENERIC) 0.5 MG capsule Take 1 capsule (0.5 mg) by mouth 2 times daily Total dose =2.5 MG BID     tacrolimus (GENERIC) 1 MG capsule Take 2 capsules (2 mg) by mouth 2 times daily     vitamin D3 (CHOLECALCIFEROL) 2000 units (50 mcg) tablet Take 1 tablet (2,000 Units) by mouth daily     No current facility-administered medications for this visit.     There are no discontinued  medications.    Physical Exam  Vital Signs: There were no vitals taken for this visit.    GENERAL APPEARANCE: alert and no distress  HENT: no obvious abnormalities on appearance  RESP: breathing appears unremarkable with normal rate, no audible wheezing or cough and no apparent shortness of breath with conversation  MS: extremities normal - no gross deformities noted  SKIN: no apparent rash and normal skin tone  NEURO: speech is clear with no obvious neurological deficits  PSYCH: mentation appears normal and affect normal     Data        Latest Ref Rng & Units 1/10/2024     7:26 AM 12/6/2023     7:31 AM 11/8/2023     7:36 AM   Renal   Na (external) 136 - 145 mmol/L 140  143  141    K (external) 3.5 - 5.1 mmol/L 3.8  3.9  4.2    Cl 98 - 109 mmol/L 106  107  108    Cl (external) 98 - 109 mmol/L 106  107  108    CO2 (external) 20 - 29 mmol/L 25  25  23    BUN (external) 7 - 26 mg/dL 13  13  11    Cr (external) 0.73 - 1.18 mg/dL 1.06  1.00  0.97    Glucose (external) 70 - 100 mg/dL 103  120  122    Ca (external) 8.4 - 10.4 mg/dL 9.1  9.3  8.9          Latest Ref Rng & Units 6/8/2022     8:11 AM 5/11/2022     8:11 AM 4/13/2022     8:04 AM   Bone Health   Phos (external) 2.3 - 4.7 mg/dL 2.4  2.3     2.9           This result is from an external source.         Latest Ref Rng & Units 1/10/2024     7:26 AM 12/6/2023     7:31 AM 11/8/2023     7:36 AM   Heme   WBC (external) 3.5 - 10.5 x10(9)/L 7.1  6.9  7.2    Hgb (external) 13.5 - 17.5 g/dL 15.9  16.0  15.8    Plt (external) 150 - 450 x10(9)/L 231  236  218          Latest Ref Rng & Units 6/8/2022     8:11 AM 12/8/2021     8:00 AM 6/10/2021     9:27 AM   Liver   AP 40 - 150 U/L   84    AP (external) 40 - 150 U/L 106  121     TBili 0.2 - 1.3 mg/dL   0.4    TBili (external) 0.2 - 1.2 mg/dL 0.5  0.5     DBili (external) 0.0 - 0.5 mg/dL 0.1  0.2     ALT 0 - 70 U/L   32    ALT (external) 0 - 55 U/L 10  18     AST 0 - 45 U/L   32    AST (external) 10 - 40 U/L 22  27     Tot  Protein 6.8 - 8.8 g/dL   7.2    Tot Protein (external) 6.4 - 8.3 g/dL 7.6  7.5     Albumin 3.4 - 5.0 g/dL   3.9    Albumin (external) 3.5 - 5.0 g/dL 3.9  4.0           Latest Ref Rng & Units 1/10/2024     7:26 AM 12/6/2023     7:31 AM 11/8/2023     7:36 AM   Pancreas   Amylase (external) 25 - 125 U/L 48  61  61    Lipase (external) 25 - 125 U/L 48  61  61          Latest Ref Rng & Units 6/8/2022     8:11 AM   Iron studies   Iron (external) 8 - 78 U/L 124          Latest Ref Rng & Units 1/10/2024     7:26 AM 12/6/2023     7:31 AM 11/8/2023     7:36 AM   [facility] Virology   BK Quant Log Ext log IU/mL 1.54  1.73  1.66    BK Quant Result Ext IU/mL 34  54  45    BK Quant Spec Ext  Plasma          Recent Labs   Lab Test 06/25/21  0630 06/27/21  0702 07/08/21  0829   DOSTAC 2,000 Not Provided Not Provided   TACROL 9.5 8.4 17.6*     Recent Labs   Lab Test 06/24/21  0642 06/25/21  0630 07/08/21  0829   DOSMPA 2000 6/23/2021 2,000 2015pm 7/7/21   MPACID 4.31* 1.43 7.94*   MPAG 100.9* 79.8 152.5*          Again, thank you for allowing me to participate in the care of your patient.        Sincerely,        Roman Fraser MD

## 2024-01-17 NOTE — LETTER
PHYSICIAN ORDERS      DATE & TIME ISSUED: 2024 12:02 PM  PATIENT NAME: Erik Cramer   : 1980     [facility] MR# [if applicable]: [id number]     DIAGNOSIS:  kidney/pancreas transplanted  ICD-10 CODE: z94.0/z94.83     Please check the following labs: Vitamin D, Magnesium, UPC, and DSA.    Cristian         Any questions please call: [phone]    Please fax each result same day as resulted/available    Critical lab results page [phone].    .

## 2024-01-17 NOTE — PROGRESS NOTES
Roman Fraser MD Colianni, Lauren, RN  Please check the following labs: Vitamin D, Magnesium, UPC, and DSA.    His HbA1c has been trending up, which I think is due to insulin resistance from his weight gain.  Discussed importance of losing weight with him.    Cristian

## 2024-01-18 ENCOUNTER — TELEPHONE (OUTPATIENT)
Dept: TRANSPLANT | Facility: CLINIC | Age: 44
End: 2024-01-18
Payer: MEDICARE

## 2024-01-18 DIAGNOSIS — Z94.0 KIDNEY TRANSPLANTED: Primary | ICD-10-CM

## 2024-01-18 NOTE — TELEPHONE ENCOUNTER
Please call [facility];  # [phone]  When are they to draw the new order they just received dated  01/18/2024 and if they draw the DSA are they to draw the DSA in June ( from the routine standing lab order?)    PS  they have the DSA  kits for Darcie

## 2024-01-18 NOTE — LETTER
PHYSICIAN ORDERS      DATE & TIME ISSUED: 2024  9:24 AM  PATIENT NAME: Erik Cramer   : 1980     [facility] MR# [if applicable]: [id number]     DIAGNOSIS:  kidney/pancreas transplanted  ICD-10 CODE: z94.0/z94.83     Please com[lete the following labs:   Vitamin D  Magnesium  Random urine protein/creatinine ratio  PRA/DSA (Donor Specific Antibodies)           Any questions please call: [phone]    Please fax each result same day as resulted/available    Critical lab results page [phone].    .

## 2024-01-18 NOTE — TELEPHONE ENCOUNTER
Provider Call: General  Route to LPN    Reason for call: Lab received orders sent yesterday  needs clarification  Is pt coiming in early- per other orders DSA not due until June  would need those orders changed also or is this an extra DSA     Call back needed? Yes    Return Call Needed  Same as documented in contacts section  When to return call?: Same day: Route High Priority

## 2024-02-07 ENCOUNTER — LAB (OUTPATIENT)
Dept: LAB | Facility: CLINIC | Age: 44
End: 2024-02-07
Payer: MEDICARE

## 2024-02-07 DIAGNOSIS — Z94.0 KIDNEY REPLACED BY TRANSPLANT: ICD-10-CM

## 2024-02-07 DIAGNOSIS — Z94.83 PANCREAS REPLACED BY TRANSPLANT (H): ICD-10-CM

## 2024-02-07 PROCEDURE — 86832 HLA CLASS I HIGH DEFIN QUAL: CPT

## 2024-02-07 PROCEDURE — 86833 HLA CLASS II HIGH DEFIN QUAL: CPT

## 2024-02-09 ENCOUNTER — TELEPHONE (OUTPATIENT)
Dept: TRANSPLANT | Facility: CLINIC | Age: 44
End: 2024-02-09
Payer: MEDICARE

## 2024-02-09 DIAGNOSIS — E55.9 VITAMIN D DEFICIENCY: Primary | ICD-10-CM

## 2024-02-09 RX ORDER — CHOLECALCIFEROL (VITAMIN D3) 50 MCG
25 TABLET ORAL DAILY
Qty: 15 TABLET | Refills: 11 | Status: SHIPPED | OUTPATIENT
Start: 2024-02-09

## 2024-02-09 NOTE — TELEPHONE ENCOUNTER
ISSUE: vit D level 26    PLAN:    Audie Robertson MD Colianni, Lauren, RN  Cholecalciferol 1000 international unit(s) qd

## 2024-02-25 ENCOUNTER — HEALTH MAINTENANCE LETTER (OUTPATIENT)
Age: 44
End: 2024-02-25

## 2024-03-12 DIAGNOSIS — Z94.0 KIDNEY REPLACED BY TRANSPLANT: ICD-10-CM

## 2024-03-12 DIAGNOSIS — Z94.83 PANCREAS TRANSPLANTED (H): ICD-10-CM

## 2024-03-12 DIAGNOSIS — Z94.0 KIDNEY TRANSPLANTED: Primary | ICD-10-CM

## 2024-03-12 RX ORDER — TACROLIMUS 0.5 MG/1
CAPSULE ORAL
Qty: 60 CAPSULE | Refills: 11 | Status: SHIPPED | OUTPATIENT
Start: 2024-03-12 | End: 2024-07-18

## 2024-03-12 RX ORDER — TACROLIMUS 1 MG/1
2 CAPSULE ORAL 2 TIMES DAILY
Qty: 120 CAPSULE | Refills: 11 | Status: SHIPPED | OUTPATIENT
Start: 2024-03-12 | End: 2024-07-18

## 2024-03-12 NOTE — TELEPHONE ENCOUNTER
ISSUE:   Tacrolimus IR level 14.9 on 3/11, goal 5-8, dose 2.55 mg BID.    PLAN:   Call Patientand confirm this was an accurate 12-hour trough.   Verify Tacrolimus IR dose 2.5 mg BID.   Confirm no new medications or illness.   Confirm no missed doses.   If accurate trough and accurate dose, decrease Tacrolimus IR dose to 2 mg BID     Is this more than a 50% increase or decrease in current IS dose: No  If YES, justification: na    Repeat labs in 1-2 weeks.  *If > 50% change in immunosuppression dose, repeat labs in 1 week.     OUTCOME:   Patient confirmed this was an accurate 12-hour trough.   Verified Tacrolimus IR dose 2.5 mg BID.   Confirmed no new medications or illness.   Confirmed no missed doses.   Patient confirms decrease Tacrolimus IR dose to 2 mg BID and repeat labs in 1-2 weeks.     
Left message and sent Patient[employer] message to patient regarding:  Tacrolimus IR level 14.9 on 3/11, goal 5-8, dose 2.55 mg BID.     PLAN:   Call Patientand confirm this was an accurate 12-hour trough.   Verify Tacrolimus IR dose 2.5 mg BID.   Confirm no new medications or illness.   Confirm no missed doses.   If accurate trough and accurate dose, decrease Tacrolimus IR dose to 2 mg BID      Is this more than a 50% increase or decrease in current IS dose: No  If YES, justification: na     Repeat labs in 1-2 weeks  
episode of a-fib rvr

## 2024-03-12 NOTE — LETTER
PHYSICIAN ORDERS      DATE & TIME ISSUED: 2024 12:06 PM  PATIENT NAME: Erik Cramer   : 1980     [facility] MR# [if applicable]: [id number]     DIAGNOSIS:  Kidney/Pancreas Transplant  ICD-10 CODE: Z94.0/Z94.83     Please repeat the following labs in 1-2 weeks:  Tacrolimus drug level  CBC  BMP  Amylase  Lipase    Any questions please call: [phone]    Please fax each result same day as resulted/available    Critical lab results page [phone]  Please fax lab results to [phone].      Saw Irby MD

## 2024-06-05 ENCOUNTER — LAB (OUTPATIENT)
Dept: LAB | Facility: CLINIC | Age: 44
End: 2024-06-05
Payer: MEDICARE

## 2024-06-05 DIAGNOSIS — Z94.0 KIDNEY TRANSPLANTED: ICD-10-CM

## 2024-06-05 PROCEDURE — 86833 HLA CLASS II HIGH DEFIN QUAL: CPT

## 2024-06-05 PROCEDURE — 86832 HLA CLASS I HIGH DEFIN QUAL: CPT

## 2024-06-19 LAB
DONOR IDENTIFICATION: NORMAL
DSA COMMENTS: NORMAL
DSA PRESENT: NO
DSA TEST METHOD: NORMAL
ORGAN: NORMAL
SA 1  COMMENTS: NORMAL
SA 1 CELL: NORMAL
SA 1 TEST METHOD: NORMAL
SA 2 CELL: NORMAL
SA 2 COMMENTS: NORMAL
SA 2 TEST METHOD: NORMAL
SA1 HI RISK ABY: NORMAL
SA1 MOD RISK ABY: NORMAL
SA2 HI RISK ABY: NORMAL
SA2 MOD RISK ABY: NORMAL
UNACCEPTABLE ANTIGENS: NORMAL
UNOS CPRA: 36

## 2024-06-21 ENCOUNTER — TELEPHONE (OUTPATIENT)
Dept: TRANSPLANT | Facility: CLINIC | Age: 44
End: 2024-06-21
Payer: MEDICARE

## 2024-06-21 DIAGNOSIS — Z94.0 KIDNEY TRANSPLANTED: Primary | ICD-10-CM

## 2024-06-21 ASSESSMENT — ENCOUNTER SYMPTOMS: NEW SYMPTOMS OF CORONARY ARTERY DISEASE: 0

## 2024-06-21 NOTE — TELEPHONE ENCOUNTER
Updated patient on lab schedule and coordinator change.  Placed order for appointment with [facility] within the next 6 months  Patient status update completed

## 2024-06-28 DIAGNOSIS — Z94.83 PANCREAS REPLACED BY TRANSPLANT (H): ICD-10-CM

## 2024-06-28 DIAGNOSIS — Z48.22 ENCOUNTER FOR AFTERCARE FOLLOWING KIDNEY TRANSPLANT: Primary | ICD-10-CM

## 2024-06-28 DIAGNOSIS — Z94.0 KIDNEY TRANSPLANT RECIPIENT: ICD-10-CM

## 2024-07-12 ENCOUNTER — TELEPHONE (OUTPATIENT)
Dept: TRANSPLANT | Facility: CLINIC | Age: 44
End: 2024-07-12
Payer: MEDICARE

## 2024-07-12 NOTE — LETTER
PHYSICIAN ORDERS      DATE & TIME ISSUED: 2024  9:48 AM   PATIENT NAME: Erik Cramer   : 1980     [facility] MR# [if applicable]: [id number]     DIAGNOSIS:  Kidney/Pancreas Transplant  ICD-10 CODE: Z94.0/Z94.83     Please repeat the following labs in ~1 week:  Tacrolimus drug level  CBC  BMP  Amylase  Lipase    Any questions please call: [phone]    Please fax each result same day as resulted/available    Critical lab results page [phone]  Please fax lab results to [phone].    .

## 2024-07-12 NOTE — TELEPHONE ENCOUNTER
ISSUE  Lipase 110.    PLAN  ----- Message from Roman Fraser sent at 7/11/2024  8:29 AM CDT -----  Elevated serum lipase and recommend usual assessment for pain over pancreas allograft, constipation, high fat diet and volume status, and repeat labs.     OUTCOME  Spoke with patient, who reports he had some fatty/fried foods earlier this week. He will be mindful of his diet and redraw next week. Complains of ongoing abdominal pain that has been worked up by PCP with CT/MRI, results unremarkable. Pain is not near transplants. He will follow up with PCP and repeat labs next week. Orders sent to local lab.

## 2024-07-14 ENCOUNTER — HEALTH MAINTENANCE LETTER (OUTPATIENT)
Age: 44
End: 2024-07-14

## 2024-07-18 ENCOUNTER — TELEPHONE (OUTPATIENT)
Dept: TRANSPLANT | Facility: CLINIC | Age: 44
End: 2024-07-18
Payer: MEDICARE

## 2024-07-18 DIAGNOSIS — Z48.22 ENCOUNTER FOR AFTERCARE FOLLOWING KIDNEY TRANSPLANT: Primary | ICD-10-CM

## 2024-07-18 DIAGNOSIS — Z94.0 KIDNEY TRANSPLANTED: ICD-10-CM

## 2024-07-18 DIAGNOSIS — Z94.0 KIDNEY TRANSPLANT RECIPIENT: ICD-10-CM

## 2024-07-18 DIAGNOSIS — Z94.83 PANCREAS TRANSPLANTED (H): ICD-10-CM

## 2024-07-18 DIAGNOSIS — D84.9 IMMUNOSUPPRESSED STATUS (H): ICD-10-CM

## 2024-07-18 DIAGNOSIS — Z94.0 KIDNEY REPLACED BY TRANSPLANT: ICD-10-CM

## 2024-07-18 DIAGNOSIS — Z94.83 PANCREAS REPLACED BY TRANSPLANT (H): ICD-10-CM

## 2024-07-18 RX ORDER — TACROLIMUS 1 MG/1
2 CAPSULE ORAL 2 TIMES DAILY
Qty: 120 CAPSULE | Refills: 11 | Status: SHIPPED | OUTPATIENT
Start: 2024-07-18

## 2024-07-18 RX ORDER — TACROLIMUS 0.5 MG/1
0.5 CAPSULE ORAL 2 TIMES DAILY
Qty: 60 CAPSULE | Refills: 11 | Status: SHIPPED | OUTPATIENT
Start: 2024-07-18

## 2024-07-18 NOTE — TELEPHONE ENCOUNTER
ISSUE  Lipase 135.     PLAN  Call patient and assess.   Any illnesses recently?  Missed IS doses or new medications?  Issues with constipation?  Dehydrated?  Any changes to diet- spicy or fatty food, caffeine or alcohol?  Any pain over graft site?    OUTCOME  Spoke with patient, who has no symptoms of illness or changes in diet or medications. No issues with constipation. Persistent pain in abdomen with certain movements that has been worked up by PCP with MRI and CT, no issues identified. Advised patient to return to PCP for evaluation and/or PT referral.      ISSUE:   Tacrolimus IR level 4.5 on 07/16/24, goal 6, dose 2 mg BID.    PLAN:   Call Patient and confirm this was an accurate 12-hour trough.   Verify Tacrolimus IR dose 2 mg BID.   Confirm no new medications or illness.   Confirm no missed doses.     If accurate trough and accurate dose, increase Tacrolimus IR dose to 2.5 mg BID  *Recommended dose rounded from calculated dose 2.67 mg  BID.      Repeat labs in 1 weeks.   For any dose change <50%, recheck labs per guideline or within 1 month.  For any dose change of more than 50%, recheck drug level based on timing to reach steady state:   Immediate release tacrolimus: 2-3 days  Extended-release tacrolimus: 7 days  Cyclosporine: 2 days  Sirolimus: 12 days  Everolimus: 8 days      OUTCOME:   Spoke with Patient, they confirm accurate trough level and current dose 2 mg BID.   Patient confirmed dose change to 2.5 mg BID.  Patient agreed to repeat labs in 1 weeks.   Orders sent to preferred pharmacy for dose change and lab for repeat labs.   Patient voiced understanding of plan.

## 2024-07-18 NOTE — LETTER
PHYSICIAN ORDERS      DATE & TIME ISSUED: 2024  3:12 PM   PATIENT NAME: Erik Cramer   : 1980     [facility] MR# [if applicable]: [id number]     DIAGNOSIS:  Kidney/Pancreas Transplant  ICD-10 CODE: Z94.0/Z94.83     Please repeat the following labs in ~1 week:  Tacrolimus drug level  CBC  BMP  Amylase  Lipase    Any questions please call: [phone]    Please fax each result same day as resulted/available    Critical lab results page [phone]  Please fax lab results to [phone].    .

## 2024-08-28 ENCOUNTER — TELEPHONE (OUTPATIENT)
Dept: TRANSPLANT | Facility: CLINIC | Age: 44
End: 2024-08-28
Payer: MEDICARE

## 2024-08-28 NOTE — TELEPHONE ENCOUNTER
Patient Call: General  Route to LPN    Reason for call: Ragini Medical Assistant calling from [facility] for Dr. Regan, Provider wanting to start patient on Phentermine, and would like to clear this with the team    Call back needed? Yes    Return Call Needed  Same as documented in contacts section  When to return call?: Same day: Route High Priority

## 2024-08-28 NOTE — TELEPHONE ENCOUNTER
Called back Ragini.  Wanting recs from transplant team.   Dr Regan wants to start patient on Phentermine 15 mg daily or other stimulants.   Wants to know if this is OK.    Message sent to Dr Fraser and Lobo Bartlett.

## 2024-08-29 ENCOUNTER — TELEPHONE (OUTPATIENT)
Dept: TRANSPLANT | Facility: CLINIC | Age: 44
End: 2024-08-29
Payer: MEDICARE

## 2024-08-29 NOTE — TELEPHONE ENCOUNTER
General  Route to LPN    Reason for call: Farrah stated the primary wants to know if it is ok for him to take phentermine     Call back needed? Yes    Return Call Needed  Same as documented in contacts section  When to return call?: Greater than one day: Route standard priority

## 2024-09-23 ENCOUNTER — MYC MEDICAL ADVICE (OUTPATIENT)
Dept: TRANSPLANT | Facility: CLINIC | Age: 44
End: 2024-09-23
Payer: MEDICARE

## 2024-09-27 NOTE — TELEPHONE ENCOUNTER
ISSUE  Patient sent Swarm message reporting weight gain and abdominal pain. He has recently started phentermine for weight loss.     OUTCOME  Spoke with patient regarding his symptoms. He has gained ~50 lbs since January. He reports his abdominal pain is intermittent and gets worse with movement (bending down). He does not notice any buldging, does not endorse fever or noticeable edema. He is not checking his blood pressures at home.     He has been taking phentermine for about 1 month and has not weighted himself since starting. [facility] offered him a weight management referral, which he declined. He is following with a chiropractor, who advised him to discuss with transplant. He is also following with GI.     Encouraged patient to follow up with primary care for in person assessment. Patient reports that he has done this and wants to be evaluated by transplant. Offered patient in person appointment, which he declined as he did not wish to travel to [place] to be seen (has completed virtual appointments for several years). At this time he reported that the muscle relaxer and salonpas he was prescribed by his PCP were sufficient. Encouraged patient to continue with regular labs and contact PCP if symptoms worsen.

## 2024-11-04 ENCOUNTER — MYC REFILL (OUTPATIENT)
Dept: TRANSPLANT | Facility: CLINIC | Age: 44
End: 2024-11-04
Payer: MEDICARE

## 2024-11-04 ENCOUNTER — TELEPHONE (OUTPATIENT)
Dept: TRANSPLANT | Facility: CLINIC | Age: 44
End: 2024-11-04
Payer: MEDICARE

## 2024-11-04 DIAGNOSIS — I15.1 HTN, KIDNEY TRANSPLANT RELATED: Primary | ICD-10-CM

## 2024-11-04 DIAGNOSIS — Z94.83 PANCREAS TRANSPLANTED (H): ICD-10-CM

## 2024-11-04 DIAGNOSIS — Z94.0 KIDNEY REPLACED BY TRANSPLANT: ICD-10-CM

## 2024-11-04 DIAGNOSIS — B34.8 BK VIREMIA: ICD-10-CM

## 2024-11-04 DIAGNOSIS — Z94.0 HTN, KIDNEY TRANSPLANT RELATED: Primary | ICD-10-CM

## 2024-11-04 RX ORDER — MYCOPHENOLATE MOFETIL 250 MG/1
500 CAPSULE ORAL 2 TIMES DAILY
Qty: 120 CAPSULE | Refills: 11 | OUTPATIENT
Start: 2024-11-04

## 2024-11-04 RX ORDER — MYCOPHENOLATE MOFETIL 250 MG/1
500 CAPSULE ORAL 2 TIMES DAILY
Qty: 120 CAPSULE | Refills: 11 | Status: SHIPPED | OUTPATIENT
Start: 2024-11-04

## 2024-11-05 RX ORDER — MYCOPHENOLATE MOFETIL 250 MG/1
500 CAPSULE ORAL 2 TIMES DAILY
Qty: 120 CAPSULE | Refills: 11 | Status: SHIPPED | OUTPATIENT
Start: 2024-11-05

## 2024-12-01 ENCOUNTER — HEALTH MAINTENANCE LETTER (OUTPATIENT)
Age: 44
End: 2024-12-01

## 2024-12-11 ENCOUNTER — MYC MEDICAL ADVICE (OUTPATIENT)
Dept: TRANSPLANT | Facility: CLINIC | Age: 44
End: 2024-12-11
Payer: MEDICARE

## 2024-12-11 DIAGNOSIS — Z94.0 HTN, KIDNEY TRANSPLANT RELATED: Primary | ICD-10-CM

## 2024-12-11 DIAGNOSIS — I15.1 HTN, KIDNEY TRANSPLANT RELATED: Primary | ICD-10-CM

## 2024-12-11 DIAGNOSIS — B34.8 BK VIREMIA: ICD-10-CM

## 2025-01-08 ENCOUNTER — LAB (OUTPATIENT)
Dept: LAB | Facility: CLINIC | Age: 45
End: 2025-01-08
Payer: MEDICARE

## 2025-01-08 DIAGNOSIS — Z94.83 PANCREAS REPLACED BY TRANSPLANT (H): ICD-10-CM

## 2025-01-08 DIAGNOSIS — D84.9 IMMUNOSUPPRESSED STATUS: ICD-10-CM

## 2025-01-08 DIAGNOSIS — Z48.22 ENCOUNTER FOR AFTERCARE FOLLOWING KIDNEY TRANSPLANT: ICD-10-CM

## 2025-01-09 ENCOUNTER — DOCUMENTATION ONLY (OUTPATIENT)
Dept: TRANSPLANT | Facility: CLINIC | Age: 45
End: 2025-01-09
Payer: MEDICARE

## 2025-01-09 DIAGNOSIS — I15.1 HTN, KIDNEY TRANSPLANT RELATED: Primary | ICD-10-CM

## 2025-01-09 DIAGNOSIS — Z94.0 HTN, KIDNEY TRANSPLANT RELATED: Primary | ICD-10-CM

## 2025-01-09 DIAGNOSIS — B34.8 BK VIREMIA: ICD-10-CM

## 2025-01-10 ENCOUNTER — MYC MEDICAL ADVICE (OUTPATIENT)
Dept: TRANSPLANT | Facility: CLINIC | Age: 45
End: 2025-01-10
Payer: MEDICARE

## 2025-01-10 DIAGNOSIS — I15.1 HTN, KIDNEY TRANSPLANT RELATED: Primary | ICD-10-CM

## 2025-01-10 DIAGNOSIS — B34.8 BK VIREMIA: ICD-10-CM

## 2025-01-10 DIAGNOSIS — Z94.0 HTN, KIDNEY TRANSPLANT RELATED: Primary | ICD-10-CM

## 2025-01-13 ENCOUNTER — DOCUMENTATION ONLY (OUTPATIENT)
Dept: TRANSPLANT | Facility: CLINIC | Age: 45
End: 2025-01-13
Payer: MEDICARE

## 2025-01-13 DIAGNOSIS — Z48.22 ENCOUNTER FOR AFTERCARE FOLLOWING KIDNEY TRANSPLANT: Primary | ICD-10-CM

## 2025-01-13 DIAGNOSIS — D84.9 IMMUNOSUPPRESSED STATUS: ICD-10-CM

## 2025-01-13 DIAGNOSIS — Z94.83 PANCREAS REPLACED BY TRANSPLANT (H): ICD-10-CM

## 2025-01-16 ENCOUNTER — VIRTUAL VISIT (OUTPATIENT)
Dept: TRANSPLANT | Facility: CLINIC | Age: 45
End: 2025-01-16
Attending: INTERNAL MEDICINE
Payer: MEDICARE

## 2025-01-16 DIAGNOSIS — D84.9 IMMUNOSUPPRESSED STATUS: ICD-10-CM

## 2025-01-16 DIAGNOSIS — B34.8 BK VIREMIA: ICD-10-CM

## 2025-01-16 DIAGNOSIS — Z48.298 AFTERCARE FOLLOWING ORGAN TRANSPLANT: ICD-10-CM

## 2025-01-16 DIAGNOSIS — Z48.22 ENCOUNTER FOR AFTERCARE FOLLOWING KIDNEY TRANSPLANT: ICD-10-CM

## 2025-01-16 DIAGNOSIS — Z94.0 KIDNEY TRANSPLANT RECIPIENT: ICD-10-CM

## 2025-01-16 DIAGNOSIS — Z94.0 HTN, KIDNEY TRANSPLANT RELATED: ICD-10-CM

## 2025-01-16 DIAGNOSIS — E83.42 HYPOMAGNESEMIA: ICD-10-CM

## 2025-01-16 DIAGNOSIS — I15.1 HTN, KIDNEY TRANSPLANT RELATED: ICD-10-CM

## 2025-01-16 DIAGNOSIS — Z94.0 KIDNEY TRANSPLANTED: ICD-10-CM

## 2025-01-16 DIAGNOSIS — E55.9 VITAMIN D DEFICIENCY: ICD-10-CM

## 2025-01-16 DIAGNOSIS — Z94.83 PANCREAS REPLACED BY TRANSPLANT (H): ICD-10-CM

## 2025-01-16 DIAGNOSIS — N18.2 CKD (CHRONIC KIDNEY DISEASE) STAGE 2, GFR 60-89 ML/MIN: ICD-10-CM

## 2025-01-16 DIAGNOSIS — Z94.0 KIDNEY REPLACED BY TRANSPLANT: Primary | ICD-10-CM

## 2025-01-16 PROCEDURE — G2211 COMPLEX E/M VISIT ADD ON: HCPCS | Performed by: INTERNAL MEDICINE

## 2025-01-16 PROCEDURE — 98006 SYNCH AUDIO-VIDEO EST MOD 30: CPT | Performed by: INTERNAL MEDICINE

## 2025-01-16 NOTE — NURSING NOTE
Current patient location: [address] [place] [address] [address] [place] [id number]    Is the patient currently in the state of [place]? NO    Visit mode: VIDEO    If the visit is dropped, the patient can be reconnected by:VIDEO VISIT: Send to e-mail at: [email or web address]    Will anyone else be joining the visit? NO  (If patient encounters technical issues they should call [phone]    Are changes needed to the allergy or medication list? No    Are refills needed on medications prescribed by this physician? NO    Rooming Documentation:  Questionnaire(s) completed    Reason for visit: RECHCLAYTON COBOS

## 2025-01-16 NOTE — LETTER
1/16/2025      RE: Erik Cramer  [address] [place] [address]       Virtual Visit Details    Type of service:  Video Visit   Video Start Time:  1609  Video End Time: 1626    Originating Location (pt. Location): Home  Distant Location (provider location):  Off-site  Platform used for Video Visit: [facility]      TRANSPLANT NEPHROLOGY CLINIC VISIT     Assessment & Plan  # DDKT (SPK): CKD Stage 2 - Stable   - Baseline Creatinine: ~ 1.0-1.2   - Proteinuria: Normal (<0.2 grams)   - DSA Hx: No DSA   - Last cPRA: 36%   - BK Viremia: Yes, minimally elevated (< 1000)   - Kidney Tx Biopsy Hx: No history of acute rejection.    # Pancreas Tx (SPK): Stable glucose levels, but trend up slightly in HbA1c. Patient has gained weight post transplant and discussed insulin resistance and recommendation for weight loss.   Would consider metformin and/or SGLT2 inhibitor.  Defer to PCP to manage.   - Pancreatic Exocrine Drainage: Enteric drained     - Blood glucose: Near euglycemia      On insulin: No   - HbA1c: Trend up      Latest HbA1c: 6.3%   - Pancreatic enzymes: Stable   - DSA Hx: No DSA   - Pancreas Tx Biopsy Hx: No history of acute rejection    # Immunosuppression: Tacrolimus immediate release (goal 5-8) and Mycophenolate mofetil (dose 500 mg every 12 hours)   - Induction with Recent Transplant:  Intermediate Intensity Protocol   - Continue with intensive monitoring of immunosuppression for efficacy and toxicity.   - Historical Changes in Immunosuppression:  Slightly decreased mycophenolate dose due to BK viremia.   - Changes: No    # Infection Prevention:  Last CD4 Level: 604 (Jan/2025)  - PJP: None      - CMV IgG Ab High Risk Discordance (D+/R-): No  CMV Serostatus: Positive  - EBV IgG Ab High Risk Discordance (D+/R-): No  EBV Serostatus: Positive    # Hypertension: Not checked recently;  Goal BP: < 130/80   - Changes: Not at this time; Recommend patient check blood pressure at home on a regular basis, such as once every week  or two, and follow up with primary Nephrologist and/or PCP if above goal.    # Mineral Bone Disorder:    - Vitamin D; level: Low        On supplement: Yes  - Calcium; level: Normal        On supplement: No    # Electrolytes:   - Potassium; level: Normal        On supplement: No  - Magnesium; level: Not checked recently        On supplement: Yes  - Bicarbonate; level: Normal        On supplement: No    # BK Viremia: Stable, minimally elevated BK PCR at ~ 34 with last check Jan/2025.  IgG level is normal.  Slightly reduced immunosuppression.   - Will continue to follow BK PCR every 2 months.    # Obesity, Class II (BMI = 39.9): Patient thinks his weight is stable, but hasn't weighed himself lately.  Last weight was during provider visit 8/2024 at 108.9 kg.  Patient has since been started on phentermine.   - Recommend weight loss for overall health by increasing exercise and watching caloric intake.   - Patient may benefit from SGLT2 inhibitors.  However, because of their pancreas transplant, would avoid GLP1 agonists due to increased risk of pancreatitis.     # Other Significant PMH:   - None     # Skin Cancer Risk:    - Discussed sun protection and recommend regular follow up with Dermatology.    # Transplant History:  Etiology of Kidney Failure: Diabetes mellitus type 2  Tx: SPK  Transplant: 6/11/2021 (Kidney / Pancreas)  Significant transplant-related complications: BK Viremia    Transplant Office Phone Number: [phone]    Assessment and plan was discussed with the patient and he voiced his understanding and agreement.    Return visit: Return in about 1 year (around 1/16/2026).    Roman Fraser MD    The longitudinal plan of care for the diagnosis(es)/condition(s) as documented were addressed during this visit. Due to the added complexity in care, I will continue to support Tye in the subsequent management and with ongoing continuity of care.      Chief Complaint  Mr. Cramer is a 44 year old here for  kidney transplant and immunosuppression management.     History of Present Illness   Mr. Cramer reports feeling good overall.  Since last clinic visit:   Hospitalizations: No   New Medical Issues: No  Active/Exercise: Yes, but less so in winter  Chest pain or shortness of breath: No  Lower extremity swelling: No  Weight change: No   Appetite change: Yes; Slightly less since starting phentermine.  Nausea and vomiting: No  Diarrhea: No  Heartburn symptoms: No  Fever, sweats or chills: No  Night sweats: No  Urinary complaints: No    Home BP: Not checked    Problem List  Patient Active Problem List   Diagnosis     HTN, kidney transplant related     Type 2 diabetes mellitus (H)     Vitamin D deficiency     Gallstones, common bile duct     Pancreas replaced by transplant (H)     Kidney replaced by transplant     Immunosuppressed status     Hypomagnesemia     BK viremia     Aftercare following organ transplant     CKD (chronic kidney disease) stage 2, GFR 60-89 ml/min     Class 1 obesity due to excess calories without serious comorbidity with body mass index (BMI) of 32.0 to 32.9 in adult       Allergies  Allergies   Allergen Reactions     Metoprolol      SOB, but was also experiencing significant edema not drug associated       Medications  Current Outpatient Medications   Medication Sig Dispense Refill     aspirin (ASA) 81 MG EC tablet Take 1 tablet (81 mg) by mouth daily 90 tablet 3     carvedilol (COREG) 6.25 MG tablet Take 1 tablet (6.25 mg) by mouth 2 times daily 120 tablet 3     losartan (COZAAR) 25 MG tablet Take 1 tablet (25 mg) by mouth at bedtime 90 tablet 3     magnesium oxide (MAG-OX) 400 MG tablet Take 1 tablet (400 mg) by mouth daily 60 tablet 11     montelukast (SINGULAIR) 10 MG tablet Take 10 mg by mouth At Bedtime       mycophenolate (GENERIC EQUIVALENT) 250 MG capsule Take 2 capsules (500 mg) by mouth 2 times daily. 120 capsule 11     mycophenolate (GENERIC EQUIVALENT) 250 MG capsule Take 2 capsules (500  mg) by mouth 2 times daily. 120 capsule 11     rosuvastatin (CRESTOR) 10 MG tablet Take 1 tablet (10 mg) by mouth daily 90 tablet 0     tacrolimus (GENERIC EQUIVALENT) 0.5 MG capsule Take 1 capsule (0.5 mg) by mouth 2 times daily. Total dose 2.5 mg twice daily. 60 capsule 11     tacrolimus (GENERIC EQUIVALENT) 1 MG capsule Take 2 capsules (2 mg) by mouth 2 times daily. Total dose 2.5 mg twice daily. 120 capsule 11     vitamin D3 (CHOLECALCIFEROL) 2000 units (50 mcg) tablet Take 1 tablet (2,000 Units) by mouth daily 90 tablet 0     vitamin D3 (CHOLECALCIFEROL) 50 mcg (2000 units) tablet Take 0.5 tablets (25 mcg) by mouth daily 15 tablet 11     No current facility-administered medications for this visit.     There are no discontinued medications.    Physical Exam  Vital Signs: There were no vitals taken for this visit.    GENERAL APPEARANCE: alert and no distress  HENT: no obvious abnormalities on appearance  RESP: breathing appears unremarkable with normal rate, no audible wheezing or cough and no apparent shortness of breath with conversation  MS: extremities normal - no gross deformities noted  SKIN: no apparent rash and normal skin tone  NEURO: speech is clear with no obvious neurological deficits  PSYCH: mentation appears normal and affect normal    Data        Latest Ref Rng & Units 1/8/2025     7:35 AM 12/11/2024     7:27 AM 11/6/2024     7:15 AM   Renal   Na (external) 136 - 145 mmol/L 142  140     142    K (external) 3.5 - 5.1 mmol/L 4.4  4.3     4.7    Cl 98 - 109 mmol/L 108  108     108    Cl (external) 98 - 109 mmol/L 108  108     108    CO2 (external) 20 - 29 mmol/L 24  24     25    BUN (external) 7 - 26 mg/dL 16  15     15    Cr (external) 0.73 - 1.18 mg/dL 0.93  0.85     1.06    Glucose (external) 70 - 100 mg/dL 123  113     121    Ca (external) 8.4 - 10.4 mg/dL 9.7  9.2     9.5        This result is from an external source.         Latest Ref Rng & Units 2/7/2024     7:36 AM 6/8/2022     8:11 AM  5/11/2022     8:11 AM   Bone Health   Phos (external) 2.3 - 4.7 mg/dL  2.4  2.3       Vit D Def (external) 30 - 80 ng/mL 26             This result is from an external source.         Latest Ref Rng & Units 1/8/2025     7:35 AM 12/11/2024     7:27 AM 11/6/2024     7:15 AM   Heme   WBC (external) 3.5 - 10.5 x10(9)/L 6.2  8.2     7.1    Hgb (external) 13.5 - 17.5 g/dL 15.4  14.6     15.5    Plt (external) 150 - 450 x10(9)/L 291  284     287    ABSOLUTE NEUTROPHILS (EXTERNAL) 1.7 - 7.0 10(9)/L 3.5         ABSOLUTE LYMPHOCYTES (EXTERNAL) 1.0 - 4.8 10(9)/L 1.6         ABSOLUTE MONOCYTES (EXTERNAL) 0.2 - 0.9 10(9)/L 0.7         ABSOLUTE EOSINOPHILS (EXTERNAL) 0.0 - 0.5 10(9)/L 0.4         ABSOLUTE BASOPHILS (EXTERNAL) 0.0 - 0.3 10(9)/L 0.0             This result is from an external source.         Latest Ref Rng & Units 6/8/2022     8:11 AM 12/8/2021     8:00 AM 6/10/2021     9:27 AM   Liver   AP 40 - 150 U/L   84    AP (external) 40 - 150 U/L 106  121     TBili 0.2 - 1.3 mg/dL   0.4    TBili (external) 0.2 - 1.2 mg/dL 0.5  0.5     DBili (external) 0.0 - 0.5 mg/dL 0.1  0.2     ALT 0 - 70 U/L   32    ALT (external) 0 - 55 U/L 10  18     AST 0 - 45 U/L   32    AST (external) 10 - 40 U/L 22  27     Tot Protein 6.8 - 8.8 g/dL   7.2    Tot Protein (external) 6.4 - 8.3 g/dL 7.6  7.5     Albumin 3.4 - 5.0 g/dL   3.9    Albumin (external) 3.5 - 5.0 g/dL 3.9  4.0           Latest Ref Rng & Units 1/8/2025     7:35 AM 12/11/2024     7:27 AM 11/6/2024     7:15 AM   Pancreas   A1C (external) <=5.6 % 6.3         Amylase (external) 25 - 125 U/L 48  47     71    Lipase (external) 25 - 125 U/L 48  47     71        This result is from an external source.         Latest Ref Rng & Units 6/8/2022     8:11 AM   Iron studies   Iron (external) 8 - 78 U/L 124          Latest Ref Rng & Units 1/10/2024     7:26 AM 12/6/2023     7:31 AM 11/8/2023     7:36 AM   [facility] Virology   BK Quant Log Ext log IU/mL 1.54  1.73  1.66    BK Quant Result Ext  IU/mL 34  54  45    BK Quant Spec Ext  Plasma        Failed to redirect to the Timeline version of the REVFS SmartLink.  Recent Labs   Lab Test 06/25/21  0630 06/27/21  0702 07/08/21  0829   DOSTAC 2,000 Not Provided Not Provided   TACROL 9.5 8.4 17.6*     Recent Labs   Lab Test 06/24/21  0642 06/25/21  0630 07/08/21  0829   DOSMPA 2000 6/23/2021 2,000 2015pm 7/7/21   MPACID 4.31* 1.43 7.94*   MPAG 100.9* 79.8 152.5*        Roman Fraser MD

## 2025-01-16 NOTE — LETTER
1/16/2025      Erik Cramer  [address] [place] [address]      Dear Colleague,    Thank you for referring your patient, Erik Cramer, to the [facility] TRANSPLANT [facility]. Please see a copy of my visit note below.    Virtual Visit Details    Type of service:  Video Visit   Video Start Time:  1609  Video End Time: 1626    Originating Location (pt. Location): Home  Distant Location (provider location):  Off-site  Platform used for Video Visit: [facility]      TRANSPLANT NEPHROLOGY CLINIC VISIT     Assessment & Plan  # DDKT (SPK): CKD Stage 2 - Stable   - Baseline Creatinine: ~ 1.0-1.2   - Proteinuria: Normal (<0.2 grams)   - DSA Hx: No DSA   - Last cPRA: 36%   - BK Viremia: Yes, minimally elevated (< 1000)   - Kidney Tx Biopsy Hx: No history of acute rejection.    # Pancreas Tx (SPK): Stable glucose levels, but trend up slightly in HbA1c. Patient has gained weight post transplant and discussed insulin resistance and recommendation for weight loss.   Would consider metformin and/or SGLT2 inhibitor.  Defer to PCP to manage.   - Pancreatic Exocrine Drainage: Enteric drained     - Blood glucose: Near euglycemia      On insulin: No   - HbA1c: Trend up      Latest HbA1c: 6.3%   - Pancreatic enzymes: Stable   - DSA Hx: No DSA   - Pancreas Tx Biopsy Hx: No history of acute rejection    # Immunosuppression: Tacrolimus immediate release (goal 5-8) and Mycophenolate mofetil (dose 500 mg every 12 hours)   - Induction with Recent Transplant:  Intermediate Intensity Protocol   - Continue with intensive monitoring of immunosuppression for efficacy and toxicity.   - Historical Changes in Immunosuppression:  Slightly decreased mycophenolate dose due to BK viremia.   - Changes: No    # Infection Prevention:  Last CD4 Level: 604 (Jan/2025)  - PJP: None      - CMV IgG Ab High Risk Discordance (D+/R-): No  CMV Serostatus: Positive  - EBV IgG Ab High Risk Discordance (D+/R-): No  EBV Serostatus: Positive    # Hypertension: Not  checked recently;  Goal BP: < 130/80   - Changes: Not at this time; Recommend patient check blood pressure at home on a regular basis, such as once every week or two, and follow up with primary Nephrologist and/or PCP if above goal.    # Mineral Bone Disorder:    - Vitamin D; level: Low        On supplement: Yes  - Calcium; level: Normal        On supplement: No    # Electrolytes:   - Potassium; level: Normal        On supplement: No  - Magnesium; level: Not checked recently        On supplement: Yes  - Bicarbonate; level: Normal        On supplement: No    # BK Viremia: Stable, minimally elevated BK PCR at ~ 34 with last check Jan/2025.  IgG level is normal.  Slightly reduced immunosuppression.   - Will continue to follow BK PCR every 2 months.    # Obesity, Class II (BMI = 39.9): Patient thinks his weight is stable, but hasn't weighed himself lately.  Last weight was during provider visit 8/2024 at 108.9 kg.  Patient has since been started on phentermine.   - Recommend weight loss for overall health by increasing exercise and watching caloric intake.   - Patient may benefit from SGLT2 inhibitors.  However, because of their pancreas transplant, would avoid GLP1 agonists due to increased risk of pancreatitis.     # Other Significant PMH:   - None     # Skin Cancer Risk:    - Discussed sun protection and recommend regular follow up with Dermatology.    # Transplant History:  Etiology of Kidney Failure: Diabetes mellitus type 2  Tx: SPK  Transplant: 6/11/2021 (Kidney / Pancreas)  Significant transplant-related complications: BK Viremia    Transplant Office Phone Number: [phone]    Assessment and plan was discussed with the patient and he voiced his understanding and agreement.    Return visit: Return in about 1 year (around 1/16/2026).    Roman Fraser MD    The longitudinal plan of care for the diagnosis(es)/condition(s) as documented were addressed during this visit. Due to the added complexity in care,  I will continue to support Tye in the subsequent management and with ongoing continuity of care.      Chief Complaint  Mr. Cramer is a 44 year old here for kidney transplant and immunosuppression management.     History of Present Illness   Mr. Cramer reports feeling good overall.  Since last clinic visit:   Hospitalizations: No   New Medical Issues: No  Active/Exercise: Yes, but less so in winter  Chest pain or shortness of breath: No  Lower extremity swelling: No  Weight change: No   Appetite change: Yes; Slightly less since starting phentermine.  Nausea and vomiting: No  Diarrhea: No  Heartburn symptoms: No  Fever, sweats or chills: No  Night sweats: No  Urinary complaints: No    Home BP: Not checked    Problem List  Patient Active Problem List   Diagnosis     HTN, kidney transplant related     Type 2 diabetes mellitus (H)     Vitamin D deficiency     Gallstones, common bile duct     Pancreas replaced by transplant (H)     Kidney replaced by transplant     Immunosuppressed status     Hypomagnesemia     BK viremia     Aftercare following organ transplant     CKD (chronic kidney disease) stage 2, GFR 60-89 ml/min     Class 1 obesity due to excess calories without serious comorbidity with body mass index (BMI) of 32.0 to 32.9 in adult       Allergies  Allergies   Allergen Reactions     Metoprolol      SOB, but was also experiencing significant edema not drug associated       Medications  Current Outpatient Medications   Medication Sig Dispense Refill     aspirin (ASA) 81 MG EC tablet Take 1 tablet (81 mg) by mouth daily 90 tablet 3     carvedilol (COREG) 6.25 MG tablet Take 1 tablet (6.25 mg) by mouth 2 times daily 120 tablet 3     losartan (COZAAR) 25 MG tablet Take 1 tablet (25 mg) by mouth at bedtime 90 tablet 3     magnesium oxide (MAG-OX) 400 MG tablet Take 1 tablet (400 mg) by mouth daily 60 tablet 11     montelukast (SINGULAIR) 10 MG tablet Take 10 mg by mouth At Bedtime       mycophenolate (GENERIC  EQUIVALENT) 250 MG capsule Take 2 capsules (500 mg) by mouth 2 times daily. 120 capsule 11     mycophenolate (GENERIC EQUIVALENT) 250 MG capsule Take 2 capsules (500 mg) by mouth 2 times daily. 120 capsule 11     rosuvastatin (CRESTOR) 10 MG tablet Take 1 tablet (10 mg) by mouth daily 90 tablet 0     tacrolimus (GENERIC EQUIVALENT) 0.5 MG capsule Take 1 capsule (0.5 mg) by mouth 2 times daily. Total dose 2.5 mg twice daily. 60 capsule 11     tacrolimus (GENERIC EQUIVALENT) 1 MG capsule Take 2 capsules (2 mg) by mouth 2 times daily. Total dose 2.5 mg twice daily. 120 capsule 11     vitamin D3 (CHOLECALCIFEROL) 2000 units (50 mcg) tablet Take 1 tablet (2,000 Units) by mouth daily 90 tablet 0     vitamin D3 (CHOLECALCIFEROL) 50 mcg (2000 units) tablet Take 0.5 tablets (25 mcg) by mouth daily 15 tablet 11     No current facility-administered medications for this visit.     There are no discontinued medications.    Physical Exam  Vital Signs: There were no vitals taken for this visit.    GENERAL APPEARANCE: alert and no distress  HENT: no obvious abnormalities on appearance  RESP: breathing appears unremarkable with normal rate, no audible wheezing or cough and no apparent shortness of breath with conversation  MS: extremities normal - no gross deformities noted  SKIN: no apparent rash and normal skin tone  NEURO: speech is clear with no obvious neurological deficits  PSYCH: mentation appears normal and affect normal    Data        Latest Ref Rng & Units 1/8/2025     7:35 AM 12/11/2024     7:27 AM 11/6/2024     7:15 AM   Renal   Na (external) 136 - 145 mmol/L 142  140     142    K (external) 3.5 - 5.1 mmol/L 4.4  4.3     4.7    Cl 98 - 109 mmol/L 108  108     108    Cl (external) 98 - 109 mmol/L 108  108     108    CO2 (external) 20 - 29 mmol/L 24  24     25    BUN (external) 7 - 26 mg/dL 16  15     15    Cr (external) 0.73 - 1.18 mg/dL 0.93  0.85     1.06    Glucose (external) 70 - 100 mg/dL 123  113     121    Ca  (external) 8.4 - 10.4 mg/dL 9.7  9.2     9.5        This result is from an external source.         Latest Ref Rng & Units 2/7/2024     7:36 AM 6/8/2022     8:11 AM 5/11/2022     8:11 AM   Bone Health   Phos (external) 2.3 - 4.7 mg/dL  2.4  2.3       Vit D Def (external) 30 - 80 ng/mL 26             This result is from an external source.         Latest Ref Rng & Units 1/8/2025     7:35 AM 12/11/2024     7:27 AM 11/6/2024     7:15 AM   Heme   WBC (external) 3.5 - 10.5 x10(9)/L 6.2  8.2     7.1    Hgb (external) 13.5 - 17.5 g/dL 15.4  14.6     15.5    Plt (external) 150 - 450 x10(9)/L 291  284     287    ABSOLUTE NEUTROPHILS (EXTERNAL) 1.7 - 7.0 10(9)/L 3.5         ABSOLUTE LYMPHOCYTES (EXTERNAL) 1.0 - 4.8 10(9)/L 1.6         ABSOLUTE MONOCYTES (EXTERNAL) 0.2 - 0.9 10(9)/L 0.7         ABSOLUTE EOSINOPHILS (EXTERNAL) 0.0 - 0.5 10(9)/L 0.4         ABSOLUTE BASOPHILS (EXTERNAL) 0.0 - 0.3 10(9)/L 0.0             This result is from an external source.         Latest Ref Rng & Units 6/8/2022     8:11 AM 12/8/2021     8:00 AM 6/10/2021     9:27 AM   Liver   AP 40 - 150 U/L   84    AP (external) 40 - 150 U/L 106  121     TBili 0.2 - 1.3 mg/dL   0.4    TBili (external) 0.2 - 1.2 mg/dL 0.5  0.5     DBili (external) 0.0 - 0.5 mg/dL 0.1  0.2     ALT 0 - 70 U/L   32    ALT (external) 0 - 55 U/L 10  18     AST 0 - 45 U/L   32    AST (external) 10 - 40 U/L 22  27     Tot Protein 6.8 - 8.8 g/dL   7.2    Tot Protein (external) 6.4 - 8.3 g/dL 7.6  7.5     Albumin 3.4 - 5.0 g/dL   3.9    Albumin (external) 3.5 - 5.0 g/dL 3.9  4.0           Latest Ref Rng & Units 1/8/2025     7:35 AM 12/11/2024     7:27 AM 11/6/2024     7:15 AM   Pancreas   A1C (external) <=5.6 % 6.3         Amylase (external) 25 - 125 U/L 48  47     71    Lipase (external) 25 - 125 U/L 48  47     71        This result is from an external source.         Latest Ref Rng & Units 6/8/2022     8:11 AM   Iron studies   Iron (external) 8 - 78 U/L 124          Latest Ref Rng  & Units 1/10/2024     7:26 AM 12/6/2023     7:31 AM 11/8/2023     7:36 AM   [facility] T[facility] Virology   BK Quant Log Ext log IU/mL 1.54  1.73  1.66    BK Quant Result Ext IU/mL 34  54  45    BK Quant Spec Ext  Plasma        Failed to redirect to the Timeline version of the REVFS SmartLink.  Recent Labs   Lab Test 06/25/21  0630 06/27/21  0702 07/08/21  0829   DOSTAC 2,000 Not Provided Not Provided   TACROL 9.5 8.4 17.6*     Recent Labs   Lab Test 06/24/21  0642 06/25/21  0630 07/08/21  0829   DOSMPA 2000 6/23/2021 2,000 2015pm 7/7/21   MPACID 4.31* 1.43 7.94*   MPAG 100.9* 79.8 152.5*          Again, thank you for allowing me to participate in the care of your patient.        Sincerely,        Rmoan Fraser MD    Electronically signed

## 2025-01-19 PROBLEM — Z29.89 NEED FOR PNEUMOCYSTIS PROPHYLAXIS: Status: RESOLVED | Noted: 2023-08-09 | Resolved: 2025-01-19

## 2025-01-19 NOTE — PATIENT INSTRUCTIONS
Patient Recommendations:  - Recommend checking blood pressure at home on a regular basis, such as once every week or two, and follow up with primary Nephrologist or PCP if above goal of less than 130/80.  - Recommend routine follow up with Dr. Dietrich in 4-6 months or so to resume care with a goal of ongoing co-management between your primary Nephrologist and the Transplant Team.     Transplant Patient Information  Your Post Transplant Coordinator is: Laura Deleon  For non urgent items, we encourage you to contact your coordinator/care team online via [employer]  You and your care team can also contact your transplant coordinator Monday - Friday, 8am - 5pm at [phone] (Option 2 to reach the coordinator or Option 4 to schedule an appointment).  After hours for urgent matters, please call [facility] at [phone].

## 2025-01-21 DIAGNOSIS — I15.1 HTN, KIDNEY TRANSPLANT RELATED: Primary | ICD-10-CM

## 2025-01-21 DIAGNOSIS — Z94.0 HTN, KIDNEY TRANSPLANT RELATED: Primary | ICD-10-CM

## 2025-01-21 DIAGNOSIS — B34.8 BK VIREMIA: ICD-10-CM

## 2025-02-05 LAB
DONOR IDENTIFICATION: NORMAL
DSA COMMENTS: NORMAL
DSA PRESENT: NO
DSA TEST METHOD: NORMAL
ORGAN: NORMAL
SA 1  COMMENTS: NORMAL
SA 1 CELL: NORMAL
SA 1 TEST METHOD: NORMAL
SA 2 CELL: NORMAL
SA 2 COMMENTS: NORMAL
SA 2 TEST METHOD: NORMAL
SA1 HI RISK ABY: NORMAL
SA1 MOD RISK ABY: NORMAL
SA2 HI RISK ABY: NORMAL
SA2 MOD RISK ABY: NORMAL
UNACCEPTABLE ANTIGENS: NORMAL
UNOS CPRA: 38

## 2025-02-13 DIAGNOSIS — Z94.0 HTN, KIDNEY TRANSPLANT RELATED: Primary | ICD-10-CM

## 2025-02-13 DIAGNOSIS — B34.8 BK VIREMIA: ICD-10-CM

## 2025-02-13 DIAGNOSIS — I15.1 HTN, KIDNEY TRANSPLANT RELATED: Primary | ICD-10-CM

## 2025-02-19 ENCOUNTER — TELEPHONE (OUTPATIENT)
Dept: TRANSPLANT | Facility: CLINIC | Age: 45
End: 2025-02-19
Payer: MEDICARE

## 2025-02-19 DIAGNOSIS — I15.1 HTN, KIDNEY TRANSPLANT RELATED: Primary | ICD-10-CM

## 2025-02-19 DIAGNOSIS — Z94.0 HTN, KIDNEY TRANSPLANT RELATED: Primary | ICD-10-CM

## 2025-02-19 DIAGNOSIS — Z94.0 KIDNEY REPLACED BY TRANSPLANT: ICD-10-CM

## 2025-02-19 DIAGNOSIS — Z94.83 PANCREAS TRANSPLANTED (H): ICD-10-CM

## 2025-02-19 DIAGNOSIS — B34.8 BK VIREMIA: ICD-10-CM

## 2025-02-19 DIAGNOSIS — Z94.0 KIDNEY TRANSPLANTED: ICD-10-CM

## 2025-02-19 RX ORDER — TACROLIMUS 0.5 MG/1
0.5 CAPSULE ORAL 2 TIMES DAILY
Qty: 60 CAPSULE | Refills: 11 | Status: SHIPPED | OUTPATIENT
Start: 2025-02-19

## 2025-02-19 RX ORDER — TACROLIMUS 1 MG/1
3 CAPSULE ORAL 2 TIMES DAILY
Qty: 180 CAPSULE | Refills: 11 | Status: SHIPPED | OUTPATIENT
Start: 2025-02-19

## 2025-02-19 NOTE — TELEPHONE ENCOUNTER
ISSUE:   Tacrolimus IR level 3.9 on 02/12/25, goal 5, dose 2.5 mg BID.    PLAN:   Call Patient and confirm this was an accurate 12-hour trough.   Verify Tacrolimus IR dose 2.5 mg BID.   Confirm no new medications or illness.   Confirm no missed doses.     If accurate trough and accurate dose, increase Tacrolimus IR dose to 3 mg BID  *Recommended dose rounded from calculated dose 3.21 mg  BID.      Repeat labs in 2 weeks.   For any dose change <50%, recheck labs per guideline or within 1 month.  For any dose change of more than 50%, recheck drug level based on timing to reach steady state:   Immediate release tacrolimus: 2-3 days  Extended-release tacrolimus: 7 days  Cyclosporine: 2 days  Sirolimus: 12 days  Everolimus: 8 days      OUTCOME:   See MyChart message

## 2025-02-19 NOTE — LETTER
PHYSICIAN ORDERS      DATE & TIME ISSUED: 2025  4:15 PM   PATIENT NAME: Erik Cramre   : 1980     [facility] MR# [if applicable]: [id number]     DIAGNOSIS:  kidney replaced by transplant; pancreas transplant recipient  ICD-10 CODE: z94.0; z94.83     Please complete the following in 1-2 weeks:  - Tacrolimus level (please note time and date of last dose)  - Basic metabolic panel  - Complete blood count with platelets      Any questions please call: [phone]    Please fax each result same day as resulted/available    Critical lab results page [phone]    Please fax these results to [phone]    .

## 2025-04-03 ENCOUNTER — TELEPHONE (OUTPATIENT)
Dept: TRANSPLANT | Facility: CLINIC | Age: 45
End: 2025-04-03
Payer: MEDICARE

## 2025-04-03 DIAGNOSIS — B34.8 BK VIREMIA: ICD-10-CM

## 2025-04-03 DIAGNOSIS — Z94.0 HTN, KIDNEY TRANSPLANT RELATED: Primary | ICD-10-CM

## 2025-04-03 DIAGNOSIS — I15.1 HTN, KIDNEY TRANSPLANT RELATED: Primary | ICD-10-CM

## 2025-04-03 NOTE — LETTER
April 3, 2025    Re: Erik Cramer        : 1980        Dear Care Team,    Erik received a transplant on 2021 (Kidney / Pancreas).    We will continue to follow Erik to monitor his status. We look forward to working with you to help ensure the best possible outcome.     We appreciate the opportunity to be a part of his care at the [facility] Solid Organ Transplant Program. For any questions, please contact the Transplant Office at [phone].      Sincerely,      Solid Organ Transplant  [facility], [facility]    cc: Care Team

## 2025-04-03 NOTE — TELEPHONE ENCOUNTER
Patient called requesting a letter as proof of his transplant status for insurance. Letter generated and sent via [employer].

## 2025-04-03 NOTE — TELEPHONE ENCOUNTER
Patient Call: General  Route to LPN    Reason for call: Pt called asking to speak to RNCC    Call back needed? Yes    Return Call Needed  Same as documented in contacts section  When to return call?: Same day: Route High Priority

## 2025-04-26 ENCOUNTER — HEALTH MAINTENANCE LETTER (OUTPATIENT)
Age: 45
End: 2025-04-26

## 2025-05-08 ENCOUNTER — RESULTS FOLLOW-UP (OUTPATIENT)
Dept: TRANSPLANT | Facility: CLINIC | Age: 45
End: 2025-05-08
Payer: MEDICARE

## 2025-05-08 DIAGNOSIS — Z94.0 HTN, KIDNEY TRANSPLANT RELATED: Primary | ICD-10-CM

## 2025-05-08 DIAGNOSIS — B34.8 BK VIREMIA: ICD-10-CM

## 2025-05-08 DIAGNOSIS — I15.1 HTN, KIDNEY TRANSPLANT RELATED: Primary | ICD-10-CM

## 2025-05-15 ENCOUNTER — RESULTS FOLLOW-UP (OUTPATIENT)
Dept: TRANSPLANT | Facility: CLINIC | Age: 45
End: 2025-05-15
Payer: MEDICARE

## 2025-05-15 DIAGNOSIS — B34.8 BK VIREMIA: ICD-10-CM

## 2025-05-15 DIAGNOSIS — I15.1 HTN, KIDNEY TRANSPLANT RELATED: Primary | ICD-10-CM

## 2025-05-15 DIAGNOSIS — Z94.0 HTN, KIDNEY TRANSPLANT RELATED: Primary | ICD-10-CM

## 2025-05-30 ENCOUNTER — MYC MEDICAL ADVICE (OUTPATIENT)
Dept: OTHER | Age: 45
End: 2025-05-30

## 2025-05-30 DIAGNOSIS — Z48.22 ENCOUNTER FOR AFTERCARE FOLLOWING KIDNEY TRANSPLANT: ICD-10-CM

## 2025-05-30 DIAGNOSIS — Z94.0 KIDNEY TRANSPLANT RECIPIENT: Primary | ICD-10-CM

## 2025-05-30 DIAGNOSIS — I10 HYPERTENSION: ICD-10-CM

## 2025-05-30 NOTE — LETTER
PHYSICIAN ORDERS      DATE & TIME ISSUED: 2025  1:52 PM   PATIENT NAME: Erik Cramer   : 1980     [facility] MR# [if applicable]: [id number]     DIAGNOSIS:  Kidney transplant recipient   ICD-10 CODE: Z94.0       Please complete the following in ~ 1 week:  - Tacrolimus level (please note time and date of last dose)  - Basic metabolic panel  - Complete blood count with platelets      Any questions please call: [phone]    Please fax each result same day as resulted/available    Critical lab results page [phone]    Please fax these results to [phone]    .

## 2025-06-03 RX ORDER — ADHESIVE BANDAGE 3/4"
BANDAGE TOPICAL
Qty: 1 EACH | Refills: 0 | Status: CANCELLED | OUTPATIENT
Start: 2025-06-03

## 2025-06-04 DIAGNOSIS — B34.8 BK VIREMIA: ICD-10-CM

## 2025-06-04 DIAGNOSIS — I15.1 HTN, KIDNEY TRANSPLANT RELATED: Primary | ICD-10-CM

## 2025-06-04 DIAGNOSIS — Z94.0 HTN, KIDNEY TRANSPLANT RELATED: Primary | ICD-10-CM

## 2025-06-12 ENCOUNTER — RESULTS FOLLOW-UP (OUTPATIENT)
Dept: TRANSPLANT | Facility: CLINIC | Age: 45
End: 2025-06-12
Payer: MEDICARE

## 2025-06-12 DIAGNOSIS — I15.1 HTN, KIDNEY TRANSPLANT RELATED: Primary | ICD-10-CM

## 2025-06-12 DIAGNOSIS — Z94.0 HTN, KIDNEY TRANSPLANT RELATED: Primary | ICD-10-CM

## 2025-06-12 DIAGNOSIS — B34.8 BK VIREMIA: ICD-10-CM

## 2025-07-09 ENCOUNTER — TELEPHONE (OUTPATIENT)
Dept: TRANSPLANT | Facility: CLINIC | Age: 45
End: 2025-07-09
Payer: MEDICARE

## 2025-07-09 ENCOUNTER — LAB (OUTPATIENT)
Dept: LAB | Facility: CLINIC | Age: 45
End: 2025-07-09
Payer: MEDICARE

## 2025-07-09 ENCOUNTER — MYC MEDICAL ADVICE (OUTPATIENT)
Dept: OTHER | Age: 45
End: 2025-07-09

## 2025-07-09 DIAGNOSIS — I15.1 HTN, KIDNEY TRANSPLANT RELATED: Primary | ICD-10-CM

## 2025-07-09 DIAGNOSIS — Z94.0 KIDNEY REPLACED BY TRANSPLANT: ICD-10-CM

## 2025-07-09 DIAGNOSIS — Z94.0 HTN, KIDNEY TRANSPLANT RELATED: Primary | ICD-10-CM

## 2025-07-09 DIAGNOSIS — B34.8 BK VIREMIA: ICD-10-CM

## 2025-07-09 NOTE — TELEPHONE ENCOUNTER
General  Route to LPN    Reason for call: Needs clarity on the new orders     Call back needed? Yes    Return Call Needed  Same as documented in contacts section  When to return call?: Greater than one day: Route standard priority

## 2025-07-10 ENCOUNTER — RESULTS FOLLOW-UP (OUTPATIENT)
Dept: TRANSPLANT | Facility: CLINIC | Age: 45
End: 2025-07-10
Payer: MEDICARE

## 2025-07-10 DIAGNOSIS — I15.1 HTN, KIDNEY TRANSPLANT RELATED: Primary | ICD-10-CM

## 2025-07-10 DIAGNOSIS — Z94.0 HTN, KIDNEY TRANSPLANT RELATED: Primary | ICD-10-CM

## 2025-07-10 DIAGNOSIS — B34.8 BK VIREMIA: ICD-10-CM

## 2025-07-11 NOTE — LETTER
"Physical Therapy Treatment    Patient Name:  Ran Reyes Jr.   MRN:  [id number]    Recommendations:     Discharge Recommendations: Moderate Intensity Therapy  Discharge Equipment Recommendations: to be determined by next level of care  Barriers to discharge: Decreased caregiver support    Assessment:     Ran Reyes Jr. is a 69 y.o. male admitted with a medical diagnosis of CHF (congestive heart failure).  He presents with the following impairments/functional limitations: weakness, impaired endurance, impaired functional mobility, gait instability, pain, decreased safety awareness, impaired balance, decreased lower extremity function, decreased ROM, decreased upper extremity function, impaired cardiopulmonary response to activity.    Rehab Prognosis: Fair; patient would benefit from acute skilled PT services to address these deficits and reach maximum level of function.    Recent Surgery: * No surgery found *      Plan:     During this hospitalization, patient to be seen 3 x/week to address the identified rehab impairments via gait training, therapeutic activities, therapeutic exercises, neuromuscular re-education and progress toward the following goals:    Plan of Care Expires:  07/23/25    Subjective     Chief Complaint: Pt reports "I'm not getting up. I'm hurting. I didn't get any sleep." Pt adamantly refused all PT interventions at this time despite encouragement.   Patient/Family Comments/goals: none stated  Pain/Comfort:  Pain Rating 1: 8/10  Location - Side 1: Bilateral  Location - Orientation 1: generalized  Location 1: leg  Pain Addressed 1: Pre-medicate for activity  Pain Rating Post-Intervention 1: 8/10      Objective:     Communicated with epic chart review prior to session.  Patient found right sidelying with peripheral IV, oxygen, telemetry upon PT entry to room.     General Precautions: Standard, fall, respiratory  Orthopedic Precautions: N/A  Braces: N/A  Respiratory Status: Nasal cannula, " 2019       RE: Erik Cramer  [address] [place] [id number]     Dear Colleague,    Thank you for referring your patient, Erik Cramer, to the [facility] at [facility]. Please see a copy of my visit note below.    Transplant Surgery Progress Note    Medical record number: [id number]  YOB: 1980,   Date of Visit:  2019  For followup after laparoscopic cholecystectomy.    Assessment and Recommendations: Mr. Cramer is doing well after laparoscopic cholecystectomy.    1. Lifting restrictions: 10 lbs until 6 weeks   2. Incision: healing well.     Total time: 15 minutes  Counselling time: 10 minutes        ---------------------------------------------------------------------------------------------------    S: Mr. Cramer is a 39-year-old male who presents to clinic today for follow-up after laparoscopic cholecystectomy.    Patient overall is doing well he has no complaints.  He denies any fevers, chills, nausea or vomiting.    He states his incision is healing well  Medications:  Prescription Medications as of 2020       Rx Number Disp Refills Start End Last Dispensed Date Next Fill Date Owning Pharmacy    Alcohol Sheets (ALCOH-WIPE) [facility]    2016        Sig: Apply 1 each topically    Class: Historical    Route: Topical    amLODIPine (NORVASC) 10 MG tablet    2019       Sig: Take 10 mg by mouth    Class: Historical    Route: Oral    atorvastatin (LIPITOR) 40 MG tablet   2 2018        Sig: Take 80 mg by mouth daily     Class: Historical    Route: Oral    blood glucose (ACCU-CHEK GUIDE) test strip    2019        Si strip    Class: Historical    blood glucose monitoring (ACCU-CHEK FASTCLIX) lancets    2019        Sig: Testing blood sugars 2 time(s) a day.  Indications: Type 2 Diabetes    Class: Historical    calcium carbonate (OS-SHAY) 1500 (600 Ca) MG tablet   12 2019    [facility]  "flow 5 L/min     Functional Mobility:  Pt refused all PT interventions at this time.   Pt rolled to L with SPV to grab urinal from bed side table.       AM-PAC 6 CLICK MOBILITY  Turning over in bed (including adjusting bedclothes, sheets and blankets)?:  (Pt refused all mobility at this time)       Treatment & Education:  Reviewed role of PT in acute care and POC. Pt refused all OOB/EOB activity and supine TherEx, despite max encouragement from therapist, due to pain and fatigue. Educated on the importance of OOB/EOB activity for his recovery. Educated on importance of consistent participation with PT. Educated on importance of TherEx to maintain/regain strength, encouraged to complete supine TherEx (hip flex, hip abd/add, heel slides, quad sets, ankle pumps) throughout the day. Encouraged frequent position changes to reduce the risk of pressure injury. Encouraged to sit up in the chair for all meals. Reviewed "call don't fall" policy and increased risk of falling due to weakness, instructed to utilize call bell for assistance with all transfers. Pt agreeable to all requests.      Patient left HOB elevated with all lines intact and call button in reach..    GOALS:   Multidisciplinary Problems       Physical Therapy Goals          Problem: Physical Therapy    Goal Priority Disciplines Outcome Interventions   Physical Therapy Goal     PT, PT/OT     Description: Pt will perform bed mobility independently in order to participate in EOB activity.  Pt will perform transfers with independently in order to participate in OOB activity.  Pt will ambulate 350 ft SPV with LRAD in order to participate in daily tasks.   Pt will improve functional status as evidenced by 2 pt increase in AMPAC score.                        DME Justifications:  No DME recommended requiring DME justifications    Time Tracking:     PT Received On: 07/11/25  PT Start Time: 0915     PT Stop Time: 0923  PT Total Time (min): 8 min     Billable Minutes: " [address]    Si mg    Class: Historical    calcium carbonate (TUMS) 500 MG chewable tablet    2019        Sig: Take 1,000 mg by mouth Pt says he takes regular calcium-not TUMS chewables    Class: Historical    Route: Oral    cloNIDine (CATAPRES) 0.1 MG tablet            Sig: Take 0.1 mg by mouth 2 times daily    Class: Historical    Route: Oral    emollient (VANICREAM) external cream  453 g 1 2019    [facility] [address]    Sig: Apply topically as needed for other (apply to abdomen and directed)    Class: E-Prescribe    Route: Topical    furosemide (LASIX) 20 MG tablet  60 tablet 1 2019    [facility], [place] - [address] [place] [address]    Sig: Take 2 tablets (40 mg) by mouth 2 times daily    Class: E-Prescribe    Route: Oral    hydrALAZINE (APRESOLINE) 25 MG tablet   0 2018        Si mg 3 times daily     Class: Historical    HYDROcodone-acetaminophen (NORCO) 5-325 MG tablet  6 tablet 0 12/10/2019    [place], [place] - [address] [place] [address]    Sig: Take 1-2 tablets by mouth every 6 hours as needed for moderate to severe pain    Class: Local Print    Earliest Fill Date: 12/10/2019    Route: Oral    insulin pen needle (BD RAÚL U/F) 32G X 4 MM miscellaneous    2018        Sig: use as directed with lantus pen once a day    Class: Historical    LANTUS SOLOSTAR 100 UNIT/ML soln   2 2019        Si Units At Bedtime     Class: Historical    losartan (COZAAR) 100 MG tablet            Sig: Take 100 mg by mouth daily    Class: Historical    Route: Oral    multivitamin (OCUVITE) TABS tablet        [address]    Sig: Take 2 tablets by mouth daily    Class: Historical    Route: Oral    senna-docusate (SENOKOT-S/PERICOLACE) 8.6-50 MG tablet  30 tablet 0 12/10/2019    [facility] [place] [place] - [address]    Sig: Take 1-2 tablets by mouth 2 times daily    Class: Local Print    Route: Oral    vitamin D3 (CHOLECALCIFEROL) 2000 units (50 mcg) tablet  90 tablet 0 7/22/2019    [facility] [place] [address]    Sig: Take 1 tablet (2,000 Units) by mouth daily    Class: E-Prescribe    Route: Oral          Exam:      Appearance: in no apparent distress.   Skin: normal  Head and Neck: Normal, no rashes or jaundice  Respiratory: normal respiratory excursions, no audible wheeze  Cardiovascular: RRR  Abdomen: rounded, Surgical scars consistent with history   Extremeties: Edema, none  Neuro: without deficit       Labs:   Recent Labs   Lab Test 12/10/19  1111 07/29/19  1252   BUN  --  101*   CR 7.70* 7.79*   GFRESTIMATED 8* 8*   GLC  --  138*     Recent Labs   Lab Test 07/29/19  1240   COLOR Straw   APPEARANCE Clear   URINEGLC 50*   URINEBILI Negative   URINEKETONE Negative   SG 1.010   UBLD Negative   URINEPH 5.0   PROTEIN >499*   NITRITE Negative   LEUKEST Negative   RBCU 5*   WBCU 3     Recent Labs   Lab Test 07/18/19  1200   [facility] 6.20*       Again, thank you for allowing me to participate in the care of your patient.      Sincerely,    Leilani Vincent NP       Therapeutic Activity 8min    Treatment Type: Treatment  PT/PTA: PT     Number of PTA visits since last PT visit: 0     07/11/2025

## 2025-07-14 ENCOUNTER — RESULTS FOLLOW-UP (OUTPATIENT)
Dept: TRANSPLANT | Facility: CLINIC | Age: 45
End: 2025-07-14
Payer: MEDICARE

## 2025-07-14 DIAGNOSIS — Z94.0 HTN, KIDNEY TRANSPLANT RELATED: Primary | ICD-10-CM

## 2025-07-14 DIAGNOSIS — I15.1 HTN, KIDNEY TRANSPLANT RELATED: Primary | ICD-10-CM

## 2025-07-14 DIAGNOSIS — B34.8 BK VIREMIA: ICD-10-CM

## 2025-07-15 DIAGNOSIS — Z94.0 KIDNEY REPLACED BY TRANSPLANT: Primary | ICD-10-CM

## 2025-07-15 DIAGNOSIS — Z94.0 HTN, KIDNEY TRANSPLANT RELATED: ICD-10-CM

## 2025-07-15 DIAGNOSIS — B34.8 BK VIREMIA: ICD-10-CM

## 2025-07-15 DIAGNOSIS — I15.1 HTN, KIDNEY TRANSPLANT RELATED: ICD-10-CM

## 2025-07-19 ENCOUNTER — HEALTH MAINTENANCE LETTER (OUTPATIENT)
Age: 45
End: 2025-07-19

## 2025-07-21 DIAGNOSIS — Z94.0 KIDNEY REPLACED BY TRANSPLANT: ICD-10-CM

## 2025-07-21 DIAGNOSIS — Z94.0 KIDNEY TRANSPLANTED: ICD-10-CM

## 2025-07-21 DIAGNOSIS — Z94.83 PANCREAS TRANSPLANTED (H): ICD-10-CM

## 2025-07-21 RX ORDER — TACROLIMUS 1 MG/1
3 CAPSULE ORAL 2 TIMES DAILY
Qty: 180 CAPSULE | Refills: 11 | Status: SHIPPED | OUTPATIENT
Start: 2025-07-21

## 2025-07-31 ENCOUNTER — RESULTS FOLLOW-UP (OUTPATIENT)
Dept: TRANSPLANT | Facility: CLINIC | Age: 45
End: 2025-07-31
Payer: MEDICARE

## 2025-07-31 DIAGNOSIS — B34.8 BK VIREMIA: ICD-10-CM

## 2025-07-31 DIAGNOSIS — Z94.0 HTN, KIDNEY TRANSPLANT RELATED: ICD-10-CM

## 2025-07-31 DIAGNOSIS — I15.1 HTN, KIDNEY TRANSPLANT RELATED: ICD-10-CM

## 2025-07-31 LAB
DONOR IDENTIFICATION: NORMAL
DSA COMMENTS: NORMAL
DSA PRESENT: NO
DSA TEST METHOD: NORMAL
ORGAN: NORMAL
SA 1  COMMENTS: NORMAL
SA 1 CELL: NORMAL
SA 1 TEST METHOD: NORMAL
SA 2 CELL: NORMAL
SA 2 COMMENTS: NORMAL
SA 2 TEST METHOD: NORMAL
SA1 HI RISK ABY: NORMAL
SA1 MOD RISK ABY: NORMAL
SA2 HI RISK ABY: NORMAL
SA2 MOD RISK ABY: NORMAL
UNACCEPTABLE ANTIGENS: NORMAL
UNOS CPRA: 44

## 2025-08-18 ENCOUNTER — RESULTS FOLLOW-UP (OUTPATIENT)
Dept: TRANSPLANT | Facility: CLINIC | Age: 45
End: 2025-08-18
Payer: MEDICARE

## 2025-08-18 DIAGNOSIS — B34.8 BK VIREMIA: ICD-10-CM

## 2025-08-18 DIAGNOSIS — I15.1 HTN, KIDNEY TRANSPLANT RELATED: ICD-10-CM

## 2025-08-18 DIAGNOSIS — Z94.0 HTN, KIDNEY TRANSPLANT RELATED: ICD-10-CM

## 2025-08-26 ENCOUNTER — TELEPHONE (OUTPATIENT)
Dept: TRANSPLANT | Facility: CLINIC | Age: 45
End: 2025-08-26
Payer: MEDICARE

## 2025-08-26 DIAGNOSIS — Z94.0 HTN, KIDNEY TRANSPLANT RELATED: ICD-10-CM

## 2025-08-26 DIAGNOSIS — Z94.0 KIDNEY TRANSPLANTED: Primary | ICD-10-CM

## 2025-08-26 DIAGNOSIS — B34.8 BK VIREMIA: ICD-10-CM

## 2025-08-26 DIAGNOSIS — I15.1 HTN, KIDNEY TRANSPLANT RELATED: ICD-10-CM

## 2025-08-28 ENCOUNTER — RESULTS FOLLOW-UP (OUTPATIENT)
Dept: TRANSPLANT | Facility: CLINIC | Age: 45
End: 2025-08-28
Payer: MEDICARE

## 2025-08-28 DIAGNOSIS — Z94.0 KIDNEY REPLACED BY TRANSPLANT: ICD-10-CM

## 2025-08-28 DIAGNOSIS — Z94.0 KIDNEY TRANSPLANTED: ICD-10-CM

## 2025-08-28 DIAGNOSIS — Z94.0 HTN, KIDNEY TRANSPLANT RELATED: ICD-10-CM

## 2025-08-28 DIAGNOSIS — Z94.83 PANCREAS TRANSPLANTED (H): ICD-10-CM

## 2025-08-28 DIAGNOSIS — I15.1 HTN, KIDNEY TRANSPLANT RELATED: ICD-10-CM

## 2025-08-28 DIAGNOSIS — B34.8 BK VIREMIA: ICD-10-CM

## 2025-09-03 RX ORDER — TACROLIMUS 1 MG/1
4 CAPSULE ORAL 2 TIMES DAILY
Qty: 240 CAPSULE | Refills: 11 | Status: SHIPPED | OUTPATIENT
Start: 2025-09-03

## (undated) DEVICE — SU PROLENE 3-0 SHDA 36" 8522H

## (undated) DEVICE — PACK SET-UP STD 9102

## (undated) DEVICE — Device

## (undated) DEVICE — SYR 30ML LL W/O NDL 302832

## (undated) DEVICE — DEVICE SUTURE GRASPER TROCAR CLOSURE 14GA PMITCSG

## (undated) DEVICE — COVER LIGHT HANDLE  HILL-ROM C LT-C02

## (undated) DEVICE — ENDO TROCAR BLUNT TIP KII BALLOON 12X130MM C0R50

## (undated) DEVICE — ENDO TROCAR FIRST ENTRY KII FIOS Z-THRD 05X100MM CTF03

## (undated) DEVICE — SYR 01ML 27GA 0.5" NDL TBC 309623

## (undated) DEVICE — SU PDS II 5-0 RB-1 27" Z303H

## (undated) DEVICE — DEVICE SUTURE PASSER 14GA WECK EFX EFXSP2

## (undated) DEVICE — CLIP HORIZON LG ORANGE 004200

## (undated) DEVICE — SU SILK 4-0 TIE 12X30" A303H

## (undated) DEVICE — ENDO SNARE POLYPECTOMY OVAL 10MM LOOP SD-240U-10

## (undated) DEVICE — SU SILK 2-0 TIE 12X30" A305H

## (undated) DEVICE — SU PDS II 4-0 SH 27" Z315H

## (undated) DEVICE — DEVICE CATH STABILIZATION STATLOCK FOLEY 3-WAY FOL0105

## (undated) DEVICE — ESU ELEC BLADE 6" COATED E1450-6

## (undated) DEVICE — DRAPE ISOLATION BAG 1003

## (undated) DEVICE — BNDG ABDOMINAL BINDER 9X62-84" 79-89210

## (undated) DEVICE — TUBING SUCTION 12"X1/4" N612

## (undated) DEVICE — CATH TRAY FOLEY SURESTEP 16FR W/URNE MTR STLK LATEX A303316A

## (undated) DEVICE — SU MONOCRYL 4-0 PS-2 27" UND Y426H

## (undated) DEVICE — SPECIMEN CONTAINER 3OZ W/FORMALIN 59901

## (undated) DEVICE — SPONGE LAP 18X18" X8435

## (undated) DEVICE — ADH SKIN CLOSURE PREMIERPRO EXOFIN 1.0ML 3470

## (undated) DEVICE — SU VICRYL 3-0 SH 27" UND J416H

## (undated) DEVICE — SU ETHILON 3-0 PS-1 18" 1663H

## (undated) DEVICE — SURGICEL ABSORBABLE HEMOSTAT SNOW 4"X4" 2083

## (undated) DEVICE — SUCTION CATH AIRLIFE TRI-FLO W/CONTROL PORT 14FR  T60C

## (undated) DEVICE — STPL SKIN 35W ROTATING HEAD PRW35

## (undated) DEVICE — SU PROLENE 6-0 RB-2DA 30" 8711H

## (undated) DEVICE — NDL COUNTER 20CT 31142493

## (undated) DEVICE — SU PDS II 1 TP-1 48" Z880G

## (undated) DEVICE — SU SILK 3-0 SH CR 8X18" C013D

## (undated) DEVICE — ENDO TROCAR SLEEVE KII Z-THREADED 05X100MM CTS02

## (undated) DEVICE — SU SILK 3-0 TIE 12X30" A304H

## (undated) DEVICE — CLIP HORIZON MED BLUE 002200

## (undated) DEVICE — ENDO POUCH UNIV RETRIEVAL SYSTEM INZII 10MM CD001

## (undated) DEVICE — ENDO TRAP POLYP E-TRAP 00711099

## (undated) DEVICE — ESU PENCIL W/COATED BLADE E2450H

## (undated) DEVICE — BLADE CLIPPER SGL USE 9680

## (undated) DEVICE — SOL NACL 0.9% IRRIG 1000ML BOTTLE 2F7124

## (undated) DEVICE — BASIN SET SINGLE STERILE 13752-624

## (undated) DEVICE — SU PDS II 6-0 RB-2DA 30" Z149H

## (undated) DEVICE — SUCTION MANIFOLD NEPTUNE 2 SYS 4 PORT 0702-020-000

## (undated) DEVICE — LINEN TOWEL PACK X6 WHITE 5487

## (undated) DEVICE — SU VICRYL 0 TIE 54" J608H

## (undated) DEVICE — SU SILK 2-0 SH CR 5X18" C0125

## (undated) DEVICE — LINEN TOWEL PACK X30 5481

## (undated) DEVICE — LIGHT HANDLE X1 31140133

## (undated) DEVICE — INSERT FOGARTY 33MM TRACTION HYDRAJAW HYDRA33

## (undated) DEVICE — ESU ENDO SCISSORS 5MM CVD 5DCS

## (undated) DEVICE — SUTURE BOOTS 051003PBX

## (undated) DEVICE — ENDO CLOSING KIT ENDOCLOSE 173022

## (undated) DEVICE — INSERT FOGARTY 61MM TRACTION HYDRAJAW HYDRA61

## (undated) DEVICE — DRAPE MAYO STAND 23X54 8337

## (undated) DEVICE — GLOVE SENSICARE 7.0 MSG1070 LATEX FREE

## (undated) DEVICE — DRAIN JACKSON PRATT RESERVOIR 100ML SU130-1305

## (undated) DEVICE — WIPES FOLEY CARE SURESTEP PROVON DFC100

## (undated) DEVICE — ENDO BITE BLOCK ADULT OMNI-BLOC

## (undated) DEVICE — ENDO TROCAR OPTICAL ACCESS KII LOW PROFILE 05X55MM CTR21

## (undated) DEVICE — SU VICRYL 0 UR-6 27" J603H

## (undated) DEVICE — SUCTION IRR STRYKERFLOW II W/TIP 250-070-520

## (undated) DEVICE — SYR 30ML SLIP TIP W/O NDL 302833

## (undated) DEVICE — SOL WATER IRRIG 1000ML BOTTLE 2F7114

## (undated) DEVICE — CLIP HORIZON SM RED WIDE SLOT 001201

## (undated) DEVICE — ANTIFOG SOLUTION W/FOAM PAD 31142527

## (undated) DEVICE — PREP CHLORAPREP 26ML TINTED ORANGE  260815

## (undated) DEVICE — NDL 18GAX1.5" 305185

## (undated) DEVICE — SYR 10ML LL W/O NDL 302995

## (undated) DEVICE — SOL NACL 0.9% INJ 1000ML BAG 2B1324X

## (undated) DEVICE — TUBING IRRIG CYSTO/BLADDER SET 81" LF 2C4040

## (undated) DEVICE — SU PROLENE 4-0 SHDA 36" 8521H

## (undated) DEVICE — SU SILK 0 TIE 6X30" A306H

## (undated) DEVICE — SUCTION TIP YANKAUER W/O VENT K86

## (undated) DEVICE — DRAPE IOBAN INCISE 23X17" 6650EZ

## (undated) DEVICE — NDL ANGIOCATH 14GA 1.25" 4048

## (undated) DEVICE — KIT ENDO TURNOVER/PROCEDURE CARRY-ON 101822

## (undated) DEVICE — SU PROLENE 7-0 BV-1DA 4X24" M8702

## (undated) DEVICE — DRSG MEDIPORE 3 1/2X13 3/4" 3573

## (undated) DEVICE — CATH FOLEY 3WAY 20FR 30ML LATEX 0167SI20

## (undated) DEVICE — KIT ENDO FIRST STEP DISINFECTANT 200ML W/POUCH EP-4

## (undated) DEVICE — STPL LINEAR 30X2.5MM VASC TX30V

## (undated) DEVICE — SU PROLENE 5-0 RB-1DA 36"  8556H

## (undated) DEVICE — DRAIN JACKSON PRATT CHANNEL 19FR ROUND HUBLESS SIL JP-2230

## (undated) DEVICE — SUCTION MANIFOLD NEPTUNE 2 SYS 1 PORT 702-025-000

## (undated) DEVICE — ENDO TROCAR FIRST ENTRY KII FIOS Z-THRD 12X100MM CTF73

## (undated) DEVICE — NDL INSUFFLATION 13GA 150MM C2202

## (undated) DEVICE — NDL COUNTER 40CT  31142311

## (undated) DEVICE — SU SILK 1 TIE 6X30" A307H

## (undated) DEVICE — COVER CAMERA IN-LIGHT DISP LT-C02

## (undated) DEVICE — APPLICATOR COTTON TIP 6"X2 STERILE LF 6012

## (undated) DEVICE — GOWN IMPERVIOUS 2XL BLUE

## (undated) DEVICE — DRAPE FLUID WARMING 52 X 60" ORS-321

## (undated) DEVICE — BLADE KNIFE SURG 11 371111

## (undated) DEVICE — ESU GROUND PAD ADULT W/CORD E7507

## (undated) DEVICE — CLIP APPLIER ENDO ROTATING 10MM MED/LG ER320

## (undated) DEVICE — DRAPE SHEET REV FOLD 3/4 9349

## (undated) RX ORDER — FENTANYL CITRATE 50 UG/ML
INJECTION, SOLUTION INTRAMUSCULAR; INTRAVENOUS
Status: DISPENSED
Start: 2021-06-10

## (undated) RX ORDER — FENTANYL CITRATE 50 UG/ML
INJECTION, SOLUTION INTRAMUSCULAR; INTRAVENOUS
Status: DISPENSED
Start: 2021-06-11

## (undated) RX ORDER — EPHEDRINE SULFATE 50 MG/ML
INJECTION, SOLUTION INTRAMUSCULAR; INTRAVENOUS; SUBCUTANEOUS
Status: DISPENSED
Start: 2019-12-10

## (undated) RX ORDER — HEPARIN SODIUM 1000 [USP'U]/ML
INJECTION, SOLUTION INTRAVENOUS; SUBCUTANEOUS
Status: DISPENSED
Start: 2021-06-10

## (undated) RX ORDER — SODIUM CHLORIDE 9 MG/ML
INJECTION, SOLUTION INTRAVENOUS
Status: DISPENSED
Start: 2021-06-29

## (undated) RX ORDER — HYDROMORPHONE HYDROCHLORIDE 1 MG/ML
INJECTION, SOLUTION INTRAMUSCULAR; INTRAVENOUS; SUBCUTANEOUS
Status: DISPENSED
Start: 2021-06-11

## (undated) RX ORDER — ONDANSETRON 2 MG/ML
INJECTION INTRAMUSCULAR; INTRAVENOUS
Status: DISPENSED
Start: 2019-12-10

## (undated) RX ORDER — HYDRALAZINE HYDROCHLORIDE 20 MG/ML
INJECTION INTRAMUSCULAR; INTRAVENOUS
Status: DISPENSED
Start: 2019-12-10

## (undated) RX ORDER — LIDOCAINE HYDROCHLORIDE 10 MG/ML
INJECTION, SOLUTION EPIDURAL; INFILTRATION; INTRACAUDAL; PERINEURAL
Status: DISPENSED
Start: 2019-12-10

## (undated) RX ORDER — LIDOCAINE HYDROCHLORIDE 10 MG/ML
INJECTION, SOLUTION EPIDURAL; INFILTRATION; INTRACAUDAL; PERINEURAL
Status: DISPENSED
Start: 2021-06-29

## (undated) RX ORDER — VERAPAMIL HYDROCHLORIDE 2.5 MG/ML
INJECTION, SOLUTION INTRAVENOUS
Status: DISPENSED
Start: 2021-06-10

## (undated) RX ORDER — HYDRALAZINE HYDROCHLORIDE 20 MG/ML
INJECTION INTRAMUSCULAR; INTRAVENOUS
Status: DISPENSED
Start: 2021-06-11

## (undated) RX ORDER — PROPOFOL 10 MG/ML
INJECTION, EMULSION INTRAVENOUS
Status: DISPENSED
Start: 2019-12-10

## (undated) RX ORDER — REGADENOSON 0.08 MG/ML
INJECTION, SOLUTION INTRAVENOUS
Status: DISPENSED
Start: 2019-10-11

## (undated) RX ORDER — GLYCOPYRROLATE 0.2 MG/ML
INJECTION, SOLUTION INTRAMUSCULAR; INTRAVENOUS
Status: DISPENSED
Start: 2019-12-10

## (undated) RX ORDER — FENTANYL CITRATE 50 UG/ML
INJECTION, SOLUTION INTRAMUSCULAR; INTRAVENOUS
Status: DISPENSED
Start: 2019-12-03

## (undated) RX ORDER — FENTANYL CITRATE 50 UG/ML
INJECTION, SOLUTION INTRAMUSCULAR; INTRAVENOUS
Status: DISPENSED
Start: 2020-08-31

## (undated) RX ORDER — FENTANYL CITRATE 50 UG/ML
INJECTION, SOLUTION INTRAMUSCULAR; INTRAVENOUS
Status: DISPENSED
Start: 2021-06-18

## (undated) RX ORDER — FENTANYL CITRATE 50 UG/ML
INJECTION, SOLUTION INTRAMUSCULAR; INTRAVENOUS
Status: DISPENSED
Start: 2021-06-29

## (undated) RX ORDER — HEPARIN SODIUM 1000 [USP'U]/ML
INJECTION, SOLUTION INTRAVENOUS; SUBCUTANEOUS
Status: DISPENSED
Start: 2021-06-11

## (undated) RX ORDER — LABETALOL HYDROCHLORIDE 5 MG/ML
INJECTION, SOLUTION INTRAVENOUS
Status: DISPENSED
Start: 2021-06-11

## (undated) RX ORDER — GABAPENTIN 300 MG/1
CAPSULE ORAL
Status: DISPENSED
Start: 2019-12-10

## (undated) RX ORDER — ONDANSETRON 2 MG/ML
INJECTION INTRAMUSCULAR; INTRAVENOUS
Status: DISPENSED
Start: 2021-06-11

## (undated) RX ORDER — LIDOCAINE HYDROCHLORIDE 10 MG/ML
INJECTION, SOLUTION EPIDURAL; INFILTRATION; INTRACAUDAL; PERINEURAL
Status: DISPENSED
Start: 2021-06-18

## (undated) RX ORDER — METOCLOPRAMIDE HYDROCHLORIDE 5 MG/ML
INJECTION INTRAMUSCULAR; INTRAVENOUS
Status: DISPENSED
Start: 2019-12-10

## (undated) RX ORDER — SODIUM CHLORIDE 9 MG/ML
INJECTION, SOLUTION INTRAVENOUS
Status: DISPENSED
Start: 2021-07-27

## (undated) RX ORDER — DIPHENHYDRAMINE HYDROCHLORIDE 50 MG/ML
INJECTION INTRAMUSCULAR; INTRAVENOUS
Status: DISPENSED
Start: 2019-12-03

## (undated) RX ORDER — ACETAMINOPHEN 325 MG/1
TABLET ORAL
Status: DISPENSED
Start: 2019-12-10

## (undated) RX ORDER — BUPIVACAINE HYDROCHLORIDE 5 MG/ML
INJECTION, SOLUTION EPIDURAL; INTRACAUDAL
Status: DISPENSED
Start: 2019-12-10

## (undated) RX ORDER — PAPAVERINE HYDROCHLORIDE 30 MG/ML
INJECTION INTRAMUSCULAR; INTRAVENOUS
Status: DISPENSED
Start: 2021-06-10

## (undated) RX ORDER — PHENYLEPHRINE HCL IN 0.9% NACL 1 MG/10 ML
SYRINGE (ML) INTRAVENOUS
Status: DISPENSED
Start: 2019-12-10

## (undated) RX ORDER — LIDOCAINE HYDROCHLORIDE 10 MG/ML
INJECTION, SOLUTION EPIDURAL; INFILTRATION; INTRACAUDAL; PERINEURAL
Status: DISPENSED
Start: 2021-07-27

## (undated) RX ORDER — ONDANSETRON 2 MG/ML
INJECTION INTRAMUSCULAR; INTRAVENOUS
Status: DISPENSED
Start: 2019-12-03

## (undated) RX ORDER — LIDOCAINE HYDROCHLORIDE 20 MG/ML
INJECTION, SOLUTION EPIDURAL; INFILTRATION; INTRACAUDAL; PERINEURAL
Status: DISPENSED
Start: 2019-12-10

## (undated) RX ORDER — SODIUM CHLORIDE 450 MG/100ML
INJECTION, SOLUTION INTRAVENOUS
Status: DISPENSED
Start: 2021-06-11

## (undated) RX ORDER — FENTANYL CITRATE 50 UG/ML
INJECTION, SOLUTION INTRAMUSCULAR; INTRAVENOUS
Status: DISPENSED
Start: 2019-12-10

## (undated) RX ORDER — DEXTROSE, SODIUM CHLORIDE, SODIUM LACTATE, POTASSIUM CHLORIDE, AND CALCIUM CHLORIDE 5; .6; .31; .03; .02 G/100ML; G/100ML; G/100ML; G/100ML; G/100ML
INJECTION, SOLUTION INTRAVENOUS
Status: DISPENSED
Start: 2021-06-11